# Patient Record
Sex: FEMALE | Race: WHITE | NOT HISPANIC OR LATINO | Employment: OTHER | URBAN - METROPOLITAN AREA
[De-identification: names, ages, dates, MRNs, and addresses within clinical notes are randomized per-mention and may not be internally consistent; named-entity substitution may affect disease eponyms.]

---

## 2017-02-02 ENCOUNTER — PATIENT OUTREACH (OUTPATIENT)
Dept: HEALTH INFORMATION MANAGEMENT | Facility: OTHER | Age: 67
End: 2017-02-02

## 2017-02-03 NOTE — PROGRESS NOTES
2/2/17  -  Outcome:CONFIRMED WEB IZ.    COMPLETED NEW MEMBER      Annual Wellness Visit Scheduling  Scheduling Status: Scheduled    Care Gap Scheduling (Attempt to Schedule EACH Overdue Care Gap!)  Health Maintenance Due   Topic Date Due   • IMM DTaP/Tdap/Td Vaccine (1 - Tdap) Added to her appt   • IMM ZOSTER VACCINE  Added to her    • IMM PNEUMOCOCCAL 65+ (ADULT) LOW/MEDIUM RISK SERIES (1 of 2 - PCV13) Added to her    • IMM INFLUENZA (1) Pt said that she had her Flu vaccine in [facility].                                                                                                           Sent a COREY form to [employer].    [employer] Activation:  Already Active

## 2017-02-24 ENCOUNTER — TELEPHONE (OUTPATIENT)
Dept: MEDICAL GROUP | Facility: PHYSICIAN GROUP | Age: 67
End: 2017-02-24

## 2017-02-24 NOTE — TELEPHONE ENCOUNTER
Future Appointments       Provider Department Center    2/28/2017 3:00 PM JAJA Murdock; [facility]  [facility]          Left message for patient to call back regarding pre-visit planning. Please transfer call to [phone].    WebIZ Immunizations Due:  PCV13 / Shingles / Tdap

## 2017-02-28 ENCOUNTER — OFFICE VISIT (OUTPATIENT)
Dept: MEDICAL GROUP | Facility: PHYSICIAN GROUP | Age: 67
End: 2017-02-28
Payer: MEDICARE

## 2017-02-28 ENCOUNTER — APPOINTMENT (OUTPATIENT)
Dept: RADIOLOGY | Facility: IMAGING CENTER | Age: 67
End: 2017-02-28
Attending: NURSE PRACTITIONER
Payer: MEDICARE

## 2017-02-28 VITALS
HEIGHT: 67 IN | SYSTOLIC BLOOD PRESSURE: 146 MMHG | TEMPERATURE: 98.3 F | OXYGEN SATURATION: 92 % | BODY MASS INDEX: 28.09 KG/M2 | DIASTOLIC BLOOD PRESSURE: 100 MMHG | RESPIRATION RATE: 16 BRPM | WEIGHT: 179 LBS | HEART RATE: 78 BPM

## 2017-02-28 DIAGNOSIS — M54.50 LOW BACK PAIN AT MULTIPLE SITES: ICD-10-CM

## 2017-02-28 DIAGNOSIS — I87.2 CHRONIC VENOUS STASIS DERMATITIS OF BOTH LOWER EXTREMITIES: ICD-10-CM

## 2017-02-28 DIAGNOSIS — F17.210 NICOTINE DEPENDENCE, CIGARETTES, UNCOMPLICATED: ICD-10-CM

## 2017-02-28 DIAGNOSIS — E03.9 ACQUIRED HYPOTHYROIDISM: ICD-10-CM

## 2017-02-28 DIAGNOSIS — N28.9 RENAL INSUFFICIENCY: ICD-10-CM

## 2017-02-28 DIAGNOSIS — Z00.00 MEDICARE ANNUAL WELLNESS VISIT, SUBSEQUENT: ICD-10-CM

## 2017-02-28 DIAGNOSIS — Z78.0 POST-MENOPAUSAL: ICD-10-CM

## 2017-02-28 DIAGNOSIS — Z12.11 SCREEN FOR COLON CANCER: ICD-10-CM

## 2017-02-28 DIAGNOSIS — Z13.6 SCREENING FOR CARDIOVASCULAR CONDITION: ICD-10-CM

## 2017-02-28 DIAGNOSIS — L03.115 CELLULITIS OF RIGHT LOWER EXTREMITY: ICD-10-CM

## 2017-02-28 DIAGNOSIS — Z23 NEED FOR VACCINATION WITH 13-POLYVALENT PNEUMOCOCCAL CONJUGATE VACCINE: ICD-10-CM

## 2017-02-28 PROCEDURE — 72100 X-RAY EXAM L-S SPINE 2/3 VWS: CPT | Mod: TC | Performed by: NURSE PRACTITIONER

## 2017-02-28 PROCEDURE — G0009 ADMIN PNEUMOCOCCAL VACCINE: HCPCS | Performed by: NURSE PRACTITIONER

## 2017-02-28 PROCEDURE — G0439 PPPS, SUBSEQ VISIT: HCPCS | Mod: 25 | Performed by: NURSE PRACTITIONER

## 2017-02-28 PROCEDURE — G8432 DEP SCR NOT DOC, RNG: HCPCS | Performed by: NURSE PRACTITIONER

## 2017-02-28 PROCEDURE — 90670 PCV13 VACCINE IM: CPT | Performed by: NURSE PRACTITIONER

## 2017-02-28 PROCEDURE — 4004F PT TOBACCO SCREEN RCVD TLK: CPT | Performed by: NURSE PRACTITIONER

## 2017-02-28 RX ORDER — CLOBETASOL PROPIONATE 0.46 MG/ML
SOLUTION TOPICAL
Refills: 3 | COMMUNITY
Start: 2017-02-07 | End: 2017-04-15

## 2017-02-28 ASSESSMENT — PAIN SCALES - GENERAL: PAINLEVEL: NO PAIN

## 2017-02-28 ASSESSMENT — PATIENT HEALTH QUESTIONNAIRE - PHQ9: CLINICAL INTERPRETATION OF PHQ2 SCORE: 1

## 2017-02-28 NOTE — PROGRESS NOTES
Chief Complaint   Patient presents with   • Annual Wellness Visit         HPI:  Naomy is a 67 y.o. female here for Medicare Annual Wellness Visit    Taking all medications as directed.    Low back pain present every day, history of falls at work and home, none recently. No fever, chills, loss of bladder or bowel function.      Patient Active Problem List    Diagnosis Date Noted   • Low back pain at multiple sites 06/06/2016   • Nicotine dependence, cigarettes, uncomplicated 06/06/2016   • Chronic venous stasis dermatitis of both lower extremities 07/15/2015   • Acquired hypothyroidism 07/14/2015   • Renal insufficiency 11/02/2013   • Cellulitis of leg 11/01/2013       Current Outpatient Prescriptions   Medication Sig Dispense Refill   • fluocinonide (LIDEX) 0.05 % Cream   3   • mupirocin (BACTROBAN) 2 % Ointment   2   • levothyroxine (SYNTHROID) 75 MCG Tab Take 1 Tab by mouth Every morning on an empty stomach. 90 Tab 4   • clobetasol (TEMOVATE) 0.05 % external solution APPLY NIGHTLY AT BEDTIME TO SCALP AS NEEDED . DO NOT USE ON FACE.  3   • ketoconazole (NIZORAL) 2 % Cream   5   • fluocinonide-emollient (LIDEX-E) 0.05 % cream Apply 0.05 % to affected area(s) BID (RC)(S).       No current facility-administered medications for this visit.        The patient reports adherence to this regimen   Current supplements as per medication list.   Chronic narcotic pain medicines: no    Allergies: Pcn    Current social contact/activities: dinner with friends and family     Is patient current with immunizations?  no   If no, due for PCV13 / Shingles / Tdap but only interested in PCV13.    She  reports that she has been smoking Cigarettes.  She started smoking about 53 years ago. She has a 26.5 pack-year smoking history. She has never used smokeless tobacco. She reports that she drinks about 7.0 oz of alcohol per week. She reports that she does not use illicit drugs.  Ready to quit: Not Answered  Counseling given:  Yes        DPA/Advanced Directive:  Patient does not have an advanced directive.  If not on file, instructed to bring in a copy to scan into her chart. If no advanced directive exists, a packet and workshop information was provided    ROS:    Gait: Uses no assistive device   Ostomy: no   Other tubes: no   Amputations: no   Chronic oxygen use no   Last eye exam 2-3 months ago   : Denies incontinence.       Depression Screening    Little interest or pleasure in doing things?  1 - several days  Feeling down, depressed, or hopeless?  0 - not at all  Patient Health Questionnaire Score: 1    If depressive symptoms identified deferred to follow up visit unless specifically addressed in assessment and plan.    Screening for Cognitive Impairment    Three Minute Recall (banana, sunrise, fence)  2/3    Draws clock face with all 12 numbers and sets the hands to show 10 minutes past 10 o'clock  0 2/5  Cognitive concerns identified deferred for follow up unless specifically addressed in assessment and plan.    Fall Risk Assessment    Has the patient had two or more falls in the last year or any fall with injury in the last year?  No    Safety Assessment    Throw rugs on floor.  Yes  Handrails on all stairs.  Yes  Good lighting in all hallways.  Yes  Difficulty hearing.  Yes  Patient counseled about all safety risks that were identified.    Functional Assessment ADLs    Are there any barriers preventing you from cooking for yourself or meeting nutritional needs?  No.    Are there any barriers preventing you from driving safely or obtaining transportation?  No.    Are there any barriers preventing you from using a telephone or calling for help?  No.    Are there any barriers preventing you from shopping?  No.    Are there any barriers preventing you from taking care of your own finances?  No.    Are there any barriers preventing you from managing your medications?  No.    Are currently engaging any exercise or physical activity?   No.       Health Maintenance Summary                IMM DTaP/Tdap/Td Vaccine Overdue 2/19/1969     IMM ZOSTER VACCINE Overdue 2/19/2010     IMM PNEUMOCOCCAL 65+ (ADULT) LOW/MEDIUM RISK SERIES Overdue 2/19/2015      Done 11/1/2013 Imm Admin: Pneumococcal polysaccharide vaccine (PPSV-23)    MAMMOGRAM Next Due 6/24/2017      Done 6/24/2016 MA-SCREEN MAMMO W/CAD-BILAT     Patient has more history with this topic...    BONE DENSITY Next Due 8/18/2020      Postponed 8/18/2015     COLONOSCOPY Next Due 8/18/2025      Postponed 8/18/2015           Patient Care Team:  JAJA Murdock as PCP - General (Family Medicine)  Edmundo Montiel M.D. (Dermatology)  Haris Talamantes M.D. as Consulting Physician (Ophthalmology)    Social History   Substance Use Topics   • Smoking status: Current Every Day Smoker -- 0.50 packs/day for 53 years     Types: Cigarettes     Start date: 03/29/1963   • Smokeless tobacco: Never Used   • Alcohol Use: 7.0 oz/week     14 Glasses of wine per week     Family History   Problem Relation Age of Onset   • Arthritis Mother      hip fracture   • Diabetes Mother    • Hypertension Mother    • Cancer Mother      skin   • Arthritis Father      lung   • Alcohol/Drug Father      etoh   • Lung Disease Sister      smoker/copd   • Other Brother      hep c   • Arthritis Maternal Grandmother      hip fracture   • Diabetes Maternal Grandfather    • Hyperlipidemia Neg Hx    • Stroke Neg Hx    • No Known Problems Brother      She  has a past medical history of Allergy; Anxiety; Arrhythmia; Blood transfusion without reported diagnosis; Cataract; GERD (gastroesophageal reflux disease); Hypertension; Kidney disease; Thyroid disease; and Urinary tract infection, site not specified. She also has no past medical history of Anemia, Depression, Arthritis, Asthma, Cancer (CMS-[facility], [facility]), CHF (congestive heart failure) (CMS-HCC), Clotting disorder (CMS-HCC), COPD (chronic obstructive pulmonary disease) (CMS-HCC), Diabetes  "(CMS-formerly [facility]), Diabetic neuropathy (CMS-HCC), Glaucoma, Headache(784.0), Heart attack (CMS-HCC), Heart murmur, HIV (human immunodeficiency virus infection) (CMS-HCC), Hyperlipidemia, IBD (inflammatory bowel disease), Meningitis, Muscle disorder, Osteoporosis, Seizure (CMS-HCC), Stroke (CMS-HCC), Substance abuse, Tuberculosis, or Ulcer (CMS-HCC).   Past Surgical History   Procedure Laterality Date   • Pr mammary ductogram, single     • Breast biopsy       hx of benign left breast biopsy   • Abdominal exploration       2 ectopic preg   • Oophorectomy     • Hysterectomy, total abdominal  1977/1978   • Eye surgery       cataract x2           Exam:     Blood pressure 146/100, pulse 78, temperature 36.8 °C (98.3 °F), resp. rate 16, height 1.702 m (5' 7.01\"), weight 81.194 kg (179 lb), SpO2 92 %. Body mass index is 28.03 kg/(m^2).    Hearing good.    Alert, oriented in no acute distress.  Eye contact is good, speech goal directed, affect calm      Assessment and Plan. The following treatment and monitoring plan is recommended:    1. Medicare annual wellness visit, subsequent     2. Acquired hypothyroidism  TSH    FREE THYROXINE    Stable, continue current medications and regular monitoring.   3. Chronic venous stasis dermatitis of both lower extremities      chronic, stable, continue current medications and f/u with dermatology.   4. Cellulitis of right lower extremity      chronic, stable, continue current medications and f/u with dermatology.   5. Nicotine dependence, cigarettes, uncomplicated      counseled regarding smoking cessation.   6. Renal insufficiency  COMP METABOLIC PANEL    Improved, will continue to monitor.    7. Low back pain at multiple sites  DX-LUMBAR SPINE-2 OR 3 VIEWS    Imaging ordered. will be notified of results and referrals prn.   8. Elevated blood pressure      Reviewed lifestyle modifications, monitor at home and bring record to appointment, f/u one month. reviewed s/sx of need for emergent " evaluation.   9. Post-menopausal  DS-BONE DENSITY STUDY (DEXA)   10. Screen for colon cancer  REFERRAL TO GASTROENTEROLOGY   11. Screening for cardiovascular condition  LIPID PROFILE   12. Need for vaccination with 13-polyvalent pneumococcal conjugate vaccine  PNEUMOCOCCAL CONJUGATE VACCINE 13-VALENT     The patient was counseled on the requirements, possible side effects, and future requirements of immunizations for today, including all of the components of combination vaccines.  The vaccinations were approved to be given. Immunizations given by MA after completion of vaccine screening checklist, under the direction of Dr. Lashonda Rosales.      Services needed: No services needed at this time  Health Care Screening recommendations as per orders if indicated.  Referrals offered: PT/OT/Nutrition counseling/Behavioral Health/Smoking cessation as per orders if indicated.    Discussion today about general wellness and lifestyle habits:    · Prevent falls and reduce trip hazards; Cautioned about securing or removing rugs.  · Have a working fire alarm and carbon monoxide detector;   · Engage in regular physical activity and social activities   · Continue with current daily activities (goals)      Follow-up: Return in about 1 month (around 3/28/2017) for elevated blood pressure.

## 2017-02-28 NOTE — MR AVS SNAPSHOT
"        Naomy Vargas   2017 3:00 PM   Office Visit   MRN: [id number]    Department:  [facility]   Dept Phone:  [phone]    Description:  Female : 1950   Provider:  JAJA Murdock; [facility]            Reason for Visit     Annual Wellness Visit           Allergies as of 2017     Allergen Noted Reactions    Pcn [Penicillins] 2015   Swelling    As a child  Unsure of tolerance to other \"cillins\"  HAS TOLERATED KEFLEX PREVIOUSLY WITHOUT DIFFICULTY.      You were diagnosed with     Medicare annual wellness visit, subsequent   [322720]       Acquired hypothyroidism   [8388594]       Chronic venous stasis dermatitis of both lower extremities   [8381216]       Cellulitis of right lower extremity   [623793]       Nicotine dependence, cigarettes, uncomplicated   [1947571]       Renal insufficiency   [109268]       Low back pain at multiple sites   [5322944]       Elevated blood pressure   [308027]       Post-menopausal   [242259]       Screen for colon cancer   [199614]       Need for vaccination with 13-polyvalent pneumococcal conjugate vaccine   [3205307]       Screening for cardiovascular condition   [655631]         Vital Signs     Blood Pressure Pulse Temperature Respirations Height Weight    146/100 mmHg 78 36.8 °C (98.3 °F) 16 1.702 m (5' 7.01\") 81.194 kg (179 lb)    Body Mass Index Oxygen Saturation Smoking Status             28.03 kg/m2 92% Current Every Day Smoker         Basic Information     Date Of Birth Sex Race Ethnicity Preferred Language    1950 Female White Non- English      Your appointments     Mar 29, 2017  2:35 PM   Established Patient with JAJA Murdock   [facility])    [address] [place] [address], [address]  [facility] [address]   [phone]           You will be receiving a confirmation call a few days before your appointment from our automated call confirmation system.              "   Problem List              ICD-10-CM Priority Class Noted - Resolved    Cellulitis of leg L03.119   11/1/2013 - Present    Renal insufficiency N28.9   11/2/2013 - Present    Acquired hypothyroidism E03.9   7/14/2015 - Present    Chronic venous stasis dermatitis of both lower extremities I83.11, I83.12   7/15/2015 - Present    Low back pain at multiple sites M54.5   6/6/2016 - Present    Nicotine dependence, cigarettes, uncomplicated F17.210   6/6/2016 - Present      Health Maintenance        Date Due Completion Dates    IMM DTaP/Tdap/Td Vaccine (1 - Tdap) 2/19/1969 ---    IMM ZOSTER VACCINE 2/19/2010 ---    IMM PNEUMOCOCCAL 65+ (ADULT) LOW/MEDIUM RISK SERIES (1 of 2 - PCV13) 2/19/2015 11/1/2013    MAMMOGRAM 6/24/2017 6/24/2016, 8/18/2015 (Postponed), 1/9/2013, 12/14/2012    Override on 8/18/2015: Postponed    BONE DENSITY 8/18/2020 8/18/2015 (Postponed)    Override on 8/18/2015: Postponed    COLONOSCOPY 8/18/2025 8/18/2015 (Postponed)    Override on 8/18/2015: Postponed            Current Immunizations     13-VALENT PCV PREVNAR 2/28/2017    INFLUENZA VACCINE H1N1 1/11/2010    Influenza TIV (IM) 11/1/2013  8:35 PM    Influenza Vaccine Adult HD 1/21/2017    Pneumococcal polysaccharide vaccine (PPSV-23) 11/1/2013  8:35 PM      Below and/or attached are the medications your provider expects you to take. Review all of your home medications and newly ordered medications with your provider and/or pharmacist. Follow medication instructions as directed by your provider and/or pharmacist. Please keep your medication list with you and share with your provider. Update the information when medications are discontinued, doses are changed, or new medications (including over-the-counter products) are added; and carry medication information at all times in the event of emergency situations     Allergies:  PCN - Swelling               Medications  Valid as of: February 28, 2017 -  4:20 PM    Generic Name Brand Name Tablet Size  Instructions for use    Clobetasol Propionate (Solution) TEMOVATE 0.05 % APPLY NIGHTLY AT BEDTIME TO SCALP AS NEEDED . DO NOT USE ON FACE.        Fluocinonide (Cream) LIDEX 0.05 %         Fluocinonide Emulsified Base (Cream) LIDEX-E 0.05 % Apply 0.05 % to affected area(s) BID (RC)(S).        Ketoconazole (Cream) NIZORAL 2 %         Levothyroxine Sodium (Tab) SYNTHROID 75 MCG Take 1 Tab by mouth Every morning on an empty stomach.        Mupirocin (Ointment) BACTROBAN 2 %         .                 Medicines prescribed today were sent to:     [facility]Y #[id number] - [place], [place] - [address]    [address] [place] [address]    Phone: [phone] Fax: [phone]    Open 24 Hours?: No      Medication refill instructions:       If your prescription bottle indicates you have medication refills left, it is not necessary to call your provider’s office. Please contact your pharmacy and they will refill your medication.    If your prescription bottle indicates you do not have any refills left, you may request refills at any time through one of the following ways: The online [employer] system (except Urgent Care), by calling your provider’s office, or by asking your pharmacy to contact your provider’s office with a refill request. Medication refills are processed only during regular business hours and may not be available until the next business day. Your provider may request additional information or to have a follow-up visit with you prior to refilling your medication.   *Please Note: Medication refills are assigned a new Rx number when refilled electronically. Your pharmacy may indicate that no refills were authorized even though a new prescription for the same medication is available at the pharmacy. Please request the medicine by name with the pharmacy before contacting your provider for a refill.        Your To Do List     Future Labs/Procedures Complete By Expires    COMP METABOLIC PANEL  As directed 3/1/2018    Comments:    8  hour fast, plain water only    DS-BONE DENSITY STUDY (DEXA)  As directed 2/28/2018    DX-LUMBAR SPINE-2 OR 3 VIEWS  As directed 2/28/2018    FREE THYROXINE  As directed 3/1/2018    LIPID PROFILE  As directed 3/1/2018    Comments:    10 hour fast, plain water only    TSH  As directed 3/1/2018      Referral     A referral request has been sent to our patient care coordination department. Please allow 3-5 business days for us to process this request and contact you either by phone or mail. If you do not hear from us by the 5th business day, please call us at [phone].           [employer] Access Code: Activation code not generated  Current [employer] Status: Active

## 2017-03-29 ENCOUNTER — OFFICE VISIT (OUTPATIENT)
Dept: MEDICAL GROUP | Facility: PHYSICIAN GROUP | Age: 67
End: 2017-03-29
Payer: MEDICARE

## 2017-03-29 VITALS
BODY MASS INDEX: 27.62 KG/M2 | TEMPERATURE: 99 F | WEIGHT: 176 LBS | RESPIRATION RATE: 16 BRPM | SYSTOLIC BLOOD PRESSURE: 146 MMHG | OXYGEN SATURATION: 94 % | DIASTOLIC BLOOD PRESSURE: 82 MMHG | HEART RATE: 80 BPM | HEIGHT: 67 IN

## 2017-03-29 DIAGNOSIS — E03.9 ACQUIRED HYPOTHYROIDISM: ICD-10-CM

## 2017-03-29 DIAGNOSIS — F17.210 NICOTINE DEPENDENCE, CIGARETTES, UNCOMPLICATED: ICD-10-CM

## 2017-03-29 DIAGNOSIS — M54.50 LOW BACK PAIN AT MULTIPLE SITES: ICD-10-CM

## 2017-03-29 PROCEDURE — 3014F SCREEN MAMMO DOC REV: CPT | Performed by: NURSE PRACTITIONER

## 2017-03-29 PROCEDURE — 4040F PNEUMOC VAC/ADMIN/RCVD: CPT | Performed by: NURSE PRACTITIONER

## 2017-03-29 PROCEDURE — G8419 CALC BMI OUT NRM PARAM NOF/U: HCPCS | Performed by: NURSE PRACTITIONER

## 2017-03-29 PROCEDURE — G8482 FLU IMMUNIZE ORDER/ADMIN: HCPCS | Performed by: NURSE PRACTITIONER

## 2017-03-29 PROCEDURE — G8432 DEP SCR NOT DOC, RNG: HCPCS | Performed by: NURSE PRACTITIONER

## 2017-03-29 PROCEDURE — 1101F PT FALLS ASSESS-DOCD LE1/YR: CPT | Performed by: NURSE PRACTITIONER

## 2017-03-29 PROCEDURE — 99214 OFFICE O/P EST MOD 30 MIN: CPT | Performed by: NURSE PRACTITIONER

## 2017-03-29 PROCEDURE — 4004F PT TOBACCO SCREEN RCVD TLK: CPT | Performed by: NURSE PRACTITIONER

## 2017-03-29 NOTE — MR AVS SNAPSHOT
"        Naomy Vargas   3/29/2017 2:35 PM   Office Visit   MRN: [id number]    Department:  [facility]   Dept Phone:  [phone]    Description:  Female : 1950   Provider:  JAJA Murdock           Reason for Visit     Follow-Up blood pressure     Hypothyroidism           Allergies as of 3/29/2017     Allergen Noted Reactions    Pcn [Penicillins] 2015   Swelling    As a child  Unsure of tolerance to other \"cillins\"  HAS TOLERATED KEFLEX PREVIOUSLY WITHOUT DIFFICULTY.      Vital Signs     Blood Pressure Pulse Temperature Respirations Height Weight    146/82 mmHg 80 37.2 °C (99 °F) 16 1.702 m (5' 7.01\") 79.833 kg (176 lb)    Body Mass Index Oxygen Saturation Smoking Status             27.56 kg/m2 94% Current Every Day Smoker         Basic Information     Date Of Birth Sex Race Ethnicity Preferred Language    1950 Female White Non- English      Your appointments     May 02, 2017  9:55 AM   Established Patient with JAJA Murdock   [facility])    [address], [address]  [facility] [address] [phone]           You will be receiving a confirmation call a few days before your appointment from our automated call confirmation system.              Problem List              ICD-10-CM Priority Class Noted - Resolved    Cellulitis of leg L03.119   2013 - Present    Renal insufficiency N28.9   2013 - Present    Acquired hypothyroidism E03.9   2015 - Present    Chronic venous stasis dermatitis of both lower extremities I83.11, I83.12   7/15/2015 - Present    Low back pain at multiple sites M54.5   2016 - Present    Nicotine dependence, cigarettes, uncomplicated F17.210   2016 - Present      Health Maintenance        Date Due Completion Dates    COLONOSCOPY 2000 ---    MAMMOGRAM 2017, 2015 (Postponed), 2013, 2012    Override on 2015: Postponed    IMM " PNEUMOCOCCAL 65+ (ADULT) LOW/MEDIUM RISK SERIES (2 of 2 - PPSV23) 11/1/2018 2/28/2017, 11/1/2013    BONE DENSITY 8/18/2020 8/18/2015 (Postponed)    Override on 8/18/2015: Postponed    IMM DTaP/Tdap/Td Vaccine (2 - Td) 3/15/2027 3/15/2017            Current Immunizations     13-VALENT PCV PREVNAR 2/28/2017    INFLUENZA VACCINE H1N1 1/11/2010    Influenza TIV (IM) 11/1/2013  8:35 PM    Influenza Vaccine Adult HD 1/21/2017    Pneumococcal polysaccharide vaccine (PPSV-23) 11/1/2013  8:35 PM    SHINGLES VACCINE 3/15/2017    Tdap Vaccine 3/15/2017      Below and/or attached are the medications your provider expects you to take. Review all of your home medications and newly ordered medications with your provider and/or pharmacist. Follow medication instructions as directed by your provider and/or pharmacist. Please keep your medication list with you and share with your provider. Update the information when medications are discontinued, doses are changed, or new medications (including over-the-counter products) are added; and carry medication information at all times in the event of emergency situations     Allergies:  PCN - Swelling               Medications  Valid as of: March 29, 2017 -  3:22 PM    Generic Name Brand Name Tablet Size Instructions for use    Clobetasol Propionate (Solution) TEMOVATE 0.05 % APPLY NIGHTLY AT BEDTIME TO SCALP AS NEEDED . DO NOT USE ON FACE.        Fluocinonide (Cream) LIDEX 0.05 %         Fluocinonide Emulsified Base (Cream) LIDEX-E 0.05 % Apply 0.05 % to affected area(s) BID (RC)(S).        Ketoconazole (Cream) NIZORAL 2 %         Levothyroxine Sodium (Tab) SYNTHROID 75 MCG Take 1 Tab by mouth Every morning on an empty stomach.        Mupirocin (Ointment) BACTROBAN 2 %         .                 Medicines prescribed today were sent to:     [facility]ACY #[id number] - [place] - [address] [address]    Phone: [phone] Fax: [phone]    Open 24 Hours?: No         Medication refill instructions:       If your prescription bottle indicates you have medication refills left, it is not necessary to call your provider’s office. Please contact your pharmacy and they will refill your medication.    If your prescription bottle indicates you do not have any refills left, you may request refills at any time through one of the following ways: The online [employer] system (except Urgent Care), by calling your provider’s office, or by asking your pharmacy to contact your provider’s office with a refill request. Medication refills are processed only during regular business hours and may not be available until the next business day. Your provider may request additional information or to have a follow-up visit with you prior to refilling your medication.   *Please Note: Medication refills are assigned a new Rx number when refilled electronically. Your pharmacy may indicate that no refills were authorized even though a new prescription for the same medication is available at the pharmacy. Please request the medicine by name with the pharmacy before contacting your provider for a refill.           [employer] Access Code: Activation code not generated  Current [employer] Status: Active          Quit Tobacco Information     Do you want to quit using tobacco?    Quitting tobacco decreases risks of cancer, heart and lung disease, increases life expectancy, improves sense of taste and smell, and increases spending money, among other benefits.    If you are thinking about quitting, we can help.  • [facility] Quit Tobacco Program: 820.352.5645  o Program occurs weekly for four weeks and includes pharmacist consultation on products to support quitting smoking or chewing tobacco. A provider referral is needed for pharmacist consultation.  • Tobacco Users Help Hotline: 5-013-QUIT-NOW (826-1800) or https://[place].quitlogix.org/  o Free, confidential telephone and online coaching for [place] residents. Sessions are  designed on a schedule that is convenient for you. Eligible clients receive free nicotine replacement therapy.  • Nationally: www.smokefree.gov  o Information and professional assistance to support both immediate and long-term needs as you become, and remain, a non-smoker. Smokefree.gov allows you to choose the help that best fits your needs.

## 2017-03-29 NOTE — PROGRESS NOTES
Subjective:     Chief Complaint   Patient presents with   • Follow-Up     blood pressure    • Hypothyroidism        Naomy Vargas is a 67 y.o. female here today for evaluation and management of:    AWV 2.28.17 BP was elevated, She is here today to follow up on elevated blood pressure. She did not have labs done as ordered at the visit but will do so.  Reports home readings range from 169/99 to 136/86 or 137/83. Most -160. She valdez shave an older device, not with her today.    Reviewed imaging and results for low back pain which continues. Denies numbness, loss of bladder bowel function.      Acquired hypothyroidism  Currently taking levothyroxine 75 mcg daily as directed on empty stomach with water and waits at least 30 minutes to consume other food, beverages, or medications.  Denies palpitations, skin changes, temperature intolerance, or changes in bowel habits.  Last TSH 3.44 within range.         ROS   Denies HA, chest pain, shortness of breath, abdominal pain, bladder or bowel changes, lower extremity edema.    Current medicines (including changes today)  Current Outpatient Prescriptions   Medication Sig Dispense Refill   • ketoconazole (NIZORAL) 2 % Cream   5   • levothyroxine (SYNTHROID) 75 MCG Tab Take 1 Tab by mouth Every morning on an empty stomach. 90 Tab 4   • fluocinonide-emollient (LIDEX-E) 0.05 % cream Apply 0.05 % to affected area(s) BID (RC)(S).     • clobetasol (TEMOVATE) 0.05 % external solution APPLY NIGHTLY AT BEDTIME TO SCALP AS NEEDED . DO NOT USE ON FACE.  3   • fluocinonide (LIDEX) 0.05 % Cream   3   • mupirocin (BACTROBAN) 2 % Ointment   2     No current facility-administered medications for this visit.       She  has a past medical history of Allergy; Anxiety; Arrhythmia; Blood transfusion without reported diagnosis; Cataract; GERD (gastroesophageal reflux disease); Hypertension; Kidney disease; Thyroid disease; and Urinary tract infection, site not specified. She also has no  "past medical history of Anemia, Depression, Arthritis, Asthma, Cancer (CMS-[facility]), CHF (congestive heart failure) (CMS-[facility]), Clotting disorder (CMS-[facility]), COPD (chronic obstructive pulmonary disease) (CMS-[facility]), Diabetes (CMS-[facility]), Diabetic neuropathy (CMS-[facility]), Glaucoma, Headache(784.0), Heart attack (CMS-[facility]), Heart murmur, HIV (human immunodeficiency virus infection) (CMS-[facility]), Hyperlipidemia, IBD (inflammatory bowel disease), Meningitis, Muscle disorder, Osteoporosis, Seizure (CMS-[facility]), Stroke (CMS-[facility]), Substance abuse, Tuberculosis, or Ulcer (CMS-HCC).    Allergies Pcn    Current medications, problem list, allergies, past medical history, and tobacco use history reviewed in [facility] today.    Health maintenance reviewed and updated. colonocopy [facility] at [facility]     Objective:   Blood pressure 146/82, pulse 80, temperature 37.2 °C (99 °F), resp. rate 16, height 1.702 m (5' 7.01\"), weight 79.833 kg (176 lb), SpO2 94 %. Body mass index is 27.56 kg/(m^2).     Physical Exam   Constitutional: Alert, no acute distress. Pleasant and cooperative with the examination.  Skin:   Warm, dry, no rashes in visible areas.    Eyes:   Pupils equal, round. Conjunctiva and sclera clear,    Lids normal.  ENT:  Pinna normal.   Neck:   Supple, trachea midline.  Lungs:  Normal effort and respirations. Clear to auscultation bilaterally.  CV:  Regular rate and rhythm.  MS/Ext:  Steady gait, no edema.  Psych:  Eye contact is good, affect calm.    Assessment and Plan:   The following treatment plan was discussed    1. Elevated blood pressure  Reviewed lifestyle modifications. She will continue home monitoring and bring device to next visit. Reviewed s/sx of need for emergent care.    2. Acquired hypothyroidism  She will continue current dose of medication and visit lab.    3. Nicotine dependence, cigarettes, uncomplicated  Advised to quit.    4. Low back pain at multiple sites-degenerative changes of facet joints L3 to S11  Differential diagnosis, " natural history, supportive care, and indications for immediate follow-up discussed at length      Followup: Return in about 1 month (around 4/29/2017).  As scheduled, sooner if symptoms don't resolve or with any new problems.         Reviewed side effects, risks, and benefits of medications prescribed today.  Advised to take all medications as instructed and report any side effects.   The patient voices understanding and agrees.  Report any new or worsening symptoms.  Have labs or other diagnostic studies prior to follow up.  Keep all appointments for any referrals given.      Please note this dictation was created using voice recognition software. Every reasonable attempt has been made to correct obvious errors, however there may be errors of grammar and possibly content that were not discovered before finalizing the note.

## 2017-04-01 NOTE — ASSESSMENT & PLAN NOTE
Currently taking levothyroxine 75 mcg daily as directed on empty stomach with water and waits at least 30 minutes to consume other food, beverages, or medications.  Denies palpitations, skin changes, temperature intolerance, or changes in bowel habits.  Last TSH 3.44 within range.

## 2017-04-05 ENCOUNTER — HOSPITAL ENCOUNTER (OUTPATIENT)
Dept: LAB | Facility: MEDICAL CENTER | Age: 67
End: 2017-04-05
Attending: NURSE PRACTITIONER
Payer: MEDICARE

## 2017-04-05 DIAGNOSIS — N28.9 RENAL INSUFFICIENCY: ICD-10-CM

## 2017-04-05 DIAGNOSIS — Z13.6 SCREENING FOR CARDIOVASCULAR CONDITION: ICD-10-CM

## 2017-04-05 DIAGNOSIS — E03.9 ACQUIRED HYPOTHYROIDISM: ICD-10-CM

## 2017-04-05 LAB
ALBUMIN SERPL BCP-MCNC: 4 G/DL (ref 3.2–4.9)
ALBUMIN/GLOB SERPL: 1.3 G/DL
ALP SERPL-CCNC: 85 U/L (ref 30–99)
ALT SERPL-CCNC: 14 U/L (ref 2–50)
ANION GAP SERPL CALC-SCNC: 9 MMOL/L (ref 0–11.9)
AST SERPL-CCNC: 23 U/L (ref 12–45)
BILIRUB SERPL-MCNC: 0.6 MG/DL (ref 0.1–1.5)
BUN SERPL-MCNC: 20 MG/DL (ref 8–22)
CALCIUM SERPL-MCNC: 9.5 MG/DL (ref 8.5–10.5)
CHLORIDE SERPL-SCNC: 103 MMOL/L (ref 96–112)
CHOLEST SERPL-MCNC: 147 MG/DL (ref 100–199)
CO2 SERPL-SCNC: 23 MMOL/L (ref 20–33)
CREAT SERPL-MCNC: 0.9 MG/DL (ref 0.5–1.4)
GFR SERPL CREATININE-BSD FRML MDRD: >60 ML/MIN/1.73 M 2
GLOBULIN SER CALC-MCNC: 3 G/DL (ref 1.9–3.5)
GLUCOSE SERPL-MCNC: 97 MG/DL (ref 65–99)
HDLC SERPL-MCNC: 48 MG/DL
LDLC SERPL CALC-MCNC: 86 MG/DL
POTASSIUM SERPL-SCNC: 4.3 MMOL/L (ref 3.6–5.5)
PROT SERPL-MCNC: 7 G/DL (ref 6–8.2)
SODIUM SERPL-SCNC: 135 MMOL/L (ref 135–145)
T4 FREE SERPL-MCNC: 1.32 NG/DL (ref 0.53–1.43)
TRIGL SERPL-MCNC: 66 MG/DL (ref 0–149)
TSH SERPL DL<=0.005 MIU/L-ACNC: 3.92 UIU/ML (ref 0.3–3.7)

## 2017-04-05 PROCEDURE — 84439 ASSAY OF FREE THYROXINE: CPT

## 2017-04-05 PROCEDURE — 80053 COMPREHEN METABOLIC PANEL: CPT

## 2017-04-05 PROCEDURE — 80061 LIPID PANEL: CPT

## 2017-04-05 PROCEDURE — 36415 COLL VENOUS BLD VENIPUNCTURE: CPT

## 2017-04-05 PROCEDURE — 84443 ASSAY THYROID STIM HORMONE: CPT

## 2017-04-10 ENCOUNTER — OFFICE VISIT (OUTPATIENT)
Dept: URGENT CARE | Facility: PHYSICIAN GROUP | Age: 67
End: 2017-04-10
Payer: MEDICARE

## 2017-04-10 ENCOUNTER — HOSPITAL ENCOUNTER (OUTPATIENT)
Facility: MEDICAL CENTER | Age: 67
End: 2017-04-10
Attending: NURSE PRACTITIONER
Payer: MEDICARE

## 2017-04-10 VITALS
OXYGEN SATURATION: 97 % | RESPIRATION RATE: 10 BRPM | HEART RATE: 88 BPM | SYSTOLIC BLOOD PRESSURE: 146 MMHG | TEMPERATURE: 98.4 F | DIASTOLIC BLOOD PRESSURE: 100 MMHG

## 2017-04-10 DIAGNOSIS — L03.113 CELLULITIS OF FOREARM, RIGHT: ICD-10-CM

## 2017-04-10 DIAGNOSIS — L28.2 PRURITIC RASH: ICD-10-CM

## 2017-04-10 DIAGNOSIS — T22.211A BURN OF FOREARM, RIGHT, SECOND DEGREE, INITIAL ENCOUNTER: ICD-10-CM

## 2017-04-10 PROCEDURE — G8432 DEP SCR NOT DOC, RNG: HCPCS | Performed by: NURSE PRACTITIONER

## 2017-04-10 PROCEDURE — 87205 SMEAR GRAM STAIN: CPT

## 2017-04-10 PROCEDURE — 99000 SPECIMEN HANDLING OFFICE-LAB: CPT | Performed by: NURSE PRACTITIONER

## 2017-04-10 PROCEDURE — 1101F PT FALLS ASSESS-DOCD LE1/YR: CPT | Performed by: NURSE PRACTITIONER

## 2017-04-10 PROCEDURE — 4004F PT TOBACCO SCREEN RCVD TLK: CPT | Performed by: NURSE PRACTITIONER

## 2017-04-10 PROCEDURE — 3014F SCREEN MAMMO DOC REV: CPT | Performed by: NURSE PRACTITIONER

## 2017-04-10 PROCEDURE — 87070 CULTURE OTHR SPECIMN AEROBIC: CPT

## 2017-04-10 PROCEDURE — G8419 CALC BMI OUT NRM PARAM NOF/U: HCPCS | Performed by: NURSE PRACTITIONER

## 2017-04-10 PROCEDURE — 4040F PNEUMOC VAC/ADMIN/RCVD: CPT | Performed by: NURSE PRACTITIONER

## 2017-04-10 PROCEDURE — 99213 OFFICE O/P EST LOW 20 MIN: CPT | Performed by: NURSE PRACTITIONER

## 2017-04-10 RX ORDER — SULFAMETHOXAZOLE AND TRIMETHOPRIM 800; 160 MG/1; MG/1
1 TABLET ORAL 2 TIMES DAILY
Qty: 20 TAB | Refills: 0 | Status: SHIPPED | OUTPATIENT
Start: 2017-04-10 | End: 2017-04-20

## 2017-04-10 RX ORDER — HYDROXYZINE HYDROCHLORIDE 25 MG/1
25 TABLET, FILM COATED ORAL 3 TIMES DAILY PRN
Qty: 30 TAB | Refills: 0 | Status: SHIPPED | OUTPATIENT
Start: 2017-04-10 | End: 2018-02-23

## 2017-04-10 ASSESSMENT — ENCOUNTER SYMPTOMS
CHILLS: 0
BURN: 1
FEVER: 0
NAUSEA: 0
VOMITING: 0
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
## 2017-04-11 LAB
GRAM STN SPEC: NORMAL
SIGNIFICANT IND 70042: NORMAL
SITE SITE: NORMAL
SOURCE SOURCE: NORMAL

## 2017-04-14 LAB
BACTERIA WND AEROBE CULT: NORMAL
GRAM STN SPEC: NORMAL
SIGNIFICANT IND 70042: NORMAL
SITE SITE: NORMAL
SOURCE SOURCE: NORMAL

## 2017-04-15 ENCOUNTER — HOSPITAL ENCOUNTER (EMERGENCY)
Facility: MEDICAL CENTER | Age: 67
End: 2017-04-15
Attending: EMERGENCY MEDICINE
Payer: MEDICARE

## 2017-04-15 ENCOUNTER — RESOLUTE PROFESSIONAL BILLING HOSPITAL PROF FEE (OUTPATIENT)
Dept: HOSPITALIST | Facility: MEDICAL CENTER | Age: 67
End: 2017-04-15
Payer: MEDICARE

## 2017-04-15 VITALS
SYSTOLIC BLOOD PRESSURE: 170 MMHG | HEIGHT: 67 IN | RESPIRATION RATE: 17 BRPM | HEART RATE: 77 BPM | OXYGEN SATURATION: 92 % | DIASTOLIC BLOOD PRESSURE: 81 MMHG | BODY MASS INDEX: 28.06 KG/M2 | WEIGHT: 178.79 LBS | TEMPERATURE: 98.4 F

## 2017-04-15 LAB
ALBUMIN SERPL BCP-MCNC: 4 G/DL (ref 3.2–4.9)
ALBUMIN/GLOB SERPL: 1.3 G/DL
ALP SERPL-CCNC: 94 U/L (ref 30–99)
ALT SERPL-CCNC: 20 U/L (ref 2–50)
ANION GAP SERPL CALC-SCNC: 9 MMOL/L (ref 0–11.9)
AST SERPL-CCNC: 17 U/L (ref 12–45)
BASOPHILS # BLD AUTO: 0.6 % (ref 0–1.8)
BASOPHILS # BLD: 0.04 K/UL (ref 0–0.12)
BILIRUB SERPL-MCNC: 0.5 MG/DL (ref 0.1–1.5)
BUN SERPL-MCNC: 19 MG/DL (ref 8–22)
CALCIUM SERPL-MCNC: 9.6 MG/DL (ref 8.5–10.5)
CHLORIDE SERPL-SCNC: 107 MMOL/L (ref 96–112)
CO2 SERPL-SCNC: 22 MMOL/L (ref 20–33)
CREAT SERPL-MCNC: 1.2 MG/DL (ref 0.5–1.4)
EOSINOPHIL # BLD AUTO: 0.87 K/UL (ref 0–0.51)
EOSINOPHIL NFR BLD: 12.6 % (ref 0–6.9)
ERYTHROCYTE [DISTWIDTH] IN BLOOD BY AUTOMATED COUNT: 47 FL (ref 35.9–50)
GFR SERPL CREATININE-BSD FRML MDRD: 45 ML/MIN/1.73 M 2
GLOBULIN SER CALC-MCNC: 3.1 G/DL (ref 1.9–3.5)
GLUCOSE SERPL-MCNC: 89 MG/DL (ref 65–99)
HCT VFR BLD AUTO: 47.9 % (ref 37–47)
HGB BLD-MCNC: 16.2 G/DL (ref 12–16)
IMM GRANULOCYTES # BLD AUTO: 0.02 K/UL (ref 0–0.11)
IMM GRANULOCYTES NFR BLD AUTO: 0.3 % (ref 0–0.9)
LACTATE BLD-SCNC: 1.1 MMOL/L (ref 0.5–2)
LYMPHOCYTES # BLD AUTO: 1.04 K/UL (ref 1–4.8)
LYMPHOCYTES NFR BLD: 15.1 % (ref 22–41)
MCH RBC QN AUTO: 36.1 PG (ref 27–33)
MCHC RBC AUTO-ENTMCNC: 33.8 G/DL (ref 33.6–35)
MCV RBC AUTO: 106.7 FL (ref 81.4–97.8)
MONOCYTES # BLD AUTO: 0.83 K/UL (ref 0–0.85)
MONOCYTES NFR BLD AUTO: 12 % (ref 0–13.4)
NEUTROPHILS # BLD AUTO: 4.1 K/UL (ref 2–7.15)
NEUTROPHILS NFR BLD: 59.4 % (ref 44–72)
NRBC # BLD AUTO: 0 K/UL
NRBC BLD AUTO-RTO: 0 /100 WBC
PLATELET # BLD AUTO: 144 K/UL (ref 164–446)
PMV BLD AUTO: 11.2 FL (ref 9–12.9)
POTASSIUM SERPL-SCNC: 4.5 MMOL/L (ref 3.6–5.5)
PROT SERPL-MCNC: 7.1 G/DL (ref 6–8.2)
RBC # BLD AUTO: 4.49 M/UL (ref 4.2–5.4)
SODIUM SERPL-SCNC: 138 MMOL/L (ref 135–145)
WBC # BLD AUTO: 6.9 K/UL (ref 4.8–10.8)

## 2017-04-15 PROCEDURE — 83520 IMMUNOASSAY QUANT NOS NONAB: CPT

## 2017-04-15 PROCEDURE — 85025 COMPLETE CBC W/AUTO DIFF WBC: CPT

## 2017-04-15 PROCEDURE — 99284 EMERGENCY DEPT VISIT MOD MDM: CPT | Performed by: HOSPITALIST

## 2017-04-15 PROCEDURE — 99284 EMERGENCY DEPT VISIT MOD MDM: CPT

## 2017-04-15 PROCEDURE — 80053 COMPREHEN METABOLIC PANEL: CPT

## 2017-04-15 PROCEDURE — 36415 COLL VENOUS BLD VENIPUNCTURE: CPT

## 2017-04-15 PROCEDURE — 700111 HCHG RX REV CODE 636 W/ 250 OVERRIDE (IP): Performed by: EMERGENCY MEDICINE

## 2017-04-15 PROCEDURE — 96375 TX/PRO/DX INJ NEW DRUG ADDON: CPT

## 2017-04-15 PROCEDURE — 83605 ASSAY OF LACTIC ACID: CPT

## 2017-04-15 PROCEDURE — 96374 THER/PROPH/DIAG INJ IV PUSH: CPT

## 2017-04-15 PROCEDURE — 87040 BLOOD CULTURE FOR BACTERIA: CPT

## 2017-04-15 RX ORDER — METHYLPREDNISOLONE 4 MG/1
TABLET ORAL
Qty: 1 KIT | Refills: 0 | Status: SHIPPED | OUTPATIENT
Start: 2017-04-15 | End: 2017-07-05

## 2017-04-15 RX ORDER — METHYLPREDNISOLONE SODIUM SUCCINATE 125 MG/2ML
125 INJECTION, POWDER, LYOPHILIZED, FOR SOLUTION INTRAMUSCULAR; INTRAVENOUS ONCE
Status: COMPLETED | OUTPATIENT
Start: 2017-04-15 | End: 2017-04-15

## 2017-04-15 RX ADMIN — METHYLPREDNISOLONE SODIUM SUCCINATE 125 MG: 125 INJECTION, POWDER, FOR SOLUTION INTRAMUSCULAR; INTRAVENOUS at 16:43

## 2017-04-15 ASSESSMENT — PAIN SCALES - GENERAL: PAINLEVEL_OUTOF10: 7
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
## 2017-04-15 NOTE — ED NOTES
Pt to triage. R eye swollen and rd, rash to R arm, small red raised areas, pt c/o itching  Blood samples collected and sent to lab
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
## 2017-04-16 NOTE — ED NOTES
Discharge home, given instructions and follow up per Dr Ceballos, prescription called in per maisha Santos no questions at this time, to lobby w/o difficulty

## 2017-04-17 DIAGNOSIS — R21 RASH: ICD-10-CM

## 2017-04-18 LAB — TRYPTASE SERPL-MCNC: 10 UG/L

## 2017-04-20 LAB
BACTERIA BLD CULT: NORMAL
SIGNIFICANT IND 70042: NORMAL
SITE SITE: NORMAL
SOURCE SOURCE: NORMAL

## 2017-04-26 ENCOUNTER — HOSPITAL ENCOUNTER (OUTPATIENT)
Dept: RADIOLOGY | Facility: MEDICAL CENTER | Age: 67
End: 2017-04-26
Attending: NURSE PRACTITIONER
Payer: MEDICARE

## 2017-04-26 DIAGNOSIS — Z78.0 POST-MENOPAUSAL: ICD-10-CM

## 2017-04-26 PROCEDURE — 77080 DXA BONE DENSITY AXIAL: CPT

## 2017-04-28 PROBLEM — M85.80 OSTEOPENIA: Status: ACTIVE | Noted: 2017-04-28
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
## 2017-05-01 ENCOUNTER — TELEPHONE (OUTPATIENT)
Dept: MEDICAL GROUP | Facility: PHYSICIAN GROUP | Age: 67
End: 2017-05-01

## 2017-05-01 NOTE — TELEPHONE ENCOUNTER
ESTABLISHED PATIENT PRE-VISIT PLANNING     Note: Patient will not be contacted if there is no indication to call.     1.  Reviewed note from last office visit with PCP and/or other med group provider: Yes    2.  If any orders were placed at last visit, do we have Results/Consult Notes?        •  Labs - Labs were not ordered at last office visit.       •  Imaging - Imaging was not ordered at last office visit.       •  Referrals - No referrals were ordered at last office visit.    3.  Immunizations were updated in [facility] using WebIZ?: Yes       •  Web Iz Recommendations: Patient is up to date on all vaccines    4.  Patient is due for the following Health Maintenance Topics:   Health Maintenance Due   Topic Date Due   • COLONOSCOPY  02/19/2000       - Patient has completed Colonoscopy/FIT and HMR Letter(s) faxed to: verbally requested from [facility]    5.  Patient was not informed to arrive 15 min prior to their scheduled appointment and bring in their medication bottles.

## 2017-05-02 ENCOUNTER — OFFICE VISIT (OUTPATIENT)
Dept: MEDICAL GROUP | Facility: PHYSICIAN GROUP | Age: 67
End: 2017-05-02
Payer: MEDICARE

## 2017-05-02 VITALS
RESPIRATION RATE: 14 BRPM | DIASTOLIC BLOOD PRESSURE: 82 MMHG | OXYGEN SATURATION: 94 % | WEIGHT: 177 LBS | TEMPERATURE: 98.4 F | BODY MASS INDEX: 27.78 KG/M2 | HEIGHT: 67 IN | SYSTOLIC BLOOD PRESSURE: 140 MMHG | HEART RATE: 74 BPM

## 2017-05-02 DIAGNOSIS — M85.80 OSTEOPENIA, UNSPECIFIED LOCATION: ICD-10-CM

## 2017-05-02 DIAGNOSIS — E03.9 ACQUIRED HYPOTHYROIDISM: ICD-10-CM

## 2017-05-02 DIAGNOSIS — R03.0 TRANSIENT ELEVATED BLOOD PRESSURE: ICD-10-CM

## 2017-05-02 DIAGNOSIS — F17.210 NICOTINE DEPENDENCE, CIGARETTES, UNCOMPLICATED: ICD-10-CM

## 2017-05-02 PROCEDURE — 1101F PT FALLS ASSESS-DOCD LE1/YR: CPT | Performed by: NURSE PRACTITIONER

## 2017-05-02 PROCEDURE — 4004F PT TOBACCO SCREEN RCVD TLK: CPT | Performed by: NURSE PRACTITIONER

## 2017-05-02 PROCEDURE — G8432 DEP SCR NOT DOC, RNG: HCPCS | Performed by: NURSE PRACTITIONER

## 2017-05-02 PROCEDURE — 99214 OFFICE O/P EST MOD 30 MIN: CPT | Performed by: NURSE PRACTITIONER

## 2017-05-02 PROCEDURE — G8419 CALC BMI OUT NRM PARAM NOF/U: HCPCS | Performed by: NURSE PRACTITIONER

## 2017-05-02 PROCEDURE — 3014F SCREEN MAMMO DOC REV: CPT | Performed by: NURSE PRACTITIONER

## 2017-05-02 PROCEDURE — 4040F PNEUMOC VAC/ADMIN/RCVD: CPT | Performed by: NURSE PRACTITIONER

## 2017-05-02 NOTE — ASSESSMENT & PLAN NOTE
Currently taking levothyroxine 75 mcg daily as directed on empty stomach with water and waits at least 30 minutes to consume other food, beverages, or medications.  Denies palpitations, temperature intolerance, or changes in bowel habits.  Recent TSH 3.9, previously level was 3.44 and within range.

## 2017-05-02 NOTE — ASSESSMENT & PLAN NOTE
DEXA 4.2017  10-year Probability of Fracture:  Major Osteoporotic     23.6%  Hip     6.7%  continues daily smoking, etoh 2 glasses wine daily. Not supplementing calcium. No regular exercise.
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
## 2017-05-02 NOTE — MR AVS SNAPSHOT
"        Naomy Vargas   2017 9:55 AM   Office Visit   MRN: [id number]    Department:  [facility]   Dept Phone:  [phone]    Description:  Female : 1950   Provider:  JAJA Murdock           Reason for Visit     Results review dexa results     Other elevated bp FV       Allergies as of 2017     Allergen Noted Reactions    Pcn [Penicillins] 2015   Swelling    As a child  Unsure of tolerance to other \"cillins\"  HAS TOLERATED KEFLEX PREVIOUSLY WITHOUT DIFFICULTY.      You were diagnosed with     Acquired hypothyroidism   [1366587]       Nicotine dependence, cigarettes, uncomplicated   [8912229]       Osteopenia, unspecified location   [3364501]         Vital Signs     Blood Pressure Pulse Temperature Respirations Height Weight    134/78 mmHg 74 36.9 °C (98.4 °F) 14 1.702 m (5' 7.01\") 80.287 kg (177 lb)    Body Mass Index Oxygen Saturation Smoking Status             27.72 kg/m2 94% Current Every Day Smoker         Basic Information     Date Of Birth Sex Race Ethnicity Preferred Language    1950 Female White Non- English      Your appointments     2017  9:55 AM   Established Patient with JAJA Murdock   [facility] ([place])    [address] [place] [address], [address]  [facility] [address] [phone]           You will be receiving a confirmation call a few days before your appointment from our automated call confirmation system.              Problem List              ICD-10-CM Priority Class Noted - Resolved    Cellulitis of leg L03.119   2013 - Present    Renal insufficiency N28.9   2013 - Present    Acquired hypothyroidism E03.9   2015 - Present    Chronic venous stasis dermatitis of both lower extremities I83.11, I83.12   7/15/2015 - Present    Low back pain at multiple sites M54.5   2016 - Present    Nicotine dependence, cigarettes, uncomplicated F17.210   2016 - Present    Osteopenia M85.80   " 4/28/2017 - Present      Health Maintenance        Date Due Completion Dates    MAMMOGRAM 6/24/2017 6/24/2016, 8/18/2015 (Postponed), 1/9/2013, 12/14/2012    Override on 8/18/2015: Postponed    IMM PNEUMOCOCCAL 65+ (ADULT) LOW/MEDIUM RISK SERIES (2 of 2 - PPSV23) 11/1/2018 2/28/2017, 11/1/2013    BONE DENSITY 4/26/2022 4/26/2017, 8/18/2015 (Postponed)    Override on 8/18/2015: Postponed    IMM DTaP/Tdap/Td Vaccine (2 - Td) 3/15/2027 3/15/2017    COLONOSCOPY 3/24/2027 3/24/2017            Current Immunizations     13-VALENT PCV PREVNAR 2/28/2017    INFLUENZA VACCINE H1N1 1/11/2010    Influenza TIV (IM) 11/1/2013  8:35 PM    Influenza Vaccine Adult HD 1/21/2017    Pneumococcal polysaccharide vaccine (PPSV-23) 11/1/2013  8:35 PM    SHINGLES VACCINE 3/15/2017    Tdap Vaccine 3/15/2017      Below and/or attached are the medications your provider expects you to take. Review all of your home medications and newly ordered medications with your provider and/or pharmacist. Follow medication instructions as directed by your provider and/or pharmacist. Please keep your medication list with you and share with your provider. Update the information when medications are discontinued, doses are changed, or new medications (including over-the-counter products) are added; and carry medication information at all times in the event of emergency situations     Allergies:  PCN - Swelling               Medications  Valid as of: May 02, 2017 - 10:36 AM    Generic Name Brand Name Tablet Size Instructions for use    Fluocinonide Emulsified Base (Cream) LIDEX-E 0.05 % Apply 0.05 % to affected area(s) BID (RC)(S).        HydrOXYzine HCl (Tab) ATARAX 25 MG Take 1 Tab by mouth 3 times a day as needed for Itching.        Levothyroxine Sodium (Tab) SYNTHROID 75 MCG Take 1 Tab by mouth Every morning on an empty stomach.        MethylPREDNISolone (Tablet Therapy Pack) MEDROL DOSEPAK 4 MG As instructed on the packaging        Mupirocin (Ointment)  BACTROBAN 2 %         .                 Medicines prescribed today were sent to:     [facility]/PHARMACY #[id number] - [place] - [address] [place] [address]    Phone: [phone] Fax: [phone]    Open 24 Hours?: No      Medication refill instructions:       If your prescription bottle indicates you have medication refills left, it is not necessary to call your provider’s office. Please contact your pharmacy and they will refill your medication.    If your prescription bottle indicates you do not have any refills left, you may request refills at any time through one of the following ways: The online [employer] system (except Urgent Care), by calling your provider’s office, or by asking your pharmacy to contact your provider’s office with a refill request. Medication refills are processed only during regular business hours and may not be available until the next business day. Your provider may request additional information or to have a follow-up visit with you prior to refilling your medication.   *Please Note: Medication refills are assigned a new Rx number when refilled electronically. Your pharmacy may indicate that no refills were authorized even though a new prescription for the same medication is available at the pharmacy. Please request the medicine by name with the pharmacy before contacting your provider for a refill.        Your To Do List     Future Labs/Procedures Complete By Expires    FREE THYROXINE  As directed 5/2/2018    TSH  As directed 5/3/2018    VITAMIN D,25 HYDROXY  As directed 5/3/2018      Instructions    Osteoporosis  Osteoporosis is the thinning and loss of density in the bones. Osteoporosis makes the bones more brittle, fragile, and likely to break (fracture). Over time, osteoporosis can cause the bones to become so weak that they fracture after a simple fall. The bones most likely to fracture are the bones in the hip, wrist, and spine.  CAUSES   The exact cause is not known.  RISK  FACTORS  Anyone can develop osteoporosis. You may be at greater risk if you have a family history of the condition or have poor nutrition. You may also have a higher risk if you are:   · Female.    · 50 years old or older.  · A smoker.  · Not physically active.    · White or .  · Slender.  SIGNS AND SYMPTOMS   A fracture might be the first sign of the disease, especially if it results from a fall or injury that would not usually cause a bone to break. Other signs and symptoms include:   · Low back and neck pain.  · Stooped posture.  · Height loss.  DIAGNOSIS   To make a diagnosis, your health care provider may:  · Take a medical history.  · Perform a physical exam.  · Order tests, such as:  ¨ A bone mineral density test.  ¨ A dual-energy X-ray absorptiometry test.  TREATMENT   The goal of osteoporosis treatment is to strengthen your bones to reduce your risk of a fracture. Treatment may involve:  · Making lifestyle changes, such as:  ¨ Eating a diet rich in calcium.  ¨ Doing weight-bearing and muscle-strengthening exercises.  ¨ Stopping tobacco use.  ¨ Limiting alcohol intake.  · Taking medicine to slow the process of bone loss or to increase bone density.  · Monitoring your levels of calcium and vitamin D.  HOME CARE INSTRUCTIONS  · Include calcium and vitamin D in your diet. Calcium is important for bone health, and vitamin D helps the body absorb calcium.  · Perform weight-bearing and muscle-strengthening exercises as directed by your health care provider.  · Do not use any tobacco products, including cigarettes, chewing tobacco, and electronic cigarettes. If you need help quitting, ask your health care provider.  · Limit your alcohol intake.  · Take medicines only as directed by your health care provider.  · Keep all follow-up visits as directed by your health care provider. This is important.  · Take precautions at home to lower your risk of falling, such as:  ¨ Keeping rooms well lit and clutter  free.  ¨ Installing safety rails on stairs.  ¨ Using rubber mats in the bathroom and other areas that are often wet or slippery.  SEEK IMMEDIATE MEDICAL CARE IF:   You fall or injure yourself.      This information is not intended to replace advice given to you by your health care provider. Make sure you discuss any questions you have with your health care provider.     Document Released: 09/27/2006 Document Revised: 01/08/2016 Document Reviewed: 05/28/2015  [employer] Interactive Patient Education ©2016 Elsevier Inc.            [employer] Access Code: Activation code not generated  Current [employer] Medical Status: Active          Quit Tobacco Information     Do you want to quit using tobacco?    Quitting tobacco decreases risks of cancer, heart and lung disease, increases life expectancy, improves sense of taste and smell, and increases spending money, among other benefits.    If you are thinking about quitting, we can help.  • [facility] Quit Tobacco Program: 853.636.6050  o Program occurs weekly for four weeks and includes pharmacist consultation on products to support quitting smoking or chewing tobacco. A provider referral is needed for pharmacist consultation.  • Tobacco Users Help Hotline: 5-816-QUIT-NOW (849-1384) or https://[place].quitlogix.org/  o Free, confidential telephone and online coaching for [place] residents. Sessions are designed on a schedule that is convenient for you. Eligible clients receive free nicotine replacement therapy.  • Nationally: www.smokefree.gov  o Information and professional assistance to support both immediate and long-term needs as you become, and remain, a non-smoker. Smokefree.gov allows you to choose the help that best fits your needs.

## 2017-05-02 NOTE — PATIENT INSTRUCTIONS
Osteoporosis  Osteoporosis is the thinning and loss of density in the bones. Osteoporosis makes the bones more brittle, fragile, and likely to break (fracture). Over time, osteoporosis can cause the bones to become so weak that they fracture after a simple fall. The bones most likely to fracture are the bones in the hip, wrist, and spine.  CAUSES   The exact cause is not known.  RISK FACTORS  Anyone can develop osteoporosis. You may be at greater risk if you have a family history of the condition or have poor nutrition. You may also have a higher risk if you are:   · Female.    · 50 years old or older.  · A smoker.  · Not physically active.    · White or .  · Slender.  SIGNS AND SYMPTOMS   A fracture might be the first sign of the disease, especially if it results from a fall or injury that would not usually cause a bone to break. Other signs and symptoms include:   · Low back and neck pain.  · Stooped posture.  · Height loss.  DIAGNOSIS   To make a diagnosis, your health care provider may:  · Take a medical history.  · Perform a physical exam.  · Order tests, such as:  ¨ A bone mineral density test.  ¨ A dual-energy X-ray absorptiometry test.  TREATMENT   The goal of osteoporosis treatment is to strengthen your bones to reduce your risk of a fracture. Treatment may involve:  · Making lifestyle changes, such as:  ¨ Eating a diet rich in calcium.  ¨ Doing weight-bearing and muscle-strengthening exercises.  ¨ Stopping tobacco use.  ¨ Limiting alcohol intake.  · Taking medicine to slow the process of bone loss or to increase bone density.  · Monitoring your levels of calcium and vitamin D.  HOME CARE INSTRUCTIONS  · Include calcium and vitamin D in your diet. Calcium is important for bone health, and vitamin D helps the body absorb calcium.  · Perform weight-bearing and muscle-strengthening exercises as directed by your health care provider.  · Do not use any tobacco products, including cigarettes, chewing  tobacco, and electronic cigarettes. If you need help quitting, ask your health care provider.  · Limit your alcohol intake.  · Take medicines only as directed by your health care provider.  · Keep all follow-up visits as directed by your health care provider. This is important.  · Take precautions at home to lower your risk of falling, such as:  ¨ Keeping rooms well lit and clutter free.  ¨ Installing safety rails on stairs.  ¨ Using rubber mats in the bathroom and other areas that are often wet or slippery.  SEEK IMMEDIATE MEDICAL CARE IF:   You fall or injure yourself.      This information is not intended to replace advice given to you by your health care provider. Make sure you discuss any questions you have with your health care provider.     Document Released: 09/27/2006 Document Revised: 01/08/2016 Document Reviewed: 05/28/2015  [employer] Interactive Patient Education ©2016 [employer].

## 2017-05-03 ENCOUNTER — HOSPITAL ENCOUNTER (OUTPATIENT)
Dept: LAB | Facility: MEDICAL CENTER | Age: 67
End: 2017-05-03
Attending: NURSE PRACTITIONER
Payer: MEDICARE

## 2017-05-03 DIAGNOSIS — E03.9 ACQUIRED HYPOTHYROIDISM: ICD-10-CM

## 2017-05-03 DIAGNOSIS — M85.80 OSTEOPENIA, UNSPECIFIED LOCATION: ICD-10-CM

## 2017-05-03 LAB
25(OH)D3 SERPL-MCNC: 8 NG/ML (ref 30–100)
T4 FREE SERPL-MCNC: 0.97 NG/DL (ref 0.53–1.43)
TSH SERPL DL<=0.005 MIU/L-ACNC: 3.27 UIU/ML (ref 0.3–3.7)

## 2017-05-03 PROCEDURE — 82306 VITAMIN D 25 HYDROXY: CPT

## 2017-05-03 PROCEDURE — 36415 COLL VENOUS BLD VENIPUNCTURE: CPT

## 2017-05-03 PROCEDURE — 84443 ASSAY THYROID STIM HORMONE: CPT

## 2017-05-03 PROCEDURE — 84439 ASSAY OF FREE THYROXINE: CPT

## 2017-05-10 DIAGNOSIS — E55.9 VITAMIN D DEFICIENCY: ICD-10-CM

## 2017-05-10 RX ORDER — ERGOCALCIFEROL 1.25 MG/1
50000 CAPSULE ORAL
Qty: 12 CAP | Refills: 0 | Status: SHIPPED | OUTPATIENT
Start: 2017-05-10 | End: 2017-08-01 | Stop reason: SDUPTHER

## 2017-05-16 ENCOUNTER — TELEPHONE (OUTPATIENT)
Dept: MEDICAL GROUP | Facility: PHYSICIAN GROUP | Age: 67
End: 2017-05-16

## 2017-05-16 DIAGNOSIS — F17.210 NICOTINE DEPENDENCE, CIGARETTES, UNCOMPLICATED: ICD-10-CM

## 2017-05-16 NOTE — TELEPHONE ENCOUNTER
Referral request submitted to [facility].  VIELKA Mari  [facility].

## 2017-06-09 ENCOUNTER — HOSPITAL ENCOUNTER (OUTPATIENT)
Dept: LAB | Facility: MEDICAL CENTER | Age: 67
End: 2017-06-09
Attending: ALLERGY & IMMUNOLOGY
Payer: MEDICARE

## 2017-06-09 LAB
BASOPHILS # BLD AUTO: 0.6 % (ref 0–1.8)
BASOPHILS # BLD: 0.06 K/UL (ref 0–0.12)
EOSINOPHIL # BLD AUTO: 0.28 K/UL (ref 0–0.51)
EOSINOPHIL NFR BLD: 2.9 % (ref 0–6.9)
ERYTHROCYTE [DISTWIDTH] IN BLOOD BY AUTOMATED COUNT: 62.6 FL (ref 35.9–50)
HCT VFR BLD AUTO: 50.2 % (ref 37–47)
HGB BLD-MCNC: 16.8 G/DL (ref 12–16)
IMM GRANULOCYTES # BLD AUTO: 0.05 K/UL (ref 0–0.11)
IMM GRANULOCYTES NFR BLD AUTO: 0.5 % (ref 0–0.9)
LYMPHOCYTES # BLD AUTO: 1.49 K/UL (ref 1–4.8)
LYMPHOCYTES NFR BLD: 15.6 % (ref 22–41)
MCH RBC QN AUTO: 36 PG (ref 27–33)
MCHC RBC AUTO-ENTMCNC: 33.5 G/DL (ref 33.6–35)
MCV RBC AUTO: 107.5 FL (ref 81.4–97.8)
MONOCYTES # BLD AUTO: 0.93 K/UL (ref 0–0.85)
MONOCYTES NFR BLD AUTO: 9.7 % (ref 0–13.4)
NEUTROPHILS # BLD AUTO: 6.75 K/UL (ref 2–7.15)
NEUTROPHILS NFR BLD: 70.7 % (ref 44–72)
NRBC # BLD AUTO: 0 K/UL
NRBC BLD AUTO-RTO: 0 /100 WBC
PLATELET # BLD AUTO: 117 K/UL (ref 164–446)
PMV BLD AUTO: 12.3 FL (ref 9–12.9)
RBC # BLD AUTO: 4.67 M/UL (ref 4.2–5.4)
WBC # BLD AUTO: 9.6 K/UL (ref 4.8–10.8)

## 2017-06-09 PROCEDURE — 86317 IMMUNOASSAY INFECTIOUS AGENT: CPT | Mod: 91

## 2017-06-09 PROCEDURE — 82784 ASSAY IGA/IGD/IGG/IGM EACH: CPT

## 2017-06-09 PROCEDURE — 82784 ASSAY IGA/IGD/IGG/IGM EACH: CPT | Mod: 91

## 2017-06-09 PROCEDURE — 83520 IMMUNOASSAY QUANT NOS NONAB: CPT

## 2017-06-09 PROCEDURE — 82785 ASSAY OF IGE: CPT

## 2017-06-09 PROCEDURE — 86162 COMPLEMENT TOTAL (CH50): CPT

## 2017-06-09 PROCEDURE — 36415 COLL VENOUS BLD VENIPUNCTURE: CPT

## 2017-06-09 PROCEDURE — 85025 COMPLETE CBC W/AUTO DIFF WBC: CPT

## 2017-06-11 LAB
IGA SERPL-MCNC: 181 MG/DL (ref 68–408)
IGG SERPL-MCNC: 905 MG/DL (ref 768–1632)
IGM SERPL-MCNC: 324 MG/DL (ref 35–263)

## 2017-06-12 LAB
CH50 SERPL-ACNC: 105 CAE UNITS (ref 60–144)
DEPRECATED S PNEUM 1 IGG SER-MCNC: 2.92 UG/ML
DEPRECATED S PNEUM12 IGG SER-MCNC: 0.22 UG/ML
DEPRECATED S PNEUM14 IGG SER-MCNC: 1.66 UG/ML
DEPRECATED S PNEUM19 IGG SER-MCNC: 6.51 UG/ML
DEPRECATED S PNEUM23 IGG SER-MCNC: 0.69 UG/ML
DEPRECATED S PNEUM3 IGG SER-MCNC: 5.15 UG/ML
DEPRECATED S PNEUM4 IGG SER-MCNC: 10.32 UG/ML
DEPRECATED S PNEUM5 IGG SER-MCNC: 3.3 UG/ML
DEPRECATED S PNEUM8 IGG SER-MCNC: 0.63 UG/ML
DEPRECATED S PNEUM9 IGG SER-MCNC: 5.43 UG/ML
S PNEUM DA 18C IGG SER-MCNC: 6.17 UG/ML
S PNEUM DA 6B IGG SER-MCNC: 1.82 UG/ML
S PNEUM DA 7F IGG SER-MCNC: >26 UG/ML
S PNEUM DA 9V IGG SER-MCNC: 15.56 UG/ML
S PNEUM SEROTYPE IGG SER-IMP: NORMAL

## 2017-06-13 LAB — IGE SERPL-ACNC: 4 KU/L

## 2017-06-14 LAB — TRYPTASE SERPL-MCNC: 7.7 UG/L

## 2017-06-15 LAB — C DIPHTHERIAE IGG SER-ACNC: 0.4 IU/ML

## 2017-06-20 ENCOUNTER — OFFICE VISIT (OUTPATIENT)
Dept: MEDICAL GROUP | Facility: PHYSICIAN GROUP | Age: 67
End: 2017-06-20
Payer: MEDICARE

## 2017-06-20 VITALS
HEIGHT: 67 IN | HEART RATE: 82 BPM | DIASTOLIC BLOOD PRESSURE: 84 MMHG | RESPIRATION RATE: 14 BRPM | OXYGEN SATURATION: 96 % | WEIGHT: 175 LBS | BODY MASS INDEX: 27.47 KG/M2 | SYSTOLIC BLOOD PRESSURE: 138 MMHG | TEMPERATURE: 98.2 F

## 2017-06-20 DIAGNOSIS — E03.9 ACQUIRED HYPOTHYROIDISM: ICD-10-CM

## 2017-06-20 DIAGNOSIS — F17.210 NICOTINE DEPENDENCE, CIGARETTES, UNCOMPLICATED: ICD-10-CM

## 2017-06-20 DIAGNOSIS — R03.0 TRANSIENT ELEVATED BLOOD PRESSURE: ICD-10-CM

## 2017-06-20 DIAGNOSIS — R21 RASH: ICD-10-CM

## 2017-06-20 DIAGNOSIS — E55.9 VITAMIN D DEFICIENCY: ICD-10-CM

## 2017-06-20 DIAGNOSIS — I87.2 CHRONIC VENOUS STASIS DERMATITIS OF BOTH LOWER EXTREMITIES: ICD-10-CM

## 2017-06-20 PROCEDURE — 99214 OFFICE O/P EST MOD 30 MIN: CPT | Performed by: NURSE PRACTITIONER

## 2017-06-20 NOTE — MR AVS SNAPSHOT
"        Naomy Vargas   2017 9:55 AM   Office Visit   MRN: [id number]    Department:  [facility]   Dept Phone:  [phone]    Description:  Female : 1950   Provider:  JAJA Murdock           Reason for Visit     Other follow up on elevated blood presure       Allergies as of 2017     Allergen Noted Reactions    Pcn [Penicillins] 2015   Swelling    As a child  Unsure of tolerance to other \"cillins\"  HAS TOLERATED KEFLEX PREVIOUSLY WITHOUT DIFFICULTY.      You were diagnosed with     Acquired hypothyroidism   [3356185]       Chronic venous stasis dermatitis of both lower extremities   [6906941]       Nicotine dependence, cigarettes, uncomplicated   [2171704]       Vitamin D deficiency   [8053289]         Vital Signs     Blood Pressure Pulse Temperature Respirations Height Weight    138/84 mmHg 82 36.8 °C (98.2 °F) 14 1.702 m (5' 7.01\") 79.379 kg (175 lb)    Body Mass Index Oxygen Saturation Smoking Status             27.40 kg/m2 96% Current Every Day Smoker         Basic Information     Date Of Birth Sex Race Ethnicity Preferred Language    1950 Female White Non- English      Your appointments     2017 10:30 AM   Smoking Cessation Consult with [facility] EXAM 4   [facility] [place] [facility]  (--)    [address] [address]   [phone]            2017 11:00 AM   Smoking Cessation Class with PULMONARY REHAB PM CLASS   [place] [facility] [place]    [address] [id number] [phone]           From [address], take the [facility] exit (#[address]) and head west on [address].  Turn right on [address]. Park in the [facility] Parking garage, or you may use our  parking.  The [place] [facility] entrance is left of the  parking by the red and pink heart sculpture.            2017 11:00 AM   Smoking Cessation Class with PULMONARY REHAB PM CLASS   "   PULMONARY LAB OP C (--)    [address]  [place] [address]   [phone]           From [facility] [address] [facility]/ [address], take the [address] exit [address]) and head west on [address].  Turn right on [address]. Park in the [address] Parking garage, or you may use our  parking.  The [place] [facility] entrance is left of the  parking by the red and pink heart sculpture.            Jul 19, 2017 11:00 AM   Smoking Cessation Class with PULMONARY REHAB PM CLASS   [place] [facility] [place])    [address]  [place] [address]   [phone]           From [facility]/ [address], take the [address] exit (#[address]) and head west on [address].  Turn right on [address]. Park in the [address] Parking garage, or you may use our  parking.  The [place] [facility] entrance is left of the  parking by the red and pink heart sculpture.            Jul 26, 2017 11:00 AM   Smoking Cessation Class with PULMONARY REHAB PM CLASS   PULMONARY LAB OP C (--)    [address]  [place] [address]   [phone]           From [facility] [address] [facility]/ [address], take the [address] exit (#[address]) and head west on [address].  Turn right on [address]. Park in the [facility] [address] Parking garage, or you may use our  parking.  The [place] [facility] entrance is left of the  parking by the red and pink heart sculpture.            Aug 24, 2017  9:55 AM   Established Patient with JAJA Murdock   [facility] [place]    [address], [address]  [place] [address]   [phone]           You will be receiving a confirmation call a few days before your appointment from our automated call confirmation system.              Problem List              ICD-10-CM Priority Class Noted - Resolved    Cellulitis of leg L03.119   11/1/2013 - Present    Renal insufficiency N28.9   11/2/2013 - Present    Acquired hypothyroidism E03.9   7/14/2015 - Present    Chronic venous stasis  dermatitis of both lower extremities I87.2   7/15/2015 - Present    Low back pain at multiple sites M54.5   6/6/2016 - Present    Nicotine dependence, cigarettes, uncomplicated F17.210   6/6/2016 - Present    Osteopenia M85.80   4/28/2017 - Present    Vitamin D deficiency E55.9   6/20/2017 - Present      Health Maintenance        Date Due Completion Dates    MAMMOGRAM 6/24/2017 6/24/2016, 8/18/2015 (Postponed), 1/9/2013, 12/14/2012    Override on 8/18/2015: Postponed    IMM PNEUMOCOCCAL 65+ (ADULT) LOW/MEDIUM RISK SERIES (2 of 2 - PPSV23) 11/1/2018 2/28/2017, 11/1/2013    BONE DENSITY 4/26/2022 4/26/2017, 8/18/2015 (Postponed)    Override on 8/18/2015: Postponed    IMM DTaP/Tdap/Td Vaccine (2 - Td) 3/15/2027 3/15/2017    COLONOSCOPY 3/24/2027 3/24/2017            Current Immunizations     13-VALENT PCV PREVNAR 2/28/2017    INFLUENZA VACCINE H1N1 1/11/2010    Influenza TIV (IM) 11/1/2013  8:35 PM    Influenza Vaccine Adult HD 1/21/2017    Pneumococcal polysaccharide vaccine (PPSV-23) 11/1/2013  8:35 PM    SHINGLES VACCINE 3/15/2017    Tdap Vaccine 3/15/2017      Below and/or attached are the medications your provider expects you to take. Review all of your home medications and newly ordered medications with your provider and/or pharmacist. Follow medication instructions as directed by your provider and/or pharmacist. Please keep your medication list with you and share with your provider. Update the information when medications are discontinued, doses are changed, or new medications (including over-the-counter products) are added; and carry medication information at all times in the event of emergency situations     Allergies:  PCN - Swelling               Medications  Valid as of: June 20, 2017 - 10:36 AM    Generic Name Brand Name Tablet Size Instructions for use    Ergocalciferol (Cap) DRISDOL 96568 UNITS Take 1 Cap by mouth every 7 days. Labs every three months, do not refill until after labs.        Fluocinonide  Emulsified Base (Cream) LIDEX-E 0.05 % Apply 0.05 % to affected area(s) BID (RC)(S).        HydrOXYzine HCl (Tab) ATARAX 25 MG Take 1 Tab by mouth 3 times a day as needed for Itching.        Levothyroxine Sodium (Tab) SYNTHROID 75 MCG Take 1 Tab by mouth Every morning on an empty stomach.        MethylPREDNISolone (Tablet Therapy Pack) MEDROL DOSEPAK 4 MG As instructed on the packaging        Mupirocin (Ointment) BACTROBAN 2 %         .                 Medicines prescribed today were sent to:     [facility]/PHARMACY #[id number] - [place] - [address] [place] [address]    Phone: [phone] Fax: [phone]    Open 24 Hours?: No      Medication refill instructions:       If your prescription bottle indicates you have medication refills left, it is not necessary to call your provider’s office. Please contact your pharmacy and they will refill your medication.    If your prescription bottle indicates you do not have any refills left, you may request refills at any time through one of the following ways: The online [employer] system (except Urgent Care), by calling your provider’s office, or by asking your pharmacy to contact your provider’s office with a refill request. Medication refills are processed only during regular business hours and may not be available until the next business day. Your provider may request additional information or to have a follow-up visit with you prior to refilling your medication.   *Please Note: Medication refills are assigned a new Rx number when refilled electronically. Your pharmacy may indicate that no refills were authorized even though a new prescription for the same medication is available at the pharmacy. Please request the medicine by name with the pharmacy before contacting your provider for a refill.        Your To Do List     Future Labs/Procedures Complete By Expires    FREE THYROXINE  As directed 6/20/2018    TSH  As directed 6/21/2018         [employer] Access Code:  Activation code not generated  Current MyChart Status: Active          Quit Tobacco Information     Do you want to quit using tobacco?    Quitting tobacco decreases risks of cancer, heart and lung disease, increases life expectancy, improves sense of taste and smell, and increases spending money, among other benefits.    If you are thinking about quitting, we can help.  • [facility] Quit Tobacco Program: [phone]  o Program occurs weekly for four weeks and includes pharmacist consultation on products to support quitting smoking or chewing tobacco. A provider referral is needed for pharmacist consultation.  • Tobacco Users Help Hotline: 4-804-QUITNOW (374-4557) or https://[place].quitlogix.org/  o Free, confidential telephone and online coaching for [place] residents. Sessions are designed on a schedule that is convenient for you. Eligible clients receive free nicotine replacement therapy.  • Nationally: www.smokefree.gov  o Information and professional assistance to support both immediate and long-term needs as you become, and remain, a non-smoker. Smokefree.gov allows you to choose the help that best fits your needs.

## 2017-06-20 NOTE — PROGRESS NOTES
"  Subjective:     Chief Complaint   Patient presents with   • Other     follow up on elevated blood presure    • Results   • Chronic Care Management        Naomy Vargas is a 67 y.o. female here today for evaluation and management of:    BP readings have improved. She checked blood pressure at home a few times with readings [id number].    She does not currently have rash but was evaluated by allergist per referral and has additional diagnostic testing and follow up appointments scheduled. She continue to follow with dermatology for chronic venous stasis. Hospital tested lower extremity circulation. Was a \"little slow but okay\" TOMY normal in July 2015.    Vitamin D deficiency  May 2017 level 8, started weekly supplement. Will repeat labs in early August, reviewed need for labs every three months while taking high dose supplement.    Acquired hypothyroidism  Currently taking levothyroxine 75 mcg daily as directed on empty stomach with water and waits at least 30 minutes to consume other food, beverages, or medications.  Denies palpitations, temperature intolerance, or changes in bowel habits.  Last TSH 3.2,  previously level was 3.9, prior to that 3.44 and within range.    Nicotine dependence, cigarettes, uncomplicated  1/2 ppd x 53 years. Plans to attend smoking cessation class next month. Has quit twice in the past. Irritable particularly with co workers, now retired. Can walk dogs.       Chronic venous stasis dermatitis of both lower extremities  She does not currently have rash but was evaluated by allergist per referral and has additional diagnostic testing and follow up appointments scheduled. She continue to follow with dermatology for chronic venous stasis. Hospital tested lower extremity circulation. Was a \"little slow but okay\" TOMY normal in July 2015.           ROS   Denies HA, chest pain, shortness of breath, abdominal pain, bladder or bowel changes.    Current medicines (including changes " "today)  Current Outpatient Prescriptions   Medication Sig Dispense Refill   • vitamin D, Ergocalciferol, (DRISDOL) 08300 UNITS Cap capsule Take 1 Cap by mouth every 7 days. Labs every three months, do not refill until after labs. 12 Cap 0   • hydrOXYzine (ATARAX) 25 MG Tab Take 1 Tab by mouth 3 times a day as needed for Itching. 30 Tab 0   • levothyroxine (SYNTHROID) 75 MCG Tab Take 1 Tab by mouth Every morning on an empty stomach. 90 Tab 4   • MethylPREDNISolone (MEDROL DOSEPAK) 4 MG Tablet Therapy Pack As instructed on the packaging 1 Kit 0   • mupirocin (BACTROBAN) 2 % Ointment   2   • fluocinonide-emollient (LIDEX-E) 0.05 % cream Apply 0.05 % to affected area(s) BID (RC)(S).       No current facility-administered medications for this visit.       She  has a past medical history of Allergy; Anxiety; Arrhythmia; Blood transfusion without reported diagnosis; Cataract; GERD (gastroesophageal reflux disease); Hypertension; Kidney disease; Thyroid disease; and Urinary tract infection, site not specified. She also has no past medical history of Anemia, Depression, Arthritis, Asthma, Cancer (CMS-[facility]), CHF (congestive heart failure) (CMS-HCC), Clotting disorder (CMS-HCC), COPD (chronic obstructive pulmonary disease) (CMS-HCC), Diabetes (CMS-HCC), Diabetic neuropathy (CMS-HCC), Glaucoma, Headache, Heart attack (CMS-HCC), Heart murmur, HIV (human immunodeficiency virus infection) (CMS-HCC), Hyperlipidemia, IBD (inflammatory bowel disease), Meningitis, Muscle disorder, Osteoporosis, Seizure (CMS-HCC), Stroke (CMS-HCC), Substance abuse, Tuberculosis, or Ulcer (CMS-HCC).    Allergies Pcn    Current medications, problem list, allergies, past medical history, and tobacco use history reviewed in [facility] today.    Health maintenance reviewed and updated.    Objective:   Blood pressure 138/84, pulse 82, temperature 36.8 °C (98.2 °F), resp. rate 14, height 1.702 m (5' 7.01\"), weight 79.379 kg (175 lb), SpO2 96 %. Body mass index is " 27.4 kg/(m^2).     Physical Exam   Constitutional: Alert, no acute distress. Pleasant and cooperative with the examination.  Skin:   Warm, dry, no rashes in visible areas.    Eyes:   Pupils equal, round. Conjunctiva and sclera clear,    Lids normal.  ENT:  Pinna normal.   Neck:   Supple, trachea midline.  Lungs:  Normal effort and respirations. Clear to auscultation bilaterally.  CV:  Regular rate and rhythm.  MS/Ext:  Steady gait, no edema.  Psych:  Eye contact is good, affect calm.    Assessment and Plan:   The following treatment plan was discussed    1. Acquired hypothyroidism  Stable. Continue current medicines. Monitor labs regularly.      - TSH; Future  - FREE THYROXINE; Future    2. Chronic venous stasis dermatitis of both lower extremities  Reviewed records in [facility], she'd like to complete diagnostics for other conditions prior to additional evaluation of this.     3. Nicotine dependence, cigarettes, uncomplicated  Counseled regarding smoking cessation and advised to follow up for additional. Smoking cessation counseling:  10 minutes including discussion of various products available to assist with this endeavor.  Discussion of triggers to smoke and reasons to quit.      4. Vitamin D deficiency  New, vitamin d weekly and labs every 3 months. Will continue to monitor.    5. Rash  Continue follow up with allergist and dermatology.    6. Transient elevated blood pressure  Currently in  Range - continue home monitoring and follow up with concerns. Reviewed lifestyle modifications. Will continue to monitor.    Followup: Return in about 2 months (around 8/20/2017).  As scheduled, sooner if symptoms don't resolve or with any new problems.       Patient was seen for 35 minutes, more than 50% of time spent in face to face review, consultation, counseling, and arranging future evaluation and follow up of medical conditions and care.     Reviewed side effects, risks, and benefits of medications prescribed  today.  Advised to take all medications as instructed and report any side effects.   The patient voices understanding and agrees.  Report any new or worsening symptoms.  Have labs or other diagnostic studies prior to follow up.  Keep all appointments for any referrals given.      Please note this dictation was created using voice recognition software. Every reasonable attempt has been made to correct obvious errors, however there may be errors of grammar and possibly content that were not discovered before finalizing the note.

## 2017-06-20 NOTE — ASSESSMENT & PLAN NOTE
Currently taking levothyroxine 75 mcg daily as directed on empty stomach with water and waits at least 30 minutes to consume other food, beverages, or medications.  Denies palpitations, temperature intolerance, or changes in bowel habits.  Last TSH 3.2,  previously level was 3.9, prior to that 3.44 and within range.

## 2017-06-20 NOTE — ASSESSMENT & PLAN NOTE
May 2017 level 8, started weekly supplement. Will repeat labs in early August, reviewed need for labs every three months while taking high dose supplement.

## 2017-06-23 NOTE — ASSESSMENT & PLAN NOTE
"She does not currently have rash but was evaluated by allergist per referral and has additional diagnostic testing and follow up appointments scheduled. She continue to follow with dermatology for chronic venous stasis. Hospital tested lower extremity circulation. Was a \"little slow but okay\" TOMY normal in July 2015.    "

## 2017-06-23 NOTE — ASSESSMENT & PLAN NOTE
1/2 ppd x 53 years. Plans to attend smoking cessation class next month. Has quit twice in the past. Irritable particularly with co workers, now retired. Can walk dogs.

## 2017-06-26 RX ORDER — LEVOTHYROXINE SODIUM 0.07 MG/1
TABLET ORAL
Qty: 90 TAB | Refills: 1 | Status: SHIPPED | OUTPATIENT
Start: 2017-06-26 | End: 2017-08-24 | Stop reason: SDUPTHER

## 2017-06-26 NOTE — TELEPHONE ENCOUNTER
Was the patient seen in the last year in this department? Yes     Does patient have an active prescription for medications requested? No     Received Request Via: Pharmacy      Pt met protocol?: Yes    LAST OV 06/20/2017    LAST TSH 05/03/2017

## 2017-07-05 ENCOUNTER — NON-PROVIDER VISIT (OUTPATIENT)
Dept: VASCULAR LAB | Facility: MEDICAL CENTER | Age: 67
End: 2017-07-05
Attending: NURSE PRACTITIONER
Payer: MEDICARE

## 2017-07-05 ENCOUNTER — HOSPITAL ENCOUNTER (OUTPATIENT)
Dept: OTHER | Facility: MEDICAL CENTER | Age: 67
End: 2017-07-05
Attending: NURSE PRACTITIONER
Payer: MEDICARE

## 2017-07-05 DIAGNOSIS — F17.210 NICOTINE DEPENDENCE, CIGARETTES, UNCOMPLICATED: ICD-10-CM

## 2017-07-05 PROCEDURE — S9453 SMOKING CESSATION CLASS: HCPCS

## 2017-07-05 NOTE — RESPIRATORY CARE
"Patient attended week 1 of Quit Tobacco class from 11 am to 12 pm. Patient has not quit smoking. Patient had a pharmacy consult on 07/05/201. Patient has not set a quit date. Patient plans on using chantix .This week we covered the health effects of smoking, having a personal plan to quit, the benefits of quitting, and the 5 day count down to quit. The patient received the first part of their personal Tobacco Quit Plan \"Reasons For Quitting\". We will see her back next week for week 2.  "

## 2017-07-05 NOTE — PROGRESS NOTES
"Outpatient Pharmacotherapy Program    Smoking Cessation Note  Time in: 10:35  Time out: 10:48    Reason for Visit: Patient presents for smoking cessation pharmacotherapy  Initial Visit    HPI:    Age at Which Started Smokin  Average number of cigarettes smoked per day: 10  Additional Smoking Hx: Has tried quitting in the past but has not been successful.   Pt has only tried \"cold-turkey\" in the past without trying any medication therapies.   Pertinent Psych Hx: Anxiety and some seasons of depression per pt.  No current Psych problems per pt.  Recent quit attempts: Last attempt was 15 years ago.    Medications reviewed and reconciled     Vitals:  BP:     164/76        HR:   80    Assessment:    Tobacco use disorder, willing to make quit attempt with a combination of pharmacological and behavioral therapy.    Plan:    Behavioral Modification Recommended: Specifically [facility] Smoking Cessation Classes    Quit Date: or other plans to reduce cigarette usage: She will begin reducing cigarette consumption while finding a way to commit fully to a stop date.      Discussed pharmacologic options, pt and I agree Chantix may be best suited for her.   Educated pt to start Chantix 7 days prior to smoking cessation.    Explained how to initiate chantix also \"starting month francisca\" was ordered from pt's preferred pharmacy.   Discussed having friends and family involved and reducing \"triggers\".  Pt is motivated to stop smoking for a healthier life and to avoid need for future medications.    Chantix            Start Pack    BP is elevated, educated pt to continue monitoring.  Hopefully with smoking cessation BP will improve.        Potential side-effects of all prescribed pharmacotherapy reviewed with patient who verbalizes understanding of the risks and benefits of this therapy.    Follow-up :  with PCP and In pharmacotherapy clinic:  Date: 17    Morales Steinberg PHARMD    cc:  Dr Michael Bloch, Dr Leesa Gómez.              "

## 2017-07-05 NOTE — MR AVS SNAPSHOT
"Naomy Vargas   2017 10:30 AM   Non-Provider Visit   MRN: [id number]    Department:  Vascular Medicine   Dept Phone:  [phone]    Description:  Female : 1950   Provider:  [facility] EXAM 4           Allergies as of 2017     Allergen Noted Reactions    Pcn [Penicillins] 2015   Swelling    As a child  Unsure of tolerance to other \"cillins\"  HAS TOLERATED KEFLEX PREVIOUSLY WITHOUT DIFFICULTY.      Vital Signs     Smoking Status                   Current Every Day Smoker           Basic Information     Date Of Birth Sex Race Ethnicity Preferred Language    1950 Female White Non- English      Your appointments     2017 11:00 AM   Smoking Cessation Class with PULMONARY REHAB PM CLASS   PULMONARY LAB OP [facility] (--)    [address]  [place] [address] [phone]           From [place] [address], take the [address] exit [address]) and head west on [address].  Turn right on [address]. Park in the [employer] garage, or you may use our  parking.  The [facility] entrance is left of the  parking by the red and pink heart sculpture.            2017 11:00 AM   Smoking Cessation Class with PULMONARY REHAB PM CLASS   PULMONARY LAB OP [facility] (--)    [address]  [place] [id number] [phone]           From [facility], take the [employer] exit (#[address]) and head west on [address].  Turn right on [address]. Park in the [employer] garage, or you may use our  parking.  The [facility] entrance is left of the  parking by the red and pink heart sculpture.            2017 10:30 AM   Smoking Cessation Consult with [facility] EXAM 4   [facility]  (--)    [address]  [place] [address]   [phone]            2017 11:00 AM   Smoking Cessation Class with PULMONARY REHAB PM CLASS   PULMONARY LAB OP [facility] (--)    [address] [place]  [place] [address]   [phone]           From [facility]/, take the [address] [facility] exit (#[address]) and head west on [address].  Turn right on [address]. Park in the [facility] Parking garage, or you may use our  parking.  The [facility] [place] [facility] entrance is left of the  parking by the red and pink heart sculpture.            Jul 26, 2017 11:00 AM   Smoking Cessation Class with PULMONARY REHAB PM CLASS   PULMONARY LAB OP [facility] (--)    [address]  [place] [address]   [phone]           From [facility]/ [address], take the [facility] exit (#66) and head west on [address].  Turn right on [address]. Park in the [facility] Parking garage, or you may use our  parking.  The [facility] [place] [facility] entrance is left of the  parking by the red and pink heart sculpture.            Aug 24, 2017  9:55 AM   Established Patient with JAJA Murdock   [facility]    [address], [address]  [place] [id number] [phone]           You will be receiving a confirmation call a few days before your appointment from our automated call confirmation system.              Problem List              ICD-10-CM Priority Class Noted - Resolved    Cellulitis of leg L03.119   11/1/2013 - Present    Renal insufficiency N28.9   11/2/2013 - Present    Acquired hypothyroidism E03.9   7/14/2015 - Present    Chronic venous stasis dermatitis of both lower extremities I87.2   7/15/2015 - Present    Low back pain at multiple sites M54.5   6/6/2016 - Present    Nicotine dependence, cigarettes, uncomplicated F17.210   6/6/2016 - Present    Osteopenia M85.80   4/28/2017 - Present    Vitamin D deficiency E55.9   6/20/2017 - Present      Health Maintenance        Date Due Completion Dates    MAMMOGRAM 6/24/2017 6/24/2016, 8/18/2015 (Postponed), 1/9/2013, 12/14/2012    Override on 8/18/2015: Postponed    IMM INFLUENZA (1) 9/1/2017 1/21/2017,  11/1/2013    IMM PNEUMOCOCCAL 65+ (ADULT) LOW/MEDIUM RISK SERIES (2 of 2 - PPSV23) 11/1/2018 2/28/2017, 11/1/2013    BONE DENSITY 4/26/2022 4/26/2017, 8/18/2015 (Postponed)    Override on 8/18/2015: Postponed    IMM DTaP/Tdap/Td Vaccine (2 - Td) 3/15/2027 3/15/2017    COLONOSCOPY 3/24/2027 3/24/2017            Current Immunizations     13-VALENT PCV PREVNAR 2/28/2017    INFLUENZA VACCINE H1N1 1/11/2010    Influenza TIV (IM) 11/1/2013  8:35 PM    Influenza Vaccine Adult HD 1/21/2017    Pneumococcal polysaccharide vaccine (PPSV-23) 11/1/2013  8:35 PM    SHINGLES VACCINE 3/15/2017    Tdap Vaccine 3/15/2017      Below and/or attached are the medications your provider expects you to take. Review all of your home medications and newly ordered medications with your provider and/or pharmacist. Follow medication instructions as directed by your provider and/or pharmacist. Please keep your medication list with you and share with your provider. Update the information when medications are discontinued, doses are changed, or new medications (including over-the-counter products) are added; and carry medication information at all times in the event of emergency situations     Allergies:  PCN - Swelling               Medications  Valid as of: July 05, 2017 - 10:47 AM    Generic Name Brand Name Tablet Size Instructions for use    Ergocalciferol (Cap) DRISDOL 97434 UNITS Take 1 Cap by mouth every 7 days. Labs every three months, do not refill until after labs.        Fluocinonide Emulsified Base (Cream) LIDEX-E 0.05 % Apply 0.05 % to affected area(s) BID (RC)(S).        HydrOXYzine HCl (Tab) ATARAX 25 MG Take 1 Tab by mouth 3 times a day as needed for Itching.        Levothyroxine Sodium (Tab) SYNTHROID 75 MCG TAKE 1 TAB BY MOUTH EVERY MORNING ON AN EMPTY STOMACH.        Mupirocin (Ointment) BACTROBAN 2 %         .                 Medicines prescribed today were sent to:     [facility][id number] - [place], [place] - [address] [place] [address]    Phone: [phone] Fax: [phone]    Open 24 Hours?: No      Medication refill instructions:       If your prescription bottle indicates you have medication refills left, it is not necessary to call your provider’s office. Please contact your pharmacy and they will refill your medication.    If your prescription bottle indicates you do not have any refills left, you may request refills at any time through one of the following ways: The online [employer] system (except Urgent Care), by calling your provider’s office, or by asking your pharmacy to contact your provider’s office with a refill request. Medication refills are processed only during regular business hours and may not be available until the next business day. Your provider may request additional information or to have a follow-up visit with you prior to refilling your medication.   *Please Note: Medication refills are assigned a new Rx number when refilled electronically. Your pharmacy may indicate that no refills were authorized even though a new prescription for the same medication is available at the pharmacy. Please request the medicine by name with the pharmacy before contacting your provider for a refill.           [employer] Access Code: Activation code not generated  Current [employer] Status: Active          Quit Tobacco Information     Do you want to quit using tobacco?    Quitting tobacco decreases risks of cancer, heart and lung disease, increases life expectancy, improves sense of taste and smell, and increases spending money, among other benefits.    If you are thinking about quitting, we can help.  • [facility] Quit Tobacco Program: [phone]  o Program occurs weekly for four weeks and includes pharmacist consultation on products to support quitting smoking or chewing tobacco. A provider referral is needed for pharmacist consultation.  • Tobacco Users Help Hotline: 9-585-QUIT-NOW (753-9266) or  https://[place].quitlogix.org/  o Free, confidential telephone and online coaching for [place] residents. Sessions are designed on a schedule that is convenient for you. Eligible clients receive free nicotine replacement therapy.  • Nationally: www.smokefree.gov  o Information and professional assistance to support both immediate and long-term needs as you become, and remain, a non-smoker. Smokefree.gov allows you to choose the help that best fits your needs.

## 2017-07-12 ENCOUNTER — HOSPITAL ENCOUNTER (OUTPATIENT)
Dept: OTHER | Facility: MEDICAL CENTER | Age: 67
End: 2017-07-12
Attending: NURSE PRACTITIONER
Payer: MEDICARE

## 2017-07-12 PROCEDURE — S9453 SMOKING CESSATION CLASS: HCPCS

## 2017-07-12 NOTE — RESPIRATORY CARE
"Patient attended week 2 Quit Tobacco class from 11 am to 12 pm.  Patient has not quit smoking. Patient has not set a Quit Date This week we covered \"Why Do I Smoke?\" The Smokers ([place]) Self-Test and review the results. The patient received the second part of their personalized Tobacco Quit Plan and Trigger List to complete.  We will see her back next week for week 3.  "

## 2017-07-19 ENCOUNTER — NON-PROVIDER VISIT (OUTPATIENT)
Dept: VASCULAR LAB | Facility: MEDICAL CENTER | Age: 67
End: 2017-07-19
Attending: NURSE PRACTITIONER
Payer: MEDICARE

## 2017-07-19 ENCOUNTER — HOSPITAL ENCOUNTER (OUTPATIENT)
Dept: OTHER | Facility: MEDICAL CENTER | Age: 67
End: 2017-07-19
Attending: NURSE PRACTITIONER
Payer: MEDICARE

## 2017-07-19 DIAGNOSIS — F17.210 NICOTINE DEPENDENCE, CIGARETTES, UNCOMPLICATED: ICD-10-CM

## 2017-07-19 PROCEDURE — S9453 SMOKING CESSATION CLASS: HCPCS

## 2017-07-19 RX ORDER — VARENICLINE TARTRATE 1 MG/1
1 TABLET, FILM COATED ORAL 2 TIMES DAILY
Qty: 60 TAB | Refills: 3 | Status: SHIPPED | OUTPATIENT
Start: 2017-07-19 | End: 2017-10-12

## 2017-07-19 NOTE — MR AVS SNAPSHOT
"        Naomy Vargas   2017 10:30 AM   Non-Provider Visit   MRN: [id number]    Department:  Vascular Medicine   Dept Phone:  [phone]    Description:  Female : 1950   Provider:  [facility] EXAM 4           Allergies as of 2017     Allergen Noted Reactions    Pcn [Penicillins] 2015   Swelling    As a child  Unsure of tolerance to other \"cillins\"  HAS TOLERATED KEFLEX PREVIOUSLY WITHOUT DIFFICULTY.      You were diagnosed with     Nicotine dependence, cigarettes, uncomplicated   [[id number]]         Vital Signs     Smoking Status                   Current Every Day Smoker           Basic Information     Date Of Birth Sex Race Ethnicity Preferred Language    1950 Female White Non- English      Your appointments     2017 11:00 AM   Smoking Cessation Class with PULMONARY REHAB PM CLASS   PULMONARY LAB OP [facility] (--)    [address] [address]   [phone]           From [address], take the [address] exit [address]) and head west on [address].  Turn right on [address]. Park in the [employer] Parking garage, or you may use our  parking.  The [facility] entrance is left of the  parking by the red and pink heart sculpture.            2017 11:00 AM   Smoking Cessation Class with PULMONARY REHAB PM CLASS   PULMONARY LAB OP [facility] (--)    [address] [address]   [phone]           From [address]/ [address], take the [address] exit (#[address]) and head west on [address].  Turn right on [address]. Park in the [employer] Parking garage, or you may use our  parking.  The [place] [place] [facility] entrance is left of the  parking by the red and pink heart sculpture.            Aug 03, 2017 11:15 AM   Smoking Cessation Consult with [facility] EXAM 4   [facility] [place] [facility]  (--)    [address] [address]   [phone]            Aug 24, 2017  9:55 AM   "   Established Patient with JAJA Murdock   [facility]    [address], [address]  [place] [address] [phone]           You will be receiving a confirmation call a few days before your appointment from our automated call confirmation system.              Problem List              ICD-10-CM Priority Class Noted - Resolved    Cellulitis of leg L03.119   11/1/2013 - Present    Renal insufficiency N28.9   11/2/2013 - Present    Acquired hypothyroidism E03.9   7/14/2015 - Present    Chronic venous stasis dermatitis of both lower extremities I87.2   7/15/2015 - Present    Low back pain at multiple sites M54.5   6/6/2016 - Present    Nicotine dependence, cigarettes, uncomplicated F17.210   6/6/2016 - Present    Osteopenia M85.80   4/28/2017 - Present    Vitamin D deficiency E55.9   6/20/2017 - Present      Health Maintenance        Date Due Completion Dates    MAMMOGRAM 6/24/2017 6/24/2016, 8/18/2015 (Postponed), 1/9/2013, 12/14/2012    Override on 8/18/2015: Postponed    IMM INFLUENZA (1) 9/1/2017 1/21/2017, 11/1/2013    IMM PNEUMOCOCCAL 65+ (ADULT) LOW/MEDIUM RISK SERIES (2 of 2 - PPSV23) 11/1/2018 2/28/2017, 11/1/2013    COLONOSCOPY 3/24/2019 3/24/2017, 3/24/2017    BONE DENSITY 4/26/2022 4/26/2017, 8/18/2015 (Postponed)    Override on 8/18/2015: Postponed    IMM DTaP/Tdap/Td Vaccine (2 - Td) 3/15/2027 3/15/2017            Current Immunizations     13-VALENT PCV PREVNAR 2/28/2017    INFLUENZA VACCINE H1N1 1/11/2010    Influenza TIV (IM) 11/1/2013  8:35 PM    Influenza Vaccine Adult HD 1/21/2017    Pneumococcal polysaccharide vaccine (PPSV-23) 11/1/2013  8:35 PM    SHINGLES VACCINE 3/15/2017    Tdap Vaccine 3/15/2017      Below and/or attached are the medications your provider expects you to take. Review all of your home medications and newly ordered medications with your provider and/or pharmacist. Follow medication instructions as directed by your provider  and/or pharmacist. Please keep your medication list with you and share with your provider. Update the information when medications are discontinued, doses are changed, or new medications (including over-the-counter products) are added; and carry medication information at all times in the event of emergency situations     Allergies:  PCN - Swelling               Medications  Valid as of: July 19, 2017 - 10:44 AM    Generic Name Brand Name Tablet Size Instructions for use    Ergocalciferol (Cap) DRISDOL 69031 UNITS Take 1 Cap by mouth every 7 days. Labs every three months, do not refill until after labs.        Fluocinonide Emulsified Base (Cream) LIDEX-E 0.05 % Apply 0.05 % to affected area(s) BID (RC)(S).        HydrOXYzine HCl (Tab) ATARAX 25 MG Take 1 Tab by mouth 3 times a day as needed for Itching.        Levothyroxine Sodium (Tab) SYNTHROID 75 MCG TAKE 1 TAB BY MOUTH EVERY MORNING ON AN EMPTY STOMACH.        Mupirocin (Ointment) BACTROBAN 2 %         Varenicline Tartrate (Misc) CHANTIX JESSICA 0.5 MG X 11 & 1 MG X 42 Begin starting titration as shown in product information.        .                 Medicines prescribed today were sent to:     [facility]ACY #[id number] - [place] - [address]    [address] [place] [address]    Phone: [phone] Fax: [phone]    Open 24 Hours?: No      Medication refill instructions:       If your prescription bottle indicates you have medication refills left, it is not necessary to call your provider’s office. Please contact your pharmacy and they will refill your medication.    If your prescription bottle indicates you do not have any refills left, you may request refills at any time through one of the following ways: The online [employer] system (except Urgent Care), by calling your provider’s office, or by asking your pharmacy to contact your provider’s office with a refill request. Medication refills are processed only during regular business hours and may not be available until  the next business day. Your provider may request additional information or to have a follow-up visit with you prior to refilling your medication.   *Please Note: Medication refills are assigned a new Rx number when refilled electronically. Your pharmacy may indicate that no refills were authorized even though a new prescription for the same medication is available at the pharmacy. Please request the medicine by name with the pharmacy before contacting your provider for a refill.           MyChart Access Code: Activation code not generated  Current MyChart Status: Active          Quit Tobacco Information     Do you want to quit using tobacco?    Quitting tobacco decreases risks of cancer, heart and lung disease, increases life expectancy, improves sense of taste and smell, and increases spending money, among other benefits.    If you are thinking about quitting, we can help.  • [employer] Quit Tobacco Program: [phone]  o Program occurs weekly for four weeks and includes pharmacist consultation on products to support quitting smoking or chewing tobacco. A provider referral is needed for pharmacist consultation.  • Tobacco Users Help Hotline: 0-258-QUIT-NOW (064-3914) or https://[place].quitlogix.org/  o Free, confidential telephone and online coaching for [place] residents. Sessions are designed on a schedule that is convenient for you. Eligible clients receive free nicotine replacement therapy.  • Nationally: www.smokefree.gov  o Information and professional assistance to support both immediate and long-term needs as you become, and remain, a non-smoker. Smokefree.gov allows you to choose the help that best fits your needs.

## 2017-07-19 NOTE — RESPIRATORY CARE
Patient attended week 3 Quit Tobacco class from 11 am to 12 pm.  Patient has/has not quit smoking. Patient had a pharmacy consult on 07/09/2017. Patient is using Chantix. Patient has quit smoking on 07/17/2017  This week we covered Stress: A Primitive Response, having an emergency kit, breathing techniques, meditation and relaxation. We will see her back next week for week 4.

## 2017-07-19 NOTE — PROGRESS NOTES
"Outpatient Pharmacotherapy Program    Smoking Cessation Note  Time in: 1027y  Time out: 1045y    Reason for Visit: Patient presents for smoking cessation pharmacotherapy  Follow-Up Visit    HPI:    Age at Which Started Smokin  Average number of cigarettes smoked per day: 10  Additional Smoking Hx: Has tried quitting in the past but has not been successful.    Pt has only tried \"cold-turkey\" in the past without trying any medication therapies.    Pertinent Psych Hx: Anxiety and some seasons of depression per pt.  No current Psych problems per pt.  Recent quit attempts: Last attempt was 15 years ago.     Medications reviewed and reconciled     Vitals:  BP:    140/96        HR:   100    Assessment:    Tobacco use disorder, willing to make quit attempt with a combination of pharmacological and behavioral therapy.    Plan:    Behavioral Modification Recommended: Specifically [facility] Smoking Cessation Classes    Quit Date: 17    Chantix            Start Pack, pt tolerating well. Has been on it 2 weeks now and quit smoking 2 days ago. Ordered maintaince dose.     Discussed smoking triggers, and problem solving.     Pt has 2 more classes, finding classes informative.     Potential side-effects of all prescribed pharmacotherapy reviewed with patient who verbalizes understanding of the risks and benefits of this therapy. Pt wishes to continue.     Follow-up :  In pharmacotherapy clinic:  Date: 8/3/17    Akosua Montoya    cc:  Dr Leesa Gómez                  "

## 2017-07-20 ENCOUNTER — HOSPITAL ENCOUNTER (OUTPATIENT)
Dept: LAB | Facility: MEDICAL CENTER | Age: 67
End: 2017-07-20
Attending: ALLERGY & IMMUNOLOGY
Payer: MEDICARE

## 2017-07-20 ENCOUNTER — HOSPITAL ENCOUNTER (OUTPATIENT)
Dept: LAB | Facility: MEDICAL CENTER | Age: 67
End: 2017-07-20
Attending: NURSE PRACTITIONER
Payer: MEDICARE

## 2017-07-20 DIAGNOSIS — E03.9 ACQUIRED HYPOTHYROIDISM: ICD-10-CM

## 2017-07-20 DIAGNOSIS — E55.9 VITAMIN D DEFICIENCY: ICD-10-CM

## 2017-07-20 LAB
25(OH)D3 SERPL-MCNC: 50 NG/ML (ref 30–100)
BASOPHILS # BLD AUTO: 0.5 % (ref 0–1.8)
BASOPHILS # BLD: 0.04 K/UL (ref 0–0.12)
EOSINOPHIL # BLD AUTO: 0.33 K/UL (ref 0–0.51)
EOSINOPHIL NFR BLD: 4.5 % (ref 0–6.9)
ERYTHROCYTE [DISTWIDTH] IN BLOOD BY AUTOMATED COUNT: 60.3 FL (ref 35.9–50)
HCT VFR BLD AUTO: 45.3 % (ref 37–47)
HGB BLD-MCNC: 15.5 G/DL (ref 12–16)
IMM GRANULOCYTES # BLD AUTO: 0.03 K/UL (ref 0–0.11)
IMM GRANULOCYTES NFR BLD AUTO: 0.4 % (ref 0–0.9)
LYMPHOCYTES # BLD AUTO: 1.47 K/UL (ref 1–4.8)
LYMPHOCYTES NFR BLD: 20.1 % (ref 22–41)
MCH RBC QN AUTO: 37.5 PG (ref 27–33)
MCHC RBC AUTO-ENTMCNC: 34.2 G/DL (ref 33.6–35)
MCV RBC AUTO: 109.7 FL (ref 81.4–97.8)
MONOCYTES # BLD AUTO: 0.75 K/UL (ref 0–0.85)
MONOCYTES NFR BLD AUTO: 10.3 % (ref 0–13.4)
NEUTROPHILS # BLD AUTO: 4.68 K/UL (ref 2–7.15)
NEUTROPHILS NFR BLD: 64.2 % (ref 44–72)
NRBC # BLD AUTO: 0 K/UL
NRBC BLD AUTO-RTO: 0 /100 WBC
PLATELET # BLD AUTO: 96 K/UL (ref 164–446)
PMV BLD AUTO: 12.3 FL (ref 9–12.9)
RBC # BLD AUTO: 4.13 M/UL (ref 4.2–5.4)
T4 FREE SERPL-MCNC: 0.95 NG/DL (ref 0.53–1.43)
TSH SERPL DL<=0.005 MIU/L-ACNC: 4.02 UIU/ML (ref 0.3–3.7)
WBC # BLD AUTO: 7.3 K/UL (ref 4.8–10.8)

## 2017-07-20 PROCEDURE — 82306 VITAMIN D 25 HYDROXY: CPT

## 2017-07-20 PROCEDURE — 36415 COLL VENOUS BLD VENIPUNCTURE: CPT

## 2017-07-20 PROCEDURE — 84439 ASSAY OF FREE THYROXINE: CPT

## 2017-07-20 PROCEDURE — 84443 ASSAY THYROID STIM HORMONE: CPT

## 2017-07-21 LAB
IGA SERPL-MCNC: 160 MG/DL (ref 68–408)
IGG SERPL-MCNC: 971 MG/DL (ref 768–1632)
IGM SERPL-MCNC: 272 MG/DL (ref 35–263)

## 2017-07-22 LAB
A ALTERNATA IGE QN: <0.1 KU/L
A FUMIGATUS IGE QN: <0.1 KU/L
ALBUMIN 24H UR QL ELPH: DETECTED
ALBUMIN SERPL-MCNC: 4.21 G/DL (ref 3.75–5.01)
ALPHA1 GLOB 24H UR QL ELPH: DETECTED
ALPHA1 GLOB SERPL ELPH-MCNC: 0.36 G/DL (ref 0.19–0.46)
ALPHA2 GLOB 24H UR QL ELPH: DETECTED
ALPHA2 GLOB SERPL ELPH-MCNC: 0.6 G/DL (ref 0.48–1.05)
B-GLOBULIN SERPL ELPH-MCNC: 0.73 G/DL (ref 0.48–1.1)
B-GLOBULIN UR QL ELPH: DETECTED
BERMUDA GRASS IGE QN: <0.1 KU/L
BOXELDER IGE QN: <0.1 KU/L
C SPHAEROSPERMUM IGE QN: <0.1 KU/L
CAT DANDER IGE QN: <0.1 KU/L
CMN PIGWEED IGE QN: <0.1 KU/L
COLLECT DURATION TIME SPEC: NORMAL H
COMMON RAGWEED IGE QN: <0.1 KU/L
COTTONWOOD IGE QN: <0.1 KU/L
COW MILK IGE QN: <0.1 KU/L
D FARINAE IGE QN: <0.1 KU/L
D PTERONYSS IGE QN: <0.1 KU/L
DEPRECATED MISC ALLERGEN IGE RAST QL: NORMAL
DOG DANDER IGE QN: <0.1 KU/L
EER PROT ELECT SER Q1092: NORMAL
GAMMA GLOB SERPL ELPH-MCNC: 1.11 G/DL (ref 0.62–1.51)
GAMMA GLOB UR QL ELPH: DETECTED
IGE SERPL-ACNC: 2 KU/L
INTERPRETATION SERPL IFE-IMP: NORMAL
INTERPRETATION UR IFE-IMP: NORMAL
KAPPA LC FREE UR-MCNC: 0.35 MG/DL (ref 0.14–2.42)
KAPPA LC FREE/LAMBDA FREE UR: 8.75 RATIO (ref 2.04–10.37)
LAMBDA LC FREE UR-MCNC: 0.04 MG/DL (ref 0.02–0.67)
M RACEMOSUS IGE QN: <0.1 KU/L
MOUSE EPITH IGE QN: <0.1 KU/L
MT JUNIPER IGE QN: <0.1 KU/L
MUGWORT IGE QN: <0.1 KU/L
OLIVE POLN IGE QN: <0.1 KU/L
P NOTATUM IGE QN: <0.1 KU/L
PEANUT IGE QN: <0.1 KU/L
PROT 24H UR-MRATE: NORMAL MG/D (ref 10–140)
PROT SERPL-MCNC: 7 G/DL (ref 6–8.3)
ROACH IGE QN: <0.1 KU/L
SALTWORT IGE QN: <0.1 KU/L
TIMOTHY IGE QN: <0.1 KU/L
TOTAL VOLUME 1105: NORMAL ML
WHITE ELM IGE QN: <0.1 KU/L
WHITE MULBERRY IGE QN: <0.1 KU/L
WHITE OAK IGE QN: <0.1 KU/L

## 2017-07-26 ENCOUNTER — HOSPITAL ENCOUNTER (OUTPATIENT)
Dept: OTHER | Facility: MEDICAL CENTER | Age: 67
End: 2017-07-26
Attending: NURSE PRACTITIONER
Payer: MEDICARE

## 2017-07-26 PROCEDURE — S9453 SMOKING CESSATION CLASS: HCPCS

## 2017-07-26 NOTE — RESPIRATORY CARE
"Patient attended week 4 Quit Tobacco class from 11 am to 12 pm.  Patient did quit smoking . Patient is not using  nicotine replacement. This week we covered Learning to [place] without Cigarettes, eating healthy snacks and reviewed /her \"Tobacco Quit Plan\". She was provided with a certificate of completion for the class.      "

## 2017-08-02 RX ORDER — ERGOCALCIFEROL 1.25 MG/1
CAPSULE ORAL
Qty: 12 CAP | Refills: 0 | Status: SHIPPED | OUTPATIENT
Start: 2017-08-02 | End: 2017-08-24

## 2017-08-03 ENCOUNTER — NON-PROVIDER VISIT (OUTPATIENT)
Dept: VASCULAR LAB | Facility: MEDICAL CENTER | Age: 67
End: 2017-08-03
Attending: NURSE PRACTITIONER
Payer: MEDICARE

## 2017-08-03 PROCEDURE — 99407 BEHAV CHNG SMOKING > 10 MIN: CPT

## 2017-08-03 NOTE — PROGRESS NOTES
"Outpatient Pharmacotherapy Program    Smoking Cessation Note    Reason for Visit: Patient presents for smoking cessation pharmacotherapy  Follow-Up Visit    HPI:    Age at Which Started Smokin  Average number of cigarettes smoked per day: 10  Additional Smoking Hx: Has tried quitting in the past but has not been successful.    Pt has only tried \"cold-turkey\" in the past without trying any medication therapies.    Pertinent Psych Hx: Anxiety and some seasons of depression per pt.  No current Psych problems per pt.  Recent quit attempts: Last attempt was 15 years ago.       Vitals:  BP:      128/72       HR:   70    Assessment:    Tobacco use disorder, willing to make quit attempt with a combination of pharmacological and behavioral therapy.    Plan:    Behavioral Modification Recommended: Specifically Pt has finished smoking cessation class.     Quit Date: 17    Chantix            Currently on maintenance dose.  No complaints, no psychological effects   Pt has been successful on cessation and has only had two slips since stopping on her stop date.  States she can  Afford Chantix and feels the medication is working well.  Continue current regimen.     Potential side-effects of all prescribed pharmacotherapy reviewed with patient who verbalizes understanding of the risks and benefits of this therapy.    Follow-up :  with PCP and In pharmacotherapy clinic:  Date: 17    Morales Steinberg                    "

## 2017-08-03 NOTE — MR AVS SNAPSHOT
"Naomy Vargas   8/3/2017 11:15 AM   Non-Provider Visit   MRN: [id number]    Department:  Vascular Medicine   Dept Phone:  [phone]    Description:  Female : 1950   Provider:  [facility] EXAM 4           Allergies as of 8/3/2017     Allergen Noted Reactions    Pcn [Penicillins] 2015   Swelling    As a child  Unsure of tolerance to other \"cillins\"  HAS TOLERATED KEFLEX PREVIOUSLY WITHOUT DIFFICULTY.      Vital Signs     Smoking Status                   Current Every Day Smoker           Basic Information     Date Of Birth Sex Race Ethnicity Preferred Language    1950 Female White Non- English      Your appointments     Aug 17, 2017  2:00 PM   Smoking Cessation Consult with [facility] EXAM 5   [facility]  (--)    [address]  [place] [address]   [phone]            Aug 24, 2017  9:55 AM   Established Patient with JAJA Murdock   [facility])    [address] [place] [address], [address]  [place] [address] [phone]           You will be receiving a confirmation call a few days before your appointment from our automated call confirmation system.              Problem List              ICD-10-CM Priority Class Noted - Resolved    Cellulitis of leg L03.119   2013 - Present    Renal insufficiency N28.9   2013 - Present    Acquired hypothyroidism E03.9   2015 - Present    Chronic venous stasis dermatitis of both lower extremities I87.2   7/15/2015 - Present    Low back pain at multiple sites M54.5   2016 - Present    Nicotine dependence, cigarettes, uncomplicated F17.210   2016 - Present    Osteopenia M85.80   2017 - Present    Vitamin D deficiency E55.9   2017 - Present      Health Maintenance        Date Due Completion Dates    MAMMOGRAM 2017, 2015 (Postponed), 2013, 2012    Override on 2015: Postponed    IMM INFLUENZA " (1) 9/1/2017 1/21/2017, 11/1/2013    IMM PNEUMOCOCCAL 65+ (ADULT) LOW/MEDIUM RISK SERIES (2 of 2 - PPSV23) 11/1/2018 2/28/2017, 11/1/2013    COLONOSCOPY 3/24/2019 3/24/2017, 3/24/2017    BONE DENSITY 4/26/2022 4/26/2017, 8/18/2015 (Postponed)    Override on 8/18/2015: Postponed    IMM DTaP/Tdap/Td Vaccine (2 - Td) 3/15/2027 3/15/2017            Current Immunizations     13-VALENT PCV PREVNAR 2/28/2017    INFLUENZA VACCINE H1N1 1/11/2010    Influenza TIV (IM) 11/1/2013  8:35 PM    Influenza Vaccine Adult HD 1/21/2017    Pneumococcal polysaccharide vaccine (PPSV-23) 11/1/2013  8:35 PM    SHINGLES VACCINE 3/15/2017    Tdap Vaccine 3/15/2017      Below and/or attached are the medications your provider expects you to take. Review all of your home medications and newly ordered medications with your provider and/or pharmacist. Follow medication instructions as directed by your provider and/or pharmacist. Please keep your medication list with you and share with your provider. Update the information when medications are discontinued, doses are changed, or new medications (including over-the-counter products) are added; and carry medication information at all times in the event of emergency situations     Allergies:  PCN - Swelling               Medications  Valid as of: August 03, 2017 - 11:25 AM    Generic Name Brand Name Tablet Size Instructions for use    Ergocalciferol (Cap) DRISDOL 93775 UNITS TAKE 1 CAP BY MOUTH EVERY 7 DAYS. LABS EVERY THREE MONTHS, DO NOT REFILL UNTIL AFTER LABS.        Fluocinonide Emulsified Base (Cream) LIDEX-E 0.05 % Apply 0.05 % to affected area(s) BID (RC)(S).        HydrOXYzine HCl (Tab) ATARAX 25 MG Take 1 Tab by mouth 3 times a day as needed for Itching.        Levothyroxine Sodium (Tab) SYNTHROID 75 MCG TAKE 1 TAB BY MOUTH EVERY MORNING ON AN EMPTY STOMACH.        Mupirocin (Ointment) BACTROBAN 2 %         Varenicline Tartrate (Tab) CHANTIX 1 MG Take 1 Tab by mouth 2 times a day.        .                  Medicines prescribed today were sent to:     [facility]/PHARMACY #[id number] - [place] - [address] [place] [address]    Phone: [phone] Fax: [phone]    Open 24 Hours?: No      Medication refill instructions:       If your prescription bottle indicates you have medication refills left, it is not necessary to call your provider’s office. Please contact your pharmacy and they will refill your medication.    If your prescription bottle indicates you do not have any refills left, you may request refills at any time through one of the following ways: The online [employer] system (except Urgent Care), by calling your provider’s office, or by asking your pharmacy to contact your provider’s office with a refill request. Medication refills are processed only during regular business hours and may not be available until the next business day. Your provider may request additional information or to have a follow-up visit with you prior to refilling your medication.   *Please Note: Medication refills are assigned a new Rx number when refilled electronically. Your pharmacy may indicate that no refills were authorized even though a new prescription for the same medication is available at the pharmacy. Please request the medicine by name with the pharmacy before contacting your provider for a refill.           [employer] Access Code: Activation code not generated  Current [employer] Status: Active          Quit Tobacco Information     Do you want to quit using tobacco?    Quitting tobacco decreases risks of cancer, heart and lung disease, increases life expectancy, improves sense of taste and smell, and increases spending money, among other benefits.    If you are thinking about quitting, we can help.  • [facility] Quit Tobacco Program: 776.877.1434  o Program occurs weekly for four weeks and includes pharmacist consultation on products to support quitting smoking or chewing tobacco. A provider referral is needed for  pharmacist consultation.  • Tobacco Users Help Hotline: 6-800-QUIT-NOW (536-9521) or https://[place].quitlogix.org/  o Free, confidential telephone and online coaching for [place] residents. Sessions are designed on a schedule that is convenient for you. Eligible clients receive free nicotine replacement therapy.  • Nationally: www.smokefree.gov  o Information and professional assistance to support both immediate and long-term needs as you become, and remain, a non-smoker. Smokefree.gov allows you to choose the help that best fits your needs.

## 2017-08-17 ENCOUNTER — NON-PROVIDER VISIT (OUTPATIENT)
Dept: VASCULAR LAB | Facility: MEDICAL CENTER | Age: 67
End: 2017-08-17
Attending: INTERNAL MEDICINE
Payer: MEDICARE

## 2017-08-17 PROCEDURE — 99406 BEHAV CHNG SMOKING 3-10 MIN: CPT | Performed by: PHARMACIST

## 2017-08-17 NOTE — PROGRESS NOTES
"Outpatient Pharmacotherapy Program    Smoking Cessation Note    Reason for Visit: Patient presents for smoking cessation pharmacotherapy  Follow-Up Visit    HPI:    Age at Which Started Smokin  Average number of cigarettes smoked per day: 10  Additional Smoking Hx: Has tried quitting in the past but has not been successful.    Pt has only tried \"cold-turkey\" in the past without trying any medication therapies.    Pertinent Psych Hx: Anxiety and some seasons of depression per pt.  No current Psych problems per pt.  Recent quit attempts: Last attempt was 15 years ago.       Vitals:  BP:      142/82       HR:   70    Assessment:    Tobacco use disorder, willing to make quit attempt with a combination of pharmacological and behavioral therapy.    Plan:    Behavioral Modification Recommended: Specifically Pt has finished smoking cessation class.     Quit Date: 17    Chantix            Currently on maintenance dose.  No complaints, no psychological effects. One day felt \"off\" but happened just once.     Pt has been successful on cessation and has only had two slips since stopping on her stop date.  However last night she was able to go to a concert with a smoker and was able to abstain. States she can  Afford Chantix and feels the medication is working well.  Continue current regimen.     Potential side-effects of all prescribed pharmacotherapy reviewed with patient who verbalizes understanding of the risks and benefits of this therapy.    Follow-up :  with PCP and In pharmacotherapy clinic: 10/12/17 - she is gone all of September.    Akosua Montoya, PHARMD      "

## 2017-08-17 NOTE — MR AVS SNAPSHOT
"        Naomy Vargas   2017 2:00 PM   Appointment   MRN: [id number]    Department:  Vascular Medicine   Dept Phone:  [phone]    Description:  Female : 1950   Provider:  [facility] EXAM 5           Allergies as of 2017     Allergen Noted Reactions    Pcn [Penicillins] 2015   Swelling    As a child  Unsure of tolerance to other \"cillins\"  HAS TOLERATED KEFLEX PREVIOUSLY WITHOUT DIFFICULTY.      Vital Signs     Smoking Status                   Current Every Day Smoker           Basic Information     Date Of Birth Sex Race Ethnicity Preferred Language    1950 Female White Non- English      Your appointments     Aug 24, 2017  9:55 AM   Established Patient with JAJA Murdock   [facility])    [address] [place] [address], [address]  [facility] [address] [phone]           You will be receiving a confirmation call a few days before your appointment from our automated call confirmation system.            Oct 12, 2017  2:00 PM   Follow up Pharmacotherapy Visit with [facility] EXAM 5   [facility]  (--)    [address] [facility] [address]   [phone]              Problem List              ICD-10-CM Priority Class Noted - Resolved    Cellulitis of leg L03.119   2013 - Present    Renal insufficiency N28.9   2013 - Present    Acquired hypothyroidism E03.9   2015 - Present    Chronic venous stasis dermatitis of both lower extremities I87.2   7/15/2015 - Present    Low back pain at multiple sites M54.5   2016 - Present    Nicotine dependence, cigarettes, uncomplicated F17.210   2016 - Present    Osteopenia M85.80   2017 - Present    Vitamin D deficiency E55.9   2017 - Present      Health Maintenance        Date Due Completion Dates    MAMMOGRAM 2017, 2015 (Postponed), 2013, 2012    Override on 2015: Postponed    IMM INFLUENZA " (1) 9/1/2017 1/21/2017, 11/1/2013    IMM PNEUMOCOCCAL 65+ (ADULT) LOW/MEDIUM RISK SERIES (2 of 2 - PPSV23) 11/1/2018 2/28/2017, 11/1/2013    COLONOSCOPY 3/24/2019 3/24/2017, 3/24/2017    BONE DENSITY 4/26/2022 4/26/2017, 8/18/2015 (Postponed)    Override on 8/18/2015: Postponed    IMM DTaP/Tdap/Td Vaccine (2 - Td) 3/15/2027 3/15/2017            Current Immunizations     13-VALENT PCV PREVNAR 2/28/2017    INFLUENZA VACCINE H1N1 1/11/2010    Influenza TIV (IM) 11/1/2013  8:35 PM    Influenza Vaccine Adult HD 1/21/2017    Pneumococcal polysaccharide vaccine (PPSV-23) 11/1/2013  8:35 PM    SHINGLES VACCINE 3/15/2017    Tdap Vaccine 3/15/2017      Below and/or attached are the medications your provider expects you to take. Review all of your home medications and newly ordered medications with your provider and/or pharmacist. Follow medication instructions as directed by your provider and/or pharmacist. Please keep your medication list with you and share with your provider. Update the information when medications are discontinued, doses are changed, or new medications (including over-the-counter products) are added; and carry medication information at all times in the event of emergency situations     Allergies:  PCN - Swelling               Medications  Valid as of: August 17, 2017 -  2:14 PM    Generic Name Brand Name Tablet Size Instructions for use    Ergocalciferol (Cap) DRISDOL 48397 units TAKE 1 CAP BY MOUTH EVERY 7 DAYS. LABS EVERY THREE MONTHS, DO NOT REFILL UNTIL AFTER LABS.        Fluocinonide Emulsified Base (Cream) LIDEX-E 0.05 % Apply 0.05 % to affected area(s) BID (RC)(S).        HydrOXYzine HCl (Tab) ATARAX 25 MG Take 1 Tab by mouth 3 times a day as needed for Itching.        Levothyroxine Sodium (Tab) SYNTHROID 75 MCG TAKE 1 TAB BY MOUTH EVERY MORNING ON AN EMPTY STOMACH.        Mupirocin (Ointment) BACTROBAN 2 %         Varenicline Tartrate (Tab) CHANTIX 1 MG Take 1 Tab by mouth 2 times a day.        .                  Medicines prescribed today were sent to:     [facility]ARMACY #[id number] - [place] - [address] [place] [address]    Phone: [phone] Fax: [phone]    Open 24 Hours?: No      Medication refill instructions:       If your prescription bottle indicates you have medication refills left, it is not necessary to call your provider’s office. Please contact your pharmacy and they will refill your medication.    If your prescription bottle indicates you do not have any refills left, you may request refills at any time through one of the following ways: The online [employer] system (except Urgent Care), by calling your provider’s office, or by asking your pharmacy to contact your provider’s office with a refill request. Medication refills are processed only during regular business hours and may not be available until the next business day. Your provider may request additional information or to have a follow-up visit with you prior to refilling your medication.   *Please Note: Medication refills are assigned a new Rx number when refilled electronically. Your pharmacy may indicate that no refills were authorized even though a new prescription for the same medication is available at the pharmacy. Please request the medicine by name with the pharmacy before contacting your provider for a refill.           Mobi Rider Access Code: Activation code not generated  Current Mobi Rider Status: Active          Quit Tobacco Information     Do you want to quit using tobacco?    Quitting tobacco decreases risks of cancer, heart and lung disease, increases life expectancy, improves sense of taste and smell, and increases spending money, among other benefits.    If you are thinking about quitting, we can help.  • [facility] Quit Tobacco Program: [phone]  o Program occurs weekly for four weeks and includes pharmacist consultation on products to support quitting smoking or chewing tobacco. A provider referral is needed for  pharmacist consultation.  • Tobacco Users Help Hotline: 2-800-QUIT-NOW (402-4840) or https://[place].quitlogix.org/  o Free, confidential telephone and online coaching for [place] residents. Sessions are designed on a schedule that is convenient for you. Eligible clients receive free nicotine replacement therapy.  • Nationally: www.smokefree.gov  o Information and professional assistance to support both immediate and long-term needs as you become, and remain, a non-smoker. Smokefree.gov allows you to choose the help that best fits your needs.

## 2017-08-24 ENCOUNTER — APPOINTMENT (OUTPATIENT)
Dept: RADIOLOGY | Facility: IMAGING CENTER | Age: 67
End: 2017-08-24
Attending: NURSE PRACTITIONER
Payer: MEDICARE

## 2017-08-24 ENCOUNTER — OFFICE VISIT (OUTPATIENT)
Dept: MEDICAL GROUP | Facility: PHYSICIAN GROUP | Age: 67
End: 2017-08-24
Payer: MEDICARE

## 2017-08-24 VITALS
OXYGEN SATURATION: 97 % | BODY MASS INDEX: 28.56 KG/M2 | SYSTOLIC BLOOD PRESSURE: 134 MMHG | WEIGHT: 182 LBS | HEIGHT: 67 IN | HEART RATE: 70 BPM | DIASTOLIC BLOOD PRESSURE: 84 MMHG | RESPIRATION RATE: 16 BRPM | TEMPERATURE: 98.1 F

## 2017-08-24 DIAGNOSIS — E55.9 VITAMIN D DEFICIENCY: ICD-10-CM

## 2017-08-24 DIAGNOSIS — E03.9 ACQUIRED HYPOTHYROIDISM: ICD-10-CM

## 2017-08-24 DIAGNOSIS — M25.531 RIGHT WRIST PAIN: ICD-10-CM

## 2017-08-24 DIAGNOSIS — F17.211 NICOTINE DEPENDENCE, CIGARETTES, IN REMISSION: ICD-10-CM

## 2017-08-24 PROCEDURE — 99214 OFFICE O/P EST MOD 30 MIN: CPT | Performed by: NURSE PRACTITIONER

## 2017-08-24 PROCEDURE — 73110 X-RAY EXAM OF WRIST: CPT | Mod: TC,RT | Performed by: NURSE PRACTITIONER

## 2017-08-24 RX ORDER — LEVOTHYROXINE SODIUM 88 UG/1
88 TABLET ORAL
Qty: 30 TAB | Refills: 1 | Status: SHIPPED | OUTPATIENT
Start: 2017-08-24 | End: 2017-11-03 | Stop reason: SDUPTHER

## 2017-08-24 NOTE — PROGRESS NOTES
Subjective:     Chief Complaint   Patient presents with   • Results     Review labs      Naomy Vargas is a 67 y.o. female here today for evaluation and management of issues listed below.    Nicotine dependence, cigarettes, in remission  1/2 ppd x 53 years. attended smoking cessation class next month.  quit twice in the past and stopped July 18, 2017. Can walk dogs but hasn't started.   Continues chantix.    Vitamin D deficiency  May 2017 level 8, started weekly supplement. Will repeat labs in early August, reviewed need for labs every three months while taking high dose supplement.  Recent labs with level improved to 50, she also started calcium citrate which has small amount of vitamin D, maybe 500 iu.  She will decrease 50,000 iu to every other week and repeat labs, discussed change to daily 2000 iu after with monitoring for goal in 40's .      Acquired hypothyroidism  Currently taking levothyroxine 75 mcg daily as directed on empty stomach with water and waits at least 30 minutes to consume other food, beverages, or medications.  Denies  temperature intolerance, or changes in bowel habits.  Last TSH was elevated at 4.02,    previously levels were 3.2, 3.9, 3.44            Right wrist pain, 2 months, joint and radial area. Pain with most attempts at grasping sometimes sharp 6-8, with certain motions milder pain rates 4. No trauma or injury. No redness or swelling. Avoiding use helps, nothing else makes it better. Uses wrist splint which helps, aleve not helpful. Denies numbness, weakness.  1. Nicotine dependence, cigarettes, in remission    2. Vitamin D deficiency    3. Acquired hypothyroidism    4. Right wrist pain          Allergies: Pcn  Current medicines (including changes today)  Current Outpatient Prescriptions   Medication Sig Dispense Refill   • levothyroxine (SYNTHROID) 88 MCG Tab Take 1 Tab by mouth every morning before breakfast. 30 Tab 1   • varenicline (CHANTIX) 1 MG tablet Take 1 Tab by mouth  "2 times a day. 60 Tab 3   • hydrOXYzine (ATARAX) 25 MG Tab Take 1 Tab by mouth 3 times a day as needed for Itching. 30 Tab 0   • mupirocin (BACTROBAN) 2 % Ointment   2   • fluocinonide-emollient (LIDEX-E) 0.05 % cream Apply 0.05 % to affected area(s) BID (RC)(S).       No current facility-administered medications for this visit.       She  has a past medical history of Allergy; Anxiety; Arrhythmia; Blood transfusion without reported diagnosis; Cataract; GERD (gastroesophageal reflux disease); Hypertension; Kidney disease; Thyroid disease; and Urinary tract infection, site not specified. She also has no past medical history of Anemia, Depression, Arthritis, Asthma, Cancer (CMS-HCC), CHF (congestive heart failure) (CMS-HCC), Clotting disorder (CMS-HCC), COPD (chronic obstructive pulmonary disease) (CMS-HCC), Diabetes (CMS-HCC), Diabetic neuropathy (CMS-HCC), Glaucoma, Headache, Heart attack (CMS-HCC), Heart murmur, HIV (human immunodeficiency virus infection) (CMS-HCC), Hyperlipidemia, IBD (inflammatory bowel disease), Meningitis, Muscle disorder, Osteoporosis, Seizure (CMS-HCC), Stroke (CMS-HCC), Substance abuse, Tuberculosis, or Ulcer (CMS-HCC).    ROS Denies HA, chest pain, shortness of breath, abdominal pain, bladder or bowel changes, lower extremity edema.    Health Maintenance: Reviewed and updated. Pt prefers every two years on mammography per previous PCP recommendations.     Objective:   Blood pressure 134/84, pulse 70, temperature 36.7 °C (98.1 °F), resp. rate 16, height 1.702 m (5' 7.01\"), weight 82.555 kg (182 lb), SpO2 97 %. Body mass index is 28.5 kg/(m^2).  Physical Exam   Alert, no acute distress. Pleasant and cooperative with the examination.  Eye contact is good, affect calm.  Skin: Warm, dry, no rashes in visible areas.    Eyes: Pupils equal, round. Conjunctiva and sclera clear, lids normal.  ENT: Pinna normal. Neck: Supple, trachea midline.  Lungs: Normal effort and respirations. Clear to " auscultation bilaterally. Abdomen: No CVAT  CV: Regular rate and rhythm.  MS/Ext: Steady gait, no edema.    Results reviewed  TSH, Vitamin D 7.20.17    Assessment and Plan:   Treatment Changes:Increase levothyroxine and repeat labs in 6 weeks. Vitamin D every other week with labs to be done around October 20.    Dermatologist referred her vein [place]. She'll notify us if she needs an additional referral for insurance purposes.  Naomy was seen today for results.    Diagnoses and all orders for this visit:    Nicotine dependence, cigarettes, in remission  Comments:  Counseled regarding ongoing smoking cessation. will continue chantix.    Vitamin D deficiency  Comments:  Decrease to every other week, repeat labs 3 months from last visit. Will be notified of results and any recommended changes. After completion of rx, daily 2000   Orders:  -     VITAMIN D,25 HYDROXY; Future    Acquired hypothyroidism  Comments:  Uncontrolled, increase levothyroxine to 88 mcg daily. repeat labs in 6 weeks.  Orders:  -     TSH WITH REFLEX TO FT4; Future  -     levothyroxine (SYNTHROID) 88 MCG Tab; Take 1 Tab by mouth every morning before breakfast.    Right wrist pain  -     DX-WRIST-COMPLETE 3+ RIGHT; Future    Differential diagnosis, natural history, treatment options, supportive care, and indications for immediate follow-up discussed at length. She'll be notified of results via my chart. She is advised to limit use of the splint toshort periods of time.  Followup: Return in about 3 months (around 11/24/2017). Sooner should new symptoms or problems arise, or as previously scheduled.         Reviewed side effects, risks, and benefits of medications prescribed today.  Advised to take all medications as instructed and report any side effects.   The patient voices understanding and agrees.  Report any new or worsening symptoms.  Have labs or other diagnostic studies prior to follow up.  Keep all appointments for any referrals  given.    Please note this dictation was created using voice recognition software. Every reasonable attempt has been made to correct obvious errors, however there may be errors of grammar and possibly content that were not discovered before finalizing the note.

## 2017-08-24 NOTE — MR AVS SNAPSHOT
"        Naomy Ramosington   2017 9:55 AM   Office Visit   MRN: [id number]    Department:  [facility]   Dept Phone:  [phone]    Description:  Female : 1950   Provider:  JAJA Murdock           Reason for Visit     Results Review labs       Allergies as of 2017     Allergen Noted Reactions    Pcn [Penicillins] 2015   Swelling    As a child  Unsure of tolerance to other \"cillins\"  HAS TOLERATED KEFLEX PREVIOUSLY WITHOUT DIFFICULTY.      You were diagnosed with     Nicotine dependence, cigarettes, in remission   [[id number]]   Counseled regarding ongoing smoking cessation. will continue chantix.    Vitamin D deficiency   [[id number]]   Decrease to every other week, repeat labs 3 months from last visit. Will be notified of results and any recommended changes. After completion of rx, daily 2000     Acquired hypothyroidism   [[id number]]   Uncontrolled, increase levothyroxine to 88 mcg daily. repeat labs in 6 weeks.    Right wrist pain   [[id number]]         Vital Signs     Blood Pressure Pulse Temperature Respirations Height Weight    134/84 mmHg 70 36.7 °C (98.1 °F) 16 1.702 m (5' 7.01\") 82.555 kg (182 lb)    Body Mass Index Oxygen Saturation Smoking Status             28.50 kg/m2 97% Former Smoker         Basic Information     Date Of Birth Sex Race Ethnicity Preferred Language    1950 Female White Non- English      Your appointments     Oct 12, 2017  2:00 PM   Follow up Pharmacotherapy Visit with [facility] EXAM 5   [facility] [place] [place] [facility]  (--)    [address] [facility] [place] [address]   [phone]            2017 12:55 PM   Established Patient with JAJA Murdock   [facility]    [address], [address]  [facility] [address] [phone]           You will be receiving a confirmation call a few days before your appointment from our automated call confirmation system.   "           Problem List              ICD-10-CM Priority Class Noted - Resolved    Cellulitis of leg L03.119   11/1/2013 - Present    Renal insufficiency N28.9   11/2/2013 - Present    Acquired hypothyroidism E03.9   7/14/2015 - Present    Chronic venous stasis dermatitis of both lower extremities I87.2   7/15/2015 - Present    Low back pain at multiple sites M54.5   6/6/2016 - Present    Nicotine dependence, cigarettes, in remission F17.211   6/6/2016 - Present    Osteopenia M85.80   4/28/2017 - Present    Vitamin D deficiency E55.9   6/20/2017 - Present      Health Maintenance        Date Due Completion Dates    IMM INFLUENZA (1) 9/1/2017 1/21/2017, 11/1/2013    MAMMOGRAM 6/24/2018 6/24/2016, 8/18/2015 (Postponed), 1/9/2013, 12/14/2012    Override on 8/18/2015: Postponed    IMM PNEUMOCOCCAL 65+ (ADULT) LOW/MEDIUM RISK SERIES (2 of 2 - PPSV23) 11/1/2018 2/28/2017, 11/1/2013    COLONOSCOPY 3/24/2019 3/24/2017, 3/24/2017    BONE DENSITY 4/26/2022 4/26/2017, 8/18/2015 (Postponed)    Override on 8/18/2015: Postponed    IMM DTaP/Tdap/Td Vaccine (2 - Td) 3/15/2027 3/15/2017            Current Immunizations     13-VALENT PCV PREVNAR 2/28/2017    INFLUENZA VACCINE H1N1 1/11/2010    Influenza TIV (IM) 11/1/2013  8:35 PM    Influenza Vaccine Adult HD 1/21/2017    Pneumococcal polysaccharide vaccine (PPSV-23) 11/1/2013  8:35 PM    SHINGLES VACCINE 3/15/2017    Tdap Vaccine 3/15/2017      Below and/or attached are the medications your provider expects you to take. Review all of your home medications and newly ordered medications with your provider and/or pharmacist. Follow medication instructions as directed by your provider and/or pharmacist. Please keep your medication list with you and share with your provider. Update the information when medications are discontinued, doses are changed, or new medications (including over-the-counter products) are added; and carry medication information at all times in the event of emergency  situations     Allergies:  PCN - Swelling               Medications  Valid as of: August 24, 2017 - 10:32 AM    Generic Name Brand Name Tablet Size Instructions for use    Fluocinonide Emulsified Base (Cream) LIDEX-E 0.05 % Apply 0.05 % to affected area(s) BID (RC)(S).        HydrOXYzine HCl (Tab) ATARAX 25 MG Take 1 Tab by mouth 3 times a day as needed for Itching.        Levothyroxine Sodium (Tab) SYNTHROID 88 MCG Take 1 Tab by mouth every morning before breakfast.        Mupirocin (Ointment) BACTROBAN 2 %         Varenicline Tartrate (Tab) CHANTIX 1 MG Take 1 Tab by mouth 2 times a day.        .                 Medicines prescribed today were sent to:     [facility]ARMACY #[id number] - [place] - [address]    [address] [place] [address]    Phone: [phone] Fax: [phone]    Open 24 Hours?: No      Medication refill instructions:       If your prescription bottle indicates you have medication refills left, it is not necessary to call your provider’s office. Please contact your pharmacy and they will refill your medication.    If your prescription bottle indicates you do not have any refills left, you may request refills at any time through one of the following ways: The online [employer] system (except Urgent Care), by calling your provider’s office, or by asking your pharmacy to contact your provider’s office with a refill request. Medication refills are processed only during regular business hours and may not be available until the next business day. Your provider may request additional information or to have a follow-up visit with you prior to refilling your medication.   *Please Note: Medication refills are assigned a new Rx number when refilled electronically. Your pharmacy may indicate that no refills were authorized even though a new prescription for the same medication is available at the pharmacy. Please request the medicine by name with the pharmacy before contacting your provider for a refill.        Your  To Do List     Future Labs/Procedures Complete By Expires    TSH WITH REFLEX TO FT4  10/2/2017 8/25/2018    DX-WRIST-COMPLETE 3+ RIGHT  As directed 2/24/2018    VITAMIN D,25 HYDROXY  As directed 8/25/2018         MyChart Access Code: Activation code not generated  Current [employer] Status: Active

## 2017-10-12 ENCOUNTER — NON-PROVIDER VISIT (OUTPATIENT)
Dept: VASCULAR LAB | Facility: MEDICAL CENTER | Age: 67
End: 2017-10-12
Attending: INTERNAL MEDICINE
Payer: MEDICARE

## 2017-10-12 VITALS — DIASTOLIC BLOOD PRESSURE: 101 MMHG | SYSTOLIC BLOOD PRESSURE: 160 MMHG

## 2017-10-12 PROCEDURE — 99406 BEHAV CHNG SMOKING 3-10 MIN: CPT

## 2017-10-12 PROCEDURE — 99211 OFF/OP EST MAY X REQ PHY/QHP: CPT

## 2017-10-12 PROCEDURE — 90471 IMMUNIZATION ADMIN: CPT

## 2017-10-12 PROCEDURE — 90662 IIV NO PRSV INCREASED AG IM: CPT

## 2017-10-12 NOTE — PROGRESS NOTES
"Outpatient Pharmacotherapy Program     Smoking Cessation Note     Reason for Visit: Patient presents for smoking cessation pharmacotherapy  Follow-Up Visit     HPI:     Age at Which Started Smokin  Average number of cigarettes smoked per day: 10  Additional Smoking Hx: Has tried quitting in the past but has not been successful.    Pt has only tried \"cold-turkey\" in the past without trying any medication therapies.    Pertinent Psych Hx: Anxiety and some seasons of depression per pt.  No current Psych problems per pt.  Recent quit attempts: Last attempt was 15 years ago.        Vitals:  BP:      160/101       HR:   75     Assessment:     Tobacco use disorder (resolved), successful quit attempt     Plan:     No further tobacco cessation visits warranted. Discontinue Chantix after remaining prescription. Influenza vaccination provided today.     Quit Date: 17     Follow up: As needed with PCP    Fuad Sweet, PharmD              "

## 2017-11-03 DIAGNOSIS — E03.9 ACQUIRED HYPOTHYROIDISM: ICD-10-CM

## 2017-11-06 RX ORDER — LEVOTHYROXINE SODIUM 88 UG/1
88 TABLET ORAL
Qty: 30 TAB | Refills: 2 | Status: SHIPPED | OUTPATIENT
Start: 2017-11-06 | End: 2017-12-06 | Stop reason: SDUPTHER

## 2017-11-06 NOTE — TELEPHONE ENCOUNTER
Was the patient seen in the last year in this department? Yes     Does patient have an active prescription for medications requested? No     Received Request Via: Pharmacy      Pt met protocol?: Yes Last OV 08/2017  TSH   Date Value Ref Range Status   07/20/2017 4.020 (H) 0.300 - 3.700 uIU/mL Final

## 2017-11-06 NOTE — TELEPHONE ENCOUNTER
Patient was last seen by PCP 8/17. Last TSH read 7/17 (4.02). Being monitored. Will refill for 3 months.

## 2017-11-28 DIAGNOSIS — F17.210 NICOTINE DEPENDENCE, CIGARETTES, UNCOMPLICATED: ICD-10-CM

## 2017-11-28 RX ORDER — VARENICLINE TARTRATE 1 MG/1
TABLET, FILM COATED ORAL
Refills: 2 | OUTPATIENT
Start: 2017-11-28

## 2017-11-28 NOTE — TELEPHONE ENCOUNTER
11/28/2017    Received refill request for Chantix. Spoke to patient on phone. She remains tobacco free and has discontinued warfarin. Congratulated patient on her success. Will refuse refill of Chantix.    Fuad Sweet, PharmD

## 2017-12-04 ENCOUNTER — HOSPITAL ENCOUNTER (OUTPATIENT)
Dept: LAB | Facility: MEDICAL CENTER | Age: 67
End: 2017-12-04
Attending: NURSE PRACTITIONER
Payer: MEDICARE

## 2017-12-04 DIAGNOSIS — E03.9 ACQUIRED HYPOTHYROIDISM: ICD-10-CM

## 2017-12-04 DIAGNOSIS — E55.9 VITAMIN D DEFICIENCY: ICD-10-CM

## 2017-12-04 LAB
25(OH)D3 SERPL-MCNC: 50 NG/ML (ref 30–100)
T4 FREE SERPL-MCNC: 0.97 NG/DL (ref 0.53–1.43)
TSH SERPL DL<=0.005 MIU/L-ACNC: 14.46 UIU/ML (ref 0.3–3.7)

## 2017-12-04 PROCEDURE — 82306 VITAMIN D 25 HYDROXY: CPT

## 2017-12-04 PROCEDURE — 84443 ASSAY THYROID STIM HORMONE: CPT

## 2017-12-04 PROCEDURE — 84439 ASSAY OF FREE THYROXINE: CPT

## 2017-12-04 PROCEDURE — 36415 COLL VENOUS BLD VENIPUNCTURE: CPT

## 2017-12-06 ENCOUNTER — OFFICE VISIT (OUTPATIENT)
Dept: MEDICAL GROUP | Facility: PHYSICIAN GROUP | Age: 67
End: 2017-12-06
Payer: MEDICARE

## 2017-12-06 VITALS
HEIGHT: 67 IN | TEMPERATURE: 98 F | OXYGEN SATURATION: 94 % | BODY MASS INDEX: 29.98 KG/M2 | DIASTOLIC BLOOD PRESSURE: 82 MMHG | WEIGHT: 191 LBS | SYSTOLIC BLOOD PRESSURE: 126 MMHG | HEART RATE: 80 BPM | RESPIRATION RATE: 16 BRPM

## 2017-12-06 DIAGNOSIS — E03.9 ACQUIRED HYPOTHYROIDISM: ICD-10-CM

## 2017-12-06 DIAGNOSIS — E55.9 VITAMIN D DEFICIENCY: ICD-10-CM

## 2017-12-06 PROCEDURE — 99214 OFFICE O/P EST MOD 30 MIN: CPT | Performed by: NURSE PRACTITIONER

## 2017-12-06 RX ORDER — LEVOTHYROXINE SODIUM 0.1 MG/1
100 TABLET ORAL
Qty: 30 TAB | Refills: 2 | Status: SHIPPED | OUTPATIENT
Start: 2017-12-06 | End: 2018-03-03 | Stop reason: SDUPTHER

## 2017-12-06 RX ORDER — ERGOCALCIFEROL 1.25 MG/1
CAPSULE ORAL
COMMUNITY
End: 2018-02-23

## 2017-12-08 NOTE — PROGRESS NOTES
Subjective:     Chief Complaint   Patient presents with   • Follow-Up     lab results     Naomy Vargas is a 67 y.o. female here today for evaluation and management of issues listed below.    Acquired hypothyroidism  Currently taking levothyroxine 88 mcg daily as directed on empty stomach with water and waits at least 30 minutes to consume other food, beverages, or medications.  Denies  temperature intolerance, or changes in bowel habits.  Recent TSH increased even with increased dose of levothyroxine.    Vitamin D deficiency  She is taking 50,000 iu to every other week     Last labs:  Lab Results   Component Value Date/Time    25HYDROXY 50 12/04/2017 12:22 PM    25HYDROXY 50 07/20/2017 12:36 PM    tsh  14. 460  1. Acquired hypothyroidism    2. Vitamin D deficiency        ROS   Denies HA, chest pain, shortness of breath, abdominal pain, bladder or bowel changes, lower extremity edema. All other pertinent systems reviewed and are negative except as in HPI.    Allergies: Pcn [penicillins]  Current medicines (including changes today)  Current Outpatient Prescriptions   Medication Sig Dispense Refill   • vitamin D, Ergocalciferol, (DRISDOL) 35598 units Cap capsule Take  by mouth every 7 days.     • levothyroxine (SYNTHROID) 100 MCG Tab Take 1 Tab by mouth every morning before breakfast. 30 Tab 2   • mupirocin (BACTROBAN) 2 % Ointment   2   • fluocinonide-emollient (LIDEX-E) 0.05 % cream Apply 0.05 % to affected area(s) BID (RC)(S).     • hydrOXYzine (ATARAX) 25 MG Tab Take 1 Tab by mouth 3 times a day as needed for Itching. 30 Tab 0     No current facility-administered medications for this visit.        She  has a past medical history of Allergy; Anxiety; Arrhythmia; Blood transfusion without reported diagnosis; Cataract; GERD (gastroesophageal reflux disease); Hypertension; Kidney disease; Thyroid disease; and Urinary tract infection, site not specified. She also has no past medical history of Anemia; Arthritis;  "Asthma; Cancer (CMS-[facility]); CHF (congestive heart failure) (CMS-HCC); Clotting disorder (CMS-HCC); COPD (chronic obstructive pulmonary disease) (CMS-HCC); Depression; Diabetes (CMS-HCC); Diabetic neuropathy (CMS-HCC); Glaucoma; Headache(784.0); Heart attack; Heart murmur; HIV (human immunodeficiency virus infection) (CMS-HCC); Hyperlipidemia; IBD (inflammatory bowel disease); Meningitis; Muscle disorder; Osteoporosis; Seizure (CMS-HCC); Stroke (CMS-HCC); Substance abuse; Tuberculosis; or Ulcer (CMS-HCC).      Health Maintenance: Reviewed and updated.      Objective:   Blood pressure 126/82, pulse 80, temperature 36.7 °C (98 °F), resp. rate 16, height 1.702 m (5' 7.01\"), weight 86.6 kg (191 lb), SpO2 94 %, not currently breastfeeding. Body mass index is 29.91 kg/m².  Physical Exam   Alert, no acute distress. Pleasant and cooperative with the examination.  Eye contact is good, affect calm.  Skin: Warm, dry, no rashes in visible areas.    Eyes: Pupils equal, round. Conjunctiva and sclera clear, lids normal.  ENT: Pinna normal.  Neck: Supple, trachea midline.  Lungs: Normal effort and respirations. Clear to auscultation bilaterally.   Abdomen: No CVAT   CV: Regular rate and rhythm.  MS/Ext: Steady gait, no edema.    Results reviewed  Recent labs    Assessment and Plan:   Treatment:   Naomy was seen today for follow-up.    Diagnoses and all orders for this visit:    Acquired hypothyroidism  Comments:  Uncontrolled, increase levothyroxine to 88 mcg daily. repeat labs in 6 weeks.  Orders:  -     levothyroxine (SYNTHROID) 100 MCG Tab; Take 1 Tab by mouth every morning before breakfast.  -     TSH WITH REFLEX TO FT4; Future    Vitamin D deficiency  Comments:  Stable. Continue high-dose supplement every other week. Discussed various treatment options today. We'll continue to monitor.  Orders:  -     VITAMIN D,25 HYDROXY; Future        Followup: Return in about 6 months (around 6/6/2018). Sooner should new symptoms or " problems arise, or as previously scheduled.           Reviewed side effects, risks, and benefits of medications prescribed today.  Advised to take all medications as instructed and report any side effects.   The patient voices understanding and agrees.  Report any new or worsening symptoms.  Have labs or other diagnostic studies prior to follow up.  Keep all appointments for any referrals given.        Please note this dictation was created using voice recognition software. Every reasonable attempt has been made to correct obvious errors, however there may be errors of grammar and possibly content that were not discovered before finalizing the note.

## 2017-12-08 NOTE — ASSESSMENT & PLAN NOTE
Currently taking levothyroxine 88 mcg daily as directed on empty stomach with water and waits at least 30 minutes to consume other food, beverages, or medications.  Denies  temperature intolerance, or changes in bowel habits.  Recent TSH increased even with increased dose of levothyroxine.

## 2018-01-17 ENCOUNTER — HOSPITAL ENCOUNTER (OUTPATIENT)
Dept: LAB | Facility: MEDICAL CENTER | Age: 68
End: 2018-01-17
Attending: NURSE PRACTITIONER
Payer: MEDICARE

## 2018-01-17 DIAGNOSIS — E03.9 ACQUIRED HYPOTHYROIDISM: ICD-10-CM

## 2018-01-17 LAB — TSH SERPL DL<=0.005 MIU/L-ACNC: 4.22 UIU/ML (ref 0.38–5.33)

## 2018-01-17 PROCEDURE — 84443 ASSAY THYROID STIM HORMONE: CPT

## 2018-01-17 PROCEDURE — 36415 COLL VENOUS BLD VENIPUNCTURE: CPT

## 2018-01-24 ENCOUNTER — HOSPITAL ENCOUNTER (OUTPATIENT)
Dept: RADIOLOGY | Facility: MEDICAL CENTER | Age: 68
End: 2018-01-24
Attending: SURGERY
Payer: MEDICARE

## 2018-01-24 ENCOUNTER — PATIENT OUTREACH (OUTPATIENT)
Dept: HEALTH INFORMATION MANAGEMENT | Facility: OTHER | Age: 68
End: 2018-01-24

## 2018-01-24 DIAGNOSIS — I87.2 VENOUS STASIS DERMATITIS OF BOTH LOWER EXTREMITIES: ICD-10-CM

## 2018-01-24 PROCEDURE — 93970 EXTREMITY STUDY: CPT

## 2018-01-24 PROCEDURE — 93970 EXTREMITY STUDY: CPT | Mod: 26 | Performed by: SURGERY

## 2018-01-24 NOTE — PROGRESS NOTES
1. Attempt #: Final  2. HealthConnect Verified: yes    3. Verify PCP: yes    4. Care Team Updated:       •   DME Company (gait device, O2, CPAP, etc.): NO       •   Other Specialists (eye doctor, derm, GYN, cardiology, endo, etc): NO    5.  Reviewed/Updated the following with patient:       •   Communication Preference Obtained? YES       •   Preferred Pharmacy? YES       •   Preferred Lab? YES       •   Family History (document living status of immediate family members and if + hx of cancer, diabetes, hypertension, hyperlipidemia, heart attack, stroke) YES. Was Abstract Encounter opened and chart updated? YES    6. [employer] Activation: already active    7. [employer] Mehrdad: yes    8. Annual Wellness Visit Scheduling  Scheduling Status:Scheduled      9. Care Gap Scheduling (Attempt to Schedule EACH Overdue Care Gap!)     There are no preventive care reminders to display for this patient.     Scheduled patient for Annual Wellness Visit    10. Patient was advised: “This is a free wellness visit. The provider will screen for medical conditions to help you stay healthy. If you have other concerns to address you may be asked to discuss these at a separate visit or there may be an additional fee.”     11. Patient was informed to arrive 15 min prior to their scheduled appointment and bring in their medication bottles.   Mom stated that the patient was evaluated on 12/4/18 with Dr. Krueger for a cough. Per mom, Dr. Krueger had recommended Benadryl. Mom stated that the cough had gotten better so she never used Benadryl. Mom stated that since that point the patient's cough has gotten worse again and mom went to the store to buy Benadryl. Per mom, the benadryl bottle only says it is for children ages 2 and up and she confirmed it with the pharmacist. Advised her that it is safe to use because it goes by weight. But advised mom that since the cough is continuing to get worse and it has been 2 weeks since we saw her, the patient really should be seen again in our office because she could have something different now. Advised mom that she should be seen before doing the benadryl. 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 She stated that she would only like the covering provider to review the message and provide advise. She stated that she would only like the provider to advise on the benadryl and whether or not it is safe for her child. If it is safe, mom would only like the provider to send a prescription over for her with the accurate dosage. Advised mom that I do not believe that we can prescribe Benadryl, asked mom if the provider could just advise about dosing. Mom stated that was fine.    Dr. Pavon, please advise

## 2018-02-06 ENCOUNTER — TELEPHONE (OUTPATIENT)
Dept: MEDICAL GROUP | Facility: PHYSICIAN GROUP | Age: 68
End: 2018-02-06

## 2018-02-06 NOTE — TELEPHONE ENCOUNTER
Future Appointments       Provider Department Center    2/13/2018 1:35 PM JAJA Murdock; [facility]  [facility]    6/6/2018 1:55 PM JAJA Murdock [facility]        ANNUAL WELLNESS VISIT PRE-VISIT PLANNING WITH OUTREACH    1.  Immunizations were updated in Epic using WebIZ?:Yes       •  WebIZ Recommendations: Patient is up to date on all vaccines       •  Is patient due for Tdap? NO       •  Is patient due for Shingles?NO    2.  MDX printed and highlighted for Provider? YES      3.  Reviewed note from last office visit with PCP: YES 12/06/2017    4.  If any orders were placed at last visit, do we have Results/Consult Notes?        •  Labs - Labs ordered, NOT completed. Patient advised to complete prior to next appointment. vitamin d lab        •  Imaging - Imaging ordered, completed and results are in chart.       •  Referrals - Referral ordered, patient was seen and consult notes are in chart. Care Teams updated  YES.      4.  Patient is due for the following Health Maintenance Topics:   There are no preventive care reminders to display for this patient.    - Patient is up-to-date on all Health Maintenance topics. No records have been requested at this time.      5.  Patient has the following Care Path diagnoses on Problem List:  NONE

## 2018-02-12 ENCOUNTER — HOSPITAL ENCOUNTER (OUTPATIENT)
Dept: LAB | Facility: MEDICAL CENTER | Age: 68
End: 2018-02-12
Attending: NURSE PRACTITIONER
Payer: MEDICARE

## 2018-02-12 DIAGNOSIS — E55.9 VITAMIN D DEFICIENCY: ICD-10-CM

## 2018-02-12 LAB — 25(OH)D3 SERPL-MCNC: 57 NG/ML (ref 30–100)

## 2018-02-12 PROCEDURE — 36415 COLL VENOUS BLD VENIPUNCTURE: CPT

## 2018-02-12 PROCEDURE — 82306 VITAMIN D 25 HYDROXY: CPT

## 2018-02-22 ENCOUNTER — TELEPHONE (OUTPATIENT)
Dept: MEDICAL GROUP | Facility: PHYSICIAN GROUP | Age: 68
End: 2018-02-22

## 2018-02-23 ENCOUNTER — HOSPITAL ENCOUNTER (OUTPATIENT)
Dept: RADIOLOGY | Facility: MEDICAL CENTER | Age: 68
End: 2018-02-23
Attending: ALLERGY & IMMUNOLOGY | Admitting: SURGERY
Payer: MEDICARE

## 2018-02-23 DIAGNOSIS — R05.9 COUGH: ICD-10-CM

## 2018-02-23 DIAGNOSIS — Z01.812 PRE-OPERATIVE LABORATORY EXAMINATION: ICD-10-CM

## 2018-02-23 DIAGNOSIS — Z01.810 PRE-OPERATIVE CARDIOVASCULAR EXAMINATION: ICD-10-CM

## 2018-02-23 LAB
ANION GAP SERPL CALC-SCNC: 4 MMOL/L (ref 0–11.9)
BUN SERPL-MCNC: 22 MG/DL (ref 8–22)
CALCIUM SERPL-MCNC: 9.4 MG/DL (ref 8.5–10.5)
CHLORIDE SERPL-SCNC: 105 MMOL/L (ref 96–112)
CO2 SERPL-SCNC: 28 MMOL/L (ref 20–33)
CREAT SERPL-MCNC: 0.96 MG/DL (ref 0.5–1.4)
EKG IMPRESSION: NORMAL
GLUCOSE SERPL-MCNC: 106 MG/DL (ref 65–99)
POTASSIUM SERPL-SCNC: 4.1 MMOL/L (ref 3.6–5.5)
SODIUM SERPL-SCNC: 137 MMOL/L (ref 135–145)

## 2018-02-23 PROCEDURE — 80048 BASIC METABOLIC PNL TOTAL CA: CPT

## 2018-02-23 PROCEDURE — 71046 X-RAY EXAM CHEST 2 VIEWS: CPT

## 2018-02-23 PROCEDURE — 36415 COLL VENOUS BLD VENIPUNCTURE: CPT

## 2018-02-23 PROCEDURE — 93010 ELECTROCARDIOGRAM REPORT: CPT | Performed by: INTERNAL MEDICINE

## 2018-02-23 PROCEDURE — 93005 ELECTROCARDIOGRAM TRACING: CPT

## 2018-02-23 RX ORDER — OMEGA-3 FATTY ACIDS/FISH OIL 300-1000MG
2 CAPSULE ORAL
Status: ON HOLD | COMMUNITY
End: 2018-02-26

## 2018-02-26 ENCOUNTER — HOSPITAL ENCOUNTER (OUTPATIENT)
Facility: MEDICAL CENTER | Age: 68
End: 2018-02-26
Attending: SURGERY | Admitting: SURGERY
Payer: MEDICARE

## 2018-02-26 VITALS
TEMPERATURE: 97.2 F | BODY MASS INDEX: 28.24 KG/M2 | SYSTOLIC BLOOD PRESSURE: 154 MMHG | RESPIRATION RATE: 18 BRPM | OXYGEN SATURATION: 94 % | WEIGHT: 190.7 LBS | HEIGHT: 69 IN | HEART RATE: 75 BPM | DIASTOLIC BLOOD PRESSURE: 89 MMHG

## 2018-02-26 PROCEDURE — C1894 INTRO/SHEATH, NON-LASER: HCPCS | Performed by: SURGERY

## 2018-02-26 PROCEDURE — 160048 HCHG OR STATISTICAL LEVEL 1-5: Performed by: SURGERY

## 2018-02-26 PROCEDURE — 160002 HCHG RECOVERY MINUTES (STAT): Performed by: SURGERY

## 2018-02-26 PROCEDURE — C1887 CATHETER, GUIDING: HCPCS | Performed by: SURGERY

## 2018-02-26 PROCEDURE — 160025 RECOVERY II MINUTES (STATS): Performed by: SURGERY

## 2018-02-26 PROCEDURE — 160038 HCHG SURGERY MINUTES - EA ADDL 1 MIN LEVEL 2: Performed by: SURGERY

## 2018-02-26 PROCEDURE — 700101 HCHG RX REV CODE 250

## 2018-02-26 PROCEDURE — 500865 HCHG NEEDLE, SPINAL 20G/22G: Performed by: SURGERY

## 2018-02-26 PROCEDURE — C1888 ENDOVAS NON-CARDIAC ABL CATH: HCPCS | Performed by: SURGERY

## 2018-02-26 PROCEDURE — 160009 HCHG ANES TIME/MIN: Performed by: SURGERY

## 2018-02-26 PROCEDURE — 500440 HCHG DRESSING, STERILE ROLL (KERLIX): Performed by: SURGERY

## 2018-02-26 PROCEDURE — 700101 HCHG RX REV CODE 250: Performed by: SURGERY

## 2018-02-26 PROCEDURE — C1769 GUIDE WIRE: HCPCS | Performed by: SURGERY

## 2018-02-26 PROCEDURE — 500700 HCHG HEMOCLIP, SMALL (RED): Performed by: SURGERY

## 2018-02-26 PROCEDURE — 700105 HCHG RX REV CODE 258: Performed by: SURGERY

## 2018-02-26 PROCEDURE — 700111 HCHG RX REV CODE 636 W/ 250 OVERRIDE (IP): Performed by: SURGERY

## 2018-02-26 PROCEDURE — 160046 HCHG PACU - 1ST 60 MINS PHASE II: Performed by: SURGERY

## 2018-02-26 PROCEDURE — 160036 HCHG PACU - EA ADDL 30 MINS PHASE I: Performed by: SURGERY

## 2018-02-26 PROCEDURE — 160027 HCHG SURGERY MINUTES - 1ST 30 MINS LEVEL 2: Performed by: SURGERY

## 2018-02-26 PROCEDURE — 500698 HCHG HEMOCLIP, MEDIUM: Performed by: SURGERY

## 2018-02-26 PROCEDURE — 700111 HCHG RX REV CODE 636 W/ 250 OVERRIDE (IP)

## 2018-02-26 PROCEDURE — 501838 HCHG SUTURE GENERAL: Performed by: SURGERY

## 2018-02-26 PROCEDURE — 502240 HCHG MISC OR SUPPLY RC 0272: Performed by: SURGERY

## 2018-02-26 PROCEDURE — 160035 HCHG PACU - 1ST 60 MINS PHASE I: Performed by: SURGERY

## 2018-02-26 PROCEDURE — 501445 HCHG STAPLER, SKIN DISP: Performed by: SURGERY

## 2018-02-26 RX ORDER — ONDANSETRON 2 MG/ML
4 INJECTION INTRAMUSCULAR; INTRAVENOUS EVERY 4 HOURS PRN
Status: DISCONTINUED | OUTPATIENT
Start: 2018-02-26 | End: 2018-02-26 | Stop reason: HOSPADM

## 2018-02-26 RX ORDER — SCOLOPAMINE TRANSDERMAL SYSTEM 1 MG/1
1 PATCH, EXTENDED RELEASE TRANSDERMAL
Status: DISCONTINUED | OUTPATIENT
Start: 2018-02-26 | End: 2018-02-26 | Stop reason: HOSPADM

## 2018-02-26 RX ORDER — LIDOCAINE HYDROCHLORIDE 10 MG/ML
0.5 INJECTION, SOLUTION INFILTRATION; PERINEURAL
Status: DISCONTINUED | OUTPATIENT
Start: 2018-02-26 | End: 2018-02-26 | Stop reason: HOSPADM

## 2018-02-26 RX ORDER — LIDOCAINE AND PRILOCAINE 25; 25 MG/G; MG/G
1 CREAM TOPICAL
Status: DISCONTINUED | OUTPATIENT
Start: 2018-02-26 | End: 2018-02-26 | Stop reason: HOSPADM

## 2018-02-26 RX ORDER — HEPARIN SODIUM,PORCINE 1000/ML
VIAL (ML) INJECTION
Status: DISCONTINUED | OUTPATIENT
Start: 2018-02-26 | End: 2018-02-26 | Stop reason: HOSPADM

## 2018-02-26 RX ORDER — DEXAMETHASONE SODIUM PHOSPHATE 4 MG/ML
4 INJECTION, SOLUTION INTRA-ARTICULAR; INTRALESIONAL; INTRAMUSCULAR; INTRAVENOUS; SOFT TISSUE
Status: DISCONTINUED | OUTPATIENT
Start: 2018-02-26 | End: 2018-02-26 | Stop reason: HOSPADM

## 2018-02-26 RX ORDER — BUDESONIDE AND FORMOTEROL FUMARATE DIHYDRATE 160; 4.5 UG/1; UG/1
2 AEROSOL RESPIRATORY (INHALATION) 2 TIMES DAILY
COMMUNITY
End: 2019-11-25

## 2018-02-26 RX ORDER — SODIUM CHLORIDE, SODIUM LACTATE, POTASSIUM CHLORIDE, CALCIUM CHLORIDE 600; 310; 30; 20 MG/100ML; MG/100ML; MG/100ML; MG/100ML
INJECTION, SOLUTION INTRAVENOUS CONTINUOUS
Status: DISCONTINUED | OUTPATIENT
Start: 2018-02-26 | End: 2018-02-26 | Stop reason: HOSPADM

## 2018-02-26 RX ORDER — HALOPERIDOL 5 MG/ML
1 INJECTION INTRAMUSCULAR EVERY 6 HOURS PRN
Status: DISCONTINUED | OUTPATIENT
Start: 2018-02-26 | End: 2018-02-26 | Stop reason: HOSPADM

## 2018-02-26 RX ORDER — LIDOCAINE HYDROCHLORIDE 10 MG/ML
INJECTION, SOLUTION INFILTRATION; PERINEURAL
Status: COMPLETED
Start: 2018-02-26 | End: 2018-02-26

## 2018-02-26 RX ADMIN — LIDOCAINE HYDROCHLORIDE 0.5 ML: 10 INJECTION, SOLUTION INFILTRATION; PERINEURAL at 12:50

## 2018-02-26 RX ADMIN — SODIUM CHLORIDE, SODIUM LACTATE, POTASSIUM CHLORIDE, CALCIUM CHLORIDE: 600; 310; 30; 20 INJECTION, SOLUTION INTRAVENOUS at 12:50

## 2018-02-26 ASSESSMENT — PAIN SCALES - GENERAL
PAINLEVEL_OUTOF10: 0

## 2018-02-27 NOTE — OR NURSING
D/c orders received. IV dc'd. Pt changed into clothing with assistance. Discharge instructions given; pt and family verbalized understanding and questions answered. Pt dc'd in w/c with hospital escort in stable condition.

## 2018-02-27 NOTE — DISCHARGE INSTRUCTIONS
ACTIVITY: Rest and take it easy for the first 24 hours.  A responsible adult is recommended to remain with you during that time.  It is normal to feel sleepy.  We encourage you to not do anything that requires balance, judgment or coordination.    MILD FLU-LIKE SYMPTOMS ARE NORMAL. YOU MAY EXPERIENCE GENERALIZED MUSCLE ACHES, THROAT IRRITATION, HEADACHE AND/OR SOME NAUSEA.    FOR 24 HOURS DO NOT:  Drive, operate machinery or run household appliances.  Drink beer or alcoholic beverages.   Make important decisions or sign legal documents.    SPECIAL INSTRUCTIONS:   1) Act: As tolerated except no lifting or pushing next 48 hours.  Walk as much as possible.  Keep right leg elevated ABOVE heart level when not on feet.  2) Leave dressings on for 2 days.  May loosen and rewrap if too tight.  Remove dressings after 2 days and shower.  Pat leg dry when done, place band aids over stitches, then wear HOPE hoses.  3) Resume home meds.  May take Tylenol 325mg every 4 hours as needed for pain. May take Motrin, 400 to 600mg every 8 hours, with food, as needed for pain.  4) Follow up with Dr. Alas in 2 weeks.  Call [phone] tomorrow for follow up ultrasound appointment as well as office appointment and for any problem.    DIET: To avoid nausea, slowly advance diet as tolerated, avoiding spicy or greasy foods for the first day.  Add more substantial food to your diet according to your physician's instructions.  Babies can be fed formula or breast milk as soon as they are hungry.  INCREASE FLUIDS AND FIBER TO AVOID CONSTIPATION.    SURGICAL DRESSING/BATHING: wear knee high hope hose after 2 days post-op    FOLLOW-UP APPOINTMENT:  A follow-up appointment should be arranged with your doctor in 1-2 weeks; call to schedule.    You should CALL YOUR PHYSICIAN if you develop:  Fever greater than 101 degrees F.  Pain not relieved by medication, or persistent nausea or vomiting.  Excessive bleeding (blood soaking through dressing) or  unexpected drainage from the wound.  Extreme redness or swelling around the incision site, drainage of pus or foul smelling drainage.  Inability to urinate or empty your bladder within 8 hours.  Problems with breathing or chest pain.    You should call 911 if you develop problems with breathing or chest pain.  If you are unable to contact your doctor or surgical center, you should go to the nearest emergency room or urgent care center.  Physician's telephone #: Dr. Alas [phone]    If any questions arise, call your doctor.  If your doctor is not available, please feel free to call the Surgical Center at [phone].  The Center is open Monday through Friday from 7AM to 7PM.  You can also call the HEALTH HOTLINE open 24 hours/day, 7 days/week and speak to a nurse at [phone], or toll free at [phone].    A registered nurse may call you a few days after your surgery to see how you are doing after your procedure.    MEDICATIONS: Resume taking daily medication.  Take prescribed pain medication with food.  If no medication is prescribed, you may take non-aspirin pain medication if needed.  PAIN MEDICATION CAN BE VERY CONSTIPATING.  Take a stool softener or laxative such as senokot, pericolace, or milk of magnesia if needed.    May take tylenol or ibuprofen over the counter as needed.     If your physician has prescribed pain medication that includes Acetaminophen (Tylenol), do not take additional Acetaminophen (Tylenol) while taking the prescribed medication.    Depression / Suicide Risk    As you are discharged from this [facility] facility, it is important to learn how to keep safe from harming yourself.    Recognize the warning signs:  · Abrupt changes in personality, positive or negative- including increase in energy   · Giving away possessions  · Change in eating patterns- significant weight changes-  positive or negative  · Change in sleeping patterns- unable to sleep or sleeping all the  time   · Unwillingness or inability to communicate  · Depression  · Unusual sadness, discouragement and loneliness  · Talk of wanting to die  · Neglect of personal appearance   · Rebelliousness- reckless behavior  · Withdrawal from people/activities they love  · Confusion- inability to concentrate     If you or a loved one observes any of these behaviors or has concerns about self-harm, here's what you can do:  · Talk about it- your feelings and reasons for harming yourself  · Remove any means that you might use to hurt yourself (examples: pills, rope, extension cords, firearm)  · Get professional help from the community (Mental Health, Substance Abuse, psychological counseling)  · Do not be alone:Call your Safe Contact- someone whom you trust who will be there for you.  · Call your local CRISIS HOTLINE 555-4367 or 111-706-9104  · Call your local Children's Mobile Crisis Response Team [place] (173) 268-7493 or www.Instapio  · Call the toll free National Suicide Prevention Hotlines   · National Suicide Prevention Lifeline 429-752-CDDB (7041)  · National Hope Line Network 800-SUICIDE (487-2025)

## 2018-02-27 NOTE — OR SURGEON
Immediate Post OP Note    PreOp Diagnosis: Right leg venous stasis disease complicated with stasis dermatitis.    PostOp Diagnosis: Same.    Procedure(s):  RADIO FREQUENCY ABLATION RIGHT LONG SAPENOUS VEIN - Wound Class: Clean    Surgeon: Jim Alas M.D.    Anesthesiologist/Type of Anesthesia:  Anesthesiologist: Franklin Griffiths M.D./General    Surgical Staff:  Circulator: Liliam Linares R.N.  Relief Circulator: Lillie Gregg R.N.  Scrub Person: Melissa Pearson    Specimens: None.    Estimated Blood Loss: 10ml.    IVF: 800ml.    Findings: Successful ablation of R long saphenous vein.    Complications: None.    Dictated, #[id number].        2/26/2018 4:04 PM Jim Alas

## 2018-02-27 NOTE — OP REPORT
DATE OF SERVICE:  02/26/2018    SURGEON:  Dr. Jim Alas.    ANESTHESIOLOGIST:  Dr. Franklin Griffiths.    TYPE OF ANESTHESIA:  General anesthesia.    PREOPERATIVE DIAGNOSIS:  Right leg venous stasis disease, complicated by his   stasis dermatitis, severe.    POSTOPERATIVE DIAGNOSIS:  Right leg venous stasis disease, complicated by his   stasis dermatitis, severe.    PROCEDURE:  Radiofrequency ablation of incompetent right long saphenous vein   in the right lower leg.    INDICATION FOR PROCEDURE:  The patient is a pleasant lady with longstanding   problem of severe right lower extremity stasis dermatitis.  A Noninvasive   vascular study was performed and showed the right long saphenous vein in the   right lower leg to be dilated and incompetent.  Discussion was made with   patient.  She would like to undergo radiofrequency ablation of the right long   saphenous vein at the right lower leg, fully understanding all risks which   include the saphenous nerve injury.    DESCRIPTION OF PROCEDURE:  Informed consent was obtained.  Patient was taken   to the operating room and was placed in the supine position.  She was given   Ancef intravenously.  General anesthesia was induced.  Her right lower   extremity was sterilely prepped and draped in the normal fashion.  A time-out   procedure was done.  Duplex evaluation of the right long saphenous vein was   performed.  It was found to be dilated in the lower leg.  Under ultrasound   guidance, the right long saphenous vein was percutaneously cannulated above   the ankle, using a micropuncture kit.  The microcatheter was replaced with a   7-Gabonese sheath.  The ClosureFAST catheter was advanced through the sheath   into the lesser saphenous vein in position with the tip in the distal right   thigh.  Under ultrasound guidance, tumescent anesthesia was performed from the   catheter entrance site up to the tip of the catheter.  This was done using   standard solution.  Following  this, ablation of the right long saphenous vein   was performed in 7 cm segments.  After this was done, duplex evaluation of the   ablated saphenous vein shows significant vein wall thickening and no   spontaneous flow.  The saphenous vein above the ablation site remained widely   patent.  The sheaths were removed and hemostasis was achieved with manual   compression.  The wounds were closed with 4-0 Prolene suture.  The right leg   was cleaned and dressed with Kerlix and Ace wrap.    ESTIMATED BLOOD LOSS:  10 mL.    INTRAVENOUS FLUID:  800 of crystalloids.    Sponge and instrument count was correct x2.    Patient was then awakened, extubated, taken, but taken to recovery area in   stable condition with intact neurovascular examination of the right lower   extremity.       ____________________________________     MD COLEMAN KING / ANITA    DD:  02/26/2018 16:09:36  DT:  02/26/2018 16:41:39    D#:  [id number]  Job#:  [id number]    cc: MARIA REYES MD, NGOC MENDIOLA MD

## 2018-03-02 ENCOUNTER — HOSPITAL ENCOUNTER (OUTPATIENT)
Dept: RADIOLOGY | Facility: MEDICAL CENTER | Age: 68
End: 2018-03-02
Attending: SURGERY
Payer: MEDICARE

## 2018-03-02 DIAGNOSIS — I87.2 PERIPHERAL VENOUS INSUFFICIENCY: ICD-10-CM

## 2018-03-02 PROCEDURE — 93971 EXTREMITY STUDY: CPT

## 2018-03-03 DIAGNOSIS — E03.9 ACQUIRED HYPOTHYROIDISM: ICD-10-CM

## 2018-03-05 RX ORDER — LEVOTHYROXINE SODIUM 0.1 MG/1
100 TABLET ORAL
Qty: 90 TAB | Refills: 1 | Status: SHIPPED | OUTPATIENT
Start: 2018-03-05 | End: 2018-05-31 | Stop reason: SDUPTHER

## 2018-03-05 NOTE — TELEPHONE ENCOUNTER
Was the patient seen in the last year in this department? Yes     Does patient have an active prescription for medications requested? No     Received Request Via: Pharmacy      Pt met protocol?: Yes pt last ov 12/17   TSH   Date Value Ref Range Status   01/17/2018 4.220 0.380 - 5.330 uIU/mL Final     Comment:

## 2018-03-26 ENCOUNTER — TELEPHONE (OUTPATIENT)
Dept: PULMONOLOGY | Facility: HOSPICE | Age: 68
End: 2018-03-26

## 2018-03-26 DIAGNOSIS — R06.00 DYSPNEA, UNSPECIFIED TYPE: ICD-10-CM

## 2018-03-27 ENCOUNTER — TELEPHONE (OUTPATIENT)
Dept: PULMONOLOGY | Facility: HOSPICE | Age: 68
End: 2018-03-27

## 2018-03-27 DIAGNOSIS — R06.00 DYSPNEA, UNSPECIFIED TYPE: ICD-10-CM

## 2018-04-17 ENCOUNTER — NON-PROVIDER VISIT (OUTPATIENT)
Dept: PULMONOLOGY | Facility: HOSPICE | Age: 68
End: 2018-04-17
Payer: MEDICARE

## 2018-04-17 ENCOUNTER — OFFICE VISIT (OUTPATIENT)
Dept: PULMONOLOGY | Facility: HOSPICE | Age: 68
End: 2018-04-17
Payer: MEDICARE

## 2018-04-17 ENCOUNTER — HOSPITAL ENCOUNTER (OUTPATIENT)
Dept: LAB | Facility: MEDICAL CENTER | Age: 68
End: 2018-04-17
Attending: INTERNAL MEDICINE
Payer: MEDICARE

## 2018-04-17 VITALS
OXYGEN SATURATION: 97 % | TEMPERATURE: 98.1 F | DIASTOLIC BLOOD PRESSURE: 90 MMHG | WEIGHT: 193 LBS | RESPIRATION RATE: 14 BRPM | HEART RATE: 82 BPM | HEIGHT: 67 IN | SYSTOLIC BLOOD PRESSURE: 148 MMHG | BODY MASS INDEX: 30.29 KG/M2

## 2018-04-17 VITALS — HEIGHT: 67 IN | WEIGHT: 193 LBS | BODY MASS INDEX: 30.29 KG/M2

## 2018-04-17 DIAGNOSIS — R91.8 PULMONARY NODULES: ICD-10-CM

## 2018-04-17 DIAGNOSIS — L03.115 CELLULITIS OF RIGHT LOWER EXTREMITY: ICD-10-CM

## 2018-04-17 DIAGNOSIS — R06.00 DYSPNEA, UNSPECIFIED TYPE: ICD-10-CM

## 2018-04-17 DIAGNOSIS — F17.211 NICOTINE DEPENDENCE, CIGARETTES, IN REMISSION: ICD-10-CM

## 2018-04-17 DIAGNOSIS — J44.9 CHRONIC OBSTRUCTIVE PULMONARY DISEASE, UNSPECIFIED COPD TYPE (HCC): ICD-10-CM

## 2018-04-17 PROCEDURE — 94729 DIFFUSING CAPACITY: CPT | Performed by: INTERNAL MEDICINE

## 2018-04-17 PROCEDURE — 36415 COLL VENOUS BLD VENIPUNCTURE: CPT

## 2018-04-17 PROCEDURE — 84520 ASSAY OF UREA NITROGEN: CPT

## 2018-04-17 PROCEDURE — 99204 OFFICE O/P NEW MOD 45 MIN: CPT | Mod: 25 | Performed by: INTERNAL MEDICINE

## 2018-04-17 PROCEDURE — 94060 EVALUATION OF WHEEZING: CPT | Performed by: INTERNAL MEDICINE

## 2018-04-17 PROCEDURE — 94726 PLETHYSMOGRAPHY LUNG VOLUMES: CPT | Performed by: INTERNAL MEDICINE

## 2018-04-17 PROCEDURE — 82565 ASSAY OF CREATININE: CPT

## 2018-04-17 RX ORDER — DIPHENHYDRAMINE HCL 25 MG
25 TABLET ORAL EVERY 6 HOURS PRN
COMMUNITY

## 2018-04-17 ASSESSMENT — PULMONARY FUNCTION TESTS
FVC_PREDICTED: 3.43
FEV1: 1.65
FEV1/FVC_PERCENT_PREDICTED: 85
FEV1_PERCENT_PREDICTED: 64
FEV1/FVC_PERCENT_LLN: 63
FEV1_PREDICTED: 2.62
FEV1/FVC_PERCENT_PREDICTED: 85
FEV1_PERCENT_CHANGE: 2
FEV1_PERCENT_PREDICTED: 63
FEV1/FVC_PREDICTED: 76
FVC: 2.5
FEV1/FVC: 65
FEV1/FVC_PERCENT_PREDICTED: 76
FEV1_PERCENT_CHANGE: 3
FEV1/FVC_PERCENT_CHANGE: -1
FVC_PERCENT_PREDICTED: 75
FVC: 2.6
FVC_PERCENT_PREDICTED: 72
FEV1/FVC: 65.38
FEV1/FVC: 66
FEV1/FVC: 66
FEV1/FVC_PERCENT_PREDICTED: 86
FVC_LLN: 2.86
FEV1_LLN: 2.19
FEV1: 1.7
FEV1/FVC_PERCENT_PREDICTED: 88
FEV1/FVC_PERCENT_CHANGE: 67

## 2018-04-17 NOTE — PROCEDURES
Technician Makeda Gamez, [facility] Comments:Good patient effort & cooperation.  The results of this test meet the ATS/ERS standards for acceptability & reproducibility.  Test was performed on the [employer] Body Plethysmograph-Elite DX system.  Predicted values were [facility]-3 for spirometry, [facility] for DLCO, ITS for Lung Volumes.  The DLCO was uncorrected for Hgb.  A bronchodilator of Ventolin HFA -2puffs via spacer administered.  DLCO performed during dilation period.    Interpretation:    Lung function testing was completed on April 17, 2018. Mid flows are moderately reduced at 42% predicted. FEV1 is mildly reduced at 1.6 L, 63% predicted. Bronchodilator response was not seen. No significant restriction or hyperinflation is noted. Oxygen transfer is normal. Flow volume loop confirms the mild to moderate obstructive pattern with good patient effort noted

## 2018-04-17 NOTE — PATIENT INSTRUCTIONS
This lady comes in on referral from her primary care doctor, a chest x-ray done in February suggested a left lower lobe nodule. She is asymptomatic, no hemoptysis. Once her weight loss. She stopped smoking 9 months ago and this is strongly encouraged.    Health maintenance issues also include vaccinations which are up-to-date, and body mass index mildly elevated at 30.2, gentle exercise and portion control advised.    She has COPD, moderate obstruction on lung function, but has not required inhalers. She stopped Symbicort for fear of its aggravating her right leg cellulitis, did not see any deterioration in status after stopping it. Her lung exam does show clear lungs, no wheezes. She is not required rescue inhalers.    [place] the [place] area [place], uses a rifle.    Recent right leg vein surgery and treatment of cellulitis are noted.    We will arrange for a chest CT scan to be done as soon as possible, and I will see her back next week to decide if we need to pursue this further with a PET scan, possible needle biopsy, but I'm hoping as I do not see it on the lateral view that this is a confluence of bronchial and blood vessels, but that remains to be determined.

## 2018-04-17 NOTE — PROGRESS NOTES
Naomy Vargas is a 68 y.o. female here for left lower lobe nodule and COPD. Patient was referred by her primary care doctor.    History of Present Illness:      This lady comes in on referral from her primary care doctor, a chest x-ray done in February suggested a left lower lobe nodule. She is asymptomatic, no hemoptysis. Once her weight loss. She stopped smoking 9 months ago and this is strongly encouraged.    Health maintenance issues also include vaccinations which are up-to-date, and body mass index mildly elevated at 30.2, gentle exercise and portion control advised.    She has COPD, moderate obstruction on lung function, but has not required inhalers. She stopped Symbicort for fear of its aggravating her right leg cellulitis, did not see any deterioration in status after stopping it. Her lung exam does show clear lungs, no wheezes. She is not required rescue inhalers.    She [place], [place], uses a rifle.    Recent right leg vein surgery and treatment of cellulitis are noted.    We will arrange for a chest CT scan to be done as soon as possible, and I will see her back next week to decide if we need to pursue this further with a PET scan, possible needle biopsy, but I'm hoping as I do not see it on the lateral view that this is a confluence of bronchial and blood vessels, but that remains to be determined.    Constitutional ROS: No unexpected change in weight, No unexplained fevers  Eyes: No change in vision or blurring or double vision  Mouth/Throat ROS: No sore throat, No recent change in voice or hoarseness  Pulmonary ROS: See present history for pertinent positives  Cardiovascular ROS: No chest pain to suggest acute coronary syndrome  Gastrointestinal ROS: No abdominal pain to suggest peptic disease  Musculoskeletal/Extremities ROS: no acute artritis or unusual swelling  Hematologic/Lymphatic ROS: No easy bleeding or unusual lymph node swelling  Neurologic ROS:  No new or unusual weakness  Psychiatric ROS: No hallucinations  Allergic/Immunologic: No  urticaria or allergic rash      Current Outpatient Prescriptions   Medication Sig Dispense Refill   • PROAIR  (90 Base) MCG/ACT Aero Soln inhalation aerosol INHALE 2 (TWO) PUFF EVERY SIX HOURS, AS NEEDED  1   • diphenhydrAMINE (BENADRYL) 25 MG Tab Take 25 mg by mouth every 6 hours as needed for Sleep.     • Acetaminophen 500 MG Cap Take  by mouth.     • Ascorbic Acid (MARYAM-C PO) Take  by mouth.     • Oxymetazoline HCl (NASAL SPRAY NA) Spray  in nose.     • levothyroxine (SYNTHROID) 100 MCG Tab TAKE 1 TAB BY MOUTH EVERY MORNING BEFORE BREAKFAST. 90 Tab 1   • TRIAMCINOLONE ACETONIDE EX Apply 1 Each to affected area(s) 2 times a day as needed (on leg).     • Cholecalciferol (VITAMIN D3) 5000 units Cap Take 1 Cap by mouth every day.     • mupirocin (BACTROBAN) 2 % Ointment Apply  to affected area(s) as needed (on leg).  2   • budesonide-formoterol (SYMBICORT) 160-4.5 MCG/ACT Aerosol Inhale 2 Puffs by mouth 2 Times a Day.       No current facility-administered medications for this visit.        Social History   Substance Use Topics   • Smoking status: Former Smoker     Packs/day: 0.50     Years: 53.00     Types: Cigarettes     Start date: 3/29/1963     Quit date: 7/18/2017   • Smokeless tobacco: Never Used      Comment: class in July 2017   • Alcohol use Yes      Comment: 2 per day        Past Medical History:   Diagnosis Date   • Anxiety    • Arthritis     wrists   • Back pain    • Blood transfusion without reported diagnosis     with ectopic preg x 2    • Breath shortness     with exertion   • Cataract     bilateral removal-Dr. Duke Talamantes   • Cellulitis     right lower leg   • Chickenpox    • COPD (chronic obstructive pulmonary disease) (CMS-[facility])    • Dental disorder     upper denture   • Earache    • Fatigue    • Frequent urination    • Hypertension 02/23/2018    on no meds at this time   • Hypothyroidism    • Influenza   "  • Insomnia    • Kidney disease     reports 1 kidney is smaller than the other   • Mumps    • Pain 02/23/2018    right leg and back, 5/10   • Pneumonia    • Rash    • Ringing in ears    • Shortness of breath    • Swelling of lower extremity    • Thyroid disease    • Tonsillitis    • Toothache    • Venous stasis dermatitis     right leg       Past Surgical History:   Procedure Laterality Date   • VEIN LIGATION RADIO FREQUENCY Right 2/26/2018    Procedure: VEIN LIGATION RADIO FREQUENCY- LONG SAPENOUS;  Surgeon: Jim Alas M.D.;  Location: SURGERY [facility];  Service: General   • ABDOMINAL EXPLORATION      2 ectopic preg   • BREAST BIOPSY      hx of benign left breast biopsy   • EYE SURGERY      cataract x2   • HYSTERECTOMY LAPAROSCOPY     • HYSTERECTOMY, TOTAL ABDOMINAL  1977/1978   • OOPHORECTOMY     • PB MAMMARY DUCTOGRAM, SINGLE     • PB REMV 2ND CATARACT,CORN-SCLER SECTN     • TONSILLECTOMY         Allergies: Latex; Neosporin [neomycin-polymyxin b]; Soap; Tape; and Tea tree oil    Family History   Problem Relation Age of Onset   • Arthritis Mother      hip fracture   • Diabetes Mother    • Cancer Mother      skin   • Arthritis Father      lung   • Alcohol/Drug Father      etoh   • Lung Disease Sister      smoker/copd   • Hypertension Sister    • Other Brother      hep c   • Arthritis Maternal Grandmother      hip fracture   • Diabetes Maternal Grandfather    • No Known Problems Brother    • Hyperlipidemia Neg Hx    • Stroke Neg Hx        Physical Examination    Vitals:    04/17/18 1342   Height: 1.702 m (5' 7\")   Weight: 87.5 kg (193 lb)   Weight % change since last entry.: 0 %   BP: 148/90   Pulse: 82   BMI (Calculated): 30.23   Resp: 14   Temp: 36.7 °C (98.1 °F)       General Appearance: alert, no distress  Skin: Skin color, texture, turgor normal. No rashes or lesions.  Eyes: negative  Oropharynx: Lips, mucosa, and tongue normal. Teeth and gums normal. Oropharynx moist and without lesion  Lungs: " positive findings: Remarkably clear without wheezes or rhonchi.  Heart: negative. RRR without murmur, gallop, or rubs.  No ectopy.  Abdomen: Abdomen soft, non-tender. . No masses,  No organomegaly  Extremities:  Ongoing treatment for right leg cellulitis and erythema  Joints: No acute arthritis  Peripheral Pulses:perfused  Neurologic: intact grossly  Right leg erythema lateral surface lower    II (soft palate, uvula, fauces visible)    Imaging: Described above    PFTS: Described above      Assessment and Plan  1. Pulmonary nodule, LLL nodule, 2/18  - CT-CHEST (THORAX) WITH; Future    2. Chronic obstructive pulmonary disease, unspecified COPD type (CMS-[facility])    3. Nicotine dependence, cigarettes, in remission    4. Cellulitis of right lower extremity    5. BMI 30.0-30.9,adult      This lady comes in on referral from her primary care doctor, a chest x-ray done in February suggested a left lower lobe nodule. She is asymptomatic, no hemoptysis. Once her weight loss. She stopped smoking 9 months ago and this is strongly encouraged.    Health maintenance issues also include vaccinations which are up-to-date, and body mass index mildly elevated at 30.2, gentle exercise and portion control advised.    She has COPD, moderate obstruction on lung function, but has not required inhalers. She stopped Symbicort for fear of its aggravating her right leg cellulitis, did not see any deterioration in status after stopping it. Her lung exam does show clear lungs, no wheezes. She is not required rescue inhalers.    [place], uses a rifle.    Recent right leg vein surgery and treatment of cellulitis are noted.    We will arrange for a chest CT scan to be done as soon as possible, and I will see her back next week to decide if we need to pursue this further with a PET scan, possible needle biopsy, but I'm hoping as I do not see it on the lateral view that this is a confluence of bronchial  and blood vessels, but that remains to be determined.  Followup Return in about 10 days (around 4/27/2018) for follow up visit with Dr. Do Velásquez, add to schedule if needed, after imaging test.

## 2018-04-18 ENCOUNTER — HOSPITAL ENCOUNTER (OUTPATIENT)
Dept: RADIOLOGY | Facility: MEDICAL CENTER | Age: 68
End: 2018-04-18
Attending: INTERNAL MEDICINE
Payer: MEDICARE

## 2018-04-18 DIAGNOSIS — R91.8 PULMONARY NODULES: ICD-10-CM

## 2018-04-18 LAB
BUN SERPL-MCNC: 20 MG/DL (ref 8–22)
CREAT SERPL-MCNC: 1.02 MG/DL (ref 0.5–1.4)

## 2018-04-18 PROCEDURE — 700117 HCHG RX CONTRAST REV CODE 255: Performed by: INTERNAL MEDICINE

## 2018-04-18 PROCEDURE — 71260 CT THORAX DX C+: CPT

## 2018-04-18 RX ADMIN — IOHEXOL 75 ML: 350 INJECTION, SOLUTION INTRAVENOUS at 15:25

## 2018-04-26 ENCOUNTER — OFFICE VISIT (OUTPATIENT)
Dept: PULMONOLOGY | Facility: HOSPICE | Age: 68
End: 2018-04-26
Payer: MEDICARE

## 2018-04-26 VITALS
TEMPERATURE: 97.8 F | HEART RATE: 74 BPM | SYSTOLIC BLOOD PRESSURE: 138 MMHG | WEIGHT: 192 LBS | RESPIRATION RATE: 16 BRPM | DIASTOLIC BLOOD PRESSURE: 82 MMHG | HEIGHT: 67 IN | BODY MASS INDEX: 30.13 KG/M2 | OXYGEN SATURATION: 97 %

## 2018-04-26 DIAGNOSIS — F17.211 NICOTINE DEPENDENCE, CIGARETTES, IN REMISSION: ICD-10-CM

## 2018-04-26 DIAGNOSIS — R91.8 PULMONARY NODULES: ICD-10-CM

## 2018-04-26 DIAGNOSIS — J44.9 CHRONIC OBSTRUCTIVE PULMONARY DISEASE, UNSPECIFIED COPD TYPE (HCC): ICD-10-CM

## 2018-04-26 PROCEDURE — 99214 OFFICE O/P EST MOD 30 MIN: CPT | Performed by: INTERNAL MEDICINE

## 2018-04-26 NOTE — PROGRESS NOTES
Naomy Vargas is a 68 y.o. female here for lung nodule and chest CT scan results with underlying COPD. Patient was referred by her primary care doctor.    History of Present Illness:      This lady comes up to follow up on her chest CT scan, we sought a faint left lower lobe nodule on plain x-ray. That was not verified on CAT scan, but she did have right upper lobe scarring and a 7 mm nodule. My review suggests that this may be a confluence of the scars, but given her smoking history but stopped last year, this bears close watching. It is too small for needle biopsy, and less than 1 cm or PET scan.    We will watch this closely, recheck it in 6 months, if it remains stable then follow-up in one year. If it shows any signs of change, then we may need to proceed with direct sampling. I explained this to her. In the meantime her COPD is controlled, she is looking forward to hunting season, remains very active in her garden. Proair and Symbicort are effective. Vaccines are up-to-date. Body mass index is in range, portion control and gentle exercise. Follow-up in 6 months, add to  my schedule if needed to review the comparison chest CT scan.    Constitutional ROS: No unexpected change in weight, No unexplained fevers  Eyes: No change in vision or blurring or double vision  Mouth/Throat ROS: No sore throat, No recent change in voice or hoarseness  Pulmonary ROS: See present history for pertinent positives  Cardiovascular ROS: No chest pain to suggest acute coronary syndrome  Gastrointestinal ROS: No abdominal pain to suggest peptic disease  Musculoskeletal/Extremities ROS: no acute artritis or unusual swelling  Hematologic/Lymphatic ROS: No easy bleeding or unusual lymph node swelling  Neurologic ROS: No new or unusual weakness  Psychiatric ROS: No hallucinations  Allergic/Immunologic: No  urticaria or allergic rash      Current Outpatient Prescriptions   Medication Sig Dispense Refill   • PROAIR  (90 Base)  MCG/ACT Aero Soln inhalation aerosol INHALE 2 (TWO) PUFF EVERY SIX HOURS, AS NEEDED  1   • diphenhydrAMINE (BENADRYL) 25 MG Tab Take 25 mg by mouth every 6 hours as needed for Sleep.     • Acetaminophen 500 MG Cap Take  by mouth.     • Ascorbic Acid (MARYAM-C PO) Take  by mouth.     • Oxymetazoline HCl (NASAL SPRAY NA) Spray  in nose.     • levothyroxine (SYNTHROID) 100 MCG Tab TAKE 1 TAB BY MOUTH EVERY MORNING BEFORE BREAKFAST. 90 Tab 1   • TRIAMCINOLONE ACETONIDE EX Apply 1 Each to affected area(s) 2 times a day as needed (on leg).     • Cholecalciferol (VITAMIN D3) 5000 units Cap Take 1 Cap by mouth every day.     • mupirocin (BACTROBAN) 2 % Ointment Apply  to affected area(s) as needed (on leg).  2   • budesonide-formoterol (SYMBICORT) 160-4.5 MCG/ACT Aerosol Inhale 2 Puffs by mouth 2 Times a Day.       No current facility-administered medications for this visit.        Social History   Substance Use Topics   • Smoking status: Former Smoker     Packs/day: 0.50     Years: 53.00     Types: Cigarettes     Start date: 3/29/1963     Quit date: 7/18/2017   • Smokeless tobacco: Never Used      Comment: class in July 2017   • Alcohol use Yes      Comment: 2 per day        Past Medical History:   Diagnosis Date   • Anxiety    • Arthritis     wrists   • Back pain    • Blood transfusion without reported diagnosis     with ectopic preg x 2    • Breath shortness     with exertion   • Cataract     bilateral removal-Dr. Duke Talamantes   • Cellulitis     right lower leg   • Chickenpox    • COPD (chronic obstructive pulmonary disease) (CMS-[facility])    • Dental disorder     upper denture   • Earache    • Fatigue    • Frequent urination    • Hypertension 02/23/2018    on no meds at this time   • Hypothyroidism    • Influenza    • Insomnia    • Kidney disease     reports 1 kidney is smaller than the other   • Mumps    • Pain 02/23/2018    right leg and back, 5/10   • Pneumonia    • Rash    • Ringing in ears    • Shortness of breath    •  "Swelling of lower extremity    • Thyroid disease    • Tonsillitis    • Toothache    • Venous stasis dermatitis     right leg       Past Surgical History:   Procedure Laterality Date   • VEIN LIGATION RADIO FREQUENCY Right 2/26/2018    Procedure: VEIN LIGATION RADIO FREQUENCY- LONG SAPENOUS;  Surgeon: Jim Alas M.D.;  Location: SURGERY [facility];  Service: General   • ABDOMINAL EXPLORATION      2 ectopic preg   • BREAST BIOPSY      hx of benign left breast biopsy   • EYE SURGERY      cataract x2   • HYSTERECTOMY LAPAROSCOPY     • HYSTERECTOMY, TOTAL ABDOMINAL  1977/1978   • OOPHORECTOMY     • PB MAMMARY DUCTOGRAM, SINGLE     • PB REMV 2ND CATARACT,CORN-SCLER SECTN     • TONSILLECTOMY         Allergies: Latex; Neosporin [neomycin-polymyxin b]; Soap; Tape; and Tea tree oil    Family History   Problem Relation Age of Onset   • Arthritis Mother      hip fracture   • Diabetes Mother    • Cancer Mother      skin   • Arthritis Father      lung   • Alcohol/Drug Father      etoh   • Lung Disease Sister      smoker/copd   • Hypertension Sister    • Other Brother      hep c   • Arthritis Maternal Grandmother      hip fracture   • Diabetes Maternal Grandfather    • No Known Problems Brother    • Hyperlipidemia Neg Hx    • Stroke Neg Hx        Physical Examination    Vitals:    04/26/18 1123   Height: 1.702 m (5' 7\")   Weight: 87.1 kg (192 lb)   Weight % change since last entry.: 0 %   BP: 138/82   Pulse: 74   BMI (Calculated): 30.07   Resp: 16   Temp: 36.6 °C (97.8 °F)       General Appearance: alert, no distress  Skin: Skin color, texture, turgor normal. No rashes or lesions.  Eyes: negative  Oropharynx: Lips, mucosa, and tongue normal. Teeth and gums normal. Oropharynx moist and without lesion  Lungs: positive findings: Remarkably clear and quiet  Heart: negative. RRR without murmur, gallop, or rubs.  No ectopy.  Abdomen: Abdomen soft, non-tender. . No masses,  No organomegaly  Extremities:  No deformities, edema, " or skin discoloration  Joints: No acute arthritis  Peripheral Pulses:perfused  Neurologic: intact grossly  No cervical or supraclavicular adenopathy    II (soft palate, uvula, fauces visible)    Imaging: Described above    PFTS: None presently      Assessment and Plan  1. Pulmonary nodule, RUL CT , 4/18  - CT-CHEST (THORAX) W/O; Future    2. Nicotine dependence, cigarettes, in remission  - CT-CHEST (THORAX) W/O; Future    3. Chronic obstructive pulmonary disease, unspecified COPD type (CMS-HCC)  - CT-CHEST (THORAX) W/O; Future    This lady comes up to follow up on her chest CT scan, we sought a faint left lower lobe nodule on plain x-ray. That was not verified on CAT scan, but she did have right upper lobe scarring and a 7 mm nodule. My review suggests that this may be a confluence of the scars, but given her smoking history but stopped last year, this bears close watching. It is too small for needle biopsy, and less than 1 cm or PET scan.    We will watch this closely, recheck it in 6 months, if it remains stable then follow-up in one year. If it shows any signs of change, then we may need to proceed with direct sampling. I explained this to her. In the meantime her COPD is controlled, she is looking forward to hunting season, remains very active in her garden. Proair and Symbicort are effective. Vaccines are up-to-date. Body mass index is in range, portion control and gentle exercise. Follow-up in 6 months, add to  my schedule if needed to review the comparison chest CT scan.  Followup Return for follow up visit with Dr. Do Velásquez, add my schedule if needed.

## 2018-04-26 NOTE — PATIENT INSTRUCTIONS
This lady comes up to follow up on her chest CT scan, we sought a faint left lower lobe nodule on plain x-ray. That was not verified on CAT scan, but she did have right upper lobe scarring and a 7 mm nodule. My review suggests that this may be a confluence of the scars, but given her smoking history but stopped last year, this bears close watching. It is too small for needle biopsy, and less than 1 cm or PET scan.    We will watch this closely, recheck it in 6 months, if it remains stable then follow-up in one year. If it shows any signs of change, then we may need to proceed with direct sampling. I explained this to her. In the meantime her COPD is controlled, she is looking forward to hunting season, remains very active in her garden. Proair and Symbicort are effective. Vaccines are up-to-date. Body mass index is in range, portion control and gentle exercise. Follow-up in 6 months, add to  my schedule if needed to review the comparison chest CT scan.

## 2018-05-30 ENCOUNTER — TELEPHONE (OUTPATIENT)
Dept: MEDICAL GROUP | Facility: PHYSICIAN GROUP | Age: 68
End: 2018-05-30

## 2018-05-30 NOTE — TELEPHONE ENCOUNTER
Future Appointments       Provider Department Center    5/31/2018 1:00 PM RESOURCE PROVIDER ROBERT [facility]    8/6/2018 1:45 PM Dina Steinberg P.A.-C. [facility]    11/1/2018 1:30 PM [facility] Pulmonary Medicine         ESTABLISHED PATIENT PRE-VISIT PLANNING     Note: Patient will not be contacted if there is no indication to call.     1.  Reviewed notes from the last few office visits within the medical group: Yes    2.  If any orders were placed at last visit or intended to be done for this visit (i.e. 6 mos follow-up), do we have Results/Consult Notes?        •  Labs - Labs ordered, completed on 04/17/18 and results are in chart.       •  Imaging - Imaging ordered, NOT completed. Patient advised to complete prior to next appointment.       •  Referrals - Referral ordered, patient has NOT been seen. Scheduled     3. Is this appointment scheduled as a Hospital Follow-Up? No    4.  Immunizations were updated in Epic using WebIZ?: Yes       •  Web Iz Recommendations: TD    5.  Patient is due for the following Health Maintenance Topics:   Health Maintenance Due   Topic Date Due   • Annual Wellness Visit  03/01/2018         6.  MDX printed for Provider? NO    7.  Patient was informed to arrive 15 min prior to their scheduled appointment and bring in their medication bottles.

## 2018-05-31 ENCOUNTER — OFFICE VISIT (OUTPATIENT)
Dept: MEDICAL GROUP | Facility: PHYSICIAN GROUP | Age: 68
End: 2018-05-31
Payer: MEDICARE

## 2018-05-31 VITALS
OXYGEN SATURATION: 92 % | HEIGHT: 67 IN | WEIGHT: 192 LBS | BODY MASS INDEX: 30.13 KG/M2 | DIASTOLIC BLOOD PRESSURE: 78 MMHG | TEMPERATURE: 99.1 F | HEART RATE: 84 BPM | SYSTOLIC BLOOD PRESSURE: 146 MMHG

## 2018-05-31 DIAGNOSIS — M54.5 CHRONIC BILATERAL LOW BACK PAIN, WITH SCIATICA PRESENCE UNSPECIFIED: ICD-10-CM

## 2018-05-31 DIAGNOSIS — I87.2 VENOUS STASIS DERMATITIS OF BOTH LOWER EXTREMITIES: ICD-10-CM

## 2018-05-31 DIAGNOSIS — E03.9 ACQUIRED HYPOTHYROIDISM: ICD-10-CM

## 2018-05-31 DIAGNOSIS — G89.29 CHRONIC BILATERAL LOW BACK PAIN, WITH SCIATICA PRESENCE UNSPECIFIED: ICD-10-CM

## 2018-05-31 DIAGNOSIS — E03.1 CONGENITAL HYPOTHYROIDISM WITHOUT GOITER: ICD-10-CM

## 2018-05-31 PROCEDURE — 99214 OFFICE O/P EST MOD 30 MIN: CPT | Performed by: NURSE PRACTITIONER

## 2018-05-31 RX ORDER — LEVOTHYROXINE SODIUM 0.1 MG/1
100 TABLET ORAL
Qty: 90 TAB | Refills: 1 | Status: SHIPPED | OUTPATIENT
Start: 2018-05-31 | End: 2019-02-28 | Stop reason: SDUPTHER

## 2018-05-31 ASSESSMENT — PATIENT HEALTH QUESTIONNAIRE - PHQ9: CLINICAL INTERPRETATION OF PHQ2 SCORE: 0

## 2018-05-31 NOTE — ASSESSMENT & PLAN NOTE
She feels she is doing well on current dose of levothyroxine . She denies feeling hot or cold , palpitations or weight loss.

## 2018-05-31 NOTE — PROGRESS NOTES
Chief Complaint   Patient presents with   • Hypothyroidism     discuss concerns and follow up on health problems        HISTORY OF PRESENT ILLNESS: Patient is a 68 y.o. female established patient who presents today to discuss the following problems.         Venous stasis dermatitis of both lower extremities  She continues to use Triamcinolone and Bactroban on her legs. She didn't return to the Vascular surgeon due to confusion about a follow up appointment. She is not excerccising. She has quit smoking.     Hypothyroidism  She feels she is doing well on current dose of levothyroxine . She denies feeling hot or cold , palpitations or weight loss.     Back pain  She continues to have back pain which limits her movements and exercise.       Patient Active Problem List    Diagnosis Date Noted   • Chronic obstructive pulmonary disease (HCC) 04/17/2018   • Pulmonary nodule, RUL CT , 4/18 04/17/2018   • BMI 30.0-30.9,adult 04/17/2018   • Vitamin D deficiency 06/20/2017   • Osteopenia 04/28/2017   • Back pain 06/06/2016   • Nicotine dependence, cigarettes, in remission 06/06/2016   • Venous stasis dermatitis of both lower extremities 07/15/2015   • Hypothyroidism 07/14/2015   • Renal insufficiency 11/02/2013   • Cellulitis of leg 11/01/2013       Allergies:Latex; Neosporin [neomycin-polymyxin b]; Soap; Tape; Tea tree oil; Colophony [pinus strobus]; and Phenylene    Current Outpatient Prescriptions   Medication Sig Dispense Refill   • levothyroxine (SYNTHROID) 100 MCG Tab Take 1 Tab by mouth every morning before breakfast. 90 Tab 1   • mupirocin (BACTROBAN) 2 % Ointment Apply 1 g to affected area(s) as needed (on leg). 1 Tube 2   • PROAIR  (90 Base) MCG/ACT Aero Soln inhalation aerosol INHALE 2 (TWO) PUFF EVERY SIX HOURS, AS NEEDED  1   • diphenhydrAMINE (BENADRYL) 25 MG Tab Take 25 mg by mouth every 6 hours as needed for Sleep.     • Acetaminophen 500 MG Cap Take  by mouth.     • Ascorbic Acid (MARYAM-C PO) Take  by  mouth.     • Oxymetazoline HCl (NASAL SPRAY NA) Spray  in nose.     • budesonide-formoterol (SYMBICORT) 160-4.5 MCG/ACT Aerosol Inhale 2 Puffs by mouth 2 Times a Day.     • TRIAMCINOLONE ACETONIDE EX Apply 1 Each to affected area(s) 2 times a day as needed (on leg).     • Cholecalciferol (VITAMIN D3) 5000 units Cap Take 1 Cap by mouth every day.       No current facility-administered medications for this visit.        Social History   Substance Use Topics   • Smoking status: Former Smoker     Packs/day: 0.50     Years: 53.00     Types: Cigarettes     Start date: 3/29/1963     Quit date: 2017   • Smokeless tobacco: Never Used      Comment: class in 2017   • Alcohol use Yes      Comment: 2 per day       Family Status   Relation Status   • Mother Alive    hip fractures   • Father  at age 80    TBI   • Sister Alive   • Brother Alive   • Maternal Uncle    • Maternal Grandmother    • Maternal Grandfather    • Paternal Grandmother    • Paternal Grandfather    • Son Alive   • Son Alive   • Brother    • Sister Alive   • Brother    • Neg Hx      Family History   Problem Relation Age of Onset   • Arthritis Mother      hip fracture   • Diabetes Mother    • Cancer Mother      skin   • Arthritis Father      lung   • Alcohol/Drug Father      etoh   • Lung Disease Sister      smoker/copd   • Hypertension Sister    • Other Brother      hep c   • Arthritis Maternal Grandmother      hip fracture   • Diabetes Maternal Grandfather    • No Known Problems Brother    • Hyperlipidemia Neg Hx    • Stroke Neg Hx        ROS:  Review of Systems   Constitutional: Negative for fever, chills, weight loss and malaise/fatigue.   HENT: Negative for ear pain, nosebleeds,      Respiratory: Negative for cough, Cardiovascular: Negative for chest pain, palpitations, orthopnea and leg swelling.   Gastrointestinal: Negative for heartburn,    Musculoskeletal: positive for back pain  "  Skin: positive for itching and rash on lower extremities       Exam:  Blood pressure 146/78, pulse 84, temperature 37.3 °C (99.1 °F), height 1.702 m (5' 7\"), weight 87.1 kg (192 lb), SpO2 92 %.  General:  Well nourished, well developed female in NAD  Head is grossly normal.    Pulmonary: Clear to ausculation   Normal effort. No rales, ronchi, or wheezing.  Cardiovascular: Regular rate and rhythm without murmur.  Extremities: lower legs with patches of erythema discoloration and deep excoriations. Consistent with venous insuffiencey.     Please note that this dictation was created using voice recognition software. I have made every reasonable attempt to correct obvious errors, but I expect that there are errors of grammar and possibly content that I did not discover before finalizing the note.    Assessment/Plan:  1. Venous stasis dermatitis of both lower extremities  mupirocin (BACTROBAN) 2 % Ointment    encouraged excercise. encouraged return to vascular surgeon for followup    2. Congenital hypothyroidism without goiter      refill current medication, synthroid 100 mcg.    3. Acquired hypothyroidism  levothyroxine (SYNTHROID) 100 MCG Tab    Uncontrolled, increase levothyroxine to 88 mcg daily. repeat labs in 6 weeks.   4. Chronic bilateral low back pain, with sciatica presence unspecified      continue OTC analagesics as needed, ice and heat.       keep scheduled follow up with Dina Steinberg     "

## 2018-05-31 NOTE — ASSESSMENT & PLAN NOTE
She continues to use Triamcinolone and Bactroban on her legs. She didn't return to the Vascular surgeon due to confusion about a follow up appointment. She is not excerccising. She has quit smoking.

## 2018-07-03 ENCOUNTER — TELEPHONE (OUTPATIENT)
Dept: MEDICAL GROUP | Facility: PHYSICIAN GROUP | Age: 68
End: 2018-07-03

## 2018-07-03 DIAGNOSIS — E03.1 CONGENITAL HYPOTHYROIDISM WITHOUT GOITER: ICD-10-CM

## 2018-07-03 DIAGNOSIS — E55.9 VITAMIN D DEFICIENCY: ICD-10-CM

## 2018-08-03 ENCOUNTER — HOSPITAL ENCOUNTER (OUTPATIENT)
Dept: LAB | Facility: MEDICAL CENTER | Age: 68
End: 2018-08-03
Attending: PHYSICIAN ASSISTANT
Payer: MEDICARE

## 2018-08-03 ENCOUNTER — TELEPHONE (OUTPATIENT)
Dept: MEDICAL GROUP | Facility: PHYSICIAN GROUP | Age: 68
End: 2018-08-03

## 2018-08-03 DIAGNOSIS — E55.9 VITAMIN D DEFICIENCY: ICD-10-CM

## 2018-08-03 DIAGNOSIS — E03.1 CONGENITAL HYPOTHYROIDISM WITHOUT GOITER: ICD-10-CM

## 2018-08-03 LAB
25(OH)D3 SERPL-MCNC: 67 NG/ML (ref 30–100)
TSH SERPL DL<=0.005 MIU/L-ACNC: 2.1 UIU/ML (ref 0.38–5.33)

## 2018-08-03 PROCEDURE — 36415 COLL VENOUS BLD VENIPUNCTURE: CPT

## 2018-08-03 PROCEDURE — 84443 ASSAY THYROID STIM HORMONE: CPT

## 2018-08-03 PROCEDURE — 82306 VITAMIN D 25 HYDROXY: CPT

## 2018-08-03 NOTE — TELEPHONE ENCOUNTER
Future Appointments       Provider Department Center    8/6/2018 1:45 PM Dina Steinberg P.A.-C. [facility] [place]    11/1/2018 1:30 PM [facility] Pulmonary Medicine         ESTABLISHED PATIENT PRE-VISIT PLANNING     Note: Patient will not be contacted if there is no indication to call.     1.  Reviewed notes from the last few office visits within the medical group: Yes    2.  If any orders were placed at last visit or intended to be done for this visit (i.e. 6 mos follow-up), do we have Results/Consult Notes?        •  Labs - Labs ordered, NOT completed. Patient advised to complete prior to next appointment.       •  Imaging - Imaging ordered, NOT completed. Patient advised to complete prior to next appointment.       •  Referrals - No referrals were ordered at last office visit.    3. Is this appointment scheduled as a Hospital Follow-Up? No    4.  Immunizations were updated in [employer] using WebIZ?: Yes       •  Web Iz Recommendations: FLU, TD and ZOSTAVAX (Shingles)    5.  Patient is due for the following Health Maintenance Topics:   Health Maintenance Due   Topic Date Due   • IMM ZOSTER VACCINES (2 of 3) 05/10/2017   • Annual Wellness Visit  03/01/2018   • MAMMOGRAM  06/24/2018       6.  MDX printed for Provider? YES    7.  Patient was informed to arrive 15 min prior to their scheduled appointment and bring in their medication bottles.

## 2018-08-06 ENCOUNTER — OFFICE VISIT (OUTPATIENT)
Dept: MEDICAL GROUP | Facility: PHYSICIAN GROUP | Age: 68
End: 2018-08-06
Payer: MEDICARE

## 2018-08-06 VITALS
OXYGEN SATURATION: 93 % | WEIGHT: 192 LBS | TEMPERATURE: 98.9 F | HEIGHT: 67 IN | SYSTOLIC BLOOD PRESSURE: 172 MMHG | DIASTOLIC BLOOD PRESSURE: 90 MMHG | HEART RATE: 74 BPM | BODY MASS INDEX: 30.13 KG/M2

## 2018-08-06 DIAGNOSIS — E03.1 CONGENITAL HYPOTHYROIDISM WITHOUT GOITER: ICD-10-CM

## 2018-08-06 DIAGNOSIS — N28.9 RENAL INSUFFICIENCY: ICD-10-CM

## 2018-08-06 DIAGNOSIS — M54.50 CHRONIC RIGHT-SIDED LOW BACK PAIN WITHOUT SCIATICA: ICD-10-CM

## 2018-08-06 DIAGNOSIS — M85.80 OSTEOPENIA, UNSPECIFIED LOCATION: ICD-10-CM

## 2018-08-06 DIAGNOSIS — I87.2 VENOUS STASIS DERMATITIS OF BOTH LOWER EXTREMITIES: ICD-10-CM

## 2018-08-06 DIAGNOSIS — I10 ESSENTIAL HYPERTENSION: ICD-10-CM

## 2018-08-06 DIAGNOSIS — R91.8 PULMONARY NODULES: ICD-10-CM

## 2018-08-06 DIAGNOSIS — Z12.39 ENCOUNTER FOR SCREENING FOR MALIGNANT NEOPLASM OF BREAST: ICD-10-CM

## 2018-08-06 DIAGNOSIS — J44.9 CHRONIC OBSTRUCTIVE PULMONARY DISEASE, UNSPECIFIED COPD TYPE (HCC): ICD-10-CM

## 2018-08-06 DIAGNOSIS — Z23 NEED FOR VACCINATION: ICD-10-CM

## 2018-08-06 DIAGNOSIS — G89.29 CHRONIC RIGHT-SIDED LOW BACK PAIN WITHOUT SCIATICA: ICD-10-CM

## 2018-08-06 DIAGNOSIS — E55.9 VITAMIN D DEFICIENCY: ICD-10-CM

## 2018-08-06 PROCEDURE — 99214 OFFICE O/P EST MOD 30 MIN: CPT | Performed by: PHYSICIAN ASSISTANT

## 2018-08-06 RX ORDER — UREA 10 %
LOTION (ML) TOPICAL
COMMUNITY

## 2018-08-06 RX ORDER — LISINOPRIL 10 MG/1
10 TABLET ORAL DAILY
Qty: 30 TAB | Refills: 5 | Status: SHIPPED | OUTPATIENT
Start: 2018-08-06 | End: 2018-09-06

## 2018-08-06 NOTE — ASSESSMENT & PLAN NOTE
"Treated with levothyroxine 100 mcg. Compliant with medication first thing in the AM before eating/drinking anything. States has been on medication for \"quite awhile.\" Has been stable on current dose. Only symptom she endorses is chronic fatigue. Recently had repeat TSH and would like to discuss results.  "

## 2018-08-06 NOTE — ASSESSMENT & PLAN NOTE
Several previous falls awhile back and has had low back issues since. Worse with standing/walking, back extension, improved with sitting. Denies radiation down legs.  X-rays done in 2/2017. Pain managed reasonably well with OTC acetaminophen. Denies any current concern.

## 2018-08-06 NOTE — ASSESSMENT & PLAN NOTE
Follows with pulmonology. Prescribed Symbicort and ProAir but doesn't use either. Quit smoking over a year ago. Denies any trouble with breathing. Not very active, which she attributes mainly to problems with her legs and back.

## 2018-08-07 NOTE — ASSESSMENT & PLAN NOTE
Has current <1 cm pulmonary nodule which is being followed by pulmonology. Had CT in 4/2018 and is due for next one in 10/2018.

## 2018-08-07 NOTE — ASSESSMENT & PLAN NOTE
Chronic issue managed with PRN steroid cream. Follows with dermatology. Has had vein procedure done previously. Endorses previous cellulitis as well.

## 2018-08-07 NOTE — PROGRESS NOTES
"Subjective:   Naomy Vargas is a 68 y.o. female here today for follow up on COPD and other chronic conditions. Is a new patient to me and is also establishing care today.    Previous PCP: VIELKA Mari    HPI: Patient has the following current medical problems:    Chronic obstructive pulmonary disease (HCC)  Follows with pulmonology. Prescribed Symbicort and ProAir but doesn't use either. Quit smoking over a year ago. Denies any trouble with breathing. Not very active, which she attributes mainly to problems with her legs and back.     Hypothyroidism  Treated with levothyroxine 100 mcg. Compliant with medication first thing in the AM before eating/drinking anything. States has been on medication for \"quite awhile.\" Has been stable on current dose. Only symptom she endorses is chronic fatigue. Recently had repeat TSH and would like to discuss results.    Chronic right-sided low back pain without sciatica  Several previous falls awhile back and has had low back issues since. Worse with standing/walking, back extension, improved with sitting. Denies radiation down legs.  X-rays done in 2/2017. Pain managed reasonably well with OTC acetaminophen. Denies any current concern.    Essential hypertension  Elevated blood pressure today and at previous office visit in May. Patient denies previous hypertension diagnosis or medication. She denies any current symptoms include headache, blurry vision/visual impairment, chest pain, or dizziness.    Osteopenia  Noted on DEXA in 4/2017. On vitamin D supplement but no calcium.     Pulmonary nodule, RUL CT , 4/18  Has current <1 cm pulmonary nodule which is being followed by pulmonology. Had CT in 4/2018 and is due for next one in 10/2018.    Renal insufficiency  Chronic issue, last renal function lab work done in 4/2018.    Venous stasis dermatitis of both lower extremities  Chronic issue managed with PRN steroid cream. Follows with dermatology. Has had vein procedure " done previously. Endorses previous cellulitis as well.    Vitamin D deficiency  Taking vitamin D supplement, 5000 IU daily. Recently had vitamin D level re-checked and would like to discuss results.       Current medicines (including changes today)  Current Outpatient Prescriptions   Medication Sig Dispense Refill   • Melatonin 1 MG Tab Take  by mouth.     • NON SPECIFIED 0.5 mL by Intramuscular route Once for 1 dose. 1 Each 1   • lisinopril (PRINIVIL) 10 MG Tab Take 1 Tab by mouth every day. 30 Tab 5   • levothyroxine (SYNTHROID) 100 MCG Tab Take 1 Tab by mouth every morning before breakfast. 90 Tab 1   • Ascorbic Acid (MARYAM-C PO) Take  by mouth.     • Oxymetazoline HCl (NASAL SPRAY NA) Spray  in nose.     • TRIAMCINOLONE ACETONIDE EX Apply 1 Each to affected area(s) 2 times a day as needed (on leg).     • Cholecalciferol (VITAMIN D3) 5000 units Cap Take 1 Cap by mouth every day.     • mupirocin (BACTROBAN) 2 % Ointment Apply 1 g to affected area(s) as needed (on leg). 1 Tube 2   • PROAIR  (90 Base) MCG/ACT Aero Soln inhalation aerosol INHALE 2 (TWO) PUFF EVERY SIX HOURS, AS NEEDED  1   • diphenhydrAMINE (BENADRYL) 25 MG Tab Take 25 mg by mouth every 6 hours as needed for Sleep.     • Acetaminophen 500 MG Cap Take  by mouth.     • budesonide-formoterol (SYMBICORT) 160-4.5 MCG/ACT Aerosol Inhale 2 Puffs by mouth 2 Times a Day.       No current facility-administered medications for this visit.      She  has a past medical history of Anxiety; Arthritis; Back pain; Blood transfusion without reported diagnosis; Breath shortness; Cataract; Cellulitis; Chickenpox; COPD (chronic obstructive pulmonary disease) (HCC); Dental disorder; Earache; Fatigue; Frequent urination; Hypertension (02/23/2018); Hypothyroidism; Influenza; Insomnia; Kidney disease; Mumps; Pain (02/23/2018); Pneumonia; Rash; Ringing in ears; Shortness of breath; Swelling of lower extremity; Thyroid disease; Tonsillitis; Toothache; and Venous stasis  "dermatitis. She also has no past medical history of Anemia; Clotting disorder (HCC); Depression; Diabetic neuropathy (HCC); Headache(784.0); HIV (human immunodeficiency virus infection) (HCC); Hyperlipidemia; IBD (inflammatory bowel disease); Meningitis; Muscle disorder; Osteoporosis; Substance abuse; or Ulcer.    ROS  As per HPI.       Objective:     Blood pressure (!) 172/90, pulse 74, temperature 37.2 °C (98.9 °F), height 1.702 m (5' 7\"), weight 87.1 kg (192 lb), SpO2 93 %. Body mass index is 30.07 kg/m².     Physical Exam:  Constitutional: Alert, well-appearing, no distress.  Skin: Warm, dry, good turgor, no rashes in visible areas.  Eye: Conjunctiva clear, lids normal.  ENMT: Lips without lesions, moist mucus membranes.  Respiratory: Unlabored respiratory effort, lungs clear to auscultation, no wheezes, no rhonchi.  Cardiovascular: Normal S1, S2, no murmur, no lower extremity edema.      Assessment and Plan:   The following treatment plan was discussed    1. Essential hypertension  New problem. BP today in office 172/90, 180/100 on my re-check. BP at last office visit was 146/78. No current symptoms. Recommend initiation of BP medication. I have prescribed lisinopril. Discussed possible side effects. Discussed importance of checking home BP readings and bringing log to next office visit. F/u in 4 weeks.  - lisinopril (PRINIVIL) 10 MG Tab; Take 1 Tab by mouth every day.  Dispense: 30 Tab; Refill: 5    2. Congenital hypothyroidism without goiter  Chronic issue, well-controlled with daily levothyroxine. Recent TSH results normal. Recommend continuing current dose of levothyroxine.    3. Vitamin D deficiency  Chronic issue, well-controlled with daily OTC vitamin D supplementation. Recent vitamin D level well within normal range. Recommend continuation of current vitamin D dose.    4. Chronic obstructive pulmonary disease, unspecified COPD type ([facility])  Chronic issue, currently well-controlled without medication. Has " quit smoking with improvement in symptoms. Will continue to follow with pulmonology.    5. Pulmonary nodule, RUL CT , 4/18  Established problem, will follow up as scheduled for repeat chest CT this October. Further management TBD depending on change/growth. Will continue to follow with pulmonology.    6. Chronic right-sided low back pain without sciatica  Chronic issue, well-controlled with PRN acetaminophen. Reviewed previous lumbar x-rays which were significant for facet arthropathy. Continue current management.    7. Osteopenia, unspecified location  Chronic issue, previous DEXA results reviewed. Will be due for repeat in 2022. In the meantime, recommend continuation of vitamin D. Recommend addition of calcium supplementation. Weight-bearing exercise encouraged.    8. Venous stasis dermatitis of both lower extremities  Chronic issue, stable with symptomatic improvement with topical steroids. Will continue to follow with dermatology.    9. Renal insufficiency  Chronic issue, stable per review. Will continue to monitor. Discussed avoidance of NSAIDs.    10. Encounter for screening for malignant neoplasm of breast  - MA-SCREEN MAMMO W/CAD-BILAT; Future    11. Need for vaccination  - NON SPECIFIED; 0.5 mL by Intramuscular route Once for 1 dose.  Dispense: 1 Each; Refill: 1    12. BMI 30.0-30.9,adult  - Patient identified as having weight management issue.  Appropriate orders and counseling given.      Followup: Return in about 4 weeks (around 9/3/2018) for BP f/u; Short.    Dina Steinberg P.A.-C.

## 2018-08-07 NOTE — ASSESSMENT & PLAN NOTE
Elevated blood pressure today and at previous office visit in May. Patient denies previous hypertension diagnosis or medication. She denies any current symptoms include headache, blurry vision/visual impairment, chest pain, or dizziness.

## 2018-08-07 NOTE — ASSESSMENT & PLAN NOTE
Taking vitamin D supplement, 5000 IU daily. Recently had vitamin D level re-checked and would like to discuss results.

## 2018-08-29 ENCOUNTER — OFFICE VISIT (OUTPATIENT)
Dept: URGENT CARE | Facility: PHYSICIAN GROUP | Age: 68
End: 2018-08-29
Payer: MEDICARE

## 2018-08-29 ENCOUNTER — HOSPITAL ENCOUNTER (OUTPATIENT)
Dept: RADIOLOGY | Facility: MEDICAL CENTER | Age: 68
End: 2018-08-29
Attending: PHYSICIAN ASSISTANT
Payer: MEDICARE

## 2018-08-29 VITALS
HEART RATE: 78 BPM | BODY MASS INDEX: 27.43 KG/M2 | WEIGHT: 181 LBS | OXYGEN SATURATION: 95 % | RESPIRATION RATE: 18 BRPM | HEIGHT: 68 IN | TEMPERATURE: 96.8 F | DIASTOLIC BLOOD PRESSURE: 82 MMHG | SYSTOLIC BLOOD PRESSURE: 128 MMHG

## 2018-08-29 DIAGNOSIS — N30.01 ACUTE CYSTITIS WITH HEMATURIA: ICD-10-CM

## 2018-08-29 DIAGNOSIS — Z12.39 ENCOUNTER FOR SCREENING FOR MALIGNANT NEOPLASM OF BREAST: ICD-10-CM

## 2018-08-29 LAB
APPEARANCE UR: NORMAL
BILIRUB UR STRIP-MCNC: NORMAL MG/DL
COLOR UR AUTO: YELLOW
GLUCOSE UR STRIP.AUTO-MCNC: NORMAL MG/DL
KETONES UR STRIP.AUTO-MCNC: NORMAL MG/DL
LEUKOCYTE ESTERASE UR QL STRIP.AUTO: NORMAL
NITRITE UR QL STRIP.AUTO: NORMAL
PH UR STRIP.AUTO: 6 [PH] (ref 5–8)
PROT UR QL STRIP: NORMAL MG/DL
RBC UR QL AUTO: NORMAL
SP GR UR STRIP.AUTO: 1.02
UROBILINOGEN UR STRIP-MCNC: 0.2 MG/DL

## 2018-08-29 PROCEDURE — 99204 OFFICE O/P NEW MOD 45 MIN: CPT | Performed by: FAMILY MEDICINE

## 2018-08-29 PROCEDURE — 77067 SCR MAMMO BI INCL CAD: CPT

## 2018-08-29 PROCEDURE — 81002 URINALYSIS NONAUTO W/O SCOPE: CPT | Performed by: FAMILY MEDICINE

## 2018-08-29 RX ORDER — CEFDINIR 300 MG/1
300 CAPSULE ORAL 2 TIMES DAILY
Qty: 10 CAP | Refills: 0 | Status: SHIPPED | OUTPATIENT
Start: 2018-08-29 | End: 2018-09-03

## 2018-08-29 NOTE — PROGRESS NOTES
Subjective:   Naomy Cox a 68 y.o. female who presents for UTI    UTI   This is a new problem. The current episode started yesterday. The problem occurs constantly. The problem has been rapidly worsening. Associated symptoms include urinary symptoms. Pertinent negatives include no chest pain, chills, fever, myalgias, nausea, rash, sore throat or vomiting.     Past Medical History:   Diagnosis Date   • Anxiety    • Arthritis     wrists   • Back pain    • Blood transfusion without reported diagnosis     with ectopic preg x 2    • Breath shortness     with exertion   • Cataract     bilateral removal-Dr. Duke Talamantes   • Cellulitis     right lower leg   • Chickenpox    • COPD (chronic obstructive pulmonary disease) ([facility])    • Dental disorder     upper denture   • Earache    • Fatigue    • Frequent urination    • Hypertension 02/23/2018    on no meds at this time   • Hypothyroidism    • Influenza    • Insomnia    • Kidney disease     reports 1 kidney is smaller than the other   • Mumps    • Pain 02/23/2018    right leg and back, 5/10   • Pneumonia    • Rash    • Ringing in ears    • Shortness of breath    • Swelling of lower extremity    • Thyroid disease    • Tonsillitis    • Toothache    • Venous stasis dermatitis     right leg     Past Surgical History:   Procedure Laterality Date   • VEIN LIGATION RADIO FREQUENCY Right 2/26/2018    Procedure: VEIN LIGATION RADIO FREQUENCY- LONG SAPENOUS;  Surgeon: Jim Alas M.D.;  Location: SURGERY [facility];  Service: General   • ABDOMINAL EXPLORATION      2 ectopic preg   • BREAST BIOPSY      hx of benign left breast biopsy   • EYE SURGERY      cataract x2   • HYSTERECTOMY LAPAROSCOPY     • HYSTERECTOMY, TOTAL ABDOMINAL  1977/1978   • OOPHORECTOMY     • PB MAMMARY DUCTOGRAM, SINGLE     • PB REMV 2ND CATARACT,CORN-SCLER SECTN     • TONSILLECTOMY       Social History     Social History   • Marital status:      Spouse name: N/A   • Number of children: 2  "  • Years of education: N/A     Occupational History   • retired      Social History Main Topics   • Smoking status: Former Smoker     Packs/day: 0.50     Years: 53.00     Types: Cigarettes     Start date: 3/29/1963     Quit date: 7/18/2017   • Smokeless tobacco: Never Used      Comment: class in July 2017   • Alcohol use Yes      Comment: 2 per day   • Drug use: No   • Sexual activity: Not Currently     Partners: Male     Other Topics Concern   • Not on file     Social History Narrative   • No narrative on file      Family History   Problem Relation Age of Onset   • Arthritis Mother         hip fracture   • Diabetes Mother    • Cancer Mother         skin   • Arthritis Father         lung   • Alcohol/Drug Father         etoh   • Lung Disease Sister         smoker/copd   • Hypertension Sister    • Other Brother         hep c   • Arthritis Maternal Grandmother         hip fracture   • Diabetes Maternal Grandfather    • No Known Problems Brother    • Hyperlipidemia Neg Hx    • Stroke Neg Hx       Review of Systems   Constitutional: Negative for chills and fever.   HENT: Negative for sore throat.    Eyes: Negative for pain.   Respiratory: Negative for shortness of breath.    Cardiovascular: Negative for chest pain.   Gastrointestinal: Negative for nausea and vomiting.   Genitourinary: Negative for hematuria.   Musculoskeletal: Negative for myalgias.   Skin: Negative for rash.   Neurological: Negative for dizziness.     Allergies   Allergen Reactions   • Latex Rash     .   • Neosporin [Neomycin-Polymyxin B] Rash     .   • Soap Rash     Certain soaps cause rash   • Tape Itching     PAPER TAPE OK   • Tea Tree Oil Rash     .   • Colophony [Pinus Strobus]    • Phenylene       Objective:   /82   Pulse 78   Temp 36 °C (96.8 °F)   Resp 18   Ht 1.727 m (5' 8\")   Wt 82.1 kg (181 lb)   LMP  (LMP Unknown)   SpO2 95%   BMI 27.52 kg/m²   Physical Exam   Constitutional: She is oriented to person, place, and time. She " appears well-developed and well-nourished. No distress.   HENT:   Head: Normocephalic and atraumatic.   Eyes: Pupils are equal, round, and reactive to light. Conjunctivae and EOM are normal.   Cardiovascular: Normal rate, regular rhythm, normal heart sounds and intact distal pulses.    No murmur heard.  Pulmonary/Chest: Effort normal and breath sounds normal. No respiratory distress.   Abdominal: Soft. Bowel sounds are normal. She exhibits no distension. There is tenderness in the suprapubic area. There is no CVA tenderness.   Musculoskeletal: Normal range of motion.   Neurological: She is alert and oriented to person, place, and time. She has normal reflexes. No sensory deficit.   Skin: Skin is warm and dry.   Psychiatric: She has a normal mood and affect. Her behavior is normal.   Vitals reviewed.    Assessment/Plan:   Assessment    1. Acute cystitis with hematuria  - POCT Urinalysis [[id number]]  - cefdinir (OMNICEF) 300 MG Cap; Take 1 Cap by mouth 2 times a day for 5 days.  Dispense: 10 Cap; Refill: 0    Differential diagnosis, natural history, supportive care, and indications for immediate follow-up discussed.  Return if symptoms worsen or fail to improve.

## 2018-09-02 ASSESSMENT — ENCOUNTER SYMPTOMS
SHORTNESS OF BREATH: 0
VOMITING: 0
FEVER: 0
SORE THROAT: 0
MYALGIAS: 0
EYE PAIN: 0
CHILLS: 0
DIZZINESS: 0
NAUSEA: 0

## 2018-09-05 ENCOUNTER — TELEPHONE (OUTPATIENT)
Dept: MEDICAL GROUP | Facility: PHYSICIAN GROUP | Age: 68
End: 2018-09-05

## 2018-09-05 NOTE — TELEPHONE ENCOUNTER
Future Appointments       Provider Department Center    9/6/2018 12:25 PM Dina Steinberg P.A.-C. [facility] [place]    11/1/2018 1:30 PM [facility] Pulmonary Medicine         .ESTABLISHED PATIENT PRE-VISIT PLANNING     Note: Patient will not be contacted if there is no indication to call.     1.  Reviewed notes from the last few office visits within the medical group: Yes    2.  If any orders were placed at last visit or intended to be done for this visit (i.e. 6 mos follow-up), do we have Results/Consult Notes?        •  Labs - Labs were not ordered at last office visit.       •  Imaging - Imaging ordered, completed and results are in chart.       •  Referrals - No referrals were ordered at last office visit.    3. Is this appointment scheduled as a Hospital Follow-Up? No    4.  Immunizations were updated in [facility] using WebIZ?: Yes       •  Web Iz Recommendations: FLU, TD and ZOSTAVAX (Shingles)    5.  Patient is due for the following Health Maintenance Topics:   Health Maintenance Due   Topic Date Due   • IMM ZOSTER VACCINES (2 of 3) 05/10/2017   • Annual Wellness Visit  03/01/2018   • IMM INFLUENZA (1) 09/01/2018       6.  MDX printed for Provider? NO complete and compliant on 05/31/18    7.  Patient was informed to arrive 15 min prior to their scheduled appointment and bring in their medication bottles.

## 2018-09-06 ENCOUNTER — HOSPITAL ENCOUNTER (OUTPATIENT)
Facility: MEDICAL CENTER | Age: 68
End: 2018-09-06
Attending: PHYSICIAN ASSISTANT
Payer: MEDICARE

## 2018-09-06 ENCOUNTER — OFFICE VISIT (OUTPATIENT)
Dept: MEDICAL GROUP | Facility: PHYSICIAN GROUP | Age: 68
End: 2018-09-06
Payer: MEDICARE

## 2018-09-06 VITALS
TEMPERATURE: 98.3 F | OXYGEN SATURATION: 95 % | HEART RATE: 80 BPM | DIASTOLIC BLOOD PRESSURE: 92 MMHG | HEIGHT: 68 IN | BODY MASS INDEX: 27.28 KG/M2 | SYSTOLIC BLOOD PRESSURE: 172 MMHG | WEIGHT: 180 LBS

## 2018-09-06 DIAGNOSIS — R39.9 UTI SYMPTOMS: ICD-10-CM

## 2018-09-06 DIAGNOSIS — I10 ESSENTIAL HYPERTENSION: ICD-10-CM

## 2018-09-06 LAB
APPEARANCE UR: NORMAL
BILIRUB UR STRIP-MCNC: NORMAL MG/DL
COLOR UR AUTO: NORMAL
GLUCOSE UR STRIP.AUTO-MCNC: NORMAL MG/DL
KETONES UR STRIP.AUTO-MCNC: NORMAL MG/DL
LEUKOCYTE ESTERASE UR QL STRIP.AUTO: NORMAL
NITRITE UR QL STRIP.AUTO: NORMAL
PH UR STRIP.AUTO: 5.5 [PH] (ref 5–8)
PROT UR QL STRIP: NORMAL MG/DL
RBC UR QL AUTO: NORMAL
SP GR UR STRIP.AUTO: 1.01
UROBILINOGEN UR STRIP-MCNC: 0.2 MG/DL

## 2018-09-06 PROCEDURE — 99214 OFFICE O/P EST MOD 30 MIN: CPT | Performed by: PHYSICIAN ASSISTANT

## 2018-09-06 PROCEDURE — 87086 URINE CULTURE/COLONY COUNT: CPT

## 2018-09-06 PROCEDURE — 87077 CULTURE AEROBIC IDENTIFY: CPT

## 2018-09-06 PROCEDURE — 81002 URINALYSIS NONAUTO W/O SCOPE: CPT | Performed by: PHYSICIAN ASSISTANT

## 2018-09-06 PROCEDURE — 87186 SC STD MICRODIL/AGAR DIL: CPT

## 2018-09-06 RX ORDER — LISINOPRIL AND HYDROCHLOROTHIAZIDE 20; 12.5 MG/1; MG/1
1 TABLET ORAL DAILY
Qty: 30 TAB | Refills: 5 | Status: SHIPPED | OUTPATIENT
Start: 2018-09-06 | End: 2018-11-08

## 2018-09-06 RX ORDER — SULFAMETHOXAZOLE AND TRIMETHOPRIM 800; 160 MG/1; MG/1
1 TABLET ORAL 2 TIMES DAILY
Qty: 14 TAB | Refills: 0 | Status: SHIPPED | OUTPATIENT
Start: 2018-09-06 | End: 2018-09-07 | Stop reason: SINTOL

## 2018-09-06 NOTE — PATIENT INSTRUCTIONS
"DASH Eating Plan  DASH stands for \"Dietary Approaches to Stop Hypertension.\" The DASH eating plan is a healthy eating plan that has been shown to reduce high blood pressure (hypertension). Additional health benefits may include reducing the risk of type 2 diabetes mellitus, heart disease, and stroke. The DASH eating plan may also help with weight loss.  What do I need to know about the DASH eating plan?  For the DASH eating plan, you will follow these general guidelines:  · Choose foods with less than 150 milligrams of sodium per serving (as listed on the food label).  · Use salt-free seasonings or herbs instead of table salt or sea salt.  · Check with your health care provider or pharmacist before using salt substitutes.  · Eat lower-sodium products. These are often labeled as \"low-sodium\" or \"no salt added.\"  · Eat fresh foods. Avoid eating a lot of canned foods.  · Eat more vegetables, fruits, and low-fat dairy products.  · Choose whole grains. Look for the word \"whole\" as the first word in the ingredient list.  · Choose fish and skinless chicken or turkey more often than red meat. Limit fish, poultry, and meat to 6 oz (170 g) each day.  · Limit sweets, desserts, sugars, and sugary drinks.  · Choose heart-healthy fats.  · Eat more home-cooked food and less restaurant, buffet, and fast food.  · Limit fried foods.  · Do not griggs foods. Cook foods using methods such as baking, boiling, grilling, and broiling instead.  · When eating at a restaurant, ask that your food be prepared with less salt, or no salt if possible.  What foods can I eat?  Seek help from a dietitian for individual calorie needs.  Grains   Whole grain or whole wheat bread. Brown rice. Whole grain or whole wheat pasta. Quinoa, bulgur, and whole grain cereals. Low-sodium cereals. Corn or whole wheat flour tortillas. Whole grain cornbread. Whole grain crackers. Low-sodium crackers.  Vegetables   Fresh or frozen vegetables (raw, steamed, roasted, or " grilled). Low-sodium or reduced-sodium tomato and vegetable juices. Low-sodium or reduced-sodium tomato sauce and paste. Low-sodium or reduced-sodium canned vegetables.  Fruits   All fresh, canned (in natural juice), or frozen fruits.  Meat and Other Protein Products   Ground beef (85% or leaner), grass-fed beef, or beef trimmed of fat. Skinless chicken or turkey. Ground chicken or turkey. Pork trimmed of fat. All fish and seafood. Eggs. Dried beans, peas, or lentils. Unsalted nuts and seeds. Unsalted canned beans.  Dairy   Low-fat dairy products, such as skim or 1% milk, 2% or reduced-fat cheeses, low-fat ricotta or cottage cheese, or plain low-fat yogurt. Low-sodium or reduced-sodium cheeses.  Fats and Oils   Tub margarines without trans fats. Light or reduced-fat mayonnaise and salad dressings (reduced sodium). Avocado. Safflower, olive, or canola oils. Natural peanut or almond butter.  Other   Unsalted popcorn and pretzels.  The items listed above may not be a complete list of recommended foods or beverages. Contact your dietitian for more options.   What foods are not recommended?  Grains   White bread. White pasta. White rice. Refined cornbread. Bagels and croissants. Crackers that contain trans fat.  Vegetables   Creamed or fried vegetables. Vegetables in a cheese sauce. Regular canned vegetables. Regular canned tomato sauce and paste. Regular tomato and vegetable juices.  Fruits   Canned fruit in light or heavy syrup. Fruit juice.  Meat and Other Protein Products   Fatty cuts of meat. Ribs, chicken wings, russo, sausage, bologna, salami, chitterlings, fatback, hot dogs, bratwurst, and packaged luncheon meats. Salted nuts and seeds. Canned beans with salt.  Dairy   Whole or 2% milk, cream, half-and-half, and cream cheese. Whole-fat or sweetened yogurt. Full-fat cheeses or blue cheese. Nondairy creamers and whipped toppings. Processed cheese, cheese spreads, or cheese curds.  Condiments   Onion and garlic  salt, seasoned salt, table salt, and sea salt. Canned and packaged gravies. Worcestershire sauce. Tartar sauce. Barbecue sauce. Teriyaki sauce. Soy sauce, including reduced sodium. Steak sauce. Fish sauce. Oyster sauce. Cocktail sauce. Horseradish. Ketchup and mustard. Meat flavorings and tenderizers. Bouillon cubes. Hot sauce. Tabasco sauce. Marinades. Taco seasonings. Relishes.  Fats and Oils   Butter, stick margarine, lard, shortening, ghee, and russo fat. Coconut, palm kernel, or palm oils. Regular salad dressings.  Other   Pickles and olives. Salted popcorn and pretzels.  The items listed above may not be a complete list of foods and beverages to avoid. Contact your dietitian for more information.   Where can I find more information?  National [employer], [employer], [employer] [place]: www.nhlbi.nih.gov/health/health-topics/topics/dash/  This information is not intended to replace advice given to you by your health care provider. Make sure you discuss any questions you have with your health care provider.  Document Released: 12/06/2012 Document Revised: 05/25/2017 Document Reviewed: 10/22/2014  Elsevier Interactive Patient Education © 2017 Elsevier Inc.

## 2018-09-07 ENCOUNTER — TELEPHONE (OUTPATIENT)
Dept: MEDICAL GROUP | Facility: PHYSICIAN GROUP | Age: 68
End: 2018-09-07

## 2018-09-07 DIAGNOSIS — N39.0 ACUTE UTI: ICD-10-CM

## 2018-09-07 RX ORDER — CIPROFLOXACIN 250 MG/1
250 TABLET, FILM COATED ORAL 2 TIMES DAILY
Qty: 6 TAB | Refills: 0 | Status: SHIPPED | OUTPATIENT
Start: 2018-09-07 | End: 2018-09-10

## 2018-09-07 NOTE — TELEPHONE ENCOUNTER
I spoke with patient on the telephone. She states that even when she takes the Bactrim with food, it causes significant nausea and vomiting. Will discontinue medication. Want to avoid Macrobid given her CKD. I have prescribed Cipro 250 mg twice a day for 3 days. We'll be in touch with patient regarding culture results when they come back.  Dina Steinberg P.A.-C.

## 2018-09-07 NOTE — TELEPHONE ENCOUNTER
VOICEMAIL  1. Caller Name: Naomy                      Call Back Number: [phone] (home)       2. Message: Pt LVM stating Bactrim is causing nausea/vomiting. Pt is requesting a change. Please advise.     3. Patient approves office to leave a detailed voicemail/MyChart message: N/A

## 2018-09-09 LAB
BACTERIA UR CULT: ABNORMAL
BACTERIA UR CULT: ABNORMAL
SIGNIFICANT IND 70042: ABNORMAL
SITE SITE: ABNORMAL
SOURCE SOURCE: ABNORMAL

## 2018-09-12 ENCOUNTER — TELEPHONE (OUTPATIENT)
Dept: MEDICAL GROUP | Facility: PHYSICIAN GROUP | Age: 68
End: 2018-09-12

## 2018-09-12 DIAGNOSIS — N39.0 UTI (URINARY TRACT INFECTION) DUE TO ENTEROCOCCUS: ICD-10-CM

## 2018-09-12 DIAGNOSIS — B95.2 UTI (URINARY TRACT INFECTION) DUE TO ENTEROCOCCUS: ICD-10-CM

## 2018-09-12 RX ORDER — AMOXICILLIN 500 MG/1
500 CAPSULE ORAL 3 TIMES DAILY
Qty: 15 CAP | Refills: 0 | Status: SHIPPED | OUTPATIENT
Start: 2018-09-12 | End: 2018-09-17

## 2018-09-12 NOTE — TELEPHONE ENCOUNTER
Called patient and discussed recent urine culture results. She has had only mild improvement in her urinary symptoms. Cipro had only intermediate susceptibility so we'll discontinue and switch to amoxicillin. Script sent to pharmacy. Patient advised to take medication with food, stay well-hydrated. Potential for diarrhea discussed. If symptoms do not improve, advised to follow-up with me.  Dina Steinberg P.A.-C.

## 2018-10-05 ENCOUNTER — TELEPHONE (OUTPATIENT)
Dept: MEDICAL GROUP | Facility: PHYSICIAN GROUP | Age: 68
End: 2018-10-05

## 2018-10-05 NOTE — TELEPHONE ENCOUNTER
Future Appointments       Provider Department [place]    10/8/2018 1:05 PM Dina Steinberg P.A.-C. [facility], located within [facility] [place]    10/8/2018 3:30 PM [facility]    11/1/2018 1:30 PM [facility]       ESTABLISHED PATIENT PRE-VISIT PLANNING     Note: Patient will not be contacted if there is no indication to call.     1.  Reviewed notes from the last few office visits within the medical group: Yes    2.  If any orders were placed at last visit or intended to be done for this visit (i.e. 6 mos follow-up), do we have Results/Consult Notes?        •  Labs - Labs were not ordered at last office visit.       •  Imaging - Imaging was not ordered at last office visit.       •  Referrals - No referrals were ordered at last office visit.    3. Is this appointment scheduled as a Hospital Follow-Up? No    4.  Immunizations were updated in Epic using WebIZ?: Yes       •  Web Iz Recommendations: FLU, TD and ZOSTAVAX (Shingles)    5.  Patient is due for the following Health Maintenance Topics:   Health Maintenance Due   Topic Date Due   • IMM ZOSTER VACCINES (2 of 3) 05/10/2017   • Annual Wellness Visit  03/01/2018   • IMM INFLUENZA (1) 09/01/2018       6.  MDX printed for Provider? NO compliant during visit 05/31/18    7.  Patient was informed to arrive 15 min prior to their scheduled appointment and bring in their medication bottles. Confirmed through automated call

## 2018-10-08 ENCOUNTER — OFFICE VISIT (OUTPATIENT)
Dept: MEDICAL GROUP | Facility: PHYSICIAN GROUP | Age: 68
End: 2018-10-08
Payer: MEDICARE

## 2018-10-08 ENCOUNTER — HOSPITAL ENCOUNTER (OUTPATIENT)
Dept: RADIOLOGY | Facility: MEDICAL CENTER | Age: 68
End: 2018-10-08
Attending: INTERNAL MEDICINE
Payer: MEDICARE

## 2018-10-08 VITALS
TEMPERATURE: 97.5 F | HEART RATE: 87 BPM | WEIGHT: 190.3 LBS | OXYGEN SATURATION: 93 % | HEIGHT: 68 IN | DIASTOLIC BLOOD PRESSURE: 60 MMHG | BODY MASS INDEX: 28.84 KG/M2 | SYSTOLIC BLOOD PRESSURE: 124 MMHG

## 2018-10-08 DIAGNOSIS — Z23 NEED FOR VACCINATION: ICD-10-CM

## 2018-10-08 DIAGNOSIS — R30.0 DYSURIA: ICD-10-CM

## 2018-10-08 DIAGNOSIS — F17.211 NICOTINE DEPENDENCE, CIGARETTES, IN REMISSION: ICD-10-CM

## 2018-10-08 DIAGNOSIS — I10 ESSENTIAL HYPERTENSION: ICD-10-CM

## 2018-10-08 DIAGNOSIS — J44.9 CHRONIC OBSTRUCTIVE PULMONARY DISEASE, UNSPECIFIED COPD TYPE (HCC): ICD-10-CM

## 2018-10-08 DIAGNOSIS — R91.8 PULMONARY NODULES: ICD-10-CM

## 2018-10-08 PROCEDURE — 99214 OFFICE O/P EST MOD 30 MIN: CPT | Mod: 25 | Performed by: PHYSICIAN ASSISTANT

## 2018-10-08 PROCEDURE — G0008 ADMIN INFLUENZA VIRUS VAC: HCPCS | Performed by: PHYSICIAN ASSISTANT

## 2018-10-08 PROCEDURE — 71250 CT THORAX DX C-: CPT

## 2018-10-08 PROCEDURE — 90662 IIV NO PRSV INCREASED AG IM: CPT | Performed by: PHYSICIAN ASSISTANT

## 2018-10-08 NOTE — PROGRESS NOTES
Subjective:   Naomy Vargas is a 68 y.o. female here today for hypertension follow-up. Is an established patient of mine.    HPI:    Patient presents to the office today for follow-up on her chronic hypertension. At the last appointment, has significantly elevated blood pressure in the office so I adjusted her medication. She is now taking lisinopril-hydrochlorothiazide 20-12.5 mg daily. She states that things are going fine on the new medication, has not noticed any side effects. She has continued to check blood pressure readings at home and brings her log in for review today. She has had some headaches at the back of her head periodically. Denies any blurry vision, dizziness, or shortness of breath. She endorses one short-lived episode of chest pain last week on one occasion which resolved within 5 minutes. This was upon [facility] . She has not had any further episodes of chest pain since that time. No known cardiac history.    The last time I saw her, I also had started her on treatment for UTI. She required a couple medication changes and ultimately was treated with amoxicillin. She finished this late last month. She is still having some burning discomfort when she urinates although states it is significantly better than it was before.    Current medicines (including changes today)  Current Outpatient Prescriptions   Medication Sig Dispense Refill   • lisinopril-hydrochlorothiazide (PRINZIDE, ZESTORETIC) 20-12.5 MG per tablet Take 1 Tab by mouth every day. 30 Tab 5   • Melatonin 1 MG Tab Take  by mouth.     • levothyroxine (SYNTHROID) 100 MCG Tab Take 1 Tab by mouth every morning before breakfast. 90 Tab 1   • Ascorbic Acid (MARYAM-C PO) Take  by mouth.     • Cholecalciferol (VITAMIN D3) 5000 units Cap Take 1 Cap by mouth every day.     • mupirocin (BACTROBAN) 2 % Ointment Apply 1 g to affected area(s) as needed (on leg). 1 Tube 2   • PROAIR  (90 Base) MCG/ACT Aero Soln  "inhalation aerosol INHALE 2 (TWO) PUFF EVERY SIX HOURS, AS NEEDED  1   • diphenhydrAMINE (BENADRYL) 25 MG Tab Take 25 mg by mouth every 6 hours as needed for Sleep.     • Acetaminophen 500 MG Cap Take  by mouth.     • Oxymetazoline HCl (NASAL SPRAY NA) Spray  in nose.     • budesonide-formoterol (SYMBICORT) 160-4.5 MCG/ACT Aerosol Inhale 2 Puffs by mouth 2 Times a Day.     • TRIAMCINOLONE ACETONIDE EX Apply 1 Each to affected area(s) 2 times a day as needed (on leg).       No current facility-administered medications for this visit.      She  has a past medical history of Anxiety; Arthritis; Back pain; Blood transfusion without reported diagnosis; Breath shortness; Cataract; Cellulitis; Chickenpox; COPD (chronic obstructive pulmonary disease) ([facility]); Dental disorder; Earache; Fatigue; Frequent urination; Hypertension (02/23/2018); Hypothyroidism; Influenza; Insomnia; Kidney disease; Mumps; Pain (02/23/2018); Pneumonia; Rash; Ringing in ears; Shortness of breath; Swelling of lower extremity; Thyroid disease; Tonsillitis; Toothache; and Venous stasis dermatitis. She also has no past medical history of Anemia; Clotting disorder ([facility]); Depression; Diabetic neuropathy ([facility]); Headache(784.0); HIV (human immunodeficiency virus infection) ([facility]); Hyperlipidemia; IBD (inflammatory bowel disease); Meningitis; Muscle disorder; Osteoporosis; Substance abuse ([facility]); or Ulcer.    ROS  As per history of present illness.       Objective:     Blood pressure 124/60, pulse 87, temperature 36.4 °C (97.5 °F), temperature source Temporal, height 1.727 m (5' 8\"), weight 86.3 kg (190 lb 4.8 oz), SpO2 93 %, not currently breastfeeding. Body mass index is 28.94 kg/m².     Physical Exam:  Constitutional: Alert, well-appearing, no distress.  Skin: No rashes in visible areas.  Eye: Pupils are equal and round, conjunctiva clear, lids normal.  ENMT: Lips without lesions, moist mucus membranes.  Respiratory: Unlabored respiratory effort, lungs clear " to auscultation, no wheezes, no rhonchi.  Cardiovascular: Normal S1, S2, no murmur.      Assessment and Plan:   The following treatment plan was discussed    1. Essential hypertension  Chronic issue, blood pressure improved since last office visit. Blood pressure on arrival was 124/60. On my recheck, I got 138/80. Her readings at home are significantly higher than her reading today in the office. They average out to 150s over 90s on my review. I would like patient to bring her blood pressure monitor in to the next appointment to make sure that we are getting similar readings to rule out problem with her machine. We'll keep her medication the same for now.    2. Need for vaccination  - INFLUENZA VACCINE, HIGH DOSE (65+ ONLY)    3. Dysuria  Established problem, improved but still having some slight urinary burning. Will obtain urine culture to rule out persistent UTI area if culture is positive, will need to be treated again with antibiotics.  - URINE CULTURE(NEW); Future      Followup: Return in about 3 months (around 1/8/2019) for f/u HTN; Short.    Dina Steinberg P.A.-C.

## 2018-10-24 ENCOUNTER — HOSPITAL ENCOUNTER (OUTPATIENT)
Dept: LAB | Facility: MEDICAL CENTER | Age: 68
End: 2018-10-24
Attending: PHYSICIAN ASSISTANT
Payer: MEDICARE

## 2018-10-24 DIAGNOSIS — R30.0 DYSURIA: ICD-10-CM

## 2018-10-24 PROCEDURE — 87086 URINE CULTURE/COLONY COUNT: CPT

## 2018-10-26 LAB
BACTERIA UR CULT: NORMAL
SIGNIFICANT IND 70042: NORMAL
SITE SITE: NORMAL
SOURCE SOURCE: NORMAL

## 2018-11-07 ENCOUNTER — TELEPHONE (OUTPATIENT)
Dept: MEDICAL GROUP | Facility: PHYSICIAN GROUP | Age: 68
End: 2018-11-07

## 2018-11-07 ENCOUNTER — OFFICE VISIT (OUTPATIENT)
Dept: PULMONOLOGY | Facility: HOSPICE | Age: 68
End: 2018-11-07
Payer: MEDICARE

## 2018-11-07 VITALS
RESPIRATION RATE: 16 BRPM | WEIGHT: 198 LBS | DIASTOLIC BLOOD PRESSURE: 90 MMHG | TEMPERATURE: 98.1 F | BODY MASS INDEX: 30.01 KG/M2 | HEART RATE: 78 BPM | HEIGHT: 68 IN | OXYGEN SATURATION: 98 % | SYSTOLIC BLOOD PRESSURE: 156 MMHG

## 2018-11-07 DIAGNOSIS — R91.8 PULMONARY NODULES: ICD-10-CM

## 2018-11-07 DIAGNOSIS — J44.9 CHRONIC OBSTRUCTIVE PULMONARY DISEASE, UNSPECIFIED COPD TYPE (HCC): ICD-10-CM

## 2018-11-07 PROCEDURE — 99214 OFFICE O/P EST MOD 30 MIN: CPT | Performed by: INTERNAL MEDICINE

## 2018-11-07 ASSESSMENT — PAIN SCALES - GENERAL: PAINLEVEL: NO PAIN

## 2018-11-07 NOTE — PATIENT INSTRUCTIONS
This lady comes in to follow-up on a lung nodule, repeat CAT scan shows stability of the 7 mm density.  We will check it once more at 1 year and that is scheduled.  Immunizations are current.  Body mass index mildly high at 30.1, portion control advised but she does maintain an activity level and this is encouraged.    The CAT scan also suggested a possible abdominal aortic aneurysm, which will require periodic follow-up, she has seen a vascular surgeon in the past, will discuss this with her primary care doctor tomorrow regarding further workup ultrasound and vascular follow-up.    She has mild COPD but no active bronchospasm has not required inhaler use.  We will check her in 1 year but sooner if problems occur

## 2018-11-07 NOTE — PROGRESS NOTES
Naomy Vargas is a 68 y.o. female here for lung nodule and results of imaging. Patient was referred by her primary care.    History of Present Illness:    This lady comes in to follow-up on a lung nodule, repeat CAT scan shows stability of the 7 mm density.  We will check it once more at 1 year and that is scheduled.  Immunizations are current.  Body mass index mildly high at 30.1, portion control advised but she does maintain an activity level and this is encouraged.    The CAT scan also suggested a possible abdominal aortic aneurysm, which will require periodic follow-up, she has seen a vascular surgeon in the past, will discuss this with her primary care doctor tomorrow regarding further workup ultrasound and vascular follow-up.    She has mild COPD but no active bronchospasm has not required inhaler use.  We will check her in 1 year but sooner if problems occur  Constitutional ROS: No unexpected change in weight, No unexplained fevers  Eyes: No change in vision or blurring or double vision  Mouth/Throat ROS: No sore throat, No recent change in voice or hoarseness  Pulmonary ROS: See present history for pertinent positives  Cardiovascular ROS: No chest pain to suggest acute coronary syndrome  Gastrointestinal ROS: No abdominal pain to suggest peptic disease  Musculoskeletal/Extremities ROS: no acute artritis or unusual swelling  Hematologic/Lymphatic ROS: No easy bleeding or unusual lymph node swelling  Neurologic ROS: No new or unusual weakness  Psychiatric ROS: No hallucinations  Allergic/Immunologic: No  urticaria or allergic rash      Current Outpatient Prescriptions   Medication Sig Dispense Refill   • lisinopril-hydrochlorothiazide (PRINZIDE, ZESTORETIC) 20-12.5 MG per tablet Take 1 Tab by mouth every day. 30 Tab 5   • Melatonin 1 MG Tab Take  by mouth.     • levothyroxine (SYNTHROID) 100 MCG Tab Take 1 Tab by mouth every morning before breakfast. 90 Tab 1   • Ascorbic Acid (MARYAM-C PO) Take  by mouth.      • Cholecalciferol (VITAMIN D3) 5000 units Cap Take 1 Cap by mouth every day.     • mupirocin (BACTROBAN) 2 % Ointment Apply 1 g to affected area(s) as needed (on leg). 1 Tube 2   • PROAIR  (90 Base) MCG/ACT Aero Soln inhalation aerosol INHALE 2 (TWO) PUFF EVERY SIX HOURS, AS NEEDED  1   • diphenhydrAMINE (BENADRYL) 25 MG Tab Take 25 mg by mouth every 6 hours as needed for Sleep.     • Acetaminophen 500 MG Cap Take  by mouth.     • Oxymetazoline HCl (NASAL SPRAY NA) Spray  in nose.     • budesonide-formoterol (SYMBICORT) 160-4.5 MCG/ACT Aerosol Inhale 2 Puffs by mouth 2 Times a Day.     • TRIAMCINOLONE ACETONIDE EX Apply 1 Each to affected area(s) 2 times a day as needed (on leg).       No current facility-administered medications for this visit.        Social History   Substance Use Topics   • Smoking status: Former Smoker     Packs/day: 0.50     Years: 53.00     Types: Cigarettes     Start date: 3/29/1963     Quit date: 7/18/2017   • Smokeless tobacco: Never Used      Comment: class in July 2017   • Alcohol use Yes      Comment: 2 per day        Past Medical History:   Diagnosis Date   • Anxiety    • Arthritis     wrists   • Back pain    • Blood transfusion without reported diagnosis     with ectopic preg x 2    • Breath shortness     with exertion   • Cataract     bilateral removal-Dr. Duke Talamantes   • Cellulitis     right lower leg   • Chickenpox    • COPD (chronic obstructive pulmonary disease) (HCC)    • Dental disorder     upper denture   • Earache    • Fatigue    • Frequent urination    • Hypertension 02/23/2018    on no meds at this time   • Hypothyroidism    • Influenza    • Insomnia    • Kidney disease     reports 1 kidney is smaller than the other   • Mumps    • Pain 02/23/2018    right leg and back, 5/10   • Pneumonia    • Rash    • Ringing in ears    • Shortness of breath    • Swelling of lower extremity    • Thyroid disease    • Tonsillitis    • Toothache    • Venous stasis dermatitis      "right leg       Past Surgical History:   Procedure Laterality Date   • VEIN LIGATION RADIO FREQUENCY Right 2/26/2018    Procedure: VEIN LIGATION RADIO FREQUENCY- LONG SAPENOUS;  Surgeon: Jim Alas M.D.;  Location: SURGERY [facility];  Service: General   • ABDOMINAL EXPLORATION      2 ectopic preg   • BREAST BIOPSY      hx of benign left breast biopsy   • EYE SURGERY      cataract x2   • HYSTERECTOMY LAPAROSCOPY     • HYSTERECTOMY, TOTAL ABDOMINAL  1977/1978   • OOPHORECTOMY     • PB MAMMARY DUCTOGRAM, SINGLE     • PB REMV 2ND CATARACT,CORN-SCLER SECTN     • TONSILLECTOMY         Allergies: Colophony [pinus strobus]; Latex; Neosporin [neomycin-polymyxin b]; Phenylene; Soap; Tape; and Tea tree oil    Family History   Problem Relation Age of Onset   • Arthritis Mother         hip fracture   • Diabetes Mother    • Cancer Mother         skin   • Arthritis Father         lung   • Alcohol/Drug Father         etoh   • Lung Disease Sister         smoker/copd   • Hypertension Sister    • Other Brother         hep c   • Arthritis Maternal Grandmother         hip fracture   • Diabetes Maternal Grandfather    • No Known Problems Brother    • Hyperlipidemia Neg Hx    • Stroke Neg Hx        Physical Examination    Vitals:    11/07/18 1348   Height: 1.727 m (5' 8\")   Weight: 89.8 kg (198 lb)   Weight % change since last entry.: 0 %   BP: 156/90   Pulse: 78   BMI (Calculated): 30.11   Resp: 16   Temp: 36.7 °C (98.1 °F)   TempSrc: Oral       General Appearance: alert, no distress  Skin: Skin color, texture, turgor normal. No rashes or lesions.  Eyes: negative  Oropharynx: Lips, mucosa, and tongue normal. Teeth and gums normal. Oropharynx moist and without lesion  Lungs: positive findings: No wheezes or rhonchi  Heart: negative. RRR without murmur, gallop, or rubs.  No ectopy.  Abdomen: Abdomen soft, non-tender. . No masses,  No organomegaly  Extremities:  No deformities, edema, or skin discoloration  Joints: No acute " arthritis  Peripheral Pulses:perfused  Neurologic: intact grossly      II (soft palate, uvula, fauces visible)    Imaging: Described above, stable nodule, possible aneurysm, see vascular referral recommendations    PFTS: None presently      Assessment and Plan  1. Chronic obstructive pulmonary disease, unspecified COPD type (HCC)    2. Pulmonary nodule, RUL CT , 4/18  - CT-CHEST (THORAX) W/O; Future    3. BMI 30.0-30.9,adult  Patient's body mass index is 30.11 kg/m². Exercise and nutrition counseling were performed at this visit.    This lady comes in to follow-up on a lung nodule, repeat CAT scan shows stability of the 7 mm density.  We will check it once more at 1 year and that is scheduled.  Immunizations are current.  Body mass index mildly high at 30.1, portion control advised but she does maintain an activity level and this is encouraged.    The CAT scan also suggested a possible abdominal aortic aneurysm, which will require periodic follow-up, she has seen a vascular surgeon in the past, will discuss this with her primary care doctor tomorrow regarding further workup ultrasound and vascular follow-up.    She has mild COPD but no active bronchospasm has not required inhaler use.  We will check her in 1 year but sooner if problems occur  Followup Return in about 1 year (around 11/7/2019) for follow up visit with Dr. oD Velásquez, after imaging test.

## 2018-11-07 NOTE — TELEPHONE ENCOUNTER
Future Appointments       Provider Department Center    11/8/2018 1:05 PM Dina Steinberg P.A.-C. [facility]        ESTABLISHED PATIENT PRE-VISIT PLANNING     Patient was NOT contacted to complete PVP.     Note: Patient will not be contacted if there is no indication to call.     1.  Reviewed notes from the last few office visits within the medical group: Yes    2.  If any orders were placed at last visit or intended to be done for this visit (i.e. 6 mos follow-up), do we have Results/Consult Notes?        •  Labs - Labs were not ordered at last office visit.       •  Imaging - Imaging ordered, NOT completed. Patient advised to complete prior to next appointment.       •  Referrals - No referrals were ordered at last office visit.    3. Is this appointment scheduled as a Hospital Follow-Up? No    4.  Immunizations were updated in [facility] using WebIZ?: Yes       •  Web Iz Recommendations: PNEUMOVAX (PPSV23), TD and ZOSTAVAX (Shingles)    5.  Patient is due for the following Health Maintenance Topics:   Health Maintenance Due   Topic Date Due   • IMM ZOSTER VACCINES (2 of 3) 05/10/2017   • Annual Wellness Visit  03/01/2018   • IMM PNEUMOCOCCAL(2 of 2 - PPSV23) 11/01/2018       6.  MDX printed for Provider? NO complaint during visit 05/31/18    7.  Patient was informed to arrive 15 min prior to their scheduled appointment and bring in their medication bottles. Confirmed through automated call

## 2018-11-08 ENCOUNTER — OFFICE VISIT (OUTPATIENT)
Dept: MEDICAL GROUP | Facility: PHYSICIAN GROUP | Age: 68
End: 2018-11-08
Payer: MEDICARE

## 2018-11-08 VITALS
SYSTOLIC BLOOD PRESSURE: 132 MMHG | WEIGHT: 189 LBS | TEMPERATURE: 97.7 F | OXYGEN SATURATION: 95 % | BODY MASS INDEX: 28.64 KG/M2 | DIASTOLIC BLOOD PRESSURE: 80 MMHG | HEIGHT: 68 IN | HEART RATE: 85 BPM

## 2018-11-08 DIAGNOSIS — I10 ESSENTIAL HYPERTENSION: ICD-10-CM

## 2018-11-08 DIAGNOSIS — I71.40 ABDOMINAL AORTIC ANEURYSM (AAA) WITHOUT RUPTURE (HCC): ICD-10-CM

## 2018-11-08 PROCEDURE — 99214 OFFICE O/P EST MOD 30 MIN: CPT | Performed by: PHYSICIAN ASSISTANT

## 2018-11-08 RX ORDER — AMLODIPINE BESYLATE 5 MG/1
5 TABLET ORAL DAILY
Qty: 30 TAB | Refills: 3 | Status: SHIPPED | OUTPATIENT
Start: 2018-11-08 | End: 2019-02-27 | Stop reason: SDUPTHER

## 2018-11-08 RX ORDER — LISINOPRIL AND HYDROCHLOROTHIAZIDE 25; 20 MG/1; MG/1
1 TABLET ORAL DAILY
Qty: 30 TAB | Refills: 3 | Status: SHIPPED | OUTPATIENT
Start: 2018-11-08 | End: 2019-03-09 | Stop reason: SDUPTHER

## 2018-11-08 NOTE — PROGRESS NOTES
Subjective:   Naomy Vargas is a 68 y.o. female here today for follow-up on hypertension. Is an established patient of mine.    HPI:    Patient presents to the office today for follow-up on hypertension.  She is currently on lisinopril-hydrochlorothiazide 20-12.5 milligrams daily.  She has been consistently checking blood pressures at home and brings log in for review.  Unfortunately, her blood pressure remains pretty elevated.  Averages about 160s over 90s consistently, sometimes even higher than this.  She remains asymptomatic.  She is denying any chest pain, shortness of breath, palpitations, dizziness, or headaches.  She has been a bit stressed out lately given a finding of an aneurysm on recent chest CT that she had done.  She is asking what needs to be done about that.  She otherwise offers up no new concerns.    Current medicines (including changes today)  Current Outpatient Prescriptions   Medication Sig Dispense Refill   • lisinopril-hydrochlorothiazide (PRINZIDE, ZESTORETIC) 20-25 MG per tablet Take 1 Tab by mouth every day. 30 Tab 3   • amLODIPine (NORVASC) 5 MG Tab Take 1 Tab by mouth every day. 30 Tab 3   • Melatonin 1 MG Tab Take  by mouth.     • levothyroxine (SYNTHROID) 100 MCG Tab Take 1 Tab by mouth every morning before breakfast. 90 Tab 1   • mupirocin (BACTROBAN) 2 % Ointment Apply 1 g to affected area(s) as needed (on leg). 1 Tube 2   • PROAIR  (90 Base) MCG/ACT Aero Soln inhalation aerosol INHALE 2 (TWO) PUFF EVERY SIX HOURS, AS NEEDED  1   • diphenhydrAMINE (BENADRYL) 25 MG Tab Take 25 mg by mouth every 6 hours as needed for Sleep.     • Acetaminophen 500 MG Cap Take  by mouth.     • Ascorbic Acid (MARYAM-C PO) Take  by mouth.     • Oxymetazoline HCl (NASAL SPRAY NA) Spray  in nose.     • budesonide-formoterol (SYMBICORT) 160-4.5 MCG/ACT Aerosol Inhale 2 Puffs by mouth 2 Times a Day.     • TRIAMCINOLONE ACETONIDE EX Apply 1 Each to affected area(s) 2 times a day as needed (on leg).   "   • Cholecalciferol (VITAMIN D3) 5000 units Cap Take 1 Cap by mouth every day.       No current facility-administered medications for this visit.      She  has a past medical history of Anxiety; Arthritis; Back pain; Blood transfusion without reported diagnosis; Breath shortness; Cataract; Cellulitis; Chickenpox; COPD (chronic obstructive pulmonary disease) ([facility]); Dental disorder; Earache; Fatigue; Frequent urination; Hypertension (02/23/2018); Hypothyroidism; Influenza; Insomnia; Kidney disease; Mumps; Pain (02/23/2018); Pneumonia; Rash; Ringing in ears; Shortness of breath; Swelling of lower extremity; Thyroid disease; Tonsillitis; Toothache; and Venous stasis dermatitis. She also has no past medical history of Anemia; Clotting disorder ([facility]); Depression; Diabetic neuropathy ([facility]); Headache(784.0); HIV (human immunodeficiency virus infection) ([facility]); Hyperlipidemia; IBD (inflammatory bowel disease); Meningitis; Muscle disorder; Osteoporosis; Substance abuse ([facility]); or Ulcer.    ROS  As per HPI.     Objective:     Blood pressure 132/80, pulse 85, temperature 36.5 °C (97.7 °F), temperature source Temporal, height 1.727 m (5' 8\"), weight 85.7 kg (189 lb), SpO2 95 %, not currently breastfeeding. Body mass index is 28.74 kg/m².     Physical Exam:  Constitutional: Alert, well-appearing, no distress.  Skin: Warm, dry, good turgor, no rashes in visible areas.  Eye: Pupils are equal and round, conjunctiva clear, lids normal.  ENMT: Lips without lesions, moist mucus membranes.  Respiratory: Unlabored respiratory effort, lungs clear to auscultation, no wheezes, no rhonchi.  Cardiovascular: Normal S1, S2, no murmur.      Assessment and Plan:   The following treatment plan was discussed    1. Essential hypertension  Chronic issue, still uncontrolled.  I checked her blood pressure and got 170/98.  I checked this against her automatic wrist cuff which she brought in with her and it reads 162/98.  I am going to increase her " medication.  Specifically, we will increase the hydrochlorothiazide to 25 mg and also add on amlodipine 5 mg daily.  Patient advised to message me in 2 weeks with home readings.  Follow-up in the office in 3 months.  - lisinopril-hydrochlorothiazide (PRINZIDE, ZESTORETIC) 20-25 MG per tablet; Take 1 Tab by mouth every day.  Dispense: 30 Tab; Refill: 3  - amLODIPine (NORVASC) 5 MG Tab; Take 1 Tab by mouth every day.  Dispense: 30 Tab; Refill: 3    2. Abdominal aortic aneurysm (AAA) without rupture (HCC)  New problem, uncontrolled.  Reviewed recent chest CT which shows likely abdominal aortic aneurysm measuring up to 3.6 cm but incompletely evaluated. Will get ultrasound to further evaluate.  Patient may need to see vascular surgery.  Will wait on results.  - US-AORTA/ILIACS DUPLEX COMPLETE; Future      Followup: Return in about 3 months (around 2/8/2019).    Dina Steinberg P.A.-C.

## 2018-11-08 NOTE — PATIENT INSTRUCTIONS
Amlodipine tablets  What is this medicine?  AMLODIPINE (am ELVI casas) is a calcium-channel blocker. It affects the amount of calcium found in your heart and muscle cells. This relaxes your blood vessels, which can reduce the amount of work the heart has to do. This medicine is used to lower high blood pressure. It is also used to prevent chest pain.  This medicine may be used for other purposes; ask your health care provider or pharmacist if you have questions.  COMMON BRAND NAME(S): Norvasc  What should I tell my health care provider before I take this medicine?  They need to know if you have any of these conditions:  -heart problems like heart failure or aortic stenosis  -liver disease  -an unusual or allergic reaction to amlodipine, other medicines, foods, dyes, or preservatives  -pregnant or trying to get pregnant  -breast-feeding  How should I use this medicine?  Take this medicine by mouth with a glass of water. Follow the directions on the prescription label. Take your medicine at regular intervals. Do not take more medicine than directed.  Talk to your pediatrician regarding the use of this medicine in children. Special care may be needed. This medicine has been used in children as young as 6.  Persons over 65 years old may have a stronger reaction to this medicine and need smaller doses.  Overdosage: If you think you have taken too much of this medicine contact a poison control center or emergency room at once.  NOTE: This medicine is only for you. Do not share this medicine with others.  What if I miss a dose?  If you miss a dose, take it as soon as you can. If it is almost time for your next dose, take only that dose. Do not take double or extra doses.  What may interact with this medicine?  -herbal or dietary supplements  -local or general anesthetics  -medicines for high blood pressure  -medicines for prostate problems  -rifampin  This list may not describe all possible interactions. Give your health  care provider a list of all the medicines, herbs, non-prescription drugs, or dietary supplements you use. Also tell them if you smoke, drink alcohol, or use illegal drugs. Some items may interact with your medicine.  What should I watch for while using this medicine?  Visit your doctor or health care professional for regular check ups. Check your blood pressure and pulse rate regularly. Ask your health care professional what your blood pressure and pulse rate should be, and when you should contact him or her.  This medicine may make you feel confused, dizzy or lightheaded. Do not drive, use machinery, or do anything that needs mental alertness until you know how this medicine affects you. To reduce the risk of dizzy or fainting spells, do not sit or stand up quickly, especially if you are an older patient. Avoid alcoholic drinks; they can make you more dizzy.  Do not suddenly stop taking amlodipine. Ask your doctor or health care professional how you can gradually reduce the dose.  What side effects may I notice from receiving this medicine?  Side effects that you should report to your doctor or health care professional as soon as possible:  -allergic reactions like skin rash, itching or hives, swelling of the face, lips, or tongue  -breathing problems  -changes in vision or hearing  -chest pain  -fast, irregular heartbeat  -swelling of legs or ankles  Side effects that usually do not require medical attention (report to your doctor or health care professional if they continue or are bothersome):  -dry mouth  -facial flushing  -nausea, vomiting  -stomach gas, pain  -tired, weak  -trouble sleeping  This list may not describe all possible side effects. Call your doctor for medical advice about side effects. You may report side effects to FDA at 1-124-FDA-4572.  Where should I keep my medicine?  Keep out of the reach of children.  Store at room temperature between 59 and 86 degrees F (15 and 30 degrees C). Protect from  light. Keep container tightly closed. Throw away any unused medicine after the expiration date.  NOTE: This sheet is a summary. It may not cover all possible information. If you have questions about this medicine, talk to your doctor, pharmacist, or health care provider.  © 2018 [employer]/Gold Standard (2013-11-15 11:40:58)

## 2018-11-16 ENCOUNTER — HOSPITAL ENCOUNTER (OUTPATIENT)
Dept: RADIOLOGY | Facility: MEDICAL CENTER | Age: 68
End: 2018-11-16
Attending: PHYSICIAN ASSISTANT
Payer: MEDICARE

## 2018-11-16 DIAGNOSIS — I71.40 ABDOMINAL AORTIC ANEURYSM (AAA) 3.0 CM TO 5.0 CM IN DIAMETER IN FEMALE (HCC): ICD-10-CM

## 2018-11-16 DIAGNOSIS — I77.1 BILATERAL ILIAC ARTERY STENOSIS (HCC): ICD-10-CM

## 2018-11-16 DIAGNOSIS — I71.40 ABDOMINAL AORTIC ANEURYSM (AAA) WITHOUT RUPTURE (HCC): ICD-10-CM

## 2018-11-16 PROCEDURE — 93978 VASCULAR STUDY: CPT | Mod: 26 | Performed by: SURGERY

## 2018-11-16 PROCEDURE — 93978 VASCULAR STUDY: CPT

## 2018-11-30 ENCOUNTER — TELEPHONE (OUTPATIENT)
Dept: URGENT CARE | Facility: PHYSICIAN GROUP | Age: 68
End: 2018-11-30

## 2018-11-30 NOTE — TELEPHONE ENCOUNTER
Patient could not scan blood pressure log into chart so she came in to get in there for you.     Scanned under media

## 2019-02-07 ENCOUNTER — TELEPHONE (OUTPATIENT)
Dept: MEDICAL GROUP | Facility: PHYSICIAN GROUP | Age: 69
End: 2019-02-07

## 2019-02-07 NOTE — TELEPHONE ENCOUNTER
Future Appointments       Provider Department Center    2/8/2019 1:25 PM (Arrive by 1:05 PM) Dina Steinberg P.A.-C. [facility] [place]    11/25/2019 2:30 PM Do Velásquez M.D. [facility] Pulmonary Medicine         ESTABLISHED PATIENT PRE-VISIT PLANNING     Patient was NOT contacted to complete PVP.     Note: Patient will not be contacted if there is no indication to call.     1.  Reviewed notes from the last few office visits within the medical group: Yes    2.  If any orders were placed at last visit or intended to be done for this visit (i.e. 6 mos follow-up), do we have Results/Consult Notes?        •  Labs - Labs were not ordered at last office visit.       •  Imaging - Imaging ordered, completed and results are in chart.       •  Referrals - Referral ordered, patient has NOT been seen.    3. Is this appointment scheduled as a Hospital Follow-Up? No    4.  Immunizations were updated in [facility] using WebIZ?: Yes       •  Web Iz Recommendations: PNEUMOVAX (PPSV23), TD and SHINGRIX (Shingles)    5.  Patient is due for the following Health Maintenance Topics:   Health Maintenance Due   Topic Date Due   • IMM ZOSTER VACCINES (2 of 3) 05/10/2017   • Annual Wellness Visit  03/01/2018   • IMM PNEUMOCOCCAL(2 of 2 - PPSV23) 11/01/2018       6. Orders for overdue Health Maintenance topics pended in Pre-Charting? YES    7.  AHA (MDX) form printed for Provider? YES    8.  Patient was informed to arrive 15 min prior to their scheduled appointment and bring in their medication bottles.

## 2019-02-08 ENCOUNTER — OFFICE VISIT (OUTPATIENT)
Dept: MEDICAL GROUP | Facility: PHYSICIAN GROUP | Age: 69
End: 2019-02-08
Payer: MEDICARE

## 2019-02-08 ENCOUNTER — HOSPITAL ENCOUNTER (OUTPATIENT)
Dept: LAB | Facility: MEDICAL CENTER | Age: 69
End: 2019-02-08
Attending: PHYSICIAN ASSISTANT
Payer: MEDICARE

## 2019-02-08 ENCOUNTER — TELEPHONE (OUTPATIENT)
Dept: MEDICAL GROUP | Facility: PHYSICIAN GROUP | Age: 69
End: 2019-02-08

## 2019-02-08 VITALS
HEART RATE: 80 BPM | HEIGHT: 68 IN | SYSTOLIC BLOOD PRESSURE: 118 MMHG | DIASTOLIC BLOOD PRESSURE: 78 MMHG | OXYGEN SATURATION: 97 % | BODY MASS INDEX: 28.64 KG/M2 | TEMPERATURE: 99.2 F | RESPIRATION RATE: 18 BRPM | WEIGHT: 189 LBS

## 2019-02-08 DIAGNOSIS — R30.0 DYSURIA: ICD-10-CM

## 2019-02-08 DIAGNOSIS — I10 ESSENTIAL HYPERTENSION: ICD-10-CM

## 2019-02-08 DIAGNOSIS — I71.40 ABDOMINAL AORTIC ANEURYSM (AAA) 3.0 CM TO 5.0 CM IN DIAMETER IN FEMALE (HCC): ICD-10-CM

## 2019-02-08 DIAGNOSIS — I77.1 BILATERAL ILIAC ARTERY STENOSIS (HCC): ICD-10-CM

## 2019-02-08 DIAGNOSIS — Z23 NEED FOR VACCINATION: ICD-10-CM

## 2019-02-08 DIAGNOSIS — J44.9 CHRONIC OBSTRUCTIVE PULMONARY DISEASE, UNSPECIFIED COPD TYPE (HCC): ICD-10-CM

## 2019-02-08 LAB
ALBUMIN SERPL BCP-MCNC: 4.3 G/DL (ref 3.2–4.9)
ALBUMIN/GLOB SERPL: 1.6 G/DL
ALP SERPL-CCNC: 69 U/L (ref 30–99)
ALT SERPL-CCNC: 16 U/L (ref 2–50)
ANION GAP SERPL CALC-SCNC: 9 MMOL/L (ref 0–11.9)
APPEARANCE UR: CLEAR
AST SERPL-CCNC: 18 U/L (ref 12–45)
BACTERIA #/AREA URNS HPF: NEGATIVE /HPF
BILIRUB SERPL-MCNC: 0.8 MG/DL (ref 0.1–1.5)
BILIRUB UR QL STRIP.AUTO: NEGATIVE
BUN SERPL-MCNC: 39 MG/DL (ref 8–22)
CALCIUM SERPL-MCNC: 10.2 MG/DL (ref 8.5–10.5)
CHLORIDE SERPL-SCNC: 102 MMOL/L (ref 96–112)
CHOLEST SERPL-MCNC: 144 MG/DL (ref 100–199)
CO2 SERPL-SCNC: 28 MMOL/L (ref 20–33)
COLOR UR: YELLOW
CREAT SERPL-MCNC: 1.33 MG/DL (ref 0.5–1.4)
EPI CELLS #/AREA URNS HPF: NEGATIVE /HPF
FASTING STATUS PATIENT QL REPORTED: NORMAL
GLOBULIN SER CALC-MCNC: 2.7 G/DL (ref 1.9–3.5)
GLUCOSE SERPL-MCNC: 98 MG/DL (ref 65–99)
GLUCOSE UR STRIP.AUTO-MCNC: NEGATIVE MG/DL
HDLC SERPL-MCNC: 57 MG/DL
HYALINE CASTS #/AREA URNS LPF: ABNORMAL /LPF
KETONES UR STRIP.AUTO-MCNC: NEGATIVE MG/DL
LDLC SERPL CALC-MCNC: 65 MG/DL
LEUKOCYTE ESTERASE UR QL STRIP.AUTO: ABNORMAL
MICRO URNS: ABNORMAL
NITRITE UR QL STRIP.AUTO: NEGATIVE
PH UR STRIP.AUTO: 6 [PH]
POTASSIUM SERPL-SCNC: 3.9 MMOL/L (ref 3.6–5.5)
PROT SERPL-MCNC: 7 G/DL (ref 6–8.2)
PROT UR QL STRIP: NEGATIVE MG/DL
RBC # URNS HPF: ABNORMAL /HPF
RBC UR QL AUTO: NEGATIVE
SODIUM SERPL-SCNC: 139 MMOL/L (ref 135–145)
SP GR UR STRIP.AUTO: 1.01
TRIGL SERPL-MCNC: 110 MG/DL (ref 0–149)
UROBILINOGEN UR STRIP.AUTO-MCNC: 0.2 MG/DL
WBC #/AREA URNS HPF: ABNORMAL /HPF

## 2019-02-08 PROCEDURE — 99214 OFFICE O/P EST MOD 30 MIN: CPT | Mod: 25 | Performed by: PHYSICIAN ASSISTANT

## 2019-02-08 PROCEDURE — 90750 HZV VACC RECOMBINANT IM: CPT | Performed by: PHYSICIAN ASSISTANT

## 2019-02-08 PROCEDURE — 90732 PPSV23 VACC 2 YRS+ SUBQ/IM: CPT | Performed by: PHYSICIAN ASSISTANT

## 2019-02-08 PROCEDURE — 90472 IMMUNIZATION ADMIN EACH ADD: CPT | Performed by: PHYSICIAN ASSISTANT

## 2019-02-08 PROCEDURE — 80053 COMPREHEN METABOLIC PANEL: CPT

## 2019-02-08 PROCEDURE — 8041 PR SCP AHA: Performed by: PHYSICIAN ASSISTANT

## 2019-02-08 PROCEDURE — G0009 ADMIN PNEUMOCOCCAL VACCINE: HCPCS | Performed by: PHYSICIAN ASSISTANT

## 2019-02-08 PROCEDURE — 87086 URINE CULTURE/COLONY COUNT: CPT

## 2019-02-08 PROCEDURE — 81001 URINALYSIS AUTO W/SCOPE: CPT

## 2019-02-08 PROCEDURE — 36415 COLL VENOUS BLD VENIPUNCTURE: CPT

## 2019-02-08 PROCEDURE — 87077 CULTURE AEROBIC IDENTIFY: CPT

## 2019-02-08 PROCEDURE — 87186 SC STD MICRODIL/AGAR DIL: CPT

## 2019-02-08 PROCEDURE — 80061 LIPID PANEL: CPT

## 2019-02-08 ASSESSMENT — PATIENT HEALTH QUESTIONNAIRE - PHQ9: CLINICAL INTERPRETATION OF PHQ2 SCORE: 0

## 2019-02-08 NOTE — TELEPHONE ENCOUNTER
Future Appointments       Provider Department Center    2/11/2019 11:25 AM Dina Steinberg P.A.-C. [facility] [place]    11/25/2019 2:30 PM Do Velásquez M.D. [facility] Pulmonary Medicine         ESTABLISHED PATIENT PRE-VISIT PLANNING     Patient was NOT contacted to complete PVP.     Note: Patient will not be contacted if there is no indication to call.     1.  Reviewed notes from the last few office visits within the medical group: Yes    2.  If any orders were placed at last visit or intended to be done for this visit (i.e. 6 mos follow-up), do we have Results/Consult Notes?        •  Labs - Labs ordered, NOT completed. Patient advised to complete prior to next appointment.   Note: If patient appointment is for lab review and patient did not complete labs, check with provider if OK to reschedule patient until labs completed.       •  Imaging - Imaging was not ordered at last office visit.       •  Referrals - No referrals were ordered at last office visit.    3. Is this appointment scheduled as a Hospital Follow-Up? No    4.  Immunizations were updated in Epic using WebIZ?: Yes       •  Web Iz Recommendations: TD    5.  Patient is due for the following Health Maintenance Topics:   Health Maintenance Due   Topic Date Due   • Annual Wellness Visit  03/01/2018       6. Orders for overdue Health Maintenance topics pended in Pre-Charting? NO    7.  AHA (MDX) form printed for Provider? No, already completed    8.  Patient was informed to arrive 15 min prior to their scheduled appointment and bring in their medication bottles.

## 2019-02-08 NOTE — PROGRESS NOTES
Subjective:     Naomy Vargas is a 68 y.o. female here today for follow-up on hypertension and Annual Health Assessment. Is an established patient of mine.    HPI:    Patient presents to the office today for follow-up on hypertension. I last saw her in November. At that time, BP was still uncontrolled with readings in the 160s-170s/90s. I increased her lisinopril-HCTZ dose to 20-25 mg daily and added on amlodipine 5 mg daily. She is tolerating the new medication well. She denies any side effects. BP has been running much better. She brings updated log in with her today. Most recent readings average in the 130s/80s. BP today in the office is 118/78.    Since the last time I saw her, she underwent US evaluation for finding of AAA on previous chest CT. This was consistent with 3.4 x 3 cm AAA with bilateral iliac artery stenosis. I placed referral to vascular surgery but she did not schedule appointment because she was unaware I ordered this referral. She did not read the [employer] message I sent.    She has new complaint today of burning with urination. Has been ongoing since fall of last year. States she can feel something coming down and occasionally has pushed back up. Notices this mainly when walking around, having BM. Does endorse some urinary leakage at times.      Health Maintenance Summary                IMM ZOSTER VACCINES Overdue 5/10/2017      Done 3/15/2017 Imm Admin: Zoster Vaccine Live (ZVL) (Zostavax)    Annual Wellness Visit Overdue 3/1/2018      Done 2/28/2017 Visit Dx: Medicare annual wellness visit, subsequent     Patient has more history with this topic...    IMM PNEUMOCOCCAL 65+ (ADULT) LOW/MEDIUM RISK SERIES Overdue 11/1/2018      Done 2/28/2017 Imm Admin: Pneumococcal Conjugate Vaccine (Prevnar/PCV-13)     Patient has more history with this topic...    COLONOSCOPY Next Due 3/24/2019      Done 3/24/2017 REFERRAL TO GI FOR COLONOSCOPY     Patient has more history with this topic...    PFT  SCREENING-FEV1 AND FEV/FVC RATIO / SPIROMETRY SHOULD BE PERFORMED ANNUALLY Next Due 4/17/2019      Done 4/17/2018 AMB PULMONARY FUNCTION TEST/LAB    MAMMOGRAM Next Due 8/29/2020      Done 8/29/2018 MA-MAMMO SCREENING BILAT W/TOMOSYNTHESIS W/CAD     Patient has more history with this topic...    BONE DENSITY Next Due 4/26/2022      Done 4/26/2017 DS-BONE DENSITY STUDY (DEXA)     Patient has more history with this topic...    IMM DTaP/Tdap/Td Vaccine Next Due 3/15/2027      Done 3/15/2017 Imm Admin: Tdap Vaccine           Annual Health Assessment Questions:     1.  Are you currently engaging in any exercise or physical activity? No    2.  How would you describe your mood or emotional well-being today? good    3.  Have you had any falls in the last year? No    4.  Have you noticed any problems with your balance or had difficulty walking? No    5.  In the last six months have you experienced any leakage of urine? Yes    6. DPA/Advanced Directive: Patient does not have an Advanced Directive.  A packet and workshop information was given on Advanced Directives.    Current medicines (including changes today)  Current Outpatient Prescriptions   Medication Sig Dispense Refill   • lisinopril-hydrochlorothiazide (PRINZIDE, ZESTORETIC) 20-25 MG per tablet Take 1 Tab by mouth every day. 30 Tab 3   • amLODIPine (NORVASC) 5 MG Tab Take 1 Tab by mouth every day. 30 Tab 3   • Melatonin 1 MG Tab Take  by mouth.     • levothyroxine (SYNTHROID) 100 MCG Tab Take 1 Tab by mouth every morning before breakfast. 90 Tab 1   • budesonide-formoterol (SYMBICORT) 160-4.5 MCG/ACT Aerosol Inhale 2 Puffs by mouth 2 Times a Day.     • Cholecalciferol (VITAMIN D3) 5000 units Cap Take 1 Cap by mouth every day.     • mupirocin (BACTROBAN) 2 % Ointment Apply 1 g to affected area(s) as needed (on leg). (Patient not taking: Reported on 2/8/2019) 1 Tube 2   • PROAIR  (90 Base) MCG/ACT Aero Soln inhalation aerosol INHALE 2 (TWO) PUFF EVERY SIX HOURS,  AS NEEDED  1   • diphenhydrAMINE (BENADRYL) 25 MG Tab Take 25 mg by mouth every 6 hours as needed for Sleep.     • Acetaminophen 500 MG Cap Take  by mouth.     • Ascorbic Acid (MARYAM-C PO) Take  by mouth.     • Oxymetazoline HCl (NASAL SPRAY NA) Spray  in nose.     • TRIAMCINOLONE ACETONIDE EX Apply 1 Each to affected area(s) 2 times a day as needed (on leg).       No current facility-administered medications for this visit.        She  has a past medical history of Anxiety; Arthritis; Back pain; Blood transfusion without reported diagnosis; Breath shortness; Cataract; Cellulitis; Cellulitis of leg (11/1/2013); Chickenpox; COPD (chronic obstructive pulmonary disease) (formerly [facility]); Dental disorder; Earache; Fatigue; Frequent urination; Hypertension (02/23/2018); Hypothyroidism; Influenza; Insomnia; Kidney disease; Mumps; Pain (02/23/2018); Pneumonia; Rash; Ringing in ears; Shortness of breath; Swelling of lower extremity; Thyroid disease; Tonsillitis; Toothache; and Venous stasis dermatitis. She also has no past medical history of Anemia; Clotting disorder (formerly [facility]); Depression; Diabetic neuropathy (formerly [facility]); Headache(784.0); HIV (human immunodeficiency virus infection) (formerly [facility]); Hyperlipidemia; IBD (inflammatory bowel disease); Meningitis; Muscle disorder; Osteoporosis; Substance abuse (formerly [facility]); or Ulcer.    Colophony [pinus strobus]; Latex; Neosporin [neomycin-polymyxin b]; Phenylene; Soap; Tape; and Tea tree oil    She  reports that she quit smoking about 18 months ago. Her smoking use included Cigarettes. She started smoking about 55 years ago. She has a 26.50 pack-year smoking history. She has never used smokeless tobacco. She reports that she drinks alcohol. She reports that she does not use drugs.  Counseling given: Not Answered      ROS   No chest pain, no shortness of breath       Objective:     Physical Exam:  Blood pressure 118/78, pulse 80, temperature 37.3 °C (99.2 °F), temperature source Temporal, resp. rate 18, height 1.727  "m (5' 8\"), weight 85.7 kg (189 lb), SpO2 97 %, not currently breastfeeding. Body mass index is 28.74 kg/m².     Constitutional: Alert, well-appearing, no distress.  Skin: Warm, dry, good turgor. Reddish-brown rash on bilateral lower legs c/w venous stasis changes.  Eye: Equal, round and reactive, conjunctiva clear, lids normal.  ENMT: Lips without lesions, good dentition, oropharynx clear.  Neck: Trachea midline, no masses, no thyromegaly. No submandibular or anterior cervical lymphadenopathy. No carotid bruits bilaterally.  Respiratory: Unlabored respiratory effort, lungs clear to auscultation, no wheezes, no rhonchi.  Cardiovascular: Normal S1, S2, no murmur, no edema.  Psych: Alert and oriented x3, normal affect and mood.      Assessment and Plan:     1. Essential hypertension  Chronic issue, now well controlled since medication changes at last appointment.  Continue current management with lisinopril-hydrochlorothiazide 20-25 milligrams daily and amlodipine 5 mg daily.  She is due for screening lab work which I have ordered.  - Comp Metabolic Panel; Future    2. Abdominal aortic aneurysm (AAA) 3.0 cm to 5.0 cm in diameter in female (HCC)  Established problem, stable but needing monitoring.  Explained to patient that at this size her aneurysm currently is, surgical intervention will likely not be recommended, but she will need surveillance imaging.  I previously referred to vascular surgery.  She was given the contact information to schedule this appointment.  - Lipid Profile; Future    3. Bilateral iliac artery stenosis (HCC)  New problem, stable.  Recent ultrasound evaluation revealed moderate stenosis of the bilateral iliac arteries.  Patient will be following up with vascular surgery regarding this.  Daily aspirin recommended.  - Lipid Profile; Future    4. Chronic obstructive pulmonary disease, unspecified COPD type (HCC)  Chronic issue, stable. She is prescribed Symbicort inhaler but states is not currently " using. She denies any significant dyspnea/ARAUJO and does not think she needs it. She will continue to follow with her pulmonologist.    5. Dysuria  Established problem, uncontrolled.Symptoms ongoing since about last September.  Features are consistent with likely bladder prolapse.  Advised patient to schedule a separate appointment for full pelvic examination.  In the meantime, we will check her urine to rule out infection.  - URINALYSIS,CULTURE IF INDICATED; Future    6. Need for vaccination  Patient is due for both Zoster and Pneumococcal vaccinations. Would like to have both today.  - Shingles Vaccine (SHINGRIX)  - Pneumococal Polysaccharide Vaccine 23-Valent =>3YO SQ/IM      Discussion today about general wellness and lifestyle habits:    · Engage in regular physical activity and social activities.  · Prevent falls and reduce trip hazards; using ambulatory aides, hearing and vision testing if appropriate.  · Steps to improve urinary incontinence.  · Advanced care planning.    Follow-Up: Return for f/u dysuria, Short.     Dina Steinberg P.A.-C.           PLEASE NOTE: This dictation was created using voice recognition software. I have made every reasonable attempt to correct obvious errors, but I expect that there are errors of grammar and possibly content that I did not discover before finalizing the note.

## 2019-02-11 ENCOUNTER — OFFICE VISIT (OUTPATIENT)
Dept: MEDICAL GROUP | Facility: PHYSICIAN GROUP | Age: 69
End: 2019-02-11
Payer: MEDICARE

## 2019-02-11 VITALS
BODY MASS INDEX: 28.64 KG/M2 | HEIGHT: 68 IN | TEMPERATURE: 98.6 F | DIASTOLIC BLOOD PRESSURE: 68 MMHG | SYSTOLIC BLOOD PRESSURE: 108 MMHG | WEIGHT: 189 LBS | OXYGEN SATURATION: 94 % | HEART RATE: 82 BPM

## 2019-02-11 DIAGNOSIS — N32.81 OAB (OVERACTIVE BLADDER): ICD-10-CM

## 2019-02-11 DIAGNOSIS — N39.0 UTI (URINARY TRACT INFECTION) DUE TO ENTEROCOCCUS: ICD-10-CM

## 2019-02-11 DIAGNOSIS — B95.2 UTI (URINARY TRACT INFECTION) DUE TO ENTEROCOCCUS: ICD-10-CM

## 2019-02-11 DIAGNOSIS — N81.10 VAGINAL PROLAPSE: ICD-10-CM

## 2019-02-11 DIAGNOSIS — N17.9 ACUTE KIDNEY INJURY (HCC): ICD-10-CM

## 2019-02-11 PROCEDURE — 99214 OFFICE O/P EST MOD 30 MIN: CPT | Performed by: PHYSICIAN ASSISTANT

## 2019-02-11 RX ORDER — AMOXICILLIN 875 MG/1
875 TABLET, COATED ORAL 2 TIMES DAILY
Qty: 10 TAB | Refills: 0 | Status: SHIPPED | OUTPATIENT
Start: 2019-02-11 | End: 2019-02-16

## 2019-02-11 NOTE — PATIENT INSTRUCTIONS
Prolapse   Prolapse means the falling down, bulging, dropping, or drooping of a body part. Organs that commonly prolapse include the rectum, small intestine, bladder, urethra, vagina (birth canal), uterus (womb), and cervix. Prolapse occurs when the ligaments and muscle tissue around the rectum, bladder, and uterus are damaged or weakened.   CAUSES   This happens especially with:  · Childbirth. Some women feel pelvic pressure or have trouble holding their urine right after childbirth, because of stretching and tearing of pelvic tissues. This generally gets better with time and the feeling usually goes away, but it may return with aging.   · Chronic heavy lifting.   · Aging.   · Menopause, with loss of estrogen production weakening the pelvic ligaments and muscles.   · Past pelvic surgery.   · Obesity.   · Chronic constipation.   · Chronic cough.   Prolapse may affect a single organ, or several organs may prolapse at the same time. The front wall of the vagina holds up the bladder. The back wall holds up part of the lower intestine, or rectum. The uterus fills a spot in the middle. All these organs can be involved when the ligaments and muscles around the vagina relax too much. This often gets worse when women stop producing estrogen (menopause).  SYMPTOMS  · Uncontrolled loss of urine (incontinence) with cough, sneeze, straining, and exercise.   · More force may be required to have a bowel movement, due to trapping of the stool.   · When part of an organ bulges through the opening of the vagina, there is sometimes a feeling of heaviness or pressure. It may feel as though something is falling out. This sensation increases with coughing or bearing down.   · If the organs protrude through the opening of the vagina and rub against the clothing, there may be soreness, ulcers, infection, pain, and bleeding.   · Lower back pain.   · Pushing in the upper or lower part of the vagina, to pass urine or have a bowel movement.    · Problems having sexual intercourse.   · Being unable to insert a tampon or applicator.   DIAGNOSIS   Usually, a physical exam is all that is needed to identify the problem. During the examination, you may be asked to cough and strain while lying down, sitting up, and standing up. Your caregiver will determine if more testing is required, such as bladder function tests. Some diagnoses are:  · Cystocele: Bulging and falling of the bladder into the top of the vagina.   · Rectocele: Part of the rectum bulging into the vagina.   · Prolapse of the uterus: The uterus falls or drops into the vagina.   · Enterocele: Bulging of the top of the vagina, after a hysterectomy (uterus removal), with the small intestine bulging into the vagina. A hernia in the top of the vagina.   · Urethrocele: The urethra (urine carrying tube) bulging into the vagina.   TREATMENT   In most cases, prolapse needs to be treated only if it produces symptoms. If the symptoms are interfering with your usual daily or sexual activities, treatment may be necessary. The following are some measures that may be used to treat prolapse.  · Estrogen may help elderly women with mild prolapse.   · Kegel exercises may help mild cases of prolapse, by strengthening and tightening the muscles of the pelvic floor.   · Pessaries are used in women who choose not to, or are unable to, have surgery. A pessary is a doughnut-shaped piece of plastic or rubber that is put into the vagina to keep the organs in place. This device must be fitted by your caregiver. Your caregiver will also explain how to care for yourself with the pessary. If it works well for you, this may be the only treatment required.   · Surgery is often the only form of treatment for more severe prolapses. There are different types of surgery available. You should discuss what the best procedure is for you. If the uterus is prolapsed, it may be removed (hysterectomy) as part of the surgical treatment.  Your caregiver will discuss the risks and benefits with you.   · Uterine-vaginal suspension (surgery to hold up the organs) may be used, especially if you want to maintain your fertility.   No form of treatment is guaranteed to correct the prolapse or relieve the symptoms.  HOME CARE INSTRUCTIONS   · Wear a sanitary pad or absorbent product if you have incontinence of urine.   · Avoid heavy lifting and straining with exercise and work.   · Take over-the-counter pain medicine for minor discomfort.   · Try taking estrogen or using estrogen vaginal cream.   · Try Kegel exercises or use a pessary, before deciding to have surgery.   · Do Kegel exercises after having a baby.   SEEK MEDICAL CARE IF:   · Your symptoms interfere with your daily activities.   · You need medicine to help with the discomfort.   · You need to be fitted with a pessary.   · You notice bleeding from the vagina.   · You think you have ulcers or you notice ulcers on the cervix.   · You have an oral temperature above 102° F (38.9° C).   · You develop pain or blood with urination.   · You have bleeding with a bowel movement.   · The symptoms are interfering with your sex life.   · You have urinary incontinence that interferes with your daily activities.   · You lose urine with sexual intercourse.   · You have a chronic cough.   · You have chronic constipation.   Document Released: 06/23/2004 Document Revised: 03/11/2013 Document Reviewed: 01/02/2011  [employer]® Patient Information ©2013 [employer].

## 2019-02-11 NOTE — PROGRESS NOTES
Subjective:   Naomy Vargas is a 68 y.o. female here today for follow-up on dysuria, vaginal bulge. Is an established patient of mine.    HPI:    Patient presents to the office today to follow-up on urinary issues discussed at recent appointment on 2/8. At that time, patient had endorsed ongoing burning with urination since fall of last year, worse recently. She continues to experience this. She does feel it is getting worse. She denies fever, abdominal pain. I ordered U/A and culture at last appointment, results of which are back and indicated enterococcus UTI. She has not yet started antibiotics. She also would like to go through results of recent blood work. She is concerned by the elevated BUN and would like to discuss this further.    In addition to dysuria, patient also has been experiencing urinary urgency, frequency, and bladder leakage for years. She has never previously had this evaluated but is interested in further treatment if it would help. She does continue to feel sensation of bulge in her vagina. This happens mostly when she is walking around or having bowel movement/straining in any way. She denies having to splint to urinate or have bowel movement.       Current medicines (including changes today)  Current Outpatient Prescriptions   Medication Sig Dispense Refill   • amoxicillin (AMOXIL) 875 MG tablet Take 1 Tab by mouth 2 times a day for 5 days. 10 Tab 0   • lisinopril-hydrochlorothiazide (PRINZIDE, ZESTORETIC) 20-25 MG per tablet Take 1 Tab by mouth every day. 30 Tab 3   • amLODIPine (NORVASC) 5 MG Tab Take 1 Tab by mouth every day. 30 Tab 3   • Melatonin 1 MG Tab Take  by mouth.     • levothyroxine (SYNTHROID) 100 MCG Tab Take 1 Tab by mouth every morning before breakfast. 90 Tab 1   • mupirocin (BACTROBAN) 2 % Ointment Apply 1 g to affected area(s) as needed (on leg). 1 Tube 2   • Ascorbic Acid (MARYAM-C PO) Take  by mouth.     • Oxymetazoline HCl (NASAL SPRAY NA) [place]  in nose.     •  "TRIAMCINOLONE ACETONIDE EX Apply 1 Each to affected area(s) 2 times a day as needed (on leg).     • Cholecalciferol (VITAMIN D3) 5000 units Cap Take 1 Cap by mouth every day.     • PROAIR  (90 Base) MCG/ACT Aero Soln inhalation aerosol INHALE 2 (TWO) PUFF EVERY SIX HOURS, AS NEEDED  1   • diphenhydrAMINE (BENADRYL) 25 MG Tab Take 25 mg by mouth every 6 hours as needed for Sleep.     • Acetaminophen 500 MG Cap Take  by mouth.     • budesonide-formoterol (SYMBICORT) 160-4.5 MCG/ACT Aerosol Inhale 2 Puffs by mouth 2 Times a Day.       No current facility-administered medications for this visit.      She  has a past medical history of Anxiety; Arthritis; Back pain; Blood transfusion without reported diagnosis; Breath shortness; Cataract; Cellulitis; Cellulitis of leg (11/1/2013); Chickenpox; COPD (chronic obstructive pulmonary disease) (Formerly [facility]); Dental disorder; Earache; Fatigue; Frequent urination; Hypertension (02/23/2018); Hypothyroidism; Influenza; Insomnia; Kidney disease; Mumps; OAB (overactive bladder) (2/11/2019); Pain (02/23/2018); Pneumonia; Rash; Ringing in ears; Shortness of breath; Swelling of lower extremity; Thyroid disease; Tonsillitis; Toothache; and Venous stasis dermatitis. She also has no past medical history of Anemia; Clotting disorder (Formerly [facility]); Depression; Diabetic neuropathy ([facility]); Headache(784.0); HIV (human immunodeficiency virus infection) (Formerly [facility]); Hyperlipidemia; IBD (inflammatory bowel disease); Meningitis; Osteoporosis; Substance abuse ([facility]); or Ulcer.    ROS  As per HPI.       Objective:     Blood pressure 108/68, pulse 82, temperature 37 °C (98.6 °F), height 1.727 m (5' 8\"), weight 85.7 kg (189 lb), SpO2 94 %, not currently breastfeeding. Body mass index is 28.74 kg/m².     Physical Exam:  Constitutional: Alert, well-appearing, no distress.  Skin: Warm, dry, good turgor, no rashes in visible areas.  Eye: Pupils are equal and round, conjunctiva clear, lids normal.  ENMT: Lips without " lesions, moist mucus membranes.  Pelvic: External genitalia with some atrophy, o/w normal-appearing. No genital lesions noted. On speculum insertion, there is visible prolapse of the anterior vaginal wall. No abnormal-appearing discharge is noted. Cervix is surgically absent. On bimanual and digital rectal exam, there are no masses palpated.    A chaperone was offered to the patient during today's exam. Patient declined chaperone.      Assessment and Plan:   The following treatment plan was discussed    1. UTI (urinary tract infection) due to Enterococcus  New problem, uncontrolled. Reviewed recent urinary studies with patient which indicate infection. Culture indicates sensitivity to penicillins, so will treat with amoxicillin BID x 5 days. Patient to inform me if she does not notice symptomatic improvement within 72 hours or feels she is worsening.  - amoxicillin (AMOXIL) 875 MG tablet; Take 1 Tab by mouth 2 times a day for 5 days.  Dispense: 10 Tab; Refill: 0    2. Acute kidney injury (HCC)  New problem, uncontrolled. Recent lab work shows decreased GFR from baseline of 40 and elevated BUN of 39. Likely secondary to uncontrolled UTI which was explained to patient. Will need to closely monitor after treatment to ensure improvement.    3. Vaginal prolapse  New problem, uncontrolled. Patient's symptoms and exam are consistent with vaginal prolapse. Will refer to urology for further evaluation/treatment. In the meantime, advised patient to start pelvic floor exercises and timed voiding. She was provided with handouts explaining both. Will re-assess symptoms at next appointment in 4 months.  - REFERRAL TO UROLOGY    4. OAB (overactive bladder)  Established problem, still uncontrolled. Plan as per above.  - REFERRAL TO UROLOGY      Followup: Return in about 4 months (around 6/11/2019).    Dina Steinberg P.A.-C.

## 2019-02-24 DIAGNOSIS — E03.9 ACQUIRED HYPOTHYROIDISM: ICD-10-CM

## 2019-02-26 RX ORDER — LEVOTHYROXINE SODIUM 0.1 MG/1
TABLET ORAL
Refills: 1 | OUTPATIENT
Start: 2019-02-26

## 2019-02-27 DIAGNOSIS — I10 ESSENTIAL HYPERTENSION: ICD-10-CM

## 2019-02-28 RX ORDER — LEVOTHYROXINE SODIUM 0.1 MG/1
100 TABLET ORAL
Qty: 90 TAB | Refills: 1 | Status: SHIPPED | OUTPATIENT
Start: 2019-02-28 | End: 2019-08-22 | Stop reason: SDUPTHER

## 2019-02-28 RX ORDER — AMLODIPINE BESYLATE 5 MG/1
TABLET ORAL
Qty: 90 TAB | Refills: 1 | Status: SHIPPED | OUTPATIENT
Start: 2019-02-28 | End: 2019-04-18 | Stop reason: SDUPTHER

## 2019-02-28 NOTE — TELEPHONE ENCOUNTER
Was the patient seen in the last year in this department? Yes    Does patient have an active prescription for medications requested? No     Received Request Via: Pharmacy      Pt met protocol?: Yes, last ov 2/11/19  BP Readings from Last 1 Encounters:   02/11/19 108/68

## 2019-02-28 NOTE — TELEPHONE ENCOUNTER
Refill X 6 months, sent to pharmacy.Pt. Seen in the last 6 months per protocol.   Lab Results   Component Value Date/Time    SODIUM 139 02/08/2019 02:47 PM    POTASSIUM 3.9 02/08/2019 02:47 PM    CHLORIDE 102 02/08/2019 02:47 PM    CO2 28 02/08/2019 02:47 PM    GLUCOSE 98 02/08/2019 02:47 PM    BUN 39 (H) 02/08/2019 02:47 PM    CREATININE 1.33 02/08/2019 02:47 PM

## 2019-03-05 ENCOUNTER — HOSPITAL ENCOUNTER (OUTPATIENT)
Dept: LAB | Facility: MEDICAL CENTER | Age: 69
End: 2019-03-05
Attending: NURSE PRACTITIONER
Payer: MEDICARE

## 2019-03-05 PROCEDURE — 87186 SC STD MICRODIL/AGAR DIL: CPT

## 2019-03-05 PROCEDURE — 87086 URINE CULTURE/COLONY COUNT: CPT

## 2019-03-05 PROCEDURE — 87077 CULTURE AEROBIC IDENTIFY: CPT

## 2019-03-06 LAB
AMBIGUOUS DTTM AMBI4: NORMAL
SIGNIFICANT IND 70042: NORMAL
SITE SITE: NORMAL
SOURCE SOURCE: NORMAL

## 2019-03-09 DIAGNOSIS — I10 ESSENTIAL HYPERTENSION: ICD-10-CM

## 2019-03-11 RX ORDER — LISINOPRIL AND HYDROCHLOROTHIAZIDE 25; 20 MG/1; MG/1
TABLET ORAL
Qty: 90 TAB | Refills: 1 | Status: SHIPPED | OUTPATIENT
Start: 2019-03-11 | End: 2019-04-18 | Stop reason: SDUPTHER

## 2019-03-11 NOTE — TELEPHONE ENCOUNTER
Was the patient seen in the last year in this department? Yes    Does patient have an active prescription for medications requested? No     Received Request Via: Pharmacy      Pt met protocol?: Yes    Pt last ov 2/19   BP Readings from Last 1 Encounters:   02/11/19 108/68

## 2019-04-18 DIAGNOSIS — I10 ESSENTIAL HYPERTENSION: ICD-10-CM

## 2019-04-18 RX ORDER — AMLODIPINE BESYLATE 5 MG/1
5 TABLET ORAL
Qty: 100 TAB | Refills: 1 | Status: SHIPPED | OUTPATIENT
Start: 2019-04-18 | End: 2020-04-10

## 2019-04-18 RX ORDER — LISINOPRIL AND HYDROCHLOROTHIAZIDE 25; 20 MG/1; MG/1
1 TABLET ORAL
Qty: 100 TAB | Refills: 1 | Status: SHIPPED | OUTPATIENT
Start: 2019-04-18 | End: 2019-12-11 | Stop reason: SDUPTHER

## 2019-08-22 DIAGNOSIS — E03.9 ACQUIRED HYPOTHYROIDISM: ICD-10-CM

## 2019-08-22 NOTE — TELEPHONE ENCOUNTER
Was the patient seen in the last year in this department? Yes    Does patient have an active prescription for medications requested? No     Received Request Via: Pharmacy    Pt met protocol?: Yes     Last OV 02/11/19    TSH   Date Value Ref Range Status   08/03/2018 2.100 0.380 - 5.330 uIU/mL Final     Comment:     Please note new reference ranges effective 12/14/2017 10:00 AM  Pregnant Females, 1st Trimester  0.050-3.700  Pregnant Females, 2nd Trimester  0.310-4.350  Pregnant Females, 3rd Trimester  0.410-5.180

## 2019-08-25 RX ORDER — LEVOTHYROXINE SODIUM 0.1 MG/1
TABLET ORAL
Qty: 90 TAB | Refills: 0 | Status: SHIPPED | OUTPATIENT
Start: 2019-08-25 | End: 2019-11-20 | Stop reason: SDUPTHER

## 2019-09-04 ENCOUNTER — TELEPHONE (OUTPATIENT)
Dept: MEDICAL GROUP | Facility: PHYSICIAN GROUP | Age: 69
End: 2019-09-04

## 2019-09-04 NOTE — TELEPHONE ENCOUNTER
Phone Number Called: [phone] (home)     Call outcome: left message for patient to call back regarding message below

## 2019-09-04 NOTE — TELEPHONE ENCOUNTER
----- Message from Naomy Vargas sent at 9/2/2019  5:00 AM PDT -----  Regarding: levothyroxine (SYNTHROID) 100 MCG Tab  Mirian Moralez,    Your medication has been filled. Please schedule a follow up for any future refills.    Have a wonderful day!

## 2019-10-10 ENCOUNTER — HOSPITAL ENCOUNTER (OUTPATIENT)
Dept: RADIOLOGY | Facility: MEDICAL CENTER | Age: 69
End: 2019-10-10
Attending: INTERNAL MEDICINE
Payer: MEDICARE

## 2019-10-10 DIAGNOSIS — R91.8 PULMONARY NODULES: ICD-10-CM

## 2019-10-10 PROCEDURE — 71250 CT THORAX DX C-: CPT

## 2019-11-21 RX ORDER — LEVOTHYROXINE SODIUM 0.1 MG/1
TABLET ORAL
Qty: 90 TAB | Refills: 0 | Status: SHIPPED | OUTPATIENT
Start: 2019-11-21 | End: 2020-02-20

## 2019-11-21 NOTE — TELEPHONE ENCOUNTER
Was the patient seen in the last year in this department? Yes    Does patient have an active prescription for medications requested? No     Received Request Via: Pharmacy      Pt met protocol?: Yes, labs 8/18 ov 2/19   Ref Range & Units 1yr ago    TSH 0.380 - 5.330 uIU/mL 2.100    Comment: Please note new reference ranges effective 12/14/2017 10:00 AM   Pregnant Females, 1st Trimester  0.050-3.700   Pregnant Females, 2nd Trimester  0.310-4.350   Pregnant Females, 3rd Trimester  0.410-5.180    Resulting Agency

## 2019-11-25 ENCOUNTER — OFFICE VISIT (OUTPATIENT)
Dept: PULMONOLOGY | Facility: HOSPICE | Age: 69
End: 2019-11-25
Payer: MEDICARE

## 2019-11-25 VITALS
HEIGHT: 68 IN | SYSTOLIC BLOOD PRESSURE: 136 MMHG | BODY MASS INDEX: 26.07 KG/M2 | OXYGEN SATURATION: 96 % | HEART RATE: 78 BPM | WEIGHT: 172 LBS | DIASTOLIC BLOOD PRESSURE: 92 MMHG

## 2019-11-25 DIAGNOSIS — I87.2 VENOUS STASIS DERMATITIS OF BOTH LOWER EXTREMITIES: ICD-10-CM

## 2019-11-25 DIAGNOSIS — R91.8 PULMONARY NODULES: ICD-10-CM

## 2019-11-25 DIAGNOSIS — J44.9 CHRONIC OBSTRUCTIVE PULMONARY DISEASE, UNSPECIFIED COPD TYPE (HCC): ICD-10-CM

## 2019-11-25 PROCEDURE — 99214 OFFICE O/P EST MOD 30 MIN: CPT | Performed by: INTERNAL MEDICINE

## 2019-11-25 RX ORDER — AZITHROMYCIN 250 MG/1
TABLET, FILM COATED ORAL
Qty: 6 TAB | Refills: 0 | Status: SHIPPED | OUTPATIENT
Start: 2019-11-25 | End: 2020-06-24

## 2019-11-25 RX ORDER — ASPIRIN 325 MG
325 TABLET ORAL DAILY
COMMUNITY
End: 2021-08-12

## 2019-11-25 NOTE — PATIENT INSTRUCTIONS
Naomy comes in to follow-up on her lung nodule, previously 7 mm and the most recent CAT scan shows stability.  We need to image this once more at 6 months to complete our 2 years surveillance.    Body mass index is down from 30-26, she still has good proportions but I cautioned her that if she has substantial weight loss unexplained she will let me know.    Mild COPD has not been problematic, she no longer requires Symbicort and she will keep the Proventil rescue inhaler on her list of medications in case things flare.  She is current on flu vaccine, also Pneumovax and Prevnar.    The last issue is she does deal with chronic lower extremity venous insufficiency and intermittent secondary cellulitis, left greater than right.  This is been limiting her activity, she is passionate about getting outside [place] the [place], likes birds large game and her  is presently hunting with their dog for [employer] game.    We will see her back in 6 months we look at the CAT scan sooner if she has problems.  A minor lower and upper respiratory infection recently does not require antibiotics at this time

## 2019-12-02 NOTE — PROGRESS NOTES
Naomy Vargas is a 69 y.o. female here for lung nodule and mild COPD with recent respiratory infection. Patient was referred by primary care.    History of Present Illness:      Naomy comes in to follow-up on her lung nodule, previously 7 mm and the most recent CAT scan shows stability.  We need to image this once more at 6 months to complete our 2 years surveillance.    Body mass index is down from 30-26, she still has good proportions but I cautioned her that if she has substantial weight loss unexplained she will let me know.    Mild COPD has not been problematic, she no longer requires Symbicort and she will keep the Proventil rescue inhaler on her list of medications in case things flare.  She is current on flu vaccine, also Pneumovax and Prevnar.    The last issue is she does deal with chronic lower extremity venous insufficiency and intermittent secondary cellulitis, left greater than right.  This is been limiting her activity, she is passionate about getting outside hunting [place] the mountains, likes birds large game and her  is presently hunting with their dog for [employer] game.    We will see her back in 6 months we look at the CAT scan sooner if she has problems.  A minor lower and upper respiratory infection recently does not require antibiotics at this time  Constitutional ROS: No unexpected change in weight, No unexplained fevers  Eyes: No change in vision or blurring or double vision  Mouth/Throat ROS: No sore throat, No recent change in voice or hoarseness  Pulmonary ROS: See present history for pertinent positives  Cardiovascular ROS: No chest pain to suggest acute coronary syndrome  Gastrointestinal ROS: No abdominal pain to suggest peptic disease  Musculoskeletal/Extremities ROS: no acute artritis or unusual swelling  Hematologic/Lymphatic ROS: No easy bleeding or unusual lymph node swelling  Neurologic ROS: No new or unusual weakness  Psychiatric ROS: No  hallucinations  Allergic/Immunologic: No  urticaria or allergic rash      Current Outpatient Medications   Medication Sig Dispense Refill   • aspirin (ASA) 325 MG Tab Take 325 mg by mouth every day.     • Mirabegron ER (MYRBETRIQ) 50 MG TABLET SR 24 HR every day.     • azithromycin (ZITHROMAX) 250 MG Tab Take 2 tablets on day 1, then take 1 tablet a day for 4 days. 6 Tab 0   • levothyroxine (SYNTHROID) 100 MCG Tab TAKE 1 TABLET BY MOUTH EVERY DAY BEFORE BREAKFAST 90 Tab 0   • lisinopril-hydrochlorothiazide (PRINZIDE, ZESTORETIC) 20-25 MG per tablet Take 1 Tab by mouth every day. 100 Tab 1   • amLODIPine (NORVASC) 5 MG Tab Take 1 Tab by mouth every day. 100 Tab 1   • Melatonin 1 MG Tab Take  by mouth.     • diphenhydrAMINE (BENADRYL) 25 MG Tab Take 25 mg by mouth every 6 hours as needed for Sleep.     • Acetaminophen 500 MG Cap Take  by mouth.     • Ascorbic Acid (MARYAM-C PO) Take  by mouth.     • Oxymetazoline HCl (NASAL SPRAY NA) Spray  in nose.     • Cholecalciferol (VITAMIN D3) 5000 units Cap Take 1 Cap by mouth every day.     • mupirocin (BACTROBAN) 2 % Ointment Apply 1 g to affected area(s) as needed (on leg). (Patient not taking: Reported on 2019) 1 Tube 2   • PROAIR  (90 Base) MCG/ACT Aero Soln inhalation aerosol INHALE 2 (TWO) PUFF EVERY SIX HOURS, AS NEEDED  1   • TRIAMCINOLONE ACETONIDE EX Apply 1 Each to affected area(s) 2 times a day as needed (on leg).       No current facility-administered medications for this visit.        Social History     Tobacco Use   • Smoking status: Former Smoker     Packs/day: 0.50     Years: 53.00     Pack years: 26.50     Types: Cigarettes     Start date: 3/29/1963     Last attempt to quit: 2017     Years since quittin.3   • Smokeless tobacco: Never Used   • Tobacco comment: class in 2017   Substance Use Topics   • Alcohol use: Yes     Comment: 2 per day   • Drug use: No        Past Medical History:   Diagnosis Date   • Anxiety    • Arthritis      wrists   • Back pain    • Blood transfusion without reported diagnosis     with ectopic preg x 2    • Breath shortness     with exertion   • Cataract     bilateral removal-Dr. Duke Talamantes   • Cellulitis     right lower leg   • Cellulitis of leg 11/1/2013    Followed by dermatology. Managed with fluocinonide and Bactroban on right leg Left leg she uses triamcinolone and Sarna pramocaine (anesthetic, antipruretic) on both.     • Chickenpox    • COPD (chronic obstructive pulmonary disease) ([facility])    • Dental disorder     upper denture   • Earache    • Fatigue    • Frequent urination    • Hypertension 02/23/2018    on no meds at this time   • Hypothyroidism    • Influenza    • Insomnia    • Kidney disease     reports 1 kidney is smaller than the other   • Mumps    • OAB (overactive bladder) 2/11/2019   • Pain 02/23/2018    right leg and back, 5/10   • Pneumonia    • Rash    • Ringing in ears    • Shortness of breath    • Swelling of lower extremity    • Thyroid disease    • Tonsillitis    • Toothache    • Venous stasis dermatitis     right leg       Past Surgical History:   Procedure Laterality Date   • VEIN LIGATION RADIO FREQUENCY Right 2/26/2018    Procedure: VEIN LIGATION RADIO FREQUENCY- LONG SAPENOUS;  Surgeon: Jim Alas M.D.;  Location: SURGERY [facility];  Service: General   • ABDOMINAL EXPLORATION      2 ectopic preg   • BREAST BIOPSY      hx of benign left breast biopsy   • EYE SURGERY      cataract x2   • HYSTERECTOMY LAPAROSCOPY     • HYSTERECTOMY, TOTAL ABDOMINAL  1977/1978   • OOPHORECTOMY     • PB MAMMARY DUCTOGRAM, SINGLE     • PB REMV 2ND CATARACT,CORN-SCLER SECTN     • TONSILLECTOMY         Allergies: Colophony [pinus strobus]; Latex; Neosporin [neomycin-polymyxin b]; Phenylene; Soap; Tape; and Tea tree oil    Family History   Problem Relation Age of Onset   • Arthritis Mother         hip fracture   • Diabetes Mother    • Cancer Mother         skin   • Arthritis Father         lung   •  "Alcohol/Drug Father         etoh   • Lung Disease Sister         smoker/copd   • Hypertension Sister    • Other Brother         hep c   • Arthritis Maternal Grandmother         hip fracture   • Diabetes Maternal Grandfather    • No Known Problems Brother    • Hyperlipidemia Neg Hx    • Stroke Neg Hx        Physical Examination    Vitals:    11/25/19 1352   Height: 1.727 m (5' 8\")   Weight: 78 kg (172 lb)   Weight % change since last entry.: 0 %   BP: 136/92   Pulse: 78   BMI (Calculated): 26.15       General Appearance: alert, no distress  Skin: Skin color, texture, turgor normal. No rashes or lesions.  Eyes: negative  Oropharynx: Lips, mucosa, and tongue normal. Teeth and gums normal. Oropharynx moist and without lesion  Lungs: positive findings: Quiet and clear with minimal rhonchi on forced expiratory exam  Heart: negative. RRR without murmur, gallop, or rubs.  No ectopy.  Abdomen: Abdomen soft, non-tender. . No masses,  No organomegaly  Extremities:  No deformities, edema, or skin discoloration  Joints: No acute arthritis  Peripheral Pulses:perfused  Neurologic: intact grossly  No cervical adenopathy    II (soft palate, uvula, fauces visible)    Imaging: Nodule as described above    PFTS: None today      Assessment and Plan  1. Pulmonary nodule, RUL CT , 4/18  - CT-CHEST (THORAX) W/O; Future    2. Chronic obstructive pulmonary disease, unspecified COPD type (HCC)  Continue maintenance and rescue inhaler as needed    3. Venous stasis dermatitis of both lower extremities  Chronic without change clinically      Followup Return in about 6 months (around 5/25/2020) for follow up visit with Dr. Do Velásquez.    "

## 2019-12-31 ENCOUNTER — HOSPITAL ENCOUNTER (OUTPATIENT)
Dept: RADIOLOGY | Facility: MEDICAL CENTER | Age: 69
End: 2019-12-31
Attending: FAMILY MEDICINE
Payer: MEDICARE

## 2019-12-31 ENCOUNTER — OFFICE VISIT (OUTPATIENT)
Dept: URGENT CARE | Facility: PHYSICIAN GROUP | Age: 69
End: 2019-12-31
Payer: MEDICARE

## 2019-12-31 VITALS
RESPIRATION RATE: 16 BRPM | TEMPERATURE: 97 F | HEART RATE: 80 BPM | WEIGHT: 173 LBS | SYSTOLIC BLOOD PRESSURE: 130 MMHG | OXYGEN SATURATION: 95 % | DIASTOLIC BLOOD PRESSURE: 86 MMHG | HEIGHT: 68 IN | BODY MASS INDEX: 26.22 KG/M2

## 2019-12-31 DIAGNOSIS — S89.91XA INJURY OF RIGHT KNEE, INITIAL ENCOUNTER: ICD-10-CM

## 2019-12-31 DIAGNOSIS — S99.921A INJURY OF RIGHT FOOT, INITIAL ENCOUNTER: ICD-10-CM
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
## 2019-12-31 DIAGNOSIS — S80.01XA CONTUSION OF RIGHT KNEE, INITIAL ENCOUNTER: ICD-10-CM

## 2019-12-31 DIAGNOSIS — S93.601A SPRAIN OF RIGHT FOOT, INITIAL ENCOUNTER: ICD-10-CM

## 2019-12-31 PROCEDURE — 73630 X-RAY EXAM OF FOOT: CPT | Mod: RT

## 2019-12-31 PROCEDURE — 99213 OFFICE O/P EST LOW 20 MIN: CPT | Performed by: FAMILY MEDICINE

## 2019-12-31 PROCEDURE — 73564 X-RAY EXAM KNEE 4 OR MORE: CPT | Mod: RT

## 2019-12-31 ASSESSMENT — ENCOUNTER SYMPTOMS
FEVER: 0
TINGLING: 0
SHORTNESS OF BREATH: 0
FALLS: 1
SENSORY CHANGE: 0
VOMITING: 0

## 2019-12-31 NOTE — PROGRESS NOTES
Subjective:     Naomy Vargas is a 69 y.o. female who presents for Fall (R knee/foot injury )    HPI  Pt presents for evaluation of a new problem   Pt with a ground level fall yesterday   Tripped over the dog and fell onto right leg   Now having pain in the right lateral knee/upper shin and of the right foot  Pain is constant, worse with ambulation, and stable since onset  Areas are very tender to touch  No open wounds, bleeding, or exudate present  No associated numbness or tingling    Review of Systems   Constitutional: Negative for fever.   Respiratory: Negative for shortness of breath.    Cardiovascular: Negative for chest pain.   Gastrointestinal: Negative for vomiting.   Musculoskeletal: Positive for falls.   Skin: Positive for rash (chronic).   Neurological: Negative for tingling and sensory change.       PMH:  has a past medical history of Anxiety, Arthritis, Back pain, Blood transfusion without reported diagnosis, Breath shortness, Cataract, Cellulitis, Cellulitis of leg (11/1/2013), Chickenpox, COPD (chronic obstructive pulmonary disease) ([facility]), Dental disorder, Earache, Fatigue, Frequent urination, Hypertension (02/23/2018), Hypothyroidism, Influenza, Insomnia, Kidney disease, Mumps, OAB (overactive bladder) (2/11/2019), Pain (02/23/2018), Pneumonia, Rash, Ringing in ears, Shortness of breath, Swelling of lower extremity, Thyroid disease, Tonsillitis, Toothache, and Venous stasis dermatitis. She also has no past medical history of Anemia, Clotting disorder ([facility]), Depression, Diabetic neuropathy ([facility]), Headache(784.0), HIV (human immunodeficiency virus infection) ([facility]), Hyperlipidemia, IBD (inflammatory bowel disease), Meningitis, Osteoporosis, Substance abuse ([facility]), or Ulcer.  MEDS:   Current Outpatient Medications:   •  lisinopril-hydrochlorothiazide (PRINZIDE) 20-25 MG per tablet, TAKE 1 TABLET BY MOUTH EVERY DAY, Disp: 100 Tab, Rfl: 0  •  aspirin (ASA) 325 MG Tab, Take 325 mg by mouth every day.,  Disp: , Rfl:   •  Mirabegron ER (MYRBETRIQ) 50 MG TABLET SR 24 HR, every day., Disp: , Rfl:   •  levothyroxine (SYNTHROID) 100 MCG Tab, TAKE 1 TABLET BY MOUTH EVERY DAY BEFORE BREAKFAST, Disp: 90 Tab, Rfl: 0  •  amLODIPine (NORVASC) 5 MG Tab, Take 1 Tab by mouth every day., Disp: 100 Tab, Rfl: 1  •  Melatonin 1 MG Tab, Take  by mouth., Disp: , Rfl:   •  diphenhydrAMINE (BENADRYL) 25 MG Tab, Take 25 mg by mouth every 6 hours as needed for Sleep., Disp: , Rfl:   •  Acetaminophen 500 MG Cap, Take  by mouth., Disp: , Rfl:   •  Ascorbic Acid (MARYAM-C PO), Take  by mouth., Disp: , Rfl:   •  Oxymetazoline HCl (NASAL SPRAY NA), Spray  in nose., Disp: , Rfl:   •  Cholecalciferol (VITAMIN D3) 5000 units Cap, Take 1 Cap by mouth every day., Disp: , Rfl:   •  azithromycin (ZITHROMAX) 250 MG Tab, Take 2 tablets on day 1, then take 1 tablet a day for 4 days. (Patient not taking: Reported on 12/31/2019), Disp: 6 Tab, Rfl: 0  •  mupirocin (BACTROBAN) 2 % Ointment, Apply 1 g to affected area(s) as needed (on leg). (Patient not taking: Reported on 11/25/2019), Disp: 1 Tube, Rfl: 2  •  PROAIR  (90 Base) MCG/ACT Aero Soln inhalation aerosol, INHALE 2 (TWO) PUFF EVERY SIX HOURS, AS NEEDED, Disp: , Rfl: 1  •  TRIAMCINOLONE ACETONIDE EX, Apply 1 Each to affected area(s) 2 times a day as needed (on leg)., Disp: , Rfl:   ALLERGIES:   Allergies   Allergen Reactions   • Colophony [Pinus Strobus] Itching   • Latex Rash     .   • Neosporin [Neomycin-Polymyxin B] Rash     .   • Phenylene Itching   • Soap Rash     Certain soaps cause rash   • Tape Itching     PAPER TAPE OK   • Tea Tree Oil Rash     .     SURGHX:   Past Surgical History:   Procedure Laterality Date   • VEIN LIGATION RADIO FREQUENCY Right 2/26/2018    Procedure: VEIN LIGATION RADIO FREQUENCY- LONG SAPENOUS;  Surgeon: Jim Alas M.D.;  Location: SURGERY [facility];  Service: General   • ABDOMINAL EXPLORATION      2 ectopic preg   • BREAST BIOPSY      hx of benign  "left breast biopsy   • EYE SURGERY      cataract x2   • HYSTERECTOMY LAPAROSCOPY     • HYSTERECTOMY, TOTAL ABDOMINAL  1977/1978   • OOPHORECTOMY     • PB MAMMARY DUCTOGRAM, SINGLE     • PB REMV 2ND CATARACT,CORN-SCLER SECTN     • TONSILLECTOMY       SOCHX:  reports that she quit smoking about 2 years ago. Her smoking use included cigarettes. She started smoking about 56 years ago. She has a 26.50 pack-year smoking history. She has never used smokeless tobacco. She reports current alcohol use. She reports that she does not use drugs.  FH: Family history was reviewed, not contributing to acute injury      Objective:   /86   Pulse 80   Temp 36.1 °C (97 °F) (Temporal)   Resp 16   Ht 1.727 m (5' 8\")   Wt 78.5 kg (173 lb)   LMP  (LMP Unknown)   SpO2 95%   BMI 26.30 kg/m²     Physical Exam  Constitutional:       General: She is not in acute distress.     Appearance: She is well-developed. She is not diaphoretic.   HENT:      Head: Normocephalic and atraumatic.   Musculoskeletal:      Comments: Right ankle/foot:  Appearance - No bruising, erythema, or deformity appreciated  Palpation - +TTP along the 2nd-5th MTP joints and along metatarsals    ROM - FROM throughout  Strength - 5/5 throughout  Special testing - Neg squeeze test, neg drawer test  Neurovascular - 2+ dorsalis pedis and posterior tibial.  Sensation intact and equal bilaterally    Right knee  Appearance - No bruising, erythema, or deformity appreciated  Palpation - +TTP along the proximal fibula and lateral knee  ROM - FROM   Strength - 5/5 throughout  Neuro - Sensation equal and intact bilaterally   Skin:     General: Skin is warm and dry.      Findings: No erythema.   Neurological:      Mental Status: She is alert and oriented to person, place, and time.   Psychiatric:         Behavior: Behavior normal.         Thought Content: Thought content normal.         Judgment: Judgment normal.       Assessment/Plan:   Assessment    1. Fall, initial " encounter  - DX-KNEE COMPLETE 4+ RIGHT; Future  - DX-FOOT-COMPLETE 3+ RIGHT; Future    2. Sprain of right foot, initial encounter  - DX-FOOT-COMPLETE 3+ RIGHT; Future    3. Contusion of right knee, initial encounter  - DX-KNEE COMPLETE 4+ RIGHT; Future    Patient is a 69-year-old female with ground-level fall after tripping over her dog.  Has sustained sprain of foot and contusion of right knee.  X-rays do not show acute fracture or dislocation of foot or knee.  Reviewed supportive care measures including heat, NSAIDs, and will plan to follow-up on an as-needed basis.

## 2020-02-19 ENCOUNTER — PATIENT OUTREACH (OUTPATIENT)
Dept: HEALTH INFORMATION MANAGEMENT | Facility: OTHER | Age: 70
End: 2020-02-19

## 2020-02-19 NOTE — PROGRESS NOTES
Called member to introduce myself as their SCP  and schedule AHA/AWV. Member had no questions at this time, direct phone number given for future contact.

## 2020-03-18 ENCOUNTER — OFFICE VISIT (OUTPATIENT)
Dept: URGENT CARE | Facility: PHYSICIAN GROUP | Age: 70
End: 2020-03-18
Payer: MEDICARE

## 2020-03-18 VITALS
DIASTOLIC BLOOD PRESSURE: 62 MMHG | TEMPERATURE: 98.2 F | RESPIRATION RATE: 16 BRPM | HEART RATE: 82 BPM | BODY MASS INDEX: 26.22 KG/M2 | SYSTOLIC BLOOD PRESSURE: 130 MMHG | OXYGEN SATURATION: 94 % | WEIGHT: 173 LBS | HEIGHT: 68 IN

## 2020-03-18 DIAGNOSIS — L03.116 BILATERAL LOWER LEG CELLULITIS: ICD-10-CM

## 2020-03-18 DIAGNOSIS — L03.115 BILATERAL LOWER LEG CELLULITIS: ICD-10-CM

## 2020-03-18 PROCEDURE — 99214 OFFICE O/P EST MOD 30 MIN: CPT | Performed by: PHYSICIAN ASSISTANT

## 2020-03-18 RX ORDER — SULFAMETHOXAZOLE AND TRIMETHOPRIM 800; 160 MG/1; MG/1
1 TABLET ORAL 2 TIMES DAILY
Qty: 20 TAB | Refills: 0 | Status: SHIPPED | OUTPATIENT
Start: 2020-03-18 | End: 2020-03-28

## 2020-03-18 RX ORDER — LISINOPRIL AND HYDROCHLOROTHIAZIDE 20; 12.5 MG/1; MG/1
TABLET ORAL
COMMUNITY
End: 2020-06-24

## 2020-03-18 RX ORDER — LISINOPRIL AND HYDROCHLOROTHIAZIDE 25; 20 MG/1; MG/1
TABLET ORAL
COMMUNITY
End: 2020-06-24

## 2020-03-18 RX ORDER — LISINOPRIL 10 MG/1
TABLET ORAL
COMMUNITY
End: 2020-06-24

## 2020-03-18 RX ORDER — METHYLPREDNISOLONE 4 MG/1
TABLET ORAL
Qty: 21 TAB | Refills: 0 | Status: SHIPPED | OUTPATIENT
Start: 2020-03-18 | End: 2020-06-24

## 2020-03-18 RX ORDER — AMLODIPINE BESYLATE 5 MG/1
TABLET ORAL
COMMUNITY
End: 2020-04-10

## 2020-03-18 NOTE — PROGRESS NOTES
Subjective:   Naomy Vargas is a 70 y.o. female who presents today with   Chief Complaint   Patient presents with   • Leg Problem     R leg rash, rash gradually spreading all over body, painful, swelling, x7 yrs        Rash   This is a new problem. The current episode started in the past 7 days. The problem has been gradually worsening since onset. The affected locations include the left lower leg and right lower leg. She was exposed to nothing. Past treatments include antibiotic cream. The treatment provided no relief.     Patient states she has had cellulitis for years now that she has battling that flares up occasionally.  She states she has seen several dermatology specialist and vascular specialist.  PMH:  has a past medical history of Anxiety, Arthritis, Back pain, Blood transfusion without reported diagnosis, Breath shortness, Cataract, Cellulitis, Cellulitis of leg (11/1/2013), Chickenpox, COPD (chronic obstructive pulmonary disease) ([facility]), Dental disorder, Earache, Fatigue, Frequent urination, Hypertension (02/23/2018), Hypothyroidism, Influenza, Insomnia, Kidney disease, Mumps, OAB (overactive bladder) (2/11/2019), Pain (02/23/2018), Pneumonia, Rash, Ringing in ears, Shortness of breath, Swelling of lower extremity, Thyroid disease, Tonsillitis, Toothache, and Venous stasis dermatitis. She also has no past medical history of Anemia, Clotting disorder ([facility]), Depression, Diabetic neuropathy ([facility]), Headache(784.0), HIV (human immunodeficiency virus infection) ([facility]), Hyperlipidemia, IBD (inflammatory bowel disease), Meningitis, Osteoporosis, Substance abuse ([facility]), or Ulcer.  MEDS:   Current Outpatient Medications:   •  lisinopril (PRINIVIL) 10 MG Tab, lisinopril 10 mg tablet, Disp: , Rfl:   •  sulfamethoxazole-trimethoprim (BACTRIM DS) 800-160 MG tablet, Take 1 Tab by mouth 2 times a day for 10 days., Disp: 20 Tab, Rfl: 0  •  methylPREDNISolone (MEDROL DOSEPAK) 4 MG Tablet Therapy Pack, Follow schedule  on package instructions., Disp: 21 Tab, Rfl: 0  •  levothyroxine (SYNTHROID) 100 MCG Tab, TAKE 1 TABLET BY MOUTH EVERY DAY BEFORE BREAKFAST, Disp: 90 Tab, Rfl: 0  •  lisinopril-hydrochlorothiazide (PRINZIDE) 20-25 MG per tablet, TAKE 1 TABLET BY MOUTH EVERY DAY, Disp: 100 Tab, Rfl: 0  •  aspirin (ASA) 325 MG Tab, Take 325 mg by mouth every day., Disp: , Rfl:   •  Mirabegron ER (MYRBETRIQ) 50 MG TABLET SR 24 HR, every day., Disp: , Rfl:   •  azithromycin (ZITHROMAX) 250 MG Tab, Take 2 tablets on day 1, then take 1 tablet a day for 4 days., Disp: 6 Tab, Rfl: 0  •  amLODIPine (NORVASC) 5 MG Tab, Take 1 Tab by mouth every day., Disp: 100 Tab, Rfl: 1  •  Melatonin 1 MG Tab, Take  by mouth., Disp: , Rfl:   •  mupirocin (BACTROBAN) 2 % Ointment, Apply 1 g to affected area(s) as needed (on leg)., Disp: 1 Tube, Rfl: 2  •  PROAIR  (90 Base) MCG/ACT Aero Soln inhalation aerosol, INHALE 2 (TWO) PUFF EVERY SIX HOURS, AS NEEDED, Disp: , Rfl: 1  •  diphenhydrAMINE (BENADRYL) 25 MG Tab, Take 25 mg by mouth every 6 hours as needed for Sleep., Disp: , Rfl:   •  Acetaminophen 500 MG Cap, Take  by mouth., Disp: , Rfl:   •  Ascorbic Acid (MARYAM-C PO), Take  by mouth., Disp: , Rfl:   •  Oxymetazoline HCl (NASAL SPRAY NA), Spray  in nose., Disp: , Rfl:   •  TRIAMCINOLONE ACETONIDE EX, Apply 1 Each to affected area(s) 2 times a day as needed (on leg)., Disp: , Rfl:   •  Cholecalciferol (VITAMIN D3) 5000 units Cap, Take 1 Cap by mouth every day., Disp: , Rfl:   •  lisinopril-hydrochlorothiazide (PRINZIDE) 20-25 MG per tablet, lisinopril 20 mg-hydrochlorothiazide 25 mg tablet, Disp: , Rfl:   •  lisinopril-hydrochlorothiazide (PRINZIDE) 20-12.5 MG per tablet, lisinopril 20 mg-hydrochlorothiazide 12.5 mg tablet, Disp: , Rfl:   •  amLODIPine (NORVASC) 5 MG Tab, amlodipine 5 mg tablet, Disp: , Rfl:   ALLERGIES:   Allergies   Allergen Reactions   • Colophony [Pinus Strobus] Itching   • Latex Rash     .   • Neosporin [Neomycin-Polymyxin B]  "Rash     .   • Phenylene Itching   • Soap Rash     Certain soaps cause rash   • Tape Itching     PAPER TAPE OK   • Tea Tree Oil Rash     .     SURGHX:   Past Surgical History:   Procedure Laterality Date   • VEIN LIGATION RADIO FREQUENCY Right 2/26/2018    Procedure: VEIN LIGATION RADIO FREQUENCY- LONG SAPENOUS;  Surgeon: Jim Alas M.D.;  Location: SURGERY [facility];  Service: General   • ABDOMINAL EXPLORATION      2 ectopic preg   • BREAST BIOPSY      hx of benign left breast biopsy   • EYE SURGERY      cataract x2   • HYSTERECTOMY LAPAROSCOPY     • HYSTERECTOMY, TOTAL ABDOMINAL  1977/1978   • OOPHORECTOMY     • PB MAMMARY DUCTOGRAM, SINGLE     • PB REMV 2ND CATARACT,CORN-SCLER SECTN     • TONSILLECTOMY       SOCHX:  reports that she quit smoking about 2 years ago. Her smoking use included cigarettes. She started smoking about 57 years ago. She has a 26.50 pack-year smoking history. She has never used smokeless tobacco. She reports current alcohol use. She reports that she does not use drugs.  FH: Reviewed with patient, not pertinent to this visit.       Review of Systems   Skin: Positive for itching and rash.   All other systems reviewed and are negative.       Objective:   /62 (BP Location: Right arm, Patient Position: Sitting, BP Cuff Size: Adult)   Pulse 82   Temp 36.8 °C (98.2 °F) (Temporal)   Resp 16   Ht 1.727 m (5' 8\")   Wt 78.5 kg (173 lb)   LMP  (LMP Unknown)   SpO2 94%   BMI 26.30 kg/m²   Physical Exam  Vitals signs and nursing note reviewed.   Constitutional:       General: She is not in acute distress.     Appearance: She is well-developed.   HENT:      Head: Normocephalic and atraumatic.      Right Ear: Hearing normal.      Left Ear: Hearing normal.   Eyes:      Pupils: Pupils are equal, round, and reactive to light.   Cardiovascular:      Rate and Rhythm: Normal rate and regular rhythm.      Heart sounds: Normal heart sounds.   Pulmonary:      Effort: Pulmonary effort is " normal.      Breath sounds: Normal breath sounds.   Musculoskeletal:      Comments: Normal movement in all 4 extremities   Skin:     General: Skin is warm and dry.             Comments: Erythema and eczematous rash on bilateral lower extremities.  No edema or swelling noted today.  Some mild weeping noted in certain portions of the rash.   Neurological:      Mental Status: She is alert.      Coordination: Coordination normal.   Psychiatric:         Mood and Affect: Mood normal.     No acute deformity noted in the leg.  Distal pulses intact.  Negative Homans sign.    Assessment/Plan:   Assessment    1. Bilateral lower leg cellulitis  - sulfamethoxazole-trimethoprim (BACTRIM DS) 800-160 MG tablet; Take 1 Tab by mouth 2 times a day for 10 days.  Dispense: 20 Tab; Refill: 0  - methylPREDNISolone (MEDROL DOSEPAK) 4 MG Tablet Therapy Pack; Follow schedule on package instructions.  Dispense: 21 Tab; Refill: 0  - REFERRAL TO VASCULAR SURGERY    Other orders  - lisinopril-hydrochlorothiazide (PRINZIDE) 20-25 MG per tablet; lisinopril 20 mg-hydrochlorothiazide 25 mg tablet  - lisinopril-hydrochlorothiazide (PRINZIDE) 20-12.5 MG per tablet; lisinopril 20 mg-hydrochlorothiazide 12.5 mg tablet  - amLODIPine (NORVASC) 5 MG Tab; amlodipine 5 mg tablet  - lisinopril (PRINIVIL) 10 MG Tab; lisinopril 10 mg tablet  Likely secondary to vascular issue.Differential diagnosis, natural history, supportive care, and indications for immediate follow-up discussed.   Discussed side effects of steroids and medication with patient regarding her kidney function and she is understanding and agreeable with this plan and the side effects.  Patient given instructions and understanding of medications and treatment.    If not improving in 3-5 days, F/U with PCP or return to UC if symptoms worsen.  Strict ER precautions given with worsening of symptoms despite treatment and patient is understanding of this and will have a low threshold to go to the  \[facility] [place] [facility]\"".  Patient agreeable to plan.      Please note that this dictation was created using voice recognition software. I have made every reasonable attempt to correct obvious errors, but I expect that there are errors of grammar and possibly content that I did not discover before finalizing the note.    Arsen Mosquera PA-C

## 2020-04-10 DIAGNOSIS — I10 ESSENTIAL HYPERTENSION: ICD-10-CM

## 2020-04-10 RX ORDER — AMLODIPINE BESYLATE 5 MG/1
TABLET ORAL
Qty: 30 TAB | Refills: 0 | Status: SHIPPED | OUTPATIENT
Start: 2020-04-10 | End: 2020-06-24 | Stop reason: SDUPTHER

## 2020-05-19 ENCOUNTER — TELEPHONE (OUTPATIENT)
Dept: PULMONOLOGY | Facility: HOSPICE | Age: 70
End: 2020-05-19

## 2020-05-19 RX ORDER — LEVOTHYROXINE SODIUM 0.1 MG/1
TABLET ORAL
Qty: 90 TAB | Refills: 0 | Status: SHIPPED | OUTPATIENT
Start: 2020-05-19 | End: 2020-08-18

## 2020-05-19 NOTE — TELEPHONE ENCOUNTER
Last seen by PCP 02/11/2019. Will send 3 month(s) to the pharmacy.  Thyroid labs not done recently. Pt to make appt and get labs prior to more refills.

## 2020-05-19 NOTE — TELEPHONE ENCOUNTER
I called patient to discuss her getting her CT done prior to Tuesday's appt 5/26/20 with Dr Velásquez. Patient to call radiology to try to get Ct completed before follow up.    Patient in agreement.

## 2020-05-21 ENCOUNTER — HOSPITAL ENCOUNTER (OUTPATIENT)
Dept: RADIOLOGY | Facility: MEDICAL CENTER | Age: 70
End: 2020-05-21
Attending: INTERNAL MEDICINE
Payer: MEDICARE

## 2020-05-21 DIAGNOSIS — R91.8 PULMONARY NODULES: ICD-10-CM

## 2020-05-21 PROCEDURE — 71250 CT THORAX DX C-: CPT

## 2020-05-26 ENCOUNTER — OFFICE VISIT (OUTPATIENT)
Dept: PULMONOLOGY | Facility: HOSPICE | Age: 70
End: 2020-05-26
Payer: MEDICARE

## 2020-05-26 VITALS
SYSTOLIC BLOOD PRESSURE: 124 MMHG | DIASTOLIC BLOOD PRESSURE: 76 MMHG | WEIGHT: 170 LBS | HEART RATE: 74 BPM | TEMPERATURE: 98.8 F | BODY MASS INDEX: 25.76 KG/M2 | RESPIRATION RATE: 16 BRPM | HEIGHT: 68 IN | OXYGEN SATURATION: 98 %

## 2020-05-26 DIAGNOSIS — I71.40 ABDOMINAL AORTIC ANEURYSM (AAA) 3.0 CM TO 5.0 CM IN DIAMETER IN FEMALE (HCC): ICD-10-CM

## 2020-05-26 DIAGNOSIS — I87.2 VENOUS STASIS DERMATITIS OF BOTH LOWER EXTREMITIES: ICD-10-CM

## 2020-05-26 DIAGNOSIS — R91.8 PULMONARY NODULES: ICD-10-CM

## 2020-05-26 DIAGNOSIS — J44.9 CHRONIC OBSTRUCTIVE PULMONARY DISEASE, UNSPECIFIED COPD TYPE (HCC): ICD-10-CM

## 2020-05-26 PROCEDURE — 99214 OFFICE O/P EST MOD 30 MIN: CPT | Performed by: INTERNAL MEDICINE

## 2020-05-26 ASSESSMENT — PAIN SCALES - GENERAL: PAINLEVEL: NO PAIN

## 2020-05-26 NOTE — PATIENT INSTRUCTIONS
Katie comes in today to follow-up on her lung nodule, 26-pack-year smoking history, the most recent imaging I reviewed personally and it does show 7 mm nodular density in the region of the scar, without apparent enlargement.  The scar makes it difficult to sort out, and continued close surveillance is planned, reassess periodically no interval new cough hemoptysis or purulence.    She does have arthritis, and the chronic pretibial rash, somewhat of a neurodermatitis pattern, she sometimes inadvertently will scratch it, but does keep it moist and controlled best possible.    Very importantly, her  Willard got an [employer] tag in the [place], and I have the same tag, [place].  [place]  His wife accompanies him, [place].    Pulmonary history is otherwise unchanged, weight is stable, vaccinations are current, rare use of albuterol rescue inhaler.  Reassess in 1 year with imaging as ordered, addendum to dictation, there are no signs of aneurysm on the CAT scan

## 2020-05-26 NOTE — PROGRESS NOTES
Naomy Vargas is a 70 y.o. female here for lung nodule and results of CT scan comparison. Patient was referred by primary care.    History of Present Illness:      Naomy comes in today to follow-up on her lung nodule, 26-pack-year smoking history, the most recent imaging I reviewed personally and it does show 7 mm nodular density in the region of the scar, without apparent enlargement.  The scar makes it difficult to sort out, and continued close surveillance is planned, reassess periodically no interval new cough hemoptysis or purulence.    She does have arthritis, and the chronic pretibial rash, somewhat of a neurodermatitis pattern, she sometimes inadvertently will scratch it, but does keep it moist and controlled best possible.    Very importantly, her  Willard got an [employer] tag in the area [place], and I have the same tag, will look forward to [place].  [place].  His wife accompanies him, perhaps [place].    Pulmonary history is otherwise unchanged, weight is stable, vaccinations are current, rare use of albuterol rescue inhaler.  Reassess in 1 year with imaging as ordered, addendum to dictation, there are no signs of aneurysm on the CAT scan    Constitutional ROS: No unexpected change in weight, No unexplained fevers  Eyes: No change in vision or blurring or double vision  Mouth/Throat ROS: No sore throat, No recent change in voice or hoarseness  Pulmonary ROS: See present history for pertinent positives  Cardiovascular ROS: No chest pain to suggest acute coronary syndrome  Gastrointestinal ROS: No abdominal pain to suggest peptic disease  Musculoskeletal/Extremities ROS: no acute artritis or unusual swelling  Hematologic/Lymphatic ROS: No easy bleeding or unusual lymph node swelling  Neurologic ROS: No new or unusual weakness  Psychiatric ROS: No  hallucinations  Allergic/Immunologic: No  urticaria or allergic rash      Current Outpatient Medications   Medication Sig Dispense Refill   • levothyroxine (SYNTHROID) 100 MCG Tab TAKE 1 TABLET BY MOUTH EVERY DAY BEFORE BREAKFAST 90 Tab 0   • amLODIPine (NORVASC) 5 MG Tab TAKE 1 TABLET BY MOUTH EVERY DAY 30 Tab 0   • lisinopril-hydrochlorothiazide (PRINZIDE) 20-25 MG per tablet TAKE 1 TABLET BY MOUTH EVERY  Tab 0   • aspirin (ASA) 325 MG Tab Take 325 mg by mouth every day.     • Mirabegron ER (MYRBETRIQ) 50 MG TABLET SR 24 HR every day.     • Melatonin 1 MG Tab Take  by mouth.     • PROAIR  (90 Base) MCG/ACT Aero Soln inhalation aerosol INHALE 2 (TWO) PUFF EVERY SIX HOURS, AS NEEDED  1   • diphenhydrAMINE (BENADRYL) 25 MG Tab Take 25 mg by mouth every 6 hours as needed for Sleep.     • Acetaminophen 500 MG Cap Take  by mouth.     • Ascorbic Acid (MARYAM-C PO) Take  by mouth.     • Oxymetazoline HCl (NASAL SPRAY NA) Spray  in nose.     • TRIAMCINOLONE ACETONIDE EX Apply 1 Each to affected area(s) 2 times a day as needed (on leg).     • Cholecalciferol (VITAMIN D3) 5000 units Cap Take 1 Cap by mouth every day.     • lisinopril-hydrochlorothiazide (PRINZIDE) 20-25 MG per tablet lisinopril 20 mg-hydrochlorothiazide 25 mg tablet     • lisinopril-hydrochlorothiazide (PRINZIDE) 20-12.5 MG per tablet lisinopril 20 mg-hydrochlorothiazide 12.5 mg tablet     • lisinopril (PRINIVIL) 10 MG Tab lisinopril 10 mg tablet     • methylPREDNISolone (MEDROL DOSEPAK) 4 MG Tablet Therapy Pack Follow schedule on package instructions. 21 Tab 0   • azithromycin (ZITHROMAX) 250 MG Tab Take 2 tablets on day 1, then take 1 tablet a day for 4 days. (Patient not taking: Reported on 5/26/2020) 6 Tab 0   • mupirocin (BACTROBAN) 2 % Ointment Apply 1 g to affected area(s) as needed (on leg). (Patient not taking: Reported on 5/26/2020) 1 Tube 2     No current facility-administered medications for this visit.        Social History      Tobacco Use   • Smoking status: Former Smoker     Packs/day: 0.50     Years: 53.00     Pack years: 26.50     Types: Cigarettes     Start date: 3/29/1963     Last attempt to quit: 2017     Years since quittin.8   • Smokeless tobacco: Never Used   • Tobacco comment: class in 2017   Substance Use Topics   • Alcohol use: Yes     Comment: 2 per day   • Drug use: No        Past Medical History:   Diagnosis Date   • Anxiety    • Arthritis     wrists   • Back pain    • Blood transfusion without reported diagnosis     with ectopic preg x 2    • Breath shortness     with exertion   • Cataract     bilateral removal-Dr. Duke Talamantes   • Cellulitis     right lower leg   • Cellulitis of leg 2013    Followed by dermatology. Managed with fluocinonide and Bactroban on right leg Left leg she uses triamcinolone and Sarna pramocaine (anesthetic, antipruretic) on both.     • Chickenpox    • COPD (chronic obstructive pulmonary disease) ([facility])    • Dental disorder     upper denture   • Earache    • Fatigue    • Frequent urination    • Hypertension 2018    on no meds at this time   • Hypothyroidism    • Influenza    • Insomnia    • Kidney disease     reports 1 kidney is smaller than the other   • Mumps    • OAB (overactive bladder) 2019   • Pain 2018    right leg and back, 5/10   • Pneumonia    • Rash    • Ringing in ears    • Shortness of breath    • Swelling of lower extremity    • Thyroid disease    • Tonsillitis    • Toothache    • Venous stasis dermatitis     right leg       Past Surgical History:   Procedure Laterality Date   • VEIN LIGATION RADIO FREQUENCY Right 2018    Procedure: VEIN LIGATION RADIO FREQUENCY- LONG SAPENOUS;  Surgeon: Jim Alas M.D.;  Location: SURGERY [facility];  Service: General   • ABDOMINAL EXPLORATION      2 ectopic preg   • BREAST BIOPSY      hx of benign left breast biopsy   • EYE SURGERY      cataract x2   • HYSTERECTOMY LAPAROSCOPY     • HYSTERECTOMY,  "TOTAL ABDOMINAL  1977/1978   • OOPHORECTOMY     • PB MAMMARY DUCTOGRAM, SINGLE     • PB REMV 2ND CATARACT,CORN-SCLER SECTN     • TONSILLECTOMY         Allergies: Colophony [pinus strobus]; Latex; Neosporin [neomycin-polymyxin b]; Phenylene; Soap; Tape; and Tea tree oil    Family History   Problem Relation Age of Onset   • Arthritis Mother         hip fracture   • Diabetes Mother    • Cancer Mother         skin   • Arthritis Father         lung   • Alcohol/Drug Father         etoh   • Lung Disease Sister         smoker/copd   • Hypertension Sister    • Other Brother         hep c   • Arthritis Maternal Grandmother         hip fracture   • Diabetes Maternal Grandfather    • No Known Problems Brother    • Hyperlipidemia Neg Hx    • Stroke Neg Hx        Physical Examination    Vitals:    05/26/20 1414   Height: 1.727 m (5' 8\")   Weight: 77.1 kg (170 lb)   Weight % change since last entry.: 0 %   BP: 124/76   Pulse: 74   BMI (Calculated): 25.85   Resp: 16   Temp: 37.1 °C (98.8 °F)   TempSrc: Oral       General Appearance: alert, no distress  Skin: Skin color, texture, turgor normal. No rashes or lesions.  Eyes: negative  Oropharynx: Lips, mucosa, and tongue normal. Teeth and gums normal. Oropharynx moist and without lesion  Lungs: positive findings: Quiet and clear  Heart: negative. RRR without murmur, gallop, or rubs.  No ectopy.  Abdomen: Abdomen soft, non-tender. . No masses,  No organomegaly  Extremities: No clubbing  Joints: No acute arthritis  Peripheral Pulses:perfused  Neurologic: intact grossly  Pretibial rash as noted    II (soft palate, uvula, fauces visible)    Imaging: See description above CAT scan reviewed    PFTS: None today      Assessment and Plan  1. Chronic obstructive pulmonary disease, unspecified COPD type (HCC)  Stable does not require inhalers    2. Pulmonary nodule, RUL CT , 4/18  Reimage in April 2021    3. Abdominal aortic aneurysm (AAA) 3.0 cm to 5.0 cm in diameter in female (HCC)  Contact " primary care regarding any additional need for follow-up, no aneurysm visualized on chest CT scan    4. Venous stasis dermatitis of both lower extremities  Noted        Followup in 1 year with repeat imaging

## 2020-06-07 DIAGNOSIS — I10 ESSENTIAL HYPERTENSION: ICD-10-CM

## 2020-06-08 RX ORDER — AMLODIPINE BESYLATE 5 MG/1
TABLET ORAL
Qty: 30 TAB | Refills: 0 | OUTPATIENT
Start: 2020-06-08

## 2020-06-11 ENCOUNTER — PATIENT OUTREACH (OUTPATIENT)
Dept: HEALTH INFORMATION MANAGEMENT | Facility: OTHER | Age: 70
End: 2020-06-11

## 2020-06-11 ENCOUNTER — TELEPHONE (OUTPATIENT)
Dept: MEDICAL GROUP | Facility: PHYSICIAN GROUP | Age: 70
End: 2020-06-11

## 2020-06-11 NOTE — TELEPHONE ENCOUNTER
ANNUAL WELLNESS VISIT PRE-VISIT PLANNING WITHOUT OUTREACH    1.  Reviewed note from last office visit with PCP: YES    2.  If any orders were placed at last visit, do we have Results/Consult Notes?        •  Labs - Labs ordered, NOT completed. Patient advised to complete prior to next appointment.  Note: If patient appointment is for lab review and patient did not complete labs, check with provider if OK to reschedule patient until labs completed.       •  Imaging - Imaging was not ordered at last office visit.       •  Referrals - No referrals were ordered at last office visit.    3.  Immunizations were updated in [facility] using WebIZ?: Yes       •  WebIZ Recommendations: TD, SHINGRIX (Shingles) and cpox        •  Is patient due for Tdap? NO       •  Is patient due for Shingrix? YES. Patient was not notified of copay/out of pocket cost.     4.  Patient is due for the following Health Maintenance Topics:   Health Maintenance Due   Topic Date Due   • HEPATITIS C SCREENING  1950   • Annual Wellness Visit  03/01/2018   • COLONOSCOPY  03/24/2019   • IMM ZOSTER VACCINES (3 of 3) 04/05/2019   • Annual Pulmonary Function Test / Spirometry  04/17/2019       5.  Reviewed/Updated the following with patient:       •   Preferred Pharmacy? YES       •   Preferred Lab? YES       •   Preferred Communication? YES       •   Allergies? YES       •   Medications? YES. Was Abstract Encounter opened and chart updated? YES       •   Social History? YES. Was Abstract Encounter opened and chart updated? YES       •   Family History (document living status of immediate family members and if + hx of  cancer, diabetes, hypertension, hyperlipidemia, heart attack, stroke) YES. Was Abstract Encounter opened and chart updated? YES    6.  Care Team Updated:       •   DME Company (gait device, O2, CPAP, etc.): YES       •   Other Specialists (eye doctor, derm, GYN, cardiology, endo, etc): YES    7. Orders for overdue Health Maintenance topics  pended in Pre-Charting? YES    8.  Patient has the following Care Path diagnoses on Problem List:  NONE    9.  Patient was advised: “This is a free wellness visit. The provider will screen for medical conditions to help you stay healthy. If you have other concerns to address you may be asked to discuss these at a separate visit or there may be an additional fee.”     10.  Patient was informed to arrive 15 min prior to their scheduled appointment and bring in their medication bottles.

## 2020-06-11 NOTE — PROGRESS NOTES
Called member to schedule AHA/AWV appointment and introduce SCPPA program. No answer LM with call back number [phone]

## 2020-06-16 ENCOUNTER — HOSPITAL ENCOUNTER (OUTPATIENT)
Dept: LAB | Facility: MEDICAL CENTER | Age: 70
End: 2020-06-16
Attending: PHYSICIAN ASSISTANT
Payer: MEDICARE

## 2020-06-16 DIAGNOSIS — E03.9 ACQUIRED HYPOTHYROIDISM: ICD-10-CM

## 2020-06-16 LAB — TSH SERPL DL<=0.005 MIU/L-ACNC: 1.61 UIU/ML (ref 0.38–5.33)

## 2020-06-16 PROCEDURE — 36415 COLL VENOUS BLD VENIPUNCTURE: CPT

## 2020-06-16 PROCEDURE — 84443 ASSAY THYROID STIM HORMONE: CPT

## 2020-06-24 ENCOUNTER — OFFICE VISIT (OUTPATIENT)
Dept: MEDICAL GROUP | Facility: PHYSICIAN GROUP | Age: 70
End: 2020-06-24
Payer: MEDICARE

## 2020-06-24 VITALS
DIASTOLIC BLOOD PRESSURE: 62 MMHG | BODY MASS INDEX: 25.76 KG/M2 | WEIGHT: 170 LBS | HEIGHT: 68 IN | OXYGEN SATURATION: 96 % | TEMPERATURE: 98.2 F | HEART RATE: 96 BPM | SYSTOLIC BLOOD PRESSURE: 112 MMHG

## 2020-06-24 DIAGNOSIS — R91.8 PULMONARY NODULES: ICD-10-CM

## 2020-06-24 DIAGNOSIS — Z12.12 SCREENING FOR COLORECTAL CANCER: ICD-10-CM

## 2020-06-24 DIAGNOSIS — I71.40 ABDOMINAL AORTIC ANEURYSM (AAA) 3.0 CM TO 5.0 CM IN DIAMETER IN FEMALE (HCC): ICD-10-CM

## 2020-06-24 DIAGNOSIS — Z13.6 ENCOUNTER FOR LIPID SCREENING FOR CARDIOVASCULAR DISEASE: ICD-10-CM

## 2020-06-24 DIAGNOSIS — I10 ESSENTIAL HYPERTENSION: ICD-10-CM

## 2020-06-24 DIAGNOSIS — D69.6 THROMBOCYTOPENIA, UNSPECIFIED (HCC): ICD-10-CM

## 2020-06-24 DIAGNOSIS — I77.1 BILATERAL ILIAC ARTERY STENOSIS (HCC): ICD-10-CM

## 2020-06-24 DIAGNOSIS — Z12.11 SCREENING FOR COLORECTAL CANCER: ICD-10-CM

## 2020-06-24 DIAGNOSIS — M54.50 CHRONIC RIGHT-SIDED LOW BACK PAIN WITHOUT SCIATICA: ICD-10-CM

## 2020-06-24 DIAGNOSIS — E03.1 CONGENITAL HYPOTHYROIDISM WITHOUT GOITER: ICD-10-CM

## 2020-06-24 DIAGNOSIS — I87.2 VENOUS STASIS DERMATITIS OF BOTH LOWER EXTREMITIES: ICD-10-CM

## 2020-06-24 DIAGNOSIS — Z11.59 NEED FOR HEPATITIS C SCREENING TEST: ICD-10-CM

## 2020-06-24 DIAGNOSIS — J44.9 CHRONIC OBSTRUCTIVE PULMONARY DISEASE, UNSPECIFIED COPD TYPE (HCC): ICD-10-CM

## 2020-06-24 DIAGNOSIS — Z13.220 ENCOUNTER FOR LIPID SCREENING FOR CARDIOVASCULAR DISEASE: ICD-10-CM

## 2020-06-24 DIAGNOSIS — M85.80 OSTEOPENIA, UNSPECIFIED LOCATION: ICD-10-CM

## 2020-06-24 DIAGNOSIS — E55.9 VITAMIN D DEFICIENCY: ICD-10-CM

## 2020-06-24 DIAGNOSIS — Z23 NEED FOR VACCINATION: ICD-10-CM

## 2020-06-24 DIAGNOSIS — Z00.00 MEDICARE ANNUAL WELLNESS VISIT, SUBSEQUENT: ICD-10-CM

## 2020-06-24 DIAGNOSIS — G89.29 CHRONIC RIGHT-SIDED LOW BACK PAIN WITHOUT SCIATICA: ICD-10-CM

## 2020-06-24 DIAGNOSIS — N18.30 CKD (CHRONIC KIDNEY DISEASE) STAGE 3, GFR 30-59 ML/MIN: ICD-10-CM

## 2020-06-24 DIAGNOSIS — N32.81 OAB (OVERACTIVE BLADDER): ICD-10-CM

## 2020-06-24 PROBLEM — N81.10 VAGINAL PROLAPSE: Status: RESOLVED | Noted: 2019-02-11 | Resolved: 2020-06-24

## 2020-06-24 PROCEDURE — G0439 PPPS, SUBSEQ VISIT: HCPCS | Performed by: PHYSICIAN ASSISTANT

## 2020-06-24 PROCEDURE — 90471 IMMUNIZATION ADMIN: CPT | Performed by: PHYSICIAN ASSISTANT

## 2020-06-24 PROCEDURE — 90750 HZV VACC RECOMBINANT IM: CPT | Performed by: PHYSICIAN ASSISTANT

## 2020-06-24 RX ORDER — LISINOPRIL AND HYDROCHLOROTHIAZIDE 25; 20 MG/1; MG/1
1 TABLET ORAL
Qty: 100 TAB | Refills: 1 | Status: SHIPPED | OUTPATIENT
Start: 2020-06-24 | End: 2020-08-25

## 2020-06-24 RX ORDER — TRIAMCINOLONE ACETONIDE 1 MG/G
1 CREAM TOPICAL 2 TIMES DAILY PRN
Qty: 80 G | Refills: 2 | Status: SHIPPED | OUTPATIENT
Start: 2020-06-24 | End: 2022-04-05 | Stop reason: SDUPTHER

## 2020-06-24 RX ORDER — AMLODIPINE BESYLATE 5 MG/1
5 TABLET ORAL
Qty: 100 TAB | Refills: 1 | Status: SHIPPED | OUTPATIENT
Start: 2020-06-24 | End: 2020-11-30

## 2020-06-24 ASSESSMENT — PATIENT HEALTH QUESTIONNAIRE - PHQ9: CLINICAL INTERPRETATION OF PHQ2 SCORE: 0

## 2020-06-24 ASSESSMENT — ENCOUNTER SYMPTOMS: GENERAL WELL-BEING: FAIR

## 2020-06-24 ASSESSMENT — ACTIVITIES OF DAILY LIVING (ADL): BATHING_REQUIRES_ASSISTANCE: 0

## 2020-06-24 NOTE — PATIENT INSTRUCTIONS
The 3 providers available to establish care with at this clinic are:  1. Dr. Josué Moses MD  2. Dr. Calista Andino DO  3. VIELKA Bearden

## 2020-06-24 NOTE — ASSESSMENT & PLAN NOTE
Established problem, well-controlled with amlodipine 5 mg daily and lisinopril-HCTZ 20-25 mg daily. BP today in the office is 112/62. Continue current management.

## 2020-06-24 NOTE — ASSESSMENT & PLAN NOTE
Established problem, well-controlled with levothyroxine 100 mcg daily. Last TSH earlier this month was normal. Continue current management.

## 2020-06-24 NOTE — ASSESSMENT & PLAN NOTE
Established problem, likely stable. She endorses high-calcium diet and is compliant with supplemental vitamin D. Due for repeat DEXA in 2022.

## 2020-06-24 NOTE — ASSESSMENT & PLAN NOTE
Established problem, stable, felt to be due to possible scarring. Has CT chest annually. Follows with pulmonology.

## 2020-06-24 NOTE — ASSESSMENT & PLAN NOTE
Established problem, suspect well-controlled given that she has been compliant with vitamin D supplement. Takes 5000 IU daily. Needs vit D level re-checked to ensure no toxicity. In the meantime, continue current management.

## 2020-06-24 NOTE — ASSESSMENT & PLAN NOTE
Established problem, uncontrolled. Currently using Epsom salt soaks and has tried multiple OTC lotions without relief. Experiences a lot of itching and skin sloughing. H/o recurrent cellulitis. Has seen dermatology in the past. Requesting refill of triamcinolone cream which was previously helping. Advised to use only as long as necessary to calm down inflammation, then transition to moisturizing lotion.

## 2020-06-24 NOTE — PROGRESS NOTES
Chief Complaint   Patient presents with   • Annual Wellness Visit         HPI:  Naomy is a 70 y.o. here for Medicare Annual Wellness Visit. Is an established patient of mine.    HC AWV    Patient Active Problem List    Diagnosis Date Noted   • Thrombocytopenia, unspecified (Former[facility]) 06/24/2020   • OAB (overactive bladder) 02/11/2019   • Abdominal aortic aneurysm (AAA) 3.0 cm to 5.0 cm in diameter in female (Formerly [facility]) 11/16/2018   • Bilateral iliac artery stenosis (Formerly [facility]) 11/16/2018   • Essential hypertension 08/06/2018   • Chronic obstructive pulmonary disease (Former[facility]) 04/17/2018   • Pulmonary nodule, RUL CT , 4/18 04/17/2018   • Vitamin D deficiency 06/20/2017   • Osteopenia 04/28/2017   • Chronic right-sided low back pain without sciatica 06/06/2016   • Venous stasis dermatitis of both lower extremities 07/15/2015   • Hypothyroidism 07/14/2015   • CKD (chronic kidney disease) stage 3, GFR 30-59 ml/min (Former[facility]) 11/02/2013       Current Outpatient Medications   Medication Sig Dispense Refill   • lisinopril-hydrochlorothiazide (PRINZIDE) 20-25 MG per tablet Take 1 Tab by mouth every day. 100 Tab 1   • amLODIPine (NORVASC) 5 MG Tab Take 1 Tab by mouth every day. 100 Tab 1   • triamcinolone acetonide (KENALOG) 0.1 % Cream Apply 1 Application to affected area(s) 2 times a day as needed. 80 g 2   • levothyroxine (SYNTHROID) 100 MCG Tab TAKE 1 TABLET BY MOUTH EVERY DAY BEFORE BREAKFAST 90 Tab 0   • aspirin (ASA) 325 MG Tab Take 325 mg by mouth every day.     • Mirabegron ER (MYRBETRIQ) 50 MG TABLET SR 24 HR every day.     • Melatonin 1 MG Tab Take  by mouth.     • PROAIR  (90 Base) MCG/ACT Aero Soln inhalation aerosol INHALE 2 (TWO) PUFF EVERY SIX HOURS, AS NEEDED  1   • diphenhydrAMINE (BENADRYL) 25 MG Tab Take 25 mg by mouth every 6 hours as needed for Sleep.     • Acetaminophen 500 MG Cap Take  by mouth.     • Ascorbic Acid (MARYAM-C PO) Take  by mouth.     • Oxymetazoline HCl (NASAL SPRAY NA) [place]  in nose.     •  Cholecalciferol (VITAMIN D3) 5000 units Cap Take 1 Cap by mouth every day.       No current facility-administered medications for this visit.         Patient is taking medications as noted in medication list.  Current supplements as per medication list.     Allergies: Colophony [pinus strobus]; Latex; Neosporin [neomycin-polymyxin b]; Phenylene; Soap; Tape; and Tea tree oil    Current social contact/activities: visits with friends and family frequently      Is patient current with immunizations? No, due for SHINGRIX (Shingles). Patient is interested in receiving SHINGRIX (Shingles) today.    She  reports that she quit smoking about 2 years ago. Her smoking use included cigarettes. She started smoking about 57 years ago. She has a 26.50 pack-year smoking history. She has never used smokeless tobacco. She reports current alcohol use. She reports that she does not use drugs.  Counseling given: Not Answered  Comment: class in July 2017        DPA/Advanced directive: Patient has Advanced Directive on file.     ROS:    Gait: Uses no assistive device   Ostomy: No   Other tubes: No   Amputations: No   Chronic oxygen use No   Last eye exam unknown and due will have done with her eye doctor  Wears hearing aids: No   : Denies any urinary leakage during the last 6 months      Screening:    Annual Health Assessment Questions:    1.  Are you currently engaging in any exercise or physical activity? No    2.  How would you describe your mood or emotional well-being today? good    3.  Have you had any falls in the last year? No    4.  Have you noticed any problems with your balance or had difficulty walking? No    5.  In the last six months have you experienced any leakage of urine? No    6. DPA/Advanced Directive: Patient has Advanced Directive on file.     Depression Screening    Little interest or pleasure in doing things?  0 - not at all  Feeling down, depressed, or hopeless? 0 - not at all  Patient Health Questionnaire Score:  0    If depressive symptoms identified deferred to follow up visit unless specifically addressed in assessment and plan.    Interpretation of PHQ-9 Total Score   Score Severity   1-4 No Depression   5-9 Mild Depression   10-14 Moderate Depression   15-19 Moderately Severe Depression   20-27 Severe Depression    Screening for Cognitive Impairment    Three Minute Recall (village, kitchen, baby)  3/3    [place]organ clock face with all 12 numbers and set the hands to show 10 past 10.  Yes    If cognitive concerns identified, deferred for follow up unless specifically addressed in assessment and plan.    Fall Risk Assessment    Has the patient had two or more falls in the last year or any fall with injury in the last year?  No  If fall risk identified, deferred for follow up unless specifically addressed in assessment and plan.    Safety Assessment    Throw rugs on floor.  No  Handrails on all stairs.  Yes  Good lighting in all hallways.  Yes  Difficulty hearing.  Yes  Patient counseled about all safety risks that were identified.    Functional Assessment ADLs    Are there any barriers preventing you from cooking for yourself or meeting nutritional needs?  No.    Are there any barriers preventing you from driving safely or obtaining transportation?  No.    Are there any barriers preventing you from using a telephone or calling for help?  No.    Are there any barriers preventing you from shopping?  No.    Are there any barriers preventing you from taking care of your own finances?  No.    Are there any barriers preventing you from managing your medications?  No.    Are there any barriers preventing you from showering, bathing or dressing yourself?  No.    Are you currently engaging in any exercise or physical activity?  No.     What is your perception of your health?  Fair.    Health Maintenance Summary                HEPATITIS C SCREENING Overdue 1950     Annual Wellness Visit Overdue 3/1/2018      Done 2/28/2017 Visit Dx:  Medicare annual wellness visit, subsequent     Patient has more history with this topic...    COLONOSCOPY Overdue 3/24/2019      Done 3/24/2017 REFERRAL TO GI FOR COLONOSCOPY     Patient has more history with this topic...    IMM ZOSTER VACCINES Overdue 2019      Done 2019 Imm Admin: Recombinant Zoster Vaccine (RZV) (Shingrix)     Patient has more history with this topic...    Annual Pulmonary Function Test / Spirometry Overdue 2019      Done 2018 AMB PULMONARY FUNCTION TEST/LAB    MAMMOGRAM Next Due 2020      Done 2018 MA-MAMMO SCREENING BILAT W/TOMOSYNTHESIS W/CAD     Patient has more history with this topic...    BONE DENSITY Next Due 2022      Done 2017 DS-BONE DENSITY STUDY (DEXA)     Patient has more history with this topic...    IMM DTaP/Tdap/Td Vaccine Next Due 3/15/2027      Done 3/15/2017 Imm Admin: Tdap Vaccine          Patient Care Team:  Dina Steinberg P.A.-C. as PCP - General (Physician Assistant)  Edmundo Montiel M.D. (Dermatology)  Haris Talamantes M.D. as Consulting Physician (Ophthalmology)  Maria R Reyes, M.D. as Consulting Physician (Allergy)    Social History     Tobacco Use   • Smoking status: Former Smoker     Packs/day: 0.50     Years: 53.00     Pack years: 26.50     Types: Cigarettes     Start date: 3/29/1963     Last attempt to quit: 2017     Years since quittin.9   • Smokeless tobacco: Never Used   • Tobacco comment: class in 2017   Substance Use Topics   • Alcohol use: Yes     Comment: 2 per day   • Drug use: No     Family History   Problem Relation Age of Onset   • Arthritis Mother         hip fracture   • Diabetes Mother    • Cancer Mother         skin   • Arthritis Father         lung   • Alcohol/Drug Father         etoh   • Lung Disease Sister         smoker/copd   • Hypertension Sister    • Other Brother         hep c   • Arthritis Maternal Grandmother         hip fracture   • Diabetes Maternal Grandfather    • No Known  "Problems Brother    • No Known Problems Sister    • No Known Problems Brother    • Hyperlipidemia Neg Hx    • Stroke Neg Hx      She  has a past medical history of Anxiety, Arthritis, Back pain, Blood transfusion without reported diagnosis, Breath shortness, Cataract, Cellulitis, Cellulitis of leg (11/1/2013), Chickenpox, COPD (chronic obstructive pulmonary disease) ([facility]), Dental disorder, Earache, Fatigue, Frequent urination, Hypertension (02/23/2018), Hypothyroidism, Influenza, Insomnia, Kidney disease, Mumps, OAB (overactive bladder) (2/11/2019), Pain (02/23/2018), Pneumonia, Rash, Ringing in ears, Shortness of breath, Swelling of lower extremity, Thyroid disease, Tonsillitis, Toothache, and Venous stasis dermatitis. She also has no past medical history of Anemia, Clotting disorder ([facility]), Depression, Diabetic neuropathy ([facility]), Headache(784.0), HIV (human immunodeficiency virus infection) (Former[facility]), Hyperlipidemia, IBD (inflammatory bowel disease), Meningitis, Osteoporosis, Substance abuse ([facility]), or Ulcer.   Past Surgical History:   Procedure Laterality Date   • VEIN LIGATION RADIO FREQUENCY Right 2/26/2018    Procedure: VEIN LIGATION RADIO FREQUENCY- LONG SAPENOUS;  Surgeon: Jim Alas M.D.;  Location: SURGERY [facility];  Service: General   • ABDOMINAL EXPLORATION      2 ectopic preg   • BREAST BIOPSY      hx of benign left breast biopsy   • EYE SURGERY      cataract x2   • HYSTERECTOMY LAPAROSCOPY     • HYSTERECTOMY, TOTAL ABDOMINAL  1977/1978   • OOPHORECTOMY     • PB MAMMARY DUCTOGRAM, SINGLE     • PB REMV 2ND CATARACT,CORN-SCLER SECTN     • TONSILLECTOMY             Exam:     /62 (BP Location: Right arm, Patient Position: Sitting, BP Cuff Size: Adult)   Pulse 96   Temp 36.8 °C (98.2 °F)   Ht 1.727 m (5' 8\")   Wt 77.1 kg (170 lb)   SpO2 96%  Body mass index is 25.85 kg/m².    Hearing good.    Alert, oriented in no acute distress.  Eye contact is good, speech goal directed, affect " calm      Assessment and Plan. The following treatment and monitoring plan is recommended:      Venous stasis dermatitis of both lower extremities  Established problem, uncontrolled. Currently using Epsom salt soaks and has tried multiple OTC lotions without relief. Experiences a lot of itching and skin sloughing. H/o recurrent cellulitis. Has seen dermatology in the past. Requesting refill of triamcinolone cream which was previously helping. Advised to use only as long as necessary to calm down inflammation, then transition to moisturizing lotion.    Vitamin D deficiency  Established problem, suspect well-controlled given that she has been compliant with vitamin D supplement. Takes 5000 IU daily. Needs vit D level re-checked to ensure no toxicity. In the meantime, continue current management.    CKD (chronic kidney disease) stage 3, GFR 30-59 ml/min ([facility])  Established problem, relatively stable per review but hasn't had labs since 2/2019. GFR was 40 at that time. Needs updated lab work which I have ordered.    Pulmonary nodule, RUL CT , 4/18  Established problem, stable, felt to be due to possible scarring. Has CT chest annually. Follows with pulmonology.    Osteopenia  Established problem, likely stable. She endorses high-calcium diet and is compliant with supplemental vitamin D. Due for repeat DEXA in 2022.    Hypothyroidism  Established problem, well-controlled with levothyroxine 100 mcg daily. Last TSH earlier this month was normal. Continue current management.    Chronic right-sided low back pain without sciatica  Established problem, stable. Manages pain with PRN Tylenol. Continue current management.    Essential hypertension  Established problem, well-controlled with amlodipine 5 mg daily and lisinopril-HCTZ 20-25 mg daily. BP today in the office is 112/62. Continue current management.    OAB (overactive bladder)  Established problem, well-controlled with Myrbetriq. Continue current management.    Chronic  obstructive pulmonary disease (HCC)  Established problem, stable. Patient not currently using inhalers although has both Symbicort and ProAir at home. Endorses improvement in breathing once she quit smoking.    Bilateral iliac artery stenosis (HCC)  Established problem, unclear level of control. Found to have bilateral iliac stenosis on US study done in 2018. I recommended she see vascular surgery at the time. Unclear if she was seen for this issue or just her venous insufficiency. Also found to have aortic aneurysm so I am recommending repeat study. Further recommendations pending results.    Abdominal aortic aneurysm (AAA) 3.0 cm to 5.0 cm in diameter in female (HCC)  Established problem, unclear level of control. Recommend repeat US to monitor for growth. Further recommendations pending result.    Thrombocytopenia, unspecified (HCC)  Established problem per review, unclear level of control as has not had CBC since 2017. Will re-check.         Services suggested: No services needed at this time  Health Care Screening recommendations as per orders if indicated.  Referrals offered: PT/OT/Nutrition counseling/Behavioral Health/Smoking cessation as per orders if indicated.    Discussion today about general wellness and lifestyle habits:    · Prevent falls and reduce trip hazards; Cautioned about securing or removing rugs.  · Have a working fire alarm and carbon monoxide detector;   · Engage in regular physical activity and social activities       Follow-up: Return for establish care with new PCP.     Dina Steinberg P.A.-C.

## 2020-06-24 NOTE — ASSESSMENT & PLAN NOTE
Established problem, relatively stable per review but hasn't had labs since 2/2019. GFR was 40 at that time. Needs updated lab work which I have ordered.

## 2020-06-25 NOTE — ASSESSMENT & PLAN NOTE
Established problem, unclear level of control. Found to have bilateral iliac stenosis on US study done in 2018. I recommended she see vascular surgery at the time. Unclear if she was seen for this issue or just her venous insufficiency. Also found to have aortic aneurysm so I am recommending repeat study. Further recommendations pending results.

## 2020-06-25 NOTE — ASSESSMENT & PLAN NOTE
Established problem, unclear level of control. Recommend repeat US to monitor for growth. Further recommendations pending result.

## 2020-06-25 NOTE — ASSESSMENT & PLAN NOTE
Established problem, stable. Patient not currently using inhalers although has both Symbicort and ProAir at home. Endorses improvement in breathing once she quit smoking.

## 2020-06-25 NOTE — ASSESSMENT & PLAN NOTE
Established problem per review, unclear level of control as has not had CBC since 2017. Will re-check.

## 2020-07-09 ENCOUNTER — HOSPITAL ENCOUNTER (OUTPATIENT)
Dept: LAB | Facility: MEDICAL CENTER | Age: 70
End: 2020-07-09
Attending: PHYSICIAN ASSISTANT
Payer: MEDICARE

## 2020-07-09 DIAGNOSIS — Z11.59 NEED FOR HEPATITIS C SCREENING TEST: ICD-10-CM

## 2020-07-09 DIAGNOSIS — Z13.220 ENCOUNTER FOR LIPID SCREENING FOR CARDIOVASCULAR DISEASE: ICD-10-CM

## 2020-07-09 DIAGNOSIS — Z13.6 ENCOUNTER FOR LIPID SCREENING FOR CARDIOVASCULAR DISEASE: ICD-10-CM

## 2020-07-09 DIAGNOSIS — I10 ESSENTIAL HYPERTENSION: ICD-10-CM

## 2020-07-09 DIAGNOSIS — E55.9 VITAMIN D DEFICIENCY: ICD-10-CM

## 2020-07-09 DIAGNOSIS — D69.6 THROMBOCYTOPENIA, UNSPECIFIED (HCC): ICD-10-CM

## 2020-07-09 LAB
25(OH)D3 SERPL-MCNC: 77 NG/ML (ref 30–100)
ALBUMIN SERPL BCP-MCNC: 4.3 G/DL (ref 3.2–4.9)
ALBUMIN/GLOB SERPL: 1.7 G/DL
ALP SERPL-CCNC: 70 U/L (ref 30–99)
ALT SERPL-CCNC: 14 U/L (ref 2–50)
ANION GAP SERPL CALC-SCNC: 14 MMOL/L (ref 7–16)
ANISOCYTOSIS BLD QL SMEAR: ABNORMAL
AST SERPL-CCNC: 19 U/L (ref 12–45)
BASOPHILS # BLD AUTO: 0.4 % (ref 0–1.8)
BASOPHILS # BLD: 0.02 K/UL (ref 0–0.12)
BILIRUB SERPL-MCNC: 0.9 MG/DL (ref 0.1–1.5)
BUN SERPL-MCNC: 28 MG/DL (ref 8–22)
CALCIUM SERPL-MCNC: 9.5 MG/DL (ref 8.5–10.5)
CHLORIDE SERPL-SCNC: 103 MMOL/L (ref 96–112)
CHOLEST SERPL-MCNC: 146 MG/DL (ref 100–199)
CO2 SERPL-SCNC: 24 MMOL/L (ref 20–33)
COMMENT 1642: NORMAL
CREAT SERPL-MCNC: 1.62 MG/DL (ref 0.5–1.4)
EOSINOPHIL # BLD AUTO: 0.23 K/UL (ref 0–0.51)
EOSINOPHIL NFR BLD: 4.6 % (ref 0–6.9)
ERYTHROCYTE [DISTWIDTH] IN BLOOD BY AUTOMATED COUNT: 67 FL (ref 35.9–50)
FASTING STATUS PATIENT QL REPORTED: NORMAL
GLOBULIN SER CALC-MCNC: 2.6 G/DL (ref 1.9–3.5)
GLUCOSE SERPL-MCNC: 101 MG/DL (ref 65–99)
HCT VFR BLD AUTO: 34.7 % (ref 37–47)
HCV AB SER QL: NORMAL
HDLC SERPL-MCNC: 61 MG/DL
HGB BLD-MCNC: 12 G/DL (ref 12–16)
IMM GRANULOCYTES # BLD AUTO: 0.03 K/UL (ref 0–0.11)
IMM GRANULOCYTES NFR BLD AUTO: 0.6 % (ref 0–0.9)
LDLC SERPL CALC-MCNC: 74 MG/DL
LYMPHOCYTES # BLD AUTO: 1.1 K/UL (ref 1–4.8)
LYMPHOCYTES NFR BLD: 22 % (ref 22–41)
MACROCYTES BLD QL SMEAR: ABNORMAL
MCH RBC QN AUTO: 43.2 PG (ref 27–33)
MCHC RBC AUTO-ENTMCNC: 34.6 G/DL (ref 33.6–35)
MCV RBC AUTO: 124.8 FL (ref 81.4–97.8)
MONOCYTES # BLD AUTO: 0.41 K/UL (ref 0–0.85)
MONOCYTES NFR BLD AUTO: 8.2 % (ref 0–13.4)
MORPHOLOGY BLD-IMP: NORMAL
NEUTROPHILS # BLD AUTO: 3.21 K/UL (ref 2–7.15)
NEUTROPHILS NFR BLD: 64.2 % (ref 44–72)
NRBC # BLD AUTO: 0 K/UL
NRBC BLD-RTO: 0 /100 WBC
PLATELET # BLD AUTO: 125 K/UL (ref 164–446)
PLATELET BLD QL SMEAR: NORMAL
PMV BLD AUTO: 11 FL (ref 9–12.9)
POTASSIUM SERPL-SCNC: 4.3 MMOL/L (ref 3.6–5.5)
PROT SERPL-MCNC: 6.9 G/DL (ref 6–8.2)
RBC # BLD AUTO: 2.78 M/UL (ref 4.2–5.4)
RBC BLD AUTO: PRESENT
SODIUM SERPL-SCNC: 141 MMOL/L (ref 135–145)
TRIGL SERPL-MCNC: 55 MG/DL (ref 0–149)
WBC # BLD AUTO: 5 K/UL (ref 4.8–10.8)

## 2020-07-09 PROCEDURE — 82306 VITAMIN D 25 HYDROXY: CPT

## 2020-07-09 PROCEDURE — 85025 COMPLETE CBC W/AUTO DIFF WBC: CPT

## 2020-07-09 PROCEDURE — 80061 LIPID PANEL: CPT

## 2020-07-09 PROCEDURE — 80053 COMPREHEN METABOLIC PANEL: CPT

## 2020-07-09 PROCEDURE — 36415 COLL VENOUS BLD VENIPUNCTURE: CPT

## 2020-07-09 PROCEDURE — G0472 HEP C SCREEN HIGH RISK/OTHER: HCPCS

## 2020-07-10 ENCOUNTER — TELEPHONE (OUTPATIENT)
Dept: MEDICAL GROUP | Facility: PHYSICIAN GROUP | Age: 70
End: 2020-07-10

## 2020-07-10 NOTE — TELEPHONE ENCOUNTER
----- Message from Dina Steinberg P.A.-C. sent at 7/9/2020  7:09 PM PDT -----  There are multiple abnormalities on patient's labs that need to be discussed. Please call her and set her up an appointment with me.  Dina Steinberg P.A.-C.

## 2020-07-15 ENCOUNTER — OFFICE VISIT (OUTPATIENT)
Dept: MEDICAL GROUP | Facility: PHYSICIAN GROUP | Age: 70
End: 2020-07-15
Payer: MEDICARE

## 2020-07-15 ENCOUNTER — HOSPITAL ENCOUNTER (OUTPATIENT)
Dept: LAB | Facility: MEDICAL CENTER | Age: 70
End: 2020-07-15
Attending: PHYSICIAN ASSISTANT
Payer: MEDICARE

## 2020-07-15 VITALS
WEIGHT: 169.9 LBS | SYSTOLIC BLOOD PRESSURE: 118 MMHG | DIASTOLIC BLOOD PRESSURE: 70 MMHG | OXYGEN SATURATION: 95 % | HEART RATE: 74 BPM | TEMPERATURE: 99.2 F | HEIGHT: 68 IN | BODY MASS INDEX: 25.75 KG/M2

## 2020-07-15 DIAGNOSIS — D69.6 THROMBOCYTOPENIA, UNSPECIFIED (HCC): ICD-10-CM

## 2020-07-15 DIAGNOSIS — E03.9 ACQUIRED HYPOTHYROIDISM: ICD-10-CM

## 2020-07-15 DIAGNOSIS — D75.89 MACROCYTOSIS: ICD-10-CM

## 2020-07-15 DIAGNOSIS — N18.30 CKD (CHRONIC KIDNEY DISEASE) STAGE 3, GFR 30-59 ML/MIN: ICD-10-CM

## 2020-07-15 LAB
FOLATE SERPL-MCNC: 19.7 NG/ML
VIT B12 SERPL-MCNC: <150 PG/ML (ref 211–911)

## 2020-07-15 PROCEDURE — 84207 ASSAY OF VITAMIN B-6: CPT

## 2020-07-15 PROCEDURE — 82746 ASSAY OF FOLIC ACID SERUM: CPT

## 2020-07-15 PROCEDURE — 36415 COLL VENOUS BLD VENIPUNCTURE: CPT

## 2020-07-15 PROCEDURE — 99214 OFFICE O/P EST MOD 30 MIN: CPT | Performed by: PHYSICIAN ASSISTANT

## 2020-07-15 PROCEDURE — 82607 VITAMIN B-12: CPT

## 2020-07-15 ASSESSMENT — FIBROSIS 4 INDEX: FIB4 SCORE: 2.84

## 2020-07-15 NOTE — PROGRESS NOTES
Subjective:   Naomy Vargas is a 70 y.o. female here today for follow-up on CKD, other lab results. Is an established patient of mine.    HPI:    Patient presents to the office today for discussion regarding above.  She was recently in for an annual wellness visit and she recently had recommended screening lab work performed on 7/9.  Multiple abnormalities came up on her labs, so I asked her to schedule an appointment.    Patient has chronic kidney disease, historically stage III.  Was noted to have worsening of her kidney function on recent lab work including jump in creatinine to 1.62 and decreased GFR of 31.  She is not currently following with a nephrologist.  Does endorse atrophied right kidney which was picked up incidentally on prior imaging. She denies NSAID use. Has not noticed hematuria.    She has historically had a macrocytosis.  There was jump in MCV on this most recent lab work to 124.8.  Her hemoglobin has decreased to the lower limit of normal at 12.0.  Platelets remain low at 125.    Vitamin D level came back at 77.  Patient does take a supplement, dose of 5000 IU daily.      Current medicines (including changes today)  Current Outpatient Medications   Medication Sig Dispense Refill   • lisinopril-hydrochlorothiazide (PRINZIDE) 20-25 MG per tablet Take 1 Tab by mouth every day. 100 Tab 1   • amLODIPine (NORVASC) 5 MG Tab Take 1 Tab by mouth every day. 100 Tab 1   • triamcinolone acetonide (KENALOG) 0.1 % Cream Apply 1 Application to affected area(s) 2 times a day as needed. 80 g 2   • levothyroxine (SYNTHROID) 100 MCG Tab TAKE 1 TABLET BY MOUTH EVERY DAY BEFORE BREAKFAST 90 Tab 0   • aspirin (ASA) 325 MG Tab Take 325 mg by mouth every day.     • Mirabegron ER (MYRBETRIQ) 50 MG TABLET SR 24 HR every day.     • Melatonin 1 MG Tab Take  by mouth.     • PROAIR  (90 Base) MCG/ACT Aero Soln inhalation aerosol INHALE 2 (TWO) PUFF EVERY SIX HOURS, AS NEEDED  1   • diphenhydrAMINE (BENADRYL) 25  "MG Tab Take 25 mg by mouth every 6 hours as needed for Sleep.     • Acetaminophen 500 MG Cap Take  by mouth.     • Ascorbic Acid (MARYAM-C PO) Take  by mouth.     • Oxymetazoline HCl (NASAL SPRAY NA) Spray  in nose.     • Cholecalciferol (VITAMIN D3) 5000 units Cap Take 1 Cap by mouth every day.       No current facility-administered medications for this visit.      She  has a past medical history of Anxiety, Arthritis, Back pain, Blood transfusion without reported diagnosis, Breath shortness, Cataract, Cellulitis, Cellulitis of leg (11/1/2013), Chickenpox, COPD (chronic obstructive pulmonary disease) ([facility]), Dental disorder, Earache, Fatigue, Frequent urination, Hypertension (02/23/2018), Hypothyroidism, Influenza, Insomnia, Kidney disease, Mumps, OAB (overactive bladder) (2/11/2019), Pain (02/23/2018), Pneumonia, Rash, Ringing in ears, Shortness of breath, Swelling of lower extremity, Thyroid disease, Tonsillitis, Toothache, and Venous stasis dermatitis. She also has no past medical history of Anemia, Clotting disorder ([facility]), Depression, Diabetic neuropathy ([facility]), Headache(784.0), HIV (human immunodeficiency virus infection) ([facility]), Hyperlipidemia, IBD (inflammatory bowel disease), Meningitis, Osteoporosis, Substance abuse ([facility]), or Ulcer.    ROS  As per HPI.     Objective:     /70 (BP Location: Right arm, Patient Position: Sitting, BP Cuff Size: Adult)   Pulse 74   Temp 37.3 °C (99.2 °F) (Temporal)   Ht 1.727 m (5' 8\")   Wt 77.1 kg (169 lb 14.4 oz)   SpO2 95%  Body mass index is 25.83 kg/m².     Physical Exam:  Constitutional: Alert, well-appearing, pleasant, no distress.  Skin: No rashes in visible areas.  Eye: Pupils are equal and round, conjunctiva clear, lids normal.  ENMT: Lips without lesions, moist mucus membranes.  Neck: Normal-appearing.  Respiratory: Unlabored respiratory effort.      Assessment and Plan:   The following treatment plan was discussed    1. CKD (chronic kidney disease) stage 3, GFR " 30-59 ml/min (HCC)  Established problem, uncontrolled and worsening.  I am recommending that she follow-up with nephrology.  I was able to find results of the 2008 abdominal CT which shows scarring in the right kidney with multiple areas of full-thickness loss of cortex.  Advised to continue to abstain from NSAIDs.  - REFERRAL TO NEPHROLOGY    2. Macrocytosis  Established problem, uncontrolled and worsening.  She is borderline anemic.  I do not see that she is ever had further testing to determine the cause, so I am recommending check of vitamins B12, B6, and folic acid.  Further recommendations pending results.  May need to see hematology.  - VITAMIN B12; Future  - VITAMIN B6; Future  - FOLATE; Future    3. Thrombocytopenia, unspecified (HCC)  Established problem, uncontrolled but stable per review.  Hepatitis C screening was negative.  Would lean towards having her see hematology but will wait on the results of her B vitamins.      Followup: Return for f/u pending lab results.    Dina Steinberg P.A.-C.

## 2020-07-19 LAB — VIT B6 SERPL-MCNC: 24.5 NMOL/L (ref 20–125)

## 2020-07-21 ENCOUNTER — TELEPHONE (OUTPATIENT)
Dept: MEDICAL GROUP | Facility: PHYSICIAN GROUP | Age: 70
End: 2020-07-21

## 2020-07-21 DIAGNOSIS — E53.8 B12 DEFICIENCY: ICD-10-CM

## 2020-07-21 NOTE — TELEPHONE ENCOUNTER
----- Message from Dina Steinberg P.A.-C. sent at 7/21/2020  2:34 PM PDT -----  Please call patient as she did not read my previous [employer] message. Her B12 level is low and she needs to start supplementation. Folate and B6 levels were normal. Can either come to clinic for injections or start oral supplement. Please ask what she prefers and let me know.  Dina Steinberg P.A.-C.

## 2020-07-22 ENCOUNTER — OFFICE VISIT (OUTPATIENT)
Dept: NEPHROLOGY | Facility: MEDICAL CENTER | Age: 70
End: 2020-07-22
Payer: MEDICARE

## 2020-07-22 VITALS
HEIGHT: 68 IN | DIASTOLIC BLOOD PRESSURE: 76 MMHG | RESPIRATION RATE: 16 BRPM | BODY MASS INDEX: 25.31 KG/M2 | HEART RATE: 64 BPM | OXYGEN SATURATION: 98 % | TEMPERATURE: 98.4 F | SYSTOLIC BLOOD PRESSURE: 140 MMHG | WEIGHT: 167 LBS

## 2020-07-22 DIAGNOSIS — R80.9 MICROALBUMINURIA: ICD-10-CM

## 2020-07-22 DIAGNOSIS — E55.9 VITAMIN D DEFICIENCY: ICD-10-CM

## 2020-07-22 DIAGNOSIS — I10 ESSENTIAL HYPERTENSION: ICD-10-CM

## 2020-07-22 DIAGNOSIS — D64.9 ANEMIA, UNSPECIFIED TYPE: ICD-10-CM

## 2020-07-22 DIAGNOSIS — N18.30 CKD (CHRONIC KIDNEY DISEASE) STAGE 3, GFR 30-59 ML/MIN: ICD-10-CM

## 2020-07-22 PROBLEM — E53.8 B12 DEFICIENCY: Status: ACTIVE | Noted: 2020-07-22

## 2020-07-22 PROCEDURE — 99204 OFFICE O/P NEW MOD 45 MIN: CPT | Performed by: INTERNAL MEDICINE

## 2020-07-22 ASSESSMENT — ENCOUNTER SYMPTOMS
EYES NEGATIVE: 1
COUGH: 0
ABDOMINAL PAIN: 0
CHILLS: 0
NAUSEA: 0
VOMITING: 0
BACK PAIN: 0
ORTHOPNEA: 0
WEIGHT LOSS: 0
SINUS PAIN: 0
FEVER: 0
NECK PAIN: 0
HEMOPTYSIS: 0
WHEEZING: 0
PALPITATIONS: 0
MYALGIAS: 0
SHORTNESS OF BREATH: 0
DIARRHEA: 0

## 2020-07-22 ASSESSMENT — FIBROSIS 4 INDEX: FIB4 SCORE: 2.84

## 2020-07-22 NOTE — PROGRESS NOTES
Subjective:      Naomy Vargas is a 70 y.o. female who presents with New Patient and Chronic Kidney Disease            HPI  Naomy is coming today for initial evaluation of CKD III with worsening creat level  Doing well, no complaints  No dysuria/hematuria/flank pain  No NSAID's  CKD III-creat in 2018 at 1.02 -worse to 1.33 in Feb 2019 -now at 1.6!  HTN: BP not monitored at home  CT abd from 2008 revealed scarred right kidney    Review of Systems   Constitutional: Negative for chills, fever, malaise/fatigue and weight loss.   HENT: Negative for congestion, hearing loss and sinus pain.    Eyes: Negative.    Respiratory: Negative for cough, hemoptysis, shortness of breath and wheezing.    Cardiovascular: Positive for leg swelling. Negative for chest pain, palpitations and orthopnea.   Gastrointestinal: Negative for abdominal pain, diarrhea, nausea and vomiting.   Genitourinary: Negative for dysuria, frequency, hematuria and urgency.   Musculoskeletal: Negative for back pain, joint pain, myalgias and neck pain.   Skin: Negative.    All other systems reviewed and are negative.    Past Medical History:   Diagnosis Date   • Anxiety    • Arthritis     wrists   • Back pain    • Blood transfusion without reported diagnosis     with ectopic preg x 2    • Breath shortness     with exertion   • Cataract     bilateral removal-Dr. Duke Talamantes   • Cellulitis     right lower leg   • Cellulitis of leg 11/1/2013    Followed by dermatology. Managed with fluocinonide and Bactroban on right leg Left leg she uses triamcinolone and Sarna pramocaine (anesthetic, antipruretic) on both.     • Chickenpox    • COPD (chronic obstructive pulmonary disease) (HCC)    • Dental disorder     upper denture   • Earache    • Fatigue    • Frequent urination    • Hypertension 02/23/2018    on no meds at this time   • Hypothyroidism    • Influenza    • Insomnia    • Kidney disease     reports 1 kidney is smaller than the other   • Mumps    • OAB  (overactive bladder) 2/11/2019   • Pain 02/23/2018    right leg and back, 5/10   • Pneumonia    • Rash    • Ringing in ears    • Shortness of breath    • Swelling of lower extremity    • Thyroid disease    • Tonsillitis    • Toothache    • Venous stasis dermatitis     right leg       Family History   Problem Relation Age of Onset   • Arthritis Mother         hip fracture   • Diabetes Mother    • Cancer Mother         skin   • Arthritis Father         lung   • Alcohol/Drug Father         etoh   • Lung Disease Sister         smoker/copd   • Hypertension Sister    • Other Brother         hep c   • Arthritis Maternal Grandmother         hip fracture   • Diabetes Maternal Grandfather    • No Known Problems Brother    • No Known Problems Sister    • No Known Problems Brother    • Hyperlipidemia Neg Hx    • Stroke Neg Hx        Social History     Socioeconomic History   • Marital status:      Spouse name: Not on file   • Number of children: 2   • Years of education: Not on file   • Highest education level: Not on file   Occupational History   • Occupation: retired   Social Needs   • Financial resource strain: Not on file   • Food insecurity     Worry: Not on file     Inability: Not on file   • Transportation needs     Medical: Not on file     Non-medical: Not on file   Tobacco Use   • Smoking status: Former Smoker     Packs/day: 0.50     Years: 53.00     Pack years: 26.50     Types: Cigarettes     Start date: 3/29/1963     Last attempt to quit: 7/18/2017     Years since quitting: 3.0   • Smokeless tobacco: Never Used   • Tobacco comment: class in July 2017   Substance and Sexual Activity   • Alcohol use: Yes     Comment: 2 per day   • Drug use: No   • Sexual activity: Not Currently     Partners: Male   Lifestyle   • Physical activity     Days per week: Not on file     Minutes per session: Not on file   • Stress: Not on file   Relationships   • Social connections     Talks on phone: Not on file     Gets together: Not  "on file     Attends Buddhist service: Not on file     Active member of club or organization: Not on file     Attends meetings of clubs or organizations: Not on file     Relationship status: Not on file   • Intimate partner violence     Fear of current or ex partner: Not on file     Emotionally abused: Not on file     Physically abused: Not on file     Forced sexual activity: Not on file   Other Topics Concern   • Not on file   Social History Narrative   • Not on file          Objective:     /76 (BP Location: Right arm, Patient Position: Sitting)   Pulse 64   Temp 36.9 °C (98.4 °F)   Resp 16   Ht 1.727 m (5' 8\")   Wt 75.8 kg (167 lb)   LMP  (LMP Unknown)   SpO2 98%   BMI 25.39 kg/m²      Physical Exam  Vitals signs reviewed.   Constitutional:       General: She is not in acute distress.     Appearance: Normal appearance. She is well-developed. She is not diaphoretic.   HENT:      Head: Normocephalic and atraumatic.      Nose: Nose normal.      Mouth/Throat:      Mouth: Mucous membranes are moist.      Pharynx: Oropharynx is clear.   Eyes:      General: No scleral icterus.     Extraocular Movements: Extraocular movements intact.      Conjunctiva/sclera: Conjunctivae normal.      Pupils: Pupils are equal, round, and reactive to light.   Neck:      Musculoskeletal: Normal range of motion and neck supple.   Cardiovascular:      Rate and Rhythm: Normal rate and regular rhythm.      Pulses: Normal pulses.      Heart sounds: Normal heart sounds. No friction rub. No gallop.    Pulmonary:      Effort: Pulmonary effort is normal. No respiratory distress.      Breath sounds: Normal breath sounds. No wheezing, rhonchi or rales.   Abdominal:      General: Bowel sounds are normal. There is no distension.      Palpations: Abdomen is soft. There is no mass.      Tenderness: There is no abdominal tenderness. There is no right CVA tenderness, left CVA tenderness or guarding.   Musculoskeletal:      Right lower leg: " Edema present.      Left lower leg: Edema present.   Skin:     General: Skin is warm.      Coloration: Skin is not jaundiced.      Findings: No erythema or rash.   Neurological:      General: No focal deficit present.      Mental Status: She is alert and oriented to person, place, and time.      Cranial Nerves: No cranial nerve deficit.      Coordination: Coordination normal.   Psychiatric:         Mood and Affect: Mood normal.         Behavior: Behavior normal.         Thought Content: Thought content normal.         Judgment: Judgment normal.            Laboratory and renal imaging results reviewed : d/w pt  Lab Results   Component Value Date/Time    CREATININE 1.62 (H) 07/09/2020 09:37 AM    POTASSIUM 4.3 07/09/2020 09:37 AM          Assessment/Plan:       1. CKD (chronic kidney disease) stage 3, GFR 30-59 ml/min (HCC)      With worsening creat level to monitor closely, increase fluid intake  - URINALYSIS; Future  - Basic Metabolic Panel; Future  - PTH INTACT (PTH ONLY); Future  - US-RENAL; Future    2. Vitamin D deficiency      Well controlled  - PTH INTACT (PTH ONLY); Future    3. Essential hypertension      To monitor BP and call clinic if BP> 135/85    4. Microalbuminuria      On ACEi  - MICROALB/CREAT RATIO, RANDOM UR    5. Anemia, unspecified type      To monitor  - CBC WITHOUT DIFFERENTIAL; Future    Recs:  Continue current treatment  Keep well hydrated  Low salt diet  Monitor BP  Avoid NSAID's  F/u in 4-5 weeks    Thank you for the consult

## 2020-07-22 NOTE — TELEPHONE ENCOUNTER
Called pt and informed pt abt results, she states she would like to do oral. Please advise on which OTC strength and dosage she should take.

## 2020-07-22 NOTE — TELEPHONE ENCOUNTER
Should start 1,000 mcg daily. I've sent script to her pharmacy. If it ends up being cheaper, can also buy OTC. Recommend re-check in 1 month which I have ordered.  Dina Steinberg P.A.-C.

## 2020-07-23 RX ORDER — CYANOCOBALAMIN 1000 UG/ML
1000 INJECTION, SOLUTION INTRAMUSCULAR; SUBCUTANEOUS ONCE
Status: COMPLETED | OUTPATIENT
Start: 2020-07-23 | End: 2020-07-24

## 2020-07-23 NOTE — TELEPHONE ENCOUNTER
B12 injection ordered. When patient comes in tomorrow, please schedule her for another injection in 1 week. Will need weekly injections until her level normalizes.  Dina Steinberg P.A.-C.

## 2020-07-23 NOTE — TELEPHONE ENCOUNTER
Called pt, she states she would like to do injections instead. Pt is scheduled to get her shot tomorrow 7/23/2020.

## 2020-07-24 ENCOUNTER — NON-PROVIDER VISIT (OUTPATIENT)
Dept: MEDICAL GROUP | Facility: PHYSICIAN GROUP | Age: 70
End: 2020-07-24
Payer: MEDICARE

## 2020-07-24 PROCEDURE — 96372 THER/PROPH/DIAG INJ SC/IM: CPT | Performed by: PHYSICIAN ASSISTANT

## 2020-07-24 RX ADMIN — CYANOCOBALAMIN 1000 MCG: 1000 INJECTION, SOLUTION INTRAMUSCULAR; SUBCUTANEOUS at 13:33

## 2020-07-24 NOTE — PROGRESS NOTES
Naomy Vargas is a 70 y.o. female here for a non-provider visit for Vitamin B-12 injection.    Reason for injection: Vitamin B-12 Defeciency  Order in MAR?: Yes  Patient supplied?:No  Minimum interval has been met for this injection (per MAR order): Yes    Order and dose verified by: EF  Patient tolerated injection and no adverse effects were observed or reported: Yes    # of Administrations remaining in MAR: 0

## 2020-07-29 ENCOUNTER — HOSPITAL ENCOUNTER (OUTPATIENT)
Dept: RADIOLOGY | Facility: MEDICAL CENTER | Age: 70
End: 2020-07-29
Attending: PHYSICIAN ASSISTANT
Payer: MEDICARE

## 2020-07-29 DIAGNOSIS — I71.40 ABDOMINAL AORTIC ANEURYSM (AAA) 3.0 CM TO 5.0 CM IN DIAMETER IN FEMALE (HCC): ICD-10-CM

## 2020-07-29 DIAGNOSIS — I77.1 BILATERAL ILIAC ARTERY STENOSIS (HCC): ICD-10-CM

## 2020-07-29 PROCEDURE — 93978 VASCULAR STUDY: CPT

## 2020-07-29 PROCEDURE — 93978 VASCULAR STUDY: CPT | Mod: 26 | Performed by: INTERNAL MEDICINE

## 2020-07-31 ENCOUNTER — NON-PROVIDER VISIT (OUTPATIENT)
Dept: MEDICAL GROUP | Facility: PHYSICIAN GROUP | Age: 70
End: 2020-07-31
Payer: MEDICARE

## 2020-07-31 DIAGNOSIS — E53.8 B12 DEFICIENCY: ICD-10-CM

## 2020-07-31 PROCEDURE — 96372 THER/PROPH/DIAG INJ SC/IM: CPT | Performed by: NURSE PRACTITIONER

## 2020-07-31 RX ORDER — CYANOCOBALAMIN 1000 UG/ML
1000 INJECTION, SOLUTION INTRAMUSCULAR; SUBCUTANEOUS ONCE
Status: COMPLETED | OUTPATIENT
Start: 2020-07-31 | End: 2020-07-31

## 2020-07-31 RX ADMIN — CYANOCOBALAMIN 1000 MCG: 1000 INJECTION, SOLUTION INTRAMUSCULAR; SUBCUTANEOUS at 11:59

## 2020-07-31 NOTE — PROGRESS NOTES
Naomy Vargas is a 70 y.o. female here for a non-provider visit for B12 injection.    Reason for injection: B12 deficiency   Order in MAR?: Yes  Patient supplied?:No  Minimum interval has been met for this injection (per MAR order): Yes    Order and dose verified by: Guido  Patient tolerated injection and no adverse effects were observed or reported: Yes

## 2020-08-07 ENCOUNTER — NON-PROVIDER VISIT (OUTPATIENT)
Dept: MEDICAL GROUP | Facility: PHYSICIAN GROUP | Age: 70
End: 2020-08-07
Payer: MEDICARE

## 2020-08-07 DIAGNOSIS — E53.8 B12 DEFICIENCY: ICD-10-CM

## 2020-08-07 PROCEDURE — 96372 THER/PROPH/DIAG INJ SC/IM: CPT | Performed by: NURSE PRACTITIONER

## 2020-08-07 RX ORDER — CYANOCOBALAMIN 1000 UG/ML
1000 INJECTION, SOLUTION INTRAMUSCULAR; SUBCUTANEOUS ONCE
Status: COMPLETED | OUTPATIENT
Start: 2020-08-07 | End: 2020-08-07

## 2020-08-07 RX ADMIN — CYANOCOBALAMIN 1000 MCG: 1000 INJECTION, SOLUTION INTRAMUSCULAR; SUBCUTANEOUS at 11:45

## 2020-08-07 NOTE — PROGRESS NOTES
Naomy Vargas is a 70 y.o. female here for a non-provider visit for b12 injection.    Reason for injection: B12 defiveiency  Order in MAR?: Yes  Patient supplied?:No  Minimum interval has been met for this injection (per MAR order): Yes    Order and dose verified by: Guido  Patient tolerated injection and no adverse effects were observed or reported: Yes

## 2020-08-18 ENCOUNTER — HOSPITAL ENCOUNTER (OUTPATIENT)
Dept: LAB | Facility: MEDICAL CENTER | Age: 70
End: 2020-08-18
Attending: INTERNAL MEDICINE
Payer: MEDICARE

## 2020-08-18 ENCOUNTER — HOSPITAL ENCOUNTER (OUTPATIENT)
Dept: RADIOLOGY | Facility: MEDICAL CENTER | Age: 70
End: 2020-08-18
Attending: INTERNAL MEDICINE
Payer: MEDICARE

## 2020-08-18 DIAGNOSIS — D64.9 ANEMIA, UNSPECIFIED TYPE: ICD-10-CM

## 2020-08-18 DIAGNOSIS — N18.30 CKD (CHRONIC KIDNEY DISEASE) STAGE 3, GFR 30-59 ML/MIN: ICD-10-CM

## 2020-08-18 DIAGNOSIS — E55.9 VITAMIN D DEFICIENCY: ICD-10-CM

## 2020-08-18 LAB
ANION GAP SERPL CALC-SCNC: 17 MMOL/L (ref 7–16)
APPEARANCE UR: CLEAR
BACTERIA #/AREA URNS HPF: NEGATIVE /HPF
BILIRUB UR QL STRIP.AUTO: NEGATIVE
BUN SERPL-MCNC: 36 MG/DL (ref 8–22)
CALCIUM SERPL-MCNC: 9.6 MG/DL (ref 8.5–10.5)
CHLORIDE SERPL-SCNC: 96 MMOL/L (ref 96–112)
CO2 SERPL-SCNC: 24 MMOL/L (ref 20–33)
COLOR UR: YELLOW
CREAT SERPL-MCNC: 1.96 MG/DL (ref 0.5–1.4)
CREAT UR-MCNC: 57.71 MG/DL
EPI CELLS #/AREA URNS HPF: NEGATIVE /HPF
ERYTHROCYTE [DISTWIDTH] IN BLOOD BY AUTOMATED COUNT: 54 FL (ref 35.9–50)
GLUCOSE SERPL-MCNC: 90 MG/DL (ref 65–99)
GLUCOSE UR STRIP.AUTO-MCNC: NEGATIVE MG/DL
HCT VFR BLD AUTO: 39.9 % (ref 37–47)
HGB BLD-MCNC: 13.3 G/DL (ref 12–16)
HYALINE CASTS #/AREA URNS LPF: ABNORMAL /LPF
KETONES UR STRIP.AUTO-MCNC: NEGATIVE MG/DL
LEUKOCYTE ESTERASE UR QL STRIP.AUTO: ABNORMAL
MCH RBC QN AUTO: 37.6 PG (ref 27–33)
MCHC RBC AUTO-ENTMCNC: 33.3 G/DL (ref 33.6–35)
MCV RBC AUTO: 112.7 FL (ref 81.4–97.8)
MICRO URNS: ABNORMAL
MICROALBUMIN UR-MCNC: <1.2 MG/DL
MICROALBUMIN/CREAT UR: NORMAL MG/G (ref 0–30)
NITRITE UR QL STRIP.AUTO: NEGATIVE
PH UR STRIP.AUTO: 6 [PH] (ref 5–8)
PLATELET # BLD AUTO: 128 K/UL (ref 164–446)
PMV BLD AUTO: 12.8 FL (ref 9–12.9)
POTASSIUM SERPL-SCNC: 4.4 MMOL/L (ref 3.6–5.5)
PROT UR QL STRIP: NEGATIVE MG/DL
PTH-INTACT SERPL-MCNC: 33.8 PG/ML (ref 14–72)
RBC # BLD AUTO: 3.54 M/UL (ref 4.2–5.4)
RBC # URNS HPF: ABNORMAL /HPF
RBC UR QL AUTO: NEGATIVE
SODIUM SERPL-SCNC: 137 MMOL/L (ref 135–145)
SP GR UR STRIP.AUTO: 1.01
UROBILINOGEN UR STRIP.AUTO-MCNC: 0.2 MG/DL
WBC # BLD AUTO: 5.9 K/UL (ref 4.8–10.8)
WBC #/AREA URNS HPF: ABNORMAL /HPF

## 2020-08-18 PROCEDURE — 81001 URINALYSIS AUTO W/SCOPE: CPT

## 2020-08-18 PROCEDURE — 36415 COLL VENOUS BLD VENIPUNCTURE: CPT

## 2020-08-18 PROCEDURE — 76775 US EXAM ABDO BACK WALL LIM: CPT

## 2020-08-18 PROCEDURE — 83970 ASSAY OF PARATHORMONE: CPT

## 2020-08-18 PROCEDURE — 80048 BASIC METABOLIC PNL TOTAL CA: CPT

## 2020-08-18 PROCEDURE — 82043 UR ALBUMIN QUANTITATIVE: CPT

## 2020-08-18 PROCEDURE — 82570 ASSAY OF URINE CREATININE: CPT

## 2020-08-18 PROCEDURE — 85027 COMPLETE CBC AUTOMATED: CPT

## 2020-08-25 ENCOUNTER — OFFICE VISIT (OUTPATIENT)
Dept: NEPHROLOGY | Facility: MEDICAL CENTER | Age: 70
End: 2020-08-25
Payer: MEDICARE

## 2020-08-25 VITALS
WEIGHT: 165 LBS | DIASTOLIC BLOOD PRESSURE: 72 MMHG | TEMPERATURE: 97.4 F | SYSTOLIC BLOOD PRESSURE: 128 MMHG | RESPIRATION RATE: 14 BRPM | HEIGHT: 68 IN | OXYGEN SATURATION: 99 % | BODY MASS INDEX: 25.01 KG/M2 | HEART RATE: 71 BPM

## 2020-08-25 DIAGNOSIS — E55.9 VITAMIN D DEFICIENCY: ICD-10-CM

## 2020-08-25 DIAGNOSIS — N18.30 CKD (CHRONIC KIDNEY DISEASE) STAGE 3, GFR 30-59 ML/MIN: ICD-10-CM

## 2020-08-25 DIAGNOSIS — R80.9 MICROALBUMINURIA: ICD-10-CM

## 2020-08-25 DIAGNOSIS — D64.9 ANEMIA, UNSPECIFIED TYPE: ICD-10-CM

## 2020-08-25 DIAGNOSIS — I10 ESSENTIAL HYPERTENSION: ICD-10-CM

## 2020-08-25 DIAGNOSIS — N17.9 AKI (ACUTE KIDNEY INJURY) (HCC): ICD-10-CM

## 2020-08-25 DIAGNOSIS — N13.30 HYDRONEPHROSIS, UNSPECIFIED HYDRONEPHROSIS TYPE: ICD-10-CM

## 2020-08-25 PROCEDURE — 99214 OFFICE O/P EST MOD 30 MIN: CPT | Performed by: INTERNAL MEDICINE

## 2020-08-25 RX ORDER — LISINOPRIL 20 MG/1
20 TABLET ORAL DAILY
Qty: 30 TAB | Refills: 3 | Status: SHIPPED | OUTPATIENT
Start: 2020-08-25 | End: 2020-12-21

## 2020-08-25 ASSESSMENT — FIBROSIS 4 INDEX: FIB4 SCORE: 2.78

## 2020-08-25 ASSESSMENT — ENCOUNTER SYMPTOMS
ORTHOPNEA: 0
CHILLS: 0
WEIGHT LOSS: 0
COUGH: 0
NAUSEA: 0
ABDOMINAL PAIN: 0
SHORTNESS OF BREATH: 0
SINUS PAIN: 0
DIARRHEA: 0
FLANK PAIN: 0
WHEEZING: 0
VOMITING: 0
EYES NEGATIVE: 1
FEVER: 0
PALPITATIONS: 0
HEMOPTYSIS: 0

## 2020-08-25 NOTE — PROGRESS NOTES
Subjective:      Naomy Vargas is a 70 y.o. female who presents with Follow-Up and Chronic Kidney Disease            Chronic Kidney Disease  Pertinent negatives include no abdominal pain, chest pain, chills, congestion, coughing, fever, nausea or vomiting.     Naomy is coming today for initial evaluation of CKD III with worsening creat level  Doing well, no complaints  No dysuria/hematuria/flank pain  Pedal edema improved  No NSAID's  CKD III-creat in 2018 at 1.02 -worse to 1.33 in Feb 2019 - worsen to1.6! -now at 1.9!  HTN: BP well controlled  CT abd from 2008 revealed scarred right kidney  Recent US small right kidney with hydronephrosis    Review of Systems   Constitutional: Negative for chills, fever, malaise/fatigue and weight loss.   HENT: Negative for congestion, hearing loss and sinus pain.    Eyes: Negative.    Respiratory: Negative for cough, hemoptysis, shortness of breath and wheezing.    Cardiovascular: Negative for chest pain, palpitations, orthopnea and leg swelling.   Gastrointestinal: Negative for abdominal pain, diarrhea, nausea and vomiting.   Genitourinary: Negative for dysuria, flank pain, frequency, hematuria and urgency.   Skin: Negative.    All other systems reviewed and are negative.    Past Medical History:   Diagnosis Date   • Anxiety    • Arthritis     wrists   • Back pain    • Blood transfusion without reported diagnosis     with ectopic preg x 2    • Breath shortness     with exertion   • Cataract     bilateral removal-Dr. Duke Talamantes   • Cellulitis     right lower leg   • Cellulitis of leg 11/1/2013    Followed by dermatology. Managed with fluocinonide and Bactroban on right leg Left leg she uses triamcinolone and Sarna pramocaine (anesthetic, antipruretic) on both.     • Chickenpox    • COPD (chronic obstructive pulmonary disease) ([facility])    • Dental disorder     upper denture   • Earache    • Fatigue    • Frequent urination    • Hypertension 02/23/2018    on no meds at this  time   • Hypothyroidism    • Influenza    • Insomnia    • Kidney disease     reports 1 kidney is smaller than the other   • Mumps    • OAB (overactive bladder) 2/11/2019   • Pain 02/23/2018    right leg and back, 5/10   • Pneumonia    • Rash    • Ringing in ears    • Shortness of breath    • Swelling of lower extremity    • Thyroid disease    • Tonsillitis    • Toothache    • Venous stasis dermatitis     right leg       Family History   Problem Relation Age of Onset   • Arthritis Mother         hip fracture   • Diabetes Mother    • Cancer Mother         skin   • Arthritis Father         lung   • Alcohol/Drug Father         etoh   • Lung Disease Sister         smoker/copd   • Hypertension Sister    • Other Brother         hep c   • Arthritis Maternal Grandmother         hip fracture   • Diabetes Maternal Grandfather    • No Known Problems Brother    • No Known Problems Sister    • No Known Problems Brother    • Hyperlipidemia Neg Hx    • Stroke Neg Hx        Social History     Socioeconomic History   • Marital status:      Spouse name: Not on file   • Number of children: 2   • Years of education: Not on file   • Highest education level: Not on file   Occupational History   • Occupation: retired   Social Needs   • Financial resource strain: Not on file   • Food insecurity     Worry: Not on file     Inability: Not on file   • Transportation needs     Medical: Not on file     Non-medical: Not on file   Tobacco Use   • Smoking status: Former Smoker     Packs/day: 0.50     Years: 53.00     Pack years: 26.50     Types: Cigarettes     Start date: 3/29/1963     Quit date: 7/18/2017     Years since quitting: 3.1   • Smokeless tobacco: Never Used   • Tobacco comment: class in July 2017   Substance and Sexual Activity   • Alcohol use: Yes     Comment: 2 per day   • Drug use: No   • Sexual activity: Not Currently     Partners: Male   Lifestyle   • Physical activity     Days per week: Not on file     Minutes per session:  "Not on file   • Stress: Not on file   Relationships   • Social connections     Talks on phone: Not on file     Gets together: Not on file     Attends Yarsani service: Not on file     Active member of club or organization: Not on file     Attends meetings of clubs or organizations: Not on file     Relationship status: Not on file   • Intimate partner violence     Fear of current or ex partner: Not on file     Emotionally abused: Not on file     Physically abused: Not on file     Forced sexual activity: Not on file   Other Topics Concern   • Not on file   Social History Narrative   • Not on file          Objective:     /72 (BP Location: Right arm, Patient Position: Sitting)   Pulse 71   Temp 36.3 °C (97.4 °F)   Resp 14   Ht 1.727 m (5' 8\")   Wt 74.8 kg (165 lb)   LMP  (LMP Unknown)   SpO2 99%   BMI 25.09 kg/m²      Physical Exam  Vitals signs and nursing note reviewed.   Constitutional:       General: She is not in acute distress.     Appearance: Normal appearance. She is well-developed and normal weight. She is not diaphoretic.   HENT:      Head: Normocephalic and atraumatic.      Nose: Nose normal. No congestion.      Mouth/Throat:      Mouth: Mucous membranes are moist.      Pharynx: Oropharynx is clear.   Eyes:      General: No scleral icterus.     Extraocular Movements: Extraocular movements intact.      Conjunctiva/sclera: Conjunctivae normal.      Pupils: Pupils are equal, round, and reactive to light.   Neck:      Musculoskeletal: Normal range of motion and neck supple.   Cardiovascular:      Rate and Rhythm: Normal rate and regular rhythm.      Pulses: Normal pulses.      Heart sounds: Normal heart sounds. No friction rub. No gallop.    Pulmonary:      Effort: Pulmonary effort is normal. No respiratory distress.      Breath sounds: Normal breath sounds. No wheezing, rhonchi or rales.   Abdominal:      General: Bowel sounds are normal. There is no distension.      Palpations: There is no mass. "      Tenderness: There is no abdominal tenderness. There is no right CVA tenderness, left CVA tenderness or guarding.   Musculoskeletal:      Right lower leg: No edema.      Left lower leg: No edema.   Skin:     General: Skin is warm.      Coloration: Skin is not jaundiced.      Findings: No erythema or rash.   Neurological:      General: No focal deficit present.      Mental Status: She is alert and oriented to person, place, and time.      Cranial Nerves: No cranial nerve deficit.      Coordination: Coordination normal.   Psychiatric:         Mood and Affect: Mood normal.         Behavior: Behavior normal.         Thought Content: Thought content normal.         Judgment: Judgment normal.            Laboratory and renal imaging results reviewed : d/w pt  Lab Results   Component Value Date/Time    CREATININE 1.96 (H) 08/18/2020 01:44 PM    POTASSIUM 4.4 08/18/2020 01:44 PM          Assessment/Plan:       1. DARRIN/CKD (chronic kidney disease) stage 3, GFR 30-59 ml/min (HCC)      With worsening creat level to monitor closely, increase fluid intake      Hold HCTZ      Urology evaluation for hydronephrosis    2. Vitamin D deficiency      Well controlled      PTH WNL    3. Essential hypertension      BP well controlled To monitor BP at home nd call clinic if BP> 135/85    4. Microalbuminuria      Very mild On ACEi      5. Anemia, unspecified type      Hb stable WNL    Recs:  Hold HCTZ  Urology evaluation  Keep well hydrated  Low salt diet  Monitor BP  Avoid NSAID's  F/u in 2 weeks

## 2020-08-25 NOTE — PATIENT INSTRUCTIONS
Stop lisinopril/HCTZ  Replace with lisinopril 20 mg QD  Low salt diet  Keep well hydrated  Urology evaluation

## 2020-09-08 ENCOUNTER — HOSPITAL ENCOUNTER (OUTPATIENT)
Dept: LAB | Facility: MEDICAL CENTER | Age: 70
End: 2020-09-08
Attending: INTERNAL MEDICINE
Payer: MEDICARE

## 2020-09-08 DIAGNOSIS — N18.30 CKD (CHRONIC KIDNEY DISEASE) STAGE 3, GFR 30-59 ML/MIN: ICD-10-CM

## 2020-09-08 DIAGNOSIS — N17.9 AKI (ACUTE KIDNEY INJURY) (HCC): ICD-10-CM

## 2020-09-08 DIAGNOSIS — N13.30 HYDRONEPHROSIS, UNSPECIFIED HYDRONEPHROSIS TYPE: ICD-10-CM

## 2020-09-08 LAB
ANION GAP SERPL CALC-SCNC: 12 MMOL/L (ref 7–16)
BUN SERPL-MCNC: 28 MG/DL (ref 8–22)
CALCIUM SERPL-MCNC: 9.4 MG/DL (ref 8.5–10.5)
CHLORIDE SERPL-SCNC: 101 MMOL/L (ref 96–112)
CO2 SERPL-SCNC: 24 MMOL/L (ref 20–33)
CREAT SERPL-MCNC: 1.36 MG/DL (ref 0.5–1.4)
GLUCOSE SERPL-MCNC: 84 MG/DL (ref 65–99)
POTASSIUM SERPL-SCNC: 4.9 MMOL/L (ref 3.6–5.5)
SODIUM SERPL-SCNC: 137 MMOL/L (ref 135–145)

## 2020-09-08 PROCEDURE — 36415 COLL VENOUS BLD VENIPUNCTURE: CPT

## 2020-09-08 PROCEDURE — 80048 BASIC METABOLIC PNL TOTAL CA: CPT

## 2020-09-10 ENCOUNTER — OFFICE VISIT (OUTPATIENT)
Dept: NEPHROLOGY | Facility: MEDICAL CENTER | Age: 70
End: 2020-09-10
Payer: MEDICARE

## 2020-09-10 ENCOUNTER — HOSPITAL ENCOUNTER (OUTPATIENT)
Dept: LAB | Facility: MEDICAL CENTER | Age: 70
End: 2020-09-10
Attending: INTERNAL MEDICINE
Payer: MEDICARE

## 2020-09-10 VITALS
RESPIRATION RATE: 14 BRPM | HEIGHT: 68 IN | SYSTOLIC BLOOD PRESSURE: 140 MMHG | TEMPERATURE: 97.8 F | HEART RATE: 76 BPM | OXYGEN SATURATION: 96 % | DIASTOLIC BLOOD PRESSURE: 76 MMHG | BODY MASS INDEX: 25.46 KG/M2 | WEIGHT: 168 LBS

## 2020-09-10 DIAGNOSIS — E55.9 VITAMIN D DEFICIENCY: ICD-10-CM

## 2020-09-10 DIAGNOSIS — I10 ESSENTIAL HYPERTENSION: ICD-10-CM

## 2020-09-10 DIAGNOSIS — N39.0 URINARY TRACT INFECTION WITHOUT HEMATURIA, SITE UNSPECIFIED: ICD-10-CM

## 2020-09-10 DIAGNOSIS — D64.9 ANEMIA, UNSPECIFIED TYPE: ICD-10-CM

## 2020-09-10 DIAGNOSIS — N18.30 CKD (CHRONIC KIDNEY DISEASE) STAGE 3, GFR 30-59 ML/MIN: ICD-10-CM

## 2020-09-10 DIAGNOSIS — N13.30 HYDRONEPHROSIS, UNSPECIFIED HYDRONEPHROSIS TYPE: ICD-10-CM

## 2020-09-10 DIAGNOSIS — R80.9 MICROALBUMINURIA: ICD-10-CM

## 2020-09-10 LAB
APPEARANCE UR: CLEAR
BACTERIA #/AREA URNS HPF: NEGATIVE /HPF
BILIRUB UR QL STRIP.AUTO: NEGATIVE
COLOR UR: YELLOW
EPI CELLS #/AREA URNS HPF: NEGATIVE /HPF
GLUCOSE UR STRIP.AUTO-MCNC: NEGATIVE MG/DL
HYALINE CASTS #/AREA URNS LPF: ABNORMAL /LPF
KETONES UR STRIP.AUTO-MCNC: NEGATIVE MG/DL
LEUKOCYTE ESTERASE UR QL STRIP.AUTO: ABNORMAL
MICRO URNS: ABNORMAL
NITRITE UR QL STRIP.AUTO: NEGATIVE
PH UR STRIP.AUTO: 7 [PH] (ref 5–8)
PROT UR QL STRIP: NEGATIVE MG/DL
RBC # URNS HPF: ABNORMAL /HPF
RBC UR QL AUTO: NEGATIVE
SP GR UR STRIP.AUTO: 1.01
UROBILINOGEN UR STRIP.AUTO-MCNC: 1 MG/DL
WBC #/AREA URNS HPF: ABNORMAL /HPF

## 2020-09-10 PROCEDURE — 81001 URINALYSIS AUTO W/SCOPE: CPT

## 2020-09-10 PROCEDURE — 99214 OFFICE O/P EST MOD 30 MIN: CPT | Performed by: INTERNAL MEDICINE

## 2020-09-10 PROCEDURE — 87086 URINE CULTURE/COLONY COUNT: CPT

## 2020-09-10 ASSESSMENT — ENCOUNTER SYMPTOMS
VOMITING: 0
CHILLS: 0
EYES NEGATIVE: 1
WEIGHT LOSS: 0
WHEEZING: 0
HEMOPTYSIS: 0
FLANK PAIN: 0
NECK PAIN: 0
MYALGIAS: 0
ABDOMINAL PAIN: 1
SHORTNESS OF BREATH: 0
SINUS PAIN: 0
ORTHOPNEA: 0
PALPITATIONS: 0
FEVER: 0
COUGH: 0
BACK PAIN: 0
NAUSEA: 0

## 2020-09-10 ASSESSMENT — FIBROSIS 4 INDEX: FIB4 SCORE: 2.78

## 2020-09-10 NOTE — PROGRESS NOTES
Subjective:      Naomy Vargas is a 70 y.o. female who presents with Follow-Up and Chronic Kidney Disease            Chronic Kidney Disease  Associated symptoms include abdominal pain. Pertinent negatives include no chest pain, chills, congestion, coughing, fever, myalgias, nausea, neck pain or vomiting.     Naomy is coming today for f/u of CKD III with worsening creat level  Doing well, no complaints except recent UTI with some bladder discomfort  No dysuria/hematuria/flank pain, no fever/chills  Pedal edema improved  No NSAID's  CKD III-creat in 2018 at 1.02 -worse to 1.33 in Feb 2019 - worsen to1.6! - 1.9!-improved to 1,3  HTN: BP well controlled  CT abd from 2008 revealed scarred right kidney  Recent US small right kidney with hydronephrosis scheduled with Urology    Review of Systems   Constitutional: Negative for chills, fever, malaise/fatigue and weight loss.   HENT: Negative for congestion, hearing loss and sinus pain.    Eyes: Negative.    Respiratory: Negative for cough, hemoptysis, shortness of breath and wheezing.    Cardiovascular: Negative for chest pain, palpitations, orthopnea and leg swelling.   Gastrointestinal: Positive for abdominal pain. Negative for nausea and vomiting.   Genitourinary: Negative for dysuria, flank pain, frequency, hematuria and urgency.   Musculoskeletal: Negative for back pain, joint pain, myalgias and neck pain.   Skin: Negative.    All other systems reviewed and are negative.    Past Medical History:   Diagnosis Date   • Anxiety    • Arthritis     wrists   • Back pain    • Blood transfusion without reported diagnosis     with ectopic preg x 2    • Breath shortness     with exertion   • Cataract     bilateral removal-Dr. Duke Talamantes   • Cellulitis     right lower leg   • Cellulitis of leg 11/1/2013    Followed by dermatology. Managed with fluocinonide and Bactroban on right leg Left leg she uses triamcinolone and Sarna pramocaine (anesthetic, antipruretic) on both.      • Chickenpox    • COPD (chronic obstructive pulmonary disease) ([facility])    • Dental disorder     upper denture   • Earache    • Fatigue    • Frequent urination    • Hypertension 02/23/2018    on no meds at this time   • Hypothyroidism    • Influenza    • Insomnia    • Kidney disease     reports 1 kidney is smaller than the other   • Mumps    • OAB (overactive bladder) 2/11/2019   • Pain 02/23/2018    right leg and back, 5/10   • Pneumonia    • Rash    • Ringing in ears    • Shortness of breath    • Swelling of lower extremity    • Thyroid disease    • Tonsillitis    • Toothache    • Venous stasis dermatitis     right leg       Family History   Problem Relation Age of Onset   • Arthritis Mother         hip fracture   • Diabetes Mother    • Cancer Mother         skin   • Arthritis Father         lung   • Alcohol/Drug Father         etoh   • Lung Disease Sister         smoker/copd   • Hypertension Sister    • Other Brother         hep c   • Arthritis Maternal Grandmother         hip fracture   • Diabetes Maternal Grandfather    • No Known Problems Brother    • No Known Problems Sister    • No Known Problems Brother    • Hyperlipidemia Neg Hx    • Stroke Neg Hx        Social History     Socioeconomic History   • Marital status:      Spouse name: Not on file   • Number of children: 2   • Years of education: Not on file   • Highest education level: Not on file   Occupational History   • Occupation: retired   Social Needs   • Financial resource strain: Not on file   • Food insecurity     Worry: Not on file     Inability: Not on file   • Transportation needs     Medical: Not on file     Non-medical: Not on file   Tobacco Use   • Smoking status: Former Smoker     Packs/day: 0.50     Years: 53.00     Pack years: 26.50     Types: Cigarettes     Start date: 3/29/1963     Quit date: 7/18/2017     Years since quitting: 3.1   • Smokeless tobacco: Never Used   • Tobacco comment: class in July 2017   Substance and Sexual  "Activity   • Alcohol use: Yes     Comment: 2 per day   • Drug use: No   • Sexual activity: Not Currently     Partners: Male   Lifestyle   • Physical activity     Days per week: Not on file     Minutes per session: Not on file   • Stress: Not on file   Relationships   • Social connections     Talks on phone: Not on file     Gets together: Not on file     Attends [employer] service: Not on file     Active member of club or organization: Not on file     Attends meetings of clubs or organizations: Not on file     Relationship status: Not on file   • Intimate partner violence     Fear of current or ex partner: Not on file     Emotionally abused: Not on file     Physically abused: Not on file     Forced sexual activity: Not on file   Other Topics Concern   • Not on file   Social History Narrative   • Not on file          Objective:     /76 (BP Location: Right arm, Patient Position: Sitting)   Pulse 76   Temp 36.6 °C (97.8 °F)   Resp 14   Ht 1.727 m (5' 8\")   Wt 76.2 kg (168 lb)   LMP  (LMP Unknown)   SpO2 96%   BMI 25.54 kg/m²      Physical Exam  Vitals signs reviewed.   Constitutional:       General: She is not in acute distress.     Appearance: Normal appearance. She is well-developed. She is not diaphoretic.   HENT:      Head: Normocephalic and atraumatic.      Nose: Nose normal.      Mouth/Throat:      Mouth: Mucous membranes are moist.      Pharynx: Oropharynx is clear.   Eyes:      General: No scleral icterus.     Extraocular Movements: Extraocular movements intact.      Conjunctiva/sclera: Conjunctivae normal.      Pupils: Pupils are equal, round, and reactive to light.   Neck:      Musculoskeletal: Normal range of motion and neck supple.   Cardiovascular:      Rate and Rhythm: Normal rate and regular rhythm.      Pulses: Normal pulses.      Heart sounds: Normal heart sounds. No friction rub. No gallop.    Pulmonary:      Effort: Pulmonary effort is normal. No respiratory distress.      Breath sounds: " Normal breath sounds. No wheezing, rhonchi or rales.   Abdominal:      General: Bowel sounds are normal. There is no distension.      Palpations: Abdomen is soft. There is no mass.      Tenderness: There is no abdominal tenderness. There is no right CVA tenderness, left CVA tenderness or guarding.   Musculoskeletal:         General: No swelling.      Right lower leg: No edema.      Left lower leg: No edema.   Skin:     General: Skin is warm.      Coloration: Skin is not jaundiced or pale.      Findings: No erythema or rash.   Neurological:      General: No focal deficit present.      Mental Status: She is alert and oriented to person, place, and time.      Cranial Nerves: No cranial nerve deficit.      Coordination: Coordination normal.   Psychiatric:         Mood and Affect: Mood normal.         Behavior: Behavior normal.         Thought Content: Thought content normal.         Judgment: Judgment normal.            Laboratory and renal imaging results reviewed : d/w pt  Lab Results   Component Value Date/Time    CREATININE 1.36 09/08/2020 11:58 AM    POTASSIUM 4.9 09/08/2020 11:58 AM          Assessment/Plan:       1. DARRIN/CKD (chronic kidney disease) stage 3, GFR 30-59 ml/min ([facility])      Creat level improved to 1.36      Hold HCTZ      Urology f/u      Keep well hydrated    2. Vitamin D deficiency      Well controlled      PTH WNL    3. Essential hypertension      BP well controlled To monitor BP at home nd call clinic if BP> 135/85    4. Microalbuminuria      Very mild On ACEi      5. Anemia, unspecified type      Hb stable WNL  6. Recent UTI -to complete UA and culture  Recs:  Hold HCTZ  Urology f/u  Keep well hydrated  Low salt diet  Monitor BP  Avoid NSAID's  F/u in 3 months

## 2020-09-13 LAB
BACTERIA UR CULT: NORMAL
SIGNIFICANT IND 70042: NORMAL
SITE SITE: NORMAL
SOURCE SOURCE: NORMAL

## 2020-09-20 ENCOUNTER — APPOINTMENT (OUTPATIENT)
Dept: RADIOLOGY | Facility: IMAGING CENTER | Age: 70
End: 2020-09-20
Attending: PHYSICIAN ASSISTANT
Payer: MEDICARE

## 2020-09-20 ENCOUNTER — OFFICE VISIT (OUTPATIENT)
Dept: URGENT CARE | Facility: PHYSICIAN GROUP | Age: 70
End: 2020-09-20
Payer: MEDICARE

## 2020-09-20 VITALS
HEIGHT: 68 IN | SYSTOLIC BLOOD PRESSURE: 140 MMHG | TEMPERATURE: 97.7 F | WEIGHT: 168 LBS | RESPIRATION RATE: 16 BRPM | OXYGEN SATURATION: 99 % | BODY MASS INDEX: 25.46 KG/M2 | HEART RATE: 72 BPM | DIASTOLIC BLOOD PRESSURE: 80 MMHG

## 2020-09-20 DIAGNOSIS — S52.501A CLOSED FRACTURE OF DISTAL END OF RIGHT RADIUS, UNSPECIFIED FRACTURE MORPHOLOGY, INITIAL ENCOUNTER: Primary | ICD-10-CM

## 2020-09-20 DIAGNOSIS — S69.91XA INJURY OF RIGHT WRIST, INITIAL ENCOUNTER: ICD-10-CM

## 2020-09-20 DIAGNOSIS — S62.124A CLOSED NONDISPLACED FRACTURE OF LUNATE OF RIGHT WRIST, INITIAL ENCOUNTER: ICD-10-CM

## 2020-09-20 PROCEDURE — 99214 OFFICE O/P EST MOD 30 MIN: CPT | Performed by: PHYSICIAN ASSISTANT

## 2020-09-20 PROCEDURE — 73110 X-RAY EXAM OF WRIST: CPT | Mod: TC,RT | Performed by: PHYSICIAN ASSISTANT

## 2020-09-20 ASSESSMENT — ENCOUNTER SYMPTOMS
FEVER: 0
FALLS: 1
DIARRHEA: 0
SHORTNESS OF BREATH: 0
VOMITING: 0
ABDOMINAL PAIN: 0
CHILLS: 0
LOSS OF CONSCIOUSNESS: 0
COUGH: 0
MUSCLE WEAKNESS: 0
NAUSEA: 0
TINGLING: 0
CONSTIPATION: 0
NUMBNESS: 0

## 2020-09-20 ASSESSMENT — FIBROSIS 4 INDEX: FIB4 SCORE: 2.78

## 2020-09-20 ASSESSMENT — PAIN SCALES - GENERAL: PAINLEVEL: 5=MODERATE PAIN

## 2020-09-20 NOTE — PROGRESS NOTES
Subjective:   Naomy Vargas is a 70 y.o. female who presents for Fall (off bed, tried to catch self and hand got caught in handle and heard a crack and now swollen and bruised, x last night )        Wrist Injury   The incident occurred 3 to 6 hours ago. The incident occurred at home. The injury mechanism was a fall (FOOSH injury ). Pain location: R wrist  The pain does not radiate. The pain is at a severity of 5/10. Pertinent negatives include no muscle weakness, numbness or tingling. The symptoms are aggravated by movement and palpation. She has tried ice for the symptoms.     No previous of injury or surgery. Hx of osteopenia.     R hand dominant     Review of Systems   Constitutional: Negative for chills, fever and malaise/fatigue.   Respiratory: Negative for cough and shortness of breath.    Gastrointestinal: Negative for abdominal pain, constipation, diarrhea, nausea and vomiting.   Musculoskeletal: Positive for falls and joint pain (R wrist ).   Neurological: Negative for tingling, loss of consciousness and numbness.   All other systems reviewed and are negative.      PMH:  has a past medical history of Anxiety, Arthritis, Back pain, Blood transfusion without reported diagnosis, Breath shortness, Cataract, Cellulitis, Cellulitis of leg (11/1/2013), Chickenpox, COPD (chronic obstructive pulmonary disease) ([facility]), Dental disorder, Earache, Fatigue, Frequent urination, Hypertension (02/23/2018), Hypothyroidism, Influenza, Insomnia, Kidney disease, Mumps, OAB (overactive bladder) (2/11/2019), Pain (02/23/2018), Pneumonia, Rash, Ringing in ears, Shortness of breath, Swelling of lower extremity, Thyroid disease, Tonsillitis, Toothache, and Venous stasis dermatitis. She also has no past medical history of Anemia, Clotting disorder ([facility]), Depression, Diabetic neuropathy ([facility]), Headache(784.0), HIV (human immunodeficiency virus infection) ([facility]), Hyperlipidemia, IBD (inflammatory bowel disease), Meningitis,  Osteoporosis, Substance abuse (H[facility]), or Ulcer.  MEDS:   Current Outpatient Medications:   •  lisinopril (PRINIVIL) 20 MG Tab, Take 1 Tab by mouth every day., Disp: 30 Tab, Rfl: 3  •  levothyroxine (SYNTHROID) 100 MCG Tab, TAKE 1 TABLET BY MOUTH EVERY DAY BEFORE BREAKFAST, Disp: 100 Tab, Rfl: 1  •  amLODIPine (NORVASC) 5 MG Tab, Take 1 Tab by mouth every day., Disp: 100 Tab, Rfl: 1  •  triamcinolone acetonide (KENALOG) 0.1 % Cream, Apply 1 Application to affected area(s) 2 times a day as needed., Disp: 80 g, Rfl: 2  •  aspirin (ASA) 325 MG Tab, Take 325 mg by mouth every day., Disp: , Rfl:   •  Mirabegron ER (MYRBETRIQ) 50 MG TABLET SR 24 HR, every day., Disp: , Rfl:   •  Melatonin 1 MG Tab, Take  by mouth., Disp: , Rfl:   •  PROAIR  (90 Base) MCG/ACT Aero Soln inhalation aerosol, INHALE 2 (TWO) PUFF EVERY SIX HOURS, AS NEEDED, Disp: , Rfl: 1  •  diphenhydrAMINE (BENADRYL) 25 MG Tab, Take 25 mg by mouth every 6 hours as needed for Sleep., Disp: , Rfl:   •  Acetaminophen 500 MG Cap, Take  by mouth., Disp: , Rfl:   •  Ascorbic Acid (MARYAM-C PO), Take  by mouth., Disp: , Rfl:   •  Oxymetazoline HCl (NASAL SPRAY NA), Spray  in nose., Disp: , Rfl:   •  Cholecalciferol (VITAMIN D3) 5000 units Cap, Take 1 Cap by mouth every day., Disp: , Rfl:   ALLERGIES:   Allergies   Allergen Reactions   • Colophony [Pinus Strobus] Itching   • Latex Rash     .   • Neosporin [Neomycin-Polymyxin B] Rash     .   • Phenylene Itching   • Soap Rash     Certain soaps cause rash   • Tape Itching     PAPER TAPE OK   • Tea Tree Oil Rash     .     SURGHX:   Past Surgical History:   Procedure Laterality Date   • VEIN LIGATION RADIO FREQUENCY Right 2/26/2018    Procedure: VEIN LIGATION RADIO FREQUENCY- LONG SAPENOUS;  Surgeon: Jim Alas M.D.;  Location: SURGERY [facility];  Service: General   • ABDOMINAL EXPLORATION      2 ectopic preg   • BREAST BIOPSY      hx of benign left breast biopsy   • EYE SURGERY      cataract x2   •  "HYSTERECTOMY LAPAROSCOPY     • HYSTERECTOMY, TOTAL ABDOMINAL  1977/1978   • OOPHORECTOMY     • PB MAMMARY DUCTOGRAM, SINGLE     • PB REMV 2ND CATARACT,CORN-SCLER SECTN     • TONSILLECTOMY       SOCHX:  reports that she quit smoking about 3 years ago. Her smoking use included cigarettes. She started smoking about 57 years ago. She has a 26.50 pack-year smoking history. She has never used smokeless tobacco. She reports current alcohol use. She reports that she does not use drugs.  Family History   Problem Relation Age of Onset   • Arthritis Mother         hip fracture   • Diabetes Mother    • Cancer Mother         skin   • Arthritis Father         lung   • Alcohol/Drug Father         etoh   • Lung Disease Sister         smoker/copd   • Hypertension Sister    • Other Brother         hep c   • Arthritis Maternal Grandmother         hip fracture   • Diabetes Maternal Grandfather    • No Known Problems Brother    • No Known Problems Sister    • No Known Problems Brother    • Hyperlipidemia Neg Hx    • Stroke Neg Hx         Objective:   /80 (BP Location: Right arm, Patient Position: Sitting, BP Cuff Size: Adult)   Pulse 72   Temp 36.5 °C (97.7 °F) (Tympanic)   Resp 16   Ht 1.727 m (5' 8\")   Wt 76.2 kg (168 lb)   LMP  (LMP Unknown)   SpO2 99%   Breastfeeding No   BMI 25.54 kg/m²     Physical Exam  Vitals signs reviewed.   Constitutional:       General: She is not in acute distress.     Appearance: She is well-developed.   HENT:      Head: Normocephalic and atraumatic.      Right Ear: External ear normal.      Left Ear: External ear normal.      Nose: Nose normal.      Mouth/Throat:      Mouth: Mucous membranes are moist.   Eyes:      Conjunctiva/sclera: Conjunctivae normal.      Pupils: Pupils are equal, round, and reactive to light.   Neck:      Musculoskeletal: Normal range of motion and neck supple.      Trachea: No tracheal deviation.   Cardiovascular:      Rate and Rhythm: Normal rate and regular rhythm. "   Pulmonary:      Effort: Pulmonary effort is normal.      Breath sounds: Normal breath sounds.   Musculoskeletal:      Left wrist: She exhibits decreased range of motion, tenderness and bony tenderness. She exhibits no swelling and no effusion.   Skin:     General: Skin is warm and dry.      Capillary Refill: Capillary refill takes less than 2 seconds.   Neurological:      General: No focal deficit present.      Mental Status: She is alert and oriented to person, place, and time.   Psychiatric:         Mood and Affect: Mood normal.         Behavior: Behavior normal.        Wrist IMPRESSION:     1.  Nondisplaced distal radial metaphysis fracture.  2.  Suggestion of irregularity of the lunate which is new from prior. This is age indeterminate. Follow-up is suggested.      Assessment/Plan:     1. Closed fracture of distal end of right radius, unspecified fracture morphology, initial encounter  REFERRAL TO ORTHOPEDICS   2. Closed nondisplaced fracture of lunate of right wrist, initial encounter     3. Injury of right wrist, initial encounter  DX-WRIST-COMPLETE 3+ RIGHT     Supportive care reviewed.  Rest, ice, elevate.  The patient was placed in a sugar tong splint.  Radius fracture handout provided.  A referral was placed to follow-up with orthopedics, patient will call to schedule on Tuesday.  We discussed possibility of repeat x-ray to evaluate possible lunate fracture.    If symptoms worsen or persist patient can return to clinic for reevaluation.  Red flags and emergency room precautions discussed. Patient confirmed understanding of information.    Please note that this dictation was created using voice recognition software. I have made every reasonable attempt to correct obvious errors, but I expect that there are errors of grammar and possibly content that I did not discover before finalizing the note.

## 2020-09-23 ENCOUNTER — TELEPHONE (OUTPATIENT)
Dept: URGENT CARE | Facility: PHYSICIAN GROUP | Age: 70
End: 2020-09-23

## 2020-09-23 ENCOUNTER — HOSPITAL ENCOUNTER (OUTPATIENT)
Dept: RADIOLOGY | Facility: MEDICAL CENTER | Age: 70
End: 2020-09-23
Attending: INTERNAL MEDICINE
Payer: MEDICARE

## 2020-09-23 DIAGNOSIS — R91.8 PULMONARY NODULES: ICD-10-CM

## 2020-09-23 PROCEDURE — 71250 CT THORAX DX C-: CPT

## 2020-09-23 NOTE — TELEPHONE ENCOUNTER
Pt said she saw you over the weekend regarding broken arm and you told her if she doesn't hear from the orthopedic by Tuesday to reach out to you.

## 2020-09-29 ENCOUNTER — HOSPITAL ENCOUNTER (OUTPATIENT)
Dept: RADIOLOGY | Facility: MEDICAL CENTER | Age: 70
End: 2020-09-29
Attending: ORTHOPAEDIC SURGERY
Payer: MEDICARE

## 2020-09-29 DIAGNOSIS — S62.124S: ICD-10-CM

## 2020-09-29 DIAGNOSIS — S52.591S: ICD-10-CM

## 2020-09-29 PROCEDURE — 73200 CT UPPER EXTREMITY W/O DYE: CPT | Mod: RT

## 2020-10-02 ENCOUNTER — HOSPITAL ENCOUNTER (OUTPATIENT)
Dept: RADIOLOGY | Facility: MEDICAL CENTER | Age: 70
End: 2020-10-02
Attending: UROLOGY
Payer: MEDICARE

## 2020-10-02 DIAGNOSIS — N32.81 OVERACTIVE BLADDER: ICD-10-CM

## 2020-10-02 PROCEDURE — 74176 CT ABD & PELVIS W/O CONTRAST: CPT

## 2020-10-06 ENCOUNTER — OFFICE VISIT (OUTPATIENT)
Dept: MEDICAL GROUP | Facility: PHYSICIAN GROUP | Age: 70
End: 2020-10-06
Payer: MEDICARE

## 2020-10-06 VITALS
HEIGHT: 68 IN | WEIGHT: 168 LBS | BODY MASS INDEX: 25.46 KG/M2 | SYSTOLIC BLOOD PRESSURE: 132 MMHG | OXYGEN SATURATION: 95 % | DIASTOLIC BLOOD PRESSURE: 78 MMHG | HEART RATE: 69 BPM | TEMPERATURE: 97.5 F

## 2020-10-06 DIAGNOSIS — E53.8 B12 DEFICIENCY: ICD-10-CM

## 2020-10-06 DIAGNOSIS — N18.32 STAGE 3B CHRONIC KIDNEY DISEASE: ICD-10-CM

## 2020-10-06 DIAGNOSIS — D75.89 MACROCYTOSIS: ICD-10-CM

## 2020-10-06 DIAGNOSIS — E55.9 VITAMIN D DEFICIENCY: ICD-10-CM

## 2020-10-06 DIAGNOSIS — E03.1 CONGENITAL HYPOTHYROIDISM WITHOUT GOITER: ICD-10-CM

## 2020-10-06 DIAGNOSIS — Z13.6 SCREENING FOR CARDIOVASCULAR CONDITION: ICD-10-CM

## 2020-10-06 DIAGNOSIS — Z12.31 ENCOUNTER FOR SCREENING MAMMOGRAM FOR MALIGNANT NEOPLASM OF BREAST: ICD-10-CM

## 2020-10-06 PROCEDURE — 99214 OFFICE O/P EST MOD 30 MIN: CPT | Performed by: NURSE PRACTITIONER

## 2020-10-06 ASSESSMENT — FIBROSIS 4 INDEX: FIB4 SCORE: 2.78

## 2020-10-06 NOTE — PATIENT INSTRUCTIONS
Call and make appointment     [facility]  [address] [place] [id number]  Phone: [phone]

## 2020-10-06 NOTE — PROGRESS NOTES
Chief Complaint   Patient presents with   • [facility] Care         Subjective:     Naomy Vargas is a 70 y.o. female presenting to establish care.    F/u for radial fracture next week.    Lower extremity rash/venous stasis: Chronic, uncontrolled.  Patient reports hypersensitivity to small injuries, describing multiple episodes where she had abrasions or cuts to her lower extremities that led to widespread cellulitis, edema, pruritic rash.  She has been hospitalized twice for this.  She is been worked up extensively by both allergy as well as vein specialist.  Working diagnosis has been venous stasis.  Underwent venous ablation of the right lower extremity several years ago but states rash and irritation have persisted.  Reports that she saw Dr. Montiel at dermatology and has not seen them for several years but he to did not understand the etiology of her physical reaction.  Currently she manages with topical corticosteroid using as needed.    Hypothyroidism: Chronic, controlled.  TSH checked in June 2020, within normal limits.  Patient 100 mcg levothyroxine and tolerating well without signs or symptoms of hypo-or hyperthyroidism.    Chronic kidney disease: Chronic, controlled.  Patient established with nephrology, seeing them routinely and avoiding nephrotoxins.      Review of systems:      Denies chest pain, shortness of breath, sore throat, difficulty swallowing, new cough, dizziness, severe headache, altered cognition, changes in bowel or bladder habits, decreased sensation, decreased strength, numbness or tingling, intolerable depression or anxiety, rash or skin concerns, changes in vision, fatigue, myalgias, painful or swollen lymph nodes.       Current Outpatient Medications:   •  lisinopril (PRINIVIL) 20 MG Tab, Take 1 Tab by mouth every day., Disp: 30 Tab, Rfl: 3  •  levothyroxine (SYNTHROID) 100 MCG Tab, TAKE 1 TABLET BY MOUTH EVERY DAY BEFORE BREAKFAST, Disp: 100 Tab, Rfl: 1  •  amLODIPine  "(NORVASC) 5 MG Tab, Take 1 Tab by mouth every day., Disp: 100 Tab, Rfl: 1  •  triamcinolone acetonide (KENALOG) 0.1 % Cream, Apply 1 Application to affected area(s) 2 times a day as needed., Disp: 80 g, Rfl: 2  •  aspirin (ASA) 325 MG Tab, Take 325 mg by mouth every day., Disp: , Rfl:   •  Mirabegron ER (MYRBETRIQ) 50 MG TABLET SR 24 HR, every day., Disp: , Rfl:   •  Melatonin 1 MG Tab, Take  by mouth., Disp: , Rfl:   •  PROAIR  (90 Base) MCG/ACT Aero Soln inhalation aerosol, INHALE 2 (TWO) PUFF EVERY SIX HOURS, AS NEEDED, Disp: , Rfl: 1  •  diphenhydrAMINE (BENADRYL) 25 MG Tab, Take 25 mg by mouth every 6 hours as needed for Sleep., Disp: , Rfl:   •  Acetaminophen 500 MG Cap, Take  by mouth., Disp: , Rfl:   •  Ascorbic Acid (MARYAM-C PO), Take  by mouth., Disp: , Rfl:   •  Oxymetazoline HCl (NASAL SPRAY NA), Spray  in nose., Disp: , Rfl:   •  Cholecalciferol (VITAMIN D3) 5000 units Cap, Take 1 Cap by mouth every day., Disp: , Rfl:     Allergies, past medical history, past surgical history, family history, social history reviewed and updated    Objective:     Vitals: /78 (BP Location: Left arm, Patient Position: Sitting, BP Cuff Size: Adult)   Pulse 69   Temp 36.4 °C (97.5 °F) (Temporal)   Ht 1.727 m (5' 8\")   Wt 76.2 kg (168 lb)   LMP  (LMP Unknown)   SpO2 95%   BMI 25.54 kg/m²   General: Alert, cooperative, dressed appropriately for weather / situation  Eyes:Normocephalic.  EOMI, no icterus or pallor.  Conjunctivae clear without erythema / irritation.  ENT:  External ears developed; Bilat TMs visualized; appear pearly without bulging, effusion, or erythema; crisp light reflex.    Lymph: Neck supple, absent of cervical or supraclavicular lymphadenopathy.  Thyroid palpated, free of masses or goiter.   Heart: Regular rate and rhythm.  S1 and S2 normal.  No murmurs auscultated; no murmurs / bruits heard over bilateral carotids.  Bilateral radial pulses strong and equal.  Bilateral posterior tibial " pulses strong and equal.  Respiratory: Normal respiratory effort.  Clear to auscultation bilaterally.  AP ratio 1:2   Abdomen: Non-distended;   Skin: Notable hemosiderin staining bilateral lower extremities below the knee, right leg worse than left.  Multiple scratch abrasions visualized on right lower extremity along with intermixed erythema.  Ecchymosis.  Scattered thickened plaques visualized.  Musculoskeletal: Gait is normal.  Bilateral  strength strong equal.  Moves extremities freely and equally bilaterally  Neuro:  AAOx3 Visual tracking intact, no nystagmus;   Psych:  Affect/mood is normal, judgement is good, memory is intact, grooming is appropriate.    Assessment/Plan:     Naomy was seen today for establish care.    Diagnoses and all orders for this visit:    Patient due for updated fasting lab work as well as mammogram.  Requested that she do so.    Regarding the venous stasis and chronic pruritic rash of the lower extremities, I would like to obtain the records from dermatology seeing what specific testing was done.  It would likely benefit patient to follow-up with either dermatology or vascular medicine as that this is a persistent worsening problem.    Encounter for screening mammogram for malignant neoplasm of breast  -     MA-SCREENING MAMMO BILAT W/TOMOSYNTHESIS W/CAD; Future    Stage 3b chronic kidney disease  -     Comp Metabolic Panel; Future    Congenital hypothyroidism without goiter  -     TSH WITH REFLEX TO FT4; Future    Vitamin D deficiency  -     VITAMIN D,25 HYDROXY; Future    Macrocytosis  -     VITAMIN B12; Future  -     CBC WITH DIFFERENTIAL; Future    B12 deficiency  -     VITAMIN B12; Future    Screening for cardiovascular condition  -     Lipid Profile; Future          Return in about 4 months (around 2/6/2021).    Patient verbalized understanding and agreed to plan of care.  Encouraged to contact me with needs via [employer] or by phone if needed.      I have placed the above  orders and discussed them with an approved delegating provider.  The MA is performing the below orders under the direction of Dr Andino.    Please note that this dictation was created using voice recognition software. I have made every reasonable attempt to correct obvious errors, but I expect that there are errors of grammar and possibly content that I did not discover before finalizing the note.

## 2020-11-20 DIAGNOSIS — Z78.0 POSTMENOPAUSAL: ICD-10-CM

## 2020-11-26 DIAGNOSIS — I10 ESSENTIAL HYPERTENSION: ICD-10-CM

## 2020-11-30 RX ORDER — AMLODIPINE BESYLATE 5 MG/1
TABLET ORAL
Qty: 90 TAB | Refills: 2 | Status: SHIPPED | OUTPATIENT
Start: 2020-11-30 | End: 2021-12-16

## 2020-12-07 ENCOUNTER — HOSPITAL ENCOUNTER (OUTPATIENT)
Dept: LAB | Facility: MEDICAL CENTER | Age: 70
End: 2020-12-07
Attending: INTERNAL MEDICINE
Payer: MEDICARE

## 2020-12-07 DIAGNOSIS — D64.9 ANEMIA, UNSPECIFIED TYPE: ICD-10-CM

## 2020-12-07 DIAGNOSIS — I10 ESSENTIAL HYPERTENSION: ICD-10-CM

## 2020-12-07 DIAGNOSIS — N39.0 URINARY TRACT INFECTION WITHOUT HEMATURIA, SITE UNSPECIFIED: ICD-10-CM

## 2020-12-07 DIAGNOSIS — N18.30 CKD (CHRONIC KIDNEY DISEASE) STAGE 3, GFR 30-59 ML/MIN: ICD-10-CM

## 2020-12-07 DIAGNOSIS — E55.9 VITAMIN D DEFICIENCY: ICD-10-CM

## 2020-12-07 LAB
25(OH)D3 SERPL-MCNC: 59 NG/ML (ref 30–100)
ANION GAP SERPL CALC-SCNC: 12 MMOL/L (ref 7–16)
APPEARANCE UR: CLEAR
BACTERIA #/AREA URNS HPF: NEGATIVE /HPF
BILIRUB UR QL STRIP.AUTO: NEGATIVE
BUN SERPL-MCNC: 26 MG/DL (ref 8–22)
CALCIUM SERPL-MCNC: 10 MG/DL (ref 8.5–10.5)
CHLORIDE SERPL-SCNC: 103 MMOL/L (ref 96–112)
CO2 SERPL-SCNC: 26 MMOL/L (ref 20–33)
COLOR UR: YELLOW
CREAT SERPL-MCNC: 1.23 MG/DL (ref 0.5–1.4)
CREAT UR-MCNC: 71.13 MG/DL
EPI CELLS #/AREA URNS HPF: NORMAL /HPF
ERYTHROCYTE [DISTWIDTH] IN BLOOD BY AUTOMATED COUNT: 47.6 FL (ref 35.9–50)
GLUCOSE SERPL-MCNC: 97 MG/DL (ref 65–99)
GLUCOSE UR STRIP.AUTO-MCNC: NEGATIVE MG/DL
HCT VFR BLD AUTO: 44.5 % (ref 37–47)
HGB BLD-MCNC: 13.9 G/DL (ref 12–16)
KETONES UR STRIP.AUTO-MCNC: NEGATIVE MG/DL
LEUKOCYTE ESTERASE UR QL STRIP.AUTO: ABNORMAL
MCH RBC QN AUTO: 31.2 PG (ref 27–33)
MCHC RBC AUTO-ENTMCNC: 31.2 G/DL (ref 33.6–35)
MCV RBC AUTO: 100 FL (ref 81.4–97.8)
MICRO URNS: ABNORMAL
MICROALBUMIN UR-MCNC: 2.4 MG/DL
MICROALBUMIN/CREAT UR: 34 MG/G (ref 0–30)
NITRITE UR QL STRIP.AUTO: NEGATIVE
PH UR STRIP.AUTO: 6.5 [PH] (ref 5–8)
PLATELET # BLD AUTO: 133 K/UL (ref 164–446)
PMV BLD AUTO: 12.1 FL (ref 9–12.9)
POTASSIUM SERPL-SCNC: 4.3 MMOL/L (ref 3.6–5.5)
PROT UR QL STRIP: NEGATIVE MG/DL
PTH-INTACT SERPL-MCNC: 33.9 PG/ML (ref 14–72)
RBC # BLD AUTO: 4.45 M/UL (ref 4.2–5.4)
RBC # URNS HPF: NORMAL /HPF
RBC UR QL AUTO: NEGATIVE
SODIUM SERPL-SCNC: 141 MMOL/L (ref 135–145)
SP GR UR STRIP.AUTO: 1.02
UROBILINOGEN UR STRIP.AUTO-MCNC: 0.2 MG/DL
WBC # BLD AUTO: 6.9 K/UL (ref 4.8–10.8)
WBC #/AREA URNS HPF: NORMAL /HPF

## 2020-12-07 PROCEDURE — 82570 ASSAY OF URINE CREATININE: CPT

## 2020-12-07 PROCEDURE — 36415 COLL VENOUS BLD VENIPUNCTURE: CPT

## 2020-12-07 PROCEDURE — 82043 UR ALBUMIN QUANTITATIVE: CPT

## 2020-12-07 PROCEDURE — 85027 COMPLETE CBC AUTOMATED: CPT

## 2020-12-07 PROCEDURE — 83970 ASSAY OF PARATHORMONE: CPT

## 2020-12-07 PROCEDURE — 80048 BASIC METABOLIC PNL TOTAL CA: CPT

## 2020-12-07 PROCEDURE — 82306 VITAMIN D 25 HYDROXY: CPT

## 2020-12-07 PROCEDURE — 81001 URINALYSIS AUTO W/SCOPE: CPT

## 2020-12-09 ENCOUNTER — OFFICE VISIT (OUTPATIENT)
Dept: NEPHROLOGY | Facility: MEDICAL CENTER | Age: 70
End: 2020-12-09
Payer: MEDICARE

## 2020-12-09 VITALS
OXYGEN SATURATION: 95 % | SYSTOLIC BLOOD PRESSURE: 132 MMHG | DIASTOLIC BLOOD PRESSURE: 74 MMHG | WEIGHT: 164.4 LBS | HEART RATE: 70 BPM | BODY MASS INDEX: 24.92 KG/M2 | TEMPERATURE: 98.7 F | HEIGHT: 68 IN

## 2020-12-09 DIAGNOSIS — N18.31 STAGE 3A CHRONIC KIDNEY DISEASE: ICD-10-CM

## 2020-12-09 DIAGNOSIS — E55.9 VITAMIN D DEFICIENCY: ICD-10-CM

## 2020-12-09 DIAGNOSIS — R80.9 MICROALBUMINURIA: ICD-10-CM

## 2020-12-09 DIAGNOSIS — D64.9 ANEMIA, UNSPECIFIED TYPE: ICD-10-CM

## 2020-12-09 DIAGNOSIS — I10 ESSENTIAL HYPERTENSION: ICD-10-CM

## 2020-12-09 PROCEDURE — 99214 OFFICE O/P EST MOD 30 MIN: CPT | Performed by: INTERNAL MEDICINE

## 2020-12-09 ASSESSMENT — ENCOUNTER SYMPTOMS
SHORTNESS OF BREATH: 0
ABDOMINAL PAIN: 0
NAUSEA: 0
PALPITATIONS: 0
CHILLS: 0
COUGH: 0
WEIGHT LOSS: 0
SINUS PAIN: 0
ORTHOPNEA: 0
HEMOPTYSIS: 0
WHEEZING: 0
FEVER: 0
DIARRHEA: 0
VOMITING: 0
FLANK PAIN: 0
EYES NEGATIVE: 1

## 2020-12-09 ASSESSMENT — FIBROSIS 4 INDEX: FIB4 SCORE: 2.672612419124243847

## 2020-12-09 NOTE — PROGRESS NOTES
Subjective:      Naomy Vargas is a 70 y.o. female who presents with Follow-Up            Chronic Kidney Disease  Pertinent negatives include no abdominal pain, chest pain, chills, congestion, coughing, fever, nausea or vomiting.     Naomy is coming today for f/u of CKD III   Doing well, no complaints.  No dysuria/hematuria/flank pain, no fever/chills  Pedal edema improved  No NSAID's  CKD III-creat in 2018 at 1.02 -worse to 1.33 in Feb 2019 - worsen to1.6! - 1.9!-improved to 1.3 -now at 1.23  HTN: BP well controlled  CT abd from 2008 revealed scarred right kidney  Renal US small right kidney with hydronephrosis f/u with Urology    Review of Systems   Constitutional: Negative for chills, fever, malaise/fatigue and weight loss.   HENT: Negative for congestion, hearing loss and sinus pain.    Eyes: Negative.    Respiratory: Negative for cough, hemoptysis, shortness of breath and wheezing.    Cardiovascular: Negative for chest pain, palpitations, orthopnea and leg swelling.   Gastrointestinal: Negative for abdominal pain, diarrhea, nausea and vomiting.   Genitourinary: Negative for dysuria, flank pain, frequency, hematuria and urgency.   Skin: Negative.    All other systems reviewed and are negative.    Past Medical History:   Diagnosis Date   • Anxiety    • Arthritis     wrists   • Back pain    • Blood transfusion without reported diagnosis     with ectopic preg x 2    • Breath shortness     with exertion   • Cataract     bilateral removal-Dr. Duke Talamantes   • Cellulitis     right lower leg   • Cellulitis of leg 11/1/2013    Followed by dermatology. Managed with fluocinonide and Bactroban on right leg Left leg she uses triamcinolone and Sarna pramocaine (anesthetic, antipruretic) on both.     • Chickenpox    • COPD (chronic obstructive pulmonary disease) (HCC)    • Dental disorder     upper denture   • Earache    • Fatigue    • Frequent urination    • Hypertension 02/23/2018    on no meds at this time   •  Hypothyroidism    • Influenza    • Insomnia    • Kidney disease     reports 1 kidney is smaller than the other   • Mumps    • OAB (overactive bladder) 2/11/2019   • Pain 02/23/2018    right leg and back, 5/10   • Pneumonia    • Rash    • Ringing in ears    • Shortness of breath    • Swelling of lower extremity    • Thyroid disease    • Tonsillitis    • Toothache    • Venous stasis dermatitis     right leg       Family History   Problem Relation Age of Onset   • Arthritis Mother         hip fracture   • Diabetes Mother    • Cancer Mother         skin   • Arthritis Father         lung   • Alcohol/Drug Father         etoh   • Lung Disease Sister         smoker/copd   • Hypertension Sister    • Other Brother         hep c   • Arthritis Maternal Grandmother         hip fracture   • Diabetes Maternal Grandfather    • No Known Problems Son    • No Known Problems Son    • No Known Problems Brother    • No Known Problems Sister    • No Known Problems Brother    • Hyperlipidemia Neg Hx    • Stroke Neg Hx        Social History     Socioeconomic History   • Marital status:      Spouse name: Not on file   • Number of children: 2   • Years of education: Not on file   • Highest education level: Not on file   Occupational History   • Occupation: retired   Social Needs   • Financial resource strain: Not on file   • Food insecurity     Worry: Not on file     Inability: Not on file   • Transportation needs     Medical: Not on file     Non-medical: Not on file   Tobacco Use   • Smoking status: Former Smoker     Packs/day: 0.50     Years: 53.00     Pack years: 26.50     Types: Cigarettes     Start date: 3/29/1963     Quit date: 7/18/2017     Years since quitting: 3.3   • Smokeless tobacco: Never Used   • Tobacco comment: class in July 2017   Substance and Sexual Activity   • Alcohol use: Yes     Comment: 2 per day   • Drug use: No   • Sexual activity: Not Currently     Partners: Male   Lifestyle   • Physical activity     Days per  "week: Not on file     Minutes per session: Not on file   • Stress: Not on file   Relationships   • Social connections     Talks on phone: Not on file     Gets together: Not on file     Attends Buddhist service: Not on file     Active member of club or organization: Not on file     Attends meetings of clubs or organizations: Not on file     Relationship status: Not on file   • Intimate partner violence     Fear of current or ex partner: Not on file     Emotionally abused: Not on file     Physically abused: Not on file     Forced sexual activity: Not on file   Other Topics Concern   • Not on file   Social History Narrative   • Not on file          Objective:     /74 (BP Location: Left arm, Patient Position: Sitting, BP Cuff Size: Adult)   Pulse 70   Temp 37.1 °C (98.7 °F) (Temporal)   Ht 1.727 m (5' 8\")   Wt 74.6 kg (164 lb 6.4 oz)   LMP  (LMP Unknown)   SpO2 95%   BMI 25.00 kg/m²      Physical Exam  Vitals signs reviewed.   Constitutional:       General: She is not in acute distress.     Appearance: Normal appearance. She is well-developed. She is not diaphoretic.   HENT:      Head: Normocephalic and atraumatic.      Nose: Nose normal.      Mouth/Throat:      Mouth: Mucous membranes are moist.      Pharynx: Oropharynx is clear.   Eyes:      Extraocular Movements: Extraocular movements intact.      Conjunctiva/sclera: Conjunctivae normal.      Pupils: Pupils are equal, round, and reactive to light.   Neck:      Musculoskeletal: Normal range of motion and neck supple.   Cardiovascular:      Rate and Rhythm: Normal rate and regular rhythm.      Pulses: Normal pulses.      Heart sounds: Normal heart sounds.   Pulmonary:      Effort: Pulmonary effort is normal. No respiratory distress.      Breath sounds: Normal breath sounds. No wheezing, rhonchi or rales.   Abdominal:      General: Bowel sounds are normal. There is no distension.      Palpations: Abdomen is soft. There is no mass.      Tenderness: There is " no abdominal tenderness. There is no right CVA tenderness, left CVA tenderness or guarding.   Musculoskeletal:      Right lower leg: No edema.      Left lower leg: No edema.   Skin:     General: Skin is warm.      Findings: No erythema or rash.   Neurological:      General: No focal deficit present.      Mental Status: She is alert and oriented to person, place, and time.      Cranial Nerves: No cranial nerve deficit.      Coordination: Coordination normal.   Psychiatric:         Mood and Affect: Mood normal.         Behavior: Behavior normal.         Thought Content: Thought content normal.         Judgment: Judgment normal.            Laboratory and renal imaging results reviewed : d/w pt  Lab Results   Component Value Date/Time    CREATININE 1.23 12/07/2020 01:23 PM    POTASSIUM 4.3 12/07/2020 01:23 PM          Assessment/Plan:       1. DARRIN/CKD (chronic kidney disease) stage 3, GFR 30-59 ml/min ([facility])      Creat level improved to 1.36 -1.23    2. Vitamin D deficiency      Well controlled      PTH WNL    3. Essential hypertension      BP well controlled       To monitor BP at home nd call clinic if BP> 135/85    4. Microalbuminuria      Very mild On ACEi      5. Anemia, unspecified type      Hb stable WNL    Recs:  Continue current treatment  Urology f/u  Keep well hydrated  Low salt diet  Monitor BP  Avoid NSAID's  F/u in 6 months

## 2020-12-21 RX ORDER — LISINOPRIL 20 MG/1
TABLET ORAL
Qty: 30 TAB | Refills: 3 | Status: SHIPPED | OUTPATIENT
Start: 2020-12-21 | End: 2021-05-26

## 2020-12-22 ENCOUNTER — HOSPITAL ENCOUNTER (OUTPATIENT)
Dept: RADIOLOGY | Facility: MEDICAL CENTER | Age: 70
End: 2020-12-22
Attending: NURSE PRACTITIONER
Payer: MEDICARE

## 2020-12-22 ENCOUNTER — HOSPITAL ENCOUNTER (OUTPATIENT)
Dept: RADIOLOGY | Facility: MEDICAL CENTER | Age: 70
End: 2020-12-22
Attending: FAMILY MEDICINE
Payer: MEDICARE

## 2020-12-22 DIAGNOSIS — Z78.0 POSTMENOPAUSAL: ICD-10-CM

## 2020-12-22 DIAGNOSIS — Z12.31 ENCOUNTER FOR SCREENING MAMMOGRAM FOR MALIGNANT NEOPLASM OF BREAST: ICD-10-CM

## 2020-12-22 PROCEDURE — 77080 DXA BONE DENSITY AXIAL: CPT

## 2020-12-22 PROCEDURE — 77067 SCR MAMMO BI INCL CAD: CPT

## 2021-01-15 DIAGNOSIS — Z23 NEED FOR VACCINATION: ICD-10-CM

## 2021-02-01 ENCOUNTER — TELEPHONE (OUTPATIENT)
Dept: MEDICAL GROUP | Facility: PHYSICIAN GROUP | Age: 71
End: 2021-02-01

## 2021-02-01 NOTE — TELEPHONE ENCOUNTER
ESTABLISHED PATIENT PRE-VISIT PLANNING     Patient was contacted to complete PVP.     Note: Patient will not be contacted if there is no indication to call.     1.  Reviewed notes from the last few office visits within the medical group: Yes    2.  If any orders were placed at last visit or intended to be done for this visit (i.e. 6 mos follow-up), do we have Results/Consult Notes?         •  Labs - Labs ordered, NOT completed. Patient advised to complete prior to next appointment.  Note: If patient appointment is for lab review and patient did not complete labs, check with provider if OK to reschedule patient until labs completed.       •  Imaging - Imaging ordered, completed and results are in chart.       •  Referrals - No referrals were ordered at last office visit.    3. Is this appointment scheduled as a Hospital Follow-Up? No    4.  Immunizations were updated in Epic using Reconcile Outside Information activity? No    5.  Patient is due for the following Health Maintenance Topics:   Health Maintenance Due   Topic Date Due   • COVID-19 Vaccine (1 of 2) 02/19/1966   • COLONOSCOPY  03/24/2019   • Annual Pulmonary Function Test / Spirometry  04/17/2019       6.  AHA (Pulse8) form printed for Provider? Yes

## 2021-02-11 ENCOUNTER — HOSPITAL ENCOUNTER (OUTPATIENT)
Dept: LAB | Facility: MEDICAL CENTER | Age: 71
End: 2021-02-11
Attending: NURSE PRACTITIONER
Payer: MEDICARE

## 2021-02-11 DIAGNOSIS — E53.8 B12 DEFICIENCY: ICD-10-CM

## 2021-02-11 DIAGNOSIS — E55.9 VITAMIN D DEFICIENCY: ICD-10-CM

## 2021-02-11 DIAGNOSIS — E03.1 CONGENITAL HYPOTHYROIDISM WITHOUT GOITER: ICD-10-CM

## 2021-02-11 DIAGNOSIS — Z13.6 SCREENING FOR CARDIOVASCULAR CONDITION: ICD-10-CM

## 2021-02-11 DIAGNOSIS — D75.89 MACROCYTOSIS: ICD-10-CM

## 2021-02-11 DIAGNOSIS — N18.32 STAGE 3B CHRONIC KIDNEY DISEASE: ICD-10-CM

## 2021-02-11 LAB
25(OH)D3 SERPL-MCNC: 72 NG/ML (ref 30–100)
ALBUMIN SERPL BCP-MCNC: 4.3 G/DL (ref 3.2–4.9)
ALBUMIN/GLOB SERPL: 1.2 G/DL
ALP SERPL-CCNC: 85 U/L (ref 30–99)
ALT SERPL-CCNC: 16 U/L (ref 2–50)
ANION GAP SERPL CALC-SCNC: 13 MMOL/L (ref 7–16)
AST SERPL-CCNC: 21 U/L (ref 12–45)
BASOPHILS # BLD AUTO: 0.9 % (ref 0–1.8)
BASOPHILS # BLD: 0.06 K/UL (ref 0–0.12)
BILIRUB SERPL-MCNC: 0.8 MG/DL (ref 0.1–1.5)
BUN SERPL-MCNC: 22 MG/DL (ref 8–22)
CALCIUM SERPL-MCNC: 9.8 MG/DL (ref 8.5–10.5)
CHLORIDE SERPL-SCNC: 100 MMOL/L (ref 96–112)
CHOLEST SERPL-MCNC: 180 MG/DL (ref 100–199)
CO2 SERPL-SCNC: 25 MMOL/L (ref 20–33)
CREAT SERPL-MCNC: 1.3 MG/DL (ref 0.5–1.4)
EOSINOPHIL # BLD AUTO: 0.62 K/UL (ref 0–0.51)
EOSINOPHIL NFR BLD: 8.9 % (ref 0–6.9)
ERYTHROCYTE [DISTWIDTH] IN BLOOD BY AUTOMATED COUNT: 45.1 FL (ref 35.9–50)
GLOBULIN SER CALC-MCNC: 3.5 G/DL (ref 1.9–3.5)
GLUCOSE SERPL-MCNC: 101 MG/DL (ref 65–99)
HCT VFR BLD AUTO: 46.4 % (ref 37–47)
HDLC SERPL-MCNC: 82 MG/DL
HGB BLD-MCNC: 15 G/DL (ref 12–16)
IMM GRANULOCYTES # BLD AUTO: 0.03 K/UL (ref 0–0.11)
IMM GRANULOCYTES NFR BLD AUTO: 0.4 % (ref 0–0.9)
LDLC SERPL CALC-MCNC: 82 MG/DL
LYMPHOCYTES # BLD AUTO: 0.89 K/UL (ref 1–4.8)
LYMPHOCYTES NFR BLD: 12.8 % (ref 22–41)
MCH RBC QN AUTO: 31.9 PG (ref 27–33)
MCHC RBC AUTO-ENTMCNC: 32.3 G/DL (ref 33.6–35)
MCV RBC AUTO: 98.7 FL (ref 81.4–97.8)
MONOCYTES # BLD AUTO: 0.79 K/UL (ref 0–0.85)
MONOCYTES NFR BLD AUTO: 11.4 % (ref 0–13.4)
NEUTROPHILS # BLD AUTO: 4.57 K/UL (ref 2–7.15)
NEUTROPHILS NFR BLD: 65.6 % (ref 44–72)
NRBC # BLD AUTO: 0 K/UL
NRBC BLD-RTO: 0 /100 WBC
PLATELET # BLD AUTO: 131 K/UL (ref 164–446)
PMV BLD AUTO: 12.7 FL (ref 9–12.9)
POTASSIUM SERPL-SCNC: 4.3 MMOL/L (ref 3.6–5.5)
PROT SERPL-MCNC: 7.8 G/DL (ref 6–8.2)
RBC # BLD AUTO: 4.7 M/UL (ref 4.2–5.4)
SODIUM SERPL-SCNC: 138 MMOL/L (ref 135–145)
TRIGL SERPL-MCNC: 78 MG/DL (ref 0–149)
TSH SERPL DL<=0.005 MIU/L-ACNC: 2.79 UIU/ML (ref 0.38–5.33)
VIT B12 SERPL-MCNC: 871 PG/ML (ref 211–911)
WBC # BLD AUTO: 7 K/UL (ref 4.8–10.8)

## 2021-02-11 PROCEDURE — 36415 COLL VENOUS BLD VENIPUNCTURE: CPT

## 2021-02-11 PROCEDURE — 80053 COMPREHEN METABOLIC PANEL: CPT

## 2021-02-11 PROCEDURE — 82607 VITAMIN B-12: CPT

## 2021-02-11 PROCEDURE — 82306 VITAMIN D 25 HYDROXY: CPT

## 2021-02-11 PROCEDURE — 84443 ASSAY THYROID STIM HORMONE: CPT

## 2021-02-11 PROCEDURE — 85025 COMPLETE CBC W/AUTO DIFF WBC: CPT

## 2021-02-11 PROCEDURE — 80061 LIPID PANEL: CPT

## 2021-02-16 ENCOUNTER — OFFICE VISIT (OUTPATIENT)
Dept: MEDICAL GROUP | Facility: PHYSICIAN GROUP | Age: 71
End: 2021-02-16
Payer: MEDICARE

## 2021-02-16 VITALS
HEIGHT: 68 IN | TEMPERATURE: 98.7 F | DIASTOLIC BLOOD PRESSURE: 76 MMHG | OXYGEN SATURATION: 96 % | SYSTOLIC BLOOD PRESSURE: 132 MMHG | WEIGHT: 174 LBS | HEART RATE: 86 BPM | BODY MASS INDEX: 26.37 KG/M2

## 2021-02-16 DIAGNOSIS — R19.09 ABDOMINAL MASS OF OTHER SITE: ICD-10-CM

## 2021-02-16 DIAGNOSIS — I87.2 VENOUS STASIS DERMATITIS OF BOTH LOWER EXTREMITIES: ICD-10-CM

## 2021-02-16 DIAGNOSIS — R59.0 ABDOMINAL LYMPHADENOPATHY: ICD-10-CM

## 2021-02-16 DIAGNOSIS — E03.1 CONGENITAL HYPOTHYROIDISM WITHOUT GOITER: ICD-10-CM

## 2021-02-16 DIAGNOSIS — J44.9 CHRONIC OBSTRUCTIVE PULMONARY DISEASE, UNSPECIFIED COPD TYPE (HCC): ICD-10-CM

## 2021-02-16 DIAGNOSIS — I71.40 ABDOMINAL AORTIC ANEURYSM (AAA) 3.0 CM TO 5.0 CM IN DIAMETER IN FEMALE (HCC): ICD-10-CM

## 2021-02-16 DIAGNOSIS — N18.32 STAGE 3B CHRONIC KIDNEY DISEASE: ICD-10-CM

## 2021-02-16 DIAGNOSIS — M85.80 OSTEOPENIA, UNSPECIFIED LOCATION: ICD-10-CM

## 2021-02-16 DIAGNOSIS — D69.6 THROMBOCYTOPENIA, UNSPECIFIED (HCC): ICD-10-CM

## 2021-02-16 DIAGNOSIS — I71.40 ABDOMINAL AORTIC ANEURYSM WITHOUT RUPTURE (HCC): ICD-10-CM

## 2021-02-16 DIAGNOSIS — I77.1 BILATERAL ILIAC ARTERY STENOSIS (HCC): ICD-10-CM

## 2021-02-16 PROBLEM — E55.9 VITAMIN D DEFICIENCY: Status: RESOLVED | Noted: 2017-06-20 | Resolved: 2021-02-16

## 2021-02-16 PROCEDURE — 99214 OFFICE O/P EST MOD 30 MIN: CPT | Performed by: NURSE PRACTITIONER

## 2021-02-16 PROCEDURE — 8041 PR SCP AHA: Performed by: NURSE PRACTITIONER

## 2021-02-16 RX ORDER — LEVOTHYROXINE SODIUM 0.1 MG/1
100 TABLET ORAL
Qty: 100 TABLET | Refills: 3 | Status: SHIPPED | OUTPATIENT
Start: 2021-02-16 | End: 2022-03-25

## 2021-02-16 RX ORDER — NITROFURANTOIN MACROCRYSTALS 50 MG/1
CAPSULE ORAL
Status: ON HOLD | COMMUNITY
Start: 2021-01-13 | End: 2023-03-30

## 2021-02-16 ASSESSMENT — PATIENT HEALTH QUESTIONNAIRE - PHQ9: CLINICAL INTERPRETATION OF PHQ2 SCORE: 0

## 2021-02-16 ASSESSMENT — FIBROSIS 4 INDEX: FIB4 SCORE: 2.81

## 2021-02-16 NOTE — PATIENT INSTRUCTIONS
[facility] Vascular Medicine  [address] [place] [id number]  Phone: [phone]       Today, your Healthcare Provider may have discussed the following recommendations:    1. Exercise and Physical Activity  According to the American Heart Association, it is recommended to engage in physical activity regularly and to aim for 150 minutes of moderate-intensity aerobic activity per week.  Your Healthcare Provider may have recommended taking the stairs instead of the elevator, starting or maintaining a walking program or strength-training program.    2. Emotional Well-being  Mental and emotional well-being is essential to overall health.  Your Healthcare Provider may have encouraged you to build strong, positive relationships with family and friends, become more involved in your community (by volunteering or joining a spiritual community), or focus on self-care.    3. Fall and Injury Prevention  To prevent falls and injuries and also improve your balance, your Healthcare Provider may have suggested that you use a cane or walker, start an exercise of physical therapy program, or have your vision and/or hearing tested.    4. Urinary Leakage (Urinary Incontinence)  To control or manage the leakage of urine, your Healthcare Provider may have recommended you start bladder training exercises (such as Kegel exercises), a trial of a medication or a referral to see a specialist to discuss surgical options.

## 2021-02-16 NOTE — PROGRESS NOTES
Chief Complaint   Patient presents with   • Follow-Up         Subjective:     Naomy Vargas is a 70 y.o. female presenting for follow-up.    Overactive bladder: Chronic and controlled.  Patient established with urology [place], Dr. Abraham Randall.  Recently started on low-dose nitrofurantoin is tolerated well with significant improvement in symptoms.  They did discuss bladder stimulation as well as neurotoxin administration into the bladder but she is going to wait and see if she can get sufficient relief with antibiotic prior to these other options    Venous stasis: ref'd to vascular; contact info provided.  Patient has had persistent bilateral lower extremity hemosiderin staining, chronic venous stasis dermatitis.  Previously referred to vascular medicine but has not followed up.  Contact info provided to encourage patient to do so.    CKD: Chronic and controlled.  Last GFR did go down from 43-40.  Have discussed with patient the importance of avoidance of nephrotoxins and increased hydration.  Follow-up with nephrology in 6 months.    Venous stasis / dermatitis: Utilizes topical corticosteroid, at most 2 days on / 2 days off when needed; roughly 2 / month.  Previously seen Dr. Montiel for dermatology.  Previously referred to vascular medicine but has not followed up.  Referral information provided.    Osteopenia: CKD; doing calcium / Vit D; wt bearing exercise; due to CKD, will avoid bisphosphonates at this time.    Review of systems:      Denies chest pain, shortness of breath, sore throat, difficulty swallowing, new cough, dizziness, severe headache, altered cognition, changes in bowel habits, decreased sensation, decreased strength, numbness or tingling, intolerable depression or anxiety, changes in vision, fatigue, myalgias, painful or swollen lymph nodes.       Current Outpatient Medications:   •  nitrofurantoin (MACRODANTIN) 50 MG Cap, TAKE 1 CAPSULE BY MOUTH EVERYDAY AT BEDTIME, Disp: , Rfl:   •   "levothyroxine (SYNTHROID) 100 MCG Tab, Take 1 tablet by mouth every morning before breakfast., Disp: 100 tablet, Rfl: 3  •  lisinopril (PRINIVIL) 20 MG Tab, TAKE 1 TABLET BY MOUTH EVERY DAY, Disp: 30 Tab, Rfl: 3  •  amLODIPine (NORVASC) 5 MG Tab, TAKE 1 TABLET BY MOUTH EVERY DAY, Disp: 90 Tab, Rfl: 2  •  triamcinolone acetonide (KENALOG) 0.1 % Cream, Apply 1 Application to affected area(s) 2 times a day as needed., Disp: 80 g, Rfl: 2  •  aspirin (ASA) 325 MG Tab, Take 325 mg by mouth every day., Disp: , Rfl:   •  Mirabegron ER (MYRBETRIQ) 50 MG TABLET SR 24 HR, every day., Disp: , Rfl:   •  Melatonin 1 MG Tab, Take  by mouth., Disp: , Rfl:   •  PROAIR  (90 Base) MCG/ACT Aero Soln inhalation aerosol, INHALE 2 (TWO) PUFF EVERY SIX HOURS, AS NEEDED, Disp: , Rfl: 1  •  diphenhydrAMINE (BENADRYL) 25 MG Tab, Take 25 mg by mouth every 6 hours as needed for Sleep., Disp: , Rfl:   •  Acetaminophen 500 MG Cap, Take  by mouth., Disp: , Rfl:   •  Ascorbic Acid (MARYAM-C PO), Take  by mouth., Disp: , Rfl:   •  Oxymetazoline HCl (NASAL SPRAY NA), Spray  in nose., Disp: , Rfl:   •  Cholecalciferol (VITAMIN D3) 5000 units Cap, Take 1 Cap by mouth every day., Disp: , Rfl:     Allergies, past medical history, past surgical history, family history, social history reviewed and updated    Objective:     Vitals: /76 (BP Location: Right arm, Patient Position: Sitting, BP Cuff Size: Large adult)   Pulse 86   Temp 37.1 °C (98.7 °F) (Temporal)   Ht 1.727 m (5' 8\")   Wt 78.9 kg (174 lb)   LMP  (LMP Unknown)   SpO2 96%   BMI 26.46 kg/m²   Constitutional: Alert, no distress, well-groomed.  Skin: Warm, dry, good turgor, no rashes in visible areas.  Eye: Equal, round and reactive, conjunctiva clear, lids normal.  ENMT: Lips without lesions, good dentition, moist mucous membranes.  Neck: Trachea midline, no masses, no thyromegaly.  Respiratory: Unlabored respiratory effort, no cough.  MSK: Normal gait, moves all " extremities.  Neuro: Grossly non-focal.   Psych: Alert and oriented x3, normal affect and mood.    Assessment/Plan:     Naomy was seen today for follow-up.    Diagnoses and all orders for this visit:    Abdominal lymphadenopathy: Seen in October CT.  No follow-up was done.  Severe diverticulosis was seen at the time, so possibly causing the lymphadenopathy.  Follow-up imaging indicated.    Congenital hypothyroidism without goiter: Chronic, controlled.  Refills of levothyroxine 100 mcg provided.  Continue current dose  -     levothyroxine (SYNTHROID) 100 MCG Tab; Take 1 tablet by mouth every morning before breakfast.    Stage 3b chronic kidney disease: Established with nephrology.  Clarification requested for patient to see if she takes daily aspirin.  Did let her know this is quite nephrotoxic if she takes a high dose daily.  Would benefit from discussing this with nephrology and vascular medicine.    Venous stasis dermatitis of both lower extremities: Chronic, utilizes topical hydrocortisone as needed.  Once again discussed the importance of using this sparingly due to risk for dependence.  Advised patient follow-up with vascular medicine and vascular surgery for further recommendations    Osteopenia, unspecified location: Bisphosphonate therapy not currently indicated due to chronic kidney disease.  Continue calcium and vitamin D supplementation as well as weightbearing exercise on a routine basis    Chronic obstructive pulmonary disease, unspecified COPD type (HCC): Chronic and controlled.  Established with pulmonology, will follow up in May.    Bilateral iliac artery stenosis (HCC): Would benefit from discussion of treatment with vascular surgery due to significant stenosis with bilateral venous stasis    Thrombocytopenia, unspecified (HCC) chronic and controlled, monitoring routinely.  No spontaneous bleeding    Abdominal aortic aneurysm (AAA) 3.0 cm to 5.0 cm in diameter in female (HCC): Referral to  vascular surgery placed.          Return in about 6 months (around 8/16/2021).    Patient verbalized understanding and agreed to plan of care.  Encouraged to contact me with needs via MyChart or by phone if needed.      I have placed the above orders and discussed them with an approved delegating provider.  The MA is performing the below orders under the direction of Dr Andino.    Please note that this dictation was created using voice recognition software. I have made every reasonable attempt to correct obvious errors, but I expect that there are errors of grammar and possibly content that I did not discover before finalizing the note.

## 2021-02-16 NOTE — PROGRESS NOTES
Annual Health Assessment Questions:    1.  Are you currently engaging in any exercise or physical activity? No    2.  How would you describe your mood or emotional well-being today? good    3.  Have you had any falls in the last year? Yes    4.  Have you noticed any problems with your balance or had difficulty walking? No    5.  In the last six months have you experienced any leakage of urine? No    6. DPA/Advanced Directive: Patient does not have an Advanced Directive.  A packet and workshop information was given on Advanced Directives.

## 2021-02-17 ENCOUNTER — IMMUNIZATION (OUTPATIENT)
Dept: FAMILY PLANNING/WOMEN'S HEALTH CLINIC | Facility: IMMUNIZATION CENTER | Age: 71
End: 2021-02-17
Attending: INTERNAL MEDICINE
Payer: MEDICARE

## 2021-02-17 DIAGNOSIS — Z23 NEED FOR VACCINATION: ICD-10-CM

## 2021-02-17 DIAGNOSIS — Z23 ENCOUNTER FOR VACCINATION: Primary | ICD-10-CM

## 2021-02-17 PROCEDURE — 0001A PFIZER SARS-COV-2 VACCINE: CPT

## 2021-02-17 PROCEDURE — 91300 PFIZER SARS-COV-2 VACCINE: CPT

## 2021-02-25 ENCOUNTER — HOSPITAL ENCOUNTER (OUTPATIENT)
Dept: RADIOLOGY | Facility: MEDICAL CENTER | Age: 71
End: 2021-02-25
Attending: NURSE PRACTITIONER
Payer: MEDICARE

## 2021-02-25 DIAGNOSIS — I71.40 ABDOMINAL AORTIC ANEURYSM WITHOUT RUPTURE (HCC): ICD-10-CM

## 2021-02-25 DIAGNOSIS — R19.09 ABDOMINAL MASS OF OTHER SITE: ICD-10-CM

## 2021-02-25 DIAGNOSIS — R59.0 ABDOMINAL LYMPHADENOPATHY: ICD-10-CM

## 2021-02-25 PROCEDURE — 700117 HCHG RX CONTRAST REV CODE 255: Performed by: NURSE PRACTITIONER

## 2021-02-25 PROCEDURE — 74174 CTA ABD&PLVS W/CONTRAST: CPT | Mod: MG

## 2021-02-25 RX ADMIN — IOHEXOL 100 ML: 350 INJECTION, SOLUTION INTRAVENOUS at 18:05

## 2021-03-10 ENCOUNTER — IMMUNIZATION (OUTPATIENT)
Dept: FAMILY PLANNING/WOMEN'S HEALTH CLINIC | Facility: IMMUNIZATION CENTER | Age: 71
End: 2021-03-10
Attending: INTERNAL MEDICINE
Payer: MEDICARE

## 2021-03-10 DIAGNOSIS — Z23 ENCOUNTER FOR VACCINATION: Primary | ICD-10-CM

## 2021-03-10 PROCEDURE — 91300 PFIZER SARS-COV-2 VACCINE: CPT

## 2021-03-10 PROCEDURE — 0002A PFIZER SARS-COV-2 VACCINE: CPT

## 2021-04-26 NOTE — PROGRESS NOTES
Neck , no lymphadenopathy Subjective:   Naomy Vargas is a 68 y.o. female here today for follow-up hypertension and UTI. Is an established patient of mine.    HPI:    Patient presents to the office today in follow-up for hypertension and UTI. At the last visit about a month ago, I started her on blood pressure medication, lisinopril 10 mg daily. She endorses compliance with taking the medication every day. She has been monitoring her blood pressures at home daily and brings a log in to show me today. She states that despite the medication, her blood pressure is still running consistently high. She continues to deny any symptoms including blurry vision/visual impairment, headaches, palpitations, dizziness, or chest pressure or pain.    She is also here to follow-up on recent UTI diagnosis on 8/29/18 at the urgent care. She was prescribed Ceftin ear which she finished yesterday. She states her symptoms have not improved at all with the antibiotic. She continues to complain of dysuria and urgency. No fever, visible hematuria, flank pain, abdominal pain, or vomiting.    Current medicines (including changes today)  Current Outpatient Prescriptions   Medication Sig Dispense Refill   • lisinopril-hydrochlorothiazide (PRINZIDE, ZESTORETIC) 20-12.5 MG per tablet Take 1 Tab by mouth every day. 30 Tab 5   • Melatonin 1 MG Tab Take  by mouth.     • levothyroxine (SYNTHROID) 100 MCG Tab Take 1 Tab by mouth every morning before breakfast. 90 Tab 1   • mupirocin (BACTROBAN) 2 % Ointment Apply 1 g to affected area(s) as needed (on leg). 1 Tube 2   • Ascorbic Acid (MARYAM-C PO) Take  by mouth.     • Oxymetazoline HCl (NASAL SPRAY NA) Spray  in nose.     • TRIAMCINOLONE ACETONIDE EX Apply 1 Each to affected area(s) 2 times a day as needed (on leg).     • Cholecalciferol (VITAMIN D3) 5000 units Cap Take 1 Cap by mouth every day.     • PROAIR  (90 Base) MCG/ACT Aero Soln inhalation aerosol INHALE 2 (TWO) PUFF EVERY SIX HOURS, AS NEEDED  1   •  "diphenhydrAMINE (BENADRYL) 25 MG Tab Take 25 mg by mouth every 6 hours as needed for Sleep.     • Acetaminophen 500 MG Cap Take  by mouth.     • budesonide-formoterol (SYMBICORT) 160-4.5 MCG/ACT Aerosol Inhale 2 Puffs by mouth 2 Times a Day.       No current facility-administered medications for this visit.      She  has a past medical history of Anxiety; Arthritis; Back pain; Blood transfusion without reported diagnosis; Breath shortness; Cataract; Cellulitis; Chickenpox; COPD (chronic obstructive pulmonary disease) ([facility]); Dental disorder; Earache; Fatigue; Frequent urination; Hypertension (02/23/2018); Hypothyroidism; Influenza; Insomnia; Kidney disease; Mumps; Pain (02/23/2018); Pneumonia; Rash; Ringing in ears; Shortness of breath; Swelling of lower extremity; Thyroid disease; Tonsillitis; Toothache; and Venous stasis dermatitis. She also has no past medical history of Anemia; Clotting disorder ([facility]); Depression; Diabetic neuropathy ([facility]); Headache(784.0); HIV (human immunodeficiency virus infection) ([facility]); Hyperlipidemia; IBD (inflammatory bowel disease); Meningitis; Muscle disorder; Osteoporosis; Substance abuse; or Ulcer.    ROS  As per HPI.       Objective:     Blood pressure (!) 172/92, pulse 80, temperature 36.8 °C (98.3 °F), height 1.727 m (5' 8\"), weight 81.6 kg (180 lb), SpO2 95 %. Body mass index is 27.37 kg/m².     Physical Exam:  Constitutional: Alert, well-appearing, no distress.  Skin: No rashes in visible areas.  Eye: Pupils are equal and round, conjunctiva clear, lids normal.  ENMT: Lips without lesions, moist mucus membranes.  Respiratory: Unlabored respiratory effort, lungs clear to auscultation, no wheezes, no rhonchi.  Cardiovascular: Normal S1, S2, no murmur.  Abdomen: Normoactive bowel sounds. Abdomen is soft, nondistended, nontender to palpation throughout. No CVA tenderness to percussion bilaterally.      Assessment and Plan:   The following treatment plan was discussed    1. Essential " hypertension  Established problem, uncontrolled. I am going to increase her blood pressure medication given that her home readings are consistently elevated per review of her log. Blood pressure today in the office is 172/92. I have changed her to combination lisinopril-hydrochlorothiazide 20-12.5 mg daily. Patient advised to continue monitoring blood pressure readings at home and bring log to the next office visit in one month. Counseled that if she ever becomes symptomatic with elevated blood pressure or if she has a home reading greater than or equal to 180/115, should go to ER for evaluation.  - lisinopril-hydrochlorothiazide (PRINZIDE, ZESTORETIC) 20-12.5 MG per tablet; Take 1 Tab by mouth every day.  Dispense: 30 Tab; Refill: 5    2. UTI symptoms  Established problem, uncontrolled. Reviewed urgent care note and urinalysis which was suggestive of acute UTI including positive leukocytes and blood. Urine culture was not obtained. Urinalysis was repeated today in the office which shows moderate leukocytes, trace blood, and trace protein. She remains symptomatic. I am going to retreat with antibiotics. I have prescribed Bactrim. Culture obtained and will notify patient of results including need for any change in antibiotics.  - POCT Urinalysis  - URINE CULTURE(NEW); Future  - sulfamethoxazole-trimethoprim (BACTRIM DS) 800-160 MG tablet; Take 1 Tab by mouth 2 times a day for 7 days.  Dispense: 14 Tab; Refill: 0        Followup: Return in about 1 month (around 10/6/2018) for f/u HTN; Short.    Dina Steinberg P.A.-C.

## 2021-05-20 ENCOUNTER — OFFICE VISIT (OUTPATIENT)
Dept: SLEEP MEDICINE | Facility: MEDICAL CENTER | Age: 71
End: 2021-05-20
Payer: MEDICARE

## 2021-05-20 VITALS
OXYGEN SATURATION: 93 % | DIASTOLIC BLOOD PRESSURE: 84 MMHG | WEIGHT: 169 LBS | HEART RATE: 80 BPM | SYSTOLIC BLOOD PRESSURE: 130 MMHG | RESPIRATION RATE: 16 BRPM | HEIGHT: 68 IN | BODY MASS INDEX: 25.61 KG/M2

## 2021-05-20 DIAGNOSIS — Z87.891 FORMER SMOKER: ICD-10-CM

## 2021-05-20 DIAGNOSIS — J44.9 CHRONIC OBSTRUCTIVE PULMONARY DISEASE, UNSPECIFIED COPD TYPE (HCC): ICD-10-CM

## 2021-05-20 DIAGNOSIS — R91.8 PULMONARY NODULES: ICD-10-CM

## 2021-05-20 PROCEDURE — 99213 OFFICE O/P EST LOW 20 MIN: CPT | Performed by: PHYSICIAN ASSISTANT

## 2021-05-20 ASSESSMENT — ENCOUNTER SYMPTOMS
DIZZINESS: 0
ORTHOPNEA: 0
WHEEZING: 0
PALPITATIONS: 0
INSOMNIA: 1
CHILLS: 0
SPUTUM PRODUCTION: 0
TREMORS: 0
FEVER: 0
HEARTBURN: 0
SHORTNESS OF BREATH: 0
SINUS PAIN: 0
COUGH: 0
WEIGHT LOSS: 0
SORE THROAT: 0
HEADACHES: 0

## 2021-05-20 ASSESSMENT — FIBROSIS 4 INDEX: FIB4 SCORE: 2.85

## 2021-05-20 NOTE — PATIENT INSTRUCTIONS
1-had covid vaccine  2-continue covid precautions per CDC guidelines  3-reviewed meds  4-reviewed latest CT  5-follow up CT in 6 months   6-follow up in 6 months with PFT

## 2021-05-20 NOTE — PROGRESS NOTES
CC: Mild nasal congestion this time year.    HPI:  Naomy Vargas is a 71 y.o. year old female here today for follow-up on COPD.  She was last seen in clinic 5/26/2020 by Dr. Do Velásquez.  She has a reported history of smoking with quit date 7/18/2017 and 26.5 pack years.    Pertinent past medical history includes chronic kidney disease stage III, venous stasis dermatitis both lower extremities, pulmonary nodule right upper lobe.  She reports having gone deer hunting this last fall with success.  She does have to take breaks when hiking especially uphill.    Reviewed in clinic vitals including /84, HR of 80, O2 sat of 93% and BMI of 25.7 kg/m².    Reviewed home medication regimen including lisinopril-patient denies dry or persistent cough, ProAir, Benadryl, oxymetazoline nasal spray.  Does not appear she has been on maintenance therapy for her COPD.  She reports rarely requiring her ProAir.    Reviewed most recent imaging including CT scan obtained 9/23/2020 demonstrating emphysematous and bullous changes of the lungs, stable 7 mm nodular scarring within right lung apex, stable biapical pulmonary scarring, no routine follow-up required as this has been stable for greater than 2 years consistent with benign process.  Stable mildly enlarged mediastinal lymph nodes.  Hiatal hernia.  Atrophic right kidney.  Splenomegaly.    Pulmonary function testing last obtained 4/17/2018 demonstrating FEV1 of 1.6 L or 63% predicted, FVC of 2.5 L or 72% predicted, FEV1/FVC ratio of 66, residual volume 117% predicted, % predicted, DLCO 97% predicted. Per pulmonologist interpretation moderate reduction of mid flows, no bronchodilator response, no significant restriction or hyperinflation noted, normal oxygen transfer, flow volume loop confirms mild to moderate obstructive pattern.    Review of Systems   Constitutional: Negative for chills, fever, malaise/fatigue and weight loss.   HENT: Positive for congestion and  tinnitus. Negative for hearing loss, nosebleeds, sinus pain and sore throat.    Respiratory: Negative for cough, sputum production, shortness of breath and wheezing.    Cardiovascular: Positive for leg swelling (wears compression hose if on her feet ). Negative for chest pain, palpitations and orthopnea.   Gastrointestinal: Negative for heartburn.        Upper dentition, missing teeth on bottom, no difficulty swallowing    Neurological: Negative for dizziness, tremors and headaches.   Psychiatric/Behavioral: The patient has insomnia (staying asleep ).        Past Medical History:   Diagnosis Date   • Anxiety    • Arthritis     wrists   • Back pain    • Blood transfusion without reported diagnosis     with ectopic preg x 2    • Breath shortness     with exertion   • Cataract     bilateral removal-Dr. Duke Talamantes   • Cellulitis     right lower leg   • Cellulitis of leg 11/1/2013    Followed by dermatology. Managed with fluocinonide and Bactroban on right leg Left leg she uses triamcinolone and Sarna pramocaine (anesthetic, antipruretic) on both.     • Chickenpox    • COPD (chronic obstructive pulmonary disease) ([facility])    • Dental disorder     upper denture   • Earache    • Fatigue    • Frequent urination    • Hypertension 02/23/2018    on no meds at this time   • Hypothyroidism    • Influenza    • Insomnia    • Kidney disease     reports 1 kidney is smaller than the other   • Mumps    • OAB (overactive bladder) 2/11/2019   • Pain 02/23/2018    right leg and back, 5/10   • Pneumonia    • Rash    • Ringing in ears    • Shortness of breath    • Swelling of lower extremity    • Thyroid disease    • Tonsillitis    • Toothache    • Venous stasis dermatitis     right leg       Past Surgical History:   Procedure Laterality Date   • VEIN LIGATION RADIO FREQUENCY Right 2/26/2018    Procedure: VEIN LIGATION RADIO FREQUENCY- LONG SAPENOUS;  Surgeon: Jim Alas M.D.;  Location: SURGERY [facility];  Service: General   •  ABDOMINAL EXPLORATION      2 ectopic preg   • BREAST BIOPSY      hx of benign left breast biopsy   • EYE SURGERY      cataract x2   • HYSTERECTOMY LAPAROSCOPY     • HYSTERECTOMY, TOTAL ABDOMINAL  1977/1978   • OOPHORECTOMY     • PB MAMMARY DUCTOGRAM, SINGLE     • PB REMV 2ND CATARACT,CORN-SCLER SECTN     • TONSILLECTOMY         Family History   Problem Relation Age of Onset   • Arthritis Mother         hip fracture   • Diabetes Mother    • Cancer Mother         skin   • Arthritis Father         lung   • Alcohol/Drug Father         etoh   • Lung Disease Sister         smoker/copd   • Hypertension Sister    • Other Brother         hep c   • Arthritis Maternal Grandmother         hip fracture   • Diabetes Maternal Grandfather    • No Known Problems Son    • No Known Problems Son    • No Known Problems Brother    • No Known Problems Sister    • No Known Problems Brother    • Hyperlipidemia Neg Hx    • Stroke Neg Hx        Social History     Socioeconomic History   • Marital status:      Spouse name: Not on file   • Number of children: 2   • Years of education: Not on file   • Highest education level: Not on file   Occupational History   • Occupation: retired   Tobacco Use   • Smoking status: Former Smoker     Packs/day: 0.50     Years: 53.00     Pack years: 26.50     Types: Cigarettes     Start date: 3/29/1963     Quit date: 7/18/2017     Years since quitting: 3.8   • Smokeless tobacco: Never Used   • Tobacco comment: class in July 2017   Vaping Use   • Vaping Use: Never used   Substance and Sexual Activity   • Alcohol use: Yes     Comment: 2 per day   • Drug use: No   • Sexual activity: Not Currently     Partners: Male   Other Topics Concern   • Not on file   Social History Narrative   • Not on file     Social Determinants of Health     Financial Resource Strain:    • Difficulty of Paying Living Expenses:    Food Insecurity:    • Worried About Running Out of Food in the Last Year:    • Ran Out of Food in the  "Last Year:    Transportation Needs:    • Lack of Transportation (Medical):    • Lack of Transportation (Non-Medical):    Physical Activity:    • Days of Exercise per Week:    • Minutes of Exercise per Session:    Stress:    • Feeling of Stress :    Social Connections:    • Frequency of Communication with Friends and Family:    • Frequency of Social Gatherings with Friends and Family:    • Attends Confucianist Services:    • Active Member of Clubs or Organizations:    • Attends Club or Organization Meetings:    • Marital Status:    Intimate Partner Violence:    • Fear of Current or Ex-Partner:    • Emotionally Abused:    • Physically Abused:    • Sexually Abused:        Allergies as of 05/20/2021 - Reviewed 05/20/2021   Allergen Reaction Noted   • Colophony [pinus strobus] Itching 05/31/2018   • Latex Rash 02/23/2018   • Neosporin [neomycin-polymyxin b] Rash 02/23/2018   • Phenylene Itching 05/31/2018   • Soap Rash 02/23/2018   • Tape Itching 02/23/2018   • Tea tree oil Rash 02/23/2018        @Vital signs for this encounter:  Vitals:    05/20/21 1443   Height: 1.727 m (5' 8\")   Weight: 76.7 kg (169 lb)   Weight % change since last entry.: 0 %   BP: 130/84   Pulse: 80   BMI (Calculated): 25.7   Resp: 16       Current medications as of today   Current Outpatient Medications   Medication Sig Dispense Refill   • nitrofurantoin (MACRODANTIN) 50 MG Cap TAKE 1 CAPSULE BY MOUTH EVERYDAY AT BEDTIME     • levothyroxine (SYNTHROID) 100 MCG Tab Take 1 tablet by mouth every morning before breakfast. 100 tablet 3   • lisinopril (PRINIVIL) 20 MG Tab TAKE 1 TABLET BY MOUTH EVERY DAY 30 Tab 3   • amLODIPine (NORVASC) 5 MG Tab TAKE 1 TABLET BY MOUTH EVERY DAY 90 Tab 2   • triamcinolone acetonide (KENALOG) 0.1 % Cream Apply 1 Application to affected area(s) 2 times a day as needed. 80 g 2   • aspirin (ASA) 325 MG Tab Take 325 mg by mouth every day.     • Mirabegron ER (MYRBETRIQ) 50 MG TABLET SR 24 HR every day.     • Melatonin 1 MG Tab " Take  by mouth.     • PROAIR  (90 Base) MCG/ACT Aero Soln inhalation aerosol INHALE 2 (TWO) PUFF EVERY SIX HOURS, AS NEEDED  1   • diphenhydrAMINE (BENADRYL) 25 MG Tab Take 25 mg by mouth every 6 hours as needed for Sleep.     • Acetaminophen 500 MG Cap Take  by mouth.     • Ascorbic Acid (MARYAM-C PO) Take  by mouth.     • Oxymetazoline HCl (NASAL SPRAY NA) Spray  in nose.     • Cholecalciferol (VITAMIN D3) 5000 units Cap Take 1 Cap by mouth every day.       No current facility-administered medications for this visit.         Physical Exam:   Gen:           Alert and oriented, No apparent distress. Mood and affect appropriate, normal interaction with provider.  Eyes:          sclere white, conjunctive moist.  Hearing:     Grossly intact.  Dentition:    Upper dentures, poor lower dentition.  Oropharynx:   Tongue normal, posterior pharynx without erythema or exudate.  Neck:        Supple, trachea midline, no masses.  Respiratory Effort: No intercostal retractions or use of accessory muscles.   Lung Auscultation:      Slightly diminished; no rales, rhonchi or wheezing.  CV:            Regular rate and rhythm.  Mild lower extremity edema. No murmurs, rubs or gallops.  Digits, Nails, Ext: No clubbing, cyanosis, petechiae, or nodes.   Skin:        No rashes, lesions or ulcers noted on exposed skin surfaces.                     Assessment:  1. Pulmonary nodules  CT-CHEST (THORAX) W/O   2. Chronic obstructive pulmonary disease, unspecified COPD type (HCC)  PULMONARY FUNCTION TESTS -Test requested: Complete Pulmonary Function Test   3. Former smoker         Immunizations:    Flu: 8/27/2020  Pneumovax 23: 2/8/2019  Prevnar 13: 2/28/2017    Plan:    71 y.o. year old female here today for follow-up on COPD.  She was last seen in clinic 5/26/2020 by Dr. Do Velásquez.  She has a reported history of smoking with quit date 7/18/2017 and 26.5 pack years.    Former smoker: Continued abstinence advised.  Continue to monitor with  annual CT scans for lung cancer screening.     Pertinent past medical history includes chronic kidney disease stage III, venous stasis dermatitis both lower extremities, pulmonary nodule right upper lobe.  She reports having gone deer hunting this last fall with success.  She does have to take breaks when hiking especially uphill.    Reviewed in clinic vitals including /84, HR of 80, O2 sat of 93% and BMI of 25.7 kg/m².    Reviewed home medication regimen including lisinopril-patient denies dry or persistent cough, ProAir, Benadryl, oxymetazoline nasal spray.  Does not appear she has been on maintenance therapy for her COPD.  She reports rarely requiring her ProAir.    COPD: Not currently requiring a maintenance inhaler, rare rescue inhaler use.  Update pulmonary function testing.    History of pulmonary nodule: Per last imaging stable, likely benign.  Will continue to monitor annually given her smoking history.  Follow-up CT in 6 months.    Had Covid vaccine, continue Covid precautions per CDC guidelines.    Follow-up appointment in 6 months with PFT.    This dictation was created using voice recognition software. The accuracy of the dictation is limited to the abilities of the software. I expect there may be some errors of grammar and possibly content.

## 2021-05-26 ENCOUNTER — HOSPITAL ENCOUNTER (OUTPATIENT)
Dept: LAB | Facility: MEDICAL CENTER | Age: 71
End: 2021-05-26
Attending: INTERNAL MEDICINE
Payer: MEDICARE

## 2021-05-26 DIAGNOSIS — N18.31 STAGE 3A CHRONIC KIDNEY DISEASE: ICD-10-CM

## 2021-05-26 DIAGNOSIS — E53.8 B12 DEFICIENCY: ICD-10-CM

## 2021-05-26 DIAGNOSIS — I10 ESSENTIAL HYPERTENSION: ICD-10-CM

## 2021-05-26 LAB
ANION GAP SERPL CALC-SCNC: 11 MMOL/L (ref 7–16)
APPEARANCE UR: CLEAR
BACTERIA #/AREA URNS HPF: NEGATIVE /HPF
BILIRUB UR QL STRIP.AUTO: NEGATIVE
BUN SERPL-MCNC: 23 MG/DL (ref 8–22)
CALCIUM SERPL-MCNC: 9.5 MG/DL (ref 8.5–10.5)
CHLORIDE SERPL-SCNC: 98 MMOL/L (ref 96–112)
CO2 SERPL-SCNC: 27 MMOL/L (ref 20–33)
COLOR UR: YELLOW
CREAT SERPL-MCNC: 1.23 MG/DL (ref 0.5–1.4)
CREAT UR-MCNC: 60.42 MG/DL
EPI CELLS #/AREA URNS HPF: NEGATIVE /HPF
GLUCOSE SERPL-MCNC: 100 MG/DL (ref 65–99)
GLUCOSE UR STRIP.AUTO-MCNC: NEGATIVE MG/DL
HYALINE CASTS #/AREA URNS LPF: NORMAL /LPF
KETONES UR STRIP.AUTO-MCNC: NEGATIVE MG/DL
LEUKOCYTE ESTERASE UR QL STRIP.AUTO: ABNORMAL
MICRO URNS: ABNORMAL
MICROALBUMIN UR-MCNC: 4.6 MG/DL
MICROALBUMIN/CREAT UR: 76 MG/G (ref 0–30)
NITRITE UR QL STRIP.AUTO: NEGATIVE
PH UR STRIP.AUTO: 6.5 [PH] (ref 5–8)
POTASSIUM SERPL-SCNC: 4.2 MMOL/L (ref 3.6–5.5)
PROT UR QL STRIP: NEGATIVE MG/DL
RBC # URNS HPF: NORMAL /HPF
RBC UR QL AUTO: NEGATIVE
SODIUM SERPL-SCNC: 136 MMOL/L (ref 135–145)
SP GR UR STRIP.AUTO: 1.01
UROBILINOGEN UR STRIP.AUTO-MCNC: 0.2 MG/DL
WBC #/AREA URNS HPF: NORMAL /HPF

## 2021-05-26 PROCEDURE — 80048 BASIC METABOLIC PNL TOTAL CA: CPT

## 2021-05-26 PROCEDURE — 82306 VITAMIN D 25 HYDROXY: CPT

## 2021-05-26 PROCEDURE — 36415 COLL VENOUS BLD VENIPUNCTURE: CPT

## 2021-05-26 PROCEDURE — 81001 URINALYSIS AUTO W/SCOPE: CPT

## 2021-05-26 PROCEDURE — 82570 ASSAY OF URINE CREATININE: CPT

## 2021-05-26 PROCEDURE — 82043 UR ALBUMIN QUANTITATIVE: CPT

## 2021-05-26 RX ORDER — LISINOPRIL 20 MG/1
TABLET ORAL
Qty: 30 TABLET | Refills: 3 | Status: SHIPPED | OUTPATIENT
Start: 2021-05-26 | End: 2022-09-07

## 2021-05-28 LAB — 25(OH)D3 SERPL-MCNC: 49 NG/ML (ref 30–80)

## 2021-06-02 ENCOUNTER — OFFICE VISIT (OUTPATIENT)
Dept: NEPHROLOGY | Facility: MEDICAL CENTER | Age: 71
End: 2021-06-02
Payer: MEDICARE

## 2021-06-02 VITALS
WEIGHT: 168 LBS | OXYGEN SATURATION: 93 % | HEART RATE: 86 BPM | BODY MASS INDEX: 25.46 KG/M2 | RESPIRATION RATE: 18 BRPM | HEIGHT: 68 IN | TEMPERATURE: 98.2 F | SYSTOLIC BLOOD PRESSURE: 130 MMHG | DIASTOLIC BLOOD PRESSURE: 70 MMHG

## 2021-06-02 DIAGNOSIS — D64.9 ANEMIA, UNSPECIFIED TYPE: ICD-10-CM

## 2021-06-02 DIAGNOSIS — I10 ESSENTIAL HYPERTENSION: ICD-10-CM

## 2021-06-02 DIAGNOSIS — N18.31 STAGE 3A CHRONIC KIDNEY DISEASE: ICD-10-CM

## 2021-06-02 DIAGNOSIS — R80.9 MICROALBUMINURIA: ICD-10-CM

## 2021-06-02 DIAGNOSIS — N13.30 HYDRONEPHROSIS, UNSPECIFIED HYDRONEPHROSIS TYPE: ICD-10-CM

## 2021-06-02 DIAGNOSIS — E55.9 VITAMIN D DEFICIENCY: ICD-10-CM

## 2021-06-02 PROCEDURE — 99214 OFFICE O/P EST MOD 30 MIN: CPT | Performed by: INTERNAL MEDICINE

## 2021-06-02 ASSESSMENT — ENCOUNTER SYMPTOMS
EYES NEGATIVE: 1
NECK PAIN: 0
NAUSEA: 0
WEIGHT LOSS: 0
HEMOPTYSIS: 0
FEVER: 0
SINUS PAIN: 0
ORTHOPNEA: 0
PALPITATIONS: 0
VOMITING: 0
WHEEZING: 0
ABDOMINAL PAIN: 0
MYALGIAS: 0
SHORTNESS OF BREATH: 0
BACK PAIN: 0
FLANK PAIN: 0
CHILLS: 0
COUGH: 0
DIARRHEA: 0

## 2021-06-02 ASSESSMENT — FIBROSIS 4 INDEX: FIB4 SCORE: 2.85

## 2021-06-02 NOTE — PROGRESS NOTES
Subjective:      Naomy Vargas is a 71 y.o. female who presents with No chief complaint on file.            Chronic Kidney Disease  Pertinent negatives include no abdominal pain, chest pain, chills, congestion, coughing, fever, myalgias, nausea, neck pain or vomiting.     Naomy is coming today for f/u of CKD III   Doing well, no complaints.  No dysuria/hematuria/flank pain, no fever/chills  Pedal edema improved  No NSAID's  CKD III-creat in 2018 at 1.02 -worse to 1.33 in Feb 2019 - worsen to1.6! - 1.9!-improved to 1.3 -now stable at 1.2-1.3  HTN: BP well controlled  CT abd from 2008 revealed scarred right kidney  Renal US small right kidney with hydronephrosis f/u with Urology    Review of Systems   Constitutional: Negative for chills, fever, malaise/fatigue and weight loss.   HENT: Negative for congestion, hearing loss and sinus pain.    Eyes: Negative.    Respiratory: Negative for cough, hemoptysis, shortness of breath and wheezing.    Cardiovascular: Negative for chest pain, palpitations, orthopnea and leg swelling.   Gastrointestinal: Negative for abdominal pain, diarrhea, nausea and vomiting.   Genitourinary: Negative for dysuria, flank pain, frequency, hematuria and urgency.   Musculoskeletal: Negative for back pain, joint pain, myalgias and neck pain.   Skin: Negative.    All other systems reviewed and are negative.    Past Medical History:   Diagnosis Date   • Anxiety    • Arthritis     wrists   • Back pain    • Blood transfusion without reported diagnosis     with ectopic preg x 2    • Breath shortness     with exertion   • Cataract     bilateral removal-Dr. Duke Talamantes   • Cellulitis     right lower leg   • Cellulitis of leg 11/1/2013    Followed by dermatology. Managed with fluocinonide and Bactroban on right leg Left leg she uses triamcinolone and Sarna pramocaine (anesthetic, antipruretic) on both.     • Chickenpox    • COPD (chronic obstructive pulmonary disease) (HCC)    • Dental disorder      upper denture   • Earache    • Fatigue    • Frequent urination    • Hypertension 02/23/2018    on no meds at this time   • Hypothyroidism    • Influenza    • Insomnia    • Kidney disease     reports 1 kidney is smaller than the other   • Mumps    • OAB (overactive bladder) 2/11/2019   • Pain 02/23/2018    right leg and back, 5/10   • Pneumonia    • Rash    • Ringing in ears    • Shortness of breath    • Swelling of lower extremity    • Thyroid disease    • Tonsillitis    • Toothache    • Venous stasis dermatitis     right leg       Family History   Problem Relation Age of Onset   • Arthritis Mother         hip fracture   • Diabetes Mother    • Cancer Mother         skin   • Arthritis Father         lung   • Alcohol/Drug Father         etoh   • Lung Disease Sister         smoker/copd   • Hypertension Sister    • Other Brother         hep c   • Arthritis Maternal Grandmother         hip fracture   • Diabetes Maternal Grandfather    • No Known Problems Son    • No Known Problems Son    • No Known Problems Brother    • No Known Problems Sister    • No Known Problems Brother    • Hyperlipidemia Neg Hx    • Stroke Neg Hx        Social History     Socioeconomic History   • Marital status:      Spouse name: Not on file   • Number of children: 2   • Years of education: Not on file   • Highest education level: Not on file   Occupational History   • Occupation: retired   Tobacco Use   • Smoking status: Former Smoker     Packs/day: 0.50     Years: 53.00     Pack years: 26.50     Types: Cigarettes     Start date: 3/29/1963     Quit date: 7/18/2017     Years since quitting: 3.8   • Smokeless tobacco: Never Used   • Tobacco comment: class in July 2017   Vaping Use   • Vaping Use: Never used   Substance and Sexual Activity   • Alcohol use: Yes     Comment: 2 per day   • Drug use: No   • Sexual activity: Not Currently     Partners: Male   Other Topics Concern   • Not on file   Social History Narrative   • Not on file  "    Social Determinants of Health     Financial Resource Strain:    • Difficulty of Paying Living Expenses:    Food Insecurity:    • Worried About Running Out of Food in the Last Year:    • Ran Out of Food in the Last Year:    Transportation Needs:    • Lack of Transportation (Medical):    • Lack of Transportation (Non-Medical):    Physical Activity:    • Days of Exercise per Week:    • Minutes of Exercise per Session:    Stress:    • Feeling of Stress :    Social Connections:    • Frequency of Communication with Friends and Family:    • Frequency of Social Gatherings with Friends and Family:    • Attends [employer] Services:    • Active Member of Clubs or Organizations:    • Attends Club or Organization Meetings:    • Marital Status:    Intimate Partner Violence:    • Fear of Current or Ex-Partner:    • Emotionally Abused:    • Physically Abused:    • Sexually Abused:           Objective:     /70 (BP Location: Right arm, Patient Position: Sitting)   Pulse 86   Temp 36.8 °C (98.2 °F) (Temporal)   Resp 18   Ht 1.727 m (5' 8\")   Wt 76.2 kg (168 lb)   LMP  (LMP Unknown)   SpO2 93%   BMI 25.54 kg/m²      Physical Exam  Vitals reviewed.   Constitutional:       General: She is not in acute distress.     Appearance: Normal appearance. She is well-developed. She is not diaphoretic.   HENT:      Head: Normocephalic and atraumatic.      Nose: Nose normal.      Mouth/Throat:      Mouth: Mucous membranes are moist.      Pharynx: Oropharynx is clear.   Eyes:      General: No scleral icterus.     Extraocular Movements: Extraocular movements intact.      Conjunctiva/sclera: Conjunctivae normal.      Pupils: Pupils are equal, round, and reactive to light.   Cardiovascular:      Rate and Rhythm: Normal rate and regular rhythm.      Pulses: Normal pulses.      Heart sounds: Normal heart sounds. No friction rub. No gallop.    Pulmonary:      Effort: Pulmonary effort is normal. No respiratory distress.      Breath sounds: " Normal breath sounds. No wheezing or rales.   Abdominal:      General: Bowel sounds are normal. There is no distension.      Palpations: Abdomen is soft. There is no mass.      Tenderness: There is no abdominal tenderness. There is no right CVA tenderness, left CVA tenderness or guarding.   Musculoskeletal:      Cervical back: Normal range of motion and neck supple.      Right lower leg: No edema.      Left lower leg: No edema.   Skin:     General: Skin is warm.      Findings: No erythema or rash.   Neurological:      General: No focal deficit present.      Mental Status: She is alert and oriented to person, place, and time.      Cranial Nerves: No cranial nerve deficit.      Coordination: Coordination normal.   Psychiatric:         Mood and Affect: Mood normal.         Behavior: Behavior normal.         Thought Content: Thought content normal.         Judgment: Judgment normal.            Laboratory and renal imaging results reviewed : d/w pt  Lab Results   Component Value Date/Time    CREATININE 1.23 05/26/2021 02:47 PM    POTASSIUM 4.2 05/26/2021 02:47 PM          Assessment/Plan:       1. CKD (chronic kidney disease) stage 3, GFR 30-59 ml/min ([facility])      Creat level stable at baseline    2. Vitamin D deficiency      Well controlled      PTH WNL    3. Essential hypertension      BP well controlled       To monitor BP at home nd call clinic if BP> 135/85    4. Microalbuminuria      Very mild On ACEi      5. Anemia, unspecified type      Hb stable WNL    Recs:  Continue current treatment  Urology f/u  Keep well hydrated  Low salt diet  Monitor BP  Avoid NSAID's  F/u in 6 months

## 2021-06-29 ENCOUNTER — PATIENT MESSAGE (OUTPATIENT)
Dept: HEALTH INFORMATION MANAGEMENT | Facility: OTHER | Age: 71
End: 2021-06-29

## 2021-08-06 ENCOUNTER — TELEPHONE (OUTPATIENT)
Dept: MEDICAL GROUP | Facility: PHYSICIAN GROUP | Age: 71
End: 2021-08-06

## 2021-08-06 NOTE — TELEPHONE ENCOUNTER
ESTABLISHED PATIENT PRE-VISIT PLANNING     Patient was contacted to complete PVP.     Note: Patient will not be contacted if there is no indication to call.     1.  Reviewed notes from the last few office visits within the medical group: Yes    2.  If any orders were placed at last visit or intended to be done for this visit (i.e. 6 mos follow-up), do we have Results/Consult Notes?         •  Labs - Labs were not ordered at last office visit.  Note: If patient appointment is for lab review and patient did not complete labs, check with provider if OK to reschedule patient until labs completed.       •  Imaging - Imaging was not ordered at last office visit.       •  Referrals - Referral ordered, patient was seen and consult notes were requested from [facility] DR. MERARI SON. Care Teams updated  YES.    3. Is this appointment scheduled as a Hospital Follow-Up? No    4.  Immunizations were updated in Epic using Reconcile Outside Information activity? Yes    5.  Patient is due for the following Health Maintenance Topics:   Health Maintenance Due   Topic Date Due   • COLONOSCOPY  03/24/2019   • Annual Pulmonary Function Test / Spirometry  04/17/2019   • Annual Wellness Visit  06/25/2021       6.  AHA (Pulse[employer]) form printed for Provider? Yes

## 2021-08-12 ENCOUNTER — OFFICE VISIT (OUTPATIENT)
Dept: MEDICAL GROUP | Facility: PHYSICIAN GROUP | Age: 71
End: 2021-08-12
Payer: MEDICARE

## 2021-08-12 VITALS
HEART RATE: 69 BPM | BODY MASS INDEX: 25.31 KG/M2 | TEMPERATURE: 97.7 F | DIASTOLIC BLOOD PRESSURE: 70 MMHG | OXYGEN SATURATION: 96 % | HEIGHT: 68 IN | SYSTOLIC BLOOD PRESSURE: 128 MMHG | RESPIRATION RATE: 12 BRPM | WEIGHT: 167 LBS

## 2021-08-12 DIAGNOSIS — D69.6 THROMBOCYTOPENIA, UNSPECIFIED (HCC): ICD-10-CM

## 2021-08-12 DIAGNOSIS — N32.81 OAB (OVERACTIVE BLADDER): ICD-10-CM

## 2021-08-12 DIAGNOSIS — I71.40 ABDOMINAL AORTIC ANEURYSM (AAA) 3.0 CM TO 5.0 CM IN DIAMETER IN FEMALE (HCC): ICD-10-CM

## 2021-08-12 DIAGNOSIS — G89.29 CHRONIC RIGHT-SIDED LOW BACK PAIN WITHOUT SCIATICA: ICD-10-CM

## 2021-08-12 DIAGNOSIS — E03.1 CONGENITAL HYPOTHYROIDISM WITHOUT GOITER: ICD-10-CM

## 2021-08-12 DIAGNOSIS — I87.2 VENOUS STASIS DERMATITIS OF BOTH LOWER EXTREMITIES: ICD-10-CM

## 2021-08-12 DIAGNOSIS — M54.50 CHRONIC RIGHT-SIDED LOW BACK PAIN WITHOUT SCIATICA: ICD-10-CM

## 2021-08-12 DIAGNOSIS — R91.8 PULMONARY NODULES: ICD-10-CM

## 2021-08-12 DIAGNOSIS — Z91.89 CANDIDATE FOR STATIN THERAPY DUE TO RISK OF FUTURE CARDIOVASCULAR EVENT: ICD-10-CM

## 2021-08-12 DIAGNOSIS — I77.1 BILATERAL ILIAC ARTERY STENOSIS (HCC): ICD-10-CM

## 2021-08-12 DIAGNOSIS — I10 ESSENTIAL HYPERTENSION: ICD-10-CM

## 2021-08-12 DIAGNOSIS — J44.9 CHRONIC OBSTRUCTIVE PULMONARY DISEASE, UNSPECIFIED COPD TYPE (HCC): ICD-10-CM

## 2021-08-12 DIAGNOSIS — M85.80 OSTEOPENIA, UNSPECIFIED LOCATION: ICD-10-CM

## 2021-08-12 DIAGNOSIS — N18.32 STAGE 3B CHRONIC KIDNEY DISEASE: ICD-10-CM

## 2021-08-12 DIAGNOSIS — Z12.11 COLON CANCER SCREENING: ICD-10-CM

## 2021-08-12 PROCEDURE — 99214 OFFICE O/P EST MOD 30 MIN: CPT | Performed by: NURSE PRACTITIONER

## 2021-08-12 RX ORDER — UBIDECARENONE 75 MG
100 CAPSULE ORAL DAILY
COMMUNITY

## 2021-08-12 RX ORDER — VITAMIN B COMPLEX
1000 TABLET ORAL DAILY
COMMUNITY

## 2021-08-12 RX ORDER — ROSUVASTATIN CALCIUM 10 MG/1
10 TABLET, COATED ORAL EVERY EVENING
Qty: 90 TABLET | Refills: 1 | Status: SHIPPED | OUTPATIENT
Start: 2021-08-12 | End: 2021-11-15

## 2021-08-12 ASSESSMENT — FIBROSIS 4 INDEX: FIB4 SCORE: 2.85

## 2021-08-12 NOTE — PROGRESS NOTES
Subjective  Chief Complaint  Establish care to manage her chronic conditions    History of Present Illness  Naomy Vargas is a 71 y.o. female. This patient is here today to establish care.  Her prior PCP was VIELKA Bearden.    CKD (chronic kidney disease) stage 3, GFR 30-59 ml/min ([facility])  This is a chronic condition.  She follows closely with [facility] (Dr. Lawrence) every 6 months.  Last labs from 5/26/2021 appear stable.  She reports adequate hydration status and continues avoid nephrotoxic agents.    Hypothyroidism  This is a chronic condition.   Last TSH:  2/2021 - WNL  Current medication:  Levothyroxine 100 mcg daily  She is taking medication as directed, and denies any side effects.  Patient is taking the medication on an empty stomach in the morning and waiting at least 30 minutes before eating.  She reports good energy level on the medication.  She denies fatigue, cold intolerance, weight gain, constipation, dry skin, insomnia, tremor, or change of appetite.      Venous stasis dermatitis of both lower extremities  This is a chronic condition.  She reports she has seen dermatology in the past.  She does continue to use kenalog cream as needed with any inflammation.  She does continue to keep area moisturized.  She does endorse some itching.  She does report history of cellulitis to both legs.     Chronic right-sided low back pain without sciatica  This is a chronic condition that is well managed with use of Tylenol PRN.  She has no further concerns at this time.     Osteopenia  Last DEXA 12/22/2020  Reports she is currently supplementing vitamin D and calcium.  She reports she  is engaging in routine weightbearing exercise.  Denies recent falls or fractures.    Chronic obstructive pulmonary disease (HCC)  This is a chronic condition.  She denies any respiratory concerns at this time.  She does have a history of tobacco use, and quit in 2017.  She does have ProAir available at home, and  she endorses she has not had to use it for quite some time.     Pulmonary nodule, RUL CT , 4/18  This is a chronic condition.  She follows closely with [facility].  She denies any respiratory concerns at this time.  She does undergo CT scans of her chest annually for monitoring.  Next CT scan scheduled for 11/2021.     Essential hypertension  This is a chronic condition.  Current meds:  Lisinopril 20 mg daily, amlodipine 5 mg daily  She is currently taking all meds as directed.   She denies light-headed, tunnel-vision, unusual fatigue., pounding headache, visual changes, palpitations, flushed face.  She  denies medicine side effects.    Abdominal aortic aneurysm (AAA) 3.0 cm to 5.0 cm in diameter in female (HCC)  This is a chronic condition.  She follows closely with [facility].  She reports that she continues to have ultrasound to monitor.    Bilateral iliac artery stenosis (HCC)  This is a chronic condition.  She continues to follow closely with vascular surgery for this condition.  She has no concerns today.  She does have annual imaging completed for visualization and monitoring of the area.     OAB (overactive bladder)  This is a chronic condition.  She follows closely with [facility].  She is currently taking Mirabegron 50 mg daily without any side effects.  She denies any urinary concerns.  She does have nitrofurantoin 50 mg that she takes daily as prevention due to history of recurrent UTIs.    Thrombocytopenia, unspecified (HCC)  This is a chronic condition.  CBCs done annually and appear stable.  She denies any abnormal bruising or bleeding.     Past Medical History    Allergies: Colophony [pinus strobus], Latex, Neosporin [neomycin-polymyxin b], Phenylene, Soap, Tape, and Tea tree oil  Past Medical History:   Diagnosis Date   • Anxiety    • Arthritis     wrists   • Back pain    • Blood transfusion without reported diagnosis     with ectopic preg x 2    • Breath shortness     with  exertion   • Cataract     bilateral removal-Dr. Duke Talamantes   • Cellulitis     right lower leg   • Cellulitis of leg 11/1/2013    Followed by dermatology. Managed with fluocinonide and Bactroban on right leg Left leg she uses triamcinolone and Sarna pramocaine (anesthetic, antipruretic) on both.     • Chickenpox    • COPD (chronic obstructive pulmonary disease) ([facility])    • Dental disorder     upper denture   • Earache    • Fatigue    • Frequent urination    • Hypertension 02/23/2018    on no meds at this time   • Hypothyroidism    • Influenza    • Insomnia    • Kidney disease     reports 1 kidney is smaller than the other   • Mumps    • OAB (overactive bladder) 2/11/2019   • Pain 02/23/2018    right leg and back, 5/10   • Pneumonia    • Rash    • Ringing in ears    • Shortness of breath    • Swelling of lower extremity    • Thyroid disease    • Tonsillitis    • Toothache    • Venous stasis dermatitis     right leg     Past Surgical History:   Procedure Laterality Date   • VEIN LIGATION RADIO FREQUENCY Right 2/26/2018    Procedure: VEIN LIGATION RADIO FREQUENCY- LONG SAPENOUS;  Surgeon: Jim Alas M.D.;  Location: SURGERY [facility];  Service: General   • ABDOMINAL EXPLORATION      2 ectopic preg   • BREAST BIOPSY      hx of benign left breast biopsy   • EYE SURGERY      cataract x2   • HYSTERECTOMY LAPAROSCOPY     • HYSTERECTOMY, TOTAL ABDOMINAL  1977/1978   • OOPHORECTOMY     • PB MAMMARY DUCTOGRAM, SINGLE     • PB REMV 2ND CATARACT,CORN-SCLER SECTN     • TONSILLECTOMY       Current Outpatient Medications Ordered in Epic   Medication Sig Dispense Refill   • cyanocobalamin (VITAMIN B-12) 100 MCG Tab Take 100 mcg by mouth every day.     • vitamin D (CHOLECALCIFEROL) 1000 Unit (25 mcg) Tab Take 1,000 Units by mouth every day.     • rosuvastatin (CRESTOR) 10 MG Tab Take 1 Tablet by mouth every evening. 90 Tablet 1   • lisinopril (PRINIVIL) 20 MG Tab TAKE 1 TABLET BY MOUTH EVERY DAY 30 tablet 3   •  nitrofurantoin (MACRODANTIN) 50 MG Cap TAKE 1 CAPSULE BY MOUTH EVERYDAY AT BEDTIME     • levothyroxine (SYNTHROID) 100 MCG Tab Take 1 tablet by mouth every morning before breakfast. 100 tablet 3   • amLODIPine (NORVASC) 5 MG Tab TAKE 1 TABLET BY MOUTH EVERY DAY 90 Tab 2   • triamcinolone acetonide (KENALOG) 0.1 % Cream Apply 1 Application to affected area(s) 2 times a day as needed. 80 g 2   • Mirabegron ER (MYRBETRIQ) 50 MG TABLET SR 24 HR every day.     • Melatonin 1 MG Tab Take  by mouth.     • PROAIR  (90 Base) MCG/ACT Aero Soln inhalation aerosol INHALE 2 (TWO) PUFF EVERY SIX HOURS, AS NEEDED  1   • diphenhydrAMINE (BENADRYL) 25 MG Tab Take 25 mg by mouth every 6 hours as needed for Sleep.     • Acetaminophen 500 MG Cap Take  by mouth.     • Oxymetazoline HCl (NASAL SPRAY NA) Spray  in nose.       No current [facility]-ordered facility-administered medications on file.     Family History:    Family History   Problem Relation Age of Onset   • Arthritis Mother         hip fracture   • Diabetes Mother    • Cancer Mother         skin   • Arthritis Father         lung   • Alcohol/Drug Father         etoh   • Lung Disease Sister         smoker/copd   • Hypertension Sister    • Other Brother         hep c   • Arthritis Maternal Grandmother         hip fracture   • Diabetes Maternal Grandfather    • No Known Problems Son    • No Known Problems Son    • No Known Problems Brother    • No Known Problems Sister    • No Known Problems Brother    • Hyperlipidemia Neg Hx    • Stroke Neg Hx       Personal/Social History:    Social History     Tobacco Use   • Smoking status: Former Smoker     Packs/day: 0.50     Years: 53.00     Pack years: 26.50     Types: Cigarettes     Start date: 3/29/1963     Quit date: 2017     Years since quittin.0   • Smokeless tobacco: Never Used   • Tobacco comment: class in 2017   Vaping Use   • Vaping Use: Never used   Substance Use Topics   • Alcohol use: Yes     Comment: 2 per day  "  • Drug use: No     Social History     Social History Narrative   • Not on file      Review of Systems:     General: Negative for fever/chills.    Eyes:  Negative for vision changes.   ENT:  Negative for congestion.    Respiratory:  Negative for cough and dyspnea.     Cardiovascular:  Negative for chest pain and palpitations.   Gastrointestinal:  Negative for abdominal pain.    Genitourinary:  Negative for dysuria.    Musculoskeletal:  See HPI.    Skin:  See HPI.    Neurological:  Negative for numbness/tingling.    Heme/Lymph:  Does not bruise/bleed easily.     Objective  Physical Exam:   /70 (BP Location: Left arm, Patient Position: Sitting, BP Cuff Size: Adult)   Pulse 69   Temp 36.5 °C (97.7 °F) (Temporal)   Resp 12   Ht 1.727 m (5' 8\")   Wt 75.8 kg (167 lb)   SpO2 96%  Body mass index is 25.39 kg/m².  General:  Alert and oriented.  Well appearing.  NAD.  Head:  Normocephalic.   Eyes:  Eyes conjunctiva clear lids without ptosis.    ENT: Ears normal shape and contour.   Neck: Supple without JVD.   Pulmonary:  Normal effort.  Clear to ausculation without rales, ronchi, or wheezing.  Cardiovascular:  Regular rate and rhythm without murmur, rubs or gallop.  Radial pulses are intact and equal bilaterally.  Gastrointestinal: Abdomen soft, nontender, nondistended. Normal bowel sounds. Liver and spleen are not palpable.  Musculoskeletal:  No extremity cyanosis, clubbing, or edema.  Skin:  Warm and dry.  No obvious lesions.  Neurologic: Grossly intact. Sensation intact.   Psych: Normal mood and affect. Alert and oriented x3. Judgment and insight is normal.    Assessment/Plan   1. Essential hypertension  This is a chronic condition, stable.   Continue amlodipine 5 mg daily.   Continue lisinopril 20 mg daily.     2. Congenital hypothyroidism without goiter  This is a chronic condition, stable.   Continue levothyroxine 100 mcg daily.   Recheck TSH annually.     3. OAB (overactive bladder)  This is a chronic " condition, stable.   Advised to continue treatment plan per and close follow up with urology.    4. Chronic obstructive pulmonary disease, unspecified COPD type (HCC)  This is a chronic condition, stable.   Advised to continue treatment plan per and close follow up with pulmonology.    5. Stage 3b chronic kidney disease (HCC)  This is a chronic condition, stable.  Advised to continue treatment plan per and close follow up with nephrology.   Advised to continue good hydration status and avoid nephrotoxic agents.     6. Venous stasis dermatitis of both lower extremities  This is a chronic condition, stable.   Continue kenalog cream application as needed.     7. Chronic right-sided low back pain without sciatica  This is a chronic condition, stable.   Continue tylenol as needed.     8. Osteopenia, unspecified location  This is a chronic condition, stable.   Continue weight bearing exercises.   Continue calcium and vitamin D supplementation.     9. Pulmonary nodule, RUL CT , 4/18  This is a chronic condition, stable.   Advised to continue treatment plan per and close follow up with pulmonology.    10. Abdominal aortic aneurysm (AAA) 3.0 cm to 5.0 cm in diameter in female (HCC)  This is a chronic condition, stable.   Advised to continue treatment plan per and close follow up with vascular surgery.     11. Bilateral iliac artery stenosis (HCC)  This is a chronic condition, stable.   Advised to continue treatment plan per and close follow up with vascular surgery.   - rosuvastatin (CRESTOR) 10 MG Tab; Take 1 Tablet by mouth every evening.  Dispense: 90 Tablet; Refill: 1    12. Thrombocytopenia, unspecified (HCC)  This is a chronic condition, stable.   CBC annually and monitor for any abnormal bleeding/bruising.     13. Candidate for statin therapy due to risk of future cardiovascular event  - rosuvastatin (CRESTOR) 10 MG Tab; Take 1 Tablet by mouth every evening.  Dispense: 90 Tablet; Refill: 1    14. Colon cancer  screening  - REFERRAL TO GI FOR COLONOSCOPY    Health Maintenance: Completed    Return in about 6 months (around 2/12/2022), or if symptoms worsen or fail to improve.    Patient verbalized understanding and agreed to plan of care.  We have discussed to contact me with needs via MyChart or by phone if needed.      I have placed the above orders and discussed them with an approved delegating provider.  The MA is performing the below orders under the direction of Dr. Moses.    Please note that this dictation was created using voice recognition software. I have made every reasonable attempt to correct obvious errors, but I expect that there are errors of grammar and possibly content that I did not discover before finalizing the note.    VIELKA Ennis  [facility]

## 2021-08-12 NOTE — ASSESSMENT & PLAN NOTE
This is a chronic condition.  Current meds:  Lisinopril 20 mg daily, amlodipine 5 mg daily  She is currently taking all meds as directed.   She denies light-headed, tunnel-vision, unusual fatigue., pounding headache, visual changes, palpitations, flushed face.  She  denies medicine side effects.

## 2021-08-12 NOTE — ASSESSMENT & PLAN NOTE
This is a chronic condition.  She follows closely with [facility] (Dr. Lawrence) every 6 months.  Last labs from 5/26/2021 appear stable.  She reports adequate hydration status and continues avoid nephrotoxic agents.

## 2021-08-12 NOTE — ASSESSMENT & PLAN NOTE
This is a chronic condition.  [facility].  She is currently taking Mirabegron 50 mg daily without any side effects.  She denies any urinary concerns.  She does have nitrofurantoin 50 mg that she takes daily as prevention due to history of recurrent UTIs.

## 2021-08-12 NOTE — ASSESSMENT & PLAN NOTE
This is a chronic condition that is well managed with use of Tylenol PRN.  She has no further concerns at this time.

## 2021-08-12 NOTE — ASSESSMENT & PLAN NOTE
This is a chronic condition.  CBCs done annually and appear stable.  She denies any abnormal bruising or bleeding.

## 2021-08-12 NOTE — ASSESSMENT & PLAN NOTE
This is a chronic condition.  She continues to follow closely with vascular surgery for this condition.  She has no concerns today.  She does have annual imaging completed for visualization and monitoring of the area.

## 2021-08-12 NOTE — ASSESSMENT & PLAN NOTE
This is a chronic condition.  She reports she has seen dermatology in the past.  She does continue to use kenalog cream as needed with any inflammation.  She does continue to keep area moisturized.  She does endorse some itching.  She does report history of cellulitis to both legs.

## 2021-08-12 NOTE — ASSESSMENT & PLAN NOTE
Last DEXA 12/22/2020  Reports she is currently supplementing vitamin D and calcium.  She reports she  is engaging in routine weightbearing exercise.  Denies recent falls or fractures.

## 2021-08-12 NOTE — ASSESSMENT & PLAN NOTE
This is a chronic condition.  She denies any respiratory concerns at this time.  She does have a history of tobacco use, and quit in 2017.  She does have ProAir available at home, and she endorses she has not had to use it for quite some time.

## 2021-08-12 NOTE — ASSESSMENT & PLAN NOTE
This is a chronic condition.  She follows closely with [facility].  She reports that she continues to have ultrasound to monitor.

## 2021-08-12 NOTE — ASSESSMENT & PLAN NOTE
This is a chronic condition.  She follows closely with [facility].  She denies any respiratory concerns at this time.  She does undergo CT scans of her chest annually for monitoring.  Next CT scan scheduled for 11/2021.

## 2021-08-12 NOTE — ASSESSMENT & PLAN NOTE
This is a chronic condition.   Last TSH:  2/2021 - WNL  Current medication:  Levothyroxine 100 mcg daily  She is taking medication as directed, and denies any side effects.  Patient is taking the medication on an empty stomach in the morning and waiting at least 30 minutes before eating.  She reports good energy level on the medication.  She denies fatigue, cold intolerance, weight gain, constipation, dry skin, insomnia, tremor, or change of appetite.

## 2021-10-05 ENCOUNTER — TELEPHONE (OUTPATIENT)
Dept: HEALTH INFORMATION MANAGEMENT | Facility: OTHER | Age: 71
End: 2021-10-05

## 2021-10-08 ENCOUNTER — OFFICE VISIT (OUTPATIENT)
Dept: MEDICAL GROUP | Facility: PHYSICIAN GROUP | Age: 71
End: 2021-10-08
Payer: MEDICARE

## 2021-10-08 VITALS
TEMPERATURE: 98.7 F | HEART RATE: 75 BPM | SYSTOLIC BLOOD PRESSURE: 126 MMHG | BODY MASS INDEX: 25.16 KG/M2 | DIASTOLIC BLOOD PRESSURE: 70 MMHG | RESPIRATION RATE: 12 BRPM | WEIGHT: 166 LBS | HEIGHT: 68 IN | OXYGEN SATURATION: 95 %

## 2021-10-08 DIAGNOSIS — R19.7 DIARRHEA, UNSPECIFIED TYPE: ICD-10-CM

## 2021-10-08 PROCEDURE — 99213 OFFICE O/P EST LOW 20 MIN: CPT | Performed by: NURSE PRACTITIONER

## 2021-10-08 RX ORDER — LOPERAMIDE HYDROCHLORIDE 2 MG/1
2 CAPSULE ORAL 4 TIMES DAILY PRN
Qty: 30 CAPSULE | Refills: 1 | Status: SHIPPED | OUTPATIENT
Start: 2021-10-08

## 2021-10-08 ASSESSMENT — FIBROSIS 4 INDEX: FIB4 SCORE: 2.85

## 2021-10-08 NOTE — PATIENT INSTRUCTIONS
[facility] - [address] [address]  Phone: [phone]   Fax: [phone]      Low-FODMAP Eating Plan    FODMAPs (fermentable oligosaccharides, disaccharides, monosaccharides, and polyols) are sugars that are hard for some people to digest. A low-FODMAP eating plan may help some people who have bowel (intestinal) diseases to manage their symptoms.  This meal plan can be complicated to follow. Work with a diet and nutrition specialist (dietitian) to make a low-FODMAP eating plan that is right for you. A dietitian can make sure that you get enough nutrition from this diet.  What are tips for following this plan?  Reading food labels  · Check labels for hidden FODMAPs such as:  ? High-fructose syrup.  ? Honey.  ? Agave.  ? Natural fruit flavors.  ? Onion or garlic powder.  · Choose low-FODMAP foods that contain 3-4 grams of fiber per serving.  · Check food labels for serving sizes. Eat only one serving at a time to make sure FODMAP levels stay low.  Meal planning  · Follow a low-FODMAP eating plan for up to 6 weeks, or as told by your health care provider or dietitian.  · To follow the eating plan:  1. Eliminate high-FODMAP foods from your diet completely.  2. Gradually reintroduce high-FODMAP foods into your diet one at a time. Most people should wait a few days after introducing one high-FODMAP food before they introduce the next high-FODMAP food. Your dietitian can recommend how quickly you may reintroduce foods.  3. Keep a daily record of what you eat and drink, and make note of any symptoms that you have after eating.  4. Review your daily record with a dietitian regularly. Your dietitian can help you identify which foods you can eat and which foods you should avoid.  General tips  · Drink enough fluid each day to keep your urine pale yellow.  · Avoid processed foods. These often have added sugar and may be high in FODMAPs.  · Avoid most dairy products, whole grains,  "and sweeteners.  · Work with a dietitian to make sure you get enough fiber in your diet.  Recommended foods  Grains  · Gluten-free grains, such as rice, oats, buckwheat, quinoa, corn, polenta, and millet. Gluten-free pasta, bread, or cereal. Rice noodles. Corn tortillas.  Vegetables  · Eggplant, zucchini, cucumber, peppers, green beans, [place] sprouts, bean sprouts, lettuce, arugula, kale, Swiss chard, spinach, eduardo greens, bok day, summer squash, potato, and tomato. Limited amounts of corn, carrot, and sweet potato. Green parts of scallions.  Fruits  · Bananas, oranges, caroline, limes, blueberries, raspberries, strawberries, grapes, cantaloupe, honeydew melon, kiwi, papaya, passion fruit, and pineapple. Limited amounts of dried cranberries, banana chips, and shredded coconut.  Dairy  · Lactose-free milk, yogurt, and kefir. Lactose-free cottage cheese and ice cream. Non-dairy milks, such as almond, coconut, hemp, and rice milk. Yogurts made of non-dairy milks. Limited amounts of goat cheese, brie, mozzarella, parmesan, swiss, and other hard cheeses.  Meats and other protein foods  · Unseasoned beef, pork, poultry, or fish. Eggs. [place]. Tofu (firm) and tempeh. Limited amounts of nuts and seeds, such as almonds, walnuts, brazil nuts, pecans, peanuts, pumpkin seeds, gabriela seeds, and sunflower seeds.  Fats and oils  · Butter-free spreads. Vegetable oils, such as olive, canola, and sunflower oil.  Seasoning and other foods  · Artificial sweeteners with names that do not end in \"ol\" such as aspartame, saccharine, and stevia. Maple syrup, white table sugar, raw sugar, brown sugar, and molasses. Fresh basil, coriander, parsley, rosemary, and thyme.  Beverages  · Water and mineral water. Sugar-sweetened soft drinks. Small amounts of orange juice or cranberry juice. Black and green tea. Most dry analisa. Coffee.  This may not be a complete list of low-FODMAP foods. Talk with your dietitian for more information.  Foods to " avoid  Grains  · Wheat, including kamut, durum, and semolina. Barley and bulgur. Couscous. Wheat-based cereals. Wheat noodles, bread, crackers, and pastries.  Vegetables  · Chicory root, artichoke, asparagus, cabbage, snow peas, sugar snap peas, mushrooms, and cauliflower. Onions, garlic, leeks, and the white part of scallions.  Fruits  · Fresh, dried, and juiced forms of apple, pear, watermelon, peach, plum, cherries, apricots, blackberries, boysenberries, figs, nectarines, and pamela. Avocado.  Dairy  · Milk, yogurt, ice cream, and soft cheese. Cream and sour cream. Milk-based sauces. Custard.  Meats and other protein foods  · Fried or fatty meat. Sausage. Cashews and pistachios. Soybeans, baked beans, black beans, chickpeas, kidney beans, amadou beans, navy beans, lentils, and split peas.  Seasoning and other foods  · Any sugar-free gum or candy. Foods that contain artificial sweeteners such as sorbitol, mannitol, isomalt, or xylitol. Foods that contain honey, high-fructose corn syrup, or agave. Bouillon, vegetable stock, beef stock, and chicken stock. Garlic and onion powder. Condiments made with onion, such as hummus, chutney, pickles, relish, salad dressing, and salsa. Tomato paste.  Beverages  · Chicory-based drinks. Coffee substitutes. Chamomile tea. Fennel tea. Sweet or fortified analisa such as port or lila. Diet soft drinks made with isomalt, mannitol, maltitol, sorbitol, or xylitol. Apple, pear, and pamela juice. Juices with high-fructose corn syrup.  This may not be a complete list of high-FODMAP foods. Talk with your dietitian to discuss what dietary choices are best for you.   Summary  · A low-FODMAP eating plan is a short-term diet that eliminates FODMAPs from your diet to help ease symptoms of certain bowel diseases.  · The eating plan usually lasts up to 6 weeks. After that, high-FODMAP foods are restarted gradually, one at a time, so you can find out which may be causing symptoms.  · A low-FODMAP  eating plan can be complicated. It is best to work with a dietitian who has experience with this type of plan.  This information is not intended to replace advice given to you by your health care provider. Make sure you discuss any questions you have with your health care provider.  Document Released: 08/14/2018 Document Revised: 11/30/2018 Document Reviewed: 08/14/2018  Elsevier Patient Education © 2020 [employer]

## 2021-10-08 NOTE — PROGRESS NOTES
Subjective  Chief Complaint  Chief Complaint   Patient presents with   • Diarrhea     3x week      History of Present Illness  Naomy presents today with the following.  Problem   Diarrhea     Past Medical History    Allergies: Colophony [pinus strobus], Latex, Neosporin [neomycin-polymyxin b], Phenylene, Soap, Tape, and Tea tree oil  Past Medical History:   Diagnosis Date   • Anxiety    • Arthritis     wrists   • Back pain    • Blood transfusion without reported diagnosis     with ectopic preg x 2    • Breath shortness     with exertion   • Cataract     bilateral removal-Dr. Duke Talamantes   • Cellulitis     right lower leg   • Cellulitis of leg 11/1/2013    Followed by dermatology. Managed with fluocinonide and Bactroban on right leg Left leg she uses triamcinolone and Sarna pramocaine (anesthetic, antipruretic) on both.     • Chickenpox    • COPD (chronic obstructive pulmonary disease) ([facility])    • Dental disorder     upper denture   • Earache    • Fatigue    • Frequent urination    • Hypertension 02/23/2018    on no meds at this time   • Hypothyroidism    • Influenza    • Insomnia    • Kidney disease     reports 1 kidney is smaller than the other   • Mumps    • OAB (overactive bladder) 2/11/2019   • Pain 02/23/2018    right leg and back, 5/10   • Pneumonia    • Rash    • Ringing in ears    • Shortness of breath    • Swelling of lower extremity    • Thyroid disease    • Tonsillitis    • Toothache    • Venous stasis dermatitis     right leg     Current Outpatient Medications Ordered in Epic   Medication Sig Dispense Refill   • loperamide (IMODIUM) 2 MG Cap Take 1 Capsule by mouth 4 times a day as needed for Diarrhea. 30 Capsule 1   • cyanocobalamin (VITAMIN B-12) 100 MCG Tab Take 100 mcg by mouth every day.     • vitamin D (CHOLECALCIFEROL) 1000 Unit (25 mcg) Tab Take 1,000 Units by mouth every day.     • rosuvastatin (CRESTOR) 10 MG Tab Take 1 Tablet by mouth every evening. 90 Tablet 1   • lisinopril (PRINIVIL) 20  "MG Tab TAKE 1 TABLET BY MOUTH EVERY DAY 30 tablet 3   • nitrofurantoin (MACRODANTIN) 50 MG Cap TAKE 1 CAPSULE BY MOUTH EVERYDAY AT BEDTIME     • levothyroxine (SYNTHROID) 100 MCG Tab Take 1 tablet by mouth every morning before breakfast. 100 tablet 3   • amLODIPine (NORVASC) 5 MG Tab TAKE 1 TABLET BY MOUTH EVERY DAY 90 Tab 2   • triamcinolone acetonide (KENALOG) 0.1 % Cream Apply 1 Application to affected area(s) 2 times a day as needed. 80 g 2   • Mirabegron ER (MYRBETRIQ) 50 MG TABLET SR 24 HR every day.     • Melatonin 1 MG Tab Take  by mouth.     • PROAIR  (90 Base) MCG/ACT Aero Soln inhalation aerosol INHALE 2 (TWO) PUFF EVERY SIX HOURS, AS NEEDED  1   • diphenhydrAMINE (BENADRYL) 25 MG Tab Take 25 mg by mouth every 6 hours as needed for Sleep.     • Acetaminophen 500 MG Cap Take  by mouth.     • Oxymetazoline HCl (NASAL SPRAY NA) Spray  in nose.       No current [facility]-ordered facility-administered medications on file.     Review of Systems  See below.     Objective  Physical Exam  /70 (BP Location: Right arm, Patient Position: Sitting, BP Cuff Size: Adult)   Pulse 75   Temp 37.1 °C (98.7 °F) (Temporal)   Resp 12   Ht 1.727 m (5' 8\")   Wt 75.3 kg (166 lb)   SpO2 95%  Body mass index is 25.24 kg/m².  General:  Alert and oriented.  Well appearing.  NAD  Neck: Supple without JVD. No lymphadenopathy.  Gastrointestinal: Abdomen soft, nontender, nondistended. Normal bowel sounds. Liver and spleen are not palpable.  Skin:  Warm and dry.  No obvious lesions.  Musculoskeletal:  No extremity cyanosis, clubbing, or edema.    Assessment/Plan  71 y.o. female with the following issues.    1. Diarrhea, unspecified type  CALPROTECTIN,FECAL    C Diff by PCR rflx Toxin    loperamide (IMODIUM) 2 MG Cap    CULTURE STOOL    FECAL LACTOFERRIN QUALITATIVE    OCCULT BLOOD STOOL    CANCELED: OCCULT BLOOD FECES IMMUNOASSAY    CANCELED: FECAL LACTOFERRIN QUALITATIVE    CANCELED: CULTURE STOOL      Diarrhea  This is a " new condition.  She reports experiencing diarrheal episodes 2-3x a day; and reports last three days of diarrhea have been black.  She does endorse less of an appetite but still able to eat square meals a day and stay hydrated.  She denies any changes to her diet.  She denies any abdominal pain, fever/chills, nausea/vomiting, no ill contacts including Covid-19 positive individuals.  She reports taking imodium, peptobismol. She denies recent antibiotic use; taken off nitrofurantoin 3-4 weeks ago (per urology).  She feels as though diarrhea worsen; reports stool is watery. She reports taking imodium on a regular basis. No new medications.  > Stop peptobismol - may be cause of dark stools.  Continue imodium QID PRN.  Will obtain stool studies for further evaluation as no new changes to medication, diet; no abdominal pain and PE benign.  She does have appointment with GI for colonoscopy at end of year; advised to call to see if able to move up appointment due to chronic diarrhea.  Discussed low FODMAP diet.  Discussed importance of hydration and nutrition status.  ER precautions advised.      Return if symptoms worsen or fail to improve.    Health Maintenance: Completed    I have placed the below orders and discussed them with an approved delegating provider.  The MA is performing the below orders under the direction of Dr. Andino.    Please note that this dictation was created using voice recognition software. I have worked with consultants from the vendor as well as technical experts from [facility] to optimize the interface. I have made every reasonable attempt to correct obvious errors, but I expect that there are errors of grammar and possibly content that I did not discover before finalizing the note.    VIELKA Ennis  [facility]

## 2021-10-08 NOTE — ASSESSMENT & PLAN NOTE
This is a new condition.  She reports experiencing diarrheal episodes 2-3x a day; and reports last three days of diarrhea have been black.  She does endorse less of an appetite but still able to eat square meals a day and stay hydrated.  She denies any changes to her diet.  She denies any abdominal pain, fever/chills, nausea/vomiting, no ill contacts including Covid-19 positive individuals.  She reports taking imodium, peptobismol. She denies recent antibiotic use; taken off nitrofurantoin 3-4 weeks ago (per urology).  She feels as though diarrhea worsen; reports stool is watery. She reports taking imodium on a regular basis. No new medications.  > Stop peptobismol - may be cause of dark stools.  Continue imodium QID PRN.  Will obtain stool studies for further evaluation as no new changes to medication, diet; no abdominal pain and PE benign.  She does have appointment with GI for colonoscopy at end of year; advised to call to see if able to move up appointment due to chronic diarrhea.  Discussed low FODMAP diet.  Discussed importance of hydration and nutrition status.  ER precautions advised.

## 2021-10-11 ENCOUNTER — HOSPITAL ENCOUNTER (OUTPATIENT)
Dept: LAB | Facility: MEDICAL CENTER | Age: 71
End: 2021-10-11
Attending: NURSE PRACTITIONER
Payer: MEDICARE

## 2021-10-13 PROBLEM — I70.0 AORTIC ATHEROSCLEROSIS (HCC): Status: ACTIVE | Noted: 2021-10-13

## 2021-10-13 PROBLEM — E66.3 OVERWEIGHT WITH BODY MASS INDEX (BMI) OF 25 TO 25.9 IN ADULT: Status: ACTIVE | Noted: 2021-10-13

## 2021-10-13 PROBLEM — I73.9 PERIPHERAL VASCULAR DISEASE, UNSPECIFIED (HCC): Status: ACTIVE | Noted: 2021-10-13

## 2021-11-05 ENCOUNTER — HOSPITAL ENCOUNTER (OUTPATIENT)
Dept: RADIOLOGY | Facility: MEDICAL CENTER | Age: 71
End: 2021-11-05
Attending: PHYSICIAN ASSISTANT
Payer: MEDICARE

## 2021-11-05 DIAGNOSIS — R91.8 PULMONARY NODULES: ICD-10-CM

## 2021-11-05 PROCEDURE — 71250 CT THORAX DX C-: CPT | Mod: ME

## 2021-11-08 ENCOUNTER — OFFICE VISIT (OUTPATIENT)
Dept: SLEEP MEDICINE | Facility: MEDICAL CENTER | Age: 71
End: 2021-11-08
Payer: MEDICARE

## 2021-11-08 ENCOUNTER — NON-PROVIDER VISIT (OUTPATIENT)
Dept: SLEEP MEDICINE | Facility: MEDICAL CENTER | Age: 71
End: 2021-11-08
Attending: PHYSICIAN ASSISTANT
Payer: MEDICARE

## 2021-11-08 VITALS
WEIGHT: 157 LBS | HEIGHT: 67 IN | DIASTOLIC BLOOD PRESSURE: 84 MMHG | BODY MASS INDEX: 24.64 KG/M2 | RESPIRATION RATE: 16 BRPM | HEART RATE: 73 BPM | OXYGEN SATURATION: 96 % | SYSTOLIC BLOOD PRESSURE: 130 MMHG

## 2021-11-08 VITALS — WEIGHT: 157 LBS | BODY MASS INDEX: 24.59 KG/M2

## 2021-11-08 DIAGNOSIS — Z87.891 FORMER SMOKER: ICD-10-CM

## 2021-11-08 DIAGNOSIS — J44.9 CHRONIC OBSTRUCTIVE PULMONARY DISEASE, UNSPECIFIED COPD TYPE (HCC): ICD-10-CM

## 2021-11-08 DIAGNOSIS — R91.8 PULMONARY NODULES: ICD-10-CM

## 2021-11-08 PROCEDURE — 99214 OFFICE O/P EST MOD 30 MIN: CPT | Performed by: PHYSICIAN ASSISTANT

## 2021-11-08 PROCEDURE — 94060 EVALUATION OF WHEEZING: CPT | Performed by: INTERNAL MEDICINE

## 2021-11-08 PROCEDURE — 94729 DIFFUSING CAPACITY: CPT | Performed by: INTERNAL MEDICINE

## 2021-11-08 PROCEDURE — 94726 PLETHYSMOGRAPHY LUNG VOLUMES: CPT | Performed by: INTERNAL MEDICINE

## 2021-11-08 ASSESSMENT — ENCOUNTER SYMPTOMS
COUGH: 1
SHORTNESS OF BREATH: 0
HEARTBURN: 0
HEADACHES: 0
FEVER: 0
CHILLS: 0
SPUTUM PRODUCTION: 1
WHEEZING: 0
INSOMNIA: 0
SORE THROAT: 0
DIZZINESS: 0
PALPITATIONS: 0
WEIGHT LOSS: 0
ORTHOPNEA: 0
TREMORS: 0

## 2021-11-08 ASSESSMENT — PULMONARY FUNCTION TESTS
FEV1/FVC: 69
FVC: 3.01
FEV1/FVC_PERCENT_PREDICTED: 85
FEV1/FVC_PREDICTED: 78
FEV1/FVC_PERCENT_PREDICTED: 86
FEV1/FVC_PERCENT_PREDICTED: 77
FEV1_PERCENT_CHANGE: 10
FEV1: 2.2
FVC_PERCENT_PREDICTED: 106
FVC_LLN: 2.61
FEV1/FVC: 66
FVC_PERCENT_PREDICTED: 96
FEV1: 2.08
FEV1_PERCENT_CHANGE: 5
FEV1/FVC_PERCENT_CHANGE: 50
FEV1_LLN: 2.01
FVC_PREDICTED: 3.13
FEV1_PERCENT_PREDICTED: 91
FEV1/FVC: 66.27
FEV1/FVC_PERCENT_PREDICTED: 90
FVC: 3.32
FEV1/FVC_PERCENT_CHANGE: -4
FEV1_PREDICTED: 2.4
FEV1_PERCENT_PREDICTED: 86
FEV1/FVC_PERCENT_PREDICTED: 88
FEV1/FVC_PERCENT_LLN: 65
FEV1/FVC: 69

## 2021-11-08 ASSESSMENT — FIBROSIS 4 INDEX
FIB4 SCORE: 2.85
FIB4 SCORE: 2.85

## 2021-11-08 NOTE — PROCEDURES
Technician: JUMANA Dietz    Technician Comment:  Good patient effort & cooperation.  The results of this test meet the ATS/ERS standards for acceptability & reproducibility.  Test was performed on the Med[employer]lexus Body Plethysmograph-El[facility] DX system.  Predicted values were GLI-2012 for spirometry, GLI-2017 for DLCO, ITS for Lung Volumes.  The DLCO was uncorrected for Hgb.  A bronchodilator of Ventolin HFA -2puffs via spacer administered.  DLCO performed during dilation period.    Interpretation:  Baseline spirometry shows airflow obstruction with FEV1/FVC ratio of 69 and FEV1 of 2.08 L or 86% predicted.  There is significant bronchodilator response by absolute increase in FVC.  Total lung capacity is within normal limits however there is mild air trapping.  Diffusion capacity is within normal limits at 92% predicted.  Pulmonary function testing shows mild airflow obstruction with preserved FEV1, mild air trapping and normal DLCO.  There is bronchodilator responsiveness.  Findings could be consistent with asthma versus early COPD.  Correlate clinically.

## 2021-11-08 NOTE — PROGRESS NOTES
CC: follow up     HPI:  Naomy Vargas is a 71 y.o. year old female here today for follow-up on COPD. Last seen in clinic 5/20/2021.  Former smoker with reported quit date 7/18/2017 and 26.5-pack-year history.    Pertinent past medical history includes chronic kidney disease, stage 3, hypertension, abdominal aortic aneurysm, PVD, venous stasis dermatitis, thrombocytopenia.     Reviewed in clinic vitals including BP one 130/84, HR 73, O2 sat 96%and BMI of 24.59 kg/m².    Reviewed home medication regimen including lisinopril-denies dry persistent cough, oxymetazoline nasal spray, albuterol inhaler which she denies requiring recently.    Reviewed most recent imaging including CT scan obtained 11/5/21 demonstrating stable nodule posterior right lung apex, likely scarring.  Groundglass opacity left lower lobe unchanged from 9/23/2020 measuring 14 mm, mild to moderate emphysema.  Stable mild mediastinal adenopathy.    Pulmonary function testing obtained 11/8/2021 demonstrated an FEV1 of 2.78 L or 86% predicted, FVC of 3.01 L 96% predicted, FEV1/FVC ratio 69, normal flows are improved from prior testing in 2018, residual volume 142% predicted, % predicted, DLCO past medical history includes stage III chronic kidney disease, hypothyroidism, venous stasis dermatitis both lower extremities peripheral vascular disease.  % predicted.  Per pulmonologist interpretation significant bronchodilator response by absolute increase in FVC, airflow obstruction, mild air trapping, normal diffusion capacity.  Findings consistent with asthma versus early COPD.    Review of Systems   Constitutional: Positive for malaise/fatigue (chronic mild). Negative for chills, fever and weight loss.   HENT: Positive for congestion (allergies ) and tinnitus (chronic ). Negative for hearing loss, nosebleeds and sore throat.    Respiratory: Positive for cough and sputum production (clear occasional ). Negative for shortness of breath and  wheezing.    Cardiovascular: Positive for leg swelling (intermittent ). Negative for chest pain, palpitations and orthopnea.   Gastrointestinal: Negative for heartburn.        Upper dentures, missing a few teeth on bottom, no difficulty swallowing    Neurological: Negative for dizziness, tremors and headaches.   Psychiatric/Behavioral: The patient does not have insomnia.        Past Medical History:   Diagnosis Date   • Anxiety    • Arthritis     wrists   • Back pain    • Blood transfusion without reported diagnosis     with ectopic preg x 2    • Breath shortness     with exertion   • Cataract     bilateral removal-Dr. Duke Talamantes   • Cellulitis     right lower leg   • Cellulitis of leg 11/1/2013    Followed by dermatology. Managed with fluocinonide and Bactroban on right leg Left leg she uses triamcinolone and Sarna pramocaine (anesthetic, antipruretic) on both.     • Chickenpox    • COPD (chronic obstructive pulmonary disease) ([facility])    • Dental disorder     upper denture   • Earache    • Fatigue    • Frequent urination    • Hypertension 02/23/2018    on no meds at this time   • Hypothyroidism    • Influenza    • Insomnia    • Kidney disease     reports 1 kidney is smaller than the other   • Mumps    • OAB (overactive bladder) 2/11/2019   • Pain 02/23/2018    right leg and back, 5/10   • Peripheral vascular disease, unspecified ([facility]) 10/13/2021   • Pneumonia    • Rash    • Ringing in ears    • Shortness of breath    • Swelling of lower extremity    • Thyroid disease    • Tonsillitis    • Toothache    • Venous stasis dermatitis     right leg       Past Surgical History:   Procedure Laterality Date   • VEIN LIGATION RADIO FREQUENCY Right 2/26/2018    Procedure: VEIN LIGATION RADIO FREQUENCY- LONG SAPENOUS;  Surgeon: Jim Alas M.D.;  Location: SURGERY [facility];  Service: General   • ABDOMINAL EXPLORATION      2 ectopic preg   • BREAST BIOPSY      hx of benign left breast biopsy   • EYE SURGERY       cataract x2   • HYSTERECTOMY LAPAROSCOPY     • HYSTERECTOMY, TOTAL ABDOMINAL     • OOPHORECTOMY     • PB MAMMARY DUCTOGRAM, SINGLE     • PB REMV 2ND CATARACT,CORN-SCLER SECTN     • TONSILLECTOMY         Family History   Problem Relation Age of Onset   • Arthritis Mother         hip fracture   • Diabetes Mother    • Cancer Mother         skin   • Arthritis Father         lung   • Alcohol/Drug Father         etoh   • Lung Disease Sister         smoker/copd   • Hypertension Sister    • Other Brother         hep c   • Arthritis Maternal Grandmother         hip fracture   • Diabetes Maternal Grandfather    • No Known Problems Son    • No Known Problems Son    • No Known Problems Brother    • No Known Problems Sister    • No Known Problems Brother    • Hyperlipidemia Neg Hx    • Stroke Neg Hx        Social History     Socioeconomic History   • Marital status:      Spouse name: Not on file   • Number of children: 2   • Years of education: Not on file   • Highest education level: Not on file   Occupational History   • Occupation: retired   Tobacco Use   • Smoking status: Former Smoker     Packs/day: 0.50     Years: 53.00     Pack years: 26.50     Types: Cigarettes     Start date: 3/29/1963     Quit date: 2017     Years since quittin.3   • Smokeless tobacco: Never Used   Vaping Use   • Vaping Use: Never used   Substance and Sexual Activity   • Alcohol use: Yes     Comment: 2 per day   • Drug use: No   • Sexual activity: Not Currently     Partners: Male   Other Topics Concern   • Not on file   Social History Narrative   • Not on file     Social Determinants of Health     Financial Resource Strain:    • Difficulty of Paying Living Expenses: Not on file   Food Insecurity:    • Worried About Running Out of Food in the Last Year: Not on file   • Ran Out of Food in the Last Year: Not on file   Transportation Needs:    • Lack of Transportation (Medical): Not on file   • Lack of Transportation (Non-Medical):  "Not on file   Physical Activity:    • Days of Exercise per Week: Not on file   • Minutes of Exercise per Session: Not on file   Stress:    • Feeling of Stress : Not on file   Social Connections:    • Frequency of Communication with Friends and Family: Not on file   • Frequency of Social Gatherings with Friends and Family: Not on file   • Attends Jain Services: Not on file   • Active Member of Clubs or Organizations: Not on file   • Attends Club or Organization Meetings: Not on file   • Marital Status: Not on file   Intimate Partner Violence:    • Fear of Current or Ex-Partner: Not on file   • Emotionally Abused: Not on file   • Physically Abused: Not on file   • Sexually Abused: Not on file   Housing Stability:    • Unable to Pay for Housing in the Last Year: Not on file   • Number of Places Lived in the Last Year: Not on file   • Unstable Housing in the Last Year: Not on file       Allergies as of 11/08/2021 - Reviewed 11/08/2021   Allergen Reaction Noted   • Colophony [pinus strobus] Itching 05/31/2018   • Latex Rash 02/23/2018   • Neosporin [neomycin-polymyxin b] Rash 02/23/2018   • Phenylene Itching 05/31/2018   • Soap Rash 02/23/2018   • Tape Itching 02/23/2018   • Tea tree oil Rash 02/23/2018        @Vital signs for this encounter:  /84   Pulse 73   Resp 16   Ht 1.702 m (5' 7\")   Wt 71.2 kg (157 lb)   SpO2 96%     Current medications as of today   Current Outpatient Medications   Medication Sig Dispense Refill   • loperamide (IMODIUM) 2 MG Cap Take 1 Capsule by mouth 4 times a day as needed for Diarrhea. 30 Capsule 1   • cyanocobalamin (VITAMIN B-12) 100 MCG Tab Take 100 mcg by mouth every day.     • vitamin D (CHOLECALCIFEROL) 1000 Unit (25 mcg) Tab Take 1,000 Units by mouth every day.     • rosuvastatin (CRESTOR) 10 MG Tab Take 1 Tablet by mouth every evening. 90 Tablet 1   • lisinopril (PRINIVIL) 20 MG Tab TAKE 1 TABLET BY MOUTH EVERY DAY 30 tablet 3   • nitrofurantoin (MACRODANTIN) 50 MG " Cap TAKE 1 CAPSULE BY MOUTH EVERYDAY AT BEDTIME     • levothyroxine (SYNTHROID) 100 MCG Tab Take 1 tablet by mouth every morning before breakfast. 100 tablet 3   • amLODIPine (NORVASC) 5 MG Tab TAKE 1 TABLET BY MOUTH EVERY DAY 90 Tab 2   • triamcinolone acetonide (KENALOG) 0.1 % Cream Apply 1 Application to affected area(s) 2 times a day as needed. 80 g 2   • Mirabegron ER (MYRBETRIQ) 50 MG TABLET SR 24 HR every day.     • Melatonin 1 MG Tab Take  by mouth.     • PROAIR  (90 Base) MCG/ACT Aero Soln inhalation aerosol INHALE 2 (TWO) PUFF EVERY SIX HOURS, AS NEEDED  1   • diphenhydrAMINE (BENADRYL) 25 MG Tab Take 25 mg by mouth every 6 hours as needed for Sleep.     • Acetaminophen 500 MG Cap Take  by mouth.     • Oxymetazoline HCl (NASAL SPRAY NA) Spray  in nose.       No current facility-administered medications for this visit.         Physical Exam:   Gen:           Alert and oriented, No apparent distress. Mood and affect appropriate, normal interaction with provider.  Eyes:          sclere white, conjunctive moist.  Hearing:     Grossly intact.  Dentition:    Upper dentures, missing some bottom teeth  Oropharynx:   Tongue normal, posterior pharynx without erythema or exudate.  Neck:        Supple, trachea midline, no masses.  Respiratory Effort: No intercostal retractions or use of accessory muscles.   Lung Auscultation:      Decreased throughout; no rales, rhonchi or wheezing.  CV:            Regular rate and rhythm.  Trace edema. No murmurs, rubs or gallops.  Digits, Nails, Ext: No clubbing, cyanosis, petechiae, or nodes.   Skin:        No rashes, lesions or ulcers noted on exposed skin surfaces.                     Assessment:  1. Chronic obstructive pulmonary disease, unspecified COPD type (HCC)     2. Pulmonary nodules  CT-CHEST (THORAX) W/O   3. Former smoker         Immunizations:    Flu: 9/22/2021  Pneumovax 23: 2/8/2019, 11/1/2013   Prevnar 13: 2/28/2017  Pfizer SARS-CoV-2 vaccine: 3/10/2021,  2/17/2021    Plan:  71 y.o. year old female here today for follow-up on COPD. Last seen in clinic 5/20/2021.  Former smoker with reported quit date 7/18/2017 and 26.5-pack-year history.    Former smoker: Patient remains abstinent of tobacco.    Pertinent past medical history includes chronic kidney disease, stage 3, hypertension, abdominal aortic aneurysm, PVD, venous stasis dermatitis, thrombocytopenia.     Reviewed in clinic vitals including BP one 130/84, HR 73, O2 sat 96%and BMI of 24.59 kg/m².    Reviewed home medication regimen including lisinopril-denies dry persistent cough, oxymetazoline nasal spray, albuterol inhaler which she has not been requiring.    COPD: Reviewed pulmonary function testing.  Does not require maintenance inhaler, infrequent requirement for rescue inhaler, reviewed indications for use.    Pulmonary nodules: Reviewed imaging with patient, reviewed with pulmonologist recommended follow-up CT scan in March 2020.    Due for covid booster.  Follow up in 5 months with Ct results.     This dictation was created using voice recognition software. The accuracy of the dictation is limited to the abilities of the software. I expect there may be some errors of grammar and possibly content.

## 2021-11-08 NOTE — PATIENT INSTRUCTIONS
"1-indications for \"rescue\" inhaler use   -increased cough   -increased shortness of breath or work of breathing   -wheezing  2-reviewed CT results  3-reviewed with pulmonologist for recommendations  4-obtain follow up chest CT in 3-6 months  4-reviewed PFT results, improved overall  5-follow up in 5 months     "

## 2021-11-13 DIAGNOSIS — I77.1 BILATERAL ILIAC ARTERY STENOSIS (HCC): ICD-10-CM

## 2021-11-13 DIAGNOSIS — Z91.89 CANDIDATE FOR STATIN THERAPY DUE TO RISK OF FUTURE CARDIOVASCULAR EVENT: ICD-10-CM

## 2021-11-16 RX ORDER — ROSUVASTATIN CALCIUM 10 MG/1
TABLET, COATED ORAL
Qty: 100 TABLET | Refills: 1 | Status: SHIPPED | OUTPATIENT
Start: 2021-11-16 | End: 2022-06-02 | Stop reason: SDUPTHER

## 2021-11-24 NOTE — TELEPHONE ENCOUNTER
Please advise patient that there has been some improvement, but blood pressure still high. Would like her to bring log in again in 2 weeks and if still high, likely increase amlodipine dose.  Dina Steinberg P.A.-C.     Previously Declined (within the last year)

## 2021-12-02 ENCOUNTER — HOSPITAL ENCOUNTER (OUTPATIENT)
Dept: LAB | Facility: MEDICAL CENTER | Age: 71
End: 2021-12-02
Attending: INTERNAL MEDICINE
Payer: MEDICARE

## 2021-12-02 DIAGNOSIS — N18.31 STAGE 3A CHRONIC KIDNEY DISEASE: ICD-10-CM

## 2021-12-02 DIAGNOSIS — I10 ESSENTIAL HYPERTENSION: ICD-10-CM

## 2021-12-02 LAB
ANION GAP SERPL CALC-SCNC: 9 MMOL/L (ref 7–16)
BUN SERPL-MCNC: 26 MG/DL (ref 8–22)
CALCIUM SERPL-MCNC: 9.3 MG/DL (ref 8.5–10.5)
CHLORIDE SERPL-SCNC: 102 MMOL/L (ref 96–112)
CO2 SERPL-SCNC: 26 MMOL/L (ref 20–33)
CREAT SERPL-MCNC: 1.21 MG/DL (ref 0.5–1.4)
FASTING STATUS PATIENT QL REPORTED: NORMAL
GLUCOSE SERPL-MCNC: 113 MG/DL (ref 65–99)
POTASSIUM SERPL-SCNC: 4.7 MMOL/L (ref 3.6–5.5)
SODIUM SERPL-SCNC: 137 MMOL/L (ref 135–145)

## 2021-12-02 PROCEDURE — 80048 BASIC METABOLIC PNL TOTAL CA: CPT

## 2021-12-02 PROCEDURE — 36415 COLL VENOUS BLD VENIPUNCTURE: CPT

## 2021-12-09 ENCOUNTER — OFFICE VISIT (OUTPATIENT)
Dept: NEPHROLOGY | Facility: MEDICAL CENTER | Age: 71
End: 2021-12-09
Payer: MEDICARE

## 2021-12-09 VITALS
OXYGEN SATURATION: 97 % | BODY MASS INDEX: 24.1 KG/M2 | SYSTOLIC BLOOD PRESSURE: 118 MMHG | WEIGHT: 159 LBS | HEIGHT: 68 IN | TEMPERATURE: 97.2 F | DIASTOLIC BLOOD PRESSURE: 78 MMHG | HEART RATE: 79 BPM

## 2021-12-09 DIAGNOSIS — R80.9 MICROALBUMINURIA: ICD-10-CM

## 2021-12-09 DIAGNOSIS — E55.9 VITAMIN D DEFICIENCY: ICD-10-CM

## 2021-12-09 DIAGNOSIS — I10 ESSENTIAL HYPERTENSION: ICD-10-CM

## 2021-12-09 DIAGNOSIS — D64.9 ANEMIA, UNSPECIFIED TYPE: ICD-10-CM

## 2021-12-09 DIAGNOSIS — N18.31 STAGE 3A CHRONIC KIDNEY DISEASE: ICD-10-CM

## 2021-12-09 PROCEDURE — 99214 OFFICE O/P EST MOD 30 MIN: CPT | Performed by: INTERNAL MEDICINE

## 2021-12-09 ASSESSMENT — ENCOUNTER SYMPTOMS
EYES NEGATIVE: 1
ABDOMINAL PAIN: 0
COUGH: 0
FLANK PAIN: 0
BACK PAIN: 0
HEMOPTYSIS: 0
CHILLS: 0
PALPITATIONS: 0
MYALGIAS: 0
DIARRHEA: 0
NECK PAIN: 0
ORTHOPNEA: 0
SHORTNESS OF BREATH: 0
FEVER: 0
SINUS PAIN: 0
WEIGHT LOSS: 0
VOMITING: 0
NAUSEA: 0
WHEEZING: 0

## 2021-12-09 ASSESSMENT — FIBROSIS 4 INDEX: FIB4 SCORE: 2.85

## 2021-12-09 NOTE — PROGRESS NOTES
Subjective:      Naomy Vargas is a 71 y.o. female who presents with Follow-Up and Chronic Kidney Disease            Chronic Kidney Disease  Pertinent negatives include no abdominal pain, chest pain, chills, congestion, coughing, fever, myalgias, nausea, neck pain or vomiting.     Naomy is coming today for f/u of CKD IIIa  Doing well, no complaints.  No dysuria/hematuria/flank pain, no fever/chills  Pedal edema improved  No NSAID's  CKD III-creat in 2018 at 1.02 -worse to 1.33 in Feb 2019 - worsen to1.6! - 1.9!-improved to 1.3 -now stable at 1.2  HTN: BP well controlled  CT abd from 2008 revealed scarred right kidney  Renal US small right kidney with hydronephrosis f/u with Urology    Review of Systems   Constitutional: Negative for chills, fever, malaise/fatigue and weight loss.   HENT: Negative for congestion, hearing loss and sinus pain.    Eyes: Negative.    Respiratory: Negative for cough, hemoptysis, shortness of breath and wheezing.    Cardiovascular: Negative for chest pain, palpitations, orthopnea and leg swelling.   Gastrointestinal: Negative for abdominal pain, diarrhea, nausea and vomiting.   Genitourinary: Negative for dysuria, flank pain, frequency, hematuria and urgency.   Musculoskeletal: Negative for back pain, myalgias and neck pain.   Skin: Negative.    All other systems reviewed and are negative.    Past Medical History:   Diagnosis Date   • Anxiety    • Arthritis     wrists   • Back pain    • Blood transfusion without reported diagnosis     with ectopic preg x 2    • Breath shortness     with exertion   • Cataract     bilateral removal-Dr. Duke Talamantes   • Cellulitis     right lower leg   • Cellulitis of leg 11/1/2013    Followed by dermatology. Managed with fluocinonide and Bactroban on right leg Left leg she uses triamcinolone and Sarna pramocaine (anesthetic, antipruretic) on both.     • Chickenpox    • COPD (chronic obstructive pulmonary disease) (HCC)    • Dental disorder     upper  denture   • Earache    • Fatigue    • Frequent urination    • Hypertension 2018    on no meds at this time   • Hypothyroidism    • Influenza    • Insomnia    • Kidney disease     reports 1 kidney is smaller than the other   • Mumps    • OAB (overactive bladder) 2019   • Pain 2018    right leg and back, 5/10   • Peripheral vascular disease, unspecified (HCC) 10/13/2021   • Pneumonia    • Rash    • Ringing in ears    • Shortness of breath    • Swelling of lower extremity    • Thyroid disease    • Tonsillitis    • Toothache    • Venous stasis dermatitis     right leg       Family History   Problem Relation Age of Onset   • Arthritis Mother         hip fracture   • Diabetes Mother    • Cancer Mother         skin   • Arthritis Father         lung   • Alcohol/Drug Father         etoh   • Lung Disease Sister         smoker/copd   • Hypertension Sister    • Other Brother         hep c   • Arthritis Maternal Grandmother         hip fracture   • Diabetes Maternal Grandfather    • No Known Problems Son    • No Known Problems Son    • No Known Problems Brother    • No Known Problems Sister    • No Known Problems Brother    • Hyperlipidemia Neg Hx    • Stroke Neg Hx        Social History     Socioeconomic History   • Marital status:      Spouse name: Not on file   • Number of children: 2   • Years of education: Not on file   • Highest education level: Not on file   Occupational History   • Occupation: retired   Tobacco Use   • Smoking status: Former Smoker     Packs/day: 0.50     Years: 53.00     Pack years: 26.50     Types: Cigarettes     Start date: 3/29/1963     Quit date: 2017     Years since quittin.3   • Smokeless tobacco: Never Used   Vaping Use   • Vaping Use: Never used   Substance and Sexual Activity   • Alcohol use: Yes     Comment: 2 per day   • Drug use: No   • Sexual activity: Not Currently     Partners: Male   Other Topics Concern   • Not on file   Social History Narrative   • Not  "on file     Social Determinants of Health     Financial Resource Strain:    • Difficulty of Paying Living Expenses: Not on file   Food Insecurity:    • Worried About Running Out of Food in the Last Year: Not on file   • Ran Out of Food in the Last Year: Not on file   Transportation Needs:    • Lack of Transportation (Medical): Not on file   • Lack of Transportation (Non-Medical): Not on file   Physical Activity:    • Days of Exercise per Week: Not on file   • Minutes of Exercise per Session: Not on file   Stress:    • Feeling of Stress : Not on file   Social Connections:    • Frequency of Communication with Friends and Family: Not on file   • Frequency of Social Gatherings with Friends and Family: Not on file   • Attends Yazidism Services: Not on file   • Active Member of Clubs or Organizations: Not on file   • Attends Club or Organization Meetings: Not on file   • Marital Status: Not on file   Intimate Partner Violence:    • Fear of Current or Ex-Partner: Not on file   • Emotionally Abused: Not on file   • Physically Abused: Not on file   • Sexually Abused: Not on file   Housing Stability:    • Unable to Pay for Housing in the Last Year: Not on file   • Number of Places Lived in the Last Year: Not on file   • Unstable Housing in the Last Year: Not on file          Objective:     /78 (BP Location: Right arm, Patient Position: Sitting)   Pulse 79   Temp 36.2 °C (97.2 °F) (Temporal)   Ht 1.727 m (5' 8\")   Wt 72.1 kg (159 lb)   LMP  (LMP Unknown)   SpO2 97%   BMI 24.18 kg/m²      Physical Exam  Vitals reviewed.   Constitutional:       General: She is not in acute distress.     Appearance: Normal appearance. She is well-developed. She is not diaphoretic.   HENT:      Head: Normocephalic and atraumatic.      Nose: Nose normal.      Mouth/Throat:      Mouth: Mucous membranes are moist.      Pharynx: Oropharynx is clear.   Eyes:      Extraocular Movements: Extraocular movements intact.      " Conjunctiva/sclera: Conjunctivae normal.      Pupils: Pupils are equal, round, and reactive to light.   Cardiovascular:      Rate and Rhythm: Normal rate and regular rhythm.      Pulses: Normal pulses.      Heart sounds: Normal heart sounds. No friction rub. No gallop.    Pulmonary:      Effort: Pulmonary effort is normal. No respiratory distress.      Breath sounds: Normal breath sounds. No wheezing, rhonchi or rales.   Abdominal:      General: Bowel sounds are normal. There is no distension.      Palpations: Abdomen is soft. There is no mass.      Tenderness: There is no abdominal tenderness. There is no right CVA tenderness or left CVA tenderness.   Musculoskeletal:      Cervical back: Normal range of motion and neck supple.      Right lower leg: No edema.      Left lower leg: No edema.   Skin:     General: Skin is warm.      Coloration: Skin is not pale.      Findings: No erythema or rash.   Neurological:      General: No focal deficit present.      Mental Status: She is alert and oriented to person, place, and time.      Cranial Nerves: No cranial nerve deficit.      Coordination: Coordination normal.   Psychiatric:         Mood and Affect: Mood normal.         Behavior: Behavior normal.         Thought Content: Thought content normal.         Judgment: Judgment normal.            Laboratory and renal imaging results reviewed : d/w pt  Lab Results   Component Value Date/Time    CREATININE 1.21 12/02/2021 09:33 AM    POTASSIUM 4.7 12/02/2021 09:33 AM          Assessment/Plan:       1. CKD (chronic kidney disease) stage 3a, GFR 30-59 ml/min ([facility])      Creat level stable at baseline    2. Vitamin D deficiency      Well controlled      PTH WNL    3. Essential hypertension      BP well controlled       To monitor BP at home nd call clinic if BP> 135/85    4. Microalbuminuria      Very mild -on ACEi      5. Anemia, unspecified type      Hb stable WNL    Recs:  Continue current treatment  Urology f/u  Keep well  hydrated  Low salt diet  Monitor BP  Avoid NSAID's  F/u in 6 months

## 2021-12-16 DIAGNOSIS — I10 ESSENTIAL HYPERTENSION: ICD-10-CM

## 2021-12-16 RX ORDER — AMLODIPINE BESYLATE 5 MG/1
TABLET ORAL
Qty: 90 TABLET | Refills: 1 | Status: SHIPPED | OUTPATIENT
Start: 2021-12-16 | End: 2022-08-02 | Stop reason: SDUPTHER

## 2022-03-29 ENCOUNTER — OFFICE VISIT (OUTPATIENT)
Dept: SLEEP MEDICINE | Facility: MEDICAL CENTER | Age: 72
End: 2022-03-29
Payer: MEDICARE

## 2022-03-29 VITALS
HEART RATE: 80 BPM | DIASTOLIC BLOOD PRESSURE: 78 MMHG | BODY MASS INDEX: 23.95 KG/M2 | RESPIRATION RATE: 16 BRPM | HEIGHT: 68 IN | SYSTOLIC BLOOD PRESSURE: 122 MMHG | WEIGHT: 158 LBS | OXYGEN SATURATION: 94 %

## 2022-03-29 DIAGNOSIS — Z87.891 FORMER SMOKER: ICD-10-CM

## 2022-03-29 DIAGNOSIS — G47.00 INSOMNIA, UNSPECIFIED TYPE: ICD-10-CM

## 2022-03-29 DIAGNOSIS — R93.89 ABNORMAL CHEST CT: ICD-10-CM

## 2022-03-29 DIAGNOSIS — J44.9 CHRONIC OBSTRUCTIVE PULMONARY DISEASE, UNSPECIFIED COPD TYPE (HCC): ICD-10-CM

## 2022-03-29 PROCEDURE — 99213 OFFICE O/P EST LOW 20 MIN: CPT | Performed by: PHYSICIAN ASSISTANT

## 2022-03-29 ASSESSMENT — ENCOUNTER SYMPTOMS
WEIGHT LOSS: 0
SPUTUM PRODUCTION: 0
WHEEZING: 0
SINUS PAIN: 0
ORTHOPNEA: 0
SORE THROAT: 0
COUGH: 0
DIZZINESS: 0
PALPITATIONS: 0
SHORTNESS OF BREATH: 0
INSOMNIA: 1
FEVER: 0
HEARTBURN: 0
HEADACHES: 0
TREMORS: 0
CHILLS: 0

## 2022-03-29 ASSESSMENT — FIBROSIS 4 INDEX: FIB4 SCORE: 2.89

## 2022-03-29 NOTE — PROGRESS NOTES
CC: Follow-up COPD, imaging results    HPI:  Naomy Vargas is a 72 y.o. year old female here today for follow-up on COPD and abnormal CT scan chest. Last seen in clinic 11/8/2021.  Patient is a former smoker with reported 26.5-pack-year history and quit date 7/18/2017.    Pertinent past medical history includes CKD stage III, hypertension, AAA, peripheral vascular disease, venous stasis dermatitis and thrombocytopenia.    Reviewed in clinic vitals including /78, HR 80, O2 sat of 94% on room air and BMI of 24.02 kg/m².    Reviewed home medication regimen including lisinopril (denies dry or persistent cough), ProAir.      Reviewed most recent imaging including CT scan obtained  11/5/21 demonstrating stable nodule posterior right lung apex, likely scarring.  Groundglass opacity left lower lobe unchanged from 9/23/2020 measuring 14 mm, mild to moderate emphysema.  Stable mild mediastinal adenopathy.     Pulmonary function testing obtained 11/8/2021 demonstrated an FEV1 of 2.78 L or 86% predicted, FVC of 3.01 L 96% predicted, FEV1/FVC ratio 69, normal flows are improved from prior testing in 2018, residual volume 142% predicted, % predicted, DLCO past medical history includes stage III chronic kidney disease, hypothyroidism, venous stasis dermatitis both lower extremities peripheral vascular disease.  % predicted.  Per pulmonologist interpretation significant bronchodilator response by absolute increase in FVC, airflow obstruction, mild air trapping, normal diffusion capacity.  Findings consistent with asthma versus early COPD.    Review of Systems   Constitutional: Positive for malaise/fatigue (mild). Negative for chills, fever and weight loss.   HENT: Positive for congestion (allergies) and tinnitus (constant). Negative for hearing loss, nosebleeds, sinus pain and sore throat.    Respiratory: Negative for cough, sputum production, shortness of breath and wheezing.    Cardiovascular: Positive for leg  swelling. Negative for chest pain, palpitations and orthopnea.   Gastrointestinal: Negative for heartburn.        Upper dentures, missing lower teeth, no swallowing difficulty      Neurological: Negative for dizziness, tremors and headaches.   Psychiatric/Behavioral: The patient has insomnia (staying asleep ).        Past Medical History:   Diagnosis Date   • Anxiety    • Arthritis     wrists   • Back pain    • Blood transfusion without reported diagnosis     with ectopic preg x 2    • Breath shortness     with exertion   • Cataract     bilateral removal-Dr. Duke Talamantes   • Cellulitis     right lower leg   • Cellulitis of leg 11/1/2013    Followed by dermatology. Managed with fluocinonide and Bactroban on right leg Left leg she uses triamcinolone and Sarna pramocaine (anesthetic, antipruretic) on both.     • Chickenpox    • COPD (chronic obstructive pulmonary disease) ([facility])    • Dental disorder     upper denture   • Earache    • Fatigue    • Frequent urination    • Hypertension 02/23/2018    on no meds at this time   • Hypothyroidism    • Influenza    • Insomnia    • Kidney disease     reports 1 kidney is smaller than the other   • Mumps    • OAB (overactive bladder) 2/11/2019   • Pain 02/23/2018    right leg and back, 5/10   • Peripheral vascular disease, unspecified ([facility]) 10/13/2021   • Pneumonia    • Rash    • Ringing in ears    • Shortness of breath    • Swelling of lower extremity    • Thyroid disease    • Tonsillitis    • Toothache    • Venous stasis dermatitis     right leg       Past Surgical History:   Procedure Laterality Date   • VEIN LIGATION RADIO FREQUENCY Right 2/26/2018    Procedure: VEIN LIGATION RADIO FREQUENCY- LONG SAPENOUS;  Surgeon: Jim Alas M.D.;  Location: SURGERY [facility];  Service: General   • ABDOMINAL EXPLORATION      2 ectopic preg   • BREAST BIOPSY      hx of benign left breast biopsy   • EYE SURGERY      cataract x2   • HYSTERECTOMY LAPAROSCOPY     • HYSTERECTOMY, TOTAL  ABDOMINAL     • OOPHORECTOMY     • PB MAMMARY DUCTOGRAM, SINGLE     • UT REMV 2ND CATARACT,CORN-SCLER SECTN     • TONSILLECTOMY         Family History   Problem Relation Age of Onset   • Arthritis Mother         hip fracture   • Diabetes Mother    • Cancer Mother         skin   • Arthritis Father         lung   • Alcohol/Drug Father         etoh   • Lung Disease Sister         smoker/copd   • Hypertension Sister    • Other Brother         hep c   • Arthritis Maternal Grandmother         hip fracture   • Diabetes Maternal Grandfather    • No Known Problems Son    • No Known Problems Son    • No Known Problems Brother    • No Known Problems Sister    • No Known Problems Brother    • Hyperlipidemia Neg Hx    • Stroke Neg Hx        Social History     Socioeconomic History   • Marital status:      Spouse name: Not on file   • Number of children: 2   • Years of education: Not on file   • Highest education level: Not on file   Occupational History   • Occupation: retired   Tobacco Use   • Smoking status: Former Smoker     Packs/day: 0.50     Years: 53.00     Pack years: 26.50     Types: Cigarettes     Start date: 3/29/1963     Quit date: 2017     Years since quittin.6   • Smokeless tobacco: Never Used   Vaping Use   • Vaping Use: Never used   Substance and Sexual Activity   • Alcohol use: Yes     Comment: 2 per day   • Drug use: No   • Sexual activity: Not Currently     Partners: Male   Other Topics Concern   • Not on file   Social History Narrative   • Not on file     Social Determinants of Health     Financial Resource Strain: Not on file   Food Insecurity: Not on file   Transportation Needs: Not on file   Physical Activity: Not on file   Stress: Not on file   Social Connections: Not on file   Intimate Partner Violence: Not on file   Housing Stability: Not on file       Allergies as of 2022 - Reviewed 2022   Allergen Reaction Noted   • Colophony [pinus strobus] Itching 2018   •  "Latex Rash 02/23/2018   • Neosporin [neomycin-polymyxin b] Rash 02/23/2018   • Phenylene Itching 05/31/2018   • Soap Rash 02/23/2018   • Tape Itching 02/23/2018   • Tea tree oil Rash 02/23/2018        @Vital signs for this encounter:  /78   Pulse 80   Resp 16   Ht 1.727 m (5' 8\")   Wt 71.7 kg (158 lb)   SpO2 94%     Current medications as of today   Current Outpatient Medications   Medication Sig Dispense Refill   • levothyroxine (SYNTHROID) 100 MCG Tab TAKE 1 TABLET BY MOUTH EVERY DAY BEFORE BREAKFAST 100 Tablet 0   • amLODIPine (NORVASC) 5 MG Tab TAKE 1 TABLET BY MOUTH EVERY DAY 90 Tablet 1   • rosuvastatin (CRESTOR) 10 MG Tab TAKE 1 TABLET BY MOUTH EVERY DAY IN THE EVENING 100 Tablet 1   • loperamide (IMODIUM) 2 MG Cap Take 1 Capsule by mouth 4 times a day as needed for Diarrhea. 30 Capsule 1   • cyanocobalamin (VITAMIN B-12) 100 MCG Tab Take 100 mcg by mouth every day.     • vitamin D (CHOLECALCIFEROL) 1000 Unit (25 mcg) Tab Take 1,000 Units by mouth every day.     • lisinopril (PRINIVIL) 20 MG Tab TAKE 1 TABLET BY MOUTH EVERY DAY 30 tablet 3   • nitrofurantoin (MACRODANTIN) 50 MG Cap TAKE 1 CAPSULE BY MOUTH EVERYDAY AT BEDTIME     • triamcinolone acetonide (KENALOG) 0.1 % Cream Apply 1 Application to affected area(s) 2 times a day as needed. 80 g 2   • Mirabegron ER 50 MG TABLET SR 24 HR every day.     • Melatonin 1 MG Tab Take  by mouth.     • PROAIR  (90 Base) MCG/ACT Aero Soln inhalation aerosol INHALE 2 (TWO) PUFF EVERY SIX HOURS, AS NEEDED  1   • diphenhydrAMINE (BENADRYL) 25 MG Tab Take 25 mg by mouth every 6 hours as needed for Sleep.     • Acetaminophen 500 MG Cap Take  by mouth.     • Oxymetazoline HCl (NASAL SPRAY NA) Spray  in nose.       No current facility-administered medications for this visit.         Physical Exam:   Gen:           Alert and oriented, No apparent distress. Mood and affect appropriate, normal interaction with provider.  Eyes:          sclere white, conjunctive " moist.  Hearing:     Grossly intact.  Dentition:    Edentulous, wears upper dentures  Oropharynx:   Tongue normal, posterior pharynx without erythema or exudate.  Neck:        Supple, trachea midline, no masses.  Respiratory Effort: No intercostal retractions or use of accessory muscles.   Lung Auscultation:      decreased throughout; no rales, rhonchi or wheezing.  CV:            Regular rate and rhythm.  Trace lower extremity edema. No murmurs, rubs or gallops.  Digits, Nails, Ext: No clubbing, cyanosis, petechiae, or nodes.   Skin:        No rashes, lesions or ulcers noted on exposed skin surfaces.                     Assessment:  1. Chronic obstructive pulmonary disease, unspecified COPD type (HCC)     2. Former smoker     3. Insomnia, unspecified type     4. Abnormal chest CT         Immunizations:    Flu: 9/22/2021  Pneumovax 23: 2/8/2019, 11/1/2013  Prevnar 13: 2/28/2017    Plan:  72 y.o. year old female here today for follow-up on COPD and abnormal CT scan chest.    Former smoker: Quit in 2017, remains abstinent of tobacco.    COPD: Patient does not currently feel like she requires maintenance inhaler.  Infrequent need for short acting bronchodilator.  Update pulmonary function testing 1 year. Mucinex to help clear secretions related to allergies, using nasal spray.    Insomnia: Trial extended release melatonin, assess for benefit.    Abnormal chest CT: Follow-up imaging ordered at previous appointment and is due in May.     Follow up in 6 months.     This dictation was created using voice recognition software. The accuracy of the dictation is limited to the abilities of the software. I expect there may be some errors of grammar and possibly content.

## 2022-04-05 ENCOUNTER — OFFICE VISIT (OUTPATIENT)
Dept: MEDICAL GROUP | Facility: PHYSICIAN GROUP | Age: 72
End: 2022-04-05
Payer: MEDICARE

## 2022-04-05 VITALS
BODY MASS INDEX: 23.64 KG/M2 | DIASTOLIC BLOOD PRESSURE: 70 MMHG | OXYGEN SATURATION: 93 % | TEMPERATURE: 97.8 F | HEIGHT: 68 IN | WEIGHT: 156 LBS | SYSTOLIC BLOOD PRESSURE: 122 MMHG | RESPIRATION RATE: 18 BRPM | HEART RATE: 79 BPM

## 2022-04-05 DIAGNOSIS — E53.8 B12 DEFICIENCY: ICD-10-CM

## 2022-04-05 DIAGNOSIS — Z12.11 COLON CANCER SCREENING: ICD-10-CM

## 2022-04-05 DIAGNOSIS — E03.9 HYPOTHYROIDISM, UNSPECIFIED TYPE: ICD-10-CM

## 2022-04-05 DIAGNOSIS — I71.40 ABDOMINAL AORTIC ANEURYSM (AAA) 3.0 CM TO 5.0 CM IN DIAMETER IN FEMALE (HCC): ICD-10-CM

## 2022-04-05 DIAGNOSIS — I77.1 BILATERAL ILIAC ARTERY STENOSIS (HCC): ICD-10-CM

## 2022-04-05 DIAGNOSIS — I87.2 VENOUS STASIS DERMATITIS OF BOTH LOWER EXTREMITIES: ICD-10-CM

## 2022-04-05 DIAGNOSIS — I70.0 AORTIC ATHEROSCLEROSIS (HCC): ICD-10-CM

## 2022-04-05 PROBLEM — E66.3 OVERWEIGHT WITH BODY MASS INDEX (BMI) OF 25 TO 25.9 IN ADULT: Status: RESOLVED | Noted: 2021-10-13 | Resolved: 2022-04-05

## 2022-04-05 PROCEDURE — 99214 OFFICE O/P EST MOD 30 MIN: CPT | Performed by: PHYSICIAN ASSISTANT

## 2022-04-05 RX ORDER — TRIAMCINOLONE ACETONIDE 1 MG/G
1 CREAM TOPICAL 2 TIMES DAILY PRN
Qty: 80 G | Refills: 2 | Status: SHIPPED | OUTPATIENT
Start: 2022-04-05 | End: 2023-10-16

## 2022-04-05 RX ORDER — MIRABEGRON 50 MG/1
TABLET, FILM COATED, EXTENDED RELEASE ORAL
COMMUNITY

## 2022-04-05 ASSESSMENT — PATIENT HEALTH QUESTIONNAIRE - PHQ9: CLINICAL INTERPRETATION OF PHQ2 SCORE: 0

## 2022-04-05 ASSESSMENT — FIBROSIS 4 INDEX: FIB4 SCORE: 2.89

## 2022-04-05 NOTE — ASSESSMENT & PLAN NOTE
Patient is currently on levothyroxine 100 mcg.  Her last TSH last year was within range.  She is taking it as prescribed on an empty stomach in the morning. When asked, patient denies any new issues or temperature intolerance, skin changes, hair changes. She also denies any abdominal issues such as diarrhea or constipation.

## 2022-04-05 NOTE — PROGRESS NOTES
Annual Health Assessment Questions:    1.  Are you currently engaging in any exercise or physical activity? No    2.  How would you describe your mood or emotional well-being today? good    3.  Have you had any falls in the last year? No    4.  Have you noticed any problems with your balance or had difficulty walking? No    5.  In the last six months have you experienced any leakage of urine? Yes, only a little bit     6. DPA/Advanced Directive: Patient does not have an Advanced Directive.  A packet and workshop information was given on Advanced Directives.

## 2022-04-05 NOTE — ASSESSMENT & PLAN NOTE
Patient's last AAA ultrasound was 2 years ago.  Discussed was managing this, and she states that she is not sure.

## 2022-04-05 NOTE — PROGRESS NOTES
Subjective:     CC: Establish care, AAA    HPI:   Naomy presents today with     Hypothyroidism  Patient is currently on levothyroxine 100 mcg.  Her last TSH last year was within range.  She is taking it as prescribed on an empty stomach in the morning. When asked, patient denies any new issues or temperature intolerance, skin changes, hair changes. She also denies any abdominal issues such as diarrhea or constipation.       Abdominal aortic aneurysm (AAA) 3.0 cm to 5.0 cm in diameter in female ([facility])  Patient's last AAA ultrasound was 2 years ago.  Discussed was managing this, and she states that she is not sure.       Past Medical History:   Diagnosis Date   • Anxiety    • Arthritis     wrists   • Back pain    • Blood transfusion without reported diagnosis     with ectopic preg x 2    • Breath shortness     with exertion   • Cataract     bilateral removal-Dr. Duke Talamantes   • Cellulitis     right lower leg   • Cellulitis of leg 11/1/2013    Followed by dermatology. Managed with fluocinonide and Bactroban on right leg Left leg she uses triamcinolone and Sarna pramocaine (anesthetic, antipruretic) on both.     • Chickenpox    • COPD (chronic obstructive pulmonary disease) ([facility])    • Dental disorder     upper denture   • Earache    • Fatigue    • Frequent urination    • Hypertension 02/23/2018    on no meds at this time   • Hypothyroidism    • Influenza    • Insomnia    • Kidney disease     reports 1 kidney is smaller than the other   • Mumps    • OAB (overactive bladder) 2/11/2019   • Pain 02/23/2018    right leg and back, 5/10   • Peripheral vascular disease, unspecified ([facility]) 10/13/2021   • Pneumonia    • Rash    • Ringing in ears    • Shortness of breath    • Swelling of lower extremity    • Thyroid disease    • Tonsillitis    • Toothache    • Venous stasis dermatitis     right leg       Social History     Tobacco Use   • Smoking status: Former Smoker     Packs/day: 0.50     Years: 53.00     Pack years: 26.50      Types: Cigarettes     Start date: 3/29/1963     Quit date: 2017     Years since quittin.7   • Smokeless tobacco: Never Used   Vaping Use   • Vaping Use: Never used   Substance Use Topics   • Alcohol use: Yes     Comment: 2 per day   • Drug use: No       Current Outpatient Medications Ordered in Epic   Medication Sig Dispense Refill   • Mirabegron ER (MYRBETRIQ) 50 MG TABLET SR 24 HR Myrbetriq 50 mg tablet,extended release   Take 1 tablet every day by oral route for 90 days.     • Ascorbic Acid (VITAMIN C PO) Take  by mouth.     • Doxylamine Succinate, Sleep, (SLEEP AID PO) Take  by mouth.     • Dextromethorphan-guaiFENesin (MUCINEX DM PO) Take  by mouth.     • triamcinolone acetonide (KENALOG) 0.1 % Cream Apply 1 Application topically 2 times a day as needed. 80 g 2   • levothyroxine (SYNTHROID) 100 MCG Tab TAKE 1 TABLET BY MOUTH EVERY DAY BEFORE BREAKFAST 100 Tablet 0   • amLODIPine (NORVASC) 5 MG Tab TAKE 1 TABLET BY MOUTH EVERY DAY 90 Tablet 1   • rosuvastatin (CRESTOR) 10 MG Tab TAKE 1 TABLET BY MOUTH EVERY DAY IN THE EVENING 100 Tablet 1   • loperamide (IMODIUM) 2 MG Cap Take 1 Capsule by mouth 4 times a day as needed for Diarrhea. 30 Capsule 1   • cyanocobalamin (VITAMIN B-12) 100 MCG Tab Take 100 mcg by mouth every day.     • vitamin D (CHOLECALCIFEROL) 1000 Unit (25 mcg) Tab Take 1,000 Units by mouth every day.     • lisinopril (PRINIVIL) 20 MG Tab TAKE 1 TABLET BY MOUTH EVERY DAY 30 tablet 3   • nitrofurantoin (MACRODANTIN) 50 MG Cap TAKE 1 CAPSULE BY MOUTH EVERYDAY AT BEDTIME     • Melatonin 1 MG Tab Take  by mouth.     • PROAIR  (90 Base) MCG/ACT Aero Soln inhalation aerosol INHALE 2 (TWO) PUFF EVERY SIX HOURS, AS NEEDED  1   • diphenhydrAMINE (BENADRYL) 25 MG Tab Take 25 mg by mouth every 6 hours as needed for Sleep.     • Acetaminophen 500 MG Cap Take  by mouth.     • Oxymetazoline HCl (NASAL SPRAY NA) Spray  in nose.       No current [facility]-ordered facility-administered medications on  "file.       Allergies:  Colophony [pinus strobus], Latex, Neosporin [neomycin-polymyxin b], Phenylene, Soap, Tape, and Tea tree oil    Health Maintenance: Completed    ROS:  Gen: no fevers/chills  Eyes: no changes in vision  ENT: no sore throat  Pulm: no sob, no cough  CV: no chest pain  GI: no nausea/vomiting  : no dysuria  MSk: no myalgias  Neuro: no headaches    Objective:       Exam:  /70   Pulse 79   Temp 36.6 °C (97.8 °F) (Temporal)   Resp 18   Ht 1.727 m (5' 8\")   Wt 70.8 kg (156 lb)   LMP  (LMP Unknown)   SpO2 93%   BMI 23.72 kg/m²  Body mass index is 23.72 kg/m².    Gen: Alert and oriented, No apparent distress.  Skin: Warm, dry, good turgor, no rashes in visible areas.  HEENT: Normocephalic. Eyes conjunctiva clear lids without ptosis, pupils equal and reactive to light accommodation, ears normal shape and contour  Neck: Trachea midline, no masses, no thyromegaly  Lungs: Normal effort, CTA bilaterally, no wheezes, rhonchi, or rales  CV: Regular rate and rhythm. No murmurs, rubs, or gallops.  MSK: Normal gait, moves all extremities.  Neuro: Grossly non-focal.  Ext: No clubbing, cyanosis, edema.  Psych: Alert and oriented x3, normal affect and mood.     Labs: Labs from 2/11/2021 were reviewed and discussed with the patient. All questions were answered.     Assessment & Plan:     72 y.o. female with the following -     1. Abdominal aortic aneurysm (AAA) 3.0 cm to 5.0 cm in diameter in female (HCC)  Chronic.  Patient is last aorta ultrasound was July 2020 and AAA measuring 3.2 x 2.9 cm.  Patient reports that she does not have a cardiologist or vascular specialist managing this.  Ultrasound ordered for surveillance.  Discussed that the patient should be established with vascular so we will place the referral once I have the results of her ultrasound  - US-AORTA/ILIACS DUPLEX COMPLETE; Future    2. Bilateral iliac artery stenosis (HCC)  See #1 above.  Ultrasound in July 2020 showed 50 to 75% " stenosis in the bilateral iliacs.   - US-AORTA/ILIACS DUPLEX COMPLETE; Future    3. Hypothyroidism, unspecified type  Chronic, labs ordered.  Patient does not need refill of her medication today.  - TSH WITH REFLEX TO FT4; Future    4. Venous stasis dermatitis of both lower extremities  Patient is requesting a refill of her Kenalog cream for a rash she has on her lower extremity for venous stasis.  She only uses this as needed.  Was previously seen by dermatology for this.  - triamcinolone acetonide (KENALOG) 0.1 % Cream; Apply 1 Application topically 2 times a day as needed.  Dispense: 80 g; Refill: 2    5. Colon cancer screening  - Referral to GI for Colonoscopy    6. Aortic atherosclerosis (HCC)  Chronic.  Patient is already on rosuvastatin 10 mg.  Labs ordered to evaluate as patient should have tight cholesterol control.  - Lipid Profile; Future    7. B12 deficiency  Patient has a history of macrocytosis.  CBC already ordered from her nephrologist.  B12 level added onto blood work for evaluation.  - VITAMIN B12; Future    Annual Health Assessment Questions:    1.  Are you currently engaging in any exercise or physical activity? No    2.  How would you describe your mood or emotional well-being today? good    3.  Have you had any falls in the last year? No    4.  Have you noticed any problems with your balance or had difficulty walking? No    5.  In the last six months have you experienced any leakage of urine? Yes, only a little bit -no interventions needed today as patient is already on Myrbetriq through urology [place].    6. DPA/Advanced Directive: Patient does not have an Advanced Directive.  A packet and workshop information was given on Advanced Directives.    I spent a total of 35 minutes with record review (including external notes and labs), exam, communication with the patient, communication with other providers, and documentation of this encounter.     Return for Annual Wellness ERNIE next  week.    Please note that this dictation was created using voice recognition software. I have made every reasonable attempt to correct obvious errors, but I expect that there are errors of grammar and possibly content that I did not discover before finalizing the note.    Electronically signed by Prema Patel PA-C on April 5, 2022

## 2022-04-12 PROBLEM — I77.9 DISORDER OF ARTERIES AND ARTERIOLES (HCC): Status: ACTIVE | Noted: 2022-04-12

## 2022-04-12 PROBLEM — I73.9 PERIPHERAL VASCULAR DISEASE, UNSPECIFIED (HCC): Status: RESOLVED | Noted: 2021-10-13 | Resolved: 2022-04-12

## 2022-06-02 DIAGNOSIS — Z91.89 CANDIDATE FOR STATIN THERAPY DUE TO RISK OF FUTURE CARDIOVASCULAR EVENT: ICD-10-CM

## 2022-06-02 DIAGNOSIS — I77.1 BILATERAL ILIAC ARTERY STENOSIS (HCC): ICD-10-CM

## 2022-06-04 RX ORDER — ROSUVASTATIN CALCIUM 10 MG/1
10 TABLET, COATED ORAL EVERY EVENING
Qty: 100 TABLET | Refills: 1 | Status: SHIPPED | OUTPATIENT
Start: 2022-06-04 | End: 2022-11-14

## 2022-06-09 ENCOUNTER — HOSPITAL ENCOUNTER (OUTPATIENT)
Dept: LAB | Facility: MEDICAL CENTER | Age: 72
End: 2022-06-09
Attending: INTERNAL MEDICINE
Payer: MEDICARE

## 2022-06-09 ENCOUNTER — HOSPITAL ENCOUNTER (OUTPATIENT)
Dept: LAB | Facility: MEDICAL CENTER | Age: 72
End: 2022-06-09
Attending: PHYSICIAN ASSISTANT
Payer: MEDICARE

## 2022-06-09 DIAGNOSIS — E03.9 HYPOTHYROIDISM, UNSPECIFIED TYPE: ICD-10-CM

## 2022-06-09 DIAGNOSIS — E53.8 B12 DEFICIENCY: ICD-10-CM

## 2022-06-09 DIAGNOSIS — R80.9 MICROALBUMINURIA: ICD-10-CM

## 2022-06-09 DIAGNOSIS — E55.9 VITAMIN D DEFICIENCY: ICD-10-CM

## 2022-06-09 DIAGNOSIS — N18.31 STAGE 3A CHRONIC KIDNEY DISEASE: ICD-10-CM

## 2022-06-09 DIAGNOSIS — I70.0 AORTIC ATHEROSCLEROSIS (HCC): ICD-10-CM

## 2022-06-09 LAB
ANION GAP SERPL CALC-SCNC: 12 MMOL/L (ref 7–16)
BUN SERPL-MCNC: 21 MG/DL (ref 8–22)
CALCIUM SERPL-MCNC: 9 MG/DL (ref 8.5–10.5)
CHLORIDE SERPL-SCNC: 103 MMOL/L (ref 96–112)
CHOLEST SERPL-MCNC: 112 MG/DL (ref 100–199)
CO2 SERPL-SCNC: 23 MMOL/L (ref 20–33)
CREAT SERPL-MCNC: 1.08 MG/DL (ref 0.5–1.4)
CREAT UR-MCNC: 82 MG/DL
GFR SERPLBLD CREATININE-BSD FMLA CKD-EPI: 54 ML/MIN/1.73 M 2
GLUCOSE SERPL-MCNC: 112 MG/DL (ref 65–99)
HDLC SERPL-MCNC: 57 MG/DL
LDLC SERPL CALC-MCNC: 42 MG/DL
MICROALBUMIN UR-MCNC: <1.2 MG/DL
MICROALBUMIN/CREAT UR: NORMAL MG/G (ref 0–30)
POTASSIUM SERPL-SCNC: 4.3 MMOL/L (ref 3.6–5.5)
PTH-INTACT SERPL-MCNC: 42.9 PG/ML (ref 14–72)
SODIUM SERPL-SCNC: 138 MMOL/L (ref 135–145)
TRIGL SERPL-MCNC: 64 MG/DL (ref 0–149)
TSH SERPL DL<=0.005 MIU/L-ACNC: 2.77 UIU/ML (ref 0.38–5.33)
VIT B12 SERPL-MCNC: 894 PG/ML (ref 211–911)

## 2022-06-09 PROCEDURE — 82607 VITAMIN B-12: CPT

## 2022-06-09 PROCEDURE — 80048 BASIC METABOLIC PNL TOTAL CA: CPT

## 2022-06-09 PROCEDURE — 82570 ASSAY OF URINE CREATININE: CPT

## 2022-06-09 PROCEDURE — 36415 COLL VENOUS BLD VENIPUNCTURE: CPT

## 2022-06-09 PROCEDURE — 80061 LIPID PANEL: CPT

## 2022-06-09 PROCEDURE — 82043 UR ALBUMIN QUANTITATIVE: CPT

## 2022-06-09 PROCEDURE — 84443 ASSAY THYROID STIM HORMONE: CPT

## 2022-06-09 PROCEDURE — 83970 ASSAY OF PARATHORMONE: CPT

## 2022-06-16 ENCOUNTER — OFFICE VISIT (OUTPATIENT)
Dept: NEPHROLOGY | Facility: MEDICAL CENTER | Age: 72
End: 2022-06-16
Payer: MEDICARE

## 2022-06-16 VITALS
WEIGHT: 153 LBS | HEIGHT: 68 IN | DIASTOLIC BLOOD PRESSURE: 68 MMHG | HEART RATE: 78 BPM | TEMPERATURE: 99 F | OXYGEN SATURATION: 92 % | SYSTOLIC BLOOD PRESSURE: 120 MMHG | BODY MASS INDEX: 23.19 KG/M2

## 2022-06-16 DIAGNOSIS — R80.9 MICROALBUMINURIA: ICD-10-CM

## 2022-06-16 DIAGNOSIS — N18.31 STAGE 3A CHRONIC KIDNEY DISEASE: ICD-10-CM

## 2022-06-16 DIAGNOSIS — D64.9 ANEMIA, UNSPECIFIED TYPE: ICD-10-CM

## 2022-06-16 DIAGNOSIS — E55.9 VITAMIN D DEFICIENCY: ICD-10-CM

## 2022-06-16 DIAGNOSIS — I10 ESSENTIAL HYPERTENSION: ICD-10-CM

## 2022-06-16 PROCEDURE — 99214 OFFICE O/P EST MOD 30 MIN: CPT | Performed by: INTERNAL MEDICINE

## 2022-06-16 ASSESSMENT — FIBROSIS 4 INDEX: FIB4 SCORE: 2.89

## 2022-06-16 ASSESSMENT — ENCOUNTER SYMPTOMS
SHORTNESS OF BREATH: 0
HEMOPTYSIS: 0
PALPITATIONS: 0
FLANK PAIN: 0
EYES NEGATIVE: 1
VOMITING: 0
NECK PAIN: 0
ORTHOPNEA: 0
WHEEZING: 0
MYALGIAS: 0
DIARRHEA: 0
ABDOMINAL PAIN: 0
WEIGHT LOSS: 0
NAUSEA: 0
CHILLS: 0
SINUS PAIN: 0
FEVER: 0
COUGH: 0
BACK PAIN: 0

## 2022-06-16 NOTE — PROGRESS NOTES
Subjective:      Naomy Vargas is a 72 y.o. female who presents with CKD          Chronic Kidney Disease  Pertinent negatives include no abdominal pain, chest pain, chills, congestion, coughing, fever, myalgias, nausea, neck pain or vomiting.     Naomy is coming today for f/u of CKD IIIa  Doing well, no complaints.  No dysuria/hematuria/flank pain, no fever/chills  Pedal edema improved -well controlled with low salt diet  No NSAID's  CKD III-creat in 2018 at 1.02 -worse to 1.33 in Feb 2019 - worsen to1.6! - 1.9!-improved to 1.3 - 1.2 -now at 1.08  HTN: BP well controlled  CT abd from 2008 revealed scarred right kidney  Renal US small right kidney with hydronephrosis f/u with Urology    Review of Systems   Constitutional: Negative for chills, fever, malaise/fatigue and weight loss.   HENT: Negative for congestion, hearing loss and sinus pain.    Eyes: Negative.    Respiratory: Negative for cough, hemoptysis, shortness of breath and wheezing.    Cardiovascular: Negative for chest pain, palpitations, orthopnea and leg swelling.   Gastrointestinal: Negative for abdominal pain, diarrhea, nausea and vomiting.   Genitourinary: Negative for dysuria, flank pain, frequency, hematuria and urgency.   Musculoskeletal: Negative for back pain, joint pain, myalgias and neck pain.   Skin: Negative.    All other systems reviewed and are negative.    Past Medical History:   Diagnosis Date   • Anxiety    • Arthritis     wrists   • Back pain    • Blood transfusion without reported diagnosis     with ectopic preg x 2    • Breath shortness     with exertion   • Cataract     bilateral removal-Dr. Duke Talamantes   • Cellulitis     right lower leg   • Cellulitis of leg 11/1/2013    Followed by dermatology. Managed with fluocinonide and Bactroban on right leg Left leg she uses triamcinolone and Sarna pramocaine (anesthetic, antipruretic) on both.     • Chickenpox    • COPD (chronic obstructive pulmonary disease) (HCC)    • Dental  "disorder     upper denture   • Earache    • Fatigue    • Frequent urination    • Hypertension 2018    on no meds at this time   • Hypothyroidism    • Influenza    • Insomnia    • Kidney disease     reports 1 kidney is smaller than the other   • Mumps    • OAB (overactive bladder) 2019   • Pain 2018    right leg and back, 5/10   • Peripheral vascular disease, unspecified (HCC) 10/13/2021   • Pneumonia    • Rash    • Ringing in ears    • Shortness of breath    • Swelling of lower extremity    • Thyroid disease    • Tonsillitis    • Toothache    • Venous stasis dermatitis     right leg       Family History   Problem Relation Age of Onset   • Arthritis Mother         hip fracture   • Diabetes Mother    • Cancer Mother         skin   • Arthritis Father         lung   • Alcohol/Drug Father         etoh   • Lung Disease Sister         smoker/copd   • Hypertension Sister    • Other Brother         hep c   • Arthritis Maternal Grandmother         hip fracture   • Diabetes Maternal Grandfather    • No Known Problems Son    • No Known Problems Son    • No Known Problems Brother    • No Known Problems Sister    • No Known Problems Brother    • Hyperlipidemia Neg Hx    • Stroke Neg Hx        Social History     Socioeconomic History   • Marital status:    • Number of children: 2   Occupational History   • Occupation: retired   Tobacco Use   • Smoking status: Former Smoker     Packs/day: 0.50     Years: 53.00     Pack years: 26.50     Types: Cigarettes     Quit date: 2017     Years since quittin.9   • Smokeless tobacco: Never Used   Vaping Use   • Vaping Use: Never used   Substance and Sexual Activity   • Alcohol use: Yes     Comment: 2 per day   • Drug use: No   • Sexual activity: Not Currently     Partners: Male          Objective:     /68 (BP Location: Right arm, Patient Position: Sitting)   Pulse 78   Temp 37.2 °C (99 °F) (Temporal)   Ht 1.727 m (5' 8\")   Wt 69.4 kg (153 lb)   LMP  " (LMP Unknown)   SpO2 92%   BMI 23.26 kg/m²      Physical Exam  Vitals reviewed.   Constitutional:       General: She is not in acute distress.     Appearance: Normal appearance. She is well-developed. She is not diaphoretic.   HENT:      Head: Normocephalic and atraumatic.      Nose: Nose normal.      Mouth/Throat:      Mouth: Mucous membranes are moist.      Pharynx: Oropharynx is clear.   Eyes:      Extraocular Movements: Extraocular movements intact.      Conjunctiva/sclera: Conjunctivae normal.      Pupils: Pupils are equal, round, and reactive to light.   Cardiovascular:      Rate and Rhythm: Normal rate and regular rhythm.      Pulses: Normal pulses.      Heart sounds: Normal heart sounds.     No friction rub. No gallop.   Pulmonary:      Effort: Pulmonary effort is normal. No respiratory distress.      Breath sounds: Normal breath sounds. No wheezing, rhonchi or rales.   Abdominal:      General: Bowel sounds are normal. There is no distension.      Palpations: Abdomen is soft. There is no mass.      Tenderness: There is no abdominal tenderness. There is no right CVA tenderness, left CVA tenderness or guarding.   Musculoskeletal:         General: Normal range of motion.      Cervical back: Normal range of motion and neck supple.      Right lower leg: No edema.      Left lower leg: No edema.   Skin:     General: Skin is warm.      Coloration: Skin is not pale.      Findings: No erythema or rash.   Neurological:      General: No focal deficit present.      Mental Status: She is alert and oriented to person, place, and time.      Cranial Nerves: No cranial nerve deficit.      Coordination: Coordination normal.   Psychiatric:         Mood and Affect: Mood normal.         Thought Content: Thought content normal.         Judgment: Judgment normal.            Laboratory and renal imaging results reviewed : d/w pt  Lab Results   Component Value Date/Time    CREATININE 1.08 06/09/2022 09:41 AM    POTASSIUM 4.3  06/09/2022 09:41 AM          Assessment/Plan:       1. CKD (chronic kidney disease) stage 3a, GFR 30-59 ml/min ([facility])      Creat level improved -to monitor    2. Vitamin D deficiency      Well controlled      PTH WNL    3. Essential hypertension      BP well controlled       To monitor BP at home nd call clinic if BP> 135/85    4. Microalbuminuria      Very mild -on ACEi      5. Anemia, unspecified type      Hb stable WNL    Recs:  Continue current treatment  Urology f/u  Keep well hydrated  Low salt diet  Monitor BP  Avoid NSAID's  F/u in 6 months

## 2022-08-02 DIAGNOSIS — I10 ESSENTIAL HYPERTENSION: ICD-10-CM

## 2022-08-03 RX ORDER — AMLODIPINE BESYLATE 5 MG/1
5 TABLET ORAL
Qty: 90 TABLET | Refills: 1 | Status: SHIPPED | OUTPATIENT
Start: 2022-08-03 | End: 2023-01-27

## 2022-08-04 ENCOUNTER — TELEPHONE (OUTPATIENT)
Dept: MEDICAL GROUP | Facility: PHYSICIAN GROUP | Age: 72
End: 2022-08-04
Payer: MEDICARE

## 2022-08-04 NOTE — TELEPHONE ENCOUNTER
Pt called and LVM needing a refill request for Amlodipine 5 MG. Prema Patel PA-C has already sent in a prescription on 8/3/22. Called pharmacy to confirm and they has the Rx and will have it ready for pt to . Pt notified after.

## 2022-09-07 RX ORDER — LISINOPRIL 20 MG/1
TABLET ORAL
Qty: 100 TABLET | Refills: 3 | Status: SHIPPED | OUTPATIENT
Start: 2022-09-07 | End: 2023-10-02

## 2022-09-13 DIAGNOSIS — E03.1 CONGENITAL HYPOTHYROIDISM WITHOUT GOITER: ICD-10-CM

## 2022-09-13 RX ORDER — LEVOTHYROXINE SODIUM 0.1 MG/1
TABLET ORAL
Qty: 100 TABLET | Refills: 0 | Status: SHIPPED | OUTPATIENT
Start: 2022-09-13 | End: 2022-12-20

## 2022-11-09 DIAGNOSIS — Z91.89 CANDIDATE FOR STATIN THERAPY DUE TO RISK OF FUTURE CARDIOVASCULAR EVENT: ICD-10-CM

## 2022-11-09 DIAGNOSIS — I77.1 BILATERAL ILIAC ARTERY STENOSIS (HCC): ICD-10-CM

## 2022-11-10 NOTE — TELEPHONE ENCOUNTER
Received request via: Pharmacy    Was the patient seen in the last year in this department? Yes    Does the patient have an active prescription (recently filled or refills available) for medication(s) requested? No    Does the patient have custodial Plus and need 100 day supply (blood pressure, diabetes and cholesterol meds only)? Yes, quantity updated to 100 days

## 2022-11-14 RX ORDER — ROSUVASTATIN CALCIUM 10 MG/1
TABLET, COATED ORAL
Qty: 100 TABLET | Refills: 1 | Status: SHIPPED | OUTPATIENT
Start: 2022-11-14 | End: 2023-06-26

## 2022-12-09 ENCOUNTER — HOSPITAL ENCOUNTER (OUTPATIENT)
Dept: LAB | Facility: MEDICAL CENTER | Age: 72
End: 2022-12-09
Attending: INTERNAL MEDICINE
Payer: MEDICARE

## 2022-12-09 DIAGNOSIS — N18.31 STAGE 3A CHRONIC KIDNEY DISEASE: ICD-10-CM

## 2022-12-09 DIAGNOSIS — I10 ESSENTIAL HYPERTENSION: ICD-10-CM

## 2022-12-09 DIAGNOSIS — D64.9 ANEMIA, UNSPECIFIED TYPE: ICD-10-CM

## 2022-12-09 LAB
ANION GAP SERPL CALC-SCNC: 9 MMOL/L (ref 7–16)
APPEARANCE UR: CLEAR
BILIRUB UR QL STRIP.AUTO: NEGATIVE
BUN SERPL-MCNC: 20 MG/DL (ref 8–22)
CALCIUM SERPL-MCNC: 9.3 MG/DL (ref 8.5–10.5)
CHLORIDE SERPL-SCNC: 102 MMOL/L (ref 96–112)
CO2 SERPL-SCNC: 26 MMOL/L (ref 20–33)
COLOR UR: YELLOW
CREAT SERPL-MCNC: 0.96 MG/DL (ref 0.5–1.4)
ERYTHROCYTE [DISTWIDTH] IN BLOOD BY AUTOMATED COUNT: 47.4 FL (ref 35.9–50)
GFR SERPLBLD CREATININE-BSD FMLA CKD-EPI: 63 ML/MIN/1.73 M 2
GLUCOSE SERPL-MCNC: 92 MG/DL (ref 65–99)
GLUCOSE UR STRIP.AUTO-MCNC: NEGATIVE MG/DL
HCT VFR BLD AUTO: 44.8 % (ref 37–47)
HGB BLD-MCNC: 14.4 G/DL (ref 12–16)
KETONES UR STRIP.AUTO-MCNC: NEGATIVE MG/DL
LEUKOCYTE ESTERASE UR QL STRIP.AUTO: NEGATIVE
MCH RBC QN AUTO: 29.8 PG (ref 27–33)
MCHC RBC AUTO-ENTMCNC: 32.1 G/DL (ref 33.6–35)
MCV RBC AUTO: 92.8 FL (ref 81.4–97.8)
MICRO URNS: NORMAL
NITRITE UR QL STRIP.AUTO: NEGATIVE
PH UR STRIP.AUTO: 7 [PH] (ref 5–8)
PLATELET # BLD AUTO: 151 K/UL (ref 164–446)
PMV BLD AUTO: 11.4 FL (ref 9–12.9)
POTASSIUM SERPL-SCNC: 4.6 MMOL/L (ref 3.6–5.5)
PROT UR QL STRIP: NEGATIVE MG/DL
RBC # BLD AUTO: 4.83 M/UL (ref 4.2–5.4)
RBC UR QL AUTO: NEGATIVE
SODIUM SERPL-SCNC: 137 MMOL/L (ref 135–145)
SP GR UR STRIP.AUTO: 1.01
UROBILINOGEN UR STRIP.AUTO-MCNC: 1 MG/DL
WBC # BLD AUTO: 7.4 K/UL (ref 4.8–10.8)

## 2022-12-09 PROCEDURE — 80048 BASIC METABOLIC PNL TOTAL CA: CPT

## 2022-12-09 PROCEDURE — 85027 COMPLETE CBC AUTOMATED: CPT

## 2022-12-09 PROCEDURE — 36415 COLL VENOUS BLD VENIPUNCTURE: CPT

## 2022-12-09 PROCEDURE — 81003 URINALYSIS AUTO W/O SCOPE: CPT

## 2022-12-14 ENCOUNTER — OFFICE VISIT (OUTPATIENT)
Dept: NEPHROLOGY | Facility: MEDICAL CENTER | Age: 72
End: 2022-12-14
Payer: MEDICARE

## 2022-12-14 VITALS
HEART RATE: 83 BPM | OXYGEN SATURATION: 94 % | BODY MASS INDEX: 25.01 KG/M2 | SYSTOLIC BLOOD PRESSURE: 110 MMHG | DIASTOLIC BLOOD PRESSURE: 60 MMHG | TEMPERATURE: 97.6 F | HEIGHT: 67 IN | WEIGHT: 159.38 LBS

## 2022-12-14 DIAGNOSIS — D64.9 ANEMIA, UNSPECIFIED TYPE: ICD-10-CM

## 2022-12-14 DIAGNOSIS — R80.9 MICROALBUMINURIA: ICD-10-CM

## 2022-12-14 DIAGNOSIS — E55.9 VITAMIN D DEFICIENCY: ICD-10-CM

## 2022-12-14 DIAGNOSIS — N18.2 CKD (CHRONIC KIDNEY DISEASE), STAGE II: ICD-10-CM

## 2022-12-14 DIAGNOSIS — I10 ESSENTIAL HYPERTENSION: ICD-10-CM

## 2022-12-14 DIAGNOSIS — N13.30 HYDRONEPHROSIS, UNSPECIFIED HYDRONEPHROSIS TYPE: ICD-10-CM

## 2022-12-14 PROCEDURE — 99214 OFFICE O/P EST MOD 30 MIN: CPT | Performed by: INTERNAL MEDICINE

## 2022-12-14 ASSESSMENT — ENCOUNTER SYMPTOMS
WHEEZING: 0
CHILLS: 0
ORTHOPNEA: 0
SINUS PAIN: 0
NAUSEA: 0
DIARRHEA: 0
COUGH: 0
FEVER: 0
SHORTNESS OF BREATH: 0
BACK PAIN: 1
WEIGHT LOSS: 0
EYES NEGATIVE: 1
HEMOPTYSIS: 0
VOMITING: 0
PALPITATIONS: 0
FLANK PAIN: 0
ABDOMINAL PAIN: 0

## 2022-12-14 ASSESSMENT — FIBROSIS 4 INDEX: FIB4 SCORE: 2.5

## 2022-12-14 NOTE — PROGRESS NOTES
Subjective:      Naomy Vargas is a 72 y.o. female who presents with CKD          Chronic Kidney Disease  Pertinent negatives include no abdominal pain, chest pain, chills, congestion, coughing, fever, nausea or vomiting.   Naomy is coming today for f/u of CKD IIIa  Doing well, no complaints.  No dysuria/hematuria/flank pain  Pedal edema improved -well controlled with low salt diet  No NSAID's  CKD III-creat in 2018 at 1.02 -worse to 1.33 in Feb 2019 - worsen to1.6! - 1.9!-improved to 1.3 - 1.2 -1.08 -improved to 0.96 -CKD II  HTN: BP well controlled  CT abd from 2008 revealed scarred right kidney  Renal US small right kidney with hydronephrosis f/u with Urology    Review of Systems   Constitutional:  Negative for chills, fever, malaise/fatigue and weight loss.   HENT:  Negative for congestion, hearing loss and sinus pain.    Eyes: Negative.    Respiratory:  Negative for cough, hemoptysis, shortness of breath and wheezing.    Cardiovascular:  Negative for chest pain, palpitations, orthopnea and leg swelling.   Gastrointestinal:  Negative for abdominal pain, diarrhea, nausea and vomiting.   Genitourinary:  Negative for dysuria, flank pain, frequency, hematuria and urgency.   Musculoskeletal:  Positive for back pain.   Skin: Negative.    All other systems reviewed and are negative.  Past Medical History:   Diagnosis Date    Anxiety     Arthritis     wrists    Back pain     Blood transfusion without reported diagnosis     with ectopic preg x 2     Breath shortness     with exertion    Cataract     bilateral removal-Dr. Duke Talamantes    Cellulitis     right lower leg    Cellulitis of leg 11/1/2013    Followed by dermatology. Managed with fluocinonide and Bactroban on right leg Left leg she uses triamcinolone and Sarna pramocaine (anesthetic, antipruretic) on both.      Chickenpox     COPD (chronic obstructive pulmonary disease) (HCC)     Dental disorder     upper denture    Earache     Fatigue     Frequent  "urination     Hypertension 2018    on no meds at this time    Hypothyroidism     Influenza     Insomnia     Kidney disease     reports 1 kidney is smaller than the other    Mumps     OAB (overactive bladder) 2019    Pain 2018    right leg and back, 5/10    Peripheral vascular disease, unspecified (HCC) 10/13/2021    Pneumonia     Rash     Ringing in ears     Shortness of breath     Swelling of lower extremity     Thyroid disease     Tonsillitis     Toothache     Venous stasis dermatitis     right leg       Family History   Problem Relation Age of Onset    Arthritis Mother         hip fracture    Diabetes Mother     Cancer Mother         skin    Arthritis Father         lung    Alcohol/Drug Father         etoh    Lung Disease Sister         smoker/copd    Hypertension Sister     Other Brother         hep c    Arthritis Maternal Grandmother         hip fracture    Diabetes Maternal Grandfather     No Known Problems Son     No Known Problems Son     No Known Problems Brother     No Known Problems Sister     No Known Problems Brother     Hyperlipidemia Neg Hx     Stroke Neg Hx        Social History     Socioeconomic History    Marital status:     Number of children: 2   Occupational History    Occupation: retired   Tobacco Use    Smoking status: Former     Packs/day: 0.50     Years: 53.00     Pack years: 26.50     Types: Cigarettes     Quit date: 2017     Years since quittin.4    Smokeless tobacco: Never   Vaping Use    Vaping Use: Never used   Substance and Sexual Activity    Alcohol use: Yes     Comment: 2 per day    Drug use: No    Sexual activity: Not Currently     Partners: Male          Objective:     /60 (BP Location: Right arm, Patient Position: Sitting, BP Cuff Size: Adult)   Pulse 83   Temp 36.4 °C (97.6 °F) (Temporal)   Ht 1.689 m (5' 6.5\")   Wt 72.3 kg (159 lb 6 oz)   LMP  (LMP Unknown)   SpO2 94%   BMI 25.34 kg/m²      Physical Exam  Vitals reviewed. "   Constitutional:       General: She is not in acute distress.     Appearance: Normal appearance. She is well-developed. She is not diaphoretic.   HENT:      Head: Normocephalic and atraumatic.      Nose: Nose normal.      Mouth/Throat:      Mouth: Mucous membranes are moist.      Pharynx: Oropharynx is clear.   Eyes:      Extraocular Movements: Extraocular movements intact.      Conjunctiva/sclera: Conjunctivae normal.      Pupils: Pupils are equal, round, and reactive to light.   Cardiovascular:      Rate and Rhythm: Normal rate and regular rhythm.      Pulses: Normal pulses.      Heart sounds: Normal heart sounds.   Pulmonary:      Effort: Pulmonary effort is normal. No respiratory distress.      Breath sounds: Normal breath sounds. No wheezing or rales.   Abdominal:      General: Bowel sounds are normal. There is no distension.      Palpations: Abdomen is soft. There is no mass.      Tenderness: There is no abdominal tenderness. There is no right CVA tenderness or left CVA tenderness.   Musculoskeletal:      Cervical back: Normal range of motion and neck supple.      Right lower leg: No edema.      Left lower leg: No edema.   Skin:     General: Skin is warm.      Coloration: Skin is not pale.      Findings: No erythema or rash.   Neurological:      General: No focal deficit present.      Mental Status: She is alert and oriented to person, place, and time.      Cranial Nerves: No cranial nerve deficit.      Coordination: Coordination normal.   Psychiatric:         Mood and Affect: Mood normal.         Behavior: Behavior normal.         Thought Content: Thought content normal.         Judgment: Judgment normal.          Laboratory and renal imaging results reviewed : d/w pt  Lab Results   Component Value Date/Time    CREATININE 0.96 12/09/2022 10:45 AM    POTASSIUM 4.6 12/09/2022 10:45 AM          Assessment/Plan:       1. CKD (chronic kidney disease) stage 3a, GFR 30-59 ml/min ([facility]) -improved to CKD II      Creat  level improved -to monitor    2. Vitamin D deficiency      Well controlled      PTH WNL    3. Essential hypertension      BP well controlled       To monitor BP at home nd call clinic if BP> 135/85    4. Microalbuminuria      Very mild -on ACEi      5. Anemia, unspecified type      Hb stable WNL  6. Right hydronephrosis -f/u with Urology    Recs:  Continue current treatment  Urology f/u  Keep well hydrated  Low salt diet  Monitor BP  Avoid NSAID's  F/u in 6 months

## 2022-12-20 DIAGNOSIS — E03.1 CONGENITAL HYPOTHYROIDISM WITHOUT GOITER: ICD-10-CM

## 2022-12-20 RX ORDER — LEVOTHYROXINE SODIUM 0.1 MG/1
TABLET ORAL
Qty: 100 TABLET | Refills: 0 | Status: SHIPPED | OUTPATIENT
Start: 2022-12-20 | End: 2023-06-19

## 2022-12-20 NOTE — TELEPHONE ENCOUNTER
Received request via: Pharmacy    Was the patient seen in the last year in this department? Yes    Does the patient have an active prescription (recently filled or refills available) for medication(s) requested? No    Does the patient have senior living Plus and need 100 day supply (blood pressure, diabetes and cholesterol meds only)? Medication is not for cholesterol, blood pressure or diabetes

## 2022-12-29 ENCOUNTER — DOCUMENTATION (OUTPATIENT)
Dept: HEALTH INFORMATION MANAGEMENT | Facility: OTHER | Age: 72
End: 2022-12-29
Payer: MEDICARE

## 2023-01-26 DIAGNOSIS — I10 ESSENTIAL HYPERTENSION: ICD-10-CM

## 2023-01-27 RX ORDER — AMLODIPINE BESYLATE 5 MG/1
5 TABLET ORAL
Qty: 90 TABLET | Refills: 1 | Status: SHIPPED | OUTPATIENT
Start: 2023-01-27 | End: 2023-07-17 | Stop reason: SDUPTHER

## 2023-02-21 NOTE — TELEPHONE ENCOUNTER
Received request via: Patient    Was the patient seen in the last year in this department? Yes    Does the patient have an active prescription (recently filled or refills available) for medication(s) requested? No    Does the patient have custodial Plus and need 100 day supply (blood pressure, diabetes and cholesterol meds only)? Medication is not for cholesterol, blood pressure or diabetes

## 2023-02-27 ENCOUNTER — OFFICE VISIT (OUTPATIENT)
Dept: MEDICAL GROUP | Facility: PHYSICIAN GROUP | Age: 73
End: 2023-02-27
Payer: MEDICARE

## 2023-02-27 VITALS
BODY MASS INDEX: 24.33 KG/M2 | HEIGHT: 67 IN | OXYGEN SATURATION: 92 % | DIASTOLIC BLOOD PRESSURE: 66 MMHG | HEART RATE: 79 BPM | WEIGHT: 155 LBS | RESPIRATION RATE: 16 BRPM | TEMPERATURE: 98.5 F | SYSTOLIC BLOOD PRESSURE: 124 MMHG

## 2023-02-27 DIAGNOSIS — N18.32 STAGE 3B CHRONIC KIDNEY DISEASE: ICD-10-CM

## 2023-02-27 DIAGNOSIS — I77.9 DISORDER OF ARTERIES AND ARTERIOLES (HCC): ICD-10-CM

## 2023-02-27 DIAGNOSIS — Z12.31 ENCOUNTER FOR SCREENING MAMMOGRAM FOR BREAST CANCER: ICD-10-CM

## 2023-02-27 DIAGNOSIS — D69.6 THROMBOCYTOPENIA, UNSPECIFIED (HCC): ICD-10-CM

## 2023-02-27 DIAGNOSIS — J43.9 PULMONARY EMPHYSEMA, UNSPECIFIED EMPHYSEMA TYPE (HCC): ICD-10-CM

## 2023-02-27 DIAGNOSIS — R91.1 LUNG NODULE: ICD-10-CM

## 2023-02-27 DIAGNOSIS — I70.0 AORTIC ATHEROSCLEROSIS (HCC): ICD-10-CM

## 2023-02-27 DIAGNOSIS — Z12.11 COLON CANCER SCREENING: ICD-10-CM

## 2023-02-27 DIAGNOSIS — I77.1 BILATERAL ILIAC ARTERY STENOSIS (HCC): ICD-10-CM

## 2023-02-27 PROCEDURE — 99214 OFFICE O/P EST MOD 30 MIN: CPT | Performed by: PHYSICIAN ASSISTANT

## 2023-02-27 RX ORDER — BUDESONIDE AND FORMOTEROL FUMARATE DIHYDRATE 80; 4.5 UG/1; UG/1
2 AEROSOL RESPIRATORY (INHALATION) 2 TIMES DAILY
Qty: 3 EACH | Refills: 0 | Status: SHIPPED | OUTPATIENT
Start: 2023-02-27 | End: 2023-05-30

## 2023-02-27 ASSESSMENT — PATIENT HEALTH QUESTIONNAIRE - PHQ9: CLINICAL INTERPRETATION OF PHQ2 SCORE: 0

## 2023-02-27 NOTE — PROGRESS NOTES
Subjective:     CC: Shortness of breath    HPI:   Naomy presents today with concerns for shortness of breath over the last 2 weeks.  States she has been using her albuterol, or does not seem to help. No fever or recent illness. Does not feel like she is wheezing.  States that she is getting shortness of breath both with exertion and without.    Annual Health Assessment Questions:    1.  Are you currently engaging in any exercise or physical activity? No    2.  How would you describe your mood or emotional well-being today? good    3.  Have you had any falls in the last year? No    4.  Have you noticed any problems with your balance or had difficulty walking? Yes    5.  In the last six months have you experienced any leakage of urine? Yes    6. DPA/Advanced Directive: Patient has Advance Directive on file.      Past Medical History:   Diagnosis Date    Anxiety     Arthritis     wrists    Back pain     Blood transfusion without reported diagnosis     with ectopic preg x 2     Breath shortness     with exertion    Cataract     bilateral removal-Dr. Duke Talamantes    Cellulitis     right lower leg    Cellulitis of leg 11/1/2013    Followed by dermatology. Managed with fluocinonide and Bactroban on right leg Left leg she uses triamcinolone and Sarna pramocaine (anesthetic, antipruretic) on both.      Chickenpox     COPD (chronic obstructive pulmonary disease) ([facility])     Dental disorder     upper denture    Earache     Fatigue     Frequent urination     Hypertension 02/23/2018    on no meds at this time    Hypothyroidism     Influenza     Insomnia     Kidney disease     reports 1 kidney is smaller than the other    Mumps     OAB (overactive bladder) 2/11/2019    Pain 02/23/2018    right leg and back, 5/10    Peripheral vascular disease, unspecified ([facility]) 10/13/2021    Pneumonia     Rash     Ringing in ears     Shortness of breath     Swelling of lower extremity     Thyroid disease     Tonsillitis     Toothache     Venous  stasis dermatitis     right leg       Social History     Tobacco Use    Smoking status: Former     Packs/day: 0.50     Years: 53.00     Pack years: 26.50     Types: Cigarettes     Quit date: 2017     Years since quittin.6    Smokeless tobacco: Never   Vaping Use    Vaping Use: Never used   Substance Use Topics    Alcohol use: Yes     Comment: 2 per day    Drug use: No       Current Outpatient Medications Ordered in Epic   Medication Sig Dispense Refill    budesonide-formoterol (SYMBICORT) 80-4.5 MCG/ACT Aerosol Inhale 2 Puffs 2 times a day. 3 Each 0    PROAIR  (90 Base) MCG/ACT Aero Soln inhalation aerosol Inhale 1 Puff every 6 hours as needed for Shortness of Breath. 8.5 g 1    amLODIPine (NORVASC) 5 MG Tab TAKE 1 TABLET BY MOUTH EVERY DAY 90 Tablet 1    levothyroxine (SYNTHROID) 100 MCG Tab TAKE 1 TABLET BY MOUTH EVERY DAY BEFORE BREAKFAST 100 Tablet 0    rosuvastatin (CRESTOR) 10 MG Tab TAKE 1 TABLET BY MOUTH EVERY DAY IN THE EVENING 100 Tablet 1    lisinopril (PRINIVIL) 20 MG Tab TAKE 1 TABLET BY MOUTH EVERY  Tablet 3    Mirabegron ER (MYRBETRIQ) 50 MG TABLET SR 24 HR Myrbetriq 50 mg tablet,extended release   Take 1 tablet every day by oral route for 90 days.      Ascorbic Acid (VITAMIN C PO) Take  by mouth.      Doxylamine Succinate, Sleep, (SLEEP AID PO) Take  by mouth.      Dextromethorphan-guaiFENesin (MUCINEX DM PO) Take  by mouth.      triamcinolone acetonide (KENALOG) 0.1 % Cream Apply 1 Application topically 2 times a day as needed. 80 g 2    loperamide (IMODIUM) 2 MG Cap Take 1 Capsule by mouth 4 times a day as needed for Diarrhea. 30 Capsule 1    cyanocobalamin (VITAMIN B-12) 100 MCG Tab Take 100 mcg by mouth every day.      vitamin D (CHOLECALCIFEROL) 1000 Unit (25 mcg) Tab Take 1,000 Units by mouth every day.      nitrofurantoin (MACRODANTIN) 50 MG Cap TAKE 1 CAPSULE BY MOUTH EVERYDAY AT BEDTIME      Melatonin 1 MG Tab Take  by mouth.      diphenhydrAMINE (BENADRYL) 25 MG Tab  "Take 25 mg by mouth every 6 hours as needed for Sleep.      Acetaminophen 500 MG Cap Take  by mouth.      Oxymetazoline HCl (NASAL SPRAY NA) Spray  in nose.       No current [facility]-ordered facility-administered medications on file.       Allergies:  Colophony [pinus strobus], Latex, Neosporin [neomycin-polymyxin b], Phenylene, Soap, Tape, and Tea tree oil    Health Maintenance: Completed    ROS:  Gen: no fevers/chills  Eyes: no changes in vision  ENT: no sore throat  Pulm: Positive for shortness of breath  CV: no chest pain  GI: no nausea/vomiting  : no dysuria  MSk: no myalgias  Skin: no rash  Neuro: no headaches    Objective:     Exam:  /66   Pulse 79   Temp 36.9 °C (98.5 °F) (Temporal)   Resp 16   Ht 1.689 m (5' 6.5\")   Wt 70.3 kg (155 lb)   LMP  (LMP Unknown)   SpO2 92%   BMI 24.64 kg/m²  Body mass index is 24.64 kg/m².    Gen: Alert and oriented, No apparent distress.  Skin: Warm, dry, good turgor, no rashes in visible areas.  HEENT: Normocephalic. Eyes conjunctiva clear lids without ptosis, pupils equal and reactive to light accommodation  Neck: Trachea midline, no masses, no thyromegaly  Lungs: Normal effort,  Very mild wheeze throughout  CV: Regular rate and rhythm. No murmurs, rubs, or gallops.  MSK: Normal gait, moves all extremities.  Neuro: Grossly non-focal.  Ext: No clubbing, cyanosis, edema.  Psych: Alert and oriented x3, normal affect and mood.     Labs: Labs from 12/9/2022, 6/9/2022 were reviewed and discussed with the patient.     Assessment & Plan:     73 y.o. female with the following -     1. Pulmonary emphysema, unspecified emphysema type (HCC)  Chronic, worsening.  Patient has been having ongoing shortness of breath over the last few weeks.  Review of previous pulmonary note shows that at that time she did not require maintenance inhaler because her symptoms were well controlled, however given that they are worsening, did discuss starting a maintenance inhaler at this time.  " Prescription for Symbicort sent in.  A lengthy discussion about the difference between a maintenance inhaler and a controller inhaler.  Patient is also due for updated PFTs but will defer this to pulmonology.  Did encourage her to schedule an appointment in the next months for her annual with them.  - Referral to Pulmonary and Sleep Medicine    2. Lung nodule  Chronic, stable.  Patient was due for an updated CT chest, however they order .  New order placed today and she will follow-up with pulmonology regarding the results.  - CT-CHEST (THORAX) W/O; Future    3. Aortic atherosclerosis (HCC)  Chronic, stable.  Patient is already on Crestor and lipids are well controlled.  Blood pressure is also well controlled.  We will continue to monitor.    4. Bilateral iliac artery stenosis (HCC)  Chronic, status unknown.  Ultrasound was previously ordered but patient forgot.  Provided with the number to call and schedule for further evaluation of this.    5. Stage 3b chronic kidney disease (HCC)  Chronic, stable.  Managed by nephrology.  plan to follow-up with Dr. Lawrence as scheduled at least annually.    6. Disorder of arteries and arterioles (HCC)  Chronic, stable.  Diagnosed last year based off of an abnormal Quanta flow result.  We will continue to monitor.    7. Thrombocytopenia, unspecified (HCC)  Chronic, improving.  Patient has an isolated mild thrombocytopenia on recent blood work that is improving.  We will plan to monitor.  Recommended refraining from using aspirin.    8. Colon cancer screening  Patient declined being referred to GI for a colonoscopy at this time. Instead they would like to proceed with Cologuard. I informed the patient that if the Cologuard comes back abnormal they will need to be referred to GI and will need a colonoscopy for further evaluation. They express understanding.   - COLOGUARD (FIT DNA)    9. Encounter for screening mammogram for breast cancer  - MA-SCREENING MAMMO BILAT W/CAD;  Future    I spent a total of 34 minutes with record review (including external notes and labs), exam, communication with the patient, communication with other providers, and documentation of this encounter.     Return in about 3 months (around 5/27/2023), or if symptoms worsen or fail to improve.    Please note that this dictation was created using voice recognition software. I have made every reasonable attempt to correct obvious errors, but I expect that there are errors of grammar and possibly content that I did not discover before finalizing the note.    Electronically signed by Prema Patel PA-C on February 27, 2023

## 2023-02-27 NOTE — PROGRESS NOTES
Annual Health Assessment Questions:    1.  Are you currently engaging in any exercise or physical activity? No    2.  How would you describe your mood or emotional well-being today? good    3.  Have you had any falls in the last year? No    4.  Have you noticed any problems with your balance or had difficulty walking? Yes    5.  In the last six months have you experienced any leakage of urine? Yes    6. DPA/Advanced Directive: Patient has Advance Directive on file.

## 2023-03-06 ENCOUNTER — TELEPHONE (OUTPATIENT)
Dept: HEALTH INFORMATION MANAGEMENT | Facility: OTHER | Age: 73
End: 2023-03-06
Payer: MEDICARE

## 2023-03-07 ENCOUNTER — HOSPITAL ENCOUNTER (OUTPATIENT)
Dept: RADIOLOGY | Facility: MEDICAL CENTER | Age: 73
End: 2023-03-07
Attending: PHYSICIAN ASSISTANT
Payer: MEDICARE

## 2023-03-07 DIAGNOSIS — Z12.31 ENCOUNTER FOR SCREENING MAMMOGRAM FOR BREAST CANCER: ICD-10-CM

## 2023-03-07 DIAGNOSIS — R91.1 LUNG NODULE: ICD-10-CM

## 2023-03-07 PROCEDURE — 77063 BREAST TOMOSYNTHESIS BI: CPT

## 2023-03-07 PROCEDURE — 71250 CT THORAX DX C-: CPT

## 2023-03-23 ENCOUNTER — APPOINTMENT (OUTPATIENT)
Dept: RADIOLOGY | Facility: MEDICAL CENTER | Age: 73
DRG: 871 | End: 2023-03-23
Attending: EMERGENCY MEDICINE
Payer: MEDICARE

## 2023-03-23 ENCOUNTER — HOSPITAL ENCOUNTER (INPATIENT)
Facility: MEDICAL CENTER | Age: 73
LOS: 7 days | DRG: 871 | End: 2023-03-30
Attending: EMERGENCY MEDICINE | Admitting: INTERNAL MEDICINE
Payer: MEDICARE

## 2023-03-23 DIAGNOSIS — J84.9 INTERSTITIAL LUNG DISEASE (HCC): ICD-10-CM

## 2023-03-23 DIAGNOSIS — R09.02 HYPOXIA: ICD-10-CM

## 2023-03-23 DIAGNOSIS — R06.03 ACUTE RESPIRATORY DISTRESS: ICD-10-CM

## 2023-03-23 DIAGNOSIS — R79.89 ELEVATED TROPONIN: ICD-10-CM

## 2023-03-23 DIAGNOSIS — A41.9 SEPSIS WITHOUT ACUTE ORGAN DYSFUNCTION, DUE TO UNSPECIFIED ORGANISM (HCC): ICD-10-CM

## 2023-03-23 PROBLEM — E87.29 HIGH ANION GAP METABOLIC ACIDOSIS: Status: ACTIVE | Noted: 2023-03-23

## 2023-03-23 PROBLEM — J90 PLEURAL EFFUSION: Status: ACTIVE | Noted: 2023-03-23

## 2023-03-23 PROBLEM — J96.01 ACUTE HYPOXEMIC RESPIRATORY FAILURE (HCC): Status: ACTIVE | Noted: 2023-03-23

## 2023-03-23 LAB
ALBUMIN SERPL BCP-MCNC: 3.5 G/DL (ref 3.2–4.9)
ALBUMIN/GLOB SERPL: 0.7 G/DL
ALP SERPL-CCNC: 142 U/L (ref 30–99)
ALT SERPL-CCNC: 43 U/L (ref 2–50)
ANION GAP SERPL CALC-SCNC: 20 MMOL/L (ref 7–16)
APPEARANCE UR: CLEAR
AST SERPL-CCNC: 55 U/L (ref 12–45)
BACTERIA #/AREA URNS HPF: NEGATIVE /HPF
BASOPHILS # BLD AUTO: 0.7 % (ref 0–1.8)
BASOPHILS # BLD: 0.09 K/UL (ref 0–0.12)
BILIRUB SERPL-MCNC: 1.2 MG/DL (ref 0.1–1.5)
BILIRUB UR QL STRIP.AUTO: NEGATIVE
BUN SERPL-MCNC: 23 MG/DL (ref 8–22)
CALCIUM ALBUM COR SERPL-MCNC: 9.5 MG/DL (ref 8.5–10.5)
CALCIUM SERPL-MCNC: 9.1 MG/DL (ref 8.5–10.5)
CHLORIDE SERPL-SCNC: 99 MMOL/L (ref 96–112)
CO2 SERPL-SCNC: 15 MMOL/L (ref 20–33)
COLOR UR: YELLOW
CREAT SERPL-MCNC: 1.34 MG/DL (ref 0.5–1.4)
EKG IMPRESSION: NORMAL
EOSINOPHIL # BLD AUTO: 0.03 K/UL (ref 0–0.51)
EOSINOPHIL NFR BLD: 0.2 % (ref 0–6.9)
EPI CELLS #/AREA URNS HPF: ABNORMAL /HPF
ERYTHROCYTE [DISTWIDTH] IN BLOOD BY AUTOMATED COUNT: 50.2 FL (ref 35.9–50)
FLUAV RNA SPEC QL NAA+PROBE: NEGATIVE
FLUBV RNA SPEC QL NAA+PROBE: NEGATIVE
GFR SERPLBLD CREATININE-BSD FMLA CKD-EPI: 42 ML/MIN/1.73 M 2
GLOBULIN SER CALC-MCNC: 4.8 G/DL (ref 1.9–3.5)
GLUCOSE SERPL-MCNC: 152 MG/DL (ref 65–99)
GLUCOSE UR STRIP.AUTO-MCNC: NEGATIVE MG/DL
GRAN CASTS #/AREA URNS LPF: ABNORMAL /LPF
HCT VFR BLD AUTO: 51.6 % (ref 37–47)
HGB BLD-MCNC: 16.6 G/DL (ref 12–16)
HYALINE CASTS #/AREA URNS LPF: ABNORMAL /LPF
IMM GRANULOCYTES # BLD AUTO: 0.17 K/UL (ref 0–0.11)
IMM GRANULOCYTES NFR BLD AUTO: 1.3 % (ref 0–0.9)
KETONES UR STRIP.AUTO-MCNC: ABNORMAL MG/DL
LACTATE SERPL-SCNC: 1.8 MMOL/L (ref 0.5–2)
LACTATE SERPL-SCNC: 2.7 MMOL/L (ref 0.5–2)
LACTATE SERPL-SCNC: 5.3 MMOL/L (ref 0.5–2)
LEUKOCYTE ESTERASE UR QL STRIP.AUTO: NEGATIVE
LYMPHOCYTES # BLD AUTO: 1.07 K/UL (ref 1–4.8)
LYMPHOCYTES NFR BLD: 8.1 % (ref 22–41)
MCH RBC QN AUTO: 31.2 PG (ref 27–33)
MCHC RBC AUTO-ENTMCNC: 32.2 G/DL (ref 33.6–35)
MCV RBC AUTO: 97 FL (ref 81.4–97.8)
MICRO URNS: ABNORMAL
MONOCYTES # BLD AUTO: 1.26 K/UL (ref 0–0.85)
MONOCYTES NFR BLD AUTO: 9.6 % (ref 0–13.4)
MUCOUS THREADS #/AREA URNS HPF: ABNORMAL /HPF
NEUTROPHILS # BLD AUTO: 10.55 K/UL (ref 2–7.15)
NEUTROPHILS NFR BLD: 80.1 % (ref 44–72)
NITRITE UR QL STRIP.AUTO: NEGATIVE
NRBC # BLD AUTO: 0 K/UL
NRBC BLD-RTO: 0 /100 WBC
NT-PROBNP SERPL IA-MCNC: 5116 PG/ML (ref 0–125)
PH UR STRIP.AUTO: 5.5 [PH] (ref 5–8)
PLATELET # BLD AUTO: 180 K/UL (ref 164–446)
PMV BLD AUTO: 10.4 FL (ref 9–12.9)
POTASSIUM SERPL-SCNC: 5.2 MMOL/L (ref 3.6–5.5)
PROT SERPL-MCNC: 8.3 G/DL (ref 6–8.2)
PROT UR QL STRIP: 30 MG/DL
RBC # BLD AUTO: 5.32 M/UL (ref 4.2–5.4)
RBC # URNS HPF: ABNORMAL /HPF
RBC UR QL AUTO: NEGATIVE
RHEUMATOID FACT SER IA-ACNC: 11 IU/ML (ref 0–14)
RSV RNA SPEC QL NAA+PROBE: NEGATIVE
SARS-COV-2 RNA RESP QL NAA+PROBE: NOTDETECTED
SODIUM SERPL-SCNC: 134 MMOL/L (ref 135–145)
SP GR UR STRIP.AUTO: 1.01
SPECIMEN SOURCE: NORMAL
TROPONIN T SERPL-MCNC: 75 NG/L (ref 6–19)
UROBILINOGEN UR STRIP.AUTO-MCNC: 1 MG/DL
WBC # BLD AUTO: 13.2 K/UL (ref 4.8–10.8)
WBC #/AREA URNS HPF: ABNORMAL /HPF

## 2023-03-23 PROCEDURE — 80053 COMPREHEN METABOLIC PANEL: CPT

## 2023-03-23 PROCEDURE — 700102 HCHG RX REV CODE 250 W/ 637 OVERRIDE(OP): Performed by: STUDENT IN AN ORGANIZED HEALTH CARE EDUCATION/TRAINING PROGRAM

## 2023-03-23 PROCEDURE — 99291 CRITICAL CARE FIRST HOUR: CPT | Mod: GC | Performed by: INTERNAL MEDICINE

## 2023-03-23 PROCEDURE — 700101 HCHG RX REV CODE 250: Performed by: STUDENT IN AN ORGANIZED HEALTH CARE EDUCATION/TRAINING PROGRAM

## 2023-03-23 PROCEDURE — 700102 HCHG RX REV CODE 250 W/ 637 OVERRIDE(OP): Performed by: EMERGENCY MEDICINE

## 2023-03-23 PROCEDURE — 86431 RHEUMATOID FACTOR QUANT: CPT

## 2023-03-23 PROCEDURE — 94640 AIRWAY INHALATION TREATMENT: CPT

## 2023-03-23 PROCEDURE — 700111 HCHG RX REV CODE 636 W/ 250 OVERRIDE (IP): Performed by: STUDENT IN AN ORGANIZED HEALTH CARE EDUCATION/TRAINING PROGRAM

## 2023-03-23 PROCEDURE — 86039 ANTINUCLEAR ANTIBODIES (ANA): CPT

## 2023-03-23 PROCEDURE — 87486 CHLMYD PNEUM DNA AMP PROBE: CPT

## 2023-03-23 PROCEDURE — 84484 ASSAY OF TROPONIN QUANT: CPT

## 2023-03-23 PROCEDURE — 96375 TX/PRO/DX INJ NEW DRUG ADDON: CPT

## 2023-03-23 PROCEDURE — 700101 HCHG RX REV CODE 250: Performed by: EMERGENCY MEDICINE

## 2023-03-23 PROCEDURE — 87798 DETECT AGENT NOS DNA AMP: CPT

## 2023-03-23 PROCEDURE — 770022 HCHG ROOM/CARE - ICU (200)

## 2023-03-23 PROCEDURE — 700105 HCHG RX REV CODE 258: Performed by: INTERNAL MEDICINE

## 2023-03-23 PROCEDURE — 83605 ASSAY OF LACTIC ACID: CPT | Mod: 91

## 2023-03-23 PROCEDURE — 87086 URINE CULTURE/COLONY COUNT: CPT

## 2023-03-23 PROCEDURE — 71045 X-RAY EXAM CHEST 1 VIEW: CPT

## 2023-03-23 PROCEDURE — 93005 ELECTROCARDIOGRAM TRACING: CPT | Performed by: EMERGENCY MEDICINE

## 2023-03-23 PROCEDURE — 83516 IMMUNOASSAY NONANTIBODY: CPT

## 2023-03-23 PROCEDURE — 83880 ASSAY OF NATRIURETIC PEPTIDE: CPT

## 2023-03-23 PROCEDURE — 36415 COLL VENOUS BLD VENIPUNCTURE: CPT

## 2023-03-23 PROCEDURE — 700111 HCHG RX REV CODE 636 W/ 250 OVERRIDE (IP): Performed by: INTERNAL MEDICINE

## 2023-03-23 PROCEDURE — 86225 DNA ANTIBODY NATIVE: CPT

## 2023-03-23 PROCEDURE — 96365 THER/PROPH/DIAG IV INF INIT: CPT

## 2023-03-23 PROCEDURE — 700111 HCHG RX REV CODE 636 W/ 250 OVERRIDE (IP): Performed by: EMERGENCY MEDICINE

## 2023-03-23 PROCEDURE — 87040 BLOOD CULTURE FOR BACTERIA: CPT

## 2023-03-23 PROCEDURE — 0241U HCHG SARS-COV-2 COVID-19 NFCT DS RESP RNA 4 TRGT MIC: CPT

## 2023-03-23 PROCEDURE — C9803 HOPD COVID-19 SPEC COLLECT: HCPCS | Performed by: EMERGENCY MEDICINE

## 2023-03-23 PROCEDURE — 85025 COMPLETE CBC W/AUTO DIFF WBC: CPT

## 2023-03-23 PROCEDURE — A9270 NON-COVERED ITEM OR SERVICE: HCPCS | Performed by: EMERGENCY MEDICINE

## 2023-03-23 PROCEDURE — 99291 CRITICAL CARE FIRST HOUR: CPT

## 2023-03-23 PROCEDURE — 86235 NUCLEAR ANTIGEN ANTIBODY: CPT

## 2023-03-23 PROCEDURE — 87389 HIV-1 AG W/HIV-1&-2 AB AG IA: CPT

## 2023-03-23 PROCEDURE — 700105 HCHG RX REV CODE 258: Performed by: EMERGENCY MEDICINE

## 2023-03-23 PROCEDURE — 93005 ELECTROCARDIOGRAM TRACING: CPT

## 2023-03-23 PROCEDURE — A9270 NON-COVERED ITEM OR SERVICE: HCPCS | Performed by: STUDENT IN AN ORGANIZED HEALTH CARE EDUCATION/TRAINING PROGRAM

## 2023-03-23 PROCEDURE — 86331 IMMUNODIFFUSION OUCHTERLONY: CPT | Mod: 91

## 2023-03-23 PROCEDURE — 86038 ANTINUCLEAR ANTIBODIES: CPT

## 2023-03-23 PROCEDURE — 86256 FLUORESCENT ANTIBODY TITER: CPT

## 2023-03-23 PROCEDURE — 81001 URINALYSIS AUTO W/SCOPE: CPT

## 2023-03-23 PROCEDURE — 86606 ASPERGILLUS ANTIBODY: CPT

## 2023-03-23 PROCEDURE — 87581 M.PNEUMON DNA AMP PROBE: CPT

## 2023-03-23 PROCEDURE — 87633 RESP VIRUS 12-25 TARGETS: CPT

## 2023-03-23 RX ORDER — LEVOTHYROXINE SODIUM 0.1 MG/1
100 TABLET ORAL
Status: DISCONTINUED | OUTPATIENT
Start: 2023-03-24 | End: 2023-03-30 | Stop reason: HOSPADM

## 2023-03-23 RX ORDER — LABETALOL HYDROCHLORIDE 5 MG/ML
10 INJECTION, SOLUTION INTRAVENOUS EVERY 4 HOURS PRN
Status: DISCONTINUED | OUTPATIENT
Start: 2023-03-23 | End: 2023-03-30 | Stop reason: HOSPADM

## 2023-03-23 RX ORDER — SODIUM CHLORIDE 9 MG/ML
30 INJECTION, SOLUTION INTRAVENOUS ONCE
Status: COMPLETED | OUTPATIENT
Start: 2023-03-23 | End: 2023-03-23

## 2023-03-23 RX ORDER — METHYLPREDNISOLONE SODIUM SUCCINATE 40 MG/ML
80 INJECTION, POWDER, LYOPHILIZED, FOR SOLUTION INTRAMUSCULAR; INTRAVENOUS EVERY 6 HOURS
Status: DISCONTINUED | OUTPATIENT
Start: 2023-03-24 | End: 2023-03-24

## 2023-03-23 RX ORDER — AZITHROMYCIN 250 MG/1
500 TABLET, FILM COATED ORAL DAILY
Status: COMPLETED | OUTPATIENT
Start: 2023-03-24 | End: 2023-03-25

## 2023-03-23 RX ORDER — IPRATROPIUM BROMIDE AND ALBUTEROL SULFATE 2.5; .5 MG/3ML; MG/3ML
SOLUTION RESPIRATORY (INHALATION)
Status: DISPENSED
Start: 2023-03-23 | End: 2023-03-24

## 2023-03-23 RX ORDER — AMOXICILLIN 250 MG
2 CAPSULE ORAL 2 TIMES DAILY
Status: DISCONTINUED | OUTPATIENT
Start: 2023-03-24 | End: 2023-03-30 | Stop reason: HOSPADM

## 2023-03-23 RX ORDER — FUROSEMIDE 10 MG/ML
20 INJECTION INTRAMUSCULAR; INTRAVENOUS ONCE
Status: COMPLETED | OUTPATIENT
Start: 2023-03-23 | End: 2023-03-23

## 2023-03-23 RX ORDER — METHYLPREDNISOLONE SODIUM SUCCINATE 125 MG/2ML
80 INJECTION, POWDER, LYOPHILIZED, FOR SOLUTION INTRAMUSCULAR; INTRAVENOUS EVERY 6 HOURS
Status: DISCONTINUED | OUTPATIENT
Start: 2023-03-24 | End: 2023-03-23

## 2023-03-23 RX ORDER — POLYETHYLENE GLYCOL 3350 17 G/17G
1 POWDER, FOR SOLUTION ORAL
Status: DISCONTINUED | OUTPATIENT
Start: 2023-03-23 | End: 2023-03-30 | Stop reason: HOSPADM

## 2023-03-23 RX ORDER — IPRATROPIUM BROMIDE AND ALBUTEROL SULFATE 2.5; .5 MG/3ML; MG/3ML
3 SOLUTION RESPIRATORY (INHALATION)
Status: DISCONTINUED | OUTPATIENT
Start: 2023-03-23 | End: 2023-03-30 | Stop reason: HOSPADM

## 2023-03-23 RX ORDER — METHYLPREDNISOLONE SODIUM SUCCINATE 125 MG/2ML
40 INJECTION, POWDER, LYOPHILIZED, FOR SOLUTION INTRAMUSCULAR; INTRAVENOUS ONCE
Status: COMPLETED | OUTPATIENT
Start: 2023-03-23 | End: 2023-03-23

## 2023-03-23 RX ORDER — BUDESONIDE AND FORMOTEROL FUMARATE DIHYDRATE 80; 4.5 UG/1; UG/1
2 AEROSOL RESPIRATORY (INHALATION) 2 TIMES DAILY
Status: DISCONTINUED | OUTPATIENT
Start: 2023-03-23 | End: 2023-03-30 | Stop reason: HOSPADM

## 2023-03-23 RX ORDER — ENOXAPARIN SODIUM 100 MG/ML
40 INJECTION SUBCUTANEOUS DAILY
Status: DISCONTINUED | OUTPATIENT
Start: 2023-03-23 | End: 2023-03-30 | Stop reason: HOSPADM

## 2023-03-23 RX ORDER — METHYLPREDNISOLONE SODIUM SUCCINATE 125 MG/2ML
125 INJECTION, POWDER, LYOPHILIZED, FOR SOLUTION INTRAMUSCULAR; INTRAVENOUS ONCE
Status: DISCONTINUED | OUTPATIENT
Start: 2023-03-23 | End: 2023-03-23

## 2023-03-23 RX ORDER — AZITHROMYCIN 250 MG/1
500 TABLET, FILM COATED ORAL ONCE
Status: COMPLETED | OUTPATIENT
Start: 2023-03-23 | End: 2023-03-23

## 2023-03-23 RX ORDER — ROSUVASTATIN CALCIUM 10 MG/1
10 TABLET, COATED ORAL EVERY EVENING
Status: DISCONTINUED | OUTPATIENT
Start: 2023-03-24 | End: 2023-03-30 | Stop reason: HOSPADM

## 2023-03-23 RX ORDER — LISINOPRIL 20 MG/1
20 TABLET ORAL DAILY
Status: DISCONTINUED | OUTPATIENT
Start: 2023-03-24 | End: 2023-03-30 | Stop reason: HOSPADM

## 2023-03-23 RX ORDER — BISACODYL 10 MG
10 SUPPOSITORY, RECTAL RECTAL
Status: DISCONTINUED | OUTPATIENT
Start: 2023-03-23 | End: 2023-03-30 | Stop reason: HOSPADM

## 2023-03-23 RX ADMIN — ENOXAPARIN SODIUM 40 MG: 100 INJECTION SUBCUTANEOUS at 21:50

## 2023-03-23 RX ADMIN — ALBUTEROL SULFATE 2.5 MG: 2.5 SOLUTION RESPIRATORY (INHALATION) at 16:26

## 2023-03-23 RX ADMIN — IPRATROPIUM BROMIDE AND ALBUTEROL SULFATE 3 ML: 2.5; .5 SOLUTION RESPIRATORY (INHALATION) at 21:13

## 2023-03-23 RX ADMIN — FUROSEMIDE 20 MG: 10 INJECTION, SOLUTION INTRAMUSCULAR; INTRAVENOUS at 19:42

## 2023-03-23 RX ADMIN — SODIUM CHLORIDE 250 MG: 9 INJECTION, SOLUTION INTRAVENOUS at 22:35

## 2023-03-23 RX ADMIN — BUDESONIDE AND FORMOTEROL FUMARATE DIHYDRATE 2 PUFF: 80; 4.5 AEROSOL RESPIRATORY (INHALATION) at 22:28

## 2023-03-23 RX ADMIN — SODIUM CHLORIDE 2109 ML: 9 INJECTION, SOLUTION INTRAVENOUS at 16:21

## 2023-03-23 RX ADMIN — METHYLPREDNISOLONE SODIUM SUCCINATE 40 MG: 125 INJECTION, POWDER, FOR SOLUTION INTRAMUSCULAR; INTRAVENOUS at 16:17

## 2023-03-23 RX ADMIN — AMPICILLIN AND SULBACTAM 3 G: 1; 2 INJECTION, POWDER, FOR SOLUTION INTRAMUSCULAR; INTRAVENOUS at 16:23

## 2023-03-23 RX ADMIN — AZITHROMYCIN MONOHYDRATE 500 MG: 250 TABLET ORAL at 16:28

## 2023-03-23 ASSESSMENT — PAIN DESCRIPTION - PAIN TYPE: TYPE: CHRONIC PAIN

## 2023-03-23 ASSESSMENT — ENCOUNTER SYMPTOMS
CHILLS: 0
SHORTNESS OF BREATH: 1
SPUTUM PRODUCTION: 0
FEVER: 1
PALPITATIONS: 0
HEMOPTYSIS: 0
HEARTBURN: 0
DIZZINESS: 0
HEADACHES: 0
BLURRED VISION: 0
NAUSEA: 0
COUGH: 0
DOUBLE VISION: 0
VOMITING: 0

## 2023-03-23 ASSESSMENT — FIBROSIS 4 INDEX: FIB4 SCORE: 3.4

## 2023-03-23 NOTE — ED TRIAGE NOTES
"Chief Complaint   Patient presents with    Shortness of Breath     X 1 month progressively worse, unable to obtain pulse ox initially with increased WOB, placed on NRB 15L with a sat of 71% in triage, pt roomed with an eventual saturation of 92% on 15L NRB, pt denies respiratory hx or home oxygen requirement, denies cardiac hx or chest pain. Pt remains alert/oriented throughout     Pt ambulatory to triage and roomed immediately in red pod for above complaints, VSS on 15L NRB, GCS 15, speaking in full sentences.     /58   Pulse (!) 101   Temp 35.9 °C (96.7 °F) (Temporal)   Resp (!) 30   Ht 1.702 m (5' 7\")   Wt 70.3 kg (155 lb)   LMP  (LMP Unknown)   SpO2 91%   BMI 24.28 kg/m²     "

## 2023-03-23 NOTE — ED PROVIDER NOTES
ER Provider Note    Scribed for Sudha Álvarez M.d. by Lala Mondragon. 3/23/2023  3:40 PM    Primary Care Provider: Prema Patel P.A.-C.    CHIEF COMPLAINT  Chief Complaint   Patient presents with    Shortness of Breath     X 1 month progressively worse, unable to obtain pulse ox initially with increased WOB, placed on NRB 15L with a sat of 71% in triage, pt roomed with an eventual saturation of 92% on 15L NRB, pt denies respiratory hx or home oxygen requirement, denies cardiac hx or chest pain. Pt remains alert/oriented throughout     LIMITATION TO HISTORY   Select: : None    HPI/ROS  OUTSIDE HISTORIAN(S):  Family Son at bedside providing some history    EXTERNAL RECORDS REVIEWED  Inpatient Notes CT-Chest from 3/7/23.   1. Interval appearance of diffuse ill-defined pulmonary infiltrates, groundglass opacities and interstitial septal thickening present throughout the lungs bilaterally with a peripheral predominance. There is also extensive bronchiectasis. These changes   are new from comparison and obscure the previously seen right upper lobe pulmonary nodule. Differential diagnosis includes sequelae of Covid pneumonia, interstitial lung disease, postinflammatory process as well as cryptogenic organizing pneumonia and   atypical infection. 2.  Multiple new enlarged mediastinal and hilar lymph nodes. 3.  Extensive atherosclerotic change of aorta and coronary arteries. 4.  Splenomegaly. 5.  Atrophic right kidney.  Patient's shortness of breath for over two weeks. She states that she has been using albuterol but it is not helping. Patient is a smoker for 53 years. Her emphysema is worsening. The CT is for lung nodule. Patient has bad kidneys.       Naomy Vargas is a 73 y.o. female who presents to the ED for evaluation of gradually worsening shortness of breath onset one month. Patient reports of worsening shortness of breath for about a month with no use of home oxygen. Naomy had a history of a small  nodule on the lungs, but reports it has now grown in size for the first time. Patient is not on chemotherapy or treatment. She admits to associated symptoms of vomiting and weakness, but denies cough, diarrhea, new leg swelling, rhinorrhea, or sore throat. Patient does not feel as if she has COVID. No alleviating factors were reported. No history of asthma, or heart problems.  Chart shows she does have a history of COPD and uses inhalers.  They have not been helpful recently for her shortness of breath.        PAST MEDICAL HISTORY  Past Medical History:   Diagnosis Date    Anxiety     Arthritis     wrists    Back pain     Blood transfusion without reported diagnosis     with ectopic preg x 2     Breath shortness     with exertion    Cataract     bilateral removal-Dr. Duke Talamantes    Cellulitis     right lower leg    Cellulitis of leg 11/1/2013    Followed by dermatology. Managed with fluocinonide and Bactroban on right leg Left leg she uses triamcinolone and Sarna pramocaine (anesthetic, antipruretic) on both.      Chickenpox     COPD (chronic obstructive pulmonary disease) ([facility])     Dental disorder     upper denture    Earache     Fatigue     Frequent urination     Hypertension 02/23/2018    on no meds at this time    Hypothyroidism     Influenza     Insomnia     Kidney disease     reports 1 kidney is smaller than the other    Mumps     OAB (overactive bladder) 2/11/2019    Pain 02/23/2018    right leg and back, 5/10    Peripheral vascular disease, unspecified ([facility]) 10/13/2021    Pneumonia     Rash     Ringing in ears     Shortness of breath     Swelling of lower extremity     Thyroid disease     Tonsillitis     Toothache     Venous stasis dermatitis     right leg       SURGICAL HISTORY  Past Surgical History:   Procedure Laterality Date    VEIN LIGATION RADIO FREQUENCY Right 2/26/2018    Procedure: VEIN LIGATION RADIO FREQUENCY- LONG SAPENOUS;  Surgeon: Jim Alas M.D.;  Location: SURGERY [facility];   Service: General    ABDOMINAL EXPLORATION      2 ectopic preg    BREAST BIOPSY      hx of benign left breast biopsy    EYE SURGERY      cataract x2    HYSTERECTOMY LAPAROSCOPY      HYSTERECTOMY, TOTAL ABDOMINAL  1977/1978    OOPHORECTOMY      PB MAMMARY DUCTOGRAM, SINGLE      CO REMV 2ND CATARACT,CORN-SCLER SECTN      TONSILLECTOMY         FAMILY HISTORY  Family History   Problem Relation Age of Onset    Arthritis Mother         hip fracture    Diabetes Mother     Cancer Mother         skin    Arthritis Father         lung    Alcohol/Drug Father         etoh    Lung Disease Sister         smoker/copd    Hypertension Sister     Other Brother         hep c    Arthritis Maternal Grandmother         hip fracture    Diabetes Maternal Grandfather     No Known Problems Son     No Known Problems Son     No Known Problems Brother     No Known Problems Sister     No Known Problems Brother     Hyperlipidemia Neg Hx     Stroke Neg Hx        SOCIAL HISTORY   reports that she quit smoking about 5 years ago. Her smoking use included cigarettes. She has a 26.50 pack-year smoking history. She has never used smokeless tobacco. She reports current alcohol use. She reports that she does not use drugs.    CURRENT MEDICATIONS  Previous Medications    ACETAMINOPHEN 500 MG CAP    Take  by mouth.    AMLODIPINE (NORVASC) 5 MG TAB    TAKE 1 TABLET BY MOUTH EVERY DAY    ASCORBIC ACID (VITAMIN C PO)    Take  by mouth.    BUDESONIDE-FORMOTEROL (SYMBICORT) 80-4.5 MCG/ACT AEROSOL    Inhale 2 Puffs 2 times a day.    CYANOCOBALAMIN (VITAMIN B-12) 100 MCG TAB    Take 100 mcg by mouth every day.    DEXTROMETHORPHAN-GUAIFENESIN (MUCINEX DM PO)    Take  by mouth.    DIPHENHYDRAMINE (BENADRYL) 25 MG TAB    Take 25 mg by mouth every 6 hours as needed for Sleep.    DOXYLAMINE SUCCINATE, SLEEP, (SLEEP AID PO)    Take  by mouth.    LEVOTHYROXINE (SYNTHROID) 100 MCG TAB    TAKE 1 TABLET BY MOUTH EVERY DAY BEFORE BREAKFAST    LISINOPRIL (PRINIVIL) 20 MG TAB     "TAKE 1 TABLET BY MOUTH EVERY DAY    LOPERAMIDE (IMODIUM) 2 MG CAP    Take 1 Capsule by mouth 4 times a day as needed for Diarrhea.    MELATONIN 1 MG TAB    Take  by mouth.    MIRABEGRON ER (MYRBETRIQ) 50 MG TABLET SR 24 HR    Myrbetriq 50 mg tablet,extended release   Take 1 tablet every day by oral route for 90 days.    NITROFURANTOIN (MACRODANTIN) 50 MG CAP    TAKE 1 CAPSULE BY MOUTH EVERYDAY AT BEDTIME    OXYMETAZOLINE HCL (NASAL SPRAY NA)    Spray  in nose.    PROAIR  (90 BASE) MCG/ACT AERO SOLN INHALATION AEROSOL    Inhale 1 Puff every 6 hours as needed for Shortness of Breath.    ROSUVASTATIN (CRESTOR) 10 MG TAB    TAKE 1 TABLET BY MOUTH EVERY DAY IN THE EVENING    TRIAMCINOLONE ACETONIDE (KENALOG) 0.1 % CREAM    Apply 1 Application topically 2 times a day as needed.    VITAMIN D (CHOLECALCIFEROL) 1000 UNIT (25 MCG) TAB    Take 1,000 Units by mouth every day.       ALLERGIES  Colophony [pinus strobus], Latex, Neosporin [neomycin-polymyxin b], Phenylene, Soap, Tape, and Tea tree oil    PHYSICAL EXAM  /58   Pulse (!) 101   Temp 35.9 °C (96.7 °F) (Temporal)   Resp (!) 30   Ht 1.702 m (5' 7\")   Wt 70.3 kg (155 lb)   LMP  (LMP Unknown)   SpO2 91%   BMI 24.28 kg/m²     Constitutional:  Well developed, moderate acute distress, Non-toxic appearance.   HENT: Normocephalic, Atraumatic, Bilateral external ears normal, Nose normal.   Eyes: PERRL, EOMI, Conjunctiva normal  Neck: Normal range of motion, No tenderness, Supple, No stridor.   Cardiovascular: Tachycardic heart rate, Normal rhythm  Thorax & Lungs: tachypneic, diminished breath sounds bilaterally, crackles in the base bilaterally, poor air movement, mild retractions  Abdomen: Benign abdominal exam, no guarding no rebound, no tenderness, no distention  Skin: Warm, Dry, No erythema, No rash. Chronic discoloration of bilateral lower extremities.   Back: No tenderness, No CVA tenderness.   Extremities: Intact distal pulses, +1 edema bilaterally, " No tenderness   Neurologic: Alert & oriented x 3, Normal motor function, Normal sensory function, No focal deficits noted.  Psychiatric: appropriate     DIAGNOSTIC STUDIES & PROCEDURES    Labs:   Results for orders placed or performed during the hospital encounter of 03/23/23   Lactic acid (lactate)   Result Value Ref Range    Lactic Acid 5.3 (HH) 0.5 - 2.0 mmol/L   Lactic acid (lactate): Repeat if initial lactic acid result is greater than 2   Result Value Ref Range    Lactic Acid 2.7 (H) 0.5 - 2.0 mmol/L   CBC With Differential   Result Value Ref Range    WBC 13.2 (H) 4.8 - 10.8 K/uL    RBC 5.32 4.20 - 5.40 M/uL    Hemoglobin 16.6 (H) 12.0 - 16.0 g/dL    Hematocrit 51.6 (H) 37.0 - 47.0 %    MCV 97.0 81.4 - 97.8 fL    MCH 31.2 27.0 - 33.0 pg    MCHC 32.2 (L) 33.6 - 35.0 g/dL    RDW 50.2 (H) 35.9 - 50.0 fL    Platelet Count 180 164 - 446 K/uL    MPV 10.4 9.0 - 12.9 fL    Neutrophils-Polys 80.10 (H) 44.00 - 72.00 %    Lymphocytes 8.10 (L) 22.00 - 41.00 %    Monocytes 9.60 0.00 - 13.40 %    Eosinophils 0.20 0.00 - 6.90 %    Basophils 0.70 0.00 - 1.80 %    Immature Granulocytes 1.30 (H) 0.00 - 0.90 %    Nucleated RBC 0.00 /100 WBC    Neutrophils (Absolute) 10.55 (H) 2.00 - 7.15 K/uL    Lymphs (Absolute) 1.07 1.00 - 4.80 K/uL    Monos (Absolute) 1.26 (H) 0.00 - 0.85 K/uL    Eos (Absolute) 0.03 0.00 - 0.51 K/uL    Baso (Absolute) 0.09 0.00 - 0.12 K/uL    Immature Granulocytes (abs) 0.17 (H) 0.00 - 0.11 K/uL    NRBC (Absolute) 0.00 K/uL   Comp Metabolic Panel   Result Value Ref Range    Sodium 134 (L) 135 - 145 mmol/L    Potassium 5.2 3.6 - 5.5 mmol/L    Chloride 99 96 - 112 mmol/L    Co2 15 (L) 20 - 33 mmol/L    Anion Gap 20.0 (H) 7.0 - 16.0    Glucose 152 (H) 65 - 99 mg/dL    Bun 23 (H) 8 - 22 mg/dL    Creatinine 1.34 0.50 - 1.40 mg/dL    Calcium 9.1 8.5 - 10.5 mg/dL    AST(SGOT) 55 (H) 12 - 45 U/L    ALT(SGPT) 43 2 - 50 U/L    Alkaline Phosphatase 142 (H) 30 - 99 U/L    Total Bilirubin 1.2 0.1 - 1.5 mg/dL    Albumin  3.5 3.2 - 4.9 g/dL    Total Protein 8.3 (H) 6.0 - 8.2 g/dL    Globulin 4.8 (H) 1.9 - 3.5 g/dL    A-G Ratio 0.7 g/dL   proBrain Natriuretic Peptide, NT   Result Value Ref Range    NT-proBNP 5116 (H) 0 - 125 pg/mL   Troponin   Result Value Ref Range    Troponin T 75 (H) 6 - 19 ng/L   CoV-2, FLU A/B, and RSV by PCR (2-4 Hours CEPHEID) : Collect NP swab in VTM    Specimen: Mid-Turbinate; Respirate   Result Value Ref Range    Influenza virus A RNA Negative Negative    Influenza virus B, PCR Negative Negative    RSV, PCR Negative Negative    SARS-CoV-2 by PCR NotDetected     SARS-CoV-2 Source NP Swab    ESTIMATED GFR   Result Value Ref Range    GFR (CKD-EPI) 42 (A) >60 mL/min/1.73 m 2   CORRECTED CALCIUM   Result Value Ref Range    Correct Calcium 9.5 8.5 - 10.5 mg/dL   EKG   Result Value Ref Range    Report       [facility] Emergency Dept.    Test Date:  2023  Pt Name:    SYLVIA PAVON          Department: ER  MRN:        [id number]                      Room:       [facility]  Gender:     Female                       Technician: [id number]  :        [id number]                   Requested By:ER TRIAGE PROTOCOL  Order #:    [id number]                    Reading MD: Sudha Scott MD    Measurements  Intervals                                Axis  Rate:       101                          P:          60  AZ:         123                          QRS:        74  QRSD:       93                           T:          -3  QT:         352  QTc:        457    Interpretive Statements  Sinus tachycardia  Atrial premature complex  Borderline T abnormalities, anterior leads  Compared to ECG 2018 11:03:54  Atrial premature complex(es) now present  T-wave abnormality now present  Sinus rhythm no longer present  Electronically Signed On 3- 20:59:47 PDT by Sudha Scott MD         All labs reviewed by me.    EKG:   I have independently interpreted this EKG     Radiology:   The attending Emergency  Physician has independently interpreted the diagnostic imaging associated with this visit and is awaiting the final reading from the radiologist, which will be displayed below.    Preliminary interpretation is a follows: infiltrates bilaterally  Radiologist interpretation:     DX-CHEST-PORTABLE (1 VIEW)   Final Result      1.  Diffuse bilateral pulmonary infiltrates. Differential diagnosis includes atypical pneumonia, interstitial edema and chronic interstitial lung disease.      2.  Cardiomegaly.      3.  Small bilateral pleural effusions.           COURSE & MEDICAL DECISION MAKING    ED Observation Status? Yes; I am placing the patient in to an observation status due to a diagnostic uncertainty as well as therapeutic intensity. Patient placed in observation status at 4:01 PM, 3/23/2023.     Observation plan is as follows: Sepsis evaluation including serial lactic acids, monitoring of oxygen saturation and response to therapy.    Upon Reevaluation, the patient's condition has: not improved; and will be escalated to hospitalization.    Patient discharged from ED Observation status at 7:15 PM (Time) 3/23/2023. (Date).     INITIAL ASSESSMENT AND PLAN  Care Narrative: Patient presented to the emergency department with acute on chronic shortness of breath.  Today patient became extremely weak and had difficulty breathing.  O2 sat in triage was 71%.  Upon arrival to her room she was placed on a nonrebreather with O2 sats in the high 80s.  She was tachypneic.  Overall she looked well and was able to reply in full sentences.  She did not appear to have be in any impending respiratory failure.  Exam did not suggest CHF.  However will obtain BnP.  No previous history of heart problems.  Denies fevers or cough that would suggest an infectious etiology.  Patient had a CT scan a few weeks ago and it does not appear that there was any follow-up concerning this scan.  It however shows diffuse bilateral pulmonary infiltrates.   There further was concerned about a possible atypical pneumonia versus chronic interstitial disease.  I will try albuterol inhaler.  We will try steroids.  Will cover for sepsis with Antibiotics for pneumonia.  Patient has not had any episodes of hypotension here.  Without any history of heart problems we will give full sepsis bolus lactic acid has returned and is elevated.      3:40 PM - Patient seen and evaluated at bedside. Naomy Vargas is a 73 y.o. female who presents with shortness of breath. Patient will be treated with NS infusion 2109 mL, Unasyn 3 g, and Zithromax 500 mg for her symptoms. Ordered DX-chest, lactic acid, COVID swab, Troponin, CBC with diff, CMP, Urinalysis, Urine culture, blood culture, and EKG to evaluate. She understands and agrees to the plan of care. Differential diagnoses include but are not limited to: COVID vs Interstitial pneumonia vs Atypical pneumonia vs Emphysema vs Cancer.     4:06 PM - Spoke with pharmacy.     4:07 PM - Patient will be treated with Proventil 2.5 mg, Solumedrol injection 40 mg, and Lasix injection 20 mg.     4:32 PM - Working on titrating oxygen.     5:10 PM - Paged ICU.     7:16 PM - Patient was reevaluated at bedside. Discussed lab and radiology results with the patient. Informed her on plans for admission. Patient verbalizes understanding and agreement to this plan of care.      7:36 PM I discussed the patient's case and the above findings with Dr. Larson (ICU) who kindly agreed to evaluate the patient     HYDRATION: Based on the patient's presentation of Dehydration the patient was given IV fluids. IV Hydration was used because oral hydration was not as rapid as required. Upon recheck following hydration, the patient was mildly improved.    ADDITIONAL PROBLEM LIST AND DISPOSITION  Anion gap acidosis  Elevated bnp                 DISPOSITION AND DISCUSSIONS    Patient initially received to the fluid bolus per sepsis protocol.  Patient's lactic acid was  5.3.  Plan was to repeat lactic acid at 2-hour interval to see appropriate disposition of the patient in the ICU versus the floor.  Patient continued to sat in the high 80s.  We initially did try to transition her to nasal cannula but she was unable to keep her oxygen saturations up and therefore she was placed back on the OxyMask.    Patients lactic acid did improve.  However patient continues to be hypoxic and short of breath.  She was placed on an OxiMax but continues to be in the low 80s with this.  Therefore she was placed on a nonrebreather at 15 L and still remains mildly hypoxic.  Therefore we will transition her to high flow oxygen.  I also gave the patient a dose of Lasix as there is evidence of some mild cardiomegaly on imaging and her BNP is elevated.  Perhaps the extra fluid from the sepsis bolus did cause her to have some mild increased shortness of breath and hypoxia.  COVID testing was negative.  Discussed the case with Dr. Butler who will evaluate the patient for ICU admission.    I have discussed management of the patient with the following physicians and TERESA's: Dr. Larson (ICU)     Discussion of management with other [facility] or appropriate source(s): None       The patient remains critically ill.  Critical care time = 120 minutes in directly providing and coordinating critical care and extensive data review.  No time overlap and excludes procedures.    DISPOSITION:  Patient will be hospitalized by Dr. Larson in critical condition.    FINAL IMPRESSION   1. Sepsis without acute organ dysfunction, due to unspecified organism (HCC)    2. Hypoxia    3. Interstitial lung disease (HCC)    4. Elevated troponin    5. Acute respiratory distress         Lala ALMONTE (Jose), am scribing for, and in the presence of, Sudha Álvarez M.D..    Electronically signed by: Lala Mondragon (Jose), 3/23/2023    Sudha ALMONTE M.D. personally performed the services described in this documentation, as scribed by Lala  Giancarlo in my presence, and it is both accurate and complete.    The note accurately reflects work and decisions made by me.  Sudha Álvarez M.D.  3/23/2023  9:11 PM

## 2023-03-24 ENCOUNTER — APPOINTMENT (OUTPATIENT)
Dept: RADIOLOGY | Facility: MEDICAL CENTER | Age: 73
DRG: 871 | End: 2023-03-24
Attending: INTERNAL MEDICINE
Payer: MEDICARE

## 2023-03-24 ENCOUNTER — APPOINTMENT (OUTPATIENT)
Dept: CARDIOLOGY | Facility: MEDICAL CENTER | Age: 73
DRG: 871 | End: 2023-03-24
Attending: INTERNAL MEDICINE
Payer: MEDICARE

## 2023-03-24 PROBLEM — R93.89 ABNORMAL CT OF THE CHEST: Status: ACTIVE | Noted: 2023-03-24

## 2023-03-24 PROBLEM — I10 HYPERTENSION: Status: ACTIVE | Noted: 2023-03-24

## 2023-03-24 PROBLEM — D72.829 LEUKOCYTOSIS: Status: ACTIVE | Noted: 2023-03-24

## 2023-03-24 PROBLEM — D75.89 MACROCYTOSIS: Status: RESOLVED | Noted: 2020-07-15 | Resolved: 2023-03-24

## 2023-03-24 PROBLEM — R19.7 DIARRHEA: Status: RESOLVED | Noted: 2021-10-08 | Resolved: 2023-03-24

## 2023-03-24 LAB
ALBUMIN SERPL BCP-MCNC: 3.1 G/DL (ref 3.2–4.9)
ALBUMIN/GLOB SERPL: 0.8 G/DL
ALP SERPL-CCNC: 155 U/L (ref 30–99)
ALT SERPL-CCNC: 34 U/L (ref 2–50)
ANION GAP SERPL CALC-SCNC: 15 MMOL/L (ref 7–16)
AST SERPL-CCNC: 43 U/L (ref 12–45)
B PARAP IS1001 DNA NPH QL NAA+NON-PROBE: NOT DETECTED
B PERT.PT PRMT NPH QL NAA+NON-PROBE: NOT DETECTED
BASOPHILS # BLD AUTO: 0.1 % (ref 0–1.8)
BASOPHILS # BLD: 0.01 K/UL (ref 0–0.12)
BILIRUB SERPL-MCNC: 0.8 MG/DL (ref 0.1–1.5)
BUN SERPL-MCNC: 27 MG/DL (ref 8–22)
C PNEUM DNA NPH QL NAA+NON-PROBE: NOT DETECTED
CALCIUM ALBUM COR SERPL-MCNC: 8.5 MG/DL (ref 8.5–10.5)
CALCIUM SERPL-MCNC: 7.8 MG/DL (ref 8.5–10.5)
CHLORIDE SERPL-SCNC: 103 MMOL/L (ref 96–112)
CO2 SERPL-SCNC: 16 MMOL/L (ref 20–33)
CREAT SERPL-MCNC: 1.08 MG/DL (ref 0.5–1.4)
CRP SERPL HS-MCNC: 8.22 MG/DL (ref 0–0.75)
EOSINOPHIL # BLD AUTO: 0 K/UL (ref 0–0.51)
EOSINOPHIL NFR BLD: 0 % (ref 0–6.9)
ERYTHROCYTE [DISTWIDTH] IN BLOOD BY AUTOMATED COUNT: 47.3 FL (ref 35.9–50)
ERYTHROCYTE [SEDIMENTATION RATE] IN BLOOD BY WESTERGREN METHOD: 28 MM/HOUR (ref 0–25)
FLUAV RNA NPH QL NAA+NON-PROBE: NOT DETECTED
FLUBV RNA NPH QL NAA+NON-PROBE: NOT DETECTED
GFR SERPLBLD CREATININE-BSD FMLA CKD-EPI: 54 ML/MIN/1.73 M 2
GLOBULIN SER CALC-MCNC: 4.1 G/DL (ref 1.9–3.5)
GLUCOSE SERPL-MCNC: 155 MG/DL (ref 65–99)
HADV DNA NPH QL NAA+NON-PROBE: NOT DETECTED
HCOV 229E RNA NPH QL NAA+NON-PROBE: NOT DETECTED
HCOV HKU1 RNA NPH QL NAA+NON-PROBE: NOT DETECTED
HCOV NL63 RNA NPH QL NAA+NON-PROBE: NOT DETECTED
HCOV OC43 RNA NPH QL NAA+NON-PROBE: NOT DETECTED
HCT VFR BLD AUTO: 42.5 % (ref 37–47)
HGB BLD-MCNC: 13.8 G/DL (ref 12–16)
HIV 1+2 AB+HIV1 P24 AG SERPL QL IA: NORMAL
HMPV RNA NPH QL NAA+NON-PROBE: NOT DETECTED
HPIV1 RNA NPH QL NAA+NON-PROBE: NOT DETECTED
HPIV2 RNA NPH QL NAA+NON-PROBE: NOT DETECTED
HPIV3 RNA NPH QL NAA+NON-PROBE: NOT DETECTED
HPIV4 RNA NPH QL NAA+NON-PROBE: NOT DETECTED
IMM GRANULOCYTES # BLD AUTO: 0.08 K/UL (ref 0–0.11)
IMM GRANULOCYTES NFR BLD AUTO: 0.9 % (ref 0–0.9)
LV EJECT FRACT  99904: 55
LV EJECT FRACT MOD 2C 99903: 61.97
LV EJECT FRACT MOD 4C 99902: 44.2
LV EJECT FRACT MOD BP 99901: 53.68
LYMPHOCYTES # BLD AUTO: 1.15 K/UL (ref 1–4.8)
LYMPHOCYTES NFR BLD: 13.4 % (ref 22–41)
M PNEUMO DNA NPH QL NAA+NON-PROBE: NOT DETECTED
MAGNESIUM SERPL-MCNC: 1.8 MG/DL (ref 1.5–2.5)
MCH RBC QN AUTO: 30.8 PG (ref 27–33)
MCHC RBC AUTO-ENTMCNC: 32.5 G/DL (ref 33.6–35)
MCV RBC AUTO: 94.9 FL (ref 81.4–97.8)
MONOCYTES # BLD AUTO: 0.28 K/UL (ref 0–0.85)
MONOCYTES NFR BLD AUTO: 3.3 % (ref 0–13.4)
NEUTROPHILS # BLD AUTO: 7.08 K/UL (ref 2–7.15)
NEUTROPHILS NFR BLD: 82.3 % (ref 44–72)
NRBC # BLD AUTO: 0 K/UL
NRBC BLD-RTO: 0 /100 WBC
PHOSPHATE SERPL-MCNC: 4.4 MG/DL (ref 2.5–4.5)
PLATELET # BLD AUTO: 144 K/UL (ref 164–446)
PMV BLD AUTO: 10.1 FL (ref 9–12.9)
POTASSIUM SERPL-SCNC: 4.4 MMOL/L (ref 3.6–5.5)
PROCALCITONIN SERPL-MCNC: 1.75 NG/ML
PROT SERPL-MCNC: 7.2 G/DL (ref 6–8.2)
RBC # BLD AUTO: 4.48 M/UL (ref 4.2–5.4)
RSV RNA NPH QL NAA+NON-PROBE: NOT DETECTED
RV+EV RNA NPH QL NAA+NON-PROBE: NOT DETECTED
SARS-COV-2 RNA NPH QL NAA+NON-PROBE: NOTDETECTED
SCCMEC + MECA PNL NOSE NAA+PROBE: NEGATIVE
SODIUM SERPL-SCNC: 134 MMOL/L (ref 135–145)
TROPONIN T SERPL-MCNC: 68 NG/L (ref 6–19)
WBC # BLD AUTO: 8.6 K/UL (ref 4.8–10.8)

## 2023-03-24 PROCEDURE — 770022 HCHG ROOM/CARE - ICU (200)

## 2023-03-24 PROCEDURE — A9270 NON-COVERED ITEM OR SERVICE: HCPCS | Performed by: GENERAL PRACTICE

## 2023-03-24 PROCEDURE — 93306 TTE W/DOPPLER COMPLETE: CPT

## 2023-03-24 PROCEDURE — A9270 NON-COVERED ITEM OR SERVICE: HCPCS | Performed by: STUDENT IN AN ORGANIZED HEALTH CARE EDUCATION/TRAINING PROGRAM

## 2023-03-24 PROCEDURE — 93306 TTE W/DOPPLER COMPLETE: CPT | Mod: 26 | Performed by: INTERNAL MEDICINE

## 2023-03-24 PROCEDURE — 80053 COMPREHEN METABOLIC PANEL: CPT

## 2023-03-24 PROCEDURE — 700102 HCHG RX REV CODE 250 W/ 637 OVERRIDE(OP): Performed by: GENERAL PRACTICE

## 2023-03-24 PROCEDURE — 85652 RBC SED RATE AUTOMATED: CPT

## 2023-03-24 PROCEDURE — 87641 MR-STAPH DNA AMP PROBE: CPT

## 2023-03-24 PROCEDURE — 99291 CRITICAL CARE FIRST HOUR: CPT | Performed by: INTERNAL MEDICINE

## 2023-03-24 PROCEDURE — 700111 HCHG RX REV CODE 636 W/ 250 OVERRIDE (IP): Performed by: INTERNAL MEDICINE

## 2023-03-24 PROCEDURE — 85025 COMPLETE CBC W/AUTO DIFF WBC: CPT

## 2023-03-24 PROCEDURE — 700102 HCHG RX REV CODE 250 W/ 637 OVERRIDE(OP): Performed by: STUDENT IN AN ORGANIZED HEALTH CARE EDUCATION/TRAINING PROGRAM

## 2023-03-24 PROCEDURE — 700111 HCHG RX REV CODE 636 W/ 250 OVERRIDE (IP): Performed by: STUDENT IN AN ORGANIZED HEALTH CARE EDUCATION/TRAINING PROGRAM

## 2023-03-24 PROCEDURE — 84100 ASSAY OF PHOSPHORUS: CPT

## 2023-03-24 PROCEDURE — 94640 AIRWAY INHALATION TREATMENT: CPT

## 2023-03-24 PROCEDURE — 84145 PROCALCITONIN (PCT): CPT

## 2023-03-24 PROCEDURE — 700101 HCHG RX REV CODE 250: Performed by: STUDENT IN AN ORGANIZED HEALTH CARE EDUCATION/TRAINING PROGRAM

## 2023-03-24 PROCEDURE — 86140 C-REACTIVE PROTEIN: CPT

## 2023-03-24 PROCEDURE — 84484 ASSAY OF TROPONIN QUANT: CPT

## 2023-03-24 PROCEDURE — 83735 ASSAY OF MAGNESIUM: CPT

## 2023-03-24 PROCEDURE — 71045 X-RAY EXAM CHEST 1 VIEW: CPT

## 2023-03-24 RX ORDER — OMEPRAZOLE 20 MG/1
20 CAPSULE, DELAYED RELEASE ORAL DAILY
Status: DISCONTINUED | OUTPATIENT
Start: 2023-03-24 | End: 2023-03-30 | Stop reason: HOSPADM

## 2023-03-24 RX ORDER — MAGNESIUM SULFATE HEPTAHYDRATE 40 MG/ML
2 INJECTION, SOLUTION INTRAVENOUS ONCE
Status: COMPLETED | OUTPATIENT
Start: 2023-03-24 | End: 2023-03-24

## 2023-03-24 RX ORDER — METHYLPREDNISOLONE SODIUM SUCCINATE 40 MG/ML
125 INJECTION, POWDER, LYOPHILIZED, FOR SOLUTION INTRAMUSCULAR; INTRAVENOUS EVERY 6 HOURS
Status: DISCONTINUED | OUTPATIENT
Start: 2023-03-24 | End: 2023-03-26

## 2023-03-24 RX ADMIN — DOCUSATE SODIUM 50 MG AND SENNOSIDES 8.6 MG 2 TABLET: 8.6; 5 TABLET, FILM COATED ORAL at 05:20

## 2023-03-24 RX ADMIN — BUDESONIDE AND FORMOTEROL FUMARATE DIHYDRATE 2 PUFF: 80; 4.5 AEROSOL RESPIRATORY (INHALATION) at 17:16

## 2023-03-24 RX ADMIN — IPRATROPIUM BROMIDE AND ALBUTEROL SULFATE 3 ML: 2.5; .5 SOLUTION RESPIRATORY (INHALATION) at 18:33

## 2023-03-24 RX ADMIN — CEFTRIAXONE SODIUM 1000 MG: 10 INJECTION, POWDER, FOR SOLUTION INTRAVENOUS at 05:20

## 2023-03-24 RX ADMIN — LEVOTHYROXINE SODIUM 100 MCG: 0.1 TABLET ORAL at 07:05

## 2023-03-24 RX ADMIN — MAGNESIUM SULFATE HEPTAHYDRATE 2 G: 40 INJECTION, SOLUTION INTRAVENOUS at 10:13

## 2023-03-24 RX ADMIN — METHYLPREDNISOLONE SODIUM SUCCINATE 125.2 MG: 40 INJECTION, POWDER, FOR SOLUTION INTRAMUSCULAR; INTRAVENOUS at 17:17

## 2023-03-24 RX ADMIN — ENOXAPARIN SODIUM 40 MG: 100 INJECTION SUBCUTANEOUS at 17:15

## 2023-03-24 RX ADMIN — LISINOPRIL 20 MG: 20 TABLET ORAL at 05:20

## 2023-03-24 RX ADMIN — IPRATROPIUM BROMIDE AND ALBUTEROL SULFATE 3 ML: 2.5; .5 SOLUTION RESPIRATORY (INHALATION) at 21:43

## 2023-03-24 RX ADMIN — AZITHROMYCIN MONOHYDRATE 500 MG: 250 TABLET ORAL at 05:20

## 2023-03-24 RX ADMIN — OMEPRAZOLE 20 MG: 20 CAPSULE, DELAYED RELEASE ORAL at 07:37

## 2023-03-24 RX ADMIN — BUDESONIDE AND FORMOTEROL FUMARATE DIHYDRATE 2 PUFF: 80; 4.5 AEROSOL RESPIRATORY (INHALATION) at 05:20

## 2023-03-24 RX ADMIN — ROSUVASTATIN CALCIUM 10 MG: 20 TABLET, FILM COATED ORAL at 17:15

## 2023-03-24 RX ADMIN — METHYLPREDNISOLONE SODIUM SUCCINATE 80 MG: 40 INJECTION, POWDER, FOR SOLUTION INTRAMUSCULAR; INTRAVENOUS at 05:19

## 2023-03-24 RX ADMIN — METHYLPREDNISOLONE SODIUM SUCCINATE 125.2 MG: 40 INJECTION, POWDER, FOR SOLUTION INTRAMUSCULAR; INTRAVENOUS at 11:52

## 2023-03-24 ASSESSMENT — ENCOUNTER SYMPTOMS
COUGH: 0
SHORTNESS OF BREATH: 1
MYALGIAS: 0
WEIGHT LOSS: 0
NAUSEA: 0
VOMITING: 0
FALLS: 0
DOUBLE VISION: 0
NERVOUS/ANXIOUS: 0
WEAKNESS: 0
ABDOMINAL PAIN: 0
FEVER: 0
WHEEZING: 0
PALPITATIONS: 0
SORE THROAT: 0
DIZZINESS: 0
HEARTBURN: 0
DEPRESSION: 0
CHILLS: 0
CONSTIPATION: 0
DIARRHEA: 0
HEADACHES: 0
BLURRED VISION: 0

## 2023-03-24 ASSESSMENT — LIFESTYLE VARIABLES: SUBSTANCE_ABUSE: 0

## 2023-03-24 ASSESSMENT — PAIN DESCRIPTION - PAIN TYPE
TYPE: CHRONIC PAIN
TYPE: ACUTE PAIN;CHRONIC PAIN

## 2023-03-24 NOTE — ASSESSMENT & PLAN NOTE
Currently requiring HiFLO. Resp panel negative. TTE this admission with LVEF 55% and RVSP 35 mmHg indicating mild pulm HTN possible 2/2 pulm source  Suspicious for progressive ILD, UIP, AIP, acute eosinophilic pneumonia, HSP, , PLCH, NSIP,  other   No history of vasculitis/autoimmune disease  S/p azithro x 3 days    -Ceftriaxone (through 3/28/23)  -Solumedrol 62.4mg IV Q12H- weaning  -Continue production, HFNC, noninvasive BiPAP if needed  -Follow closely in ICU, high risk of respiratory decompensation  -Start serologic work-up for ILD  -Pulm consult for further evaluation of ILD  -When clinically improves, consider bronchoscopy and transbronchial biopsy versus open lung biopsy if no clear diagnosis  -Wean O2 as able, appreciate RT assistance  -Aspiration precautions in place  -Will likely need to be sent home on steroid taper-- will possibly require PJP ppx with Bactrim at discharge  -Will require follow up with pulmonology as outpt   -PT/OT  -Will transfer to [facility]

## 2023-03-24 NOTE — PROGRESS NOTES
[facility] Progress Note      Admit Date: 3/23/2023    Resident(s): Ted Foss Jr., M.D.  Attending: MICAH JOSEPH/ Dr. Beltran    Date & Time:   3/24/2023   12:40 PM       Patient ID:    Name:             Naomy Vargas     YOB: 1950  Age:                 73 y.o.  female   MRN:               [id number]    HPI:  73-year-old female, history of non-oxygen dependent COPD 40.5 pack-year history quit smoking 6 years ago, HTN, DLD who was admitted 3/23 after presenting with one month of increasing dyspnea on exertion that worsened the past several days associated with subjective fever and nonproductive cough. Seen by PCP with CT chest 3/7/2023 which demonstrating interval development of diffuse interstitial septal thickening and some groundglass infiltrates with a peripheral predominance suggestive of a interstitial lung disease which has not yet been worked up or followed up on.Admitted to the ICU for sepsis secondary to presumed underlying pulmonary infection complicated by acute hypoxemic respiratory failure requiiring high dose steroids and antibiotics.      Hospital Course:  3/24:increase methylprednisolone from 80->125mg Q6H  Continue ceftriaxone and azithromycin.  High flow nasal cannula.    Consultants:  none       Procedures:  none      Interval Events:  NO acute events since admission.  Patient denies pain and breathing improved. Patient reports she has an appointment with [facility] scheduled 3/29 with Nika Khan.Has 2 daog, no cats or birds.  Neighbors have horses, goats, pigs, chickens, roosters - does not spend any significant time around their pens. No reported personal or family history of autoimmune disease.  Afebrile  HR  (85)  BP /60-63 (115/62)  I/0: net +1709  HFNC 60L/100%  WBC 13->8  ->144  Na 134 (corrected 135)  Cr wnl  Glucose 150s  Mg 1.8  K 4.4,phos 4.4  Trop 75->68  Procal 1.75  Resp panel negative  RF wnl  HIV negative  CXR:  "Pulmonary edema and/or infiltrates are identified, which are stable since the prior exam.Trace bilateral pleural effusions        Review of Systems   Constitutional:  Negative for chills, fever, malaise/fatigue and weight loss.   HENT:  Negative for congestion and sore throat.    Eyes:  Negative for blurred vision and double vision.   Respiratory:  Positive for shortness of breath. Negative for cough and wheezing.    Cardiovascular:  Negative for chest pain and palpitations.   Gastrointestinal:  Negative for abdominal pain, constipation, diarrhea, heartburn, nausea and vomiting.   Genitourinary:  Negative for dysuria, frequency and urgency.   Musculoskeletal:  Negative for falls, joint pain and myalgias.   Skin:  Negative for rash.   Neurological:  Negative for dizziness, weakness and headaches.   Psychiatric/Behavioral:  Negative for depression, substance abuse and suicidal ideas. The patient is not nervous/anxious.    All other systems reviewed and are negative.    PHYSICAL EXAM  Vitals:    03/24/23 0950 03/24/23 1000 03/24/23 1100 03/24/23 1200   BP:  129/62 127/65 106/58   Pulse: 87 87 92 85   Resp: (!) 24 (!) 26 (!) 35 (!) 36   Temp:       TempSrc:       SpO2: 91% 90% 88% 88%   Weight:       Height:         Body mass index is 24.98 kg/m².  /58   Pulse 85   Temp 36.6 °C (97.8 °F) (Temporal)   Resp (!) 36   Ht 1.676 m (5' 6\")   Wt 70.2 kg (154 lb 12.2 oz)   LMP  (LMP Unknown)   SpO2 88%   BMI 24.98 kg/m²   O2 therapy: Pulse Oximetry: 88 %, O2 (LPM): 60, O2 Delivery Device: Heated High Flow Nasal Cannula    Physical Exam  Vitals and nursing note reviewed.   Constitutional:       Appearance: Normal appearance. She is ill-appearing.   HENT:      Head: Normocephalic and atraumatic.      Mouth/Throat:      Mouth: Mucous membranes are dry.      Pharynx: No oropharyngeal exudate or posterior oropharyngeal erythema.   Eyes:      Conjunctiva/sclera: Conjunctivae normal.   Cardiovascular:      Rate and " Rhythm: Normal rate and regular rhythm.      Pulses: Normal pulses.      Heart sounds: Normal heart sounds. No murmur heard.    No friction rub. No gallop.   Pulmonary:      Effort: Pulmonary effort is normal. No respiratory distress.      Breath sounds: Normal breath sounds. No stridor. No wheezing, rhonchi or rales.      Comments: On HFNC  Abdominal:      General: Abdomen is flat. Bowel sounds are normal. There is no distension.      Palpations: Abdomen is soft.      Tenderness: There is no abdominal tenderness.   Musculoskeletal:         General: No swelling or tenderness. Normal range of motion.      Cervical back: Neck supple.      Right lower leg: No edema.      Left lower leg: No edema.   Skin:     General: Skin is warm and dry.      Capillary Refill: Capillary refill takes less than 2 seconds.      Findings: No bruising or rash.   Neurological:      Mental Status: She is alert and oriented to person, place, and time. Mental status is at baseline.      Cranial Nerves: No cranial nerve deficit.      Sensory: No sensory deficit.      Coordination: Coordination normal.   Psychiatric:         Mood and Affect: Mood normal.         Behavior: Behavior normal.       Respiratory:     Respiration: (!) 36, Pulse Oximetry: 88 %    Chest Tube Drains:          HemoDynamics:  Pulse: 85 Blood Pressure : 106/58      Neuro:      Fluids:  Date 03/24/23 0700 - 03/25/23 0659   Shift 5506-4332 2723-1857 3134-3303 24 Hour Total   INTAKE   P.O. 360   360     P.O. 360   360   I.V. 36.9   36.9     Magnesium Sulfate Volume 36.9   36.9     Volume (mL) (NS (BOLUS) infusion 2,109 mL) 0   0   IV Piggyback 10.5   10.5     Volume (mL) (ampicillin/sulbactam (UNASYN) 3 g in  mL IVPB) 0   0     Volume (mL) (methylPREDNISolone sod succ (Solu-Medrol) 250 mg in  mL IVPB) 0.5   0.5     Volume (mL) (cefTRIAXone (Rocephin) syringe 1,000 mg) 10   10   Shift Total 407.4   407.4   OUTPUT   Shift Total       .4   407.4         Intake/Output Summary (Last 24 hours) at 3/24/2023 0726  Last data filed at 3/24/2023 0600  Gross per 24 hour   Intake 2309 ml   Output 600 ml   Net 1709 ml       Weight: 70.2 kg (154 lb 12.2 oz)  Body mass index is 24.98 kg/m².    Recent Labs     23  1521 23  0534   SODIUM 134* 134*   POTASSIUM 5.2 4.4   CHLORIDE 99 103   CO2 15* 16*   BUN 23* 27*   CREATININE 1.34 1.08   MAGNESIUM  --  1.8   PHOSPHORUS  --  4.4   CALCIUM 9.1 7.8*       GI/Nutrition:  Recent Labs     23  1521 23  0534   ALTSGPT 43 34   ASTSGOT 55* 43   ALKPHOSPHAT 142* 155*   TBILIRUBIN 1.2 0.8   GLUCOSE 152* 155*       Heme:  Recent Labs     23  1521 23  0534   RBC 5.32 4.48   HEMOGLOBIN 16.6* 13.8   HEMATOCRIT 51.6* 42.5   PLATELETCT 180 144*       Infectious Disease:  Temp  Av.6 °C (97.9 °F)  Min: 35.9 °C (96.7 °F)  Max: 37 °C (98.6 °F)  Recent Labs     23  1521 23  0534   WBC 13.2* 8.6   NEUTSPOLYS 80.10* 82.30*   LYMPHOCYTES 8.10* 13.40*   MONOCYTES 9.60 3.30   EOSINOPHILS 0.20 0.00   BASOPHILS 0.70 0.10   ASTSGOT 55* 43   ALTSGPT 43 34   ALKPHOSPHAT 142* 155*   TBILIRUBIN 1.2 0.8       Meds:   omeprazole  20 mg      methylPREDNISolone  125.2 mg      senna-docusate  2 Tablet      And    polyethylene glycol/lytes  1 Packet      And    magnesium hydroxide  30 mL      And    bisacodyl  10 mg      Respiratory Therapy Consult        enoxaparin (LOVENOX) injection  40 mg      labetalol  10 mg      ipratropium-albuterol  3 mL      cefTRIAXone (ROCEPHIN) IV  1,000 mg      azithromycin  500 mg      budesonide-formoterol  2 Puff      levothyroxine  100 mcg      lisinopril  20 mg      rosuvastatin  10 mg              Imaging:  EC-ECHOCARDIOGRAM COMPLETE W/O CONT   Final Result      DX-CHEST-PORTABLE (1 VIEW)   Final Result         1.  Pulmonary edema and/or infiltrates are identified, which are stable since the prior exam.   2.  Trace bilateral pleural effusions   3.  Cardiomegaly   4.   Atherosclerosis      DX-CHEST-PORTABLE (1 VIEW)   Final Result      1.  Diffuse bilateral pulmonary infiltrates. Differential diagnosis includes atypical pneumonia, interstitial edema and chronic interstitial lung disease.      2.  Cardiomegaly.      3.  Small bilateral pleural effusions.          Assessment and Plan:  73-year-old female, history of non-oxygen dependent COPD with 40.5 pack years quit smoking six years ago, HTN, DLD who was admitted 3/23 after presenting with one month of increasing dyspnea on exertion that worsened the past several days associated with subjective fever and nonproductive cough. Seen by PCP with CT chest 3/7/2023 which demonstrating interval development of diffuse interstitial septal thickening and some groundglass infiltrates with a peripheral predominance suggestive of a interstitial lung disease which has not yet been worked up or followed up on.Admitted to the ICU for sepsis secondary to presumed underlying pulmonary infection complicated by acute hypoxemic respiratory failure requiiring high dose steroids and antibiotics.    * Acute hypoxemic respiratory failure (HCC)- (present on admission)  Assessment & Plan  Currently requiring HiFLO.   Suspicious for progressive ILD, UIP, AIP, acute eosinophilic pneumonia, HSP, , PLCH, NSIP,  other   -No history of vasculitis/autoimmune disease  -Low suspicion for acute bacterial pneumonia; short course abx for ? CAP  -resp panel negative  -increase Solumedrol 80->125mg IV Q6H  -Continue production, HFNC, noninvasive BiPAP if needed  -Follow closely in ICU, certainly at risk for requiring emergent intubation  -Follow-up echocardiogram; suspect significant pulm hypertension with a large PA on CT, likely related to chronic lung disease/hypoxia  -Start serologic work-up for ILD  -When clinically improves, consider bronchoscopy and transbronchial biopsy versus open lung biopsy if no clear diagnosis  - Cont ceftriaxone, azithromycin  - Wean O2  as able, appreciate RT assistance  - Aspiration precautions in place    Sepsis (HCC)  Assessment & Plan  This is Sepsis Present on admission  SIRS criteria identified on my evaluation include: Tachycardia, with heart rate greater than 90 BPM, Tachypnea, with respirations greater than 20 per minute and Leukocytosis, with WBC greater than 12,000  Source is Likely pulmonary  Sepsis protocol initiated  Fluid resuscitation ordered per protocol  Crystalloid Fluid Administration: Fluid resuscitation ordered per standard protocol - 30 mL/kg per current or ideal body weight  IV antibiotics as appropriate for source of sepsis  Reassessment: I have reassessed the patient's hemodynamic status      Will cont to treat empirically with Ceftriaxone, azithromycin        ILD (interstitial lung disease) ([facility])  Assessment & Plan  Concern for possible ILD. 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  - Will initiate steroid therapy as above  - W/u at this time includes: EBEN, RF, hypersensitivity panels, PJP. Will evaluate further based on results     Leukocytosis  Assessment & Plan  resolved    Pleural effusion  Assessment & Plan  Noted on CXR in addition to elevated BNP  - Will order echo to assess for possible underlying CHF    Elevated troponin  Assessment & Plan  downtrending likely from demand ischemia. No need to repeat.      Thrombocytopenia, unspecified (HCC)- (present on admission)  Assessment & Plan   Suspect secondary to sepsis. No bleeding evident. Continue to monitor.    Chronic obstructive pulmonary disease (HCC)- (present on admission)  Assessment & Plan  Not on home O2  - Cont home inhalers, duo nebs prn  Former smoker 3/4 PPD for 54 years, quit six years ago. On home Symbicort and Albuterol    Hypothyroidism- (present on admission)  Assessment & Plan  Cont home synthroid     Hypertension  Assessment & Plan  Controlled  -continue home Lisinopril        DISPO: stay in the ICU    CODE STATUS:  Full      Quality Measures:  Feeding: Oral  Analgesia: none  Sedation: none  Thromboprophylaxis: Lovenox  Head of bed: aspiration precautions  Ulcer prophylaxis: Omeprazole  Glycemic control: none  Bowel care:yes  Indwelling lines: PIV x 2  Deescalation of antibiotics: Ceftriaxone, Azithromycin

## 2023-03-24 NOTE — ASSESSMENT & PLAN NOTE
>>ASSESSMENT AND PLAN FOR HYPERTENSION WRITTEN ON 3/27/2023  2:09 PM BY GARETH LUDWIG M.D.    Controlled  -continue home Lisinopril  - Labetalol PRN if SBP >180

## 2023-03-24 NOTE — ASSESSMENT & PLAN NOTE
Improved; Source is Likely pulmonary  -Stopped Azithromycin, continue Ceftriaxone 3/24-3/28  -Off Vasopressors

## 2023-03-24 NOTE — PROGRESS NOTES
4 Eyes Skin Assessment Completed by Aaron WORTHINGTON RN and Arin HERNANDEZ RN.    Head WDL  Ears WDL  Nose WDL  Mouth WDL  Neck WDL  Breast/Chest WDL  Shoulder Blades WDL  Spine WDL  (R) Arm/Elbow/Hand WDL  (L) Arm/Elbow/Hand WDL  Abdomen WDL  Groin WDL  Scrotum/Coccyx/Buttocks Redness and Blanching  (R) Leg WDL  (L) Leg WDL  (R) Heel/Foot/Toe WDL  (L) Heel/Foot/Toe WDL          Devices In Places ECG, Blood Pressure Cuff, Pulse Ox, and HFNC      Interventions In Place Heel Mepilex, Sacral Mepilex, Pillows, Q2 Turns, and Low Air Loss Mattress    Possible Skin Injury No    Pictures Uploaded Into Epic N/A  Wound Consult Placed N/A  RN Wound Prevention Protocol Ordered Yes

## 2023-03-24 NOTE — CARE PLAN
The patient is Watcher - Medium risk of patient condition declining or worsening    Shift Goals  Clinical Goals: Monitor O2, pain control  Patient Goals: Rest  Family Goals: CASIMIRO    Progress made toward(s) clinical / shift goals:   to monitor O2 levels, Collaborating with RT regarding O2 demand. Pt updated on POC. No complaints of acute pain.   Problem: Knowledge Deficit - Standard  Goal: Patient and family/care givers will demonstrate understanding of plan of care, disease process/condition, diagnostic tests and medications  Outcome: Progressing     Problem: Pain - Standard  Goal: Alleviation of pain or a reduction in pain to the patient’s comfort goal  Outcome: Progressing       Patient is not progressing towards the following goals:

## 2023-03-24 NOTE — CARE PLAN
Problem: Humidified High Flow Nasal Cannula  Goal: Maintain adequate oxygenation dependent on patient condition  Description: Target End Date:  resolve prior to discharge or when underlying condition is resolved/stabilized    1.  Implement humidified high flow oxygen therapy  2.  Titrate high flow oxygen to maintain appropriate SpO2  Outcome: Progressing  60L / 100%

## 2023-03-24 NOTE — ASSESSMENT & PLAN NOTE
Concern for possible ILD. 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  - Will initiate steroid therapy as above  - Workup at this time includes: EBEN (+ve), RF, hypersensitivity panels, PJP. Will evaluate further based on results   -Pulm consult

## 2023-03-24 NOTE — H&P
Critical Care/Pulmonary H&P    Date of Service: 3/23/2023    Date of Admission:  3/23/2023  3:08 PM    Attending Physician: Rey Larson M.d.    Resident Physician: Bhavesh Muhammad M.D.        Chief Complaint:  Shortness of Breath (X 1 month progressively worse, unable to obtain pulse ox initially with increased WOB, placed on NRB 15L with a sat of 71% in triage, pt roomed with an eventual saturation of 92% on 15L NRB, pt denies respiratory hx or home oxygen requirement, denies cardiac hx or chest pain. Pt remains alert/oriented throughout)      History of Present Illness:     Naomy Vargas is a 73 y.o. female with a past medical history of HTN, HLD, COPD (not on home O2), lung nodules who presented to our facility for evaluation and management of progressively worsening SOB. Patient states that over the past month she's noted progression of her SOB to the point where she was so SOB while getting into her shower this am that she had no choice but to call her  and come to the ED for evaluation. She reports subjective fevers as well over the past few days. She denies any CP, n/v, cough, sputum production at this time.    She states she was evaluated by her PCP regarding these symptoms and underwent CT scan with findings suggestive of possible ILD vs atypical PNA vs COVID PNA. She was told by her PCP to f/u with pulmonology for further evaluation, however due to the acute progression of her symptoms she was unable to wait for her upcoming appointment.    In the ED patient was noted to be satting at 71% on RA and patient was promptly titrated up to a NRB. WBC 13.2, BNP 5116, trop 75, LA 5.3, and CXR revealing diffuse b/l pulmonary infiltrates, cardiomegaly and b/l pleural effusions.     Review of Systems   Constitutional:  Positive for fever. Negative for chills.   Eyes:  Negative for blurred vision and double vision.   Respiratory:  Positive for shortness of breath. Negative for cough, hemoptysis and  sputum production.    Cardiovascular:  Negative for chest pain and palpitations.   Gastrointestinal:  Negative for heartburn, nausea and vomiting.   Genitourinary:  Negative for dysuria and urgency.   Skin:  Negative for itching and rash.   Neurological:  Negative for dizziness and headaches.     Home Medications    **Home medications have not yet been reviewed for this encounter**         Social History     Tobacco Use    Smoking status: Former     Packs/day: 0.50     Years: 53.00     Pack years: 26.50     Types: Cigarettes     Quit date: 2017     Years since quittin.6    Smokeless tobacco: Never   Vaping Use    Vaping Use: Never used   Substance Use Topics    Alcohol use: Yes     Comment: 2 per day    Drug use: No        Past Medical History:   Diagnosis Date    Anxiety     Arthritis     wrists    Back pain     Blood transfusion without reported diagnosis     with ectopic preg x 2     Breath shortness     with exertion    Cataract     bilateral removal-Dr. Duke Talamantes    Cellulitis     right lower leg    Cellulitis of leg 2013    Followed by dermatology. Managed with fluocinonide and Bactroban on right leg Left leg she uses triamcinolone and Sarna pramocaine (anesthetic, antipruretic) on both.      Chickenpox     COPD (chronic obstructive pulmonary disease) ([facility])     Dental disorder     upper denture    Earache     Fatigue     Frequent urination     Hypertension 2018    on no meds at this time    Hypothyroidism     Influenza     Insomnia     Kidney disease     reports 1 kidney is smaller than the other    Mumps     OAB (overactive bladder) 2019    Pain 2018    right leg and back, 5/10    Peripheral vascular disease, unspecified ([facility]) 10/13/2021    Pneumonia     Rash     Ringing in ears     Shortness of breath     Swelling of lower extremity     Thyroid disease     Tonsillitis     Toothache     Venous stasis dermatitis     right leg       Past Surgical History:   Procedure Laterality  "Date    VEIN LIGATION RADIO FREQUENCY Right 2/26/2018    Procedure: VEIN LIGATION RADIO FREQUENCY- LONG SAPENOUS;  Surgeon: Jim Alas M.D.;  Location: SURGERY [facility];  Service: General    ABDOMINAL EXPLORATION      2 ectopic preg    BREAST BIOPSY      hx of benign left breast biopsy    EYE SURGERY      cataract x2    HYSTERECTOMY LAPAROSCOPY      HYSTERECTOMY, TOTAL ABDOMINAL  1977/1978    OOPHORECTOMY      PB MAMMARY DUCTOGRAM, SINGLE      VA REMV 2ND CATARACT,CORN-SCLER SECTN      TONSILLECTOMY         Allergies: Colophony [pinus strobus], Latex, Neosporin [neomycin-polymyxin b], Phenylene, Soap, Tape, and Tea tree oil    Family History   Problem Relation Age of Onset    Arthritis Mother         hip fracture    Diabetes Mother     Cancer Mother         skin    Arthritis Father         lung    Alcohol/Drug Father         etoh    Lung Disease Sister         smoker/copd    Hypertension Sister     Other Brother         hep c    Arthritis Maternal Grandmother         hip fracture    Diabetes Maternal Grandfather     No Known Problems Son     No Known Problems Son     No Known Problems Brother     No Known Problems Sister     No Known Problems Brother     Hyperlipidemia Neg Hx     Stroke Neg Hx        Vitals:    03/23/23 1515 03/23/23 1519 03/23/23 1521 03/23/23 1531   Height:   1.702 m (5' 7\")    Weight:   70.3 kg (155 lb)    Weight % change since last entry.:   0 %    BP: 117/64 118/58  103/58   Pulse: (!) 113 (!) 101  (!) 101   BMI (Calculated):   24.28    Resp: 12 (!) 30     Temp: 35.9 °C (96.7 °F)      TempSrc: Temporal       03/23/23 1601 03/23/23 1626 03/23/23 1631 03/23/23 1701   Height:       Weight:       Weight % change since last entry.:       BP: 101/58  112/61 123/75   Pulse: 96 94 95 91   BMI (Calculated):       Resp: (!) 34 20 (!) 26    Temp:       TempSrc:        03/23/23 1731 03/23/23 1801 03/23/23 1945 03/23/23 1951   Height:       Weight:       Weight % change since last entry.:     "   BP: 124/70 122/72     Pulse: 91 90 91 86   BMI (Calculated):       Resp:   (!) 28 (!) 28   Temp:       TempSrc:        23   Height:    Weight:    Weight % change since last entry.:    BP: 131/71   Pulse: 90   BMI (Calculated):    Resp:    Temp:    TempSrc:          Social History     Socioeconomic History    Marital status:      Spouse name: Not on file    Number of children: 2    Years of education: Not on file    Highest education level: Not on file   Occupational History    Occupation: retired   Tobacco Use    Smoking status: Former     Packs/day: 0.50     Years: 53.00     Pack years: 26.50     Types: Cigarettes     Quit date: 2017     Years since quittin.6    Smokeless tobacco: Never   Vaping Use    Vaping Use: Never used   Substance and Sexual Activity    Alcohol use: Yes     Comment: 2 per day    Drug use: No    Sexual activity: Not Currently     Partners: Male   Other Topics Concern    Not on file   Social History Narrative    Not on file     Social Determinants of Health     Financial Resource Strain: Not on file   Food Insecurity: Not on file   Transportation Needs: Not on file   Physical Activity: Not on file   Stress: Not on file   Social Connections: Not on file   Intimate Partner Violence: Not on file   Housing Stability: Not on file           Physical Examination    Physical Exam  HENT:      Mouth/Throat:      Mouth: Mucous membranes are moist.      Pharynx: Oropharynx is clear.   Eyes:      Pupils: Pupils are equal, round, and reactive to light.   Cardiovascular:      Rate and Rhythm: Normal rate and regular rhythm.   Pulmonary:      Effort: Pulmonary effort is normal.      Comments: Satting well on HiFLO  Abdominal:      General: Abdomen is flat.      Palpations: Abdomen is soft.   Skin:     General: Skin is warm and dry.      Capillary Refill: Capillary refill takes less than 2 seconds.   Neurological:      General: No focal deficit present.      Mental Status: She is  alert and oriented to person, place, and time.   Psychiatric:         Mood and Affect: Mood normal.         Behavior: Behavior normal.         Intake/Output Summary (Last 24 hours) at 3/23/2023 2022  Last data filed at 3/23/2023 1806  Gross per 24 hour   Intake 2209 ml   Output --   Net 2209 ml       Recent Labs     03/23/23  1521   WBC 13.2*   NEUTSPOLYS 80.10*   LYMPHOCYTES 8.10*   MONOCYTES 9.60   EOSINOPHILS 0.20   BASOPHILS 0.70   ASTSGOT 55*   ALTSGPT 43   ALKPHOSPHAT 142*   TBILIRUBIN 1.2     Recent Labs     03/23/23  1521   SODIUM 134*   POTASSIUM 5.2   CHLORIDE 99   CO2 15*   BUN 23*   CREATININE 1.34   CALCIUM 9.1     Recent Labs     03/23/23  1521   ALTSGPT 43   ASTSGOT 55*   ALKPHOSPHAT 142*   TBILIRUBIN 1.2   GLUCOSE 152*         DX-CHEST-PORTABLE (1 VIEW)   Final Result      1.  Diffuse bilateral pulmonary infiltrates. Differential diagnosis includes atypical pneumonia, interstitial edema and chronic interstitial lung disease.      2.  Cardiomegaly.      3.  Small bilateral pleural effusions.          Patient Active Problem List   Diagnosis    CKD (chronic kidney disease) stage 3, GFR 30-59 ml/min (HCC)    Hypothyroidism    Venous stasis dermatitis of both lower extremities    Chronic right-sided low back pain without sciatica    Osteopenia    Chronic obstructive pulmonary disease (HCC)    Pulmonary nodule, RUL CT , 4/18    Essential hypertension    Abdominal aortic aneurysm (AAA) 3.0 cm to 5.0 cm in diameter in female    Bilateral iliac artery stenosis (HCC)    OAB (overactive bladder)    Thrombocytopenia, unspecified (HCC)    Macrocytosis    B12 deficiency    Diarrhea    Overweight with body mass index (BMI) of 25 to 25.9 in adult    Aortic atherosclerosis (HCC)    Disorder of arteries and arterioles (HCC)       Assessment and Plan:    * Acute hypoxemic respiratory failure (HCC)  Assessment & Plan  Currently requiring HiFLO. DDx at this time includes possible ILD vs PNA   - Admit to CICU for  respiratory monitoring and possible BiPAP requirements  - Cont high dose steroids (will give additional 250 mg IV solumedrol dose followed by 80 mg Q6, titrate as appropriate based on response)  - Cont ceftriaxone, azithromycin  - Wean O2 as able, appreciate RT assistance  - Aspiration precautions in place    Sepsis (HCC)  Assessment & Plan  This is Sepsis Present on admission  SIRS criteria identified on my evaluation include: Tachycardia, with heart rate greater than 90 BPM, Tachypnea, with respirations greater than 20 per minute and Leukocytosis, with WBC greater than 12,000  Source is Likely pulmonary  Sepsis protocol initiated  Fluid resuscitation ordered per protocol  Crystalloid Fluid Administration: Fluid resuscitation ordered per standard protocol - 30 mL/kg per current or ideal body weight  IV antibiotics as appropriate for source of sepsis  Reassessment: I have reassessed the patient's hemodynamic status      Will cont to treat empirically with Ceftriaxone, azithromycin        Elevated troponin  Assessment & Plan  Likely demand ischemia in setting of AHRF  - Continue to trend    ILD (interstitial lung disease) ([facility])  Assessment & Plan  Concern for possible ILD. 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  - Will initiate steroid therapy as above  - W/u at this time includes: EBEN, RF, hypersensitivity panels, PJP. Will evaluate further based on results     Pleural effusion  Assessment & Plan  Noted on CXR in addition to elevated BNP  - Will order echo to assess for possible underlying CHF    High anion gap metabolic acidosis  Assessment & Plan  Likely in the setting of lactic acidosis 2/2 AHRF  - CTM, will improve w/ correction of above     Chronic obstructive pulmonary disease (HCC)  Assessment & Plan  Not on home O2  - Cont home inhalers, duo nebs prn    Hypothyroidism  Assessment & Plan  Cont home synthroid       ICU check list:  CODE: Full  DVT PPx: LVX  Abx: Ceftriaxone,  azithromycin  Diet: Regular

## 2023-03-24 NOTE — ASSESSMENT & PLAN NOTE
Not on home O2; Former smoker 3/4 PPD for 54 years, quit six years ago. On home Symbicort and Albuterol  - Cont home inhalers, duo nebs prn  - Solumedrol  - Currently on HFNC

## 2023-03-24 NOTE — PROGRESS NOTES
CRITICAL CARE MEDICINE ATTENDING PROGRESS NOTE    Date of admission  3/23/2023    Chief Complaint  73 y.o. female admitted 3/23/2023 with acute respiratory failure.  She has a history of COPD, HTN, dyslipidemia    Hospital Course      3/24 -    increase methylprednisolone.  Continue ceftriaxone and azithromycin.  High flow nasal cannula.  3/25 -    continue current dose of methylprednisolone.  Continue antibiotics.  High flow nasal cannula.  Keep ICU.      Interval Problem Update  Reviewed last 24 hour events:      SR  HFNC  98.0  -143 mL in the last 24      Review of Systems  Review of Systems   Unable to perform ROS: Acuity of condition     Vital Signs for the last 24 hours  Temp:  [36.5 °C (97.7 °F)-36.7 °C (98 °F)] 36.5 °C (97.7 °F)  Pulse:  [74-92] 85  Resp:  [19-46] 24  BP: ()/(55-85) 108/55  SpO2:  [86 %-95 %] 89 %    Hemodynamic parameters for the last 24 hours       Vent Settings for the last 24 hours       Physical Exam  Physical Exam  Constitutional:       Appearance: She is not diaphoretic.   HENT:      Head: Normocephalic.      Mouth/Throat:      Pharynx: Oropharynx is clear.   Eyes:      Pupils: Pupils are equal, round, and reactive to light.   Cardiovascular:      Comments: Sinus rhythm  Pulmonary:      Breath sounds: Rales (Coarse and fine crackles are about the same) present. No wheezing.   Abdominal:      General: There is no distension.      Tenderness: There is no abdominal tenderness.   Musculoskeletal:      Cervical back: Normal range of motion.      Right lower leg: No edema.      Left lower leg: No edema.   Skin:     General: Skin is warm.      Capillary Refill: Capillary refill takes less than 2 seconds.   Neurological:      General: No focal deficit present.      Mental Status: She is oriented to person, place, and time.      Cranial Nerves: No cranial nerve deficit.       Medications  Current Facility-Administered Medications   Medication Dose Route Frequency Provider Last Rate Last  Admin    melatonin tablet 5 mg  5 mg Oral Nightly Bhavesh Muhammad M.D.   5 mg at 03/25/23 0106    albuterol (PROVENTIL) 2.5mg/0.5ml nebulizer solution 2.5 mg  2.5 mg Nebulization 4X/DAY (RT) Jos Beltran M.D.   2.5 mg at 03/25/23 0658    albuterol (PROVENTIL) 2.5mg/0.5ml nebulizer solution 2.5 mg  2.5 mg Nebulization Q2HRS PRN (RT) Jos Beltran M.D.        omeprazole (PRILOSEC) capsule 20 mg  20 mg Oral DAILY Ted Foss Jr., M.D.   20 mg at 03/25/23 0548    methylPREDNISolone (SOLU-MEDROL) 40 MG injection 125.2 mg  125.2 mg Intravenous Q6HRS Jos Beltran M.D.   125.2 mg at 03/25/23 0547    senna-docusate (PERICOLACE or SENOKOT S) 8.6-50 MG per tablet 2 Tablet  2 Tablet Oral BID Bhavesh Muhammad M.D.   2 Tablet at 03/24/23 0520    And    polyethylene glycol/lytes (MIRALAX) PACKET 1 Packet  1 Packet Oral QDAY PRN Bhavesh Muhammad M.D.        And    magnesium hydroxide (MILK OF MAGNESIA) suspension 30 mL  30 mL Oral QDAY PRN Bhavesh Muhammad M.D.        And    bisacodyl (DULCOLAX) suppository 10 mg  10 mg Rectal QDAY PRN Bhavesh Muhammad M.D.        Respiratory Therapy Consult   Nebulization Continuous RT Bhavesh Muhammad M.D.        enoxaparin (Lovenox) inj 40 mg  40 mg Subcutaneous DAILY AT 1800 Bhavesh Muhammad M.D.   40 mg at 03/24/23 1715    labetalol (NORMODYNE/TRANDATE) injection 10 mg  10 mg Intravenous Q4HRS PRN Bhavesh Muhammad M.D.        ipratropium-albuterol (DUONEB) nebulizer solution  3 mL Nebulization Q4H PRN (RT) Bhavesh Muhammad M.D.   3 mL at 03/25/23 0224    cefTRIAXone (Rocephin) syringe 1,000 mg  1,000 mg Intravenous Q24HRS Bhavesh Muhammad M.D.   1,000 mg at 03/25/23 0548    budesonide-formoterol (SYMBICORT) 80-4.5 MCG/ACT inhaler 2 Puff  2 Puff Inhalation BID Bhavesh Muhammad M.D.   2 Puff at 03/25/23 0548    levothyroxine (SYNTHROID) tablet 100 mcg  100 mcg Oral QAM AC Bhavesh Muhammad M.D.   100 mcg at 03/25/23 0548    lisinopril (PRINIVIL)  tablet 20 mg  20 mg Oral DAILY Bhavesh Muhammad M.D.   20 mg at 03/25/23 0548    rosuvastatin (CRESTOR) tablet 10 mg  10 mg Oral Q EVENING Bhavesh Muhammad M.D.   10 mg at 03/24/23 1715       Fluids    Intake/Output Summary (Last 24 hours) at 3/25/2023 0749  Last data filed at 3/25/2023 0600  Gross per 24 hour   Intake 1257.39 ml   Output 1400 ml   Net -142.61 ml       Laboratory      Recent Labs     03/23/23  1521 03/24/23  0534 03/25/23  0101   SODIUM 134* 134* 133*   POTASSIUM 5.2 4.4 4.1   CHLORIDE 99 103 105   CO2 15* 16* 17*   BUN 23* 27* 30*   CREATININE 1.34 1.08 1.00   MAGNESIUM  --  1.8 2.4   PHOSPHORUS  --  4.4 3.3   CALCIUM 9.1 7.8* 8.0*     Recent Labs     03/23/23  1521 03/24/23  0534 03/25/23  0101   ALTSGPT 43 34 27   ASTSGOT 55* 43 29   ALKPHOSPHAT 142* 155* 135*   TBILIRUBIN 1.2 0.8 0.7   GLUCOSE 152* 155* 185*     Recent Labs     03/23/23  1521 03/24/23  0534 03/25/23  0101   WBC 13.2* 8.6 17.4*   NEUTSPOLYS 80.10* 82.30* 87.60*   LYMPHOCYTES 8.10* 13.40* 5.70*   MONOCYTES 9.60 3.30 5.60   EOSINOPHILS 0.20 0.00 0.00   BASOPHILS 0.70 0.10 0.20   ASTSGOT 55* 43 29   ALTSGPT 43 34 27   ALKPHOSPHAT 142* 155* 135*   TBILIRUBIN 1.2 0.8 0.7     Recent Labs     03/23/23  1521 03/24/23  0534 03/25/23  0101   RBC 5.32 4.48 4.18*   HEMOGLOBIN 16.6* 13.8 13.2   HEMATOCRIT 51.6* 42.5 40.2   PLATELETCT 180 144* 167       Imaging  X-Ray:  I have personally reviewed the images and compared with prior images. and My impression is: Bilateral opacities are about the same    Assessment/Plan      Acute hypoxemic respiratory failure   She requires very high supplemental oxygen on a high flow nasal cannula   High risk of deterioration    Abnormal CT of the thorax   Evidence of interstitial lung disease - UIP, AIP, hypersensitivity pneumonitis, organizing pneumonia, NSIP   She has 2 dogs which are not new pets   No pussy cats or birdies - she has an outside bird feeder for hummingbirds   Neighbors have horses, goats,  pigs, chickens, roosters - does not spend any significant time around their pens   No evidence of collagen vascular or rheumatologic disease on exam   Urinalysis without an active sediment   Negative screening for influenza, RSV and SARS-CoV-2   Clinically, cannot exclude an acute infectious process with pneumonia - continue empiric ceftriaxone and azithromycin   Continue methylprednisolone, 125 mg IV every 6 hours    COPD   No acute exacerbation   Bronchodilators    Primary hypertension   Goal SBP less than 160    Dyslipidemia   Continue statin      VTE:  Lovenox  Ulcer: Not Indicated  Lines: None    I have performed a physical exam and reviewed and updated ROS and Plan today (3/25/2023). In review of yesterday's note (3/24/2023), there are no changes except as documented above.     She remains on very high supplemental oxygen requiring a high flow nasal cannula.  I have assessed and reassessed her respiratory status.  She is at increased risk for worsening respiratory system dysfunction.    Discussed patient condition and risk of morbidity and/or mortality with RN, RT, Pharmacy, Charge nurse / hot rounds, and QA team    The patient remains critically ill.  Critical care time = 40 minutes in directly providing and coordinating critical care and extensive data review.  No time overlap and excludes procedures.    A Critical Care Medicine progress note may have been authored by a resident physician or advanced practitioner of nursing under my direct supervision on this date of service.  As the supervising and attending physician, I have either attested to or cosigned that document.  IN THE EVENT THAT DISCREPANCIES EXIST BETWEEN THIS DOCUMENT AND ANY DOCUMENT THAT I HAVE ATTESTED TO OR COSIGNED ON THIS DATE OF SERVICE, THEN THIS DOCUMENT REMAINS THE FINAL AUTHORITY AS TO MY ASSESSMENT AND PLAN REGARDING THE CARE OF THIS PATIENT.    Jos Beltran MD  Pulmonary and Critical Care Medicine

## 2023-03-25 ENCOUNTER — APPOINTMENT (OUTPATIENT)
Dept: RADIOLOGY | Facility: MEDICAL CENTER | Age: 73
DRG: 871 | End: 2023-03-25
Attending: INTERNAL MEDICINE
Payer: MEDICARE

## 2023-03-25 PROBLEM — I27.20 PULMONARY HYPERTENSION (HCC): Status: ACTIVE | Noted: 2023-03-25

## 2023-03-25 PROBLEM — I36.1 NONRHEUMATIC TRICUSPID VALVE REGURGITATION: Status: ACTIVE | Noted: 2023-03-25

## 2023-03-25 LAB
ALBUMIN SERPL BCP-MCNC: 3 G/DL (ref 3.2–4.9)
ALBUMIN/GLOB SERPL: 0.8 G/DL
ALP SERPL-CCNC: 135 U/L (ref 30–99)
ALT SERPL-CCNC: 27 U/L (ref 2–50)
ANION GAP SERPL CALC-SCNC: 11 MMOL/L (ref 7–16)
AST SERPL-CCNC: 29 U/L (ref 12–45)
BASOPHILS # BLD AUTO: 0.2 % (ref 0–1.8)
BASOPHILS # BLD: 0.03 K/UL (ref 0–0.12)
BILIRUB SERPL-MCNC: 0.7 MG/DL (ref 0.1–1.5)
BUN SERPL-MCNC: 30 MG/DL (ref 8–22)
CALCIUM ALBUM COR SERPL-MCNC: 8.8 MG/DL (ref 8.5–10.5)
CALCIUM SERPL-MCNC: 8 MG/DL (ref 8.5–10.5)
CHLORIDE SERPL-SCNC: 105 MMOL/L (ref 96–112)
CO2 SERPL-SCNC: 17 MMOL/L (ref 20–33)
CREAT SERPL-MCNC: 1 MG/DL (ref 0.5–1.4)
EOSINOPHIL # BLD AUTO: 0 K/UL (ref 0–0.51)
EOSINOPHIL NFR BLD: 0 % (ref 0–6.9)
ERYTHROCYTE [DISTWIDTH] IN BLOOD BY AUTOMATED COUNT: 47.5 FL (ref 35.9–50)
EST. AVERAGE GLUCOSE BLD GHB EST-MCNC: 97 MG/DL
GFR SERPLBLD CREATININE-BSD FMLA CKD-EPI: 59 ML/MIN/1.73 M 2
GLOBULIN SER CALC-MCNC: 3.6 G/DL (ref 1.9–3.5)
GLUCOSE SERPL-MCNC: 185 MG/DL (ref 65–99)
HBA1C MFR BLD: 5 % (ref 4–5.6)
HCT VFR BLD AUTO: 40.2 % (ref 37–47)
HGB BLD-MCNC: 13.2 G/DL (ref 12–16)
IMM GRANULOCYTES # BLD AUTO: 0.15 K/UL (ref 0–0.11)
IMM GRANULOCYTES NFR BLD AUTO: 0.9 % (ref 0–0.9)
LYMPHOCYTES # BLD AUTO: 0.99 K/UL (ref 1–4.8)
LYMPHOCYTES NFR BLD: 5.7 % (ref 22–41)
MAGNESIUM SERPL-MCNC: 2.4 MG/DL (ref 1.5–2.5)
MCH RBC QN AUTO: 31.6 PG (ref 27–33)
MCHC RBC AUTO-ENTMCNC: 32.8 G/DL (ref 33.6–35)
MCV RBC AUTO: 96.2 FL (ref 81.4–97.8)
MONOCYTES # BLD AUTO: 0.98 K/UL (ref 0–0.85)
MONOCYTES NFR BLD AUTO: 5.6 % (ref 0–13.4)
MYELOPEROXIDASE AB SER-ACNC: 1 AU/ML (ref 0–19)
NEUTROPHILS # BLD AUTO: 15.27 K/UL (ref 2–7.15)
NEUTROPHILS NFR BLD: 87.6 % (ref 44–72)
NRBC # BLD AUTO: 0 K/UL
NRBC BLD-RTO: 0 /100 WBC
PHOSPHATE SERPL-MCNC: 3.3 MG/DL (ref 2.5–4.5)
PLATELET # BLD AUTO: 167 K/UL (ref 164–446)
PMV BLD AUTO: 10.2 FL (ref 9–12.9)
POTASSIUM SERPL-SCNC: 4.1 MMOL/L (ref 3.6–5.5)
PROT SERPL-MCNC: 6.6 G/DL (ref 6–8.2)
PROTEINASE3 AB SER-ACNC: 0 AU/ML (ref 0–19)
RBC # BLD AUTO: 4.18 M/UL (ref 4.2–5.4)
SODIUM SERPL-SCNC: 133 MMOL/L (ref 135–145)
WBC # BLD AUTO: 17.4 K/UL (ref 4.8–10.8)

## 2023-03-25 PROCEDURE — 700111 HCHG RX REV CODE 636 W/ 250 OVERRIDE (IP): Performed by: STUDENT IN AN ORGANIZED HEALTH CARE EDUCATION/TRAINING PROGRAM

## 2023-03-25 PROCEDURE — 84100 ASSAY OF PHOSPHORUS: CPT

## 2023-03-25 PROCEDURE — 770022 HCHG ROOM/CARE - ICU (200)

## 2023-03-25 PROCEDURE — 700101 HCHG RX REV CODE 250: Performed by: INTERNAL MEDICINE

## 2023-03-25 PROCEDURE — A9270 NON-COVERED ITEM OR SERVICE: HCPCS | Performed by: STUDENT IN AN ORGANIZED HEALTH CARE EDUCATION/TRAINING PROGRAM

## 2023-03-25 PROCEDURE — A9270 NON-COVERED ITEM OR SERVICE: HCPCS | Performed by: GENERAL PRACTICE

## 2023-03-25 PROCEDURE — 80053 COMPREHEN METABOLIC PANEL: CPT

## 2023-03-25 PROCEDURE — 99291 CRITICAL CARE FIRST HOUR: CPT | Performed by: INTERNAL MEDICINE

## 2023-03-25 PROCEDURE — 94640 AIRWAY INHALATION TREATMENT: CPT

## 2023-03-25 PROCEDURE — 700101 HCHG RX REV CODE 250: Performed by: STUDENT IN AN ORGANIZED HEALTH CARE EDUCATION/TRAINING PROGRAM

## 2023-03-25 PROCEDURE — 700102 HCHG RX REV CODE 250 W/ 637 OVERRIDE(OP): Performed by: INTERNAL MEDICINE

## 2023-03-25 PROCEDURE — 82962 GLUCOSE BLOOD TEST: CPT

## 2023-03-25 PROCEDURE — 83036 HEMOGLOBIN GLYCOSYLATED A1C: CPT

## 2023-03-25 PROCEDURE — 85025 COMPLETE CBC W/AUTO DIFF WBC: CPT

## 2023-03-25 PROCEDURE — 700102 HCHG RX REV CODE 250 W/ 637 OVERRIDE(OP): Performed by: GENERAL PRACTICE

## 2023-03-25 PROCEDURE — 700111 HCHG RX REV CODE 636 W/ 250 OVERRIDE (IP): Performed by: INTERNAL MEDICINE

## 2023-03-25 PROCEDURE — 700102 HCHG RX REV CODE 250 W/ 637 OVERRIDE(OP): Performed by: STUDENT IN AN ORGANIZED HEALTH CARE EDUCATION/TRAINING PROGRAM

## 2023-03-25 PROCEDURE — 83735 ASSAY OF MAGNESIUM: CPT

## 2023-03-25 PROCEDURE — 94669 MECHANICAL CHEST WALL OSCILL: CPT

## 2023-03-25 PROCEDURE — 71045 X-RAY EXAM CHEST 1 VIEW: CPT

## 2023-03-25 RX ORDER — CHOLECALCIFEROL (VITAMIN D3) 125 MCG
5 CAPSULE ORAL NIGHTLY
Status: DISCONTINUED | OUTPATIENT
Start: 2023-03-25 | End: 2023-03-30 | Stop reason: HOSPADM

## 2023-03-25 RX ADMIN — BUDESONIDE AND FORMOTEROL FUMARATE DIHYDRATE 2 PUFF: 80; 4.5 AEROSOL RESPIRATORY (INHALATION) at 17:21

## 2023-03-25 RX ADMIN — BUDESONIDE AND FORMOTEROL FUMARATE DIHYDRATE 2 PUFF: 80; 4.5 AEROSOL RESPIRATORY (INHALATION) at 05:48

## 2023-03-25 RX ADMIN — LISINOPRIL 20 MG: 20 TABLET ORAL at 05:48

## 2023-03-25 RX ADMIN — ALBUTEROL SULFATE 2.5 MG: 2.5 SOLUTION RESPIRATORY (INHALATION) at 09:56

## 2023-03-25 RX ADMIN — AZITHROMYCIN MONOHYDRATE 500 MG: 250 TABLET ORAL at 05:48

## 2023-03-25 RX ADMIN — METHYLPREDNISOLONE SODIUM SUCCINATE 125.2 MG: 40 INJECTION, POWDER, FOR SOLUTION INTRAMUSCULAR; INTRAVENOUS at 11:41

## 2023-03-25 RX ADMIN — INSULIN HUMAN 2 UNITS: 100 INJECTION, SOLUTION PARENTERAL at 19:50

## 2023-03-25 RX ADMIN — LEVOTHYROXINE SODIUM 100 MCG: 0.1 TABLET ORAL at 05:48

## 2023-03-25 RX ADMIN — Medication 5 MG: at 01:06

## 2023-03-25 RX ADMIN — ALBUTEROL SULFATE 2.5 MG: 2.5 SOLUTION RESPIRATORY (INHALATION) at 06:58

## 2023-03-25 RX ADMIN — METHYLPREDNISOLONE SODIUM SUCCINATE 125.2 MG: 40 INJECTION, POWDER, FOR SOLUTION INTRAMUSCULAR; INTRAVENOUS at 05:47

## 2023-03-25 RX ADMIN — ENOXAPARIN SODIUM 40 MG: 100 INJECTION SUBCUTANEOUS at 17:21

## 2023-03-25 RX ADMIN — METHYLPREDNISOLONE SODIUM SUCCINATE 125.2 MG: 40 INJECTION, POWDER, FOR SOLUTION INTRAMUSCULAR; INTRAVENOUS at 00:52

## 2023-03-25 RX ADMIN — Medication 5 MG: at 20:15

## 2023-03-25 RX ADMIN — ALBUTEROL SULFATE 2.5 MG: 2.5 SOLUTION RESPIRATORY (INHALATION) at 18:57

## 2023-03-25 RX ADMIN — ROSUVASTATIN CALCIUM 10 MG: 20 TABLET, FILM COATED ORAL at 17:21

## 2023-03-25 RX ADMIN — ALBUTEROL SULFATE 2.5 MG: 2.5 SOLUTION RESPIRATORY (INHALATION) at 13:56

## 2023-03-25 RX ADMIN — CEFTRIAXONE SODIUM 1000 MG: 10 INJECTION, POWDER, FOR SOLUTION INTRAVENOUS at 05:48

## 2023-03-25 RX ADMIN — METHYLPREDNISOLONE SODIUM SUCCINATE 125.2 MG: 40 INJECTION, POWDER, FOR SOLUTION INTRAMUSCULAR; INTRAVENOUS at 17:22

## 2023-03-25 RX ADMIN — IPRATROPIUM BROMIDE AND ALBUTEROL SULFATE 3 ML: 2.5; .5 SOLUTION RESPIRATORY (INHALATION) at 02:24

## 2023-03-25 RX ADMIN — OMEPRAZOLE 20 MG: 20 CAPSULE, DELAYED RELEASE ORAL at 05:48

## 2023-03-25 ASSESSMENT — ENCOUNTER SYMPTOMS
CONSTIPATION: 0
MYALGIAS: 0
HEADACHES: 0
SHORTNESS OF BREATH: 1
BLURRED VISION: 0
CHILLS: 0
PALPITATIONS: 0
VOMITING: 0
ABDOMINAL PAIN: 0
DOUBLE VISION: 0
NERVOUS/ANXIOUS: 0
DEPRESSION: 0
DIZZINESS: 0
HEARTBURN: 0
WHEEZING: 0
DIARRHEA: 0
SORE THROAT: 0
COUGH: 0
WEAKNESS: 0
FEVER: 0
FALLS: 0
WEIGHT LOSS: 0
NAUSEA: 0

## 2023-03-25 ASSESSMENT — PAIN DESCRIPTION - PAIN TYPE
TYPE: ACUTE PAIN;CHRONIC PAIN
TYPE: ACUTE PAIN;CHRONIC PAIN
TYPE: ACUTE PAIN
TYPE: ACUTE PAIN;CHRONIC PAIN

## 2023-03-25 ASSESSMENT — PATIENT HEALTH QUESTIONNAIRE - PHQ9
SUM OF ALL RESPONSES TO PHQ9 QUESTIONS 1 AND 2: 0
1. LITTLE INTEREST OR PLEASURE IN DOING THINGS: NOT AT ALL
2. FEELING DOWN, DEPRESSED, IRRITABLE, OR HOPELESS: NOT AT ALL

## 2023-03-25 ASSESSMENT — LIFESTYLE VARIABLES: SUBSTANCE_ABUSE: 0

## 2023-03-25 NOTE — CARE PLAN
The patient is Watcher - Medium risk of patient condition declining or worsening    Shift Goals  Clinical Goals: Maintain airway, monitor for worsening dyspnea  Patient Goals: Rest  Family Goals: CASIMIRO    Progress made toward(s) clinical / shift goals:    Problem: Knowledge Deficit - Standard  Goal: Patient and family/care givers will demonstrate understanding of plan of care, disease process/condition, diagnostic tests and medications  Outcome: Progressing  Note: Pt is able to demonstrate understanding of plan of care, condition, diagnostic tests, and medications.     Problem: Pain - Standard  Goal: Alleviation of pain or a reduction in pain to the patient’s comfort goal  3/25/2023 0131 by Ksenia Hill RReenaNReena  Outcome: Progressing  Flowsheets (Taken 3/25/2023 0000)  Pain Rating Scale (NPRS): 0  Note: Pt remains pain free. No interventions needed.  3/25/2023 0116 by Ksenia Hill R.NReena  Outcome: Progressing  Flowsheets (Taken 3/25/2023 0000)  Pain Rating Scale (NPRS): 0  Note: Pt remains pain free. No interventions necessary.      Problem: Skin Integrity  Goal: Skin integrity is maintained or improved  3/25/2023 0131 by Ksenia Hill R.N.  Outcome: Progressing  Note: Skin remains intact.  3/25/2023 0116 by Ksenia Hill R.N.  Outcome: Progressing  Note: Skin integrity is maintained.        Patient is not progressing towards the following goals:

## 2023-03-25 NOTE — ASSESSMENT & PLAN NOTE
Pt gluc consistently >150  Likely 2/2 high dose steroids as above v acute illness  -SSI  -hypoglycemia protocol   -A1c

## 2023-03-25 NOTE — PROGRESS NOTES
Monitor summary:  Sinus rhythm with rates from the 70s to 80s  Frequent PACs and PVCs  .12/.08/.38

## 2023-03-25 NOTE — ASSESSMENT & PLAN NOTE
Mild, TTE this admission with RVSP 35 mmHg   Likely 2/2 primary pulmonary etiology (type 3 pulm HTN)

## 2023-03-25 NOTE — CARE PLAN
Problem: Humidified High Flow Nasal Cannula  Goal: Maintain adequate oxygenation dependent on patient condition  Description: Target End Date:  resolve prior to discharge or when underlying condition is resolved/stabilized    1.  Implement humidified high flow oxygen therapy  2.  Titrate high flow oxygen to maintain appropriate SpO2  Outcome: Progressing     45L 100%

## 2023-03-25 NOTE — CARE PLAN
The patient is Stable - Low risk of patient condition declining or worsening    Shift Goals  Clinical Goals: Maintain airway, monitor for worsening dyspnea  Patient Goals: Rest  Family Goals: CASIMIRO    Progress made toward(s) clinical / shift goals:    Problem: Knowledge Deficit - Standard  Goal: Patient and family/care givers will demonstrate understanding of plan of care, disease process/condition, diagnostic tests and medications  Outcome: Progressing     Problem: Pain - Standard  Goal: Alleviation of pain or a reduction in pain to the patient’s comfort goal  Outcome: Progressing     Problem: Skin Integrity  Goal: Skin integrity is maintained or improved  Outcome: Progressing     Problem: Psychosocial  Goal: Patient's level of anxiety will decrease  Outcome: Progressing  Goal: Patient's ability to verbalize feelings about condition will improve  Outcome: Progressing  Goal: Patient's ability to re-evaluate and adapt role responsibilities will improve  Outcome: Progressing  Goal: Patient and family will demonstrate ability to cope with life altering diagnosis and/or procedure  Outcome: Progressing  Goal: Spiritual and cultural needs incorporated into hospitalization  Outcome: Progressing     Problem: Communication  Goal: The ability to communicate needs accurately and effectively will improve  Outcome: Progressing     Problem: Discharge Barriers/Planning  Goal: Patient's continuum of care needs are met  Outcome: Progressing     Problem: Hemodynamics  Goal: Patient's hemodynamics, fluid balance and neurologic status will be stable or improve  Outcome: Progressing     Problem: Respiratory  Goal: Patient will achieve/maintain optimum respiratory ventilation and gas exchange  Outcome: Progressing     Problem: Chest Tube Management  Goal: Complications related to chest tube will be avoided or minimized  Outcome: Progressing     Problem: Fluid Volume  Goal: Fluid volume balance will be maintained  Outcome: Progressing      Problem: Mechanical Ventilation  Goal: Safe management of artificial airway and ventilation  Outcome: Progressing  Goal: Successful weaning off mechanical ventilator, spontaneously maintains adequate gas exchange  Outcome: Progressing  Goal: Patient will be able to express needs and understand communication  Outcome: Progressing     Problem: Dysphagia  Goal: Dysphagia will improve  Outcome: Progressing     Problem: Risk for Aspiration  Goal: Patient's risk for aspiration will be absent or decrease  Outcome: Progressing     Problem: Nutrition  Goal: Patient's nutritional and fluid intake will be adequate or improve  Outcome: Progressing  Goal: Enteral nutrition will be maintained or improve  Outcome: Progressing  Goal: Enteral nutrition will be maintained or improve  Outcome: Progressing     Problem: Urinary Elimination  Goal: Establish and maintain regular urinary output  Outcome: Progressing     Problem: Bowel Elimination  Goal: Establish and maintain regular bowel function  Outcome: Progressing     Problem: Gastrointestinal Irritability  Goal: Nausea and vomiting will be absent or improve  Outcome: Progressing  Goal: Diarrhea will be absent or improved  Outcome: Progressing     Problem: Rectal Tube  Goal: Fecal output will be contained and skin will remain free from irritation  Outcome: Progressing     Problem: Mobility  Goal: Patient's capacity to carry out activities will improve  Outcome: Progressing     Problem: Self Care  Goal: Patient will have the ability to perform ADLs independently or with assistance (bathe, groom, dress, toilet and feed)  Outcome: Progressing     Problem: Infection - Standard  Goal: Patient will remain free from infection  Outcome: Progressing     Problem: Wound/ / Incision Healing  Goal: Patient's wound/surgical incision will decrease in size and heals properly  Outcome: Progressing       Patient is not progressing towards the following goals:

## 2023-03-25 NOTE — CARE PLAN
Problem: Humidified High Flow Nasal Cannula  Goal: Maintain adequate oxygenation dependent on patient condition  Description: Target End Date:  resolve prior to discharge or when underlying condition is resolved/stabilized    1.  Implement humidified high flow oxygen therapy  2.  Titrate high flow oxygen to maintain appropriate SpO2  Outcome: Progressing  Flowsheets  Taken 3/25/2023 1357 by Jean Davis, RRT  O2 (LPM): 45  FiO2%: 80 %  Taken 3/24/2023 2100 by Sheila Gleason, MARY ALICE  Indication: COPD diagnosis: SpO2 less than 89% despite conventional supplemental oxygen devices  Outcome: Normoxia

## 2023-03-25 NOTE — PROGRESS NOTES
CRITICAL CARE MEDICINE ATTENDING PROGRESS NOTE    Date of admission  3/23/2023    Chief Complaint  73 y.o. female admitted 3/23/2023 with acute respiratory failure.  She has a history of COPD, HTN, dyslipidemia    Hospital Course      3/24 -    increase methylprednisolone.  Continue ceftriaxone and azithromycin.  High flow nasal cannula.  3/25 -    continue current dose of methylprednisolone.  Continue antibiotics.  High flow nasal cannula.  Keep ICU.  3/26 -    Taper methylprednisolone.  Continue antibiotics.  Keep ICU.  High flow nasal cannula.      Interval Problem Update  Reviewed last 24 hour events:      SR  HFNC  98.1  +800 mL in the last 24  +2366 mL since admission      Review of Systems  Review of Systems   Unable to perform ROS: Acuity of condition     Vital Signs for the last 24 hours  Temp:  [36.3 °C (97.3 °F)-36.7 °C (98.1 °F)] 36.6 °C (97.9 °F)  Pulse:  [] 81  Resp:  [13-55] 22  BP: ()/(52-66) 132/64  SpO2:  [89 %-95 %] 92 %    Hemodynamic parameters for the last 24 hours       Vent Settings for the last 24 hours       Physical Exam  Physical Exam  Constitutional:       Appearance: She is not diaphoretic.   HENT:      Head: Normocephalic.      Mouth/Throat:      Pharynx: Oropharynx is clear.   Eyes:      Pupils: Pupils are equal, round, and reactive to light.   Cardiovascular:      Comments: Sinus rhythm  Pulmonary:      Breath sounds: Rales (Significant improvement in coarse bibasilar crackles.  Fine, Velcro-like crackles persist.) present. No wheezing.   Abdominal:      General: There is no distension.      Tenderness: There is no abdominal tenderness.   Musculoskeletal:      Cervical back: Normal range of motion.      Right lower leg: No edema.      Left lower leg: No edema.   Skin:     General: Skin is warm.      Capillary Refill: Capillary refill takes less than 2 seconds.   Neurological:      General: No focal deficit present.      Mental Status: She is oriented to person, place, and  time.      Cranial Nerves: No cranial nerve deficit.       Medications  Current Facility-Administered Medications   Medication Dose Route Frequency Provider Last Rate Last Admin    melatonin tablet 5 mg  5 mg Oral Nightly Bhavesh Muhammad M.D.   5 mg at 03/25/23 2015    albuterol (PROVENTIL) 2.5mg/0.5ml nebulizer solution 2.5 mg  2.5 mg Nebulization 4X/DAY (RT) Jos Beltran M.D.   2.5 mg at 03/26/23 0712    albuterol (PROVENTIL) 2.5mg/0.5ml nebulizer solution 2.5 mg  2.5 mg Nebulization Q2HRS PRN (RT) Jos Beltran M.D.        insulin regular (HumuLIN R,NovoLIN R) injection  2-9 Units Subcutaneous 4X/DAY ACHS Jos Beltran M.D.   2 Units at 03/1950    And    dextrose 10 % BOLUS 25 g  25 g Intravenous Q15 MIN PRN Jos Beltran M.D.        omeprazole (PRILOSEC) capsule 20 mg  20 mg Oral DAILY Ted Foss Jr., M.D.   20 mg at 03/26/23 0604    methylPREDNISolone (SOLU-MEDROL) 40 MG injection 125.2 mg  125.2 mg Intravenous Q6HRS Jos Beltran M.D.   125.2 mg at 03/26/23 0604    senna-docusate (PERICOLACE or SENOKOT S) 8.6-50 MG per tablet 2 Tablet  2 Tablet Oral BID Bhavesh Muhammad M.D.   2 Tablet at 03/24/23 0520    And    polyethylene glycol/lytes (MIRALAX) PACKET 1 Packet  1 Packet Oral QDAY PRN Bhavesh Muhammad M.D.        And    magnesium hydroxide (MILK OF MAGNESIA) suspension 30 mL  30 mL Oral QDAY PRN Bhavesh Muhammad M.D.        And    bisacodyl (DULCOLAX) suppository 10 mg  10 mg Rectal QDAY PRN Bhavesh Muhammad M.D.        Respiratory Therapy Consult   Nebulization Continuous RT Bhavesh Muhammad M.D.        enoxaparin (Lovenox) inj 40 mg  40 mg Subcutaneous DAILY AT 1800 Bhavesh Muhammad M.D.   40 mg at 03/25/23 1721    labetalol (NORMODYNE/TRANDATE) injection 10 mg  10 mg Intravenous Q4HRS PRN Bhavesh Muhammad M.D.        ipratropium-albuterol (DUONEB) nebulizer solution  3 mL Nebulization Q4H PRN (RT) Bhavesh Muhammad M.D.   3 mL at  03/25/23 0224    cefTRIAXone (Rocephin) syringe 1,000 mg  1,000 mg Intravenous Q24HRS Bhavesh Muhammad M.D.   1,000 mg at 03/26/23 0604    budesonide-formoterol (SYMBICORT) 80-4.5 MCG/ACT inhaler 2 Puff  2 Puff Inhalation BID Bhavesh Muhammad M.D.   2 Puff at 03/26/23 0604    levothyroxine (SYNTHROID) tablet 100 mcg  100 mcg Oral QAM AC Bhavesh Muhammad M.D.   100 mcg at 03/26/23 0604    lisinopril (PRINIVIL) tablet 20 mg  20 mg Oral DAILY Bhavesh Muhammad M.D.   20 mg at 03/26/23 0604    rosuvastatin (CRESTOR) tablet 10 mg  10 mg Oral Q EVENING Bhavesh Muhammad M.D.   10 mg at 03/25/23 1721       Fluids    Intake/Output Summary (Last 24 hours) at 3/26/2023 0939  Last data filed at 3/26/2023 0600  Gross per 24 hour   Intake 1450 ml   Output 650 ml   Net 800 ml       Laboratory      Recent Labs     03/24/23  0534 03/25/23  0101 03/26/23  0115   SODIUM 134* 133* 134*   POTASSIUM 4.4 4.1 3.9   CHLORIDE 103 105 106   CO2 16* 17* 18*   BUN 27* 30* 32*   CREATININE 1.08 1.00 0.92   MAGNESIUM 1.8 2.4 2.3   PHOSPHORUS 4.4 3.3 3.4   CALCIUM 7.8* 8.0* 8.0*     Recent Labs     03/24/23  0534 03/25/23  0101 03/26/23  0115   ALTSGPT 34 27 27   ASTSGOT 43 29 23   ALKPHOSPHAT 155* 135* 123*   TBILIRUBIN 0.8 0.7 0.6   GLUCOSE 155* 185* 166*     Recent Labs     03/24/23  0534 03/25/23  0101 03/26/23  0115   WBC 8.6 17.4* 15.6*   NEUTSPOLYS 82.30* 87.60* 90.90*   LYMPHOCYTES 13.40* 5.70* 4.10*   MONOCYTES 3.30 5.60 4.40   EOSINOPHILS 0.00 0.00 0.00   BASOPHILS 0.10 0.20 0.20   ASTSGOT 43 29 23   ALTSGPT 34 27 27   ALKPHOSPHAT 155* 135* 123*   TBILIRUBIN 0.8 0.7 0.6     Recent Labs     03/24/23  0534 03/25/23  0101 03/26/23  0115   RBC 4.48 4.18* 4.02*   HEMOGLOBIN 13.8 13.2 12.5   HEMATOCRIT 42.5 40.2 37.9   PLATELETCT 144* 167 168       Imaging  X-Ray:  I have personally reviewed the images and compared with prior images. and My impression is: A wee bit of improvement in the left upper lung    Assessment/Plan      Acute  hypoxemic respiratory failure   She requires very high supplemental oxygen on a high flow nasal cannula   High risk of deterioration    Abnormal CT of the thorax   Evidence of interstitial lung disease - extensive differential diagnosis   She has 2 dogs which are not new pets   No pussy cats or birdies - she has an outside bird feeder for hummingbirds   Neighbors have horses, goats, pigs, chickens, roosters - does not spend any significant time around their pens   No evidence of collagen vascular or rheumatologic disease on exam   Urinalysis without an active sediment   Rheumatoid factor normal, MPO normal, serine protease 3 negative   WSR and CRP slightly elevated   Negative screening for influenza, RSV and SARS-CoV-2   Negative respiratory viral panel   Blood cultures negative so far from 3/23   Clinically, cannot exclude an acute infectious process with pneumonia - procalcitonin elevated (for what it's worth ) - continue empiric ceftriaxone and azithromycin   Taper methylprednisolone, 62.5 mg IV every 6 hours    COPD   No acute exacerbation   Bronchodilators    Primary hypertension   Goal SBP less than 160    Dyslipidemia   Continue statin      VTE:  Lovenox  Ulcer: Not Indicated  Lines: None    I have performed a physical exam and reviewed and updated ROS and Plan today (3/26/2023). In review of yesterday's note (3/25/2023), there are no changes except as documented above.     I have assessed and reassessed her respiratory status.  She is on very high supplemental oxygen requiring high flow nasal cannula.  She is at increased risk for worsening respiratory system dysfunction.    Discussed patient condition and risk of morbidity and/or mortality with RN, RT, Pharmacy, Charge nurse / hot rounds, and QA team    The patient remains critically ill.  Critical care time = 35 minutes in directly providing and coordinating critical care and extensive data review.  No time overlap and excludes procedures.    A Critical  Care Medicine progress note may have been authored by a resident physician or advanced practitioner of nursing under my direct supervision on this date of service.  As the supervising and attending physician, I have either attested to or cosigned that document.  IN THE EVENT THAT DISCREPANCIES EXIST BETWEEN THIS DOCUMENT AND ANY DOCUMENT THAT I HAVE ATTESTED TO OR COSIGNED ON THIS DATE OF SERVICE, THEN THIS DOCUMENT REMAINS THE FINAL AUTHORITY AS TO MY ASSESSMENT AND PLAN REGARDING THE CARE OF THIS PATIENT.    Jos Beltran MD  Pulmonary and Critical Care Medicine

## 2023-03-25 NOTE — PROGRESS NOTES
[facility] Progress Note      Admit Date: 3/23/2023    Resident(s): Ted Foss Jr., M.D.  Attending: MICAH JOSEPH/ Dr. Beltran    Date & Time:   3/25/2023   11:58 AM       Patient ID:    Name:             Naomy Vargas     YOB: 1950  Age:                 73 y.o.  female   MRN:               [id number]    HPI:  73-year-old female, history of non-oxygen dependent COPD 40.5 pack-year history quit smoking 6 years ago, HTN, DLD who was admitted 3/23 after presenting with one month of increasing dyspnea on exertion that worsened the past several days associated with subjective fever and nonproductive cough. Seen by PCP with CT chest 3/7/2023 which demonstrating interval development of diffuse interstitial septal thickening and some groundglass infiltrates with a peripheral predominance suggestive of a interstitial lung disease which has not yet been worked up or followed up on.Admitted to the ICU for sepsis secondary to presumed underlying pulmonary infection complicated by acute hypoxemic respiratory failure requiiring high dose steroids and antibiotics.    Hospital Course:  3/24:increase methylprednisolone from 80->125mg Q6H  Continue ceftriaxone and azithromycin.  High flow nasal cannula.  3/25:cont mgmt as above  Pulm: HFNC 45L/100%  Pressors: none  Sedation: none  Abx: ceftriaxone, azithro (last day)  Fluids: none    Consultants:  none     Procedures:  none    Interval Events:  NO acute events since admission.  Patient denies pain and breathing continues to improve.    Review of Systems   Constitutional:  Negative for chills, fever, malaise/fatigue and weight loss.   HENT:  Negative for congestion and sore throat.    Eyes:  Negative for blurred vision and double vision.   Respiratory:  Positive for shortness of breath. Negative for cough and wheezing.    Cardiovascular:  Negative for chest pain and palpitations.   Gastrointestinal:  Negative for abdominal pain, constipation,  "diarrhea, heartburn, nausea and vomiting.   Genitourinary:  Negative for dysuria, frequency and urgency.   Musculoskeletal:  Negative for falls, joint pain and myalgias.   Skin:  Negative for rash.   Neurological:  Negative for dizziness, weakness and headaches.   Psychiatric/Behavioral:  Negative for depression, substance abuse and suicidal ideas. The patient is not nervous/anxious.    All other systems reviewed and are negative.    PHYSICAL EXAM  Vitals:    03/25/23 0900 03/25/23 0957 03/25/23 1000 03/25/23 1100   BP: 115/57  109/65 107/52   Pulse: 83 87 89 88   Resp: (!) 25 (!) 24 (!) 42 (!) 33   Temp:       TempSrc:       SpO2: 93% 90% 89% 93%   Weight:       Height:         Body mass index is 24.98 kg/m².  /52   Pulse 88   Temp 36.4 °C (97.5 °F) (Temporal)   Resp (!) 33   Ht 1.676 m (5' 6\")   Wt 70.2 kg (154 lb 12.2 oz)   LMP  (LMP Unknown)   SpO2 93%   BMI 24.98 kg/m²   O2 therapy: Pulse Oximetry: 93 %, O2 (LPM): 45, O2 Delivery Device: Heated High Flow Nasal Cannula    Physical Exam  Vitals and nursing note reviewed.   Constitutional:       Appearance: Normal appearance. She is ill-appearing.   HENT:      Head: Normocephalic and atraumatic.      Mouth/Throat:      Mouth: Mucous membranes are dry.      Pharynx: No oropharyngeal exudate or posterior oropharyngeal erythema.   Eyes:      Conjunctiva/sclera: Conjunctivae normal.   Cardiovascular:      Rate and Rhythm: Normal rate and regular rhythm.      Pulses: Normal pulses.      Heart sounds: Normal heart sounds. No murmur heard.    No friction rub. No gallop.   Pulmonary:      Effort: Pulmonary effort is normal. No respiratory distress.      Breath sounds: Normal breath sounds. No stridor. No wheezing, rhonchi or rales.      Comments: On HFNC  Abdominal:      General: Abdomen is flat. Bowel sounds are normal. There is no distension.      Palpations: Abdomen is soft.      Tenderness: There is no abdominal tenderness.   Musculoskeletal:         " General: No swelling or tenderness. Normal range of motion.      Cervical back: Neck supple.      Right lower leg: No edema.      Left lower leg: No edema.   Skin:     General: Skin is warm and dry.      Capillary Refill: Capillary refill takes less than 2 seconds.      Findings: No bruising or rash.   Neurological:      Mental Status: She is alert and oriented to person, place, and time. Mental status is at baseline.      Cranial Nerves: No cranial nerve deficit.      Sensory: No sensory deficit.      Coordination: Coordination normal.   Psychiatric:         Mood and Affect: Mood normal.         Behavior: Behavior normal.     Respiratory:     Respiration: (!) 33, Pulse Oximetry: 93 %    Chest Tube Drains:          HemoDynamics:  Pulse: 88 Blood Pressure : 107/52      Neuro:      Fluids:  Date 23 0700 - 23 0659   Shift 1786-5704 3205-1928 2730-4547 24 Hour Total   INTAKE   P.O. 300   300     P.O. 300   300   Shift Total 300   300   OUTPUT   Shift Total          300          Intake/Output Summary (Last 24 hours) at 3/24/2023 0726  Last data filed at 3/24/2023 0600  Gross per 24 hour   Intake 2309 ml   Output 600 ml   Net 1709 ml          Body mass index is 24.98 kg/m².    Recent Labs     23  1521 23  0534 23  0101   SODIUM 134* 134* 133*   POTASSIUM 5.2 4.4 4.1   CHLORIDE 99 103 105   CO2 15* 16* 17*   BUN 23* 27* 30*   CREATININE 1.34 1.08 1.00   MAGNESIUM  --  1.8 2.4   PHOSPHORUS  --  4.4 3.3   CALCIUM 9.1 7.8* 8.0*       GI/Nutrition:  Recent Labs     23  1521 23  0534 23  0101   ALTSGPT 43 34 27   ASTSGOT 55* 43 29   ALKPHOSPHAT 142* 155* 135*   TBILIRUBIN 1.2 0.8 0.7   GLUCOSE 152* 155* 185*       Heme:  Recent Labs     23  1521 23  0534 23  0101   RBC 5.32 4.48 4.18*   HEMOGLOBIN 16.6* 13.8 13.2   HEMATOCRIT 51.6* 42.5 40.2   PLATELETCT 180 144* 167       Infectious Disease:  Temp  Av.6 °C (97.8 °F)  Min: 36.4 °C (97.5 °F)  Max: 36.7  °C (98 °F)  Recent Labs     03/23/23  1521 03/24/23  0534 03/25/23  0101   WBC 13.2* 8.6 17.4*   NEUTSPOLYS 80.10* 82.30* 87.60*   LYMPHOCYTES 8.10* 13.40* 5.70*   MONOCYTES 9.60 3.30 5.60   EOSINOPHILS 0.20 0.00 0.00   BASOPHILS 0.70 0.10 0.20   ASTSGOT 55* 43 29   ALTSGPT 43 34 27   ALKPHOSPHAT 142* 155* 135*   TBILIRUBIN 1.2 0.8 0.7       Meds:   melatonin  5 mg      albuterol  2.5 mg      albuterol  2.5 mg      insulin regular  2-9 Units      And    dextrose bolus  25 g      omeprazole  20 mg      methylPREDNISolone  125.2 mg      senna-docusate  2 Tablet      And    polyethylene glycol/lytes  1 Packet      And    magnesium hydroxide  30 mL      And    bisacodyl  10 mg      Respiratory Therapy Consult        enoxaparin (LOVENOX) injection  40 mg      labetalol  10 mg      ipratropium-albuterol  3 mL      cefTRIAXone (ROCEPHIN) IV  1,000 mg      budesonide-formoterol  2 Puff      levothyroxine  100 mcg      lisinopril  20 mg      rosuvastatin  10 mg              Imaging:  DX-CHEST-PORTABLE (1 VIEW)   Final Result         1.  Pulmonary edema and/or infiltrates are identified, which are stable since the prior exam.   2.  Trace bilateral pleural effusions   3.  Cardiomegaly   4.  Atherosclerosis      EC-ECHOCARDIOGRAM COMPLETE W/O CONT   Final Result      DX-CHEST-PORTABLE (1 VIEW)   Final Result         1.  Pulmonary edema and/or infiltrates are identified, which are stable since the prior exam.   2.  Trace bilateral pleural effusions   3.  Cardiomegaly   4.  Atherosclerosis      DX-CHEST-PORTABLE (1 VIEW)   Final Result      1.  Diffuse bilateral pulmonary infiltrates. Differential diagnosis includes atypical pneumonia, interstitial edema and chronic interstitial lung disease.      2.  Cardiomegaly.      3.  Small bilateral pleural effusions.        Assessment and Plan:  73-year-old female, history of non-oxygen dependent COPD with 40.5 pack years quit smoking six years ago, HTN, DLD who was admitted 3/23 after  presenting with one month of increasing dyspnea on exertion that worsened the past several days associated with subjective fever and nonproductive cough. Seen by PCP with CT chest 3/7/2023 which demonstrated interval development of diffuse interstitial septal thickening and some groundglass infiltrates with a peripheral predominance suggestive of a interstitial lung disease which has not yet been worked up or followed up on. Admitted to the ICU for sepsis secondary to presumed underlying pulmonary infection complicated by acute hypoxemic respiratory failure requiring high dose steroids and abx.    * Acute hypoxemic respiratory failure (HCC)- (present on admission)  Assessment & Plan  Currently requiring HiFLO. Resp panel negative. TTE this admission with LVEF 55% and RVSP 35 mmHg indicating mild pulm HTN possible 2/2 pulm source  Suspicious for progressive ILD, UIP, AIP, acute eosinophilic pneumonia, HSP, , PLCH, NSIP,  other   No history of vasculitis/autoimmune disease  S/p azithro x 3 days    -Ceftriaxone (through 3/28/23)  -Solumedrol 125mg IV Q6H- can likely wean off high dose steroids starting 3/26  -Continue production, HFNC, noninvasive BiPAP if needed  -Follow closely in ICU, high risk of respiratory decompensation  -Start serologic work-up for ILD  -When clinically improves, consider bronchoscopy and transbronchial biopsy versus open lung biopsy if no clear diagnosis  -Wean O2 as able, appreciate RT assistance  -Aspiration precautions in place  -Will likely need to be sent home on steroid taper-- will possibly require PJP ppx with Bactrim at discharge  -Will require follow up with pulmonology as outpt     Nonrheumatic tricuspid valve regurgitation  Assessment & Plan  Mild on TTE this admission    Pulmonary hypertension (HCC)  Assessment & Plan  Mild, TTE this admission with RVSP 35 mmHg   Likely 2/2 primary pulmonary etiology (type 3 pulm HTN)    Hypertension  Assessment & Plan  Controlled  -continue  home Lisinopril    Leukocytosis  Assessment & Plan  resolved    Pleural effusion  Assessment & Plan  Noted on CXR in addition to elevated BNP. TTE with preserved EF this admission (55%).    Sepsis (HCC)  Assessment & Plan  This is Sepsis Present on admission  SIRS criteria identified on my evaluation include: Tachycardia, with heart rate greater than 90 BPM, Tachypnea, with respirations greater than 20 per minute and Leukocytosis, with WBC greater than 12,000  Source is Likely pulmonary  Sepsis protocol initiated  Fluid resuscitation ordered per protocol  Crystalloid Fluid Administration: Fluid resuscitation ordered per standard protocol - 30 mL/kg per current or ideal body weight  IV antibiotics as appropriate for source of sepsis  Reassessment: I have reassessed the patient's hemodynamic status    Abx as above    Elevated troponin  Assessment & Plan  downtrending likely from demand ischemia. No need to repeat.      ILD (interstitial lung disease) ([facility])  Assessment & Plan  Concern for possible ILD. 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  - Will initiate steroid therapy as above  - W/u at this time includes: EBEN, RF, hypersensitivity panels, PJP. Will evaluate further based on results     Thrombocytopenia, unspecified (HCC)- (present on admission)  Assessment & Plan   Suspect secondary to sepsis. No bleeding evident. Continue to monitor.    Chronic obstructive pulmonary disease (HCC)- (present on admission)  Assessment & Plan  Not on home O2  - Cont home inhalers, duo nebs prn  Former smoker 3/4 PPD for 54 years, quit six years ago. On home Symbicort and Albuterol    Hypothyroidism- (present on admission)  Assessment & Plan  Cont home synthroid     Hyperglycemia  Assessment & Plan  Pt gluc consistently >150  Likely 2/2 high dose steroids as above v acute illness    PLAN  -SSI, hypoglycemia protocol   -A1c    DISPO: continue ICU monitoring    CODE STATUS: Full    Quality  Measures:  Feeding: Oral  Analgesia: none  Sedation: none  Thromboprophylaxis: Lovenox  Head of bed: aspiration precautions  Ulcer prophylaxis: Omeprazole  Glycemic control: none  Bowel care:yes  Indwelling lines: PIV x 2  Deescalation of antibiotics: Ceftriaxone, Azithromycin (last day)

## 2023-03-26 ENCOUNTER — APPOINTMENT (OUTPATIENT)
Dept: RADIOLOGY | Facility: MEDICAL CENTER | Age: 73
DRG: 871 | End: 2023-03-26
Attending: INTERNAL MEDICINE
Payer: MEDICARE

## 2023-03-26 LAB
ALBUMIN SERPL BCP-MCNC: 3.1 G/DL (ref 3.2–4.9)
ALBUMIN/GLOB SERPL: 0.9 G/DL
ALP SERPL-CCNC: 123 U/L (ref 30–99)
ALT SERPL-CCNC: 27 U/L (ref 2–50)
ANION GAP SERPL CALC-SCNC: 10 MMOL/L (ref 7–16)
AST SERPL-CCNC: 23 U/L (ref 12–45)
BACTERIA UR CULT: NORMAL
BASOPHILS # BLD AUTO: 0.2 % (ref 0–1.8)
BASOPHILS # BLD: 0.03 K/UL (ref 0–0.12)
BILIRUB SERPL-MCNC: 0.6 MG/DL (ref 0.1–1.5)
BUN SERPL-MCNC: 32 MG/DL (ref 8–22)
CALCIUM ALBUM COR SERPL-MCNC: 8.7 MG/DL (ref 8.5–10.5)
CALCIUM SERPL-MCNC: 8 MG/DL (ref 8.5–10.5)
CHLORIDE SERPL-SCNC: 106 MMOL/L (ref 96–112)
CO2 SERPL-SCNC: 18 MMOL/L (ref 20–33)
CREAT SERPL-MCNC: 0.92 MG/DL (ref 0.5–1.4)
EOSINOPHIL # BLD AUTO: 0 K/UL (ref 0–0.51)
EOSINOPHIL NFR BLD: 0 % (ref 0–6.9)
ERYTHROCYTE [DISTWIDTH] IN BLOOD BY AUTOMATED COUNT: 46.3 FL (ref 35.9–50)
GFR SERPLBLD CREATININE-BSD FMLA CKD-EPI: 66 ML/MIN/1.73 M 2
GLOBULIN SER CALC-MCNC: 3.4 G/DL (ref 1.9–3.5)
GLUCOSE BLD STRIP.AUTO-MCNC: 137 MG/DL (ref 65–99)
GLUCOSE BLD STRIP.AUTO-MCNC: 137 MG/DL (ref 65–99)
GLUCOSE BLD STRIP.AUTO-MCNC: 150 MG/DL (ref 65–99)
GLUCOSE BLD STRIP.AUTO-MCNC: 161 MG/DL (ref 65–99)
GLUCOSE BLD STRIP.AUTO-MCNC: 181 MG/DL (ref 65–99)
GLUCOSE SERPL-MCNC: 166 MG/DL (ref 65–99)
HCT VFR BLD AUTO: 37.9 % (ref 37–47)
HGB BLD-MCNC: 12.5 G/DL (ref 12–16)
IMM GRANULOCYTES # BLD AUTO: 0.07 K/UL (ref 0–0.11)
IMM GRANULOCYTES NFR BLD AUTO: 0.4 % (ref 0–0.9)
LYMPHOCYTES # BLD AUTO: 0.64 K/UL (ref 1–4.8)
LYMPHOCYTES NFR BLD: 4.1 % (ref 22–41)
MAGNESIUM SERPL-MCNC: 2.3 MG/DL (ref 1.5–2.5)
MCH RBC QN AUTO: 31.1 PG (ref 27–33)
MCHC RBC AUTO-ENTMCNC: 33 G/DL (ref 33.6–35)
MCV RBC AUTO: 94.3 FL (ref 81.4–97.8)
MONOCYTES # BLD AUTO: 0.69 K/UL (ref 0–0.85)
MONOCYTES NFR BLD AUTO: 4.4 % (ref 0–13.4)
NEUTROPHILS # BLD AUTO: 14.19 K/UL (ref 2–7.15)
NEUTROPHILS NFR BLD: 90.9 % (ref 44–72)
NRBC # BLD AUTO: 0 K/UL
NRBC BLD-RTO: 0 /100 WBC
NUCLEAR IGG SER QL IA: DETECTED
PHOSPHATE SERPL-MCNC: 3.4 MG/DL (ref 2.5–4.5)
PLATELET # BLD AUTO: 168 K/UL (ref 164–446)
PMV BLD AUTO: 10.3 FL (ref 9–12.9)
POTASSIUM SERPL-SCNC: 3.9 MMOL/L (ref 3.6–5.5)
PROT SERPL-MCNC: 6.5 G/DL (ref 6–8.2)
RBC # BLD AUTO: 4.02 M/UL (ref 4.2–5.4)
SIGNIFICANT IND 70042: NORMAL
SITE SITE: NORMAL
SODIUM SERPL-SCNC: 134 MMOL/L (ref 135–145)
SOURCE SOURCE: NORMAL
WBC # BLD AUTO: 15.6 K/UL (ref 4.8–10.8)

## 2023-03-26 PROCEDURE — 700102 HCHG RX REV CODE 250 W/ 637 OVERRIDE(OP): Performed by: GENERAL PRACTICE

## 2023-03-26 PROCEDURE — 700102 HCHG RX REV CODE 250 W/ 637 OVERRIDE(OP): Performed by: STUDENT IN AN ORGANIZED HEALTH CARE EDUCATION/TRAINING PROGRAM

## 2023-03-26 PROCEDURE — 700111 HCHG RX REV CODE 636 W/ 250 OVERRIDE (IP): Performed by: INTERNAL MEDICINE

## 2023-03-26 PROCEDURE — 770022 HCHG ROOM/CARE - ICU (200)

## 2023-03-26 PROCEDURE — 85025 COMPLETE CBC W/AUTO DIFF WBC: CPT

## 2023-03-26 PROCEDURE — 82962 GLUCOSE BLOOD TEST: CPT

## 2023-03-26 PROCEDURE — 700101 HCHG RX REV CODE 250: Performed by: INTERNAL MEDICINE

## 2023-03-26 PROCEDURE — 71045 X-RAY EXAM CHEST 1 VIEW: CPT

## 2023-03-26 PROCEDURE — 700111 HCHG RX REV CODE 636 W/ 250 OVERRIDE (IP): Performed by: STUDENT IN AN ORGANIZED HEALTH CARE EDUCATION/TRAINING PROGRAM

## 2023-03-26 PROCEDURE — A9270 NON-COVERED ITEM OR SERVICE: HCPCS | Performed by: GENERAL PRACTICE

## 2023-03-26 PROCEDURE — A9270 NON-COVERED ITEM OR SERVICE: HCPCS | Performed by: STUDENT IN AN ORGANIZED HEALTH CARE EDUCATION/TRAINING PROGRAM

## 2023-03-26 PROCEDURE — 84100 ASSAY OF PHOSPHORUS: CPT

## 2023-03-26 PROCEDURE — 83735 ASSAY OF MAGNESIUM: CPT

## 2023-03-26 PROCEDURE — 94640 AIRWAY INHALATION TREATMENT: CPT

## 2023-03-26 PROCEDURE — 80053 COMPREHEN METABOLIC PANEL: CPT

## 2023-03-26 PROCEDURE — 99291 CRITICAL CARE FIRST HOUR: CPT | Performed by: INTERNAL MEDICINE

## 2023-03-26 RX ORDER — METHYLPREDNISOLONE SODIUM SUCCINATE 40 MG/ML
62.5 INJECTION, POWDER, LYOPHILIZED, FOR SOLUTION INTRAMUSCULAR; INTRAVENOUS EVERY 6 HOURS
Status: DISCONTINUED | OUTPATIENT
Start: 2023-03-26 | End: 2023-03-27

## 2023-03-26 RX ADMIN — METHYLPREDNISOLONE SODIUM SUCCINATE 125.2 MG: 40 INJECTION, POWDER, FOR SOLUTION INTRAMUSCULAR; INTRAVENOUS at 11:44

## 2023-03-26 RX ADMIN — METHYLPREDNISOLONE SODIUM SUCCINATE 125.2 MG: 40 INJECTION, POWDER, FOR SOLUTION INTRAMUSCULAR; INTRAVENOUS at 01:03

## 2023-03-26 RX ADMIN — LISINOPRIL 20 MG: 20 TABLET ORAL at 06:04

## 2023-03-26 RX ADMIN — BUDESONIDE AND FORMOTEROL FUMARATE DIHYDRATE 2 PUFF: 80; 4.5 AEROSOL RESPIRATORY (INHALATION) at 19:46

## 2023-03-26 RX ADMIN — ALBUTEROL SULFATE 2.5 MG: 2.5 SOLUTION RESPIRATORY (INHALATION) at 07:12

## 2023-03-26 RX ADMIN — OMEPRAZOLE 20 MG: 20 CAPSULE, DELAYED RELEASE ORAL at 06:04

## 2023-03-26 RX ADMIN — Medication 5 MG: at 19:46

## 2023-03-26 RX ADMIN — ROSUVASTATIN CALCIUM 10 MG: 20 TABLET, FILM COATED ORAL at 17:49

## 2023-03-26 RX ADMIN — ALBUTEROL SULFATE 2.5 MG: 2.5 SOLUTION RESPIRATORY (INHALATION) at 20:04

## 2023-03-26 RX ADMIN — ALBUTEROL SULFATE 2.5 MG: 2.5 SOLUTION RESPIRATORY (INHALATION) at 14:39

## 2023-03-26 RX ADMIN — METHYLPREDNISOLONE SODIUM SUCCINATE 125.2 MG: 40 INJECTION, POWDER, FOR SOLUTION INTRAMUSCULAR; INTRAVENOUS at 06:04

## 2023-03-26 RX ADMIN — ENOXAPARIN SODIUM 40 MG: 100 INJECTION SUBCUTANEOUS at 17:48

## 2023-03-26 RX ADMIN — DOCUSATE SODIUM 50 MG AND SENNOSIDES 8.6 MG 2 TABLET: 8.6; 5 TABLET, FILM COATED ORAL at 17:49

## 2023-03-26 RX ADMIN — CEFTRIAXONE SODIUM 1000 MG: 10 INJECTION, POWDER, FOR SOLUTION INTRAVENOUS at 06:04

## 2023-03-26 RX ADMIN — METHYLPREDNISOLONE SODIUM SUCCINATE 62.4 MG: 40 INJECTION, POWDER, FOR SOLUTION INTRAMUSCULAR; INTRAVENOUS at 17:47

## 2023-03-26 RX ADMIN — ALBUTEROL SULFATE 2.5 MG: 2.5 SOLUTION RESPIRATORY (INHALATION) at 10:35

## 2023-03-26 RX ADMIN — INSULIN HUMAN 2 UNITS: 100 INJECTION, SOLUTION PARENTERAL at 19:48

## 2023-03-26 RX ADMIN — BUDESONIDE AND FORMOTEROL FUMARATE DIHYDRATE 2 PUFF: 80; 4.5 AEROSOL RESPIRATORY (INHALATION) at 06:04

## 2023-03-26 RX ADMIN — LEVOTHYROXINE SODIUM 100 MCG: 0.1 TABLET ORAL at 06:04

## 2023-03-26 ASSESSMENT — FIBROSIS 4 INDEX: FIB4 SCORE: 1.92

## 2023-03-26 ASSESSMENT — PAIN DESCRIPTION - PAIN TYPE
TYPE: ACUTE PAIN;CHRONIC PAIN

## 2023-03-26 NOTE — PROGRESS NOTES
[facility] Progress Note      Admit Date: 3/23/2023    Resident(s): Ted Foss Jr., M.D.  Attending: MICAH JOSEPH/ Dr. Beltran    Date & Time:   3/26/2023   10:59 AM       Patient ID:    Name:             Naomy Vargas     YOB: 1950  Age:                 73 y.o.  female   MRN:               [id number]    HPI:  73-year-old female, history of non-oxygen dependent COPD 40.5 pack-year history quit smoking 6 years ago, HTN, DLD who was admitted 3/23 after presenting with one month of increasing dyspnea on exertion that worsened the past several days associated with subjective fever and nonproductive cough. Seen by PCP with CT chest 3/7/2023 which demonstrating interval development of diffuse interstitial septal thickening and some groundglass infiltrates with a peripheral predominance suggestive of a interstitial lung disease which has not yet been worked up or followed up on.Admitted to the ICU for sepsis secondary to presumed underlying pulmonary infection complicated by acute hypoxemic respiratory failure requiiring high dose steroids and antibiotics.        Hospital Course:  3/24:increase methylprednisolone from 80->125mg Q6H  Continue ceftriaxone and azithromycin.  High flow nasal cannula.  3/25: continue current dose of methylprednisolone.  Continue antibiotics.  High flow nasal cannula.  Keep ICU.  3/26: Taper methylprednisolone.  Continue antibiotics.  Keep ICU.  High flow nasal cannula.    Consultants:  none         Procedures:  none           Interval Events:  No acute events overnight  Patient denies complaints  Telemetry: SR 70s-80s with occasional PACs, PVCs  HFNC 40L/80%  Afebrile  HR  (78)  RR 13-55 (29)  BP /58-65 (124/59)  I/0: net +800  WBC 17->15  Na 133->134  Glucose 180s->160s  Cr wnl  CXR: Stable bilateral coarse interstitial opacities which could be related to chronic lung disease or pneumonitis.  Cultures: negative    Review of Systems  "  Constitutional:  Negative for chills, fever, malaise/fatigue and weight loss.   HENT:  Negative for congestion and sore throat.    Eyes:  Negative for blurred vision and double vision.   Respiratory:  Positive for shortness of breath. Negative for cough and wheezing.    Cardiovascular:  Negative for chest pain and palpitations.   Gastrointestinal:  Negative for abdominal pain, constipation, diarrhea, heartburn, nausea and vomiting.   Genitourinary:  Negative for dysuria, frequency and urgency.   Musculoskeletal:  Negative for falls, joint pain and myalgias.   Skin:  Negative for rash.   Neurological:  Negative for dizziness, weakness and headaches.   Psychiatric/Behavioral:  Negative for depression, substance abuse and suicidal ideas. The patient is not nervous/anxious.    All other systems reviewed and are negative.    PHYSICAL EXAM  Vitals:    03/26/23 0715 03/26/23 0800 03/26/23 0900 03/26/23 1033   BP: 124/61 132/64     Pulse: 78 81  77   Resp: (!) 29 (!) 22  (!) 26   Temp:       TempSrc:       SpO2: 89% 92%  93%   Weight:   70.5 kg (155 lb 6.8 oz)    Height:         Body mass index is 25.09 kg/m².  /64   Pulse 77   Temp 36.6 °C (97.9 °F) (Temporal)   Resp (!) 26   Ht 1.676 m (5' 6\")   Wt 70.5 kg (155 lb 6.8 oz)   LMP  (LMP Unknown)   SpO2 93%   BMI 25.09 kg/m²   O2 therapy: Pulse Oximetry: 93 %, O2 (LPM): 40, O2 Delivery Device: Heated High Flow Nasal Cannula      Physical Exam  Vitals and nursing note reviewed.   Constitutional:       Appearance: Normal appearance. She is ill-appearing, but improved sitting in bed.   HENT:      Head: Normocephalic and atraumatic.      Mouth/Throat:      Mouth: Mucous membranes are dry.      Pharynx: No oropharyngeal exudate or posterior oropharyngeal erythema.   Eyes:      Conjunctiva/sclera: Conjunctivae normal.   Cardiovascular:      Rate and Rhythm: Normal rate and regular rhythm.      Pulses: Normal pulses.      Heart sounds: Normal heart sounds. No murmur " heard.    No friction rub. No gallop.   Pulmonary:      Effort: Pulmonary effort is normal. No respiratory distress.      Breath sounds: Normal breath sounds. No stridor. No wheezing, rhonchi or rales.      Comments: On HFNC  Abdominal:      General: Abdomen is flat. Bowel sounds are normal. There is no distension.      Palpations: Abdomen is soft.      Tenderness: There is no abdominal tenderness.   Musculoskeletal:         General: No swelling or tenderness. Normal range of motion.      Cervical back: Neck supple.      Right lower leg: No edema.      Left lower leg: No edema.   Skin:     General: Skin is warm and dry.      Capillary Refill: Capillary refill takes less than 2 seconds.      Findings: No bruising or rash.   Neurological:      Mental Status: She is alert and oriented to person, place, and time. Mental status is at baseline.      Cranial Nerves: No cranial nerve deficit.      Sensory: No sensory deficit.      Coordination: Coordination normal.   Psychiatric:         Mood and Affect: Mood normal.         Behavior: Behavior normal.       Respiratory:     Respiration: (!) 26, Pulse Oximetry: 93 %    Chest Tube Drains:          HemoDynamics:  Pulse: 77 Blood Pressure : 132/64      Neuro:      Fluids:       Intake/Output Summary (Last 24 hours) at 3/26/2023 0746  Last data filed at 3/26/2023 0600  Gross per 24 hour   Intake 1450 ml   Output 650 ml   Net 800 ml       Weight: 70.5 kg (155 lb 6.8 oz)  Body mass index is 25.09 kg/m².    Recent Labs     03/24/23  0534 03/25/23  0101 03/26/23  0115   SODIUM 134* 133* 134*   POTASSIUM 4.4 4.1 3.9   CHLORIDE 103 105 106   CO2 16* 17* 18*   BUN 27* 30* 32*   CREATININE 1.08 1.00 0.92   MAGNESIUM 1.8 2.4 2.3   PHOSPHORUS 4.4 3.3 3.4   CALCIUM 7.8* 8.0* 8.0*       GI/Nutrition:  Recent Labs     03/24/23  0534 03/25/23  0101 03/26/23  0115   ALTSGPT 34 27 27   ASTSGOT 43 29 23   ALKPHOSPHAT 155* 135* 123*   TBILIRUBIN 0.8 0.7 0.6   GLUCOSE 155* 185* 166*        Heme:  Recent Labs     23  0534 23  0101 23  0115   RBC 4.48 4.18* 4.02*   HEMOGLOBIN 13.8 13.2 12.5   HEMATOCRIT 42.5 40.2 37.9   PLATELETCT 144* 167 168       Infectious Disease:  Temp  Av.5 °C (97.7 °F)  Min: 36.3 °C (97.3 °F)  Max: 36.7 °C (98.1 °F)  Recent Labs     23  0534 23  0101 23  0115   WBC 8.6 17.4* 15.6*   NEUTSPOLYS 82.30* 87.60* 90.90*   LYMPHOCYTES 13.40* 5.70* 4.10*   MONOCYTES 3.30 5.60 4.40   EOSINOPHILS 0.00 0.00 0.00   BASOPHILS 0.10 0.20 0.20   ASTSGOT 43 29 23   ALTSGPT 34 27 27   ALKPHOSPHAT 155* 135* 123*   TBILIRUBIN 0.8 0.7 0.6       Meds:   melatonin  5 mg      albuterol  2.5 mg      albuterol  2.5 mg      insulin regular  2-9 Units      And    dextrose bolus  25 g      omeprazole  20 mg      methylPREDNISolone  125.2 mg      senna-docusate  2 Tablet      And    polyethylene glycol/lytes  1 Packet      And    magnesium hydroxide  30 mL      And    bisacodyl  10 mg      Respiratory Therapy Consult        enoxaparin (LOVENOX) injection  40 mg      labetalol  10 mg      ipratropium-albuterol  3 mL      cefTRIAXone (ROCEPHIN) IV  1,000 mg      budesonide-formoterol  2 Puff      levothyroxine  100 mcg      lisinopril  20 mg      rosuvastatin  10 mg              Imaging:  DX-CHEST-PORTABLE (1 VIEW)   Final Result      1.  Stable bilateral coarse interstitial opacities which could be related to chronic lung disease or pneumonitis.      DX-CHEST-PORTABLE (1 VIEW)   Final Result         1.  Pulmonary edema and/or infiltrates are identified, which are stable since the prior exam.   2.  Trace bilateral pleural effusions   3.  Cardiomegaly   4.  Atherosclerosis      EC-ECHOCARDIOGRAM COMPLETE W/O CONT   Final Result      DX-CHEST-PORTABLE (1 VIEW)   Final Result         1.  Pulmonary edema and/or infiltrates are identified, which are stable since the prior exam.   2.  Trace bilateral pleural effusions   3.  Cardiomegaly   4.  Atherosclerosis       DX-CHEST-PORTABLE (1 VIEW)   Final Result      1.  Diffuse bilateral pulmonary infiltrates. Differential diagnosis includes atypical pneumonia, interstitial edema and chronic interstitial lung disease.      2.  Cardiomegaly.      3.  Small bilateral pleural effusions.          Assessment and Plan:  73-year-old female, history of non-oxygen dependent COPD with 40.5 pack years quit smoking six years ago, HTN, DLD who was admitted 3/23 after presenting with one month of increasing dyspnea on exertion that worsened the past several days associated with subjective fever and nonproductive cough. Seen by PCP with CT chest 3/7/2023 which demonstrating interval development of diffuse interstitial septal thickening and some groundglass infiltrates with a peripheral predominance suggestive of a interstitial lung disease which has not yet been worked up or followed up on.Admitted to the ICU for sepsis secondary to presumed underlying pulmonary infection complicated by acute hypoxemic respiratory failure requiiring high dose steroids and antibiotics.    * Acute hypoxemic respiratory failure (HCC)- (present on admission)  Assessment & Plan  Currently requiring HiFLO. Resp panel negative. TTE this admission with LVEF 55% and RVSP 35 mmHg indicating mild pulm HTN possible 2/2 pulm source  Suspicious for progressive ILD, UIP, AIP, acute eosinophilic pneumonia, HSP, , PLCH, NSIP,  other   No history of vasculitis/autoimmune disease  S/p azithro x 3 days    -Ceftriaxone (through 3/28/23)  -Solumedrol 125mg IV Q6H- can likely wean off high dose steroids starting 3/26  -Continue production, HFNC, noninvasive BiPAP if needed  -Follow closely in ICU, high risk of respiratory decompensation  -Start serologic work-up for ILD  -When clinically improves, consider bronchoscopy and transbronchial biopsy versus open lung biopsy if no clear diagnosis  -Wean O2 as able, appreciate RT assistance  -Aspiration precautions in place  -Will likely  need to be sent home on steroid taper-- will possibly require PJP ppx with Bactrim at discharge  -Will require follow up with pulmonology as outpt     Sepsis (HCC)  Assessment & Plan  Improved  Source is Likely pulmonary  Stopped Azithromycin, continue Ceftriaxone 3/24-3/28  Off Vasopressors          ILD (interstitial lung disease) ([facility])  Assessment & Plan  Concern for possible ILD. 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  - Will initiate steroid therapy as above  - W/u at this time includes: EBEN, RF, hypersensitivity panels, PJP. Will evaluate further based on results     Nonrheumatic tricuspid valve regurgitation  Assessment & Plan  Mild on TTE this admission    Pulmonary hypertension (HCC)  Assessment & Plan  Mild, TTE this admission with RVSP 35 mmHg   Likely 2/2 primary pulmonary etiology (type 3 pulm HTN)    Leukocytosis  Assessment & Plan  Downtrending    Pleural effusion  Assessment & Plan  Noted on CXR in addition to elevated BNP. TTE with preserved EF this admission (55%).    Elevated troponin  Assessment & Plan  downtrending likely from demand ischemia. No need to repeat.      Thrombocytopenia, unspecified (HCC)- (present on admission)  Assessment & Plan  Resolved. Suspect secondary to sepsis. No bleeding evident. Continue to monitor.    Chronic obstructive pulmonary disease (HCC)- (present on admission)  Assessment & Plan  Not on home O2  - Cont home inhalers, duo nebs prn  Former smoker 3/4 PPD for 54 years, quit six years ago. On home Symbicort and Albuterol    Hypothyroidism- (present on admission)  Assessment & Plan  Cont home synthroid     Hyperglycemia  Assessment & Plan  Pt gluc consistently >150  Likely 2/2 high dose steroids as above v acute illness    PLAN  -SSI, hypoglycemia protocol   -A1c    Hypertension  Assessment & Plan  Controlled  -continue home Lisinopril        DISPO: stay in the ICU     CODE STATUS: Full        Quality Measures:  Feeding: Oral  Analgesia:  none  Sedation: none  Thromboprophylaxis: Lovenox  Head of bed: aspiration precautions  Ulcer prophylaxis: Omeprazole  Glycemic control: none  Bowel care:yes  Indwelling lines: PIV x 2, wick  Deescalation of antibiotics: Ceftriaxone

## 2023-03-26 NOTE — CARE PLAN
"The patient is Watcher - Medium risk of patient condition declining or worsening    Shift Goals  Clinical Goals: maintain airway, monitor for worsening WOB/O2 sats  Patient Goals: Rest  Family Goals: CASIMIRO    Progress made toward(s) clinical / shift goals:    Problem: Pain - Standard  Goal: Alleviation of pain or a reduction in pain to the patient’s comfort goal  Outcome: Progressing  Note: Pt remains pain free.     Problem: Psychosocial  Goal: Patient's level of anxiety will decrease  Outcome: Progressing  Note: Pt remains anxiety free.  Goal: Patient's ability to verbalize feelings about condition will improve  Outcome: Progressing  Note: Pt verbalizes feelings. States, \"I can feel I'm getting better.\"     Problem: Urinary Elimination  Goal: Establish and maintain regular urinary output  Outcome: Progressing  Note: Pt is able to void. Adequate output. Purewick in place.       Patient is not progressing towards the following goals:      " Statement Selected

## 2023-03-26 NOTE — RESPIRATORY CARE
"  COPD EDUCATION by COPD CLINICAL EDUCATOR  3/26/2023  at  12:58 PM by Cheyanne Stephen, RRT     Patient interviewed by COPD education team.  Patient unable to participate in full program.  A comprehensive packet including information about COPD, types of treatments to manage their disease and safe home Oxygen usage was provided and reviewed with patient at the bedside.  She quit smoking 6 years ago. We reviewed her current respiratory status and her lung disease. She see's [facility].      COPD Screen  COPD Risk Screening  Do you have a history of COPD?: Yes  Do you have a Pulmonologist?: Yes    COPD Assessment  COPD Clinical Specialists ONLY  COPD Education Initiated: Yes--Short Intervention (Met with pt. hx copd and presents now with ILD/RLD symptoms on high steroids. See's [facility])  DME Company: none  DME Equipment Type: Nebulizer  Pulmonary Follow Up Appointment: 03/29/23  Appt Time: 0800  Pulmonologist Name: [facility] team declined appt at this time - HERB Gambino call to make aware of admission and change appt  Smoking Cessation: No (quit 6 years)  Home Health Care:  (TBD at this time on [facility])  Is this a COPD exacerbation patient?: No    PFT Results  Pulmonary Function Testing: Yes (11/8/20210327-WGE8-27, FEV1/FVC Ratio-69 overlap ILD/COPD/RLD)      Meds to Beds  Would the patient like to opt in for Bedside Medication Delivery at Discharge?: Yes, interested     MY COPD ACTION PLAN     It is recommended that patients and physicians /healthcare providers complete this action plan together. This plan should be discussed at each physician visit and updated as needed.    The green, yellow and red zones show groups of symptoms of COPD. This list of symptoms is not comprehensive, and you may experience other symptoms. In the \"Actions\" column, your healthcare provider has recommended actions for you to take based on your symptoms.    Patient Name: Naomy Vargas   YOB: 1950   Last " "Updated on:     Green Zone:  I am doing well today Actions     Usual activitiy and exercise level   Take daily medications     Usual amounts of cough and phlegm/mucus   Use oxygen as prescribed     Sleep well at night   Continue regular exercise/diet plan     Appetite is good   At all times avoid cigarette smoke, inhaled irritants     Daily Medications (these medications are taken every day):   Budesonide-Formoterol Fumarate (Symbicort) 2 Puffs Twice daily     Additional Information:  Use your spacer and rinse your mouth after using your Symbicort    Yellow Zone:  I am having a bad day or a COPD flare Actions     More breathless than usual   Continue daily medications     I have less energy for my daily activities   Use quick relief inhaler as ordered     Increased or thicker phlegm/mucus   Use oxygen as prescribed     Using quick relief inhaler/nebulizer more often   Get plenty of rest     Swelling of ankles more than usual   Use pursed lip breathing     More coughing than usual   At all times avoid cigarette smoke, inhaled irritants     I feel like I have a \"chest cold\"     Poor sleep and my symptoms woke me up     My appetite is not good     My medicine is not helping      Call provider immediately if symptoms don’t improve     Continue daily medications, add rescue medications:   Albuterol  Albuterol 2 Puffs  3mL via nebulizer Every 6 hours PRN         Medications to be used during a flare up, (as Discussed with Provider):           Additional Information:  Use your nebulizer as directed from you Pulmonary Team    Red Zone:  I need urgent medical care Actions     Severe shortness of breath even at rest   Call 911 or seek medical care immediately     Not able to do any activity because of breathing      Fever or shaking chills      Feeling confused or very drowsy       Chest pains      Coughing up blood                  "

## 2023-03-26 NOTE — PROGRESS NOTES
Monitor summary:  Sinus rhythm with rates from the 70s to 80s  Occasional PACs and PVCs  .12/.08/.37

## 2023-03-27 LAB
ALBUMIN SERPL BCP-MCNC: 3.1 G/DL (ref 3.2–4.9)
ALBUMIN/GLOB SERPL: 1 G/DL
ALP SERPL-CCNC: 109 U/L (ref 30–99)
ALT SERPL-CCNC: 27 U/L (ref 2–50)
ANA INTERPRETIVE COMMENT Q5143: ABNORMAL
ANA PATTERN Q5144: ABNORMAL
ANA TITER Q5145: ABNORMAL
ANION GAP SERPL CALC-SCNC: 11 MMOL/L (ref 7–16)
ANTINUCLEAR ANTIBODY (ANA), HEP-2, IGG Q5142: DETECTED
AST SERPL-CCNC: 27 U/L (ref 12–45)
BASOPHILS # BLD AUTO: 0.1 % (ref 0–1.8)
BASOPHILS # BLD: 0.01 K/UL (ref 0–0.12)
BILIRUB SERPL-MCNC: 0.6 MG/DL (ref 0.1–1.5)
BUN SERPL-MCNC: 31 MG/DL (ref 8–22)
CALCIUM ALBUM COR SERPL-MCNC: 8.7 MG/DL (ref 8.5–10.5)
CALCIUM SERPL-MCNC: 8 MG/DL (ref 8.5–10.5)
CHLORIDE SERPL-SCNC: 106 MMOL/L (ref 96–112)
CO2 SERPL-SCNC: 19 MMOL/L (ref 20–33)
CREAT SERPL-MCNC: 0.86 MG/DL (ref 0.5–1.4)
EOSINOPHIL # BLD AUTO: 0 K/UL (ref 0–0.51)
EOSINOPHIL NFR BLD: 0 % (ref 0–6.9)
ERYTHROCYTE [DISTWIDTH] IN BLOOD BY AUTOMATED COUNT: 47.3 FL (ref 35.9–50)
GFR SERPLBLD CREATININE-BSD FMLA CKD-EPI: 71 ML/MIN/1.73 M 2
GLOBULIN SER CALC-MCNC: 3.1 G/DL (ref 1.9–3.5)
GLUCOSE BLD STRIP.AUTO-MCNC: 128 MG/DL (ref 65–99)
GLUCOSE BLD STRIP.AUTO-MCNC: 130 MG/DL (ref 65–99)
GLUCOSE BLD STRIP.AUTO-MCNC: 159 MG/DL (ref 65–99)
GLUCOSE BLD STRIP.AUTO-MCNC: 182 MG/DL (ref 65–99)
GLUCOSE SERPL-MCNC: 162 MG/DL (ref 65–99)
HCT VFR BLD AUTO: 40.2 % (ref 37–47)
HGB BLD-MCNC: 12.6 G/DL (ref 12–16)
IMM GRANULOCYTES # BLD AUTO: 0.07 K/UL (ref 0–0.11)
IMM GRANULOCYTES NFR BLD AUTO: 0.5 % (ref 0–0.9)
LYMPHOCYTES # BLD AUTO: 0.59 K/UL (ref 1–4.8)
LYMPHOCYTES NFR BLD: 4.5 % (ref 22–41)
MAGNESIUM SERPL-MCNC: 2.3 MG/DL (ref 1.5–2.5)
MCH RBC QN AUTO: 30.4 PG (ref 27–33)
MCHC RBC AUTO-ENTMCNC: 31.3 G/DL (ref 33.6–35)
MCV RBC AUTO: 96.9 FL (ref 81.4–97.8)
MONOCYTES # BLD AUTO: 0.56 K/UL (ref 0–0.85)
MONOCYTES NFR BLD AUTO: 4.3 % (ref 0–13.4)
NEUTROPHILS # BLD AUTO: 11.93 K/UL (ref 2–7.15)
NEUTROPHILS NFR BLD: 90.6 % (ref 44–72)
NRBC # BLD AUTO: 0 K/UL
NRBC BLD-RTO: 0 /100 WBC
PHOSPHATE SERPL-MCNC: 3.1 MG/DL (ref 2.5–4.5)
PLATELET # BLD AUTO: 196 K/UL (ref 164–446)
PMV BLD AUTO: 10.4 FL (ref 9–12.9)
POTASSIUM SERPL-SCNC: 4.3 MMOL/L (ref 3.6–5.5)
PROT SERPL-MCNC: 6.2 G/DL (ref 6–8.2)
RBC # BLD AUTO: 4.15 M/UL (ref 4.2–5.4)
SODIUM SERPL-SCNC: 136 MMOL/L (ref 135–145)
WBC # BLD AUTO: 13.2 K/UL (ref 4.8–10.8)

## 2023-03-27 PROCEDURE — 700111 HCHG RX REV CODE 636 W/ 250 OVERRIDE (IP): Performed by: INTERNAL MEDICINE

## 2023-03-27 PROCEDURE — A9270 NON-COVERED ITEM OR SERVICE: HCPCS | Performed by: STUDENT IN AN ORGANIZED HEALTH CARE EDUCATION/TRAINING PROGRAM

## 2023-03-27 PROCEDURE — 82962 GLUCOSE BLOOD TEST: CPT | Mod: 91

## 2023-03-27 PROCEDURE — 99291 CRITICAL CARE FIRST HOUR: CPT | Performed by: INTERNAL MEDICINE

## 2023-03-27 PROCEDURE — 700111 HCHG RX REV CODE 636 W/ 250 OVERRIDE (IP): Performed by: STUDENT IN AN ORGANIZED HEALTH CARE EDUCATION/TRAINING PROGRAM

## 2023-03-27 PROCEDURE — 80053 COMPREHEN METABOLIC PANEL: CPT

## 2023-03-27 PROCEDURE — 94640 AIRWAY INHALATION TREATMENT: CPT

## 2023-03-27 PROCEDURE — 84100 ASSAY OF PHOSPHORUS: CPT

## 2023-03-27 PROCEDURE — A9270 NON-COVERED ITEM OR SERVICE: HCPCS | Performed by: GENERAL PRACTICE

## 2023-03-27 PROCEDURE — 83735 ASSAY OF MAGNESIUM: CPT

## 2023-03-27 PROCEDURE — 700102 HCHG RX REV CODE 250 W/ 637 OVERRIDE(OP): Performed by: STUDENT IN AN ORGANIZED HEALTH CARE EDUCATION/TRAINING PROGRAM

## 2023-03-27 PROCEDURE — 700102 HCHG RX REV CODE 250 W/ 637 OVERRIDE(OP): Performed by: GENERAL PRACTICE

## 2023-03-27 PROCEDURE — 770022 HCHG ROOM/CARE - ICU (200)

## 2023-03-27 PROCEDURE — 700101 HCHG RX REV CODE 250: Performed by: INTERNAL MEDICINE

## 2023-03-27 PROCEDURE — 85025 COMPLETE CBC W/AUTO DIFF WBC: CPT

## 2023-03-27 RX ORDER — IPRATROPIUM BROMIDE AND ALBUTEROL SULFATE 2.5; .5 MG/3ML; MG/3ML
3 SOLUTION RESPIRATORY (INHALATION)
Status: DISCONTINUED | OUTPATIENT
Start: 2023-03-27 | End: 2023-03-30 | Stop reason: HOSPADM

## 2023-03-27 RX ORDER — METHYLPREDNISOLONE SODIUM SUCCINATE 40 MG/ML
62.5 INJECTION, POWDER, LYOPHILIZED, FOR SOLUTION INTRAMUSCULAR; INTRAVENOUS EVERY 8 HOURS
Status: DISCONTINUED | OUTPATIENT
Start: 2023-03-27 | End: 2023-03-28

## 2023-03-27 RX ADMIN — IPRATROPIUM BROMIDE AND ALBUTEROL SULFATE 3 ML: .5; 2.5 SOLUTION RESPIRATORY (INHALATION) at 19:08

## 2023-03-27 RX ADMIN — ENOXAPARIN SODIUM 40 MG: 100 INJECTION SUBCUTANEOUS at 17:19

## 2023-03-27 RX ADMIN — IPRATROPIUM BROMIDE AND ALBUTEROL SULFATE 3 ML: .5; 2.5 SOLUTION RESPIRATORY (INHALATION) at 11:29

## 2023-03-27 RX ADMIN — CEFTRIAXONE SODIUM 1000 MG: 10 INJECTION, POWDER, FOR SOLUTION INTRAVENOUS at 05:50

## 2023-03-27 RX ADMIN — LEVOTHYROXINE SODIUM 100 MCG: 0.1 TABLET ORAL at 05:50

## 2023-03-27 RX ADMIN — BUDESONIDE AND FORMOTEROL FUMARATE DIHYDRATE 2 PUFF: 80; 4.5 AEROSOL RESPIRATORY (INHALATION) at 17:18

## 2023-03-27 RX ADMIN — METHYLPREDNISOLONE SODIUM SUCCINATE 62.4 MG: 40 INJECTION, POWDER, FOR SOLUTION INTRAMUSCULAR; INTRAVENOUS at 13:04

## 2023-03-27 RX ADMIN — METHYLPREDNISOLONE SODIUM SUCCINATE 62.4 MG: 40 INJECTION, POWDER, FOR SOLUTION INTRAMUSCULAR; INTRAVENOUS at 21:12

## 2023-03-27 RX ADMIN — Medication 5 MG: at 21:13

## 2023-03-27 RX ADMIN — LISINOPRIL 20 MG: 20 TABLET ORAL at 05:50

## 2023-03-27 RX ADMIN — INSULIN HUMAN 2 UNITS: 100 INJECTION, SOLUTION PARENTERAL at 21:13

## 2023-03-27 RX ADMIN — IPRATROPIUM BROMIDE AND ALBUTEROL SULFATE 3 ML: .5; 2.5 SOLUTION RESPIRATORY (INHALATION) at 14:12

## 2023-03-27 RX ADMIN — ROSUVASTATIN CALCIUM 10 MG: 20 TABLET, FILM COATED ORAL at 17:18

## 2023-03-27 RX ADMIN — OMEPRAZOLE 20 MG: 20 CAPSULE, DELAYED RELEASE ORAL at 05:50

## 2023-03-27 RX ADMIN — METHYLPREDNISOLONE SODIUM SUCCINATE 62.4 MG: 40 INJECTION, POWDER, FOR SOLUTION INTRAMUSCULAR; INTRAVENOUS at 05:50

## 2023-03-27 RX ADMIN — ALBUTEROL SULFATE 2.5 MG: 2.5 SOLUTION RESPIRATORY (INHALATION) at 06:22

## 2023-03-27 RX ADMIN — METHYLPREDNISOLONE SODIUM SUCCINATE 62.4 MG: 40 INJECTION, POWDER, FOR SOLUTION INTRAMUSCULAR; INTRAVENOUS at 00:47

## 2023-03-27 RX ADMIN — BUDESONIDE AND FORMOTEROL FUMARATE DIHYDRATE 2 PUFF: 80; 4.5 AEROSOL RESPIRATORY (INHALATION) at 05:51

## 2023-03-27 RX ADMIN — INSULIN HUMAN 2 UNITS: 100 INJECTION, SOLUTION PARENTERAL at 13:05

## 2023-03-27 ASSESSMENT — PAIN DESCRIPTION - PAIN TYPE
TYPE: ACUTE PAIN
TYPE: ACUTE PAIN;CHRONIC PAIN
TYPE: ACUTE PAIN;CHRONIC PAIN
TYPE: ACUTE PAIN

## 2023-03-27 ASSESSMENT — ENCOUNTER SYMPTOMS
SORE THROAT: 0
WEAKNESS: 0
BACK PAIN: 0
SHORTNESS OF BREATH: 1
HEADACHES: 0
NAUSEA: 0
DEPRESSION: 0
HEMOPTYSIS: 0
FOCAL WEAKNESS: 0
HEARTBURN: 0
ABDOMINAL PAIN: 0
DIARRHEA: 0
WHEEZING: 0
MYALGIAS: 0
CONSTIPATION: 0
BLURRED VISION: 0
VOMITING: 0
FALLS: 0
EYES NEGATIVE: 1
FEVER: 0
DOUBLE VISION: 0
CHILLS: 0
COUGH: 0
DIZZINESS: 0
NERVOUS/ANXIOUS: 0
SPUTUM PRODUCTION: 0
PALPITATIONS: 0
BRUISES/BLEEDS EASILY: 0
WEIGHT LOSS: 0

## 2023-03-27 ASSESSMENT — LIFESTYLE VARIABLES: SUBSTANCE_ABUSE: 0

## 2023-03-27 NOTE — PROGRESS NOTES
[facility] ICU Progress Note      Admit Date: 3/23/2023    Resident(s): Carmina Guzman M.D.   Attending:  MICAH JOSEPH/ Dr. Salomon    Patient ID:    Name:  Naomy Vargas     YOB: 1950  Age:  73 y.o.  female   MRN:  [id number]    Hospital Course (carried forward and updated):  Naomy Vargas is a 73 y.o. female with history with a PMHx of non-oxygen dependent COPD (40.5 pack-year history; quit smoking 6 years ago), HTN, DLD who was admitted 3/23 after presenting with one month of increasing dyspnea on exertion that worsened the past several days associated with subjective fever and nonproductive cough. Seen by PCP with CT chest 3/7/2023 which demonstrated interval development of diffuse interstitial septal thickening and some groundglass infiltrates with a peripheral predominance suggestive of a interstitial lung disease which has not yet been worked up or followed up on. Admitted to the ICU for sepsis secondary to presumed underlying pulmonary infection complicated by acute hypoxemic respiratory failure requiiring high dose steroids and antibiotics.     Consultants:  Critical Care      Interval Events:    3/24:increase methylprednisolone from 80->125mg Q6H  Continue ceftriaxone and azithromycin.  High flow nasal cannula.  3/25: continue current dose of methylprednisolone.  Continue antibiotics.  High flow nasal cannula.  Keep ICU.  3/26: Taper methylprednisolone.  Continue antibiotics.  Keep ICU.  High flow nasal cannula.  3/27: Taper methylprednisolone. Continue antibiotics. Continue HFNC. Pending ILD workup    NEURO:   AAOx4, in no acute distress  CARDIOVASC:  P: 68-73, SBP: 157-111   RESPIRATORY:  HFNC  GI/NUTRITION:  Regular  RENAL/FLUID/LYTES: I/O: +290  HEME/ONC:   Leukocytosis  INFECTIOUS D:  Continue C3  ENDOCRINE:   Hyperglycemia    Vitals Range last 24h:  Temp:  [36.4 °C (97.5 °F)-36.6 °C (97.9 °F)] 36.4 °C (97.6 °F)  Pulse:  [68-88] 69  Resp:  [16-41] 31  BP:  (111-157)/(59-85) 121/65  SpO2:  [88 %-94 %] 93 %      Intake/Output Summary (Last 24 hours) at 3/27/2023 1417  Last data filed at 3/27/2023 1200  Gross per 24 hour   Intake 790 ml   Output 500 ml   Net 290 ml        Review of Systems   Constitutional:  Negative for chills, fever, malaise/fatigue and weight loss.   HENT:  Negative for congestion and sore throat.    Eyes:  Negative for blurred vision and double vision.   Respiratory:  Positive for shortness of breath. Negative for cough and wheezing.    Cardiovascular:  Negative for chest pain and palpitations.   Gastrointestinal:  Negative for abdominal pain, constipation, diarrhea, heartburn, nausea and vomiting.   Genitourinary:  Negative for dysuria, frequency and urgency.   Musculoskeletal:  Negative for falls, joint pain and myalgias.   Skin:  Negative for rash.   Neurological:  Negative for dizziness, weakness and headaches.   Psychiatric/Behavioral:  Negative for depression, substance abuse and suicidal ideas. The patient is not nervous/anxious.    All other systems reviewed and are negative.    PHYSICAL EXAM:  Vitals:    03/27/23 1000 03/27/23 1100 03/27/23 1129 03/27/23 1200   BP: (!) 146/77  139/73 121/65   Pulse: 78 87 79 69   Resp: (!) 24 (!) 32 (!) 41 (!) 31   Temp:    36.4 °C (97.6 °F)   TempSrc:    Temporal   SpO2: 91% 92% 93% 93%   Weight:       Height:        Body mass index is 25.09 kg/m².    O2 therapy: Pulse Oximetry: 93 %, O2 (LPM): 40, O2 Delivery Device: Heated High Flow Nasal Cannula    Date 03/27/23 0700 - 03/28/23 0659   Shift 3443-7326 6320-3494 2221-5982 24 Hour Total   INTAKE   Shift Total       OUTPUT   Urine 100   100     Output (mL) (External Urinary Catheter (Female Wick)) 100   100   Emesis 0   0     Emesis 0   0   Stool         Number of Times Stooled 1 x   1 x   Shift Total 100   100   NET -100   -100        Physical Exam  Vitals and nursing note reviewed.   Constitutional:       Appearance: Normal appearance. She is  ill-appearing.   HENT:      Head: Normocephalic and atraumatic.      Mouth/Throat:      Mouth: Mucous membranes are dry.      Pharynx: No oropharyngeal exudate or posterior oropharyngeal erythema.   Eyes:      Conjunctiva/sclera: Conjunctivae normal.   Cardiovascular:      Rate and Rhythm: Normal rate and regular rhythm.      Pulses: Normal pulses.      Heart sounds: Normal heart sounds. No murmur heard.    No friction rub. No gallop.   Pulmonary:      Effort: Pulmonary effort is normal. No respiratory distress.      Breath sounds: Normal breath sounds. No stridor. No wheezing, rhonchi or rales.      Comments: On HFNC  Abdominal:      General: Abdomen is flat. Bowel sounds are normal. There is no distension.      Palpations: Abdomen is soft.      Tenderness: There is no abdominal tenderness.   Musculoskeletal:         General: No swelling or tenderness. Normal range of motion.      Cervical back: Neck supple.      Right lower leg: No edema.      Left lower leg: No edema.   Skin:     General: Skin is warm and dry.      Capillary Refill: Capillary refill takes less than 2 seconds.      Findings: No bruising or rash.   Neurological:      Mental Status: She is alert and oriented to person, place, and time. Mental status is at baseline.      Cranial Nerves: No cranial nerve deficit.      Sensory: No sensory deficit.      Coordination: Coordination normal.   Psychiatric:         Mood and Affect: Mood normal.         Behavior: Behavior normal.           Recent Labs     03/25/23  0101 03/26/23 0115 03/27/23  0048   SODIUM 133* 134* 136   POTASSIUM 4.1 3.9 4.3   CHLORIDE 105 106 106   CO2 17* 18* 19*   BUN 30* 32* 31*   CREATININE 1.00 0.92 0.86   MAGNESIUM 2.4 2.3 2.3   PHOSPHORUS 3.3 3.4 3.1   CALCIUM 8.0* 8.0* 8.0*     Recent Labs     03/25/23  0101 03/26/23  0115 03/27/23  0048   ALTSGPT 27 27 27   ASTSGOT 29 23 27   ALKPHOSPHAT 135* 123* 109*   TBILIRUBIN 0.7 0.6 0.6   GLUCOSE 185* 166* 162*     Recent Labs      03/25/23  0101 03/26/23  0115 03/27/23  0048   RBC 4.18* 4.02* 4.15*   HEMOGLOBIN 13.2 12.5 12.6   HEMATOCRIT 40.2 37.9 40.2   PLATELETCT 167 168 196     Recent Labs     03/25/23  0101 03/26/23  0115 03/27/23  0048   WBC 17.4* 15.6* 13.2*   NEUTSPOLYS 87.60* 90.90* 90.60*   LYMPHOCYTES 5.70* 4.10* 4.50*   MONOCYTES 5.60 4.40 4.30   EOSINOPHILS 0.00 0.00 0.00   BASOPHILS 0.20 0.20 0.10   ASTSGOT 29 23 27   ALTSGPT 27 27 27   ALKPHOSPHAT 135* 123* 109*   TBILIRUBIN 0.7 0.6 0.6       Meds:   methylPREDNISolone  62.4 mg      ipratropium-albuterol  3 mL      melatonin  5 mg      albuterol  2.5 mg      insulin regular  2-9 Units      And    dextrose bolus  25 g      omeprazole  20 mg      senna-docusate  2 Tablet      And    polyethylene glycol/lytes  1 Packet      And    magnesium hydroxide  30 mL      And    bisacodyl  10 mg      Respiratory Therapy Consult        enoxaparin (LOVENOX) injection  40 mg      labetalol  10 mg      ipratropium-albuterol  3 mL      cefTRIAXone (ROCEPHIN) IV  1,000 mg      budesonide-formoterol  2 Puff      levothyroxine  100 mcg      lisinopril  20 mg      rosuvastatin  10 mg          Procedures:  None    Imaging:  DX-CHEST-PORTABLE (1 VIEW)   Final Result      1.  Stable bilateral coarse interstitial opacities which could be related to chronic lung disease or pneumonitis.      DX-CHEST-PORTABLE (1 VIEW)   Final Result         1.  Pulmonary edema and/or infiltrates are identified, which are stable since the prior exam.   2.  Trace bilateral pleural effusions   3.  Cardiomegaly   4.  Atherosclerosis      EC-ECHOCARDIOGRAM COMPLETE W/O CONT   Final Result      DX-CHEST-PORTABLE (1 VIEW)   Final Result         1.  Pulmonary edema and/or infiltrates are identified, which are stable since the prior exam.   2.  Trace bilateral pleural effusions   3.  Cardiomegaly   4.  Atherosclerosis      DX-CHEST-PORTABLE (1 VIEW)   Final Result      1.  Diffuse bilateral pulmonary infiltrates. Differential  diagnosis includes atypical pneumonia, interstitial edema and chronic interstitial lung disease.      2.  Cardiomegaly.      3.  Small bilateral pleural effusions.          ASSESSEMENT and PLAN:  73-year-old female, history of non-oxygen dependent COPD with 40.5 pack years quit smoking six years ago, HTN, DLD who was admitted 3/23 after presenting with one month of increasing dyspnea on exertion that worsened the past several days associated with subjective fever and nonproductive cough. Seen by PCP with CT chest 3/7/2023 which demonstrating interval development of diffuse interstitial septal thickening and some groundglass infiltrates with a peripheral predominance suggestive of a interstitial lung disease which has not yet been worked up or followed up on.Admitted to the ICU for sepsis secondary to presumed underlying pulmonary infection complicated by acute hypoxemic respiratory failure requiiring high dose steroids and antibiotics.    * Acute hypoxemic respiratory failure (HCC)- (present on admission)  Assessment & Plan  Currently requiring HiFLO. Resp panel negative. TTE this admission with LVEF 55% and RVSP 35 mmHg indicating mild pulm HTN possible 2/2 pulm source  Suspicious for progressive ILD, UIP, AIP, acute eosinophilic pneumonia, HSP, , PLCH, NSIP,  other   No history of vasculitis/autoimmune disease  S/p azithro x 3 days    -Ceftriaxone (through 3/28/23)  -Solumedrol 125mg IV Q6H- can likely wean off high dose steroids starting 3/26  -Continue production, HFNC, noninvasive BiPAP if needed  -Follow closely in ICU, high risk of respiratory decompensation  -Start serologic work-up for ILD  -When clinically improves, consider bronchoscopy and transbronchial biopsy versus open lung biopsy if no clear diagnosis  -Wean O2 as able, appreciate RT assistance  -Aspiration precautions in place  -Will likely need to be sent home on steroid taper-- will possibly require PJP ppx with Bactrim at discharge  -Will  require follow up with pulmonology as outpt     ILD (interstitial lung disease) (HCC)  Assessment & Plan  Concern for possible ILD. 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  - Will initiate steroid therapy as above  - Workup at this time includes: EBEN (+ve), RF, hypersensitivity panels, PJP. Will evaluate further based on results     Pulmonary hypertension (HCC)  Assessment & Plan  Mild, TTE this admission with RVSP 35 mmHg   Likely 2/2 primary pulmonary etiology (type 3 pulm HTN)    Hypertension  Assessment & Plan  Controlled  -continue home Lisinopril  - Labetalol PRN if SBP >180    Pleural effusion  Assessment & Plan  Noted on CXR in addition to elevated BNP. TTE with preserved EF this admission (55%).    Sepsis (HCC)  Assessment & Plan  Improved; Source is Likely pulmonary  -Stopped Azithromycin, continue Ceftriaxone 3/24-3/28  -Off Vasopressors    Chronic obstructive pulmonary disease (HCC)- (present on admission)  Assessment & Plan  Not on home O2; Former smoker 3/4 PPD for 54 years, quit six years ago. On home Symbicort and Albuterol  - Cont home inhalers, duo nebs prn  - Solumedrol  - Currently on HFNC    Hyperglycemia  Assessment & Plan  Pt gluc consistently >150  Likely 2/2 high dose steroids as above v acute illness  -SSI  -hypoglycemia protocol   -A1c    Nonrheumatic tricuspid valve regurgitation  Assessment & Plan  Mild on TTE this admission    Leukocytosis  Assessment & Plan  Most likely 2/2 sepsis, ILD  -Downtrending; continue to monitor    Elevated troponin  Assessment & Plan  downtrending likely from demand ischemia. No need to repeat.    Thrombocytopenia, unspecified (HCC)- (present on admission)  Assessment & Plan  Resolved. Suspect secondary to sepsis. No bleeding evident. Continue to monitor.    Hypothyroidism- (present on admission)  Assessment & Plan  Cont home synthroid         DISPO: ICU    CODE STATUS: FULL    Quality Measures:  Feeding: Oral  Analgesia:  None  Sedation: None  Thromboprophylaxis: Lovenox  Head of bed: >30 degrees  Ulcer prophylaxis: Omeprazole  Glycemic control: ISS  Bowel care: bowel regimen: yes  Indwelling lines: pIV x2, purwick  Deescalation of antibiotics: C3      Carmina Guzman M.D.

## 2023-03-27 NOTE — CARE PLAN
The patient is Watcher - Medium risk of patient condition declining or worsening    Shift Goals  Clinical Goals: optimize o2,tolerates activity, remains hemodynamically stable  Patient Goals: ret  Family Goals: kimi    Progress made toward(s) clinical / shift goals:  ***    Patient is not progressing towards the following goals:

## 2023-03-27 NOTE — CARE PLAN
Problem: Humidified High Flow Nasal Cannula  Goal: Maintain adequate oxygenation dependent on patient condition  Description: Target End Date:  resolve prior to discharge or when underlying condition is resolved/stabilized    1.  Implement humidified high flow oxygen therapy  2.  Titrate high flow oxygen to maintain appropriate SpO2  Outcome: Not Progressing         Respiratory Update    Treatment modality / Frequency    DuoNeb/QID  HFNC/Continuous   40L/70%    Pt tolerating current treatments well with no adverse reactions.

## 2023-03-27 NOTE — DISCHARGE PLANNING
Care Transition Team Assessment    Information Source  Orientation Level: Oriented X4  Information Given By: Patient  Informant's Name: Naomy Vargas  Who is responsible for making decisions for patient? : Patient    Readmission Evaluation  Is this a readmission?: No    Elopement Risk  Legal Hold: No  Ambulatory or Self Mobile in Wheelchair: No-Not an Elopement Risk  Disoriented: No  Psychiatric Symptoms: None  History of Wandering: No  Elopement this Admit: No  Vocalizing Wanting to Leave: No  Displays Behaviors, Body Language Wanting to Leave: No-Not at Risk for Elopement  Elopement Risk: Not at Risk for Elopement    Interdisciplinary Discharge Planning  Does Admitting Nurse Feel This Could be a Complex Discharge?: No  Primary Care Physician: Prema ESTEVEZ  Lives with - Patient's Self Care Capacity: Spouse  Support Systems: Spouse / Significant Other  Housing / Facility: 1 Story House (2 stairs)  Do You Take your Prescribed Medications Regularly: Yes  Able to Return to Previous ADL's: Yes  Mobility Issues: No  Prior Services: Home-Independent  Patient Prefers to be Discharged to:: Home  Assistance Needed: No  Durable Medical Equipment: Not Applicable    Discharge Preparedness  What is your plan after discharge?: Home with help  What are your discharge supports?: Spouse  Prior Functional Level: Independent with Activities of Daily Living, Independent with Medication Management, Ambulatory  Difficulty with ADLs: None  Difficulty with IADLs: None    Functional Assessment  Prior Functional Level: Independent with Activities of Daily Living, Independent with Medication Management, Ambulatory    Finances  Financial Barriers to Discharge: No  Prescription Coverage: Yes ([facility], [address], [place] [id number],  [phone])    Vision / Hearing Impairment  Vision Impairment : Yes  Right Eye Vision: Wears Glasses  Left Eye Vision: Wears Glasses    Values / Beliefs / Concerns  Values / Beliefs Concerns :  No    Advance Directive  Advance Directive?: None  Advance Directive offered?: AD Booklet given    Domestic Abuse  Have you ever been the victim of abuse or violence?: No    Psychological Assessment  History of Substance Abuse: None  History of Psychiatric Problems: No  Non-compliant with Treatment: No    Discharge Risks or Barriers  Discharge risks or barriers?: Complex medical needs  Patient risk factors: Cognitive / sensory / physical deficit    Anticipated Discharge Information  Discharge Disposition: Discharged to home/self care (01)  Discharge Address: [address] [place] [address]  Discharge Contact Phone Number: [phone]    RN RICK met with patient at bedside, introduced self and role. Patient lives with spouse and roommate (who is there only on weekends). Patient is independent with ADLs/IADLs. Patient has no DME or respiratory equipment.      Patient has [facility] and will need an IMM prior to discharge.      Cecy XIAO RN Case Manager  [phone]

## 2023-03-27 NOTE — PROGRESS NOTES
Monitor summary:  Sinus rhythm with rates from the 60s to 80s  Frequent PVCs and PACs  .13/.10/.38

## 2023-03-27 NOTE — CARE PLAN
The patient is Watcher - Medium risk of patient condition declining or worsening    Shift Goals  Clinical Goals: maintain airway, wean high flow settings, monitor blood glucose  Patient Goals: Rest  Family Goals: CASIMIRO    Progress made toward(s) clinical / shift goals:    Problem: Pain - Standard  Goal: Alleviation of pain or a reduction in pain to the patient’s comfort goal  Outcome: Progressing  Note: Pt remains pain free     Problem: Respiratory  Goal: Patient will achieve/maintain optimum respiratory ventilation and gas exchange  Outcome: Progressing  Flowsheets  Taken 3/27/2023 0400  O2 Delivery Device: Heated High Flow Nasal Cannula  Taken 3/26/2023 2000  Incentive Spirometer:   Effective   Self Motivated  Incentive Spirometer Volume: 1000 mL  Deep Breathe and Cough: Performs Correctly  Note: Pt still requires O2 support via heat high flow nasal cannula.        Patient is not progressing towards the following goals:

## 2023-03-27 NOTE — CARE PLAN
The patient is Watcher - Medium risk of patient condition declining or worsening    Shift Goals  Clinical Goals: keep hemodynamically stable, wean 02, mobilize as tolerated  Patient Goals: Get out off the unit  Family Goals: CASIMIRO    Progress made toward(s) clinical / shift goals:      -VSS, remains on HFNC 40/70%, able to sit on the chair for meals-well tolerated    Problem: Knowledge Deficit - Standard  Goal: Patient and family/care givers will demonstrate understanding of plan of care, disease process/condition, diagnostic tests and medications  Outcome: Progressing     Problem: Pain - Standard  Goal: Alleviation of pain or a reduction in pain to the patient’s comfort goal  Outcome: Progressing     Problem: Skin Integrity  Goal: Skin integrity is maintained or improved  Outcome: Progressing     Problem: Psychosocial  Goal: Patient's ability to verbalize feelings about condition will improve  Outcome: Progressing     Problem: Communication  Goal: The ability to communicate needs accurately and effectively will improve  Outcome: Progressing     Problem: Hemodynamics  Goal: Patient's hemodynamics, fluid balance and neurologic status will be stable or improve  Outcome: Progressing     Problem: Respiratory  Goal: Patient will achieve/maintain optimum respiratory ventilation and gas exchange  Outcome: Progressing     Problem: Risk for Aspiration  Goal: Patient's risk for aspiration will be absent or decrease  Outcome: Progressing     Problem: Bowel Elimination  Goal: Establish and maintain regular bowel function  Outcome: Progressing     Problem: Fall Risk  Goal: Patient will remain free from falls  Outcome: Progressing

## 2023-03-27 NOTE — PROGRESS NOTES
CRITICAL CARE MEDICINE ATTENDING PROGRESS NOTE    Date of admission  3/23/2023    Chief Complaint  73 y.o. female admitted 3/23/2023 with acute respiratory failure.  She has a history of COPD, HTN, dyslipidemia    Hospital Course      3/24 -    increase methylprednisolone.  Continue ceftriaxone and azithromycin.  High flow nasal cannula.  3/25 -    continue current dose of methylprednisolone.  Continue antibiotics.  High flow nasal cannula.  Keep ICU.  3/26 -    Taper methylprednisolone.  Continue antibiotics.  Keep ICU.  High flow nasal cannula.  3/27 HFNC 70%/40L, IV Medrol, antibiotics      Interval Problem Update  Reviewed last 24 hour events:    A&O x4  Follows well, much bewtter vs admit  SR 70s, PACs  SBp 120-150s  UO adequate  ARB + 2.65 L  B/c 31/0.86  HFNC 40/70  Sats 90-93%  WOB low  CXR no change  CT ILD?  IS 1000  Albuterol neb 4 times daily  Symbicort every 12  Lovenox  Tm 97.9  WBC 13.2, better  Solu-Medrol 62.6 Q6H  PPI on steroids  Glucose: 181, 137, 150, 137, 161  K 0.3, Mg 2.3, Phos 3.1  HCO3 19, trending up AG 11  PI, statin, ACE, SSI, Lovenox    Wean steroids  Check markers    Serial follow-up, O2 saturations better, work of breathing the same, hemodynamics unchanged     YESTERDAY  SR  HFNC  98.1  +800 mL in the last 24  +2366 mL since admission      Review of Systems  Review of Systems   Constitutional:  Positive for malaise/fatigue. Negative for chills and fever.   HENT:  Negative for congestion and sore throat.    Eyes: Negative.    Respiratory:  Positive for shortness of breath. Negative for hemoptysis and sputum production.    Cardiovascular:  Negative for chest pain and palpitations.   Gastrointestinal:  Negative for abdominal pain, nausea and vomiting.   Genitourinary:  Negative for hematuria.   Musculoskeletal:  Negative for back pain.   Neurological:  Negative for focal weakness and headaches.   Endo/Heme/Allergies:  Does not bruise/bleed easily.   Psychiatric/Behavioral:  The patient  is not nervous/anxious.      Vital Signs for the last 24 hours  Temp:  [36.4 °C (97.5 °F)-36.6 °C (97.9 °F)] 36.4 °C (97.6 °F)  Pulse:  [68-88] 72  Resp:  [16-41] 22  BP: (111-157)/() 139/101  SpO2:  [88 %-97 %] 97 %    Hemodynamic parameters for the last 24 hours       Vent Settings for the last 24 hours       Physical Exam  Physical Exam  Vitals reviewed.   Constitutional:       Appearance: She is normal weight. She is not diaphoretic.      Comments: [facility]   HENT:      Head: Normocephalic.      Mouth/Throat:      Mouth: Mucous membranes are moist.      Pharynx: Oropharynx is clear.   Eyes:      General: No scleral icterus.     Pupils: Pupils are equal, round, and reactive to light.   Cardiovascular:      Rate and Rhythm: Normal rate and regular rhythm.      Heart sounds: No murmur heard.     Comments: Sinus rhythm  Pulmonary:      Breath sounds: Rales (Significant improvement in coarse bibasilar crackles.  Fine, Velcro-like crackles persist.) present. No wheezing.   Abdominal:      General: There is no distension.      Tenderness: There is no abdominal tenderness. There is no right CVA tenderness or left CVA tenderness.   Musculoskeletal:      Cervical back: Normal range of motion.      Right lower leg: No edema.      Left lower leg: No edema.   Skin:     General: Skin is warm.      Capillary Refill: Capillary refill takes less than 2 seconds.   Neurological:      General: No focal deficit present.      Mental Status: She is alert and oriented to person, place, and time.      Cranial Nerves: No cranial nerve deficit.   Psychiatric:         Mood and Affect: Mood normal.         Behavior: Behavior normal. Behavior is cooperative.         Thought Content: Thought content normal.       Medications  Current Facility-Administered Medications   Medication Dose Route Frequency Provider Last Rate Last Admin    methylPREDNISolone (SOLU-MEDROL) 40 MG injection 62.4 mg  62.4 mg Intravenous Q8HRS Perry Salomon M.D.    62.4 mg at 03/27/23 1304    ipratropium-albuterol (DUONEB) nebulizer solution  3 mL Nebulization 4X/DAY (RT) Perry Salomon M.D.   3 mL at 03/27/23 1412    melatonin tablet 5 mg  5 mg Oral Nightly Bhavesh Muhammad M.D.   5 mg at 03/26/23 1946    albuterol (PROVENTIL) 2.5mg/0.5ml nebulizer solution 2.5 mg  2.5 mg Nebulization Q2HRS PRN (RT) Jos Beltran M.D.        insulin regular (HumuLIN R,NovoLIN R) injection  2-9 Units Subcutaneous 4X/DAY ACHS Jos Beltran M.D.   2 Units at 03/27/23 1305    And    dextrose 10 % BOLUS 25 g  25 g Intravenous Q15 MIN PRN Jos Beltran M.D.        omeprazole (PRILOSEC) capsule 20 mg  20 mg Oral DAILY Ted Foss Jr., M.D.   20 mg at 03/27/23 0550    senna-docusate (PERICOLACE or SENOKOT S) 8.6-50 MG per tablet 2 Tablet  2 Tablet Oral BID Bhavesh Muhammad M.D.   2 Tablet at 03/26/23 1749    And    polyethylene glycol/lytes (MIRALAX) PACKET 1 Packet  1 Packet Oral QDAY PRN Bhavesh Muhammad M.D.        And    magnesium hydroxide (MILK OF MAGNESIA) suspension 30 mL  30 mL Oral QDAY PRN Bhavesh Muhammad M.D.        And    bisacodyl (DULCOLAX) suppository 10 mg  10 mg Rectal QDAY PRN Bhavesh Muhammad M.D.        Respiratory Therapy Consult   Nebulization Continuous RT Bhavesh Muhammad M.D.        enoxaparin (Lovenox) inj 40 mg  40 mg Subcutaneous DAILY AT 1800 Bhavesh Muhammad M.D.   40 mg at 03/26/23 1748    labetalol (NORMODYNE/TRANDATE) injection 10 mg  10 mg Intravenous Q4HRS PRN Bhavesh Muhammad M.D.        ipratropium-albuterol (DUONEB) nebulizer solution  3 mL Nebulization Q4H PRN (RT) Bhavesh Muhammad M.D.   3 mL at 03/25/23 0224    cefTRIAXone (Rocephin) syringe 1,000 mg  1,000 mg Intravenous Q24HRS Bhavesh Muhammad M.D.   1,000 mg at 03/27/23 0550    budesonide-formoterol (SYMBICORT) 80-4.5 MCG/ACT inhaler 2 Puff  2 Puff Inhalation BID Bhavesh Muhammad M.D.   2 Puff at 03/27/23 0551    levothyroxine (SYNTHROID) tablet 100 mcg   100 mcg Oral QAM AC Bhavesh Muhammad M.D.   100 mcg at 03/27/23 0550    lisinopril (PRINIVIL) tablet 20 mg  20 mg Oral DAILY Bhavesh Muhammad M.D.   20 mg at 03/27/23 0550    rosuvastatin (CRESTOR) tablet 10 mg  10 mg Oral Q EVENING Bhavesh Muhammad M.D.   10 mg at 03/26/23 1749       Fluids    Intake/Output Summary (Last 24 hours) at 3/27/2023 1458  Last data filed at 3/27/2023 1200  Gross per 24 hour   Intake 1040 ml   Output 500 ml   Net 540 ml       Laboratory      Recent Labs     03/25/23  0101 03/26/23  0115 03/27/23  0048   SODIUM 133* 134* 136   POTASSIUM 4.1 3.9 4.3   CHLORIDE 105 106 106   CO2 17* 18* 19*   BUN 30* 32* 31*   CREATININE 1.00 0.92 0.86   MAGNESIUM 2.4 2.3 2.3   PHOSPHORUS 3.3 3.4 3.1   CALCIUM 8.0* 8.0* 8.0*     Recent Labs     03/25/23  0101 03/26/23  0115 03/27/23  0048   ALTSGPT 27 27 27   ASTSGOT 29 23 27   ALKPHOSPHAT 135* 123* 109*   TBILIRUBIN 0.7 0.6 0.6   GLUCOSE 185* 166* 162*     Recent Labs     03/25/23  0101 03/26/23  0115 03/27/23  0048   WBC 17.4* 15.6* 13.2*   NEUTSPOLYS 87.60* 90.90* 90.60*   LYMPHOCYTES 5.70* 4.10* 4.50*   MONOCYTES 5.60 4.40 4.30   EOSINOPHILS 0.00 0.00 0.00   BASOPHILS 0.20 0.20 0.10   ASTSGOT 29 23 27   ALTSGPT 27 27 27   ALKPHOSPHAT 135* 123* 109*   TBILIRUBIN 0.7 0.6 0.6     Recent Labs     03/25/23  0101 03/26/23  0115 03/27/23  0048   RBC 4.18* 4.02* 4.15*   HEMOGLOBIN 13.2 12.5 12.6   HEMATOCRIT 40.2 37.9 40.2   PLATELETCT 167 168 196       Imaging  X-Ray:  I have personally reviewed the images and compared with prior images.  EKG:  I have personally reviewed the images and compared with prior images.  CT:    Reviewed  Echo:   Reviewed    Assessment/Plan      Acute hypoxemic respiratory failure  She requires very high supplemental oxygen on a high flow nasal cannula   High risk of deterioration   WOB and O2 requirements improved   Monitoring for need to intubate   BiPAP for rescue first    Abnormal CT of the thorax  Evidence of interstitial lung  disease - extensive differential diagnosis   She has 2 dogs which are not new pets  No cats or birds - she has an outside bird feeder for hummingbirds  Neighbors have horses, goats, pigs, chickens, roosters - does not spend any significant time around their pens  No evidence of collagen vascular or rheumatologic disease on exam - Negative Hx as well   Urinalysis without an active sediment  RA factor normal, MPO normal, serine protease 3 negative   ESR and CRP slightly elevated   Negative screening for influenza, RSV and SARS-CoV-2   Negative respiratory viral panel   Blood cultures negative so far from 3/23  Clinically, cannot exclude an acute infectious process with pneumonia - procalcitonin elevated (for what it's worth ) - continue empiric ceftriaxone and azithromycin   Taper methylprednisolone, 62.5 mg IV every 8 hours   Pulmonary consult when she transfers out of ICU    COPD   No acute exacerbation   Changed to DuoNeb 4 times daily and PRN    Primary hypertension   Goal SBP less than 160    Dyslipidemia   Continue statin      VTE:  Lovenox  Ulcer: Not Indicated  Lines: None    I have performed a physical exam and reviewed and updated ROS and Plan today (3/27/2023). In review of yesterday's note (3/26/2023), there are no changes except as documented above.     I have assessed and reassessed her respiratory status.  She is on very high supplemental oxygen requiring high flow nasal cannula.  She is at increased risk for worsening respiratory system dysfunction.    Discussed patient condition and risk of morbidity and/or mortality with RN, RT, Pharmacy, [employer] resident, Charge nurse / hot rounds, and Patient    The patient remains critically ill.  Critical care time = 45  minutes in directly providing and coordinating critical care and extensive data review.  No time overlap and excludes procedures.

## 2023-03-28 LAB
ALBUMIN SERPL BCP-MCNC: 3 G/DL (ref 3.2–4.9)
ALBUMIN/GLOB SERPL: 0.9 G/DL
ALP SERPL-CCNC: 99 U/L (ref 30–99)
ALT SERPL-CCNC: 34 U/L (ref 2–50)
ANION GAP SERPL CALC-SCNC: 10 MMOL/L (ref 7–16)
AST SERPL-CCNC: 23 U/L (ref 12–45)
BACTERIA BLD CULT: NORMAL
BACTERIA BLD CULT: NORMAL
BASOPHILS # BLD AUTO: 0.1 % (ref 0–1.8)
BASOPHILS # BLD: 0.01 K/UL (ref 0–0.12)
BILIRUB SERPL-MCNC: 0.6 MG/DL (ref 0.1–1.5)
BUN SERPL-MCNC: 30 MG/DL (ref 8–22)
CALCIUM ALBUM COR SERPL-MCNC: 8.9 MG/DL (ref 8.5–10.5)
CALCIUM SERPL-MCNC: 8.1 MG/DL (ref 8.5–10.5)
CHLORIDE SERPL-SCNC: 105 MMOL/L (ref 96–112)
CO2 SERPL-SCNC: 19 MMOL/L (ref 20–33)
CREAT SERPL-MCNC: 0.8 MG/DL (ref 0.5–1.4)
DSDNA AB TITR SER CLIF: NORMAL {TITER}
ENA JO1 AB TITR SER: 2 AU/ML (ref 0–40)
ENA SCL70 IGG SER QL: 1 AU/ML (ref 0–40)
ENA SM IGG SER-ACNC: 1 AU/ML (ref 0–40)
ENA SS-B IGG SER IA-ACNC: 1 AU/ML (ref 0–40)
EOSINOPHIL # BLD AUTO: 0 K/UL (ref 0–0.51)
EOSINOPHIL NFR BLD: 0 % (ref 0–6.9)
ERYTHROCYTE [DISTWIDTH] IN BLOOD BY AUTOMATED COUNT: 46.7 FL (ref 35.9–50)
GFR SERPLBLD CREATININE-BSD FMLA CKD-EPI: 78 ML/MIN/1.73 M 2
GLOBULIN SER CALC-MCNC: 3.3 G/DL (ref 1.9–3.5)
GLUCOSE BLD STRIP.AUTO-MCNC: 123 MG/DL (ref 65–99)
GLUCOSE BLD STRIP.AUTO-MCNC: 138 MG/DL (ref 65–99)
GLUCOSE BLD STRIP.AUTO-MCNC: 139 MG/DL (ref 65–99)
GLUCOSE BLD STRIP.AUTO-MCNC: 151 MG/DL (ref 65–99)
GLUCOSE SERPL-MCNC: 133 MG/DL (ref 65–99)
HCT VFR BLD AUTO: 42.3 % (ref 37–47)
HGB BLD-MCNC: 13.7 G/DL (ref 12–16)
IMM GRANULOCYTES # BLD AUTO: 0.06 K/UL (ref 0–0.11)
IMM GRANULOCYTES NFR BLD AUTO: 0.5 % (ref 0–0.9)
LYMPHOCYTES # BLD AUTO: 0.61 K/UL (ref 1–4.8)
LYMPHOCYTES NFR BLD: 5.1 % (ref 22–41)
MAGNESIUM SERPL-MCNC: 2.2 MG/DL (ref 1.5–2.5)
MCH RBC QN AUTO: 31.3 PG (ref 27–33)
MCHC RBC AUTO-ENTMCNC: 32.4 G/DL (ref 33.6–35)
MCV RBC AUTO: 96.6 FL (ref 81.4–97.8)
MONOCYTES # BLD AUTO: 0.71 K/UL (ref 0–0.85)
MONOCYTES NFR BLD AUTO: 6 % (ref 0–13.4)
NEUTROPHILS # BLD AUTO: 10.53 K/UL (ref 2–7.15)
NEUTROPHILS NFR BLD: 88.3 % (ref 44–72)
NRBC # BLD AUTO: 0 K/UL
NRBC BLD-RTO: 0 /100 WBC
PHOSPHATE SERPL-MCNC: 3.5 MG/DL (ref 2.5–4.5)
PLATELET # BLD AUTO: 172 K/UL (ref 164–446)
PMV BLD AUTO: 9.8 FL (ref 9–12.9)
POTASSIUM SERPL-SCNC: 4.5 MMOL/L (ref 3.6–5.5)
PROT SERPL-MCNC: 6.3 G/DL (ref 6–8.2)
RBC # BLD AUTO: 4.38 M/UL (ref 4.2–5.4)
SIGNIFICANT IND 70042: NORMAL
SIGNIFICANT IND 70042: NORMAL
SITE SITE: NORMAL
SITE SITE: NORMAL
SODIUM SERPL-SCNC: 134 MMOL/L (ref 135–145)
SOURCE SOURCE: NORMAL
SOURCE SOURCE: NORMAL
SSA52 R0ENA AB IGG Q0420: 22 AU/ML (ref 0–40)
SSA60 R0ENA AB IGG Q0419: 6 AU/ML (ref 0–40)
U1 SNRNP IGG SER QL: 5 UNITS (ref 0–19)
WBC # BLD AUTO: 11.9 K/UL (ref 4.8–10.8)

## 2023-03-28 PROCEDURE — 85025 COMPLETE CBC W/AUTO DIFF WBC: CPT

## 2023-03-28 PROCEDURE — 84100 ASSAY OF PHOSPHORUS: CPT

## 2023-03-28 PROCEDURE — A9270 NON-COVERED ITEM OR SERVICE: HCPCS | Performed by: GENERAL PRACTICE

## 2023-03-28 PROCEDURE — A9270 NON-COVERED ITEM OR SERVICE: HCPCS | Performed by: INTERNAL MEDICINE

## 2023-03-28 PROCEDURE — 700102 HCHG RX REV CODE 250 W/ 637 OVERRIDE(OP): Performed by: INTERNAL MEDICINE

## 2023-03-28 PROCEDURE — 80053 COMPREHEN METABOLIC PANEL: CPT

## 2023-03-28 PROCEDURE — 700101 HCHG RX REV CODE 250: Performed by: INTERNAL MEDICINE

## 2023-03-28 PROCEDURE — 82962 GLUCOSE BLOOD TEST: CPT

## 2023-03-28 PROCEDURE — 700111 HCHG RX REV CODE 636 W/ 250 OVERRIDE (IP): Performed by: STUDENT IN AN ORGANIZED HEALTH CARE EDUCATION/TRAINING PROGRAM

## 2023-03-28 PROCEDURE — 99223 1ST HOSP IP/OBS HIGH 75: CPT | Performed by: HOSPITALIST

## 2023-03-28 PROCEDURE — 770000 HCHG ROOM/CARE - INTERMEDIATE ICU *

## 2023-03-28 PROCEDURE — 700102 HCHG RX REV CODE 250 W/ 637 OVERRIDE(OP): Performed by: STUDENT IN AN ORGANIZED HEALTH CARE EDUCATION/TRAINING PROGRAM

## 2023-03-28 PROCEDURE — 99222 1ST HOSP IP/OBS MODERATE 55: CPT | Performed by: INTERNAL MEDICINE

## 2023-03-28 PROCEDURE — 99233 SBSQ HOSP IP/OBS HIGH 50: CPT | Performed by: INTERNAL MEDICINE

## 2023-03-28 PROCEDURE — 94640 AIRWAY INHALATION TREATMENT: CPT

## 2023-03-28 PROCEDURE — 700102 HCHG RX REV CODE 250 W/ 637 OVERRIDE(OP): Performed by: GENERAL PRACTICE

## 2023-03-28 PROCEDURE — A9270 NON-COVERED ITEM OR SERVICE: HCPCS | Performed by: STUDENT IN AN ORGANIZED HEALTH CARE EDUCATION/TRAINING PROGRAM

## 2023-03-28 PROCEDURE — 700111 HCHG RX REV CODE 636 W/ 250 OVERRIDE (IP): Performed by: INTERNAL MEDICINE

## 2023-03-28 PROCEDURE — 83735 ASSAY OF MAGNESIUM: CPT

## 2023-03-28 RX ORDER — AMLODIPINE BESYLATE 5 MG/1
5 TABLET ORAL
Status: DISCONTINUED | OUTPATIENT
Start: 2023-03-28 | End: 2023-03-30 | Stop reason: HOSPADM

## 2023-03-28 RX ORDER — METHYLPREDNISOLONE SODIUM SUCCINATE 40 MG/ML
62.5 INJECTION, POWDER, LYOPHILIZED, FOR SOLUTION INTRAMUSCULAR; INTRAVENOUS EVERY 12 HOURS
Status: DISCONTINUED | OUTPATIENT
Start: 2023-03-28 | End: 2023-03-29

## 2023-03-28 RX ADMIN — ROSUVASTATIN CALCIUM 10 MG: 20 TABLET, FILM COATED ORAL at 17:12

## 2023-03-28 RX ADMIN — IPRATROPIUM BROMIDE AND ALBUTEROL SULFATE 3 ML: .5; 2.5 SOLUTION RESPIRATORY (INHALATION) at 11:00

## 2023-03-28 RX ADMIN — AMLODIPINE BESYLATE 5 MG: 10 TABLET ORAL at 11:33

## 2023-03-28 RX ADMIN — METHYLPREDNISOLONE SODIUM SUCCINATE 62.4 MG: 40 INJECTION, POWDER, FOR SOLUTION INTRAMUSCULAR; INTRAVENOUS at 04:57

## 2023-03-28 RX ADMIN — IPRATROPIUM BROMIDE AND ALBUTEROL SULFATE 3 ML: .5; 2.5 SOLUTION RESPIRATORY (INHALATION) at 06:40

## 2023-03-28 RX ADMIN — Medication 5 MG: at 21:29

## 2023-03-28 RX ADMIN — INSULIN HUMAN 2 UNITS: 100 INJECTION, SOLUTION PARENTERAL at 21:31

## 2023-03-28 RX ADMIN — OMEPRAZOLE 20 MG: 20 CAPSULE, DELAYED RELEASE ORAL at 04:57

## 2023-03-28 RX ADMIN — LISINOPRIL 20 MG: 20 TABLET ORAL at 04:56

## 2023-03-28 RX ADMIN — ENOXAPARIN SODIUM 40 MG: 100 INJECTION SUBCUTANEOUS at 17:12

## 2023-03-28 RX ADMIN — LEVOTHYROXINE SODIUM 100 MCG: 0.1 TABLET ORAL at 05:00

## 2023-03-28 RX ADMIN — CEFTRIAXONE SODIUM 1000 MG: 10 INJECTION, POWDER, FOR SOLUTION INTRAVENOUS at 04:57

## 2023-03-28 RX ADMIN — BUDESONIDE AND FORMOTEROL FUMARATE DIHYDRATE 2 PUFF: 80; 4.5 AEROSOL RESPIRATORY (INHALATION) at 04:57

## 2023-03-28 RX ADMIN — METHYLPREDNISOLONE SODIUM SUCCINATE 62.4 MG: 40 INJECTION, POWDER, FOR SOLUTION INTRAMUSCULAR; INTRAVENOUS at 17:12

## 2023-03-28 ASSESSMENT — ENCOUNTER SYMPTOMS
NAUSEA: 0
MYALGIAS: 0
PALPITATIONS: 0
NECK PAIN: 0
DIARRHEA: 0
HEARTBURN: 0
COUGH: 0
MUSCULOSKELETAL NEGATIVE: 1
POLYDIPSIA: 0
FOCAL WEAKNESS: 0
HEMOPTYSIS: 0
GASTROINTESTINAL NEGATIVE: 1
EYES NEGATIVE: 1
SORE THROAT: 0
FEVER: 0
BACK PAIN: 0
WEIGHT LOSS: 0
DIZZINESS: 0
CHILLS: 0
ABDOMINAL PAIN: 0
NERVOUS/ANXIOUS: 1
CARDIOVASCULAR NEGATIVE: 1
DEPRESSION: 0
BRUISES/BLEEDS EASILY: 0
VOMITING: 0
CONSTIPATION: 0
DOUBLE VISION: 0
FALLS: 0
SHORTNESS OF BREATH: 1
BLURRED VISION: 0
SPUTUM PRODUCTION: 0
HEADACHES: 0
NEUROLOGICAL NEGATIVE: 1
NERVOUS/ANXIOUS: 0
WEAKNESS: 0
WHEEZING: 0

## 2023-03-28 ASSESSMENT — PAIN DESCRIPTION - PAIN TYPE
TYPE: ACUTE PAIN

## 2023-03-28 ASSESSMENT — LIFESTYLE VARIABLES: SUBSTANCE_ABUSE: 0

## 2023-03-28 ASSESSMENT — FIBROSIS 4 INDEX: FIB4 SCORE: 1.52

## 2023-03-28 NOTE — PROGRESS NOTES
Report given to YAMEL Savage for continuity of care.  Patient transferred out to [facility] Rm 631 via wheelchair.

## 2023-03-28 NOTE — PROGRESS NOTES
CRITICAL CARE MEDICINE ATTENDING PROGRESS NOTE    Date of admission  3/23/2023    Chief Complaint  73 y.o. female admitted 3/23/2023 with acute respiratory failure.  She has a history of COPD, HTN, dyslipidemia    Hospital Course      3/24 -    increase methylprednisolone.  Continue ceftriaxone and azithromycin.  High flow nasal cannula.  3/25 -    continue current dose of methylprednisolone.  Continue antibiotics.  High flow nasal cannula.  Keep ICU.  3/26 -    Taper methylprednisolone.  Continue antibiotics.  Keep ICU.  High flow nasal cannula.  3/27 - HFNC 70%/40L, IV Medrol, antibiotics  3/28 - HFNC 60%/35L, IV solumedrol, ABX      Interval Problem Update  Reviewed last 24 hour events:    A&O x4  Feels  better, ARAUJO  SR 60-70s  SBp 110-150s  UO adequate  HFNC 35/60%  Sats 91-97%  WOB low  DuoNeb QID  CXR no film  IS 1000  Tm 97.7  WBC 7.9  Culture neg  Solumedrol  ABX  PPI PPx  EBEN + 1: 640 homogeneous pattern further antibody levels pending  ERP 8.22 on 3/24    Patient pulmonary notes are reviewed back to May 2020 when Dr. Velásquez last saw her with multiple PA visits since then.    Reduce steroids to every 12 hours  Request PT & OT, mobilize as oxygenation allows  Repeat procalcitonin and CRP in the a.m.  Transfer to [facility]  Pulm consult       YESTERDAY  A&O x4  Follows well, much bewtter vs admit  SR 70s, PACs  SBp 120-150s  UO adequate  ARB + 2.65 L  B/c 31/0.86  HFNC 40/70  Sats 90-93%  WOB low  CXR no change  CT ILD?  IS 1000  Albuterol neb 4 times daily  Symbicort every 12  Lovenox  Tm 97.9  WBC 13.2, better  Solu-Medrol 62.6 Q6H  PPI on steroids  Glucose: 181, 137, 150, 137, 161  K 0.3, Mg 2.3, Phos 3.1  HCO3 19, trending up AG 11  PI, statin, ACE, SSI, Lovenox    Wean steroids  Check markers    Serial follow-up, O2 saturations better, work of breathing the same, hemodynamics unchanged    Review of Systems  Review of Systems   Constitutional:  Positive for malaise/fatigue (Improved). Negative for chills and  fever.   HENT:  Negative for congestion and sore throat.    Eyes: Negative.    Respiratory:  Positive for shortness of breath (Proved, primarily an issue with exertion or if oxygen somehow comes off). Negative for cough (Improved), hemoptysis, sputum production and wheezing.    Cardiovascular:  Negative for chest pain and palpitations.   Gastrointestinal:  Negative for abdominal pain, nausea and vomiting.   Genitourinary:  Negative for hematuria.   Musculoskeletal:  Negative for back pain.   Neurological:  Negative for focal weakness and headaches.   Endo/Heme/Allergies:  Does not bruise/bleed easily.   Psychiatric/Behavioral:  The patient is not nervous/anxious.      Vital Signs for the last 24 hours  Temp:  [36.4 °C (97.5 °F)-36.4 °C (97.6 °F)] 36.4 °C (97.6 °F)  Pulse:  [67-87] 68  Resp:  [14-42] 19  BP: (121-157)/() 129/62  SpO2:  [90 %-97 %] 91 %    Hemodynamic parameters for the last 24 hours       Vent Settings for the last 24 hours       Physical Exam  Physical Exam  Vitals reviewed.   Constitutional:       Appearance: She is normal weight. She is not diaphoretic.      Comments: HFNC   HENT:      Head: Normocephalic.      Mouth/Throat:      Mouth: Mucous membranes are moist.      Pharynx: Oropharynx is clear.   Eyes:      General: No scleral icterus.     Pupils: Pupils are equal, round, and reactive to light.   Cardiovascular:      Rate and Rhythm: Normal rate and regular rhythm.      Heart sounds: No murmur heard.     Comments: Sinus rhythm  Pulmonary:      Breath sounds: Rales (Primarily dependent Velcro rales.) present. No wheezing.   Abdominal:      General: There is no distension.      Tenderness: There is no abdominal tenderness. There is no right CVA tenderness or left CVA tenderness.   Musculoskeletal:      Cervical back: Normal range of motion.      Right lower leg: No edema.      Left lower leg: No edema.   Lymphadenopathy:      Cervical: No cervical adenopathy.   Skin:     General: Skin is  warm.      Capillary Refill: Capillary refill takes less than 2 seconds.      Coloration: Skin is not cyanotic or mottled.      Findings: No lesion or rash.      Nails: There is no clubbing.   Neurological:      General: No focal deficit present.      Mental Status: She is alert and oriented to person, place, and time.      Cranial Nerves: No cranial nerve deficit.   Psychiatric:         Mood and Affect: Mood normal.         Behavior: Behavior normal. Behavior is cooperative.         Thought Content: Thought content normal.       Medications  Current Facility-Administered Medications   Medication Dose Route Frequency Provider Last Rate Last Admin    methylPREDNISolone (SOLU-MEDROL) 40 MG injection 62.4 mg  62.4 mg Intravenous Q8HRS Perry Salomon M.D.   62.4 mg at 03/28/23 0457    ipratropium-albuterol (DUONEB) nebulizer solution  3 mL Nebulization 4X/DAY (RT) Perry Salomon M.D.   3 mL at 03/27/23 1908    melatonin tablet 5 mg  5 mg Oral Nightly Bhavesh Muhammad M.D.   5 mg at 03/27/23 2113    albuterol (PROVENTIL) 2.5mg/0.5ml nebulizer solution 2.5 mg  2.5 mg Nebulization Q2HRS PRN (RT) Jos Beltran M.D.        insulin regular (HumuLIN R,NovoLIN R) injection  2-9 Units Subcutaneous 4X/DAY ACHS Jos Beltran M.D.   2 Units at 03/27/23 2113    And    dextrose 10 % BOLUS 25 g  25 g Intravenous Q15 MIN PRN Jos Beltran M.D.        omeprazole (PRILOSEC) capsule 20 mg  20 mg Oral DAILY Ted Foss Jr., M.D.   20 mg at 03/28/23 0457    senna-docusate (PERICOLACE or SENOKOT S) 8.6-50 MG per tablet 2 Tablet  2 Tablet Oral BID Bhavesh Muhammad M.D.   2 Tablet at 03/26/23 1749    And    polyethylene glycol/lytes (MIRALAX) PACKET 1 Packet  1 Packet Oral QDAY PRN Bhavesh Muhammad M.D.        And    magnesium hydroxide (MILK OF MAGNESIA) suspension 30 mL  30 mL Oral QDAY PRN Bhavesh Muhammad M.D.        And    bisacodyl (DULCOLAX) suppository 10 mg  10 mg Rectal QDAY REJIN Bhavesh  KANA Muhammad        Respiratory Therapy Consult   Nebulization Continuous RT Bhavesh Muhammad M.D.        enoxaparin (Lovenox) inj 40 mg  40 mg Subcutaneous DAILY AT 1800 Bhavesh Muhammad M.D.   40 mg at 03/27/23 1719    labetalol (NORMODYNE/TRANDATE) injection 10 mg  10 mg Intravenous Q4HRS PRN Bhavesh Muhammad M.D.        ipratropium-albuterol (DUONEB) nebulizer solution  3 mL Nebulization Q4H PRN (RT) Bhavesh Muhammad M.D.   3 mL at 03/25/23 0224    budesonide-formoterol (SYMBICORT) 80-4.5 MCG/ACT inhaler 2 Puff  2 Puff Inhalation BID Bhavesh Muhammad M.D.   2 Puff at 03/28/23 0457    levothyroxine (SYNTHROID) tablet 100 mcg  100 mcg Oral QAM AC Bhavesh Muhammad M.D.   100 mcg at 03/28/23 0500    lisinopril (PRINIVIL) tablet 20 mg  20 mg Oral DAILY Bhavesh Muhammad M.D.   20 mg at 03/28/23 0456    rosuvastatin (CRESTOR) tablet 10 mg  10 mg Oral Q EVENING Bhavesh Muhammad M.D.   10 mg at 03/27/23 1718       Fluids    Intake/Output Summary (Last 24 hours) at 3/28/2023 0524  Last data filed at 3/28/2023 0400  Gross per 24 hour   Intake 1240 ml   Output 800 ml   Net 440 ml         Laboratory      Recent Labs     03/26/23  0115 03/27/23 0048   SODIUM 134* 136   POTASSIUM 3.9 4.3   CHLORIDE 106 106   CO2 18* 19*   BUN 32* 31*   CREATININE 0.92 0.86   MAGNESIUM 2.3 2.3   PHOSPHORUS 3.4 3.1   CALCIUM 8.0* 8.0*       Recent Labs     03/26/23  0115 03/27/23 0048   ALTSGPT 27 27   ASTSGOT 23 27   ALKPHOSPHAT 123* 109*   TBILIRUBIN 0.6 0.6   GLUCOSE 166* 162*       Recent Labs     03/26/23  0115 03/27/23  0048 03/28/23  0452   WBC 15.6* 13.2* 11.9*   NEUTSPOLYS 90.90* 90.60* 88.30*   LYMPHOCYTES 4.10* 4.50* 5.10*   MONOCYTES 4.40 4.30 6.00   EOSINOPHILS 0.00 0.00 0.00   BASOPHILS 0.20 0.10 0.10   ASTSGOT 23 27  --    ALTSGPT 27 27  --    ALKPHOSPHAT 123* 109*  --    TBILIRUBIN 0.6 0.6  --        Recent Labs     03/26/23  0115 03/27/23  0048 03/28/23  0452   RBC 4.02* 4.15* 4.38   HEMOGLOBIN 12.5 12.6 13.7    HEMATOCRIT 37.9 40.2 42.3   PLATELETCT 168 196 172         Imaging  X-Ray:  I have personally reviewed the images and compared with prior images.  EKG:  I have personally reviewed the images and compared with prior images.  CT:    Reviewed  Echo:   Reviewed    Assessment/Plan      Acute hypoxemic respiratory failure  She requires very high supplemental oxygen on a high flow nasal cannula   High risk of deterioration   WOB and O2 requirements improved   Monitoring for need to intubate   BiPAP for rescue first    Abnormal CT of the thorax  Evidence of interstitial lung disease - extensive DDx   She has 2 dogs which are not new pets  No cats or birds - she has an outside bird feeder for humMoxie Jeanbirds  Neighbors have horses, goats, pigs, chickens, roosters - does not spend any significant time around their pens  No evidence of collagen vascular or rheumatologic disease on exam - Negative Hx as well   Urinalysis without an active sediment  RA factor normal, MPO normal, serine protease 3 negative  EBEN +, f/u ABs pending   ESR and CRP elevated -> repeat   Procalcitonin elevated -> repeat   Negative screening for influenza, RSV and SARS-CoV-2   Negative respiratory viral panel   Blood cultures negative so far from 3/23  Clinically, cannot exclude an acute infectious process with pneumonia - procalcitonin elevated (for what it's worth ) - continue empiric ceftriaxone and azithromycin   Taper methylprednisolone, 62.5 mg IV every 12 hours   Pulmonary consult - I reviewed case with Dr Crocker    COPD   No acute exacerbation   Changed to DuoNeb 4 times daily and PRN    Primary hypertension   Goal SBP less than 160  SBP now running in the 150s, not related to increased WOB   On lisinopril, add back home amlodipine    Dyslipidemia   Continue statin      VTE:  Lovenox  Ulcer: Not Indicated  Lines: None    I have performed a physical exam and reviewed and updated ROS and Plan today (3/28/2023). In review of yesterday's note  (3/27/2023), there are no changes except as documented above.       Discussed patient condition and risk of morbidity and/or mortality with RN, RT, Pharmacy, [employer] resident, Charge nurse / hot rounds, and Patient      Case reviewed with Dr Griggs (hospitalist) and Dr Machado (pulmonary) who kindly accept the patient in transfer to [facility].

## 2023-03-28 NOTE — PROGRESS NOTES
[facility] ICU Progress Note      Admit Date: 3/23/2023    Resident(s): Carmina Guzman M.D.   Attending:  MICAH JOSEPH/ Dr. Salomon    Patient ID:    Name:  Naomy Vargas     YOB: 1950  Age:  73 y.o.  female   MRN:  [id number]    Hospital Course (carried forward and updated):  Naomy Vargas is a 73 y.o. female with history with a PMHx of non-oxygen dependent COPD (40.5 pack-year history; quit smoking 6 years ago), HTN, DLD who was admitted 3/23 after presenting with one month of increasing dyspnea on exertion that worsened the past several days associated with subjective fever and nonproductive cough. Seen by PCP with CT chest 3/7/2023 which demonstrated interval development of diffuse interstitial septal thickening and some groundglass infiltrates with a peripheral predominance suggestive of a interstitial lung disease which has not yet been worked up or followed up on. Admitted to the ICU for sepsis secondary to presumed underlying pulmonary infection complicated by acute hypoxemic respiratory failure requiring high dose steroids and antibiotics.     Consultants:  Critical Care      Interval Events:    3/24:increase methylprednisolone from 80->125mg Q6H  Continue ceftriaxone and azithromycin.  High flow nasal cannula.  3/25: continue current dose of methylprednisolone.  Continue antibiotics.  High flow nasal cannula.  Keep ICU.  3/26: Taper methylprednisolone.  Continue antibiotics.  Keep ICU.  High flow nasal cannula.  3/27: Taper methylprednisolone. Continue antibiotics. Continue HFNC. Pending ILD workup  3/28: Taper methylprednisolone. Abx completed. Continue HFNC. ILD workup: titer 1:640, EBEN IgG +ve, homogenous pattern, most consistent w SLE vs SLE drug induced vs DARSHAN. Pulm consult. PT/OT consult. Transfer to [facility]    NEURO:   AAOx4, in no acute distress  CARDIOVASC:  P: 60s-80s, SBP: 110s-150s  RESPIRATORY:  HFNC  GI/NUTRITION:  Regular; 2x BM  RENAL/FLUID/LYTES: I/O:  +490  HEME/ONC:   Leukocytosis downtrendign  INFECTIOUS D:  Continue C3  ENDOCRINE:   Hyperglycemia    Vitals Range last 24h:  Temp:  [36.4 °C (97.5 °F)-36.5 °C (97.7 °F)] 36.5 °C (97.7 °F)  Pulse:  [67-82] 75  Resp:  [14-42] 23  BP: (121-158)/() 158/82  SpO2:  [90 %-97 %] 95 %      Intake/Output Summary (Last 24 hours) at 3/28/2023 1139  Last data filed at 3/28/2023 0800  Gross per 24 hour   Intake 990 ml   Output 800 ml   Net 190 ml        Review of Systems   Constitutional:  Negative for chills, fever, malaise/fatigue and weight loss.   HENT:  Negative for congestion and sore throat.    Eyes:  Negative for blurred vision and double vision.   Respiratory:  Positive for shortness of breath. Negative for cough and wheezing.    Cardiovascular:  Negative for chest pain and palpitations.   Gastrointestinal:  Negative for abdominal pain, constipation, diarrhea, heartburn, nausea and vomiting.   Genitourinary:  Negative for dysuria, frequency and urgency.   Musculoskeletal:  Negative for falls, joint pain and myalgias.   Skin:  Negative for rash.   Neurological:  Negative for dizziness, weakness and headaches.   Psychiatric/Behavioral:  Negative for depression, substance abuse and suicidal ideas. The patient is not nervous/anxious.    All other systems reviewed and are negative.    PHYSICAL EXAM:  Vitals:    03/28/23 0600 03/28/23 0800 03/28/23 0900 03/28/23 1000   BP: (!) 151/74 138/65 (!) 147/70 (!) 158/82   Pulse: 71 75 72 75   Resp: 16 (!) 23 17 (!) 23   Temp:  36.5 °C (97.7 °F)     TempSrc:  Temporal     SpO2: 91% 91% 95% 95%   Weight:       Height:        Body mass index is 25.09 kg/m².    O2 therapy: Pulse Oximetry: 95 %, O2 (LPM): 35, O2 Delivery Device: Heated High Flow Nasal Cannula    Date 03/28/23 0700 - 03/29/23 0659   Shift 2651-7395 1301-9938 0133-1116 24 Hour Total   INTAKE   Shift Total       OUTPUT   Urine 100   100     Number of Times Voided 1 x   1 x     Output (mL) (External Urinary Catheter  (Female Witrino)) 100   100   Stool         Number of Times Stooled 1 x   1 x   Shift Total 100   100   NET -100   -100        Physical Exam  Vitals and nursing note reviewed.   Constitutional:       Appearance: Normal appearance. She is ill-appearing.   HENT:      Head: Normocephalic and atraumatic.      Mouth/Throat:      Mouth: Mucous membranes are dry.      Pharynx: No oropharyngeal exudate or posterior oropharyngeal erythema.   Eyes:      Conjunctiva/sclera: Conjunctivae normal.   Cardiovascular:      Rate and Rhythm: Normal rate and regular rhythm.      Pulses: Normal pulses.      Heart sounds: Normal heart sounds. No murmur heard.    No friction rub. No gallop.   Pulmonary:      Effort: Pulmonary effort is normal. No respiratory distress.      Breath sounds: Normal breath sounds. No stridor. No wheezing, rhonchi or rales.      Comments: On HFNC  Abdominal:      General: Abdomen is flat. Bowel sounds are normal. There is no distension.      Palpations: Abdomen is soft.      Tenderness: There is no abdominal tenderness.   Musculoskeletal:         General: No swelling or tenderness. Normal range of motion.      Cervical back: Neck supple.      Right lower leg: No edema.      Left lower leg: No edema.   Skin:     General: Skin is warm and dry.      Capillary Refill: Capillary refill takes less than 2 seconds.      Findings: No bruising or rash.   Neurological:      Mental Status: She is alert and oriented to person, place, and time. Mental status is at baseline.      Cranial Nerves: No cranial nerve deficit.      Sensory: No sensory deficit.      Coordination: Coordination normal.   Psychiatric:         Mood and Affect: Mood normal.         Behavior: Behavior normal.           Recent Labs     03/26/23  0115 03/27/23  0048 03/28/23  0452   SODIUM 134* 136 134*   POTASSIUM 3.9 4.3 4.5   CHLORIDE 106 106 105   CO2 18* 19* 19*   BUN 32* 31* 30*   CREATININE 0.92 0.86 0.80   MAGNESIUM 2.3 2.3 2.2   PHOSPHORUS 3.4 3.1 3.5    CALCIUM 8.0* 8.0* 8.1*     Recent Labs     03/26/23  0115 03/27/23  0048 03/28/23  0452   ALTSGPT 27 27 34   ASTSGOT 23 27 23   ALKPHOSPHAT 123* 109* 99   TBILIRUBIN 0.6 0.6 0.6   GLUCOSE 166* 162* 133*     Recent Labs     03/26/23  0115 03/27/23  0048 03/28/23  0452   RBC 4.02* 4.15* 4.38   HEMOGLOBIN 12.5 12.6 13.7   HEMATOCRIT 37.9 40.2 42.3   PLATELETCT 168 196 172     Recent Labs     03/26/23  0115 03/27/23  0048 03/28/23  0452   WBC 15.6* 13.2* 11.9*   NEUTSPOLYS 90.90* 90.60* 88.30*   LYMPHOCYTES 4.10* 4.50* 5.10*   MONOCYTES 4.40 4.30 6.00   EOSINOPHILS 0.00 0.00 0.00   BASOPHILS 0.20 0.10 0.10   ASTSGOT 23 27 23   ALTSGPT 27 27 34   ALKPHOSPHAT 123* 109* 99   TBILIRUBIN 0.6 0.6 0.6       Meds:   methylPREDNISolone  62.4 mg      amLODIPine  5 mg      ipratropium-albuterol  3 mL      melatonin  5 mg      albuterol  2.5 mg      insulin regular  2-9 Units      And    dextrose bolus  25 g      omeprazole  20 mg      senna-docusate  2 Tablet      And    polyethylene glycol/lytes  1 Packet      And    magnesium hydroxide  30 mL      And    bisacodyl  10 mg      Respiratory Therapy Consult        enoxaparin (LOVENOX) injection  40 mg      labetalol  10 mg      ipratropium-albuterol  3 mL      budesonide-formoterol  2 Puff      levothyroxine  100 mcg      lisinopril  20 mg      rosuvastatin  10 mg          Procedures:  None    Imaging:  DX-CHEST-PORTABLE (1 VIEW)   Final Result      1.  Stable bilateral coarse interstitial opacities which could be related to chronic lung disease or pneumonitis.      DX-CHEST-PORTABLE (1 VIEW)   Final Result         1.  Pulmonary edema and/or infiltrates are identified, which are stable since the prior exam.   2.  Trace bilateral pleural effusions   3.  Cardiomegaly   4.  Atherosclerosis      EC-ECHOCARDIOGRAM COMPLETE W/O CONT   Final Result      DX-CHEST-PORTABLE (1 VIEW)   Final Result         1.  Pulmonary edema and/or infiltrates are identified, which are stable since the  prior exam.   2.  Trace bilateral pleural effusions   3.  Cardiomegaly   4.  Atherosclerosis      DX-CHEST-PORTABLE (1 VIEW)   Final Result      1.  Diffuse bilateral pulmonary infiltrates. Differential diagnosis includes atypical pneumonia, interstitial edema and chronic interstitial lung disease.      2.  Cardiomegaly.      3.  Small bilateral pleural effusions.          ASSESSEMENT and PLAN:  73-year-old female, history of non-oxygen dependent COPD with 40.5 pack years quit smoking six years ago, HTN, DLD who was admitted 3/23 after presenting with one month of increasing dyspnea on exertion that worsened the past several days associated with subjective fever and nonproductive cough. Seen by PCP with CT chest 3/7/2023 which demonstrating interval development of diffuse interstitial septal thickening and some groundglass infiltrates with a peripheral predominance suggestive of a interstitial lung disease which has not yet been worked up or followed up on.Admitted to the ICU for sepsis secondary to presumed underlying pulmonary infection complicated by acute hypoxemic respiratory failure requiiring high dose steroids and antibiotics. Transfer to [facility] with pulm (ILD workup), PT/OT consult (for generalized weakness).    * Acute hypoxemic respiratory failure (HCC)- (present on admission)  Assessment & Plan  Currently requiring HiFLO. Resp panel negative. TTE this admission with LVEF 55% and RVSP 35 mmHg indicating mild pulm HTN possible 2/2 pulm source  Suspicious for progressive ILD, UIP, AIP, acute eosinophilic pneumonia, HSP, , PLCH, NSIP,  other   No history of vasculitis/autoimmune disease  S/p azithro x 3 days    -Ceftriaxone (through 3/28/23)  -Solumedrol 62.4mg IV Q12H- weaning  -Continue production, HFNC, noninvasive BiPAP if needed  -Follow closely in ICU, high risk of respiratory decompensation  -Start serologic work-up for ILD  -Pulm consult for further evaluation of ILD  -When clinically improves,  consider bronchoscopy and transbronchial biopsy versus open lung biopsy if no clear diagnosis  -Wean O2 as able, appreciate RT assistance  -Aspiration precautions in place  -Will likely need to be sent home on steroid taper-- will possibly require PJP ppx with Bactrim at discharge  -Will require follow up with pulmonology as outpt   -PT/OT  -Will transfer to [facility]    ILD (interstitial lung disease) (HCC)  Assessment & Plan  Concern for possible ILD. 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  - Will initiate steroid therapy as above  - Workup at this time includes: EBEN (+ve), RF, hypersensitivity panels, PJP. Will evaluate further based on results   -Pulm consult    Pulmonary hypertension (HCC)  Assessment & Plan  Mild, TTE this admission with RVSP 35 mmHg   Likely 2/2 primary pulmonary etiology (type 3 pulm HTN)    Hypertension  Assessment & Plan  Controlled  -continue home Lisinopril  - Labetalol PRN if SBP >180    Pleural effusion  Assessment & Plan  Noted on CXR in addition to elevated BNP. TTE with preserved EF this admission (55%).    Sepsis (HCC)  Assessment & Plan  Improved; Source is Likely pulmonary  -Stopped Azithromycin, continue Ceftriaxone 3/24-3/28  -Off Vasopressors    Chronic obstructive pulmonary disease (HCC)- (present on admission)  Assessment & Plan  Not on home O2; Former smoker 3/4 PPD for 54 years, quit six years ago. On home Symbicort and Albuterol  - Cont home inhalers, duo nebs prn  - Solumedrol  - Currently on HFNC    Hyperglycemia  Assessment & Plan  Pt gluc consistently >150  Likely 2/2 high dose steroids as above v acute illness  -SSI  -hypoglycemia protocol   -A1c    Nonrheumatic tricuspid valve regurgitation  Assessment & Plan  Mild on TTE this admission    Leukocytosis  Assessment & Plan  Most likely 2/2 sepsis, ILD  -Downtrending; continue to monitor    Elevated troponin  Assessment & Plan  downtrending likely from demand ischemia. No need to  repeat.    Thrombocytopenia, unspecified (HCC)- (present on admission)  Assessment & Plan  Resolved. Suspect secondary to sepsis. No bleeding evident. Continue to monitor.    Hypothyroidism- (present on admission)  Assessment & Plan  Cont home synthroid         DISPO: ICU    CODE STATUS: FULL    Quality Measures:  Feeding: Oral  Analgesia: None  Sedation: None  Thromboprophylaxis: Lovenox  Head of bed: >30 degrees  Ulcer prophylaxis: Omeprazole  Glycemic control: ISS  Bowel care: bowel regimen: yes  Indwelling lines: pIV x2, purwick  Deescalation of antibiotics: C3      Carmina Guzman M.D.

## 2023-03-28 NOTE — CARE PLAN
The patient is Stable - Low risk of patient condition declining or worsening    Shift Goals  Clinical Goals: Wean O2, mobilize  Patient Goals: Get home  Family Goals: CASIMIRO    Progress made toward(s) clinical / shift goals: Pt aware of plan of care, no c/o pain, pt weaned to 6L NC maintaining )2 sats > 90%    Patient is not progressing towards the following goals:      Problem: Knowledge Deficit - Standard  Goal: Patient and family/care givers will demonstrate understanding of plan of care, disease process/condition, diagnostic tests and medications  Outcome: Progressing     Problem: Pain - Standard  Goal: Alleviation of pain or a reduction in pain to the patient’s comfort goal  Outcome: Progressing     Problem: Respiratory  Goal: Patient will achieve/maintain optimum respiratory ventilation and gas exchange  Outcome: Progressing

## 2023-03-28 NOTE — CONSULTS
"Pulmonary Consult    Date of Admission: 3/23/23  Date of Consult: 3/28/23  Consulted by: Dr. Carmine Griggs     Reason for consult: Acute hypoxic respiratory failure, possible CTD-ILD    Chief Complaint:  Chief Complaint   Patient presents with    Shortness of Breath     X 1 month progressively worse, unable to obtain pulse ox initially with increased WOB, placed on NRB 15L with a sat of 71% in triage, pt roomed with an eventual saturation of 92% on 15L NRB, pt denies respiratory hx or home oxygen requirement, denies cardiac hx or chest pain. Pt remains alert/oriented throughout     HPI:   From Dr. Fermin Guzman's note: \"Naomy Vargas is a 73 y.o. female with history with a PMHx of non-oxygen dependent COPD (40.5 pack-year history; quit smoking 6 years ago), HTN, DLD who was admitted 3/23 after presenting with one month of increasing dyspnea on exertion that worsened the past several days associated with subjective fever and nonproductive cough. Seen by PCP with CT chest 3/7/2023 which demonstrated interval development of diffuse interstitial septal thickening and some groundglass infiltrates with a peripheral predominance suggestive of a interstitial lung disease which has not yet been worked up or followed up on. Admitted to the ICU for sepsis secondary to presumed underlying pulmonary infection complicated by acute hypoxemic respiratory failure requiring high dose steroids and antibiotics.    3/24:increase methylprednisolone from 80->125mg Q6H  Continue ceftriaxone and azithromycin.  High flow nasal cannula.  3/25: continue current dose of methylprednisolone.  Continue antibiotics.  High flow nasal cannula.  Keep ICU.  3/26: Taper methylprednisolone.  Continue antibiotics.  Keep ICU.  High flow nasal cannula.  3/27: Taper methylprednisolone. Continue antibiotics. Continue HFNC. Pending ILD workup  3/28: Taper methylprednisolone. Abx completed. Continue HFNC. ILD workup: titer 1:640, EBEN IgG +ve, " "homogenous pattern, most consistent w SLE vs SLE drug induced vs DARSHAN. Pulm consult. PT/OT consult. Transfer to [facility]\"    Patient has been seen by pulmonary at [facility] in the past: Dr. Do Velásquez in 201 for a RUL nodule.   Recent imaging:         She has prior PFTs in 2021: Normal by NHANEs criteria but mild obstruction by ATS. Air trapping. Normal DLCO.    She denies any known personal or family history of autoimmune disease.  She denies any small joint issues, uveitis, burning with urination.  Renal function is normal.  She does acknowledge that she tested positive at home for COVID about a year ago.  Her respiratory symptoms started about a month ago.  She was standing near a pill burning stove when she became symptomatic.  She denies any new medications or new environment around the time that she became sick.  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      Past Medical History:   Diagnosis Date    Anxiety     Arthritis     wrists    Back pain     Blood transfusion without reported diagnosis     with ectopic preg x 2     Breath shortness     with exertion    Cataract     bilateral removal-Dr. Duke Talamantes    Cellulitis     right lower leg    Cellulitis of leg 11/1/2013    Followed by dermatology. Managed with fluocinonide and Bactroban on right leg Left leg she uses triamcinolone and Sarna pramocaine (anesthetic, antipruretic) on both.      Chickenpox     COPD (chronic obstructive pulmonary disease) ([facility])     Dental disorder     upper denture    Earache     Fatigue     Frequent urination     Hypertension 02/23/2018    on no meds at this time    Hypothyroidism     Influenza     Insomnia     Kidney disease     reports 1 kidney is smaller than the other    Mumps     OAB (overactive bladder) 2/11/2019    Pain 02/23/2018    right leg and back, 5/10    Peripheral vascular disease, unspecified ([facility]) 10/13/2021    Pneumonia     Rash     Ringing in ears     Shortness of breath     " Swelling of lower extremity     Thyroid disease     Tonsillitis     Toothache     Venous stasis dermatitis     right leg       Past Surgical History:   Procedure Laterality Date    VEIN LIGATION RADIO FREQUENCY Right 2018    Procedure: VEIN LIGATION RADIO FREQUENCY- LONG SAPENOUS;  Surgeon: Jim Alas M.D.;  Location: SURGERY [facility];  Service: General    ABDOMINAL EXPLORATION      2 ectopic preg    BREAST BIOPSY      hx of benign left breast biopsy    EYE SURGERY      cataract x2    HYSTERECTOMY LAPAROSCOPY      HYSTERECTOMY, TOTAL ABDOMINAL      OOPHORECTOMY      PB MAMMARY DUCTOGRAM, SINGLE      MA REMV 2ND CATARACT,CORN-SCLER SECTN      TONSILLECTOMY         Social History     Socioeconomic History    Marital status:      Spouse name: Not on file    Number of children: 2    Years of education: Not on file    Highest education level: Not on file   Occupational History    Occupation: retired   Tobacco Use    Smoking status: Former     Packs/day: 0.50     Years: 53.00     Pack years: 26.50     Types: Cigarettes     Quit date: 2017     Years since quittin.6    Smokeless tobacco: Never   Vaping Use    Vaping Use: Never used   Substance and Sexual Activity    Alcohol use: Yes     Comment: 2 per day    Drug use: No    Sexual activity: Not Currently     Partners: Male   Other Topics Concern    Not on file   Social History Narrative    Not on file     Social Determinants of Health     Financial Resource Strain: Not on file   Food Insecurity: Not on file   Transportation Needs: Not on file   Physical Activity: Not on file   Stress: Not on file   Social Connections: Not on file   Intimate Partner Violence: Not on file   Housing Stability: Not on file          Family History   Problem Relation Age of Onset    Arthritis Mother         hip fracture    Diabetes Mother     Cancer Mother         skin    Arthritis Father         lung    Alcohol/Drug Father         etoh    Lung Disease  Sister         smoker/copd    Hypertension Sister     Other Brother         hep c    Arthritis Maternal Grandmother         hip fracture    Diabetes Maternal Grandfather     No Known Problems Son     No Known Problems Son     No Known Problems Brother     No Known Problems Sister     No Known Problems Brother     Hyperlipidemia Neg Hx     Stroke Neg Hx        No current facility-administered medications on file prior to encounter.     Current Outpatient Medications on File Prior to Encounter   Medication Sig Dispense Refill    budesonide-formoterol (SYMBICORT) 80-4.5 MCG/ACT Aerosol Inhale 2 Puffs 2 times a day. 3 Each 0    PROAIR  (90 Base) MCG/ACT Aero Soln inhalation aerosol Inhale 1 Puff every 6 hours as needed for Shortness of Breath. 8.5 g 1    amLODIPine (NORVASC) 5 MG Tab TAKE 1 TABLET BY MOUTH EVERY DAY 90 Tablet 1    levothyroxine (SYNTHROID) 100 MCG Tab TAKE 1 TABLET BY MOUTH EVERY DAY BEFORE BREAKFAST 100 Tablet 0    rosuvastatin (CRESTOR) 10 MG Tab TAKE 1 TABLET BY MOUTH EVERY DAY IN THE EVENING 100 Tablet 1    lisinopril (PRINIVIL) 20 MG Tab TAKE 1 TABLET BY MOUTH EVERY  Tablet 3    Mirabegron ER (MYRBETRIQ) 50 MG TABLET SR 24 HR Myrbetriq 50 mg tablet,extended release   Take 1 tablet every day by oral route for 90 days.      Ascorbic Acid (VITAMIN C PO) Take  by mouth.      Doxylamine Succinate, Sleep, (SLEEP AID PO) Take  by mouth.      Dextromethorphan-guaiFENesin (MUCINEX DM PO) Take  by mouth.      triamcinolone acetonide (KENALOG) 0.1 % Cream Apply 1 Application topically 2 times a day as needed. 80 g 2    loperamide (IMODIUM) 2 MG Cap Take 1 Capsule by mouth 4 times a day as needed for Diarrhea. 30 Capsule 1    cyanocobalamin (VITAMIN B-12) 100 MCG Tab Take 100 mcg by mouth every day.      vitamin D (CHOLECALCIFEROL) 1000 Unit (25 mcg) Tab Take 1,000 Units by mouth every day.      nitrofurantoin (MACRODANTIN) 50 MG Cap TAKE 1 CAPSULE BY MOUTH EVERYDAY AT BEDTIME      Melatonin 1 MG  "Tab Take  by mouth.      diphenhydrAMINE (BENADRYL) 25 MG Tab Take 25 mg by mouth every 6 hours as needed for Sleep.      Acetaminophen 500 MG Cap Take  by mouth.      Oxymetazoline HCl (NASAL SPRAY NA) Spray  in nose.         Allergies: Colophony [pinus strobus], Latex, Neosporin [neomycin-polymyxin b], Phenylene, Soap, Tape, and Tea tree oil      ROS: A 12 point ROS was performed on intake and during my interview. ROS negative unless specifically noted in HPI.     Vitals:  BP (!) 158/82   Pulse 75   Temp 36.5 °C (97.7 °F) (Temporal)   Resp (!) 23   Ht 1.676 m (5' 6\")   Wt 70.5 kg (155 lb 6.8 oz)   SpO2 95%     Physical Exam:  Physical Exam  Vitals reviewed.   Constitutional:       General: She is not in acute distress.     Appearance: She is ill-appearing. She is not toxic-appearing or diaphoretic.   Eyes:      General:         Right eye: No discharge.         Left eye: No discharge.      Conjunctiva/sclera: Conjunctivae normal.   Cardiovascular:      Rate and Rhythm: Normal rate.   Pulmonary:      Effort: Pulmonary effort is normal.      Breath sounds: Normal breath sounds.   Musculoskeletal:      Right lower leg: No edema.      Left lower leg: No edema.   Skin:     General: Skin is warm.      Coloration: Skin is not jaundiced.   Neurological:      General: No focal deficit present.      Mental Status: She is alert and oriented to person, place, and time.       Laboratory Data: I personally reviewed labs including historical lab trends.     Assessment/Plan:    Pulmonary problem list:  #Acute hypoxic respiratory failure, possible CTD-ILD  #Former smoker  #History of COVID 19 infection    Assessment: I personally reviewed her CT images.  She has diffuse fibrotic changes as well as traction bronchiectasis.  Differential includes progressive NSIP, CTD-ILD, chronic HP, drug toxicity, COVID-19 related lung changes.  Her EBEN titer is remarkably elevated, but she does not have other findings suggestive of " lupus.    Recommendations/Plan:  -Chest ILD questionnaire given to the patient to fill out  -Continue empiric steroids  -Follow-up pending testing  -Wean oxygen as tolerated.  Target pulse oximetry 88 to 92%    Total consult time: 65 minutes which included time spent on chart review, personally reviewing pertinent images and labs, time spent counseling and educating the patient and/or family members, and coordinating care with the healthcare team to include consultants.   __________  Levi Perez, DO  Pulmonary and Critical Care Medicine  [facility]    Please note that this dictation was created using voice recognition software. The accuracy of the dictation is limited to the abilities of the software. I have made every reasonable attempt to correct obvious errors, but I expect that there are errors of grammar and possibly content that I did not discover before finalizing the note.

## 2023-03-28 NOTE — PROGRESS NOTES
Monitor summary:  Sinus rhythm with rates from the 60s to 80s  Frequent PACs and PVCs  .12/.09/.39

## 2023-03-28 NOTE — CARE PLAN
The patient is Watcher - Medium risk of patient condition declining or worsening    Shift Goals  Clinical Goals: wean high flow settings, maintain airway, mobility  Patient Goals: Rest  Family Goals: CASIMIRO    Progress made toward(s) clinical / shift goals:    Problem: Pain - Standard  Goal: Alleviation of pain or a reduction in pain to the patient’s comfort goal  Outcome: Progressing  Flowsheets (Taken 3/28/2023 0000)  Pain Rating Scale (NPRS): 0  Note: Pt remains pain free     Problem: Respiratory  Goal: Patient will achieve/maintain optimum respiratory ventilation and gas exchange  Outcome: Progressing  Flowsheets  Taken 3/28/2023 0000  O2 Delivery Device: Heated High Flow Nasal Cannula  Taken 3/27/2023 2000  Incentive Spirometer:   Effective   Self Motivated  Incentive Spirometer Volume: 1000 mL  Deep Breathe and Cough: Performs Correctly  Note: Pt still requires heated high flow nasal cannula.        Patient is not progressing towards the following goals:

## 2023-03-29 ENCOUNTER — APPOINTMENT (OUTPATIENT)
Dept: RADIOLOGY | Facility: MEDICAL CENTER | Age: 73
DRG: 871 | End: 2023-03-29
Attending: HOSPITALIST
Payer: MEDICARE

## 2023-03-29 LAB
A FLAVUS AB SER QL ID: NORMAL
A FUMIGATUS1 AB SER QL ID: NORMAL
A FUMIGATUS2 AB SER QL: NORMAL
A FUMIGATUS3 AB SER QL ID: NORMAL
A FUMIGATUS6 AB SER QL ID: NORMAL
A PULLULANS AB SER QL ID: NORMAL
ALBUMIN SERPL BCP-MCNC: 3 G/DL (ref 3.2–4.9)
ALBUMIN/GLOB SERPL: 1.1 G/DL
ALP SERPL-CCNC: 95 U/L (ref 30–99)
ALT SERPL-CCNC: 38 U/L (ref 2–50)
ANION GAP SERPL CALC-SCNC: 8 MMOL/L (ref 7–16)
AST SERPL-CCNC: 22 U/L (ref 12–45)
BASOPHILS # BLD AUTO: 0.1 % (ref 0–1.8)
BASOPHILS # BLD: 0.01 K/UL (ref 0–0.12)
BILIRUB SERPL-MCNC: 0.6 MG/DL (ref 0.1–1.5)
BUN SERPL-MCNC: 28 MG/DL (ref 8–22)
CALCIUM ALBUM COR SERPL-MCNC: 8.7 MG/DL (ref 8.5–10.5)
CALCIUM SERPL-MCNC: 7.9 MG/DL (ref 8.5–10.5)
CHLORIDE SERPL-SCNC: 106 MMOL/L (ref 96–112)
CO2 SERPL-SCNC: 21 MMOL/L (ref 20–33)
CREAT SERPL-MCNC: 0.83 MG/DL (ref 0.5–1.4)
CRP SERPL HS-MCNC: <0.3 MG/DL (ref 0–0.75)
DSDNA IGG TITR SER CLIF: NORMAL {TITER}
EOSINOPHIL # BLD AUTO: 0 K/UL (ref 0–0.51)
EOSINOPHIL NFR BLD: 0 % (ref 0–6.9)
ERYTHROCYTE [DISTWIDTH] IN BLOOD BY AUTOMATED COUNT: 45.2 FL (ref 35.9–50)
GFR SERPLBLD CREATININE-BSD FMLA CKD-EPI: 74 ML/MIN/1.73 M 2
GLOBULIN SER CALC-MCNC: 2.7 G/DL (ref 1.9–3.5)
GLUCOSE BLD STRIP.AUTO-MCNC: 111 MG/DL (ref 65–99)
GLUCOSE BLD STRIP.AUTO-MCNC: 115 MG/DL (ref 65–99)
GLUCOSE BLD STRIP.AUTO-MCNC: 144 MG/DL (ref 65–99)
GLUCOSE BLD STRIP.AUTO-MCNC: 157 MG/DL (ref 65–99)
GLUCOSE SERPL-MCNC: 135 MG/DL (ref 65–99)
HCT VFR BLD AUTO: 40.2 % (ref 37–47)
HGB BLD-MCNC: 13.2 G/DL (ref 12–16)
IMM GRANULOCYTES # BLD AUTO: 0.06 K/UL (ref 0–0.11)
IMM GRANULOCYTES NFR BLD AUTO: 0.6 % (ref 0–0.9)
LYMPHOCYTES # BLD AUTO: 0.58 K/UL (ref 1–4.8)
LYMPHOCYTES NFR BLD: 5.8 % (ref 22–41)
MAGNESIUM SERPL-MCNC: 2.2 MG/DL (ref 1.5–2.5)
MCH RBC QN AUTO: 30.9 PG (ref 27–33)
MCHC RBC AUTO-ENTMCNC: 32.8 G/DL (ref 33.6–35)
MCV RBC AUTO: 94.1 FL (ref 81.4–97.8)
MONOCYTES # BLD AUTO: 0.77 K/UL (ref 0–0.85)
MONOCYTES NFR BLD AUTO: 7.7 % (ref 0–13.4)
NEUTROPHILS # BLD AUTO: 8.62 K/UL (ref 2–7.15)
NEUTROPHILS NFR BLD: 85.8 % (ref 44–72)
NRBC # BLD AUTO: 0 K/UL
NRBC BLD-RTO: 0 /100 WBC
NT-PROBNP SERPL IA-MCNC: 2922 PG/ML (ref 0–125)
PHOSPHATE SERPL-MCNC: 3.4 MG/DL (ref 2.5–4.5)
PIGEON SERUM AB QL ID: NORMAL
PLATELET # BLD AUTO: 171 K/UL (ref 164–446)
PMV BLD AUTO: 9.8 FL (ref 9–12.9)
POTASSIUM SERPL-SCNC: 4.5 MMOL/L (ref 3.6–5.5)
PROCALCITONIN SERPL-MCNC: 0.17 NG/ML
PROT SERPL-MCNC: 5.7 G/DL (ref 6–8.2)
RBC # BLD AUTO: 4.27 M/UL (ref 4.2–5.4)
S RECTIVIRGULA AB SER QL ID: NORMAL
S VIRIDIS AB SER QL: NORMAL
SODIUM SERPL-SCNC: 135 MMOL/L (ref 135–145)
T CANDIDUS AB SER QL: NORMAL
WBC # BLD AUTO: 10 K/UL (ref 4.8–10.8)

## 2023-03-29 PROCEDURE — 99233 SBSQ HOSP IP/OBS HIGH 50: CPT | Performed by: HOSPITALIST

## 2023-03-29 PROCEDURE — 770006 HCHG ROOM/CARE - MED/SURG/GYN SEMI*

## 2023-03-29 PROCEDURE — 700111 HCHG RX REV CODE 636 W/ 250 OVERRIDE (IP): Performed by: STUDENT IN AN ORGANIZED HEALTH CARE EDUCATION/TRAINING PROGRAM

## 2023-03-29 PROCEDURE — 700111 HCHG RX REV CODE 636 W/ 250 OVERRIDE (IP): Performed by: INTERNAL MEDICINE

## 2023-03-29 PROCEDURE — 71045 X-RAY EXAM CHEST 1 VIEW: CPT

## 2023-03-29 PROCEDURE — 97166 OT EVAL MOD COMPLEX 45 MIN: CPT

## 2023-03-29 PROCEDURE — 84100 ASSAY OF PHOSPHORUS: CPT

## 2023-03-29 PROCEDURE — A9270 NON-COVERED ITEM OR SERVICE: HCPCS | Performed by: STUDENT IN AN ORGANIZED HEALTH CARE EDUCATION/TRAINING PROGRAM

## 2023-03-29 PROCEDURE — 700102 HCHG RX REV CODE 250 W/ 637 OVERRIDE(OP): Performed by: STUDENT IN AN ORGANIZED HEALTH CARE EDUCATION/TRAINING PROGRAM

## 2023-03-29 PROCEDURE — 80053 COMPREHEN METABOLIC PANEL: CPT

## 2023-03-29 PROCEDURE — 82962 GLUCOSE BLOOD TEST: CPT

## 2023-03-29 PROCEDURE — 85025 COMPLETE CBC W/AUTO DIFF WBC: CPT

## 2023-03-29 PROCEDURE — 94640 AIRWAY INHALATION TREATMENT: CPT

## 2023-03-29 PROCEDURE — A9270 NON-COVERED ITEM OR SERVICE: HCPCS | Performed by: GENERAL PRACTICE

## 2023-03-29 PROCEDURE — 700102 HCHG RX REV CODE 250 W/ 637 OVERRIDE(OP): Performed by: INTERNAL MEDICINE

## 2023-03-29 PROCEDURE — 700101 HCHG RX REV CODE 250: Performed by: INTERNAL MEDICINE

## 2023-03-29 PROCEDURE — 83880 ASSAY OF NATRIURETIC PEPTIDE: CPT

## 2023-03-29 PROCEDURE — 700111 HCHG RX REV CODE 636 W/ 250 OVERRIDE (IP): Performed by: HOSPITALIST

## 2023-03-29 PROCEDURE — 700102 HCHG RX REV CODE 250 W/ 637 OVERRIDE(OP): Performed by: GENERAL PRACTICE

## 2023-03-29 PROCEDURE — A9270 NON-COVERED ITEM OR SERVICE: HCPCS | Performed by: INTERNAL MEDICINE

## 2023-03-29 PROCEDURE — 99232 SBSQ HOSP IP/OBS MODERATE 35: CPT | Performed by: INTERNAL MEDICINE

## 2023-03-29 PROCEDURE — 86140 C-REACTIVE PROTEIN: CPT

## 2023-03-29 PROCEDURE — 84145 PROCALCITONIN (PCT): CPT

## 2023-03-29 PROCEDURE — 83735 ASSAY OF MAGNESIUM: CPT

## 2023-03-29 RX ORDER — PREDNISONE 20 MG/1
60 TABLET ORAL DAILY
Status: DISCONTINUED | OUTPATIENT
Start: 2023-03-30 | End: 2023-03-30 | Stop reason: HOSPADM

## 2023-03-29 RX ORDER — FUROSEMIDE 10 MG/ML
40 INJECTION INTRAMUSCULAR; INTRAVENOUS ONCE
Status: COMPLETED | OUTPATIENT
Start: 2023-03-29 | End: 2023-03-29

## 2023-03-29 RX ADMIN — IPRATROPIUM BROMIDE AND ALBUTEROL SULFATE 3 ML: .5; 2.5 SOLUTION RESPIRATORY (INHALATION) at 06:52

## 2023-03-29 RX ADMIN — LEVOTHYROXINE SODIUM 100 MCG: 0.1 TABLET ORAL at 08:11

## 2023-03-29 RX ADMIN — OMEPRAZOLE 20 MG: 20 CAPSULE, DELAYED RELEASE ORAL at 05:42

## 2023-03-29 RX ADMIN — IPRATROPIUM BROMIDE AND ALBUTEROL SULFATE 3 ML: .5; 2.5 SOLUTION RESPIRATORY (INHALATION) at 11:33

## 2023-03-29 RX ADMIN — METHYLPREDNISOLONE SODIUM SUCCINATE 62.4 MG: 40 INJECTION, POWDER, FOR SOLUTION INTRAMUSCULAR; INTRAVENOUS at 05:42

## 2023-03-29 RX ADMIN — ROSUVASTATIN CALCIUM 10 MG: 20 TABLET, FILM COATED ORAL at 17:43

## 2023-03-29 RX ADMIN — LISINOPRIL 20 MG: 20 TABLET ORAL at 05:42

## 2023-03-29 RX ADMIN — Medication 5 MG: at 20:36

## 2023-03-29 RX ADMIN — FUROSEMIDE 40 MG: 10 INJECTION, SOLUTION INTRAMUSCULAR; INTRAVENOUS at 10:55

## 2023-03-29 RX ADMIN — AMLODIPINE BESYLATE 5 MG: 10 TABLET ORAL at 05:42

## 2023-03-29 RX ADMIN — IPRATROPIUM BROMIDE AND ALBUTEROL SULFATE 3 ML: .5; 2.5 SOLUTION RESPIRATORY (INHALATION) at 15:43

## 2023-03-29 RX ADMIN — INSULIN HUMAN 2 UNITS: 100 INJECTION, SOLUTION PARENTERAL at 10:55

## 2023-03-29 RX ADMIN — ENOXAPARIN SODIUM 40 MG: 100 INJECTION SUBCUTANEOUS at 17:43

## 2023-03-29 RX ADMIN — BUDESONIDE AND FORMOTEROL FUMARATE DIHYDRATE 2 PUFF: 80; 4.5 AEROSOL RESPIRATORY (INHALATION) at 05:46

## 2023-03-29 ASSESSMENT — ENCOUNTER SYMPTOMS
DEPRESSION: 0
BACK PAIN: 0
NAUSEA: 0
SPUTUM PRODUCTION: 1
GASTROINTESTINAL NEGATIVE: 1
NERVOUS/ANXIOUS: 1
DIZZINESS: 0
FEVER: 0
HEARTBURN: 0
WEAKNESS: 0
CARDIOVASCULAR NEGATIVE: 1
EYES NEGATIVE: 1
NECK PAIN: 0
CHILLS: 0
FOCAL WEAKNESS: 0
VOMITING: 0
POLYDIPSIA: 0
MUSCULOSKELETAL NEGATIVE: 1
COUGH: 1
PALPITATIONS: 0
BRUISES/BLEEDS EASILY: 0
SHORTNESS OF BREATH: 1
HEADACHES: 0
NEUROLOGICAL NEGATIVE: 1

## 2023-03-29 ASSESSMENT — COGNITIVE AND FUNCTIONAL STATUS - GENERAL
MOVING FROM LYING ON BACK TO SITTING ON SIDE OF FLAT BED: A LITTLE
WALKING IN HOSPITAL ROOM: A LITTLE
DRESSING REGULAR UPPER BODY CLOTHING: A LITTLE
HELP NEEDED FOR BATHING: A LITTLE
CLIMB 3 TO 5 STEPS WITH RAILING: A LITTLE
TOILETING: A LITTLE
HELP NEEDED FOR BATHING: A LITTLE
SUGGESTED CMS G CODE MODIFIER DAILY ACTIVITY: CI
STANDING UP FROM CHAIR USING ARMS: A LITTLE
MOBILITY SCORE: 20
DAILY ACTIVITIY SCORE: 21
DAILY ACTIVITIY SCORE: 23
SUGGESTED CMS G CODE MODIFIER DAILY ACTIVITY: CJ
SUGGESTED CMS G CODE MODIFIER MOBILITY: CJ

## 2023-03-29 ASSESSMENT — PATIENT HEALTH QUESTIONNAIRE - PHQ9
2. FEELING DOWN, DEPRESSED, IRRITABLE, OR HOPELESS: NOT AT ALL
1. LITTLE INTEREST OR PLEASURE IN DOING THINGS: NOT AT ALL
SUM OF ALL RESPONSES TO PHQ9 QUESTIONS 1 AND 2: 0

## 2023-03-29 ASSESSMENT — FIBROSIS 4 INDEX: FIB4 SCORE: 1.52

## 2023-03-29 ASSESSMENT — PAIN DESCRIPTION - PAIN TYPE: TYPE: ACUTE PAIN

## 2023-03-29 ASSESSMENT — ACTIVITIES OF DAILY LIVING (ADL): TOILETING: INDEPENDENT

## 2023-03-29 NOTE — CARE PLAN
Problem: Respiratory  Goal: Patient will achieve/maintain optimum respiratory ventilation and gas exchange  Outcome: Progressing   The patient is Stable - Low risk of patient condition declining or worsening    Shift Goals  Clinical Goals: Wean O2, mobilize  Patient Goals: Get home  Family Goals: CASIMIRO    Progress made toward(s) clinical / shift goals:  Patient remained stable on 5L nasal cannula.     Patient is not progressing towards the following goals:

## 2023-03-29 NOTE — ASSESSMENT & PLAN NOTE
>>ASSESSMENT AND PLAN FOR HYPERTENSION WRITTEN ON 3/28/2023  5:34 PM BY ALVINO RAINEY M.D.    History of outpatient treatment,  Monitor closely, restart and adjust medication as indicated

## 2023-03-29 NOTE — DISCHARGE PLANNING
Case Management Discharge Planning    Admission Date: 3/23/2023  GMLOS: 5  ALOS: 6    6-Clicks ADL Score: 21  6-Clicks Mobility Score: 20      Anticipated Discharge Dispo: Discharge Disposition: Discharged to home/self care (01)  Discharge Address: [address] [place] [address]  Discharge Contact Phone Number: [phone]    DME Needed: Yes    DME Ordered: No    Action(s) Taken: RNCM met with patient at bedside. Patient states she is not currently on oxygen at home. Patient is pending walking O2 eval and orders.   Escalations Completed: None    Medically Clear: Yes    Next Steps: RNCM obtained choice and will have scanned into media for when orders are placed. RNCM entered referral to case management and faxed choice form to Candice GAUTHIER.     Barriers to Discharge: Oxygen Delivery    Is the patient up for discharge tomorrow: Yes   patient states she has transportation home.

## 2023-03-29 NOTE — PROGRESS NOTES
Hospital Medicine Daily Progress Note    Date of Service  3/29/2023    Chief Complaint  Naomy Vargas is a 73 y.o. female admitted 3/23/2023 with  respiratory failure    Hospital Course  73 y.o. female who presented 3/23/2023 with a history of COPD, the patient has a 40-pack-year history of smoking, quit approximately 6 years ago, hypertension, dyslipidemia, chronic back pain, hypothyroidism, peripheral vascular disease, history of pneumonia, admitted with increasing dyspnea, subjectively worsening over approximately a month, increasing dyspnea on exertion, subjective fevers and nonproductive cough.  Seen outpatient on 3/7/2023, CT showed a diffuse interstitial septal thickening and groundglass infiltrate picture, suggestive of possible interstitial lung disease, initially found hypoxic with room air saturations in the 70s, previously not on oxygen, the patient tested negative for viral pulmonary disease including COVID-19, influenza, RSV, patient admitted, started on IV steroids, breathing treatments, empiric antibiotics for possible pulmonary infection component, no additional exposure or other pulmonary disease component identified so far.  The patient treated with high flow nasal cannula oxygen, currently titrating down slowly,  The patient being worked up for interstitial lung disease with extensive DDx, ESR and CRP found elevated, EBEN positive, pulmonary medicine consulted for inpatient follow-up.    Interval Problem Update  Patient seen and examined today.  Data, Medication data reviewed.  Case discussed with nursing as available.  Plan of Care reviewed with patient and notified of changes.   3/29 the patient states that she feels overall better, she is off high flow nasal cannula currently still requiring 6 to 8 L nasal cannula oxygen, desaturates with movement, not tolerating PT, pulmonary medicine follow-up noted, the patient evaluated for ILD causative cofactors, discussed steroid taper, currently  patient is afebrile heart rate in the 70s to 90s, respiration unlabored, oxygen in the low 90s to mid 90s on 5 to 8 L nasal cannula oxygen, blood pressure is fluctuating between 1 teens to 160s over 80s to 60s, laboratory data indicates a white cell count of 10, hemoglobin 13.2, platelet count 171, BNP slightly elevated 2922, glucose control in the 1 teens to 150s, low procalcitonin, low C-reactive protein, chest x-ray with improving infiltrates.  I have discussed this patient's plan of care and discharge plan at IDT rounds today with Case Management, Nursing, Nursing leadership, and other members of the IDT team.    Consultants/Specialty  critical care and pulmonary    Code Status  Full Code    Disposition  Patient is not medically cleared for discharge.   Anticipate discharge to to home with close outpatient follow-up.  I have placed the appropriate orders for post-discharge needs.    Review of Systems  Review of Systems   Constitutional:  Positive for malaise/fatigue. Negative for chills and fever.   HENT: Negative.     Eyes: Negative.    Respiratory:  Positive for cough, sputum production and shortness of breath.    Cardiovascular: Negative.  Negative for chest pain and palpitations.   Gastrointestinal: Negative.  Negative for heartburn, nausea and vomiting.   Genitourinary: Negative.  Negative for dysuria and frequency.   Musculoskeletal: Negative.  Negative for back pain and neck pain.   Skin: Negative.  Negative for itching and rash.   Neurological: Negative.  Negative for dizziness, focal weakness, weakness and headaches.   Endo/Heme/Allergies: Negative.  Negative for polydipsia. Does not bruise/bleed easily.   Psychiatric/Behavioral:  Negative for depression. The patient is nervous/anxious.       Physical Exam  Temp:  [36 °C (96.8 °F)-36.6 °C (97.9 °F)] 36.3 °C (97.3 °F)  Pulse:  [65-94] 88  Resp:  [14-58] 30  BP: (118-158)/(56-77) 118/59  SpO2:  [84 %-97 %] 93 %    Physical Exam  Vitals and nursing note  reviewed.   Constitutional:       Appearance: She is well-developed. She is ill-appearing. She is not diaphoretic.   HENT:      Head: Normocephalic and atraumatic.      Nose: Nose normal.   Eyes:      Conjunctiva/sclera: Conjunctivae normal.      Pupils: Pupils are equal, round, and reactive to light.   Neck:      Thyroid: No thyromegaly.      Vascular: No JVD.   Cardiovascular:      Rate and Rhythm: Normal rate and regular rhythm.      Heart sounds: Normal heart sounds.     No friction rub. No gallop.   Pulmonary:      Effort: Pulmonary effort is normal.      Breath sounds: Rhonchi and rales present. No wheezing.   Abdominal:      General: Bowel sounds are normal. There is no distension.      Palpations: Abdomen is soft. There is no mass.      Tenderness: There is no abdominal tenderness. There is no guarding or rebound.   Musculoskeletal:         General: No tenderness. Normal range of motion.      Cervical back: Normal range of motion and neck supple.   Lymphadenopathy:      Cervical: No cervical adenopathy.   Skin:     General: Skin is warm and dry.   Neurological:      Mental Status: She is alert and oriented to person, place, and time.      Cranial Nerves: No cranial nerve deficit.   Psychiatric:         Behavior: Behavior normal.       Fluids    Intake/Output Summary (Last 24 hours) at 3/29/2023 1509  Last data filed at 3/29/2023 1400  Gross per 24 hour   Intake 700 ml   Output 1300 ml   Net -600 ml       Laboratory  Recent Labs     03/27/23  0048 03/28/23  0452 03/29/23  0310   WBC 13.2* 11.9* 10.0   RBC 4.15* 4.38 4.27   HEMOGLOBIN 12.6 13.7 13.2   HEMATOCRIT 40.2 42.3 40.2   MCV 96.9 96.6 94.1   MCH 30.4 31.3 30.9   MCHC 31.3* 32.4* 32.8*   RDW 47.3 46.7 45.2   PLATELETCT 196 172 171   MPV 10.4 9.8 9.8     Recent Labs     03/27/23  0048 03/28/23  0452 03/29/23  0310   SODIUM 136 134* 135   POTASSIUM 4.3 4.5 4.5   CHLORIDE 106 105 106   CO2 19* 19* 21   GLUCOSE 162* 133* 135*   BUN 31* 30* 28*   CREATININE  0.86 0.80 0.83   CALCIUM 8.0* 8.1* 7.9*                   Imaging  DX-CHEST-PORTABLE (1 VIEW)   Final Result      1.  Improving bilateral coarse interstitial opacities likely on the basis of resolving acute on chronic interstitial lung disease.   2.  Unchanged blunting of the bilateral costophrenic angles which may related to small effusions or pleural scarring.      DX-CHEST-PORTABLE (1 VIEW)   Final Result      1.  Stable bilateral coarse interstitial opacities which could be related to chronic lung disease or pneumonitis.      DX-CHEST-PORTABLE (1 VIEW)   Final Result         1.  Pulmonary edema and/or infiltrates are identified, which are stable since the prior exam.   2.  Trace bilateral pleural effusions   3.  Cardiomegaly   4.  Atherosclerosis      EC-ECHOCARDIOGRAM COMPLETE W/O CONT   Final Result      DX-CHEST-PORTABLE (1 VIEW)   Final Result         1.  Pulmonary edema and/or infiltrates are identified, which are stable since the prior exam.   2.  Trace bilateral pleural effusions   3.  Cardiomegaly   4.  Atherosclerosis      DX-CHEST-PORTABLE (1 VIEW)   Final Result      1.  Diffuse bilateral pulmonary infiltrates. Differential diagnosis includes atypical pneumonia, interstitial edema and chronic interstitial lung disease.      2.  Cardiomegaly.      3.  Small bilateral pleural effusions.           Assessment/Plan  * Acute hypoxemic respiratory failure (HCC)- (present on admission)  Assessment & Plan  Currently still requiring high flow nasal cannula oxygen  Monitor closely, BiPAP as needed nocturnally or with worsening dyspnea  Brought and white differential diagnosis given the patient's presentation, CT findings of possible ILD  Pulmonary medicine consulting  Ongoing steroid use, antibiotics  Respiratory care, respiratory therapy assist    ILD (interstitial lung disease) (HCC)  Assessment & Plan  With broad differential diagnosis, EBEN positive currently  Ongoing steroid use  Pulmonary medicine  consulting,  High oxygen support, wean as tolerated  Evaluate for alternative diagnosis  Will need pulmonary medicine follow-up, pulmonary rehab    Chronic obstructive pulmonary disease (HCC)- (present on admission)  Assessment & Plan  History of extensive tobacco abuse, currently without wheezing, monitor closely, steroids, antibiotics, breathing treatments    Nonrheumatic tricuspid valve regurgitation  Assessment & Plan  History of, currently per echocardiogram only mild      Pulmonary hypertension (HCC)  Assessment & Plan  Mild, RVSP 35, monitor    Hypertension  Assessment & Plan  History of outpatient treatment,  Monitor closely, restart and adjust medication as indicated    Leukocytosis  Assessment & Plan  Likely reactive, monitor    Pleural effusion  Assessment & Plan  Monitor, outpatient follow-up, currently not enough to perform a thoracentesis    Sepsis (HCC)  Assessment & Plan  Initially suspected, the patient empiric antibiotics, resolved    Elevated troponin  Assessment & Plan  Likely type II injury, monitor, no chest pain    Hypothyroidism- (present on admission)  Assessment & Plan  Continue replacement therapy, TSH is normal in the recent past    Hyperglycemia  Assessment & Plan  Monitor, secondary to steroids, treat as indicated    Plan  Forced diuresis  Ongoing steroids, transition to oral medication with slow taper  Ongoing weaning of oxygen, still has significant desaturation with activity  Discharge planning  Will likely need home oxygen  Further taper her current medication regimen as tolerated,  Close laboratory follow-up  PT OT, mobilization and evaluate for tolerance  Medically complex hours patient      VTE prophylaxis: enoxaparin ppx    I have performed a physical exam and reviewed and updated ROS and Plan today (3/29/2023). In review of yesterday's note (3/28/2023), there are no changes except as documented above.      Please note that this dictation was created using voice recognition  software. I have made every reasonable attempt to correct obvious errors, but I expect that there are errors of grammar and possibly context that I did not discover before finalizing the note.

## 2023-03-29 NOTE — PROGRESS NOTES
"Pulmonary Consult    Date of Admission: 3/23/23  Date of Consult: 3/28/23  Consulted by: Dr. Carmine Griggs     Reason for consult: Acute hypoxic respiratory failure, possible CTD-ILD    Chief Complaint:  Chief Complaint   Patient presents with    Shortness of Breath     X 1 month progressively worse, unable to obtain pulse ox initially with increased WOB, placed on NRB 15L with a sat of 71% in triage, pt roomed with an eventual saturation of 92% on 15L NRB, pt denies respiratory hx or home oxygen requirement, denies cardiac hx or chest pain. Pt remains alert/oriented throughout     HPI:   From Dr. Fermin Guzman's note: \"Naomy Vargas is a 73 y.o. female with history with a PMHx of non-oxygen dependent COPD (40.5 pack-year history; quit smoking 6 years ago), HTN, DLD who was admitted 3/23 after presenting with one month of increasing dyspnea on exertion that worsened the past several days associated with subjective fever and nonproductive cough. Seen by PCP with CT chest 3/7/2023 which demonstrated interval development of diffuse interstitial septal thickening and some groundglass infiltrates with a peripheral predominance suggestive of a interstitial lung disease which has not yet been worked up or followed up on. Admitted to the ICU for sepsis secondary to presumed underlying pulmonary infection complicated by acute hypoxemic respiratory failure requiring high dose steroids and antibiotics.    3/24:increase methylprednisolone from 80->125mg Q6H  Continue ceftriaxone and azithromycin.  High flow nasal cannula.  3/25: continue current dose of methylprednisolone.  Continue antibiotics.  High flow nasal cannula.  Keep ICU.  3/26: Taper methylprednisolone.  Continue antibiotics.  Keep ICU.  High flow nasal cannula.  3/27: Taper methylprednisolone. Continue antibiotics. Continue HFNC. Pending ILD workup  3/28: Taper methylprednisolone. Abx completed. Continue HFNC. ILD workup: titer 1:640, EBEN IgG +ve, " "homogenous pattern, most consistent w SLE vs SLE drug induced vs DARSHAN. Pulm consult. PT/OT consult. Transfer to [facility]\"  3/29: Down to 5 lpm. Procal improving. BNP 2922. She noted on her ILD questionnaire that she chronically takes Macrobid.  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  She does endorse dry eyes and mouth as well as sensitivity to light.    Patient has been seen by pulmonary at [facility] in the past: Dr. Do Velásquez in 201 for a RUL nodule.   Recent imaging:         She has prior PFTs in 2021: Normal by NHANEs criteria but mild obstruction by ATS. Air trapping. Normal DLCO.    She denies any known personal or family history of autoimmune disease.  She denies any small joint issues, uveitis, burning with urination.  Renal function is normal.  She does acknowledge that she tested positive at home for COVID about a year ago.  Her respiratory symptoms started about a month ago.  She was standing near a pill burning stove when she became symptomatic.  She denies any new medications or new environment around the time that she became sick.  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      Past Medical History:   Diagnosis Date    Anxiety     Arthritis     wrists    Back pain     Blood transfusion without reported diagnosis     with ectopic preg x 2     Breath shortness     with exertion    Cataract     bilateral removal-Dr. Duke Talamantes    Cellulitis     right lower leg    Cellulitis of leg 11/1/2013    Followed by dermatology. Managed with fluocinonide and Bactroban on right leg Left leg she uses triamcinolone and Sarna pramocaine (anesthetic, antipruretic) on both.      Chickenpox     COPD (chronic obstructive pulmonary disease) ([facility])     Dental disorder     upper denture    Earache     Fatigue     Frequent urination     Hypertension 02/23/2018    on no meds at this time    " Hypothyroidism     Influenza     Insomnia     Kidney disease     reports 1 kidney is smaller than the other    Mumps     OAB (overactive bladder) 2019    Pain 2018    right leg and back, 5/10    Peripheral vascular disease, unspecified (HCC) 10/13/2021    Pneumonia     Rash     Ringing in ears     Shortness of breath     Swelling of lower extremity     Thyroid disease     Tonsillitis     Toothache     Venous stasis dermatitis     right leg       Past Surgical History:   Procedure Laterality Date    VEIN LIGATION RADIO FREQUENCY Right 2018    Procedure: VEIN LIGATION RADIO FREQUENCY- LONG SAPENOUS;  Surgeon: Jim Alas M.D.;  Location: SURGERY [facility];  Service: General    ABDOMINAL EXPLORATION      2 ectopic preg    BREAST BIOPSY      hx of benign left breast biopsy    EYE SURGERY      cataract x2    HYSTERECTOMY LAPAROSCOPY      HYSTERECTOMY, TOTAL ABDOMINAL      OOPHORECTOMY      PB MAMMARY DUCTOGRAM, SINGLE      AR REMV 2ND CATARACT,CORN-SCLER SECTN      TONSILLECTOMY         Social History     Socioeconomic History    Marital status:      Spouse name: Not on file    Number of children: 2    Years of education: Not on file    Highest education level: Not on file   Occupational History    Occupation: retired   Tobacco Use    Smoking status: Former     Packs/day: 0.50     Years: 53.00     Pack years: 26.50     Types: Cigarettes     Quit date: 2017     Years since quittin.6    Smokeless tobacco: Never   Vaping Use    Vaping Use: Never used   Substance and Sexual Activity    Alcohol use: Yes     Comment: 2 per day    Drug use: No    Sexual activity: Not Currently     Partners: Male   Other Topics Concern    Not on file   Social History Narrative    Not on file     Social Determinants of Health     Financial Resource Strain: Not on file   Food Insecurity: Not on file   Transportation Needs: Not on file   Physical Activity: Not on file   Stress: Not on file   Social  Connections: Not on file   Intimate Partner Violence: Not on file   Housing Stability: Not on file          Family History   Problem Relation Age of Onset    Arthritis Mother         hip fracture    Diabetes Mother     Cancer Mother         skin    Arthritis Father         lung    Alcohol/Drug Father         etoh    Lung Disease Sister         smoker/copd    Hypertension Sister     Other Brother         hep c    Arthritis Maternal Grandmother         hip fracture    Diabetes Maternal Grandfather     No Known Problems Son     No Known Problems Son     No Known Problems Brother     No Known Problems Sister     No Known Problems Brother     Hyperlipidemia Neg Hx     Stroke Neg Hx        No current facility-administered medications on file prior to encounter.     Current Outpatient Medications on File Prior to Encounter   Medication Sig Dispense Refill    budesonide-formoterol (SYMBICORT) 80-4.5 MCG/ACT Aerosol Inhale 2 Puffs 2 times a day. 3 Each 0    PROAIR  (90 Base) MCG/ACT Aero Soln inhalation aerosol Inhale 1 Puff every 6 hours as needed for Shortness of Breath. 8.5 g 1    amLODIPine (NORVASC) 5 MG Tab TAKE 1 TABLET BY MOUTH EVERY DAY 90 Tablet 1    levothyroxine (SYNTHROID) 100 MCG Tab TAKE 1 TABLET BY MOUTH EVERY DAY BEFORE BREAKFAST 100 Tablet 0    rosuvastatin (CRESTOR) 10 MG Tab TAKE 1 TABLET BY MOUTH EVERY DAY IN THE EVENING 100 Tablet 1    lisinopril (PRINIVIL) 20 MG Tab TAKE 1 TABLET BY MOUTH EVERY  Tablet 3    Mirabegron ER (MYRBETRIQ) 50 MG TABLET SR 24 HR Myrbetriq 50 mg tablet,extended release   Take 1 tablet every day by oral route for 90 days.      Ascorbic Acid (VITAMIN C PO) Take  by mouth.      Doxylamine Succinate, Sleep, (SLEEP AID PO) Take  by mouth.      Dextromethorphan-guaiFENesin (MUCINEX DM PO) Take  by mouth.      triamcinolone acetonide (KENALOG) 0.1 % Cream Apply 1 Application topically 2 times a day as needed. 80 g 2    loperamide (IMODIUM) 2 MG Cap Take 1 Capsule by  "mouth 4 times a day as needed for Diarrhea. 30 Capsule 1    cyanocobalamin (VITAMIN B-12) 100 MCG Tab Take 100 mcg by mouth every day.      vitamin D (CHOLECALCIFEROL) 1000 Unit (25 mcg) Tab Take 1,000 Units by mouth every day.      nitrofurantoin (MACRODANTIN) 50 MG Cap TAKE 1 CAPSULE BY MOUTH EVERYDAY AT BEDTIME      Melatonin 1 MG Tab Take  by mouth.      diphenhydrAMINE (BENADRYL) 25 MG Tab Take 25 mg by mouth every 6 hours as needed for Sleep.      Acetaminophen 500 MG Cap Take  by mouth.      Oxymetazoline HCl (NASAL SPRAY NA) Spray  in nose.         Allergies: Colophony [pinus strobus], Latex, Neosporin [neomycin-polymyxin b], Phenylene, Soap, Tape, and Tea tree oil      ROS: A 12 point ROS was performed on intake and during my interview. ROS negative unless specifically noted in HPI.     Vitals:  /67   Pulse 94   Temp 36 °C (96.8 °F) (Temporal)   Resp 18   Ht 1.676 m (5' 6\")   Wt 73.6 kg (162 lb 4.1 oz)   SpO2 89%     Physical Exam:  Physical Exam  Vitals reviewed.   Constitutional:       General: She is not in acute distress.     Appearance: She is ill-appearing. She is not toxic-appearing or diaphoretic.   Eyes:      General:         Right eye: No discharge.         Left eye: No discharge.      Conjunctiva/sclera: Conjunctivae normal.   Cardiovascular:      Rate and Rhythm: Normal rate.   Pulmonary:      Effort: Pulmonary effort is normal.      Breath sounds: Normal breath sounds.   Musculoskeletal:      Right lower leg: No edema.      Left lower leg: No edema.   Skin:     General: Skin is warm.      Coloration: Skin is not jaundiced.   Neurological:      General: No focal deficit present.      Mental Status: She is alert and oriented to person, place, and time.       Laboratory Data: I personally reviewed labs including historical lab trends.     Assessment/Plan:    Pulmonary problem list:  #Acute hypoxic respiratory failure, possible CTD-ILD  #Former smoker  #History of COVID 19 " "infection    Assessment: I personally reviewed her CT images.  She has diffuse fibrotic changes as well as traction bronchiectasis.  Differential includes progressive NSIP, CTD-ILD, chronic HP, drug toxicity, COVID-19 related lung changes.  Her EBEN titer is remarkably elevated, but she does not have other findings suggestive of lupus.    Recommendations/Plan:  -Chest ILD questionnaire given to the patient to fill out: Chronic Macrobid use and her employment history stand out as possible contributing factors. Macrobid is described in the literature as causing a reversible ILD pattern. I counseled her that she should stop this medication. It is not clear why it would be indicated chronically but she states she takes it nightly for years for a \"kidney infection\".   -Continue empiric steroids: Transition to 60 mg p.o. prednisone today.  Decrease by 10 mg every 7 days until down to 2.5 mg for 7 days.  She was counseled that if she has to go up on her oxygen for 48 hours after decreasing the steroid dose, she should go back to the most effective previous dose.  -Wean oxygen as tolerated.  Target pulse oximetry 88 to 92%.  She will need to discharge with oxygen.  -Discussed vaccines.  She is up-to-date.  I did explain to her that she is a high risk patient.  -She will need follow-up as an outpatient with pulmonary medicine within 4 to 6 weeks.  She will also need an outpatient referral to rheumatology.  She does have traits of Sjogren's.   -Likely ready for discharge tomorrow.    Total consult time: 45 minutes which included time spent on chart review, personally reviewing pertinent images and labs, time spent counseling and educating the patient and/or family members, and coordinating care with the healthcare team to include consultants.   __________  Levi Perez, DO  Pulmonary and Critical Care Medicine  [facility]    Please note that this dictation was created using voice recognition software. The accuracy of " the dictation is limited to the abilities of the software. I have made every reasonable attempt to correct obvious errors, but I expect that there are errors of grammar and possibly content that I did not discover before finalizing the note.     [employer]rics

## 2023-03-29 NOTE — ASSESSMENT & PLAN NOTE
Currently still requiring high flow nasal cannula oxygen  Monitor closely, BiPAP as needed nocturnally or with worsening dyspnea  Brought and white differential diagnosis given the patient's presentation, CT findings of possible ILD  Pulmonary medicine consulting  Ongoing steroid use, antibiotics  Respiratory care, respiratory therapy assist   Hepatitis B

## 2023-03-29 NOTE — ASSESSMENT & PLAN NOTE
History of extensive tobacco abuse, currently without wheezing, monitor closely, steroids, antibiotics, breathing treatments

## 2023-03-29 NOTE — CONSULTS
Hospital Medicine Consultation    Date of Service  3/28/2023    Referring Physician  Perry Salomon M.D.    Consulting Physician  Carmine Griggs M.D.    Reason for Consultation  Hospital medicine consultation requested in a patient admitted with respiratory failure.    History of Presenting Illness  73 y.o. female who presented 3/23/2023 with a history of COPD, the patient has a 40-pack-year history of smoking, quit approximately 6 years ago, hypertension, dyslipidemia, chronic back pain, hypothyroidism, peripheral vascular disease, history of pneumonia, admitted with increasing dyspnea, subjectively worsening over approximately a month, increasing dyspnea on exertion, subjective fevers and nonproductive cough.  Seen outpatient on 3/7/2023, CT showed a diffuse interstitial septal thickening and groundglass infiltrate picture, suggestive of possible interstitial lung disease, initially found hypoxic with room air saturations in the 70s, previously not on oxygen, the patient tested negative for viral pulmonary disease including COVID-19, influenza, RSV, patient admitted, started on IV steroids, breathing treatments, empiric antibiotics for possible pulmonary infection component, no additional exposure or other pulmonary disease component identified so far.  The patient treated with high flow nasal cannula oxygen, currently titrating down slowly,  The patient being worked up for interstitial lung disease with extensive DDx, ESR and CRP found elevated, EBEN positive, pulmonary medicine consulted for inpatient follow-up.  On my evaluation today the patient feels better, she remains on a high flow nasal cannula, she denies excessive sputum production or increase in dyspnea today.  The patient denies chest pain, she denies any other acute pain currently or fevers chills, nausea or vomiting.    Review of Systems  Review of Systems   Constitutional:  Positive for malaise/fatigue. Negative for chills and fever.   HENT:  Negative.     Eyes: Negative.    Respiratory:  Positive for shortness of breath. Negative for cough, hemoptysis, sputum production and wheezing.    Cardiovascular: Negative.  Negative for chest pain and palpitations.   Gastrointestinal: Negative.  Negative for heartburn, nausea and vomiting.   Genitourinary: Negative.  Negative for dysuria and frequency.   Musculoskeletal: Negative.  Negative for back pain and neck pain.   Skin: Negative.  Negative for itching and rash.   Neurological: Negative.  Negative for dizziness, focal weakness, weakness and headaches.   Endo/Heme/Allergies: Negative.  Negative for polydipsia. Does not bruise/bleed easily.   Psychiatric/Behavioral:  Negative for depression. The patient is nervous/anxious.      Past Medical History   has a past medical history of Anxiety, Arthritis, Back pain, Blood transfusion without reported diagnosis, Breath shortness, Cataract, Cellulitis, Cellulitis of leg (11/1/2013), Chickenpox, COPD (chronic obstructive pulmonary disease) ([facility]), Dental disorder, Earache, Fatigue, Frequent urination, Hypertension (02/23/2018), Hypothyroidism, Influenza, Insomnia, Kidney disease, Mumps, OAB (overactive bladder) (2/11/2019), Pain (02/23/2018), Peripheral vascular disease, unspecified ([facility]) (10/13/2021), Pneumonia, Rash, Ringing in ears, Shortness of breath, Swelling of lower extremity, Thyroid disease, Tonsillitis, Toothache, and Venous stasis dermatitis.    She has no past medical history of Anemia, Clotting disorder ([facility]), Depression, Diabetic neuropathy ([facility]), Headache(784.0), HIV (human immunodeficiency virus infection) ([facility]), Hyperlipidemia, IBD (inflammatory bowel disease), Meningitis, Osteoporosis, or Ulcer.    Surgical History   has a past surgical history that includes pr mammary ductogram, single; breast biopsy; abdominal exploration; oophorectomy; hysterectomy, total abdominal (1977/1978); eye surgery; vein ligation radio frequency (Right, 2/26/2018);  hysterectomy laparoscopy; pr remv 2nd cataract,corn-scler sectn; and tonsillectomy.    Family History  family history includes Alcohol/Drug in her father; Arthritis in her father, maternal grandmother, and mother; Cancer in her mother; Diabetes in her maternal grandfather and mother; Hypertension in her sister; Lung Disease in her sister; No Known Problems in her brother, brother, sister, son, and son; Other in her brother.    Social History   reports that she quit smoking about 5 years ago. Her smoking use included cigarettes. She has a 26.50 pack-year smoking history. She has never used smokeless tobacco. She reports current alcohol use. She reports that she does not use drugs.    Medications  Prior to Admission Medications   Prescriptions Last Dose Informant Patient Reported? Taking?   Acetaminophen 500 MG Cap   Yes No   Sig: Take  by mouth.   Ascorbic Acid (VITAMIN C PO)   Yes No   Sig: Take  by mouth.   Dextromethorphan-guaiFENesin (MUCINEX DM PO)   Yes No   Sig: Take  by mouth.   Doxylamine Succinate, Sleep, (SLEEP AID PO)   Yes No   Sig: Take  by mouth.   Melatonin 1 MG Tab   Yes No   Sig: Take  by mouth.   Mirabegron ER (MYRBETRIQ) 50 MG TABLET SR 24 HR   Yes No   Sig: Myrbetriq 50 mg tablet,extended release   Take 1 tablet every day by oral route for 90 days.   Oxymetazoline HCl (NASAL SPRAY NA)   Yes No   Sig: Spray  in nose.   PROAIR  (90 Base) MCG/ACT Aero Soln inhalation aerosol   No No   Sig: Inhale 1 Puff every 6 hours as needed for Shortness of Breath.   amLODIPine (NORVASC) 5 MG Tab   No No   Sig: TAKE 1 TABLET BY MOUTH EVERY DAY   budesonide-formoterol (SYMBICORT) 80-4.5 MCG/ACT Aerosol   No No   Sig: Inhale 2 Puffs 2 times a day.   cyanocobalamin (VITAMIN B-12) 100 MCG Tab   Yes No   Sig: Take 100 mcg by mouth every day.   diphenhydrAMINE (BENADRYL) 25 MG Tab   Yes No   Sig: Take 25 mg by mouth every 6 hours as needed for Sleep.   levothyroxine (SYNTHROID) 100 MCG Tab   No No   Sig: TAKE 1  TABLET BY MOUTH EVERY DAY BEFORE BREAKFAST   lisinopril (PRINIVIL) 20 MG Tab   No No   Sig: TAKE 1 TABLET BY MOUTH EVERY DAY   loperamide (IMODIUM) 2 MG Cap   No No   Sig: Take 1 Capsule by mouth 4 times a day as needed for Diarrhea.   nitrofurantoin (MACRODANTIN) 50 MG Cap   Yes No   Sig: TAKE 1 CAPSULE BY MOUTH EVERYDAY AT BEDTIME   rosuvastatin (CRESTOR) 10 MG Tab   No No   Sig: TAKE 1 TABLET BY MOUTH EVERY DAY IN THE EVENING   triamcinolone acetonide (KENALOG) 0.1 % Cream   No No   Sig: Apply 1 Application topically 2 times a day as needed.   vitamin D (CHOLECALCIFEROL) 1000 Unit (25 mcg) Tab   Yes No   Sig: Take 1,000 Units by mouth every day.      Facility-Administered Medications: None       Allergies  Allergies   Allergen Reactions    Colophony [Pinus Strobus] Itching    Latex Rash     .    Neosporin [Neomycin-Polymyxin B] Rash     .    Phenylene Itching    Soap Rash     Certain soaps cause rash    Tape Itching     PAPER TAPE OK    Tea Tree Oil Rash     .       Physical Exam  Temp:  [36.4 °C (97.5 °F)-36.6 °C (97.9 °F)] 36.6 °C (97.9 °F)  Pulse:  [67-82] 74  Resp:  [14-38] 31  BP: (128-158)/(60-96) 145/68  SpO2:  [90 %-97 %] 92 %    Physical Exam  Vitals and nursing note reviewed.   Constitutional:       Appearance: She is well-developed. She is not diaphoretic.   HENT:      Head: Normocephalic and atraumatic.      Nose: Nose normal.   Eyes:      Conjunctiva/sclera: Conjunctivae normal.      Pupils: Pupils are equal, round, and reactive to light.   Neck:      Thyroid: No thyromegaly.      Vascular: No JVD.   Cardiovascular:      Rate and Rhythm: Normal rate and regular rhythm.      Heart sounds: Normal heart sounds.     No friction rub. No gallop.   Pulmonary:      Effort: Pulmonary effort is normal.      Breath sounds: Rhonchi and rales present. No wheezing.   Abdominal:      General: Bowel sounds are normal. There is no distension.      Palpations: Abdomen is soft. There is no mass.      Tenderness: There  is no abdominal tenderness. There is no guarding or rebound.   Musculoskeletal:         General: No tenderness. Normal range of motion.      Cervical back: Normal range of motion and neck supple.   Lymphadenopathy:      Cervical: No cervical adenopathy.   Skin:     General: Skin is warm and dry.      Coloration: Skin is pale.   Neurological:      Mental Status: She is alert and oriented to person, place, and time.      Cranial Nerves: No cranial nerve deficit.   Psychiatric:         Behavior: Behavior normal.       Fluids  Date 03/28/23 0700 - 03/29/23 0659   Shift 9150-8877 2164-8301 9536-4703 24 Hour Total   INTAKE   P.O. 200   200   Shift Total 200   200   OUTPUT   Urine 200   200   Shift Total 200   200   Weight (kg) 70.5 70.5 70.5 70.5       Laboratory  Recent Labs     03/26/23  0115 03/27/23  0048 03/28/23  0452   WBC 15.6* 13.2* 11.9*   RBC 4.02* 4.15* 4.38   HEMOGLOBIN 12.5 12.6 13.7   HEMATOCRIT 37.9 40.2 42.3   MCV 94.3 96.9 96.6   MCH 31.1 30.4 31.3   MCHC 33.0* 31.3* 32.4*   RDW 46.3 47.3 46.7   PLATELETCT 168 196 172   MPV 10.3 10.4 9.8     Recent Labs     03/26/23  0115 03/27/23  0048 03/28/23  0452   SODIUM 134* 136 134*   POTASSIUM 3.9 4.3 4.5   CHLORIDE 106 106 105   CO2 18* 19* 19*   GLUCOSE 166* 162* 133*   BUN 32* 31* 30*   CREATININE 0.92 0.86 0.80   CALCIUM 8.0* 8.0* 8.1*                     Imaging  DX-CHEST-PORTABLE (1 VIEW)   Final Result      1.  Stable bilateral coarse interstitial opacities which could be related to chronic lung disease or pneumonitis.      DX-CHEST-PORTABLE (1 VIEW)   Final Result         1.  Pulmonary edema and/or infiltrates are identified, which are stable since the prior exam.   2.  Trace bilateral pleural effusions   3.  Cardiomegaly   4.  Atherosclerosis      EC-ECHOCARDIOGRAM COMPLETE W/O CONT   Final Result      DX-CHEST-PORTABLE (1 VIEW)   Final Result         1.  Pulmonary edema and/or infiltrates are identified, which are stable since the prior exam.   2.   Trace bilateral pleural effusions   3.  Cardiomegaly   4.  Atherosclerosis      DX-CHEST-PORTABLE (1 VIEW)   Final Result      1.  Diffuse bilateral pulmonary infiltrates. Differential diagnosis includes atypical pneumonia, interstitial edema and chronic interstitial lung disease.      2.  Cardiomegaly.      3.  Small bilateral pleural effusions.          Assessment/Plan  * Acute hypoxemic respiratory failure (HCC)- (present on admission)  Assessment & Plan  Currently still requiring high flow nasal cannula oxygen  Monitor closely, BiPAP as needed nocturnally or with worsening dyspnea  Brought and white differential diagnosis given the patient's presentation, CT findings of possible ILD  Pulmonary medicine consulting  Ongoing steroid use, antibiotics  Respiratory care, respiratory therapy assist    ILD (interstitial lung disease) (HCC)  Assessment & Plan  With broad differential diagnosis, EBEN positive currently  Ongoing steroid use  Pulmonary medicine consulting,  High oxygen support, wean as tolerated  Evaluate for alternative diagnosis  Will need pulmonary medicine follow-up, pulmonary rehab    Chronic obstructive pulmonary disease (HCC)- (present on admission)  Assessment & Plan  History of extensive tobacco abuse, currently without wheezing, monitor closely, steroids, antibiotics, breathing treatments    Nonrheumatic tricuspid valve regurgitation  Assessment & Plan  History of, currently per echocardiogram only mild      Pulmonary hypertension (HCC)  Assessment & Plan  Mild, RVSP 35, monitor    Hypertension  Assessment & Plan  History of outpatient treatment,  Monitor closely, restart and adjust medication as indicated    Leukocytosis  Assessment & Plan  Likely reactive, monitor    Pleural effusion  Assessment & Plan  Monitor, outpatient follow-up, currently not enough to perform a thoracentesis    Sepsis ([facility])  Assessment & Plan  Initially suspected, the patient empiric antibiotics, resolved    Elevated  troponin  Assessment & Plan  Likely type II injury, monitor, no chest pain    Hypothyroidism- (present on admission)  Assessment & Plan  Continue replacement therapy, TSH is normal in the recent past    Hyperglycemia  Assessment & Plan  Monitor, secondary to steroids, treat as indicated      Plan  Continue with steroid therapy, wean slowly  Any antihypertensive regimen  Consider mild forced diuresis  Recheck a.m. labs  Wean oxygen as tolerated  Currently the patient is critically ill given her oxygen needs requiring high flow nasal cannula oxygen currently at 35 L, 60% FiO2  Critical care time 35 minutes, consultation time 55 minutes  Medically complex high-risk patient    Thank you for consulting with us, we will follow closely while the patient is hospitalized    Please note that this dictation was created using voice recognition software. I have made every reasonable attempt to correct obvious errors, but I expect that there are errors of grammar and possibly context that I did not discover before finalizing the note.

## 2023-03-29 NOTE — THERAPY
03/29/23 1501   Interdisciplinary Plan of Care Collaboration   Collaboration Comments Defer PT eval this AM.  Per OT pts o2 dropped to low 80's w/ commode transfer.

## 2023-03-29 NOTE — ASSESSMENT & PLAN NOTE
With broad differential diagnosis, EBEN positive currently  Ongoing steroid use  Pulmonary medicine consulting,  High oxygen support, wean as tolerated  Evaluate for alternative diagnosis  Will need pulmonary medicine follow-up, pulmonary rehab

## 2023-03-29 NOTE — THERAPY
"Occupational Therapy   Initial Evaluation     Patient Name: Naomy Vargas  Age:  73 y.o., Sex:  female  Medical Record #: [id number]  Today's Date: 3/29/2023     Assessment    Patient is 73 y.o. female admitted with acute hypoxic respiratory failure and suspected sepsis. Pt presents to OT eval able to demonstrate basic self-care ADLs at SPV level but is limited by increased O2 needs. Pt desaturated from 92% to 83% during transfer from bedside commode to bedside chair, require 1min pursed lip breathing to recover above 90%. Pt provided education regarding home O2 safety considerations like hose management and keeping O2 on during showers. Pt very receptive. Anticipate DC home once medically cleared.     Plan    Occupational Therapy Initial Treatment Plan   Duration: Discharge Needs Only    DC Equipment Recommendations: None  Discharge Recommendations: Recommend home health for continued occupational therapy services.       Subjective    \"We hunt and hike, normally very active.\"     Objective       03/29/23 1131   Prior Living Situation   Prior Services None   Housing / Facility 1 Story House   Steps Into Home 4   Steps In Home 0   Bathroom Set up Shower Chair;Bathtub / Shower Combination   Equipment Owned Tub / Shower Seat   Lives with - Patient's Self Care Capacity Spouse   Comments supportive , son lives nearby   Prior Level of ADL Function   Self Feeding Independent   Grooming / Hygiene Independent   Bathing Independent   Dressing Independent   Toileting Independent   Prior Level of IADL Function   Medication Management Independent   Laundry Independent   Kitchen Mobility Independent   Finances Independent   Home Management Independent   Shopping Independent   Prior Level Of Mobility Independent Without Device in Community   Driving / Transportation Driving Independent   Occupation (Pre-Hospital Vocational) Retired Due To Age   Vitals   O2 (LPM) 7   O2 Delivery Device Silicone Nasal Cannula   Pain 0 - 10 " Group   Therapist Pain Assessment Nurse Notified;Post Activity Pain Same as Prior to Activity;0   Cognition    Cognition / Consciousness WDL   Level of Consciousness Alert   Comments pleasant, receptive   Strength Upper Body   Upper Body Strength  WDL   Coordination Upper Body   Coordination WDL   Balance Assessment   Sitting Balance (Static) Good   Sitting Balance (Dynamic) Good   Standing Balance (Static) Good   Standing Balance (Dynamic) Good   Weight Shift Sitting Good   Weight Shift Standing Good   Comments no AD   Bed Mobility    Comments not observed but pt and RN state no assist required   ADL Assessment   Eating Modified Independent   Grooming Modified Independent;Seated   Upper Body Dressing Supervision   Lower Body Dressing Supervision   Toileting Supervision   How much help from another person does the patient currently need...   Putting on and taking off regular lower body clothing? 4   Bathing (including washing, rinsing, and drying)? 3   Toileting, which includes using a toilet, bedpan, or urinal? 4   Putting on and taking off regular upper body clothing? 4   Taking care of personal grooming such as brushing teeth? 4   Eating meals? 4   6 Clicks Daily Activity Score 23   Functional Mobility   Sit to Stand Supervised   Bed, Chair, Wheelchair Transfer Supervised   Toilet Transfers Supervised   Mobility transfers only 2/2 desaturates into low 80s with minimal exertion on 7L NC 02   Edema / Skin Assessment   Edema / Skin  WDL   Activity Tolerance   Sitting in Chair 10+min   Comments limited by respiratory status, not functional ability   Occupational Therapy Initial Treatment Plan    Duration Discharge Needs Only   Problem List   Problem List None   Anticipated Discharge Equipment and Recommendations   DC Equipment Recommendations None   Discharge Recommendations Recommend home health for continued occupational therapy services.

## 2023-03-30 ENCOUNTER — HOME HEALTH ADMISSION (OUTPATIENT)
Dept: HOME HEALTH SERVICES | Facility: HOME HEALTHCARE | Age: 73
End: 2023-03-30
Payer: MEDICARE

## 2023-03-30 ENCOUNTER — PHARMACY VISIT (OUTPATIENT)
Dept: PHARMACY | Facility: MEDICAL CENTER | Age: 73
End: 2023-03-30
Payer: MEDICARE

## 2023-03-30 VITALS
HEIGHT: 66 IN | RESPIRATION RATE: 16 BRPM | DIASTOLIC BLOOD PRESSURE: 61 MMHG | HEART RATE: 76 BPM | TEMPERATURE: 99.3 F | SYSTOLIC BLOOD PRESSURE: 124 MMHG | OXYGEN SATURATION: 97 % | WEIGHT: 163.14 LBS | BODY MASS INDEX: 26.22 KG/M2

## 2023-03-30 LAB
ALBUMIN SERPL BCP-MCNC: 3.1 G/DL (ref 3.2–4.9)
ALBUMIN/GLOB SERPL: 1.1 G/DL
ALP SERPL-CCNC: 100 U/L (ref 30–99)
ALT SERPL-CCNC: 35 U/L (ref 2–50)
ANION GAP SERPL CALC-SCNC: 12 MMOL/L (ref 7–16)
AST SERPL-CCNC: 22 U/L (ref 12–45)
BASOPHILS # BLD AUTO: 0.1 % (ref 0–1.8)
BASOPHILS # BLD: 0.01 K/UL (ref 0–0.12)
BILIRUB SERPL-MCNC: 0.6 MG/DL (ref 0.1–1.5)
BUN SERPL-MCNC: 28 MG/DL (ref 8–22)
CALCIUM ALBUM COR SERPL-MCNC: 8.8 MG/DL (ref 8.5–10.5)
CALCIUM SERPL-MCNC: 8.1 MG/DL (ref 8.5–10.5)
CHLORIDE SERPL-SCNC: 103 MMOL/L (ref 96–112)
CO2 SERPL-SCNC: 21 MMOL/L (ref 20–33)
CREAT SERPL-MCNC: 0.86 MG/DL (ref 0.5–1.4)
EOSINOPHIL # BLD AUTO: 0.05 K/UL (ref 0–0.51)
EOSINOPHIL NFR BLD: 0.4 % (ref 0–6.9)
ERYTHROCYTE [DISTWIDTH] IN BLOOD BY AUTOMATED COUNT: 45.1 FL (ref 35.9–50)
GFR SERPLBLD CREATININE-BSD FMLA CKD-EPI: 71 ML/MIN/1.73 M 2
GLOBULIN SER CALC-MCNC: 2.7 G/DL (ref 1.9–3.5)
GLUCOSE BLD STRIP.AUTO-MCNC: 138 MG/DL (ref 65–99)
GLUCOSE BLD STRIP.AUTO-MCNC: 83 MG/DL (ref 65–99)
GLUCOSE SERPL-MCNC: 108 MG/DL (ref 65–99)
HCT VFR BLD AUTO: 44.3 % (ref 37–47)
HGB BLD-MCNC: 14.1 G/DL (ref 12–16)
IMM GRANULOCYTES # BLD AUTO: 0.08 K/UL (ref 0–0.11)
IMM GRANULOCYTES NFR BLD AUTO: 0.7 % (ref 0–0.9)
LYMPHOCYTES # BLD AUTO: 0.99 K/UL (ref 1–4.8)
LYMPHOCYTES NFR BLD: 8.4 % (ref 22–41)
MAGNESIUM SERPL-MCNC: 2.1 MG/DL (ref 1.5–2.5)
MCH RBC QN AUTO: 29.9 PG (ref 27–33)
MCHC RBC AUTO-ENTMCNC: 31.8 G/DL (ref 33.6–35)
MCV RBC AUTO: 94.1 FL (ref 81.4–97.8)
MONOCYTES # BLD AUTO: 0.64 K/UL (ref 0–0.85)
MONOCYTES NFR BLD AUTO: 5.5 % (ref 0–13.4)
NEUTROPHILS # BLD AUTO: 9.96 K/UL (ref 2–7.15)
NEUTROPHILS NFR BLD: 84.9 % (ref 44–72)
NRBC # BLD AUTO: 0 K/UL
NRBC BLD-RTO: 0 /100 WBC
NT-PROBNP SERPL IA-MCNC: 2106 PG/ML (ref 0–125)
PHOSPHATE SERPL-MCNC: 3.1 MG/DL (ref 2.5–4.5)
PLATELET # BLD AUTO: 186 K/UL (ref 164–446)
PMV BLD AUTO: 9.8 FL (ref 9–12.9)
POTASSIUM SERPL-SCNC: 3.6 MMOL/L (ref 3.6–5.5)
PROT SERPL-MCNC: 5.8 G/DL (ref 6–8.2)
RBC # BLD AUTO: 4.71 M/UL (ref 4.2–5.4)
SODIUM SERPL-SCNC: 136 MMOL/L (ref 135–145)
WBC # BLD AUTO: 11.7 K/UL (ref 4.8–10.8)

## 2023-03-30 PROCEDURE — 94669 MECHANICAL CHEST WALL OSCILL: CPT

## 2023-03-30 PROCEDURE — RXMED WILLOW AMBULATORY MEDICATION CHARGE: Performed by: HOSPITALIST

## 2023-03-30 PROCEDURE — A9270 NON-COVERED ITEM OR SERVICE: HCPCS | Performed by: GENERAL PRACTICE

## 2023-03-30 PROCEDURE — 85025 COMPLETE CBC W/AUTO DIFF WBC: CPT

## 2023-03-30 PROCEDURE — A9270 NON-COVERED ITEM OR SERVICE: HCPCS | Performed by: STUDENT IN AN ORGANIZED HEALTH CARE EDUCATION/TRAINING PROGRAM

## 2023-03-30 PROCEDURE — 700101 HCHG RX REV CODE 250: Performed by: INTERNAL MEDICINE

## 2023-03-30 PROCEDURE — 83735 ASSAY OF MAGNESIUM: CPT

## 2023-03-30 PROCEDURE — A9270 NON-COVERED ITEM OR SERVICE: HCPCS | Performed by: INTERNAL MEDICINE

## 2023-03-30 PROCEDURE — 99239 HOSP IP/OBS DSCHRG MGMT >30: CPT | Performed by: HOSPITALIST

## 2023-03-30 PROCEDURE — 700102 HCHG RX REV CODE 250 W/ 637 OVERRIDE(OP): Performed by: INTERNAL MEDICINE

## 2023-03-30 PROCEDURE — 94640 AIRWAY INHALATION TREATMENT: CPT

## 2023-03-30 PROCEDURE — 36415 COLL VENOUS BLD VENIPUNCTURE: CPT

## 2023-03-30 PROCEDURE — 700102 HCHG RX REV CODE 250 W/ 637 OVERRIDE(OP): Performed by: GENERAL PRACTICE

## 2023-03-30 PROCEDURE — 83880 ASSAY OF NATRIURETIC PEPTIDE: CPT

## 2023-03-30 PROCEDURE — 84100 ASSAY OF PHOSPHORUS: CPT

## 2023-03-30 PROCEDURE — 80053 COMPREHEN METABOLIC PANEL: CPT

## 2023-03-30 PROCEDURE — 700111 HCHG RX REV CODE 636 W/ 250 OVERRIDE (IP): Performed by: HOSPITALIST

## 2023-03-30 PROCEDURE — 700102 HCHG RX REV CODE 250 W/ 637 OVERRIDE(OP): Performed by: STUDENT IN AN ORGANIZED HEALTH CARE EDUCATION/TRAINING PROGRAM

## 2023-03-30 PROCEDURE — 82962 GLUCOSE BLOOD TEST: CPT | Mod: 91

## 2023-03-30 RX ORDER — SULFAMETHOXAZOLE AND TRIMETHOPRIM 400; 80 MG/1; MG/1
1 TABLET ORAL DAILY
Qty: 56 TABLET | Refills: 0 | Status: ACTIVE | OUTPATIENT
Start: 2023-03-30 | End: 2023-03-30 | Stop reason: SDUPTHER

## 2023-03-30 RX ORDER — SULFAMETHOXAZOLE AND TRIMETHOPRIM 400; 80 MG/1; MG/1
1 TABLET ORAL DAILY
Qty: 56 TABLET | Refills: 0 | Status: ACTIVE | OUTPATIENT
Start: 2023-03-30 | End: 2023-05-25

## 2023-03-30 RX ORDER — PREDNISONE 10 MG/1
TABLET ORAL
Qty: 160 TABLET | Refills: 0 | Status: SHIPPED | OUTPATIENT
Start: 2023-03-31 | End: 2023-07-17

## 2023-03-30 RX ADMIN — LEVOTHYROXINE SODIUM 100 MCG: 0.1 TABLET ORAL at 06:13

## 2023-03-30 RX ADMIN — IPRATROPIUM BROMIDE AND ALBUTEROL SULFATE 3 ML: .5; 2.5 SOLUTION RESPIRATORY (INHALATION) at 10:09

## 2023-03-30 RX ADMIN — IPRATROPIUM BROMIDE AND ALBUTEROL SULFATE 3 ML: .5; 2.5 SOLUTION RESPIRATORY (INHALATION) at 07:44

## 2023-03-30 RX ADMIN — OMEPRAZOLE 20 MG: 20 CAPSULE, DELAYED RELEASE ORAL at 06:13

## 2023-03-30 RX ADMIN — BUDESONIDE AND FORMOTEROL FUMARATE DIHYDRATE 2 PUFF: 80; 4.5 AEROSOL RESPIRATORY (INHALATION) at 06:00

## 2023-03-30 RX ADMIN — AMLODIPINE BESYLATE 5 MG: 10 TABLET ORAL at 06:13

## 2023-03-30 RX ADMIN — PREDNISONE 60 MG: 20 TABLET ORAL at 06:14

## 2023-03-30 RX ADMIN — LISINOPRIL 20 MG: 20 TABLET ORAL at 06:13

## 2023-03-30 ASSESSMENT — FIBROSIS 4 INDEX: FIB4 SCORE: 1.46

## 2023-03-30 ASSESSMENT — LIFESTYLE VARIABLES
TOTAL SCORE: 0
HOW MANY TIMES IN THE PAST YEAR HAVE YOU HAD 5 OR MORE DRINKS IN A DAY: 0
HAVE PEOPLE ANNOYED YOU BY CRITICIZING YOUR DRINKING: NO
DOES PATIENT WANT TO STOP DRINKING: NO
ALCOHOL_USE: NO
AVERAGE NUMBER OF DAYS PER WEEK YOU HAVE A DRINK CONTAINING ALCOHOL: 0
TOTAL SCORE: 0
TOTAL SCORE: 0
EVER HAD A DRINK FIRST THING IN THE MORNING TO STEADY YOUR NERVES TO GET RID OF A HANGOVER: NO
EVER FELT BAD OR GUILTY ABOUT YOUR DRINKING: NO
ON A TYPICAL DAY WHEN YOU DRINK ALCOHOL HOW MANY DRINKS DO YOU HAVE: 0
HAVE YOU EVER FELT YOU SHOULD CUT DOWN ON YOUR DRINKING: NO
CONSUMPTION TOTAL: NEGATIVE

## 2023-03-30 ASSESSMENT — PAIN SCALES - WONG BAKER: WONGBAKER_NUMERICALRESPONSE: DOESN'T HURT AT ALL

## 2023-03-30 NOTE — CARE PLAN
The patient is Stable - Low risk of patient condition declining or worsening    Shift Goals  Clinical Goals: respiratory care, comfort, safety  Patient Goals: respiratory care, rest  Family Goals: n/a    Progress made toward(s) clinical / shift goals:  A&Ox4. Pt denies pain, no pain identifiers noted. Pt on 6L NC, sating > 95%, tolerating well. Humidifier placed for comfort. Pt denies SOB, no acute distress noted. Scheduled RT treatments on board. Bed kept in lowest position, call light within reach, and bed alarm on. Pt able to self turn. Frequent rounding done. Pt calls appropriately. Pt free from falls.     Problem: Skin Integrity  Goal: Skin integrity is maintained or improved  Outcome: Progressing     Problem: Respiratory  Goal: Patient will achieve/maintain optimum respiratory ventilation and gas exchange  Outcome: Progressing     Problem: Mechanical Ventilation  Goal: Safe management of artificial airway and ventilation  Outcome: Progressing     Problem: Fall Risk  Goal: Patient will remain free from falls  Outcome: Progressing       Patient is not progressing towards the following goals:

## 2023-03-30 NOTE — DISCHARGE PLANNING
[facility] TCN chart review completed. Collaborated with KEL Aldana prior to meeting with the pt. The most current review of medical record, knowledge of pt's PLOF and social support, LACE+ score of 77, 6 clicks scores of 20 mobility were considered.      Pt seen at bedside. Introduced TCN program. Provided education regarding post acute levels of care. Discussed HTH/SCP plan benefits (Meds to Beds, medical uber and [facility] transitional care). Pt verbalizes understanding.     Patient reports primary concern is O2 needs, as she is not on O2 at baseline.  Additionally, she does feel a little off her normal functional mobility, but her  is getting her a FWW.  DME choice for AD/O2 obtained if needed.   can assist per patient.  Patient reports she does not think she will need HH, however per chart review OT recommends HH and patient is agreeable if recommended.  Choice obtained.  Patient is agreeable to [facility].       Choice proactively obtained for HH, DME and faxed to [facility]. TCN will continue to follow and collaborate with discharge planning team as additional post acute needs arise. Thank you.     Completed today:  Awaiting PT recs   OT recommending home with home health  Choice obtained: HH and DME (AD/O2 if needed)  [facility] referral sent

## 2023-03-30 NOTE — DISCHARGE PLANNING
Received Choice form @: 1020  [facility]Fac[facility]  Referral sent per Choice form @: 1046    1056  Received Choice form @: 101  Agency/Facility Name: [facility]  Referral sent per Choice form @: 2979

## 2023-03-30 NOTE — DISCHARGE PLANNING
Thank you for your request. At this time there is no valid referral placed. Once placed we will then review.

## 2023-03-30 NOTE — CARE PLAN
The patient is Stable - Low risk of patient condition declining or worsening    Shift Goals  Clinical Goals: Wean O2, mobilize  Patient Goals: Get home  Family Goals: CASIMIRO    Progress made toward(s) clinical / shift goals:  Patient up to chair and to commode multiple times.     Patient is not progressing towards the following goals: 02 remains at 6l. Had to go up to 8l when ambulating

## 2023-03-30 NOTE — FACE TO FACE
"Face to Face Note  -  Durable Medical Equipment    Carmine Griggs M.D. - NPI: [id number]  I certify that this patient is under my care and that they had a durable medical equipment(DME)face to face encounter by myself that meets the physician DME face-to-face encounter requirements with this patient on:    Date of encounter:   Patient:                    MRN:                       YOB: 2023  Naomy Vargas  [id number]  1950     The encounter with the patient was in whole, or in part, for the following medical condition, which is the primary reason for durable medical equipment:  Other - ILD, respiratory Failure    I certify that, based on my findings, the following durable medical equipment is medically necessary:    Oxygen   HOME O2 Saturation Measurements:(Values must be present for Home Oxygen orders)        With liters of O2: 6, O2 sat at rest with O2: 92  With Liters of O2: 6, O2 sat with amb with O2 : 84  Is the patient mobile?: Yes  If patient feels more short of breath, they can go up to 6 liters per minute and contact healthcare provider.    Supporting Symptoms: The patient requires supplemental oxygen, as the following interventions have been tried with limited or no improvement: \"Oral and/or IV steroids, \"Ambulation with oximetry, and \"Incentive spirometry.    My Clinical findings support the need for the above equipment due to:  Hypoxia  "

## 2023-03-30 NOTE — DISCHARGE PLANNING
ATTN: Case Management  RE: Referral for Home Health    Reason for referral denial: Patient DC'd without HH order in place              Unfortunately, we are not able to accept this referral for the reason listed above. If further clarity is needed, our Transitional Care Specialists are available to discuss any barriers to service at [phone].      We look forward to collaborating with you in the future,  [facility] Team

## 2023-03-30 NOTE — DISCHARGE SUMMARY
Discharge Summary    CHIEF COMPLAINT ON ADMISSION  Chief Complaint   Patient presents with    Shortness of Breath     X 1 month progressively worse, unable to obtain pulse ox initially with increased WOB, placed on NRB 15L with a sat of 71% in triage, pt roomed with an eventual saturation of 92% on 15L NRB, pt denies respiratory hx or home oxygen requirement, denies cardiac hx or chest pain. Pt remains alert/oriented throughout       Reason for Admission  Shortness of breath, respiratory failure    Admission Date  3/23/2023    CODE STATUS  Full Code    HPI & HOSPITAL COURSE  73 y.o. female who presented 3/23/2023 with a history of COPD, the patient has a 40-pack-year history of smoking, quit approximately 6 years ago, hypertension, dyslipidemia, chronic back pain, hypothyroidism, peripheral vascular disease, history of pneumonia, admitted with increasing dyspnea, subjectively worsening over approximately a month, increasing dyspnea on exertion, subjective fevers and nonproductive cough.  Seen outpatient on 3/7/2023, CT showed a diffuse interstitial septal thickening and groundglass infiltrate picture, suggestive of possible interstitial lung disease, initially found hypoxic with room air saturations in the 70s, previously not on oxygen, the patient tested negative for viral pulmonary disease including COVID-19, influenza, RSV, patient admitted, started on IV steroids, breathing treatments, empiric antibiotics for possible pulmonary infection component, no additional exposure or other pulmonary disease component identified so far.  The patient treated with high flow nasal cannula oxygen, currently titrating down slowly,  The patient being worked up for interstitial lung disease with extensive DDx, ESR and CRP found elevated, EBEN positive, pulmonary medicine consulted for inpatient follow-up.  Patient is oxygenation difficulty with ongoing medical treatment including high-dose steroids, slight diuresis, she titrated  down to 6 L nasal cannula oxygen without significant dyspnea on exertion on current settings, the patient will need close follow-up with the pulmonary clinic to establish a firm diagnosis, currently highly suspicious for immune mediated pulmonary fibrosis.  The patient understands instructions, she does have a walker for ambulation at home, she is prescribed her new and change medication regimen as well as oxygen therapy for at home, referred to pulmonary medicine for follow-up, referred to home oxygen monitoring.        Therefore, she is discharged in fair and stable condition to home with organized home healthcare and close outpatient follow-up.    The patient met 2-midnight criteria for an inpatient stay at the time of discharge.    Discharge Date  3/30/2023    FOLLOW UP ITEMS POST DISCHARGE  Close pulmonary follow-up to for to follow-up on pending laboratory data, antibodies and to establish a firm diagnosis  A outpatient referral to rheumatology is likely also in order.    DISCHARGE DIAGNOSES  Principal Problem:    Acute hypoxemic respiratory failure (HCC) POA: Yes  Active Problems:    Chronic obstructive pulmonary disease (HCC) POA: Yes    ILD (interstitial lung disease) (HCC) POA: Unknown    Hyperglycemia POA: Unknown      Overview: During illness/hospitalizations. Mother with DM, now diet controlled.     Hypothyroidism POA: Yes      Overview: Currently taking levothyroxine 88 mcg daily as directed on empty stomach       with water and waits at least 30 minutes to consume other food, beverages,       or medications.      Denies  temperature intolerance, or changes in bowel habits.      Recent TSH increased even with increased dose of levothyroxine.          Thrombocytopenia, unspecified (HCC) POA: Yes    Elevated troponin POA: Unknown    Sepsis (HCC) POA: Unknown    Pleural effusion POA: Unknown    Leukocytosis POA: Unknown    Hypertension POA: Unknown    Pulmonary hypertension (HCC) POA: Unknown     Nonrheumatic tricuspid valve regurgitation POA: Unknown  Resolved Problems:    * No resolved hospital problems. *      FOLLOW UP  Future Appointments   Date Time Provider Department Center   4/5/2023  8:00 AM Nika Wing P.A.-C. [facility] None   6/15/2023  2:30 PM Tali Lawrence M.D. [address] [place]  [address]  [facility] [id number] [phone]    Schedule an appointment as soon as possible for a visit in 2 day(s)        MEDICATIONS ON DISCHARGE     Medication List        START taking these medications        Instructions   predniSONE 10 MG Tabs  Start taking on: March 31, 2023  Commonly known as: DELTASONE   Take 6 tabs (60 mg) daily x 7 days then 5 tabs (50 mg) daily x 7 days, then 4 tabs (40 mg) daily x 7 days, then 3 tabs (30 mg) daily x 7 days, then 2 tabs (20 mg) daily x 7 days, then 1 tab (10 mg) daily x 7 days, then 1/2 tab (5 mg) daily x 7 days then 1/4 tab (2.5 mg) daily 7 days then stop     sulfamethoxazole-trimethoprim 400-80 MG Tabs  Commonly known as: Bactrim   Take 1 Tablet by mouth every day for 56 days.  Dose: 1 Tablet            CONTINUE taking these medications        Instructions   Acetaminophen 500 MG Caps   Take  by mouth.     amLODIPine 5 MG Tabs  Commonly known as: NORVASC   TAKE 1 TABLET BY MOUTH EVERY DAY  Dose: 5 mg     budesonide-formoterol 80-4.5 MCG/ACT Aero  Commonly known as: Symbicort   Inhale 2 Puffs 2 times a day.  Dose: 2 Puff     cyanocobalamin 100 MCG Tabs  Commonly known as: VITAMIN B-12   Take 100 mcg by mouth every day.  Dose: 100 mcg     diphenhydrAMINE 25 MG Tabs  Commonly known as: BENADRYL   Take 25 mg by mouth every 6 hours as needed for Sleep.  Dose: 25 mg     levothyroxine 100 MCG Tabs  Commonly known as: SYNTHROID   TAKE 1 TABLET BY MOUTH EVERY DAY BEFORE BREAKFAST     lisinopril 20 MG Tabs  Commonly known as: PRINIVIL   TAKE 1 TABLET BY MOUTH EVERY DAY     loperamide 2 MG Caps  Commonly known as: IMODIUM   Take 1 Capsule by mouth  4 times a day as needed for Diarrhea.  Dose: 2 mg     melatonin 1 mg Tabs   Take  by mouth.     MUCINEX DM PO   Take  by mouth.     Myrbetriq 50 MG Tb24  Generic drug: Mirabegron ER   Myrbetriq 50 mg tablet,extended release   Take 1 tablet every day by oral route for 90 days.     NASAL SPRAY NA   [place]  in nose.     ProAir  (90 Base) MCG/ACT Aers inhalation aerosol  Generic drug: albuterol   Inhale 1 Puff every 6 hours as needed for Shortness of Breath.  Dose: 1 Puff     rosuvastatin 10 MG Tabs  Commonly known as: CRESTOR   TAKE 1 TABLET BY MOUTH EVERY DAY IN THE EVENING     SLEEP AID PO   Take  by mouth.     triamcinolone acetonide 0.1 % Crea  Commonly known as: KENALOG   Apply 1 Application topically 2 times a day as needed.  Dose: 1 Application.     VITAMIN C PO   Take  by mouth.     vitamin D3 1000 Unit (25 mcg) Tabs  Commonly known as: cholecalciferol   Take 1,000 Units by mouth every day.  Dose: 1,000 Units            STOP taking these medications      nitrofurantoin 50 MG Caps  Commonly known as: MACRODANTIN              Allergies  Allergies   Allergen Reactions    Colophony [Pinus Strobus] Itching    Latex Rash     .    Neosporin [Neomycin-Polymyxin B] Rash     .    Phenylene Itching    Soap Rash     Certain soaps cause rash    Tape Itching     PAPER TAPE OK    Tea Tree Oil Rash     .       DIET  Orders Placed This Encounter   Procedures    Diet Order Diet: Regular     Standing Status:   Standing     Number of Occurrences:   1     Order Specific Question:   Diet:     Answer:   Regular [1]    Discontinue Diet Tray     Standing Status:   Standing     Number of Occurrences:   1       ACTIVITY  As tolerated.  Weight bearing as tolerated    CONSULTATIONS  Critical care, pulmonary medicine    PROCEDURES  Chest CT  3/7/2023 8:57 AM     HISTORY/REASON FOR EXAM:  Follow-up pulmonary nodule.        TECHNIQUE/EXAM DESCRIPTION:  CT scan of the chest without contrast.     Noncontrast helical scanning of the chest  was obtained from the lung apices through the adrenal glands.     Low dose optimization technique was utilized for this CT exam including automated exposure control and adjustment of the mA and/or kV according to patient size.     COMPARISON: 11/5/2021     FINDINGS:  Lungs: There is interval appearance of diffuse widespread interstitial and ill-defined groundglass infiltrates. This has a peripheral predominance with areas of fibrosis and ill-defined parenchymal density involving all of the lungs. This is all new from   comparison. There is emphysematous change of the lungs. The previously described right apical nodule is obscured by these ill-defined infiltrates. Areas of bronchiectasis are present as well.     Mediastinum/Hiral: There are multiple enlarged mediastinal lymph nodes which are new from comparison. There is a no pretracheal lymph node measuring  15 mm in short axis dimension. Precarinal lymph node measures 2.8 cm in short axis dimension. Prevascular   lymph nodes measure 11 mm in short axis dimension. There are also enlarged aorticopulmonary window lymph nodes measuring over 1 cm in size and likely bilateral hilar lymph nodes.     Pleura: No pleural effusion.     Cardiac: There is a small amount of pericardial fluid.     Vascular: There is extensive atherosclerotic vascular calcification involving the aorta and coronary arteries..     Soft tissues: Unremarkable.     Bones: No acute or destructive process.     There is fatty change of the liver. Spleen is enlarged. Right kidney is atrophic.     IMPRESSION:     1.  Interval appearance of diffuse ill-defined pulmonary infiltrates, groundglass opacities and interstitial septal thickening present throughout the lungs bilaterally with a peripheral predominance. There is also extensive bronchiectasis. These changes   are new from comparison and obscure the previously seen right upper lobe pulmonary nodule. Differential diagnosis includes sequelae of Covid  pneumonia, interstitial lung disease, postinflammatory process as well as cryptogenic organizing pneumonia and   atypical infection.     2.  Multiple new enlarged mediastinal and hilar lymph nodes.     3.  Extensive atherosclerotic change of aorta and coronary arteries.     4.  Splenomegaly.     5.  Atrophic right kidney.     Fleischner Society pulmonary nodule recommendations:  Not Applicable         Echocardiogram with ejection fraction of 55%      LABORATORY  Lab Results   Component Value Date    SODIUM 136 03/30/2023    POTASSIUM 3.6 03/30/2023    CHLORIDE 103 03/30/2023    CO2 21 03/30/2023    GLUCOSE 108 (H) 03/30/2023    BUN 28 (H) 03/30/2023    CREATININE 0.86 03/30/2023        Lab Results   Component Value Date    WBC 11.7 (H) 03/30/2023    HEMOGLOBIN 14.1 03/30/2023    HEMATOCRIT 44.3 03/30/2023    PLATELETCT 186 03/30/2023      The patient has been prescribed oxygen as well as prednisone taper and Bactrim for PJP prophylaxis    Total time of the discharge process to the extent of 65 minutes.

## 2023-03-30 NOTE — PROGRESS NOTES
Discharge Instructions    Discharged to home by car with relative. Discharged via wheelchair, hospital escort: Yes.  Special equipment needed: Oxygen    Be sure to schedule a follow-up appointment with your primary care doctor or any specialists as instructed.     Discharge Plan:   Diet Plan: Discussed  Activity Level: Discussed  Confirmed Follow up Appointment: Appointment Scheduled  Confirmed Symptoms Management: Discussed  Medication Reconciliation Updated: Yes  Influenza Vaccine Indication: Patient Refuses (per pt, up to date)    I understand that a diet low in cholesterol, fat, and sodium is recommended for good health. Unless I have been given specific instructions below for another diet, I accept this instruction as my diet prescription.   Other diet: regular    Special Instructions: None    -Is this patient being discharged with medication to prevent blood clots?  No    Is patient discharged on Warfarin / Coumadin?   No            patient sent home with her home o2 and home meds, discharge intructions given regarding how to take her meds and her appointments, additional educational copies given and all questions answered, All her belongings were rechecked and made sure she has all of it before being wheeled down by escort to the [facility] entrance

## 2023-03-30 NOTE — PROGRESS NOTES
Patient arrived to [place] [address] from [facility]. Awake and alert. Continued on nasal cannula at 6 lpm. Ambulated from stretcher to bed with stand by assistance.

## 2023-03-30 NOTE — PROGRESS NOTES
4 Eyes Skin Assessment Completed by YAMEL Gardner and YAMEL Dunham.    Head WDL  Ears WDL  Nose WDL  Mouth WDL  Neck WDL  Breast/Chest WDL  Shoulder Blades WDL  Spine WDL  (R) Arm/Elbow/Hand WDL  (L) Arm/Elbow/Hand WDL  Abdomen WDL  Groin WDL  Scrotum/Coccyx/Buttocks WDL  (R) Leg WDL  (L) Leg WDL  (R) Heel/Foot/Toe Edema  (L) Heel/Foot/Toe Edema          Devices In Places Nasal Cannula      Interventions In Place NC W/Ear Foams    Possible Skin Injury No    Pictures Uploaded Into Epic N/A  Wound Consult Placed N/A  RN Wound Prevention Protocol Ordered No

## 2023-04-05 ENCOUNTER — OFFICE VISIT (OUTPATIENT)
Dept: SLEEP MEDICINE | Facility: MEDICAL CENTER | Age: 73
End: 2023-04-05
Attending: PHYSICIAN ASSISTANT
Payer: MEDICARE

## 2023-04-05 VITALS
RESPIRATION RATE: 16 BRPM | WEIGHT: 150 LBS | HEART RATE: 76 BPM | BODY MASS INDEX: 24.11 KG/M2 | OXYGEN SATURATION: 94 % | HEIGHT: 66 IN | DIASTOLIC BLOOD PRESSURE: 62 MMHG | SYSTOLIC BLOOD PRESSURE: 118 MMHG

## 2023-04-05 DIAGNOSIS — J96.01 ACUTE HYPOXEMIC RESPIRATORY FAILURE (HCC): ICD-10-CM

## 2023-04-05 DIAGNOSIS — J44.9 CHRONIC OBSTRUCTIVE PULMONARY DISEASE, UNSPECIFIED COPD TYPE (HCC): ICD-10-CM

## 2023-04-05 DIAGNOSIS — J84.9 INTERSTITIAL PULMONARY DISEASE (HCC): ICD-10-CM

## 2023-04-05 PROCEDURE — 99213 OFFICE O/P EST LOW 20 MIN: CPT | Performed by: PHYSICIAN ASSISTANT

## 2023-04-05 PROCEDURE — 99212 OFFICE O/P EST SF 10 MIN: CPT | Performed by: PHYSICIAN ASSISTANT

## 2023-04-05 PROCEDURE — 3074F SYST BP LT 130 MM HG: CPT | Performed by: PHYSICIAN ASSISTANT

## 2023-04-05 PROCEDURE — 3078F DIAST BP <80 MM HG: CPT | Performed by: PHYSICIAN ASSISTANT

## 2023-04-05 RX ORDER — ALBUTEROL SULFATE 90 UG/1
1-2 AEROSOL, METERED RESPIRATORY (INHALATION) EVERY 6 HOURS PRN
Qty: 8.5 G | Refills: 2 | Status: SHIPPED | OUTPATIENT
Start: 2023-04-05

## 2023-04-05 ASSESSMENT — FIBROSIS 4 INDEX: FIB4 SCORE: 1.46

## 2023-04-05 ASSESSMENT — ENCOUNTER SYMPTOMS
INSOMNIA: 1
COUGH: 0
SORE THROAT: 0
CHILLS: 0
DIZZINESS: 1
SHORTNESS OF BREATH: 1
ROS GI COMMENTS: UPPER AND LOWER DENTURES, NO SWALLOWING DIFFICULTY
WHEEZING: 0
WEIGHT LOSS: 0
FEVER: 0
SINUS PAIN: 0
SPUTUM PRODUCTION: 0
ORTHOPNEA: 0
HEARTBURN: 0
PALPITATIONS: 0

## 2023-04-05 NOTE — PATIENT INSTRUCTIONS
1-patient requalified or oxygen in clinic at 4L at rest and 5L with activity  2- place order for a humidifier on oxygen and in line water trap   3-consider use of Nasogel or Ponaris to help with nasal dryness   4-continue current regimen including slow steroid taper and antibiotics  5- using spacer with symbicort  6-refill on albuterol inhaler   7-obtain high resolution chest CT   8-follow with MD Dr. Crocker May 9th at 3pm

## 2023-04-05 NOTE — PROGRESS NOTES
"CC: Hospital follow-up    HPI:  Naomy Vargas is a 73 y.o. year old female here today for follow-up on recent hospitalization, COPD, ILD, chronic respiratory failure with hypoxia.  Patient is a former smoker with reported quit date in 2017 and 26.5-pack-year history.  Previously evaluated/diagnosed by Dr. Do Velásquez.    Pertinent past medical history includes stage III chronic kidney disease, hypertension, AAA, peripheral vascular disease, venous stasis dermatitis and thrombocytopenia.    Reviewed in clinic vitals  /62 (BP Location: Left arm, Patient Position: Sitting, BP Cuff Size: Adult)   Pulse 76   Resp 16   Ht 1.676 m (5' 6\")   Wt 68 kg (150 lb)   SpO2 94%     Reviewed home medication regimen including albuterol, prednisone taper, Symbicort 80 mcg, ProAir, lisinopril-denies dry or persistent cough, oxymetazoline nasal spray.  Patient does use Mucinex to help thin secretions.  Does use doxylamine succinate for sleep.  She is O2 dependent, currently on 5 L with activity normally on 6 L with activity, 4 L at rest.  Presents today with with O2 sat of 83% on room air.    Reviewed most recent imaging CXR obtained 3/29/23 demonstrated improving bilateral coarse interstitial opacities likely on basis of resolving acute on chronic interstitial lung disease. Unchanged blunting of the bilateral costophrenic angles.         Echocardiogram obtained 3/24/2023 demonstrated normal left ventricular chamber size, wall thickness, systolic and diastolic function, LVEF estimated 55%.  Mildly dilated right ventricle, normal right ventricular systolic function, enlarged right atrium, mild tricuspid regurgitation with RVSP estimated at 35 mmHg.      Pulmonary function testing obtained 11/8/2021 demonstrated    Per pulmonologist interpretation airflow obstruction, significant bronchodilator response based on absolute increase in FVC, mild air trapping, normal diffusion capacity.  Findings could be consistent with " asthma versus early COPD correlate clinically.    Review of Systems   Constitutional:  Negative for chills, fever, malaise/fatigue and weight loss.   HENT:  Positive for congestion (saline drops) and tinnitus (constant). Negative for hearing loss, nosebleeds, sinus pain and sore throat.    Respiratory:  Positive for shortness of breath (with activity or without oxygen). Negative for cough, sputum production and wheezing.    Cardiovascular:  Positive for leg swelling. Negative for chest pain, palpitations and orthopnea.   Gastrointestinal:  Negative for heartburn.        Upper and lower dentures, no swallowing difficulty    Neurological:  Positive for dizziness.   Psychiatric/Behavioral:  The patient has insomnia (maintaining sleep).        Past Medical History:   Diagnosis Date    Anxiety     Arthritis     wrists    Back pain     Blood transfusion without reported diagnosis     with ectopic preg x 2     Breath shortness     with exertion    Cataract     bilateral removal-Dr. Duke Talamantes    Cellulitis     right lower leg    Cellulitis of leg 11/1/2013    Followed by dermatology. Managed with fluocinonide and Bactroban on right leg Left leg she uses triamcinolone and Sarna pramocaine (anesthetic, antipruretic) on both.      Chickenpox     COPD (chronic obstructive pulmonary disease) ([facility])     Dental disorder     upper denture    Earache     Fatigue     Frequent urination     Hypertension 02/23/2018    on no meds at this time    Hypothyroidism     Influenza     Insomnia     Kidney disease     reports 1 kidney is smaller than the other    Mumps     OAB (overactive bladder) 2/11/2019    Pain 02/23/2018    right leg and back, 5/10    Peripheral vascular disease, unspecified ([facility]) 10/13/2021    Pneumonia     Rash     Ringing in ears     Shortness of breath     Swelling of lower extremity     Thyroid disease     Tonsillitis     Toothache     Venous stasis dermatitis     right leg       Past Surgical History:   Procedure  Laterality Date    VEIN LIGATION RADIO FREQUENCY Right 2018    Procedure: VEIN LIGATION RADIO FREQUENCY- LONG SAPENOUS;  Surgeon: Jim Alas M.D.;  Location: SURGERY [facility];  Service: General    ABDOMINAL EXPLORATION      2 ectopic preg    BREAST BIOPSY      hx of benign left breast biopsy    EYE SURGERY      cataract x2    HYSTERECTOMY LAPAROSCOPY      HYSTERECTOMY, TOTAL ABDOMINAL      OOPHORECTOMY      PB MAMMARY DUCTOGRAM, SINGLE      TN REMV 2ND CATARACT,CORN-SCLER SECTN      TONSILLECTOMY         Family History   Problem Relation Age of Onset    Arthritis Mother         hip fracture    Diabetes Mother     Cancer Mother         skin    Arthritis Father         lung    Alcohol/Drug Father         etoh    Lung Disease Sister         smoker/copd    Hypertension Sister     Other Brother         hep c    Arthritis Maternal Grandmother         hip fracture    Diabetes Maternal Grandfather     No Known Problems Son     No Known Problems Son     No Known Problems Brother     No Known Problems Sister     No Known Problems Brother     Hyperlipidemia Neg Hx     Stroke Neg Hx        Social History     Socioeconomic History    Marital status:      Spouse name: Not on file    Number of children: 2    Years of education: Not on file    Highest education level: Not on file   Occupational History    Occupation: retired   Tobacco Use    Smoking status: Former     Packs/day: 0.50     Years: 53.00     Pack years: 26.50     Types: Cigarettes     Quit date: 2017     Years since quittin.7    Smokeless tobacco: Never   Vaping Use    Vaping Use: Never used   Substance and Sexual Activity    Alcohol use: Yes     Comment: 2 per day    Drug use: No    Sexual activity: Not Currently     Partners: Male   Other Topics Concern    Not on file   Social History Narrative    Not on file     Social Determinants of Health     Financial Resource Strain: Not on file   Food Insecurity: Not on file    Transportation Needs: Not on file   Physical Activity: Not on file   Stress: Not on file   Social Connections: Not on file   Intimate Partner Violence: Not on file   Housing Stability: Not on file       Allergies as of 04/05/2023 - Reviewed 04/05/2023   Allergen Reaction Noted    Colophony [pinus strobus] Itching 05/31/2018    Latex Rash 02/23/2018    Neosporin [neomycin-polymyxin b] Rash 02/23/2018    Phenylene Itching 05/31/2018    Soap Rash 02/23/2018    Tape Itching 02/23/2018    Tea tree oil Rash 02/23/2018          Current medications as of today   Current Outpatient Medications   Medication Sig Dispense Refill    predniSONE (DELTASONE) 10 MG Tab Take 6 tabs (60 mg) daily x 7 days then 5 tabs (50 mg) daily x 7 days, then 4 tabs (40 mg) daily x 7 days, then 3 tabs (30 mg) daily x 7 days, then 2 tabs (20 mg) daily x 7 days, then 1 tab (10 mg) daily x 7 days, then 1/2 tab (5 mg) daily x 7 days then 1/4 tab (2.5 mg) daily 7 days then stop 160 Tablet 0    sulfamethoxazole-trimethoprim (BACTRIM) 400-80 MG Tab Take 1 Tablet by mouth every day for 56 days. 56 Tablet 0    budesonide-formoterol (SYMBICORT) 80-4.5 MCG/ACT Aerosol Inhale 2 Puffs 2 times a day. 3 Each 0    PROAIR  (90 Base) MCG/ACT Aero Soln inhalation aerosol Inhale 1 Puff every 6 hours as needed for Shortness of Breath. 8.5 g 1    amLODIPine (NORVASC) 5 MG Tab TAKE 1 TABLET BY MOUTH EVERY DAY 90 Tablet 1    levothyroxine (SYNTHROID) 100 MCG Tab TAKE 1 TABLET BY MOUTH EVERY DAY BEFORE BREAKFAST 100 Tablet 0    rosuvastatin (CRESTOR) 10 MG Tab TAKE 1 TABLET BY MOUTH EVERY DAY IN THE EVENING 100 Tablet 1    lisinopril (PRINIVIL) 20 MG Tab TAKE 1 TABLET BY MOUTH EVERY  Tablet 3    Mirabegron ER (MYRBETRIQ) 50 MG TABLET SR 24 HR Myrbetriq 50 mg tablet,extended release   Take 1 tablet every day by oral route for 90 days.      Ascorbic Acid (VITAMIN C PO) Take  by mouth.      Doxylamine Succinate, Sleep, (SLEEP AID PO) Take  by mouth.       Dextromethorphan-guaiFENesin (MUCINEX DM PO) Take  by mouth.      triamcinolone acetonide (KENALOG) 0.1 % Cream Apply 1 Application topically 2 times a day as needed. 80 g 2    loperamide (IMODIUM) 2 MG Cap Take 1 Capsule by mouth 4 times a day as needed for Diarrhea. 30 Capsule 1    cyanocobalamin (VITAMIN B-12) 100 MCG Tab Take 100 mcg by mouth every day.      vitamin D (CHOLECALCIFEROL) 1000 Unit (25 mcg) Tab Take 1,000 Units by mouth every day.      Melatonin 1 MG Tab Take  by mouth.      diphenhydrAMINE (BENADRYL) 25 MG Tab Take 25 mg by mouth every 6 hours as needed for Sleep.      Acetaminophen 500 MG Cap Take  by mouth.      Oxymetazoline HCl (NASAL SPRAY NA) Spray  in nose.       No current facility-administered medications for this visit.         Physical Exam:   Gen:           Alert and oriented, No apparent distress. Mood and affect appropriate, normal interaction with provider.  Eyes:          sclere white, conjunctive moist.  Hearing:     Grossly intact.  Dentition:    Good dentition.  Oropharynx:   Tongue normal, posterior pharynx without erythema or exudate.  Neck:        Supple, trachea midline, no masses.  Respiratory Effort: No intercostal retractions or use of accessory muscles.   Lung Auscultation:      Clear to auscultation bilaterally; no rales, rhonchi or wheezing.  CV:            Regular rate and rhythm. No edema. No murmurs, rubs or gallops.  Digits, Nails, Ext: No clubbing, cyanosis, petechiae, or nodes.   Skin:        No rashes, lesions or ulcers noted on exposed skin surfaces.                     Assessment:  1. Acute hypoxemic respiratory failure (HCC)  Multiple Oximetry      2. Interstitial pulmonary disease (HCC)  CT-CHEST, HIGH RESOLUTION LUNG      3. Chronic obstructive pulmonary disease, unspecified COPD type ([facility])  albuterol 108 (90 Base) MCG/ACT Aero Soln inhalation aerosol          Immunizations:    Flu: 10/28/2022  Pneumovax 23: 2/8/2019, 11/1/2013  Prevnar 13:  2/28/2017  Pfizer booster SARS CoV2 vaccine: 10/28/2022  Pfizer SARS-CoV-2 vaccine: 11/22/2021, 3/10/2021, 2/17/2021        Plan:    Acute hypoxic respiratory failure: Patient requalified in clinic for O2 at 4 L at rest and 5 L with activity, place order for humidifier on oxygen with inline water drop, consider use of nasal gel or Ponaris to help with nasal dryness.    Interstitial lung disease: Patient is pending follow-up with Dr. Crocker, obtain high-resolution chest CT, continue steroid taper.     COPD: Continued use of spacer with Symbicort pending follow-up.     Patient follow-up as scheduled with pulmonologist.    This dictation was created using voice recognition software. The accuracy of the dictation is limited to the abilities of the software. I expect there may be some errors of grammar and possibly content.

## 2023-04-06 ENCOUNTER — DOCUMENTATION (OUTPATIENT)
Dept: HEALTH INFORMATION MANAGEMENT | Facility: OTHER | Age: 73
End: 2023-04-06

## 2023-04-10 ENCOUNTER — OFFICE VISIT (OUTPATIENT)
Dept: MEDICAL GROUP | Facility: PHYSICIAN GROUP | Age: 73
End: 2023-04-10
Payer: MEDICARE

## 2023-04-10 VITALS
DIASTOLIC BLOOD PRESSURE: 68 MMHG | SYSTOLIC BLOOD PRESSURE: 110 MMHG | TEMPERATURE: 97.5 F | RESPIRATION RATE: 20 BRPM | HEART RATE: 72 BPM | OXYGEN SATURATION: 99 % | HEIGHT: 66 IN | BODY MASS INDEX: 23.54 KG/M2 | WEIGHT: 146.5 LBS

## 2023-04-10 DIAGNOSIS — R79.89 ELEVATED BRAIN NATRIURETIC PEPTIDE (BNP) LEVEL: ICD-10-CM

## 2023-04-10 DIAGNOSIS — R76.8 POSITIVE ANA (ANTINUCLEAR ANTIBODY): ICD-10-CM

## 2023-04-10 DIAGNOSIS — D72.829 LEUKOCYTOSIS, UNSPECIFIED TYPE: ICD-10-CM

## 2023-04-10 DIAGNOSIS — R73.9 HYPERGLYCEMIA: ICD-10-CM

## 2023-04-10 DIAGNOSIS — J96.01 ACUTE HYPOXEMIC RESPIRATORY FAILURE (HCC): ICD-10-CM

## 2023-04-10 DIAGNOSIS — E03.9 HYPOTHYROIDISM, UNSPECIFIED TYPE: ICD-10-CM

## 2023-04-10 DIAGNOSIS — I27.20 PULMONARY HYPERTENSION (HCC): ICD-10-CM

## 2023-04-10 PROCEDURE — 99215 OFFICE O/P EST HI 40 MIN: CPT | Performed by: PHYSICIAN ASSISTANT

## 2023-04-10 ASSESSMENT — FIBROSIS 4 INDEX: FIB4 SCORE: 1.46

## 2023-04-10 NOTE — PROGRESS NOTES
Subjective:     CC: Hospital follow-up acute respiratory failure    HPI:   Naomy presents today for hospital follow-up.  Patient originally presented to the emergency room on 3/23/2023 with concerns for acute respiratory failure with hypoxia.  Patient was originally admitted to the ICU.  Pulm was consulted and patient was started on steroid taper and antibiotics empirically.    Past Medical History:   Diagnosis Date    Anxiety     Arthritis     wrists    Back pain     Blood transfusion without reported diagnosis     with ectopic preg x 2     Breath shortness     with exertion    Cataract     bilateral removal-Dr. Duke Talamantes    Cellulitis     right lower leg    Cellulitis of leg 2013    Followed by dermatology. Managed with fluocinonide and Bactroban on right leg Left leg she uses triamcinolone and Sarna pramocaine (anesthetic, antipruretic) on both.      Chickenpox     COPD (chronic obstructive pulmonary disease) ([facility])     Dental disorder     upper denture    Earache     Fatigue     Frequent urination     Hypertension 2018    on no meds at this time    Hypothyroidism     Influenza     Insomnia     Kidney disease     reports 1 kidney is smaller than the other    Mumps     OAB (overactive bladder) 2019    Pain 2018    right leg and back, 5/10    Peripheral vascular disease, unspecified ([facility]) 10/13/2021    Pneumonia     Rash     Ringing in ears     Shortness of breath     Swelling of lower extremity     Thyroid disease     Tonsillitis     Toothache     Venous stasis dermatitis     right leg       Social History     Tobacco Use    Smoking status: Former     Packs/day: 0.50     Years: 53.00     Pack years: 26.50     Types: Cigarettes     Quit date: 2017     Years since quittin.7    Smokeless tobacco: Never   Vaping Use    Vaping Use: Never used   Substance Use Topics    Alcohol use: Yes     Comment: 2 per day    Drug use: No       Current Outpatient Medications Ordered in Epic    Medication Sig Dispense Refill    albuterol 108 (90 Base) MCG/ACT Aero Soln inhalation aerosol Inhale 1-2 Puffs every 6 hours as needed for Shortness of Breath. 8.5 g 2    predniSONE (DELTASONE) 10 MG Tab Take 6 tabs (60 mg) daily x 7 days then 5 tabs (50 mg) daily x 7 days, then 4 tabs (40 mg) daily x 7 days, then 3 tabs (30 mg) daily x 7 days, then 2 tabs (20 mg) daily x 7 days, then 1 tab (10 mg) daily x 7 days, then 1/2 tab (5 mg) daily x 7 days then 1/4 tab (2.5 mg) daily 7 days then stop 160 Tablet 0    sulfamethoxazole-trimethoprim (BACTRIM) 400-80 MG Tab Take 1 Tablet by mouth every day for 56 days. 56 Tablet 0    budesonide-formoterol (SYMBICORT) 80-4.5 MCG/ACT Aerosol Inhale 2 Puffs 2 times a day. 3 Each 0    PROAIR  (90 Base) MCG/ACT Aero Soln inhalation aerosol Inhale 1 Puff every 6 hours as needed for Shortness of Breath. 8.5 g 1    amLODIPine (NORVASC) 5 MG Tab TAKE 1 TABLET BY MOUTH EVERY DAY 90 Tablet 1    levothyroxine (SYNTHROID) 100 MCG Tab TAKE 1 TABLET BY MOUTH EVERY DAY BEFORE BREAKFAST 100 Tablet 0    rosuvastatin (CRESTOR) 10 MG Tab TAKE 1 TABLET BY MOUTH EVERY DAY IN THE EVENING 100 Tablet 1    lisinopril (PRINIVIL) 20 MG Tab TAKE 1 TABLET BY MOUTH EVERY  Tablet 3    Mirabegron ER (MYRBETRIQ) 50 MG TABLET SR 24 HR Myrbetriq 50 mg tablet,extended release   Take 1 tablet every day by oral route for 90 days.      Ascorbic Acid (VITAMIN C PO) Take  by mouth.      Doxylamine Succinate, Sleep, (SLEEP AID PO) Take  by mouth.      Dextromethorphan-guaiFENesin (MUCINEX DM PO) Take  by mouth.      triamcinolone acetonide (KENALOG) 0.1 % Cream Apply 1 Application topically 2 times a day as needed. 80 g 2    loperamide (IMODIUM) 2 MG Cap Take 1 Capsule by mouth 4 times a day as needed for Diarrhea. 30 Capsule 1    cyanocobalamin (VITAMIN B-12) 100 MCG Tab Take 100 mcg by mouth every day.      vitamin D (CHOLECALCIFEROL) 1000 Unit (25 mcg) Tab Take 1,000 Units by mouth every day.       "Melatonin 1 MG Tab Take  by mouth.      diphenhydrAMINE (BENADRYL) 25 MG Tab Take 25 mg by mouth every 6 hours as needed for Sleep.      Acetaminophen 500 MG Cap Take  by mouth.      Oxymetazoline HCl (NASAL SPRAY NA) Spray  in nose.       No current [facility]-ordered facility-administered medications on file.       Allergies:  Colophony [pinus strobus], Latex, Neosporin [neomycin-polymyxin b], Phenylene, Soap, Tape, and Tea tree oil    Health Maintenance: Completed    ROS:  Gen: no fevers/chills  Eyes: no changes in vision  ENT: no sore throat  Pulm: Positive for shortness of breath  CV: no chest pain  GI: no nausea/vomiting  : no dysuria  MSk: no myalgias  Skin: no rash  Neuro: no headaches    Objective:       Exam:  /68 (BP Location: Left arm, Patient Position: Sitting, BP Cuff Size: Adult)   Pulse 72   Temp 36.4 °C (97.5 °F) (Temporal)   Resp 20   Ht 1.676 m (5' 6\")   Wt 66.5 kg (146 lb 8 oz)   LMP  (LMP Unknown)   SpO2 99%   Breastfeeding No   BMI 23.65 kg/m²  Body mass index is 23.65 kg/m².    Gen: Alert and oriented, No apparent distress.  Skin: Warm, dry, good turgor, no rashes in visible areas.  HEENT: Normocephalic. Eyes conjunctiva clear lids without ptosis, pupils equal and reactive to light accommodation  Neck: Trachea midline, no masses, no thyromegaly  Lungs: Normal effort, decreased throughout, no wheezes, rhonchi, or rales  CV: Regular rate and rhythm. No murmurs, rubs, or gallops.  MSK: Normal gait, moves all extremities.  Neuro: Grossly non-focal.  Ext: No clubbing, cyanosis, edema.  Psych: Alert and oriented x3, normal affect and mood.     Labs: Labs from 3/23/2023-3/28/2023 were reviewed and discussed with the patient. All questions were answered.     Imaging:    CT chest from 3/7/2023  IMPRESSION:     1.  Interval appearance of diffuse ill-defined pulmonary infiltrates, groundglass opacities and interstitial septal thickening present throughout the lungs bilaterally with a " peripheral predominance. There is also extensive bronchiectasis. These changes   are new from comparison and obscure the previously seen right upper lobe pulmonary nodule. Differential diagnosis includes sequelae of Covid pneumonia, interstitial lung disease, postinflammatory process as well as cryptogenic organizing pneumonia and   atypical infection.     2.  Multiple new enlarged mediastinal and hilar lymph nodes.     3.  Extensive atherosclerotic change of aorta and coronary arteries.     4.  Splenomegaly.     5.  Atrophic right kidney.    Assessment & Plan:     73 y.o. female with the following -     1. Acute hypoxemic respiratory failure (HCC)  Acute.  Patient is continued with oxygen between 4 and 5 L at home since being discharged.  Patient has already been evaluated by pulmonology and a high-resolution CT chest has been ordered.  Recommend continue with prednisone taper as prescribed as well as Bactrim.  At this point, no clear etiology for her ILD.  Review of notes from hospital did recommend outpatient rheumatology referral for possible Sjogren's.  Sjogren's antibodies have come back negative so referral to rheumatology will likely be declined, though she does have a markedly elevated EBEN, ESR, and CRP and there is concerns for immune mediated pulmonary fibrosis so I did place a referral today.  Oxygen in the office is 99% on 4 L. recommended continuing the current regimen including steroid taper and antibiotics until her upcoming appointment with pulmonology in a month.  Patient does seem somewhat confused on what has happened up to this point so we discussed imaging and lab results and all questions were answered.  Follow-up labs/ for any new or worsening concerns.    2. Pulmonary hypertension (HCC)  This is a new diagnosis.  Originally diagnosed after a TTE during admission showing an RVSP of 35 mmHg indicating mild pulmonary hypertension.  They did think this was possibly secondary to a pulmonary  source.  Patient is going to continue to follow-up with pulmonology outpatient.    3. Elevated brain natriuretic peptide (BNP) level  - proBrain Natriuretic Peptide, NT; Future    4. Hyperglycemia  - Comp Metabolic Panel; Future    5. Hypothyroidism, unspecified type  - TSH WITH REFLEX TO FT4; Future    6. Leukocytosis, unspecified type  - CBC WITH DIFFERENTIAL; Future    7. Positive EBEN (antinuclear antibody)    I spent a total of 45 minutes with record review (including external notes and labs), exam, communication with the patient, communication with other providers, and documentation of this encounter.     Return for follow up labs.    Please note that this dictation was created using voice recognition software. I have made every reasonable attempt to correct obvious errors, but I expect that there are errors of grammar and possibly content that I did not discover before finalizing the note.    Electronically signed by Prema Patel PA-C on April 10, 2023

## 2023-05-09 ENCOUNTER — OFFICE VISIT (OUTPATIENT)
Dept: SLEEP MEDICINE | Facility: MEDICAL CENTER | Age: 73
End: 2023-05-09
Attending: INTERNAL MEDICINE
Payer: MEDICARE

## 2023-05-09 VITALS
BODY MASS INDEX: 22.44 KG/M2 | HEIGHT: 67 IN | SYSTOLIC BLOOD PRESSURE: 112 MMHG | DIASTOLIC BLOOD PRESSURE: 66 MMHG | OXYGEN SATURATION: 97 % | WEIGHT: 143 LBS | HEART RATE: 96 BPM

## 2023-05-09 DIAGNOSIS — J84.9 ILD (INTERSTITIAL LUNG DISEASE) (HCC): ICD-10-CM

## 2023-05-09 DIAGNOSIS — J96.01 ACUTE HYPOXEMIC RESPIRATORY FAILURE (HCC): ICD-10-CM

## 2023-05-09 PROCEDURE — 99213 OFFICE O/P EST LOW 20 MIN: CPT | Performed by: INTERNAL MEDICINE

## 2023-05-09 PROCEDURE — 99215 OFFICE O/P EST HI 40 MIN: CPT | Performed by: INTERNAL MEDICINE

## 2023-05-09 ASSESSMENT — FIBROSIS 4 INDEX: FIB4 SCORE: 1.46

## 2023-05-09 NOTE — PATIENT INSTRUCTIONS
Please continue to wean your oxygen. Your target oxygen level is 88-90%. It does not need to be higher than that.  Please continue to wean your prednisone as prescribed. If you notice a worsening of your breathing when you go down to a lower dose, please reach out to your primary care provider or our office.   3. Please make sure to go to your rheumatology appointment in September.

## 2023-05-09 NOTE — PROGRESS NOTES
"Pulmonary Consult    Date of Initial Consult: 3/28/23 (inpatient)    Reason for consult: Acute hypoxic respiratory failure, possible CTD-ILD (Sjogrens?) vs. Macrobid-induced ILD, Former smoker, History of COVID 19 infection    Chief Complaint:  Chief Complaint   Patient presents with    Follow-Up     Last seen 4/5/23 with Nika Wing      HPI:   From Dr. Fermin Guzman's note: \"Naomy Vargas is a 73 y.o. female with history with a PMHx of non-oxygen dependent COPD (40.5 pack-year history; quit smoking 6 years ago), HTN, DLD who was admitted 3/23 after presenting with one month of increasing dyspnea on exertion that worsened the past several days associated with subjective fever and nonproductive cough. Seen by PCP with CT chest 3/7/2023 which demonstrated interval development of diffuse interstitial septal thickening and some groundglass infiltrates with a peripheral predominance suggestive of a interstitial lung disease which has not yet been worked up or followed up on. Admitted to the ICU for sepsis secondary to presumed underlying pulmonary infection complicated by acute hypoxemic respiratory failure requiring high dose steroids and antibiotics.    3/24:increase methylprednisolone from 80->125mg Q6H  Continue ceftriaxone and azithromycin.  High flow nasal cannula.  3/25: continue current dose of methylprednisolone.  Continue antibiotics.  High flow nasal cannula.  Keep ICU.  3/26: Taper methylprednisolone.  Continue antibiotics.  Keep ICU.  High flow nasal cannula.  3/27: Taper methylprednisolone. Continue antibiotics. Continue HFNC. Pending ILD workup  3/28: Taper methylprednisolone. Abx completed. Continue HFNC. ILD workup: titer 1:640, EBEN IgG +ve, homogenous pattern, most consistent w SLE vs SLE drug induced vs DARSHAN. Pulm consult. PT/OT consult. Transfer to [facility]\"    Patient has been seen by pulmonary at [facility] in the past: Dr. Do Velásquez in 201 for a RUL nodule.   Recent imaging:     She has prior " PFTs in 2021: Normal by NHANEs criteria but mild obstruction by ATS. Air trapping. Normal DLCO.    She denies any known personal or family history of autoimmune disease.  She denies any small joint issues, uveitis, burning with urination.  Renal function is normal.  She does acknowledge that she tested positive at home for COVID about a year ago.  Her respiratory symptoms started about a month ago.  She was standing near a pill burning stove when she became symptomatic.  She denies any new medications or new environment around the time that she became sick.  She states she previously worked at a  for [employer].    Today: She is seen as a hospital follow-up.She is doing better.  She is using 3 L of oxygen today with a saturation of 97%.  She has not been actively weaning the oxygen because she was not sure she was supposed to.  She is down to 10 mg daily on prednisone and has not noticed any rebound symptoms as she has come down on the dose.     Past Medical History:   Diagnosis Date    Anxiety     Arthritis     wrists    Back pain     Blood transfusion without reported diagnosis     with ectopic preg x 2     Breath shortness     with exertion    Cataract     bilateral removal-Dr. Duke Talamantes    Cellulitis     right lower leg    Cellulitis of leg 11/1/2013    Followed by dermatology. Managed with fluocinonide and Bactroban on right leg Left leg she uses triamcinolone and Sarna pramocaine (anesthetic, antipruretic) on both.      Chickenpox     COPD (chronic obstructive pulmonary disease) ([facility])     Dental disorder     upper denture    Earache     Fatigue     Frequent urination     Hypertension 02/23/2018    on no meds at this time    Hypothyroidism     Influenza     Insomnia     Kidney disease     reports 1 kidney is smaller than the other    Mumps     OAB (overactive bladder) 2/11/2019    Pain 02/23/2018    right leg and back, 5/10    Peripheral vascular disease, unspecified ([facility])  10/13/2021    Pneumonia     Rash     Ringing in ears     Shortness of breath     Swelling of lower extremity     Thyroid disease     Tonsillitis     Toothache     Venous stasis dermatitis     right leg       Past Surgical History:   Procedure Laterality Date    VEIN LIGATION RADIO FREQUENCY Right 2018    Procedure: VEIN LIGATION RADIO FREQUENCY- LONG SAPENOUS;  Surgeon: Jim Alas M.D.;  Location: SURGERY [facility];  Service: General    ABDOMINAL EXPLORATION      2 ectopic preg    BREAST BIOPSY      hx of benign left breast biopsy    EYE SURGERY      cataract x2    HYSTERECTOMY LAPAROSCOPY      HYSTERECTOMY, TOTAL ABDOMINAL      OOPHORECTOMY      PB MAMMARY DUCTOGRAM, SINGLE      MI REMV 2ND CATARACT,CORN-SCLER SECTN      TONSILLECTOMY         Social History     Socioeconomic History    Marital status:      Spouse name: Not on file    Number of children: 2    Years of education: Not on file    Highest education level: Not on file   Occupational History    Occupation: retired   Tobacco Use    Smoking status: Former     Packs/day: 0.50     Years: 53.00     Pack years: 26.50     Types: Cigarettes     Quit date: 2017     Years since quittin.8    Smokeless tobacco: Never   Vaping Use    Vaping Use: Never used   Substance and Sexual Activity    Alcohol use: Yes     Comment: 2 per day    Drug use: No    Sexual activity: Not Currently     Partners: Male   Other Topics Concern    Not on file   Social History Narrative    Not on file     Social Determinants of Health     Financial Resource Strain: Not on file   Food Insecurity: Not on file   Transportation Needs: Not on file   Physical Activity: Not on file   Stress: Not on file   Social Connections: Not on file   Intimate Partner Violence: Not on file   Housing Stability: Not on file          Family History   Problem Relation Age of Onset    Arthritis Mother         hip fracture    Diabetes Mother     Cancer Mother         skin     Arthritis Father         lung    Alcohol/Drug Father         etoh    Lung Disease Sister         smoker/copd    Hypertension Sister     Other Brother         hep c    Arthritis Maternal Grandmother         hip fracture    Diabetes Maternal Grandfather     No Known Problems Son     No Known Problems Son     No Known Problems Brother     No Known Problems Sister     No Known Problems Brother     Hyperlipidemia Neg Hx     Stroke Neg Hx        Current Outpatient Medications on File Prior to Visit   Medication Sig Dispense Refill    albuterol 108 (90 Base) MCG/ACT Aero Soln inhalation aerosol Inhale 1-2 Puffs every 6 hours as needed for Shortness of Breath. 8.5 g 2    predniSONE (DELTASONE) 10 MG Tab Take 6 tabs (60 mg) daily x 7 days then 5 tabs (50 mg) daily x 7 days, then 4 tabs (40 mg) daily x 7 days, then 3 tabs (30 mg) daily x 7 days, then 2 tabs (20 mg) daily x 7 days, then 1 tab (10 mg) daily x 7 days, then 1/2 tab (5 mg) daily x 7 days then 1/4 tab (2.5 mg) daily 7 days then stop 160 Tablet 0    sulfamethoxazole-trimethoprim (BACTRIM) 400-80 MG Tab Take 1 Tablet by mouth every day for 56 days. 56 Tablet 0    budesonide-formoterol (SYMBICORT) 80-4.5 MCG/ACT Aerosol Inhale 2 Puffs 2 times a day. 3 Each 0    PROAIR  (90 Base) MCG/ACT Aero Soln inhalation aerosol Inhale 1 Puff every 6 hours as needed for Shortness of Breath. 8.5 g 1    amLODIPine (NORVASC) 5 MG Tab TAKE 1 TABLET BY MOUTH EVERY DAY 90 Tablet 1    levothyroxine (SYNTHROID) 100 MCG Tab TAKE 1 TABLET BY MOUTH EVERY DAY BEFORE BREAKFAST 100 Tablet 0    rosuvastatin (CRESTOR) 10 MG Tab TAKE 1 TABLET BY MOUTH EVERY DAY IN THE EVENING 100 Tablet 1    lisinopril (PRINIVIL) 20 MG Tab TAKE 1 TABLET BY MOUTH EVERY  Tablet 3    Mirabegron ER (MYRBETRIQ) 50 MG TABLET SR 24 HR Myrbetriq 50 mg tablet,extended release   Take 1 tablet every day by oral route for 90 days.      Ascorbic Acid (VITAMIN C PO) Take  by mouth.      Doxylamine Succinate, Sleep,  "(SLEEP AID PO) Take  by mouth.      Dextromethorphan-guaiFENesin (MUCINEX DM PO) Take  by mouth.      triamcinolone acetonide (KENALOG) 0.1 % Cream Apply 1 Application topically 2 times a day as needed. 80 g 2    loperamide (IMODIUM) 2 MG Cap Take 1 Capsule by mouth 4 times a day as needed for Diarrhea. 30 Capsule 1    cyanocobalamin (VITAMIN B-12) 100 MCG Tab Take 100 mcg by mouth every day.      vitamin D (CHOLECALCIFEROL) 1000 Unit (25 mcg) Tab Take 1,000 Units by mouth every day.      Melatonin 1 MG Tab Take  by mouth.      diphenhydrAMINE (BENADRYL) 25 MG Tab Take 25 mg by mouth every 6 hours as needed for Sleep.      Acetaminophen 500 MG Cap Take  by mouth.      Oxymetazoline HCl (NASAL SPRAY NA) Spray  in nose.       No current facility-administered medications on file prior to visit.       Allergies: Colophony [pinus strobus], Latex, Neosporin [neomycin-polymyxin b], Phenylene, Soap, Tape, and Tea tree oil      ROS: A 12 point ROS was performed on intake and during my interview. ROS negative unless specifically noted in HPI.     Vitals:  /66 (BP Location: Left arm, Patient Position: Sitting, BP Cuff Size: Adult)   Pulse 96   Ht 1.702 m (5' 7\")   Wt 64.9 kg (143 lb)   SpO2 97%     Physical Exam:  Physical Exam  Vitals reviewed.   Constitutional:       General: She is not in acute distress.     Appearance: She is ill-appearing. She is not toxic-appearing or diaphoretic.   Eyes:      General:         Right eye: No discharge.         Left eye: No discharge.      Conjunctiva/sclera: Conjunctivae normal.   Cardiovascular:      Rate and Rhythm: Normal rate.   Pulmonary:      Effort: Pulmonary effort is normal.      Breath sounds: Normal breath sounds.   Musculoskeletal:      Right lower leg: No edema.      Left lower leg: No edema.   Skin:     General: Skin is warm.      Coloration: Skin is not jaundiced.   Neurological:      General: No focal deficit present.      Mental Status: She is alert and oriented " to person, place, and time.   Psychiatric:         Mood and Affect: Mood normal.         Behavior: Behavior normal.       Laboratory Data: I personally reviewed labs including historical lab trends.     March 2023 CT scan:        Assessment/Plan:    Pulmonary problem list:  #Acute hypoxic respiratory failure, possible CTD-ILD (?Sjogrens) vs. Nitrofurantoin induced ILD   #Former smoker  #History of COVID 19 infection    Assessment: I personally reviewed her CT images.  She has diffuse fibrotic changes as well as traction bronchiectasis.  Differential includes progressive NSIP, CTD-ILD, chronic HP, drug toxicity, COVID-19 related lung changes.  Her EBEN titer is remarkably elevated, but she does not have other findings suggestive of lupus.    Recommendations/Plan:  -Continue steroid taper as prescribed   -CT scheduled for 2 weeks from now  -Wean oxygen as tolerated.  Target pulse oximetry 88 to 92%  -reminded to keep appointment with Rheumatology in Sept 2023  -reminded to avoid Nitrofurantoin use  -reminded to continue with vaccine vigilance     Return to clinic within the next month. She should be off prednisone and weaned further from O2 by then. She should also have her repeat CT done before then.     Total consult time: 40 minutes which included time spent on chart review, personally reviewing pertinent images and labs, time spent counseling and educating the patient and/or family members, and coordinating care with the healthcare team to include consultants.   __________  Levi Perez, DO  Pulmonary and Critical Care Medicine  [facility]    Please note that this dictation was created using voice recognition software. The accuracy of the dictation is limited to the abilities of the software. I have made every reasonable attempt to correct obvious errors, but I expect that there are errors of grammar and possibly content that I did not discover before finalizing the note.

## 2023-05-16 ENCOUNTER — HOSPITAL ENCOUNTER (OUTPATIENT)
Dept: RADIOLOGY | Facility: MEDICAL CENTER | Age: 73
End: 2023-05-16
Attending: PHYSICIAN ASSISTANT
Payer: MEDICARE

## 2023-05-16 DIAGNOSIS — J84.9 INTERSTITIAL PULMONARY DISEASE (HCC): ICD-10-CM

## 2023-05-16 PROCEDURE — 71250 CT THORAX DX C-: CPT

## 2023-05-30 RX ORDER — DILTIAZEM HYDROCHLORIDE 60 MG/1
TABLET, FILM COATED ORAL
Qty: 30.6 EACH | Refills: 2 | Status: SHIPPED | OUTPATIENT
Start: 2023-05-30

## 2023-06-07 ENCOUNTER — PATIENT MESSAGE (OUTPATIENT)
Dept: HEALTH INFORMATION MANAGEMENT | Facility: OTHER | Age: 73
End: 2023-06-07

## 2023-06-07 ENCOUNTER — DOCUMENTATION (OUTPATIENT)
Dept: HEALTH INFORMATION MANAGEMENT | Facility: OTHER | Age: 73
End: 2023-06-07
Payer: MEDICARE

## 2023-06-12 DIAGNOSIS — E03.1 CONGENITAL HYPOTHYROIDISM WITHOUT GOITER: ICD-10-CM

## 2023-06-13 ENCOUNTER — HOSPITAL ENCOUNTER (OUTPATIENT)
Dept: LAB | Facility: MEDICAL CENTER | Age: 73
End: 2023-06-13
Attending: INTERNAL MEDICINE
Payer: MEDICARE

## 2023-06-13 DIAGNOSIS — R80.9 MICROALBUMINURIA: ICD-10-CM

## 2023-06-13 DIAGNOSIS — N18.2 CKD (CHRONIC KIDNEY DISEASE), STAGE II: ICD-10-CM

## 2023-06-13 DIAGNOSIS — N13.30 HYDRONEPHROSIS, UNSPECIFIED HYDRONEPHROSIS TYPE: ICD-10-CM

## 2023-06-13 DIAGNOSIS — D64.9 ANEMIA, UNSPECIFIED TYPE: ICD-10-CM

## 2023-06-13 DIAGNOSIS — I10 ESSENTIAL HYPERTENSION: ICD-10-CM

## 2023-06-13 LAB
25(OH)D3 SERPL-MCNC: 58 NG/ML (ref 30–100)
ANION GAP SERPL CALC-SCNC: 15 MMOL/L (ref 7–16)
APPEARANCE UR: CLEAR
BACTERIA #/AREA URNS HPF: NEGATIVE /HPF
BILIRUB UR QL STRIP.AUTO: NEGATIVE
BUN SERPL-MCNC: 27 MG/DL (ref 8–22)
CALCIUM SERPL-MCNC: 9.3 MG/DL (ref 8.5–10.5)
CHLORIDE SERPL-SCNC: 105 MMOL/L (ref 96–112)
CO2 SERPL-SCNC: 23 MMOL/L (ref 20–33)
COLOR UR: YELLOW
CREAT SERPL-MCNC: 0.98 MG/DL (ref 0.5–1.4)
CREAT UR-MCNC: 58.03 MG/DL
EPI CELLS #/AREA URNS HPF: NEGATIVE /HPF
ERYTHROCYTE [DISTWIDTH] IN BLOOD BY AUTOMATED COUNT: 62.1 FL (ref 35.9–50)
GFR SERPLBLD CREATININE-BSD FMLA CKD-EPI: 61 ML/MIN/1.73 M 2
GLUCOSE SERPL-MCNC: 116 MG/DL (ref 65–99)
GLUCOSE UR STRIP.AUTO-MCNC: NEGATIVE MG/DL
HCT VFR BLD AUTO: 41.4 % (ref 37–47)
HGB BLD-MCNC: 13.2 G/DL (ref 12–16)
HYALINE CASTS #/AREA URNS LPF: NORMAL /LPF
KETONES UR STRIP.AUTO-MCNC: NEGATIVE MG/DL
LEUKOCYTE ESTERASE UR QL STRIP.AUTO: ABNORMAL
MCH RBC QN AUTO: 32.4 PG (ref 27–33)
MCHC RBC AUTO-ENTMCNC: 31.9 G/DL (ref 32.2–35.5)
MCV RBC AUTO: 101.5 FL (ref 81.4–97.8)
MICRO URNS: ABNORMAL
MICROALBUMIN UR-MCNC: <1.2 MG/DL
MICROALBUMIN/CREAT UR: NORMAL MG/G (ref 0–30)
NITRITE UR QL STRIP.AUTO: NEGATIVE
PH UR STRIP.AUTO: 6.5 [PH] (ref 5–8)
PLATELET # BLD AUTO: 134 K/UL (ref 164–446)
PMV BLD AUTO: 11.6 FL (ref 9–12.9)
POTASSIUM SERPL-SCNC: 4.5 MMOL/L (ref 3.6–5.5)
PROT UR QL STRIP: NEGATIVE MG/DL
RBC # BLD AUTO: 4.08 M/UL (ref 4.2–5.4)
RBC # URNS HPF: NORMAL /HPF
RBC UR QL AUTO: NEGATIVE
SODIUM SERPL-SCNC: 143 MMOL/L (ref 135–145)
SP GR UR STRIP.AUTO: 1.01
UROBILINOGEN UR STRIP.AUTO-MCNC: 1 MG/DL
WBC # BLD AUTO: 7.8 K/UL (ref 4.8–10.8)
WBC #/AREA URNS HPF: NORMAL /HPF

## 2023-06-13 PROCEDURE — 85027 COMPLETE CBC AUTOMATED: CPT

## 2023-06-13 PROCEDURE — 82043 UR ALBUMIN QUANTITATIVE: CPT

## 2023-06-13 PROCEDURE — 82306 VITAMIN D 25 HYDROXY: CPT

## 2023-06-13 PROCEDURE — 81001 URINALYSIS AUTO W/SCOPE: CPT

## 2023-06-13 PROCEDURE — 82570 ASSAY OF URINE CREATININE: CPT

## 2023-06-13 PROCEDURE — 36415 COLL VENOUS BLD VENIPUNCTURE: CPT

## 2023-06-13 PROCEDURE — 80048 BASIC METABOLIC PNL TOTAL CA: CPT

## 2023-06-15 ENCOUNTER — OFFICE VISIT (OUTPATIENT)
Dept: NEPHROLOGY | Facility: MEDICAL CENTER | Age: 73
End: 2023-06-15
Payer: MEDICARE

## 2023-06-15 DIAGNOSIS — N18.2 CKD (CHRONIC KIDNEY DISEASE), STAGE II: ICD-10-CM

## 2023-06-15 DIAGNOSIS — I10 ESSENTIAL HYPERTENSION: ICD-10-CM

## 2023-06-15 DIAGNOSIS — E55.9 VITAMIN D DEFICIENCY: ICD-10-CM

## 2023-06-15 DIAGNOSIS — R80.9 MICROALBUMINURIA: ICD-10-CM

## 2023-06-15 DIAGNOSIS — D64.9 ANEMIA, UNSPECIFIED TYPE: ICD-10-CM

## 2023-06-15 DIAGNOSIS — N13.30 HYDRONEPHROSIS, UNSPECIFIED HYDRONEPHROSIS TYPE: ICD-10-CM

## 2023-06-15 PROCEDURE — 99214 OFFICE O/P EST MOD 30 MIN: CPT | Performed by: INTERNAL MEDICINE

## 2023-06-15 ASSESSMENT — ENCOUNTER SYMPTOMS
DIARRHEA: 0
FLANK PAIN: 0
SHORTNESS OF BREATH: 0
FEVER: 0
WEIGHT LOSS: 0
ABDOMINAL PAIN: 0
EYES NEGATIVE: 1
WHEEZING: 0
ORTHOPNEA: 0
PALPITATIONS: 0
NAUSEA: 0
CHILLS: 0
VOMITING: 0
SINUS PAIN: 0
COUGH: 0
HEMOPTYSIS: 0

## 2023-06-15 ASSESSMENT — FIBROSIS 4 INDEX: FIB4 SCORE: 2.03

## 2023-06-15 NOTE — PROGRESS NOTES
Subjective:      Naomy Vargas is a 73 y.o. female who presents with CKD          Chronic Kidney Disease  Pertinent negatives include no abdominal pain, chest pain, chills, congestion, coughing, fever, nausea or vomiting.     Naomy is coming today for f/u of CKD II  Doing well, no complaints.  No dysuria/hematuria/flank pain  Pedal edema improved -well controlled with low salt diet  No NSAID's  CKD II -creat improved -stable ow at 0.9's  HTN: BP well controlled  CT abd from 2008 revealed scarred right kidney  Renal US small right kidney with hydronephrosis f/u with Urology    Review of Systems   Constitutional:  Negative for chills, fever, malaise/fatigue and weight loss.   HENT:  Negative for congestion, hearing loss and sinus pain.    Eyes: Negative.    Respiratory:  Negative for cough, hemoptysis, shortness of breath and wheezing.    Cardiovascular:  Negative for chest pain, palpitations, orthopnea and leg swelling.   Gastrointestinal:  Negative for abdominal pain, diarrhea, nausea and vomiting.   Genitourinary:  Negative for dysuria, flank pain, frequency, hematuria and urgency.   Skin: Negative.    All other systems reviewed and are negative.    Past Medical History:   Diagnosis Date    Anxiety     Arthritis     wrists    Back pain     Blood transfusion without reported diagnosis     with ectopic preg x 2     Breath shortness     with exertion    Cataract     bilateral removal-Dr. Duke Talamantes    Cellulitis     right lower leg    Cellulitis of leg 11/01/2013    Followed by dermatology. Managed with fluocinonide and Bactroban on right leg Left leg she uses triamcinolone and Sarna pramocaine (anesthetic, antipruretic) on both.      Chickenpox     COPD (chronic obstructive pulmonary disease) ([facility])     Dental disorder     upper denture    Earache     Fatigue     Frequent urination     Hypertension 02/23/2018    on no meds at this time    Hypothyroidism     Influenza     Insomnia     Kidney disease      "reports 1 kidney is smaller than the other    Mumps     OAB (overactive bladder) 2019    Pain 2018    right leg and back, 5/10    Peripheral vascular disease, unspecified ([facility]) 10/13/2021    Pneumonia     Rash     Ringing in ears     Shortness of breath     Swelling of lower extremity     Thyroid disease     Tonsillitis     Toothache     Venous stasis dermatitis     right leg       Family History   Problem Relation Age of Onset    Arthritis Mother         hip fracture    Diabetes Mother     Cancer Mother         skin    Arthritis Father         lung    Alcohol/Drug Father         etoh    Lung Disease Sister         smoker/copd    Hypertension Sister     Other Brother         hep c    Arthritis Maternal Grandmother         hip fracture    Diabetes Maternal Grandfather     No Known Problems Son     No Known Problems Son     No Known Problems Brother     No Known Problems Sister     No Known Problems Brother     Hyperlipidemia Neg Hx     Stroke Neg Hx        Social History     Socioeconomic History    Marital status:     Number of children: 2   Occupational History    Occupation: retired   Tobacco Use    Smoking status: Former     Packs/day: 0.50     Years: 53.00     Pack years: 26.50     Types: Cigarettes     Quit date: 2017     Years since quittin.9    Smokeless tobacco: Never   Vaping Use    Vaping Use: Never used   Substance and Sexual Activity    Alcohol use: Yes     Comment: 2 per day    Drug use: No    Sexual activity: Not Currently     Partners: Male          Objective:     BP (P) 114/68 (BP Location: Right arm, Patient Position: Sitting, BP Cuff Size: Adult)   Pulse (P) 85   Temp (P) 36.5 °C (97.7 °F) (Temporal)   Ht (P) 1.702 m (5' 7\")   Wt (P) 70.8 kg (156 lb)   LMP  (LMP Unknown)   SpO2 (P) 97%   BMI (P) 24.43 kg/m²      Physical Exam  Vitals reviewed.   Constitutional:       General: She is not in acute distress.     Appearance: Normal appearance. She is well-developed. " She is not diaphoretic.   HENT:      Head: Normocephalic and atraumatic.      Nose: Nose normal.      Mouth/Throat:      Mouth: Mucous membranes are moist.      Pharynx: Oropharynx is clear.   Eyes:      Extraocular Movements: Extraocular movements intact.      Conjunctiva/sclera: Conjunctivae normal.      Pupils: Pupils are equal, round, and reactive to light.   Cardiovascular:      Rate and Rhythm: Normal rate and regular rhythm.      Pulses: Normal pulses.      Heart sounds: Normal heart sounds.   Pulmonary:      Effort: Pulmonary effort is normal. No respiratory distress.      Breath sounds: Normal breath sounds. No wheezing or rales.   Abdominal:      General: Bowel sounds are normal. There is no distension.      Palpations: Abdomen is soft. There is no mass.      Tenderness: There is no abdominal tenderness. There is no right CVA tenderness or left CVA tenderness.   Musculoskeletal:      Cervical back: Normal range of motion and neck supple.      Right lower leg: No edema.      Left lower leg: No edema.   Skin:     General: Skin is warm.      Coloration: Skin is not pale.      Findings: No erythema or rash.   Neurological:      General: No focal deficit present.      Mental Status: She is alert and oriented to person, place, and time.      Cranial Nerves: No cranial nerve deficit.      Coordination: Coordination normal.   Psychiatric:         Mood and Affect: Mood normal.         Behavior: Behavior normal.         Thought Content: Thought content normal.         Judgment: Judgment normal.            Laboratory and renal imaging results reviewed : d/w pt  Lab Results   Component Value Date/Time    CREATININE 0.98 06/13/2023 11:37 AM    POTASSIUM 4.5 06/13/2023 11:37 AM          Assessment/Plan:       1. CKD (chronic kidney disease) stage II, GFR > 60 ml/min ([facility])       Creat level improved - stable at new baseline to monitor      Keep well hydrated    2. Vitamin D deficiency      Well controlled      PTH  WNL    3. Essential hypertension      BP well controlled       To monitor BP at home nd call clinic if BP> 135/85    4. Microalbuminuria      Very mild -on ACEi      5. Anemia, unspecified type      Hb stable WNL    6. Right hydronephrosis - stable -f/u with Urology    Recs:  Continue current treatment  Urology f/u  Keep well hydrated  Low salt diet  Monitor BP  Avoid NSAID's  F/u in 6 months

## 2023-06-19 DIAGNOSIS — Z91.89 CANDIDATE FOR STATIN THERAPY DUE TO RISK OF FUTURE CARDIOVASCULAR EVENT: ICD-10-CM

## 2023-06-19 DIAGNOSIS — I77.1 BILATERAL ILIAC ARTERY STENOSIS (HCC): ICD-10-CM

## 2023-06-19 RX ORDER — LEVOTHYROXINE SODIUM 0.1 MG/1
TABLET ORAL
Qty: 100 TABLET | Refills: 0 | Status: SHIPPED | OUTPATIENT
Start: 2023-06-19 | End: 2023-10-03

## 2023-06-26 RX ORDER — ROSUVASTATIN CALCIUM 10 MG/1
TABLET, COATED ORAL
Qty: 100 TABLET | Refills: 1 | Status: SHIPPED | OUTPATIENT
Start: 2023-06-26 | End: 2024-01-09

## 2023-07-17 ENCOUNTER — OFFICE VISIT (OUTPATIENT)
Dept: MEDICAL GROUP | Facility: PHYSICIAN GROUP | Age: 73
End: 2023-07-17
Payer: MEDICARE

## 2023-07-17 VITALS
TEMPERATURE: 98.2 F | RESPIRATION RATE: 16 BRPM | SYSTOLIC BLOOD PRESSURE: 126 MMHG | BODY MASS INDEX: 24.48 KG/M2 | HEIGHT: 67 IN | OXYGEN SATURATION: 93 % | WEIGHT: 156 LBS | DIASTOLIC BLOOD PRESSURE: 70 MMHG | HEART RATE: 87 BPM

## 2023-07-17 DIAGNOSIS — J96.01 ACUTE HYPOXEMIC RESPIRATORY FAILURE (HCC): ICD-10-CM

## 2023-07-17 DIAGNOSIS — B35.9 RINGWORM: ICD-10-CM

## 2023-07-17 DIAGNOSIS — I10 ESSENTIAL HYPERTENSION: ICD-10-CM

## 2023-07-17 DIAGNOSIS — Z12.11 COLON CANCER SCREENING: ICD-10-CM

## 2023-07-17 PROCEDURE — 3074F SYST BP LT 130 MM HG: CPT | Performed by: PHYSICIAN ASSISTANT

## 2023-07-17 PROCEDURE — 99214 OFFICE O/P EST MOD 30 MIN: CPT | Performed by: PHYSICIAN ASSISTANT

## 2023-07-17 PROCEDURE — 3078F DIAST BP <80 MM HG: CPT | Performed by: PHYSICIAN ASSISTANT

## 2023-07-17 RX ORDER — ROSUVASTATIN CALCIUM 10 MG/1
1 TABLET, COATED ORAL EVERY EVENING
COMMUNITY
End: 2023-07-17

## 2023-07-17 RX ORDER — AMLODIPINE BESYLATE 5 MG/1
5 TABLET ORAL
Qty: 100 TABLET | Refills: 2 | Status: SHIPPED | OUTPATIENT
Start: 2023-07-17 | End: 2024-03-25

## 2023-07-17 RX ORDER — LISINOPRIL 20 MG/1
1 TABLET ORAL
COMMUNITY
End: 2023-07-17

## 2023-07-17 RX ORDER — PRENATAL VIT 91/IRON/FOLIC/DHA 28-975-200
COMBINATION PACKAGE (EA) ORAL
Qty: 42 G | Refills: 1 | Status: SHIPPED | OUTPATIENT
Start: 2023-07-17

## 2023-07-17 RX ORDER — CLOTRIMAZOLE 1 %
1 CREAM (GRAM) TOPICAL 2 TIMES DAILY PRN
Qty: 60 G | Refills: 1 | Status: SHIPPED | OUTPATIENT
Start: 2023-07-17 | End: 2023-07-17 | Stop reason: CLARIF

## 2023-07-17 RX ORDER — BUDESONIDE AND FORMOTEROL FUMARATE DIHYDRATE 80; 4.5 UG/1; UG/1
2 AEROSOL RESPIRATORY (INHALATION) 2 TIMES DAILY
COMMUNITY
End: 2023-09-20

## 2023-07-17 RX ORDER — BUDESONIDE AND FORMOTEROL FUMARATE DIHYDRATE 80; 4.5 UG/1; UG/1
AEROSOL RESPIRATORY (INHALATION)
COMMUNITY
End: 2023-07-17

## 2023-07-17 RX ORDER — AMLODIPINE BESYLATE 5 MG/1
1 TABLET ORAL
COMMUNITY
End: 2023-07-17

## 2023-07-17 RX ORDER — LEVOTHYROXINE SODIUM 0.1 MG/1
1 TABLET ORAL
COMMUNITY
End: 2023-07-17

## 2023-07-17 RX ORDER — SULFAMETHOXAZOLE AND TRIMETHOPRIM 400; 80 MG/1; MG/1
TABLET ORAL
COMMUNITY
End: 2023-07-17

## 2023-07-17 ASSESSMENT — FIBROSIS 4 INDEX: FIB4 SCORE: 2.03

## 2023-07-17 NOTE — PROGRESS NOTES
(1) Other Diagnosis Subjective:     CC: rash    HPI:   Naomy presents today to follow-up on her acute respiratory failure. Has been weaning herself off of her oxygen and O2 has been running 88-90s.     Had a rash on her right inner thigh that is super itchy. Has been using lotrimin and it has lightened up.     Past Medical History:   Diagnosis Date    Anxiety     Arthritis     wrists    Back pain     Blood transfusion without reported diagnosis     with ectopic preg x 2     Breath shortness     with exertion    Cataract     bilateral removal-Dr. Duke Talamantes    Cellulitis     right lower leg    Cellulitis of leg 2013    Followed by dermatology. Managed with fluocinonide and Bactroban on right leg Left leg she uses triamcinolone and Sarna pramocaine (anesthetic, antipruretic) on both.      Chickenpox     COPD (chronic obstructive pulmonary disease) ([facility])     Dental disorder     upper denture    Earache     Fatigue     Frequent urination     Hypertension 2018    on no meds at this time    Hypothyroidism     Influenza     Insomnia     Kidney disease     reports 1 kidney is smaller than the other    Mumps     OAB (overactive bladder) 2019    Pain 2018    right leg and back, 5/10    Peripheral vascular disease, unspecified ([facility]) 10/13/2021    Pneumonia     Rash     Ringing in ears     Shortness of breath     Swelling of lower extremity     Thyroid disease     Tonsillitis     Toothache     Venous stasis dermatitis     right leg       Social History     Tobacco Use    Smoking status: Former     Packs/day: 0.50     Years: 53.00     Pack years: 26.50     Types: Cigarettes     Quit date: 2017     Years since quittin.0    Smokeless tobacco: Never   Vaping Use    Vaping Use: Never used   Substance Use Topics    Alcohol use: Yes     Comment: 2 per day    Drug use: No       Current Outpatient Medications Ordered in Epic   Medication Sig Dispense Refill    budesonide-formoterol (SYMBICORT) 80-4.5 MCG/ACT Aerosol  Inhale 2 Puffs 2 times a day.      amLODIPine (NORVASC) 5 MG Tab Take 1 Tablet by mouth every day. 100 Tablet 2    terbinafine (LAMISIL) 1 % cream Apply to affected areas 1-2 times per day as needed for rash 42 g 1    rosuvastatin (CRESTOR) 10 MG Tab TAKE 1 TABLET BY MOUTH EVERY DAY IN THE EVENING 100 Tablet 1    levothyroxine (SYNTHROID) 100 MCG Tab TAKE 1 TABLET BY MOUTH EVERY DAY BEFORE BREAKFAST 100 Tablet 0    SYMBICORT 80-4.5 MCG/ACT Aerosol INHALE 2 PUFFS BY MOUTH TWICE A DAY 30.6 Each 2    albuterol 108 (90 Base) MCG/ACT Aero Soln inhalation aerosol Inhale 1-2 Puffs every 6 hours as needed for Shortness of Breath. 8.5 g 2    PROAIR  (90 Base) MCG/ACT Aero Soln inhalation aerosol Inhale 1 Puff every 6 hours as needed for Shortness of Breath. 8.5 g 1    lisinopril (PRINIVIL) 20 MG Tab TAKE 1 TABLET BY MOUTH EVERY  Tablet 3    Mirabegron ER (MYRBETRIQ) 50 MG TABLET SR 24 HR Myrbetriq 50 mg tablet,extended release   Take 1 tablet every day by oral route for 90 days.      Ascorbic Acid (VITAMIN C PO) Take  by mouth.      Doxylamine Succinate, Sleep, (SLEEP AID PO) Take  by mouth.      Dextromethorphan-guaiFENesin (MUCINEX DM PO) Take  by mouth.      triamcinolone acetonide (KENALOG) 0.1 % Cream Apply 1 Application topically 2 times a day as needed. 80 g 2    loperamide (IMODIUM) 2 MG Cap Take 1 Capsule by mouth 4 times a day as needed for Diarrhea. 30 Capsule 1    cyanocobalamin (VITAMIN B-12) 100 MCG Tab Take 100 mcg by mouth every day.      vitamin D (CHOLECALCIFEROL) 1000 Unit (25 mcg) Tab Take 1,000 Units by mouth every day.      Melatonin 1 MG Tab Take  by mouth.      diphenhydrAMINE (BENADRYL) 25 MG Tab Take 25 mg by mouth every 6 hours as needed for Sleep.      Acetaminophen 500 MG Cap Take  by mouth.      Oxymetazoline HCl (NASAL SPRAY NA) Spray  in nose.       No current Epic-ordered facility-administered medications on file.       Allergies:  Colophony [pinus strobus], Latex, Neosporin  "[neomycin-polymyxin b], Phenylene, Soap, Tape, and Tea tree oil    Health Maintenance: Completed    ROS:  Gen: no fevers/chills  Eyes: no changes in vision  ENT: no sore throat  Pulm: no sob, no cough  CV: no chest pain  GI: no nausea/vomiting  : no dysuria  MSk: no myalgias  Skin: Positive for rash  Neuro: no headaches    Objective:       Exam:  /70   Pulse 87   Temp 36.8 °C (98.2 °F) (Temporal)   Resp 16   Ht 1.702 m (5' 7\")   Wt 70.8 kg (156 lb)   LMP  (LMP Unknown)   SpO2 93% Comment: 1 LPM  BMI 24.43 kg/m²  Body mass index is 24.43 kg/m².    Gen: Alert and oriented, No apparent distress.  Skin: Warm, dry, good turgor, approximately 1 inch diameter annular lesion with surrounding erythema with central clearing   HEENT: Normocephalic. Eyes conjunctiva clear lids without ptosis, pupils equal and reactive to light accommodation, ears normal shape and contour  Neck: Trachea midline, no masses, no thyromegaly  Lungs: Normal effort, CTA bilaterally, no wheezes, rhonchi, or rales  CV: Regular rate and rhythm. No murmurs, rubs, or gallops.  MSK: Normal gait, moves all extremities.  Neuro: Grossly non-focal.  Ext: No clubbing, cyanosis, edema.  Psych: Alert and oriented x3, normal affect and mood.     Labs: Labs from 6/13/2023 were reviewed and discussed with the patient. All questions were answered.     Imaging:   CT chest from 5/16/2023  IMPRESSION:     1.  Interstitial opacifications, thickening of interlobular septa, and scattered areas of honeycombing are identified all of which are most prominent in the lung periphery. Findings are consistent with interstitial lung disease such as UIP.     2.  Bronchiectasis again noted.     3.  No change in mediastinal and hilar adenopathy.     4.  No change in hiatal hernia    Assessment & Plan:     73 y.o. female with the following -     1. Acute hypoxemic respiratory failure (HCC)  Chronic, improving.  Patient has follow-up scheduled with pulmonology in 3 days " for reevaluation and to discuss her CT results which we briefly discussed today.  She also has an appointment with rheumatology scheduled in about 2 months to further evaluate as well.  Patient has been weaning off of the oxygen and is down to about 1 lpm most of the time.  Follow-up in 3 months or sooner if needed for reevaluation.     2. Essential hypertension  Chronic, well controlled.  Blood pressure in office today is 126/70.  Refill of medication sent in today.  - amLODIPine (NORVASC) 5 MG Tab; Take 1 Tablet by mouth every day.  Dispense: 100 Tablet; Refill: 2    3. Ringworm  Acute.  Reported history and exam findings consistent with ringworm.  Prescription for terbinafine sent in.  Follow-up for new or worsening concerns or if rash is not improving.  - terbinafine (LAMISIL) 1 % cream; Apply to affected areas 1-2 times per day as needed for rash  Dispense: 42 g; Refill: 1    4. Colon cancer screening  Patient declined being referred to GI for a colonoscopy at this time. Instead they would like to proceed with Cologuard. I informed the patient that if the Cologuard comes back abnormal they will need to be referred to GI and will need a colonoscopy for further evaluation. They express understanding.     - COLOGUARD (FIT DNA)    I spent a total of 30 minutes with record review (including external notes and labs), exam, communication with the patient, communication with other providers, and documentation of this encounter.     Return in about 3 months (around 10/17/2023).    Please note that this dictation was created using voice recognition software. I have made every reasonable attempt to correct obvious errors, but I expect that there are errors of grammar and possibly content that I did not discover before finalizing the note.    Electronically signed by Prema Patel PA-C on July 17, 2023

## 2023-07-20 ENCOUNTER — OFFICE VISIT (OUTPATIENT)
Dept: SLEEP MEDICINE | Facility: MEDICAL CENTER | Age: 73
End: 2023-07-20
Attending: INTERNAL MEDICINE
Payer: MEDICARE

## 2023-07-20 VITALS
HEART RATE: 91 BPM | BODY MASS INDEX: 24.01 KG/M2 | HEIGHT: 67 IN | SYSTOLIC BLOOD PRESSURE: 102 MMHG | DIASTOLIC BLOOD PRESSURE: 64 MMHG | OXYGEN SATURATION: 93 % | WEIGHT: 153 LBS

## 2023-07-20 DIAGNOSIS — J84.10 LUNG FIBROSIS (HCC): ICD-10-CM

## 2023-07-20 DIAGNOSIS — J43.2 CENTRILOBULAR EMPHYSEMA (HCC): ICD-10-CM

## 2023-07-20 PROCEDURE — 3074F SYST BP LT 130 MM HG: CPT | Performed by: INTERNAL MEDICINE

## 2023-07-20 PROCEDURE — 99213 OFFICE O/P EST LOW 20 MIN: CPT | Performed by: INTERNAL MEDICINE

## 2023-07-20 PROCEDURE — 99215 OFFICE O/P EST HI 40 MIN: CPT | Performed by: INTERNAL MEDICINE

## 2023-07-20 PROCEDURE — 3078F DIAST BP <80 MM HG: CPT | Performed by: INTERNAL MEDICINE

## 2023-07-20 RX ORDER — IPRATROPIUM BROMIDE AND ALBUTEROL SULFATE 2.5; .5 MG/3ML; MG/3ML
3 SOLUTION RESPIRATORY (INHALATION) EVERY 4 HOURS PRN
Qty: 120 EACH | Refills: 3 | Status: SHIPPED | OUTPATIENT
Start: 2023-07-20

## 2023-07-20 ASSESSMENT — FIBROSIS 4 INDEX: FIB4 SCORE: 2.03

## 2023-07-20 NOTE — PROGRESS NOTES
"Pulmonary Consult    Date of Initial Consult: 3/28/23 (inpatient)    Reason for consult: Acute hypoxic respiratory failure, possible CTD-ILD (Sjogrens?) vs. Macrobid-induced ILD, Former smoker, History of COVID 19 infection    Chief Complaint:  Chief Complaint   Patient presents with    Follow-Up     Last seen 5/9/23 with Dr. Perez     Results     CT-Chest 5/16/23     HPI:   From Dr. Fermin Guzman's note: \"Naomy Vargas is a 73 y.o. female with history with a PMHx of non-oxygen dependent COPD (40.5 pack-year history; quit smoking 6 years ago), HTN, DLD who was admitted 3/23 after presenting with one month of increasing dyspnea on exertion that worsened the past several days associated with subjective fever and nonproductive cough. Seen by PCP with CT chest 3/7/2023 which demonstrated interval development of diffuse interstitial septal thickening and some groundglass infiltrates with a peripheral predominance suggestive of a interstitial lung disease which has not yet been worked up or followed up on. Admitted to the ICU for sepsis secondary to presumed underlying pulmonary infection complicated by acute hypoxemic respiratory failure requiring high dose steroids and antibiotics.    3/24:increase methylprednisolone from 80->125mg Q6H  Continue ceftriaxone and azithromycin.  High flow nasal cannula.  3/25: continue current dose of methylprednisolone.  Continue antibiotics.  High flow nasal cannula.  Keep ICU.  3/26: Taper methylprednisolone.  Continue antibiotics.  Keep ICU.  High flow nasal cannula.  3/27: Taper methylprednisolone. Continue antibiotics. Continue HFNC. Pending ILD workup  3/28: Taper methylprednisolone. Abx completed. Continue HFNC. ILD workup: titer 1:640, EBEN IgG +ve, homogenous pattern, most consistent w SLE vs SLE drug induced vs DARSHAN. Pulm consult. PT/OT consult. Transfer to [facility]\"    Patient has been seen by pulmonary at [facility] in the past: Dr. Do Velásquez in 201 for a RUL nodule. "   Recent imaging:     She has prior PFTs in 2021: Normal by NHANEs criteria but mild obstruction by ATS. Air trapping. Normal DLCO.    She denies any known personal or family history of autoimmune disease.  She denies any small joint issues, uveitis, burning with urination.  Renal function is normal.  She does acknowledge that she tested positive at home for COVID about a year ago.  Her respiratory symptoms started about a month ago.  She was standing near a pill burning stove when she became symptomatic.  She denies any new medications or new environment around the time that she became sick.  She states she previously worked at a  for commercial [employer].    May 2023: She is seen as a hospital follow-up.She is doing better.  She is using 3 L of oxygen today with a saturation of 97%.  She has not been actively weaning the oxygen because she was not sure she was supposed to.  She is down to 10 mg daily on prednisone and has not noticed any rebound symptoms as she has come down on the dose.     Today: Doing well. No hospitalizations or ER visits since our last appt. Weaned down to 1 lpm oxygen.     Past Medical History:   Diagnosis Date    Anxiety     Arthritis     wrists    Back pain     Blood transfusion without reported diagnosis     with ectopic preg x 2     Breath shortness     with exertion    Cataract     bilateral removal-Dr. Duke Talamantes    Cellulitis     right lower leg    Cellulitis of leg 11/01/2013    Followed by dermatology. Managed with fluocinonide and Bactroban on right leg Left leg she uses triamcinolone and Sarna pramocaine (anesthetic, antipruretic) on both.      Chickenpox     COPD (chronic obstructive pulmonary disease) (HCC)     Dental disorder     upper denture    Earache     Fatigue     Frequent urination     Hypertension 02/23/2018    on no meds at this time    Hypothyroidism     Influenza     Insomnia     Kidney disease     reports 1 kidney is smaller than the other     Mumps     OAB (overactive bladder) 2019    Pain 2018    right leg and back, 5/10    Peripheral vascular disease, unspecified (HCC) 10/13/2021    Pneumonia     Rash     Ringing in ears     Shortness of breath     Swelling of lower extremity     Thyroid disease     Tonsillitis     Toothache     Venous stasis dermatitis     right leg       Past Surgical History:   Procedure Laterality Date    VEIN LIGATION RADIO FREQUENCY Right 2018    Procedure: VEIN LIGATION RADIO FREQUENCY- LONG SAPENOUS;  Surgeon: Jim Alas M.D.;  Location: SURGERY [facility];  Service: General    ABDOMINAL EXPLORATION      2 ectopic preg    BREAST BIOPSY      hx of benign left breast biopsy    EYE SURGERY      cataract x2    HYSTERECTOMY LAPAROSCOPY      HYSTERECTOMY, TOTAL ABDOMINAL      OOPHORECTOMY      PB MAMMARY DUCTOGRAM, SINGLE      VA REMV 2ND CATARACT,CORN-SCLER SECTN      TONSILLECTOMY         Social History     Socioeconomic History    Marital status:      Spouse name: Not on file    Number of children: 2    Years of education: Not on file    Highest education level: Not on file   Occupational History    Occupation: retired   Tobacco Use    Smoking status: Former     Packs/day: 0.50     Years: 53.00     Pack years: 26.50     Types: Cigarettes     Quit date: 2017     Years since quittin.0    Smokeless tobacco: Never   Vaping Use    Vaping Use: Never used   Substance and Sexual Activity    Alcohol use: Yes     Comment: 2 per day    Drug use: No    Sexual activity: Not Currently     Partners: Male   Other Topics Concern    Not on file   Social History Narrative    Not on file     Social Determinants of Health     Financial Resource Strain: Not on file   Food Insecurity: Not on file   Transportation Needs: Not on file   Physical Activity: Not on file   Stress: Not on file   Social Connections: Not on file   Intimate Partner Violence: Not on file   Housing Stability: Not on file           Family History   Problem Relation Age of Onset    Arthritis Mother         hip fracture    Diabetes Mother     Cancer Mother         skin    Arthritis Father         lung    Alcohol/Drug Father         etoh    Lung Disease Sister         smoker/copd    Hypertension Sister     Other Brother         hep c    Arthritis Maternal Grandmother         hip fracture    Diabetes Maternal Grandfather     No Known Problems Son     No Known Problems Son     No Known Problems Brother     No Known Problems Sister     No Known Problems Brother     Hyperlipidemia Neg Hx     Stroke Neg Hx        Current Outpatient Medications on File Prior to Visit   Medication Sig Dispense Refill    budesonide-formoterol (SYMBICORT) 80-4.5 MCG/ACT Aerosol Inhale 2 Puffs 2 times a day.      amLODIPine (NORVASC) 5 MG Tab Take 1 Tablet by mouth every day. 100 Tablet 2    terbinafine (LAMISIL) 1 % cream Apply to affected areas 1-2 times per day as needed for rash 42 g 1    rosuvastatin (CRESTOR) 10 MG Tab TAKE 1 TABLET BY MOUTH EVERY DAY IN THE EVENING 100 Tablet 1    levothyroxine (SYNTHROID) 100 MCG Tab TAKE 1 TABLET BY MOUTH EVERY DAY BEFORE BREAKFAST 100 Tablet 0    SYMBICORT 80-4.5 MCG/ACT Aerosol INHALE 2 PUFFS BY MOUTH TWICE A DAY 30.6 Each 2    albuterol 108 (90 Base) MCG/ACT Aero Soln inhalation aerosol Inhale 1-2 Puffs every 6 hours as needed for Shortness of Breath. 8.5 g 2    PROAIR  (90 Base) MCG/ACT Aero Soln inhalation aerosol Inhale 1 Puff every 6 hours as needed for Shortness of Breath. 8.5 g 1    lisinopril (PRINIVIL) 20 MG Tab TAKE 1 TABLET BY MOUTH EVERY  Tablet 3    Mirabegron ER (MYRBETRIQ) 50 MG TABLET SR 24 HR Myrbetriq 50 mg tablet,extended release   Take 1 tablet every day by oral route for 90 days.      Ascorbic Acid (VITAMIN C PO) Take  by mouth.      Doxylamine Succinate, Sleep, (SLEEP AID PO) Take  by mouth.      Dextromethorphan-guaiFENesin (MUCINEX DM PO) Take  by mouth.      triamcinolone acetonide  "(KENALOG) 0.1 % Cream Apply 1 Application topically 2 times a day as needed. 80 g 2    loperamide (IMODIUM) 2 MG Cap Take 1 Capsule by mouth 4 times a day as needed for Diarrhea. 30 Capsule 1    cyanocobalamin (VITAMIN B-12) 100 MCG Tab Take 100 mcg by mouth every day.      vitamin D (CHOLECALCIFEROL) 1000 Unit (25 mcg) Tab Take 1,000 Units by mouth every day.      Melatonin 1 MG Tab Take  by mouth.      diphenhydrAMINE (BENADRYL) 25 MG Tab Take 25 mg by mouth every 6 hours as needed for Sleep.      Acetaminophen 500 MG Cap Take  by mouth.      Oxymetazoline HCl (NASAL SPRAY NA) Spray  in nose.       No current facility-administered medications on file prior to visit.       Allergies: Colophony [pinus strobus], Latex, Neosporin [neomycin-polymyxin b], Phenylene, Soap, Tape, and Tea tree oil      ROS: A 12 point ROS was performed on intake and during my interview. ROS negative unless specifically noted in HPI.     Vitals:  /64 (BP Location: Right arm, Patient Position: Sitting, BP Cuff Size: Adult)   Pulse 91   Ht 1.702 m (5' 7\")   Wt 69.4 kg (153 lb)   SpO2 93%     Physical Exam:  Physical Exam  Vitals reviewed.   Constitutional:       General: She is not in acute distress.     Appearance: She is ill-appearing. She is not toxic-appearing or diaphoretic.   Eyes:      General:         Right eye: No discharge.         Left eye: No discharge.      Conjunctiva/sclera: Conjunctivae normal.   Cardiovascular:      Rate and Rhythm: Normal rate.   Pulmonary:      Effort: Pulmonary effort is normal.      Breath sounds: Normal breath sounds.   Musculoskeletal:      Right lower leg: No edema.      Left lower leg: No edema.   Skin:     General: Skin is warm.      Coloration: Skin is not jaundiced.   Neurological:      General: No focal deficit present.      Mental Status: She is alert and oriented to person, place, and time.   Psychiatric:         Mood and Affect: Mood normal.         Behavior: Behavior normal. "         Laboratory Data: I personally reviewed labs including historical lab trends.     March 2023 CT scan:    May 2023:        Immunization History   Administered Date(s) Administered    INFLUENZA TIV (IM) 11/01/2013    Influenza Seasonal Injectable - Historical Data 11/01/2013    Influenza Vaccine Adult HD 01/21/2017, 10/12/2017, 10/08/2018, 09/21/2019, 08/27/2020, 09/22/2021, 10/28/2022    Influenza Vaccine H1N1 - HISTORICAL DATA 01/11/2010    Influenza Vaccine Pediatric Split - Historical Data 11/01/2013    PFIZER BIVALENT SARS-COV-2 VACCINE (12+) 10/28/2022    PFIZER PURPLE CAP SARS-COV-2 VACCINATION (12+) 02/17/2021, 03/10/2021, 11/22/2021    Pneumococcal Conjugate Vaccine (Prevnar/PCV-13) 02/28/2017    Pneumococcal polysaccharide vaccine (PPSV-23) 11/01/2013, 02/08/2019    Tdap Vaccine 03/15/2017    Zoster Vaccine Live (ZVL) (Zostavax) - HISTORICAL DATA 03/15/2017    Zoster Vaccine Recombinant (RZV) (SHINGRIX) 02/08/2019, 06/24/2020       Assessment/Plan:    Pulmonary problem list:  #Chronic hypoxic respiratory failure, possible CTD-ILD (?Sjogrens) vs. Nitrofurantoin induced ILD   #Former smoker  #History of COVID 19 infection    Assessment: I personally reviewed her CT images.  She has diffuse fibrotic changes as well as traction bronchiectasis.  Differential includes progressive NSIP, CTD-ILD, chronic HP, drug toxicity, COVID-19 related lung changes.  Her EBEN titer is remarkably elevated, but she does not have other findings suggestive of lupus.    Recommendations/Plan:  -Wean oxygen as tolerated.  Target pulse oximetry 88 to 92%  -reminded to keep appointment with Rheumatology in Sept 2023  -reminded to avoid Nitrofurantoin use  -reminded to continue with vaccine vigilance - UTD  -nebulizer and Duonebs ordered; previous PFTs with suggestion of COPD  -new PFTs ordered for benchmarking  -pulm rehab referral placed    Return to clinic in 3 months; sooner if needed     Total consult time: 41 minutes which  included time spent on chart review, personally reviewing pertinent images and labs, time spent counseling and educating the patient and/or family members, and coordinating care with the healthcare team to include consultants.   __________  Levi Perez, DO  Pulmonary and Critical Care Medicine  [facility]    Please note that this dictation was created using voice recognition software. The accuracy of the dictation is limited to the abilities of the software. I have made every reasonable attempt to correct obvious errors, but I expect that there are errors of grammar and possibly content that I did not discover before finalizing the note.

## 2023-07-20 NOTE — PATIENT INSTRUCTIONS
Please schedule pulmonary rehab.   Please schedule your PFT (pulmonary function test, breathing test) and 6 minute walk test.   I ordered a nebulizer for you.   The medication for your nebulizer was sent to the pharmacy.

## 2023-08-03 ENCOUNTER — NON-PROVIDER VISIT (OUTPATIENT)
Dept: SLEEP MEDICINE | Facility: MEDICAL CENTER | Age: 73
End: 2023-08-03
Attending: INTERNAL MEDICINE
Payer: MEDICARE

## 2023-08-03 VITALS — BODY MASS INDEX: 24.33 KG/M2 | HEIGHT: 67 IN | WEIGHT: 155 LBS

## 2023-08-03 VITALS — WEIGHT: 155 LBS | BODY MASS INDEX: 24.33 KG/M2 | HEIGHT: 67 IN

## 2023-08-03 DIAGNOSIS — R06.02 SOB (SHORTNESS OF BREATH): ICD-10-CM

## 2023-08-03 DIAGNOSIS — J84.10 LUNG FIBROSIS (HCC): ICD-10-CM

## 2023-08-03 PROCEDURE — 94060 EVALUATION OF WHEEZING: CPT | Mod: 26 | Performed by: INTERNAL MEDICINE

## 2023-08-03 PROCEDURE — 94726 PLETHYSMOGRAPHY LUNG VOLUMES: CPT | Performed by: INTERNAL MEDICINE

## 2023-08-03 PROCEDURE — 94618 PULMONARY STRESS TESTING: CPT | Mod: 26 | Performed by: INTERNAL MEDICINE

## 2023-08-03 PROCEDURE — 94726 PLETHYSMOGRAPHY LUNG VOLUMES: CPT | Mod: 26 | Performed by: INTERNAL MEDICINE

## 2023-08-03 PROCEDURE — 94729 DIFFUSING CAPACITY: CPT | Performed by: INTERNAL MEDICINE

## 2023-08-03 PROCEDURE — 94618 PULMONARY STRESS TESTING: CPT | Performed by: INTERNAL MEDICINE

## 2023-08-03 PROCEDURE — 94060 EVALUATION OF WHEEZING: CPT | Performed by: INTERNAL MEDICINE

## 2023-08-03 PROCEDURE — 94729 DIFFUSING CAPACITY: CPT | Mod: 26 | Performed by: INTERNAL MEDICINE

## 2023-08-03 ASSESSMENT — 6 MINUTE WALK TEST (6MWT)
HEART RATE AT 5 MIN: 99
COMMENTS: ON 2 LPM
PERCEIVED BREATHLESSNESS AT 2 MIN: 0.5
PERCEIVED FATIGUE AT 1 MIN: 0
HEART RATE AT 6 MIN: 101
SAO2 AT 2 MIN: 94
HEART RATE AT 4 MIN: 103
NUMBER OF RESTS: 0
PERCEIVED BREATHLESSNESS AT 4 MIN: 1
BLOOD PRESSURE AT 1 MIN: 100/72
SAO2 AT 2 MIN: 85
SAO2 AT 6 MIN: 89
PERCEIVED BREATHLESSNESS AT 3 MIN: 0
HEART RATE AT 1 MIN: 101
SAO2 AT 4 MIN: 87
HEART RATE AT 2 MIN: 103
SITTING BLOOD PRESSURE: 104/72
HEART RATE AT 2 MIN: 86
HEART RATE AT 1 MIN: 99
PERCEIVED BREATHLESSNESS AT 2 MIN: 0
PERCEIVED FATIGUE AT 5 MIN: 2
PERCEIVED BREATHLESSNESS AT 1 MIN: 0.5
SAO2 AT 1 MIN: 85
PERCEIVED FATIGUE AT 3 MIN: 1
SAO2 AT 5 MIN: 90
PERCEIVED FATIGUE AT 2 MIN: 0
COMMENTS: ON 2 LPM
PERCEIVED FATIGUE AT 6 MIN: 2
PERCEIVED BREATHLESSNESS AT 1 MIN: 0.5
BLOOD PRESSURE: LEFT ARM
COMMENTS: ON 3 LPM
SAO2 AT 1 MIN: 90
PERCEIVED FATIGUE AT 1 MIN: 0.5
O2 SAT PERCENT ROOM AIR: 90
PERCEIVED BREATHLESSNESS AT 5 MIN: 1
PERCEIVED BREATHLESSNESS AT 6 MIN: 1
PERCEIVED FATIGUE AT 4 MIN: 1
COMMENTS: ON 3 LPM
HEART RATE: 82
PERCENT OF NORMAL WALKED: 66
HEART RATE AT 3 MIN: 95
SAO2 AT 3 MIN: 92

## 2023-08-03 ASSESSMENT — PULMONARY FUNCTION TESTS
FEV1/FVC_PERCENT_PREDICTED: 105
FVC_PERCENT_PREDICTED: 83
FEV1/FVC: 81
FVC: 2.55
FVC_PERCENT_PREDICTED: 86
FVC_LLN: 2.54
FEV1/FVC: 80
FEV1/FVC_PERCENT_CHANGE: 33
FEV1: 2.11
FEV1/FVC: 79.92
FEV1_PERCENT_PREDICTED: 90
FEV1_PREDICTED: 2.33
FEV1/FVC_PERCENT_LLN: 65
FEV1: 2.07
FEV1/FVC_PERCENT_PREDICTED: 105
FEV1/FVC_PERCENT_PREDICTED: 77
FEV1_PERCENT_CHANGE: 1
FVC: 2.64
FEV1/FVC_PERCENT_PREDICTED: 103
FEV1_PERCENT_CHANGE: 3
FEV1/FVC: 81
FEV1/FVC_PERCENT_CHANGE: -1
FEV1_LLN: 1.94
FVC_PREDICTED: 3.04
FEV1/FVC_PERCENT_PREDICTED: 107
FEV1/FVC_PREDICTED: 77
FEV1_PERCENT_PREDICTED: 89

## 2023-08-03 ASSESSMENT — FIBROSIS 4 INDEX
FIB4 SCORE: 2.03
FIB4 SCORE: 2.03

## 2023-08-03 NOTE — PROCEDURES
Technician: JUMANA Dietz    Technician Comment:  Good patient effort & cooperation.  The results of this test meet the ATS/ERS standards for acceptability & reproducibility.  Test was performed on the [employer] Body Plethysmograph-Elite DX system.  Predicted values were GLI-2012 for spirometry, GLI-2017 for DLCO, ITS for Lung Volumes.  The DLCO was uncorrected for Hgb.  A bronchodilator of Albuterol HFA -2puffs via spacer administered.  DLCO performed during dilation period.    Interpretation:   There is no significant obstructive ventilatory defect on spirometry.  There is no significant response to bronchodilators.    There is a mild restrictive ventilatory defect shown on lung volumes.  T  here is a moderate diffusion impairment.  The reduced DLCO and DL/VA ratio are consistent with reported diagnosis of interstitial lung disease.    Flow-volume loop is consistent with the above interpretation.    Compared to prior testing in 2021, the total lung capacity has declined significantly from 6.36 L to 3.92 L, or 71% predicted.    IEdmundo M.D. am the author of this note (PFT interpretation).    __________  Edmundo Partida MD  Pulmonary and Critical Care Medicine  [facility]

## 2023-08-04 NOTE — PROCEDURES
Test began on RA; O2 was added in minute 3 at 2 LPM continuous. O2 was increased to 3 LPM in min 5. Pt walked 960' or 66%

## 2023-08-18 ENCOUNTER — OFFICE VISIT (OUTPATIENT)
Dept: SLEEP MEDICINE | Facility: MEDICAL CENTER | Age: 73
End: 2023-08-18
Attending: NURSE PRACTITIONER
Payer: MEDICARE

## 2023-08-18 VITALS
HEIGHT: 67 IN | BODY MASS INDEX: 23.83 KG/M2 | OXYGEN SATURATION: 90 % | WEIGHT: 151.8 LBS | DIASTOLIC BLOOD PRESSURE: 60 MMHG | SYSTOLIC BLOOD PRESSURE: 112 MMHG | HEART RATE: 82 BPM | RESPIRATION RATE: 16 BRPM

## 2023-08-18 DIAGNOSIS — R06.02 SOB (SHORTNESS OF BREATH): ICD-10-CM

## 2023-08-18 DIAGNOSIS — J84.10 LUNG FIBROSIS (HCC): ICD-10-CM

## 2023-08-18 DIAGNOSIS — J43.2 CENTRILOBULAR EMPHYSEMA (HCC): ICD-10-CM

## 2023-08-18 DIAGNOSIS — J96.11 CHRONIC RESPIRATORY FAILURE WITH HYPOXIA (HCC): ICD-10-CM

## 2023-08-18 DIAGNOSIS — J84.9 ILD (INTERSTITIAL LUNG DISEASE) (HCC): ICD-10-CM

## 2023-08-18 PROCEDURE — 99212 OFFICE O/P EST SF 10 MIN: CPT | Performed by: NURSE PRACTITIONER

## 2023-08-18 PROCEDURE — 99214 OFFICE O/P EST MOD 30 MIN: CPT | Performed by: NURSE PRACTITIONER

## 2023-08-18 PROCEDURE — 3078F DIAST BP <80 MM HG: CPT | Performed by: NURSE PRACTITIONER

## 2023-08-18 PROCEDURE — 3074F SYST BP LT 130 MM HG: CPT | Performed by: NURSE PRACTITIONER

## 2023-08-18 ASSESSMENT — FIBROSIS 4 INDEX: FIB4 SCORE: 2.03

## 2023-08-18 NOTE — PROGRESS NOTES
No chief complaint on file.      HPI:  Naomy Vargas is a 73 y.o. year old female here today for follow-up on Pittore failure with hypoxia, possible CTD-ILD versus Macrobid induced ILD, with history of COVID-19 infection.  Patient is a former smoker.  Last seen by Dr. Perez on 7/20/2023.  Also has a history of COPD with a 40.5-pack-year history quit 6 years ago, hypertension, DLD. Seen by PCP with CT chest 3/7/2023 which demonstrated interval development of diffuse interstitial septal thickening and some groundglass infiltrates with a peripheral predominance suggestive of a interstitial lung disease which has not yet been worked up or followed up on. Admitted to the ICU for sepsis secondary to presumed underlying pulmonary infection complicated by acute hypoxemic respiratory failure requiring high dose steroids and antibiotics.    She denies any known personal or family history of autoimmune disease. She denies any small joint issues, uveitis, burning with urination. Renal function is normal.  She does acknowledge that she tested positive at home for COVID about a year ago.  Her respiratory symptoms started about a month ago.  She was standing near a pill burning stove when she became symptomatic.  She denies any new medications or new environment around the time that she became sick.  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    Recommended the patient to see rheumatology in September 2023.  Patient was reminded to avoid Bactrim use.  An order was placed for DuoNebs and nebulizer.  Referral to pulmonary rehab was also placed.    6-minute walk test (8/3/2023):        PFT (8/3/2023):             March 2023 CT scan:    May 2023:      She has diffuse fibrotic changes as well as traction bronchiectasis.  Differential includes progressive NSIP, CTD-ILD, chronic HP, drug toxicity, COVID-19 related lung changes.  Her EBEN titer is remarkably elevated, but she does not  have other findings suggestive of lupus.    ROS: As per HPI and otherwise negative if not stated.    Past Medical History:   Diagnosis Date    Anxiety     Arthritis     wrists    Back pain     Blood transfusion without reported diagnosis     with ectopic preg x 2     Breath shortness     with exertion    Cataract     bilateral removal-Dr. Duke Talamantes    Cellulitis     right lower leg    Cellulitis of leg 11/01/2013    Followed by dermatology. Managed with fluocinonide and Bactroban on right leg Left leg she uses triamcinolone and Sarna pramocaine (anesthetic, antipruretic) on both.      Chickenpox     COPD (chronic obstructive pulmonary disease) ([facility]ly [facility])     Dental disorder     upper denture    Earache     Fatigue     Frequent urination     Hypertension 02/23/2018    on no meds at this time    Hypothyroidism     Influenza     Insomnia     Kidney disease     reports 1 kidney is smaller than the other    Mumps     OAB (overactive bladder) 02/11/2019    Pain 02/23/2018    right leg and back, 5/10    Peripheral vascular disease, unspecified (Formerly [facility]) 10/13/2021    Pneumonia     Rash     Ringing in ears     Shortness of breath     Swelling of lower extremity     Thyroid disease     Tonsillitis     Toothache     Venous stasis dermatitis     right leg       Past Surgical History:   Procedure Laterality Date    VEIN LIGATION RADIO FREQUENCY Right 2/26/2018    Procedure: VEIN LIGATION RADIO FREQUENCY- LONG SAPENOUS;  Surgeon: Jim Alas M.D.;  Location: SURGERY [facility];  Service: General    ABDOMINAL EXPLORATION      2 ectopic preg    BREAST BIOPSY      hx of benign left breast biopsy    EYE SURGERY      cataract x2    HYSTERECTOMY LAPAROSCOPY      HYSTERECTOMY, TOTAL ABDOMINAL  1977/1978    OOPHORECTOMY      PB MAMMARY DUCTOGRAM, SINGLE      IL REMV 2ND CATARACT,CORN-SCLER SECTN      TONSILLECTOMY         Family History   Problem Relation Age of Onset    Arthritis Mother         hip fracture    Diabetes Mother      Cancer Mother         skin    Arthritis Father         lung    Alcohol/Drug Father         etoh    Lung Disease Sister         smoker/copd    Hypertension Sister     Other Brother         hep c    Arthritis Maternal Grandmother         hip fracture    Diabetes Maternal Grandfather     No Known Problems Son     No Known Problems Son     No Known Problems Brother     No Known Problems Sister     No Known Problems Brother     Hyperlipidemia Neg Hx     Stroke Neg Hx        Allergies as of 08/18/2023 - Reviewed 07/20/2023   Allergen Reaction Noted    Colophony [pinus strobus] Itching 05/31/2018    Latex Rash 02/23/2018    Neosporin [neomycin-polymyxin b] Rash 02/23/2018    Phenylene Itching 05/31/2018    Soap Rash 02/23/2018    Tape Itching 02/23/2018    Tea tree oil Rash 02/23/2018        Vitals:  There were no vitals taken for this visit.    Current medications as of today   Current Outpatient Medications   Medication Sig Dispense Refill    ipratropium-albuterol (DUONEB) 0.5-2.5 (3) MG/3ML nebulizer solution Take 3 mL by nebulization every four hours as needed for Shortness of Breath (Wheezing). 120 Each 3    budesonide-formoterol (SYMBICORT) 80-4.5 MCG/ACT Aerosol Inhale 2 Puffs 2 times a day.      amLODIPine (NORVASC) 5 MG Tab Take 1 Tablet by mouth every day. 100 Tablet 2    terbinafine (LAMISIL) 1 % cream Apply to affected areas 1-2 times per day as needed for rash 42 g 1    rosuvastatin (CRESTOR) 10 MG Tab TAKE 1 TABLET BY MOUTH EVERY DAY IN THE EVENING 100 Tablet 1    levothyroxine (SYNTHROID) 100 MCG Tab TAKE 1 TABLET BY MOUTH EVERY DAY BEFORE BREAKFAST 100 Tablet 0    SYMBICORT 80-4.5 MCG/ACT Aerosol INHALE 2 PUFFS BY MOUTH TWICE A DAY 30.6 Each 2    albuterol 108 (90 Base) MCG/ACT Aero Soln inhalation aerosol Inhale 1-2 Puffs every 6 hours as needed for Shortness of Breath. 8.5 g 2    PROAIR  (90 Base) MCG/ACT Aero Soln inhalation aerosol Inhale 1 Puff every 6 hours as needed for Shortness of Breath.  8.5 g 1    lisinopril (PRINIVIL) 20 MG Tab TAKE 1 TABLET BY MOUTH EVERY  Tablet 3    Mirabegron ER (MYRBETRIQ) 50 MG TABLET SR 24 HR Myrbetriq 50 mg tablet,extended release   Take 1 tablet every day by oral route for 90 days.      Ascorbic Acid (VITAMIN C PO) Take  by mouth.      Doxylamine Succinate, Sleep, (SLEEP AID PO) Take  by mouth.      Dextromethorphan-guaiFENesin (MUCINEX DM PO) Take  by mouth.      triamcinolone acetonide (KENALOG) 0.1 % Cream Apply 1 Application topically 2 times a day as needed. 80 g 2    loperamide (IMODIUM) 2 MG Cap Take 1 Capsule by mouth 4 times a day as needed for Diarrhea. 30 Capsule 1    cyanocobalamin (VITAMIN B-12) 100 MCG Tab Take 100 mcg by mouth every day.      vitamin D (CHOLECALCIFEROL) 1000 Unit (25 mcg) Tab Take 1,000 Units by mouth every day.      Melatonin 1 MG Tab Take  by mouth.      diphenhydrAMINE (BENADRYL) 25 MG Tab Take 25 mg by mouth every 6 hours as needed for Sleep.      Acetaminophen 500 MG Cap Take  by mouth.      Oxymetazoline HCl (NASAL SPRAY NA) Spray  in nose.       No current facility-administered medications for this visit.         Physical Exam:   Gen:           Alert and oriented, No apparent distress. Mood and affect appropriate, normal interaction with examiner.  Eyes:          PERRL, EOM intact, sclere white, conjunctive moist.  Ears:          Not examined.   Hearing:     Grossly intact.  Nose:          Normal, no lesions or deformities.  Dentition:    Good dentition.  Oropharynx:   Tongue normal, posterior pharynx without erythema or exudate  Neck:        Supple, trachea midline, no masses.  Respiratory Effort: No intercostal retractions or use of accessory muscles.   Lung Auscultation:      Clear to auscultation bilaterally; no rales, rhonchi or wheezing.  CV:            Regular rate and rhythm. No murmurs, rubs or gallops.  Abd:           Not examined.   Lymphadenopathy: Not examined.  Gait and Station: Normal.  Digits and Nails: No  clubbing, cyanosis, petechiae, or nodes.   Cranial Nerves: II-XII grossly intact.  Skin:        No rashes, lesions or ulcers noted.               Ext:           No cyanosis or edema.      Assessment:  1. Lung fibrosis (HCC)        2. SOB (shortness of breath)        3. Centrilobular emphysema (HCC)        4. ILD (interstitial lung disease) (HCC)        5. Chronic respiratory failure with hypoxia (HCC)            Plan:  As fibrosis of the lung secondary to ILD.  Breathing is stable.  Reviewed PFTs and 6-minute walk test.  At this time not needing supplemental oxygen.  Discussed antifibrotic's with patient, however patient is not amendable due to potential side effects.  Patient will follow-up in 6 months for PFT and 6-minute walk test.  Can be seen sooner if needed.  Advised to continue current regimen of albuterol or DuoNebs as needed.  Not complaining of cough or chest congestion at this time.  Has pending follow-up with rheumatology for potential CTD-ILD.  We will follow-up in 6 months, but can be seen sooner if needed.    Please note that this dictation was created using voice recognition software. I have made every reasonable attempt to correct obvious errors, but it is possible there are errors of grammar and possibly content that I did not discover before finalizing the note.

## 2023-09-20 ENCOUNTER — OFFICE VISIT (OUTPATIENT)
Dept: RHEUMATOLOGY | Facility: MEDICAL CENTER | Age: 73
End: 2023-09-20
Attending: STUDENT IN AN ORGANIZED HEALTH CARE EDUCATION/TRAINING PROGRAM
Payer: MEDICARE

## 2023-09-20 VITALS
OXYGEN SATURATION: 96 % | TEMPERATURE: 96.7 F | SYSTOLIC BLOOD PRESSURE: 118 MMHG | HEIGHT: 68 IN | WEIGHT: 151 LBS | HEART RATE: 83 BPM | DIASTOLIC BLOOD PRESSURE: 74 MMHG | BODY MASS INDEX: 22.88 KG/M2

## 2023-09-20 DIAGNOSIS — J84.9 INTERSTITIAL LUNG DISEASE (HCC): ICD-10-CM

## 2023-09-20 DIAGNOSIS — J96.11 CHRONIC HYPOXEMIC RESPIRATORY FAILURE (HCC): ICD-10-CM

## 2023-09-20 DIAGNOSIS — R76.8 SEROLOGIC AUTOIMMUNITY: ICD-10-CM

## 2023-09-20 PROCEDURE — 99212 OFFICE O/P EST SF 10 MIN: CPT | Performed by: STUDENT IN AN ORGANIZED HEALTH CARE EDUCATION/TRAINING PROGRAM

## 2023-09-20 PROCEDURE — 99204 OFFICE O/P NEW MOD 45 MIN: CPT | Performed by: STUDENT IN AN ORGANIZED HEALTH CARE EDUCATION/TRAINING PROGRAM

## 2023-09-20 PROCEDURE — 3078F DIAST BP <80 MM HG: CPT | Performed by: STUDENT IN AN ORGANIZED HEALTH CARE EDUCATION/TRAINING PROGRAM

## 2023-09-20 PROCEDURE — 3074F SYST BP LT 130 MM HG: CPT | Performed by: STUDENT IN AN ORGANIZED HEALTH CARE EDUCATION/TRAINING PROGRAM

## 2023-09-20 ASSESSMENT — FIBROSIS 4 INDEX: FIB4 SCORE: 2.03

## 2023-09-20 NOTE — PATIENT INSTRUCTIONS
AFTER VISIT INSTRUCTIONS    Below are important information to help you navigate your healthcare needs and help us serve you safely and effectively:  If laboratory tests and/or imaging studies were ordered, remember to go get them done as instructed.  If new prescriptions and/or refills were sent, remember to go pick them up from your local pharmacy, or call the specialty pharmacy to request shipment.  Always take your prescription medications exactly as prescribed unless instructed otherwise.  Note that antirheumatic drugs and steroids are immunosuppressive which means they increase your risk of infections and have multiple potential adverse effects on various organ systems in your body, though most of them are uncommon.  It is important that you are up-to-date on age-appropriate immunizations, particularly shingles and bacterial/viral pneumonia vaccines, which you can request from me or your primary care provider.  Be sure to read the drug package inserts to learn about the potential side effects of your medications before you start taking them.  If you experience any significant drug side effects, stop taking the medication and notify me promptly, and depending on the severity of the side effects, consider going to an urgent care or emergency department for immediate attention.  If there are significant findings on your lab tests and imaging studies that warrant further action, I will notify you with explanations via [employer]io[employer] or phone call, otherwise you can view them on [employer] and let me know if you have any questions.  Note that [employer] messages are typically read during office hours and may take 1-7 business days before a response depending on the urgency of the situation and how busy my clinic schedule is.  In general, [employer] messaging is for non-urgent matters that do not require immediate attention, so for urgent matters that cannot wait, you are advised to go to an urgent care.  Lastly, you are granted  [employer] access to my documentation of your visit and are encouraged to read my note which details my assessment and plan for your condition.  To learn more about your condition and rheumatic diseases evaluated and treated by rheumatologists, as well as gain access to many helpful resources about these diseases, visit our website at [email or web address].

## 2023-09-20 NOTE — PROGRESS NOTES
[facility] RHEUMATOLOGY  [address], [address], [place] [address]  Phone: [phone] ? Fax: [phone]  Website: [facility]he[facility]ology    RHEUMATOLOGY NEW PATIENT VISIT NOTE      DATE OF SERVICE: 09/20/2023         Subjective     REFERRING PRACTITIONER:  Prema Patel P.A.-C.  [address] [place] [address]    PATIENT IDENTIFICATION:  Naomy Vargas  [address] [place] [address]    YOB: 1950    MEDICAL RECORD NUMBER: [id number]         CHIEF COMPLAINT:   Chief Complaint   Patient presents with    New Patient     Positive EBEN (antinuclear antibody)       HISTORY OF PRESENT ILLNESS:  Naomy Vargas is a 73 y.o. female with pertinent history notable for ILD with hypoxemic respiratory failure requiring home oxygen PRN for activity (diagnosed in 3/2023 during hospitalization for unexplained hypoxia s/p high-dose IV and PO steroids), COPD, abdominal aortic aneurysm, venous stasis dermatitis of legs, osteoarthritis of multiple joints (hands and feet), DJD/DD of lumbosacral spine with scoliosis, osteopenia of femur, and multiple comorbidities. Presents for rheumatologic evaluation in the setting of positive EBEN with concern for underlying connective tissue disease. Reports mild intermittent pain in her knees, feet, and lower back, but otherwise doing quite well with her respiratory function only requiring supplemental oxygen during more than usual physical activity. Reports other aspects of her symptoms and medical history as noted on the questionnaire below or scanned under media tab.      Pertinent laboratory results: Positive EBEN 1:640 homogenous pattern and anti-dsDNA 61 with negative titer at 1:10, and elevated ESR 28 with normal CRP 0.3 (in 3/2023), low <186 (in 6/2023<3/2023); negative/normal HCV (in 7/2020), TSH (in 6/2022), RF, anti-MPO, anti-PR3, Hypersensitivity Pneumonitis Panel, and Respiratory Viral Panel (in 3/2023), vitamin D,  MACR, eGFR and creatinine (in 6/2023).    HRCT of chest in 5/2023: Interstitial opacifications, thickening of interlobular septa, and scattered areas of honeycombing all of which are most prominent in the lung periphery, extensive bronchiectasis, and multiple enlarged mediastinal and hilar lymph nodes; findings are consistent with interstitial lung disease such as UIP.    2D ECHO in 3/2023: Enlarged right atrium, mild tricuspid regurgitation, mildly dilated right ventricle with mildly elevated estimated systolic pressure of 35 mmHg, and estimated LVEF of 55%.    REVIEW OF SYSTEMS:  Except as noted in the history above, relevant review of systems with emphasis on autoimmune rheumatic diseases was otherwise negative.      ACTIVE PROBLEM LIST:  Patient Active Problem List    Diagnosis Date Noted    Serologic autoimmunity (positive EBEN with anti-dsDNA) 09/20/2023    Chronic hypoxemic respiratory failure (HCC) 09/20/2023    Pulmonary hypertension (HCC) 03/25/2023    Nonrheumatic tricuspid valve regurgitation 03/25/2023    Abnormal CT of the chest 03/24/2023    Leukocytosis 03/24/2023    Hypertension 03/24/2023    Acute hypoxemic respiratory failure (HCC) 03/23/2023    Interstitial lung disease (HCC) 03/23/2023    Elevated troponin 03/23/2023    Sepsis (HCC) 03/23/2023    Pleural effusion 03/23/2023    Disorder of arteries and arterioles (HCC) 04/12/2022    Overweight with body mass index (BMI) of 25 to 25.9 in adult 10/13/2021    Aortic atherosclerosis (HCC) 10/13/2021    B12 deficiency 07/22/2020    Thrombocytopenia, unspecified (HCC) 06/24/2020    OAB (overactive bladder) 02/11/2019    Abdominal aortic aneurysm (AAA) 3.0 cm to 5.0 cm in diameter in female (HCC) 11/16/2018    Bilateral iliac artery stenosis (HCC) 11/16/2018    Essential hypertension 08/06/2018    Chronic obstructive pulmonary disease (HCC) 04/17/2018    Pulmonary nodule, RUL CT , 4/18 04/17/2018    Osteopenia 04/28/2017    Chronic right-sided low back  pain without sciatica 06/06/2016    Venous stasis dermatitis of both lower extremities 07/15/2015    Hypothyroidism 07/14/2015    CKD (chronic kidney disease) stage 3, GFR 30-59 ml/min ([facility]) 11/02/2013    Hyperglycemia 11/02/2013       PAST MEDICAL HISTORY:  Past Medical History:   Diagnosis Date    Anxiety     Arthritis     wrists    Back pain     Blood transfusion without reported diagnosis     with ectopic preg x 2     Breath shortness     with exertion    Cataract     bilateral removal-Dr. Duke Talamantes    Cellulitis     right lower leg    Cellulitis of leg 11/01/2013    Followed by dermatology. Managed with fluocinonide and Bactroban on right leg Left leg she uses triamcinolone and Sarna pramocaine (anesthetic, antipruretic) on both.      Chickenpox     COPD (chronic obstructive pulmonary disease) ([facility])     Dental disorder     upper denture    Earache     Fatigue     Frequent urination     Hypertension 02/23/2018    on no meds at this time    Hypothyroidism     Influenza     Insomnia     Kidney disease     reports 1 kidney is smaller than the other    Mumps     OAB (overactive bladder) 02/11/2019    Pain 02/23/2018    right leg and back, 5/10    Peripheral vascular disease, unspecified ([facility]) 10/13/2021    Pneumonia     Rash     Ringing in ears     Shortness of breath     Swelling of lower extremity     Thyroid disease     Tonsillitis     Toothache     Venous stasis dermatitis     right leg       PAST SURGICAL HISTORY:  Past Surgical History:   Procedure Laterality Date    VEIN LIGATION RADIO FREQUENCY Right 2/26/2018    Procedure: VEIN LIGATION RADIO FREQUENCY- LONG SAPENOUS;  Surgeon: Jim Alas M.D.;  Location: SURGERY [facility];  Service: General    ABDOMINAL EXPLORATION      2 ectopic preg    BREAST BIOPSY      hx of benign left breast biopsy    EYE SURGERY      cataract x2    HYSTERECTOMY LAPAROSCOPY      HYSTERECTOMY, TOTAL ABDOMINAL  1977/1978    OOPHORECTOMY      PB MAMMARY DUCTOGRAM, SINGLE       IN REMV 2ND CATARACT,CORN-SCLER SECTN      TONSILLECTOMY         MEDICATIONS:  Current Outpatient Medications   Medication Sig    ipratropium-albuterol (DUONEB) 0.5-2.5 (3) MG/3ML nebulizer solution Take 3 mL by nebulization every four hours as needed for Shortness of Breath (Wheezing).    amLODIPine (NORVASC) 5 MG Tab Take 1 Tablet by mouth every day.    terbinafine (LAMISIL) 1 % cream Apply to affected areas 1-2 times per day as needed for rash    rosuvastatin (CRESTOR) 10 MG Tab TAKE 1 TABLET BY MOUTH EVERY DAY IN THE EVENING    levothyroxine (SYNTHROID) 100 MCG Tab TAKE 1 TABLET BY MOUTH EVERY DAY BEFORE BREAKFAST    SYMBICORT 80-4.5 MCG/ACT Aerosol INHALE 2 PUFFS BY MOUTH TWICE A DAY    albuterol 108 (90 Base) MCG/ACT Aero Soln inhalation aerosol Inhale 1-2 Puffs every 6 hours as needed for Shortness of Breath.    PROAIR  (90 Base) MCG/ACT Aero Soln inhalation aerosol Inhale 1 Puff every 6 hours as needed for Shortness of Breath.    lisinopril (PRINIVIL) 20 MG Tab TAKE 1 TABLET BY MOUTH EVERY DAY    Mirabegron ER (MYRBETRIQ) 50 MG TABLET SR 24 HR Myrbetriq 50 mg tablet,extended release   Take 1 tablet every day by oral route for 90 days.    Ascorbic Acid (VITAMIN C PO) Take  by mouth.    Doxylamine Succinate, Sleep, (SLEEP AID PO) Take  by mouth.    Dextromethorphan-guaiFENesin (MUCINEX DM PO) Take  by mouth.    triamcinolone acetonide (KENALOG) 0.1 % Cream Apply 1 Application topically 2 times a day as needed.    loperamide (IMODIUM) 2 MG Cap Take 1 Capsule by mouth 4 times a day as needed for Diarrhea.    cyanocobalamin (VITAMIN B-12) 100 MCG Tab Take 100 mcg by mouth every day.    vitamin D (CHOLECALCIFEROL) 1000 Unit (25 mcg) Tab Take 1,000 Units by mouth every day.    Melatonin 1 MG Tab Take  by mouth.    diphenhydrAMINE (BENADRYL) 25 MG Tab Take 25 mg by mouth every 6 hours as needed for Sleep.    Acetaminophen 500 MG Cap Take  by mouth.    Oxymetazoline HCl (NASAL SPRAY NA) Spray  in nose.        ALLERGIES:   Allergies   Allergen Reactions    Colophony [Pinus Strobus] Itching    Latex Rash and Unspecified     .    Neosporin [Neomycin-Polymyxin B] Rash     .    Phenylene Itching    Soap Rash     Certain soaps cause rash    Tape Itching     PAPER TAPE OK    Tea Tree Oil Rash     .       IMMUNIZATIONS:  Immunization History   Administered Date(s) Administered    INFLUENZA TIV (IM) 2013    Influenza Seasonal Injectable - Historical Data 2013    Influenza Vaccine Adult HD 2017, 10/12/2017, 10/08/2018, 2019, 2020, 2021, 10/28/2022    Influenza Vaccine H1N1 - HISTORICAL DATA 2010    Influenza Vaccine Pediatric Split - Historical Data 2013    PFIZER BIVALENT SARS-COV-2 VACCINE (12+) 10/28/2022    PFIZER PURPLE CAP SARS-COV-2 VACCINATION (12+) 2021, 03/10/2021, 2021    Pneumococcal Conjugate Vaccine (Prevnar/PCV-13) 2017    Pneumococcal polysaccharide vaccine (PPSV-23) 2013, 2019    Tdap Vaccine 03/15/2017    Zoster Vaccine Live (ZVL) (Zostavax) - HISTORICAL DATA 03/15/2017    Zoster Vaccine Recombinant (RZV) (SHINGRIX) 2019, 2020       SOCIAL HISTORY:   Social History     Socioeconomic History    Marital status:     Number of children: 2   Occupational History    Occupation: retired   Tobacco Use    Smoking status: Former     Current packs/day: 0.00     Average packs/day: 0.5 packs/day for 53.0 years (26.5 ttl pk-yrs)     Types: Cigarettes     Start date: 1964     Quit date: 2017     Years since quittin.1    Smokeless tobacco: Never   Vaping Use    Vaping Use: Never used   Substance and Sexual Activity    Alcohol use: Yes     Comment: 2 per day    Drug use: No    Sexual activity: Not Currently     Partners: Male       FAMILY HISTORY:  Family History   Problem Relation Age of Onset    Arthritis Mother         hip fracture    Diabetes Mother     Cancer Mother         skin    Arthritis Father          "lung    Alcohol/Drug Father         etoh    Lung Disease Sister         smoker/copd    Hypertension Sister     Other Brother         hep c    Arthritis Maternal Grandmother         hip fracture    Diabetes Maternal Grandfather     No Known Problems Son     No Known Problems Son     No Known Problems Brother     No Known Problems Sister     No Known Problems Brother     Hyperlipidemia Neg Hx     Stroke Neg Hx             Objective     Vital Signs: /74 (BP Location: Left arm, Patient Position: Sitting, BP Cuff Size: Adult)   Pulse 83   Temp 35.9 °C (96.7 °F) (Temporal)   Ht 1.715 m (5' 7.5\")   Wt 68.5 kg (151 lb)   SpO2 96% Body mass index is 23.3 kg/m².    General: Appears well and comfortable  Eyes: No scleral or conjunctival lesions  ENT: No apparent oral or nasal lesions  Head/Neck: No apparent scalp or neck lesions  Cardiovascular: Regular rate and rhythm; no pericardial rubs  Respiratory: Coarse breath sounds on mid/lower lung fields but no overt rales; breathing quiet and unlabored  Gastrointestinal: No apparent organomegaly or abdominal masses  Integumentary: No significant cutaneous lesions or discolorations  Musculoskeletal: No significant joint tenderness, periarticular soft tissue swelling, warmth, erythema, or overt synovitis; no significant restriction in range of motion of joints examined  Neurologic: No focal sensory or motor deficits  Psychiatric: Mood and affect appropriate      LABORATORY RESULTS REVIEWED AND INTERPRETED BY ME:  Lab Results   Component Value Date/Time    SEDRATEWES 28 (H) 03/24/2023 05:34 AM    CREACTPROT <0.30 03/29/2023 03:10 AM     Lab Results   Component Value Date/Time    RHEUMFACTN 11 03/23/2023 10:00 PM     Lab Results   Component Value Date/Time    ANTINUCAB Detected (A) 03/23/2023 10:00 PM     Lab Results   Component Value Date/Time    ANTIDNADS SEE BELOW 03/23/2023 10:00 PM    SMITHAB 1 03/23/2023 10:00 PM    RNPAB 5 03/23/2023 10:00 PM    QAPMCRG44 1 " 03/23/2023 10:00 PM    SSA60 6 03/23/2023 10:00 PM    SSA52 22 03/23/2023 10:00 PM    ANTISSBSJ 1 03/23/2023 10:00 PM    JO1AB 2 03/23/2023 10:00 PM     Lab Results   Component Value Date/Time    ANCAIGG <1:20 07/13/2015 07:45 AM     Lab Results   Component Value Date/Time     07/20/2017 12:35 PM     07/20/2017 12:35 PM     Lab Results   Component Value Date/Time    TSHULTRASEN 2.770 06/09/2022 09:41 AM    FREET4 0.97 12/04/2017 12:22 PM     Lab Results   Component Value Date/Time    25HYDROXY 58 06/13/2023 11:37 AM    PTHINTACT 42.9 06/09/2022 09:41 AM     Lab Results   Component Value Date/Time    FOLATE 19.7 07/15/2020 02:01 PM    PROTHROMBTM 14.6 11/02/2013 02:44 AM    INR 1.12 11/02/2013 02:44 AM     Lab Results   Component Value Date/Time    WBC 7.8 06/13/2023 11:37 AM    RBC 4.08 (L) 06/13/2023 11:37 AM    HEMOGLOBIN 13.2 06/13/2023 11:37 AM    HEMATOCRIT 41.4 06/13/2023 11:37 AM    .5 (H) 06/13/2023 11:37 AM    MCH 32.4 06/13/2023 11:37 AM    MCHC 31.9 (L) 06/13/2023 11:37 AM    RDW 62.1 (H) 06/13/2023 11:37 AM    PLATELETCT 134 (L) 06/13/2023 11:37 AM    MPV 11.6 06/13/2023 11:37 AM    NEUTS 9.96 (H) 03/30/2023 01:03 AM    LYMPHOCYTES 8.40 (L) 03/30/2023 01:03 AM    MONOCYTES 5.50 03/30/2023 01:03 AM    EOSINOPHILS 0.40 03/30/2023 01:03 AM    BASOPHILS 0.10 03/30/2023 01:03 AM    ANISOCYTOSIS 2+ 07/09/2020 09:37 AM     Lab Results   Component Value Date/Time    ASTSGOT 22 03/30/2023 01:03 AM    ALTSGPT 35 03/30/2023 01:03 AM    ALKPHOSPHAT 100 (H) 03/30/2023 01:03 AM    TBILIRUBIN 0.6 03/30/2023 01:03 AM    TOTPROTEIN 5.8 (L) 03/30/2023 01:03 AM    TOTPROTEIN 7.00 07/20/2017 12:35 PM    ALBUMIN 3.1 (L) 03/30/2023 01:03 AM    ALBUMIN 4.21 07/20/2017 12:35 PM     Lab Results   Component Value Date/Time    SODIUM 143 06/13/2023 11:37 AM    POTASSIUM 4.5 06/13/2023 11:37 AM    CHLORIDE 105 06/13/2023 11:37 AM    CO2 23 06/13/2023 11:37 AM    GLUCOSE 116 (H) 06/13/2023 11:37 AM    BUN 27 (H)  06/13/2023 11:37 AM    CREATININE 0.98 06/13/2023 11:37 AM    CALCIUM 9.3 06/13/2023 11:37 AM    MAGNESIUM 2.1 03/30/2023 01:03 AM     Lab Results   Component Value Date/Time    TOTALVOLUME random 07/20/2017 12:35 PM    TOTPROTUR See Note 07/20/2017 12:35 PM    MICROALBUR <1.2 06/13/2023 11:37 AM    MALBCRT see below 06/13/2023 11:37 AM     Lab Results   Component Value Date/Time    COLORURINE Yellow 06/13/2023 11:37 AM    SPECGRAVITY 1.015 06/13/2023 11:37 AM    PHURINE 6.5 06/13/2023 11:37 AM    GLUCOSEUR Negative 06/13/2023 11:37 AM    KETONES Negative 06/13/2023 11:37 AM    PROTEINURIN Negative 06/13/2023 11:37 AM     Lab Results   Component Value Date/Time    HEPCAB Non-Reactive 07/09/2020 09:37 AM     Lab Results   Component Value Date/Time    CHOLSTRLTOT 112 06/09/2022 09:41 AM    LDL 42 06/09/2022 09:41 AM    HDL 57 06/09/2022 09:41 AM    TRIGLYCERIDE 64 06/09/2022 09:41 AM    HBA1C 5.0 03/25/2023 01:01 AM       RADIOLOGY RESULTS REVIEWED AND INTERPRETED BY ME:  Results for orders placed during the hospital encounter of 12/31/19    DX-FOOT-COMPLETE 3+ RIGHT    Impression  1.  Negative for right foot fracture    2.  Bunion    3.  Mild midfoot osteoarthritis    Results for orders placed during the hospital encounter of 12/31/19    DX-KNEE COMPLETE 4+ RIGHT    Impression  Negative right knee series    Results for orders placed in visit on 09/20/20    DX-WRIST-COMPLETE 3+ RIGHT    Impression  1.  Nondisplaced distal radial metaphysis fracture.  2.  Suggestion of irregularity of the lunate which is new from prior. This is age indeterminate. Follow-up is suggested.    Results for orders placed during the hospital encounter of 12/22/20    DS-BONE DENSITY STUDY (DEXA)    Impression  According to the World Health Organization classification, bone mineral density of this patient is osteopenia with increased fracture risk left femur, normal density lumbar spine.    10-year Probability of Fracture:  Major Osteoporotic      41.3%  Hip     22.8%  Population      [place] ()    Based on left femur neck BMD    Results for orders placed during the hospital encounter of 05/16/23    CT-CHEST, HIGH RESOLUTION LUNG    Impression  1.  Interstitial opacifications, thickening of interlobular septa, and scattered areas of honeycombing are identified all of which are most prominent in the lung periphery. Findings are consistent with interstitial lung disease such as UIP.    2.  Bronchiectasis again noted.    3.  No change in mediastinal and hilar adenopathy.    4.  No change in hiatal hernia    Fleischner Society pulmonary nodule recommendations:  Not Applicable    Results for orders placed during the hospital encounter of 03/07/23    CT-CHEST (THORAX) W/O    Impression  1.  Interval appearance of diffuse ill-defined pulmonary infiltrates, groundglass opacities and interstitial septal thickening present throughout the lungs bilaterally with a peripheral predominance. There is also extensive bronchiectasis. These changes  are new from comparison and obscure the previously seen right upper lobe pulmonary nodule. Differential diagnosis includes sequelae of Covid pneumonia, interstitial lung disease, postinflammatory process as well as cryptogenic organizing pneumonia and  atypical infection.    2.  Multiple new enlarged mediastinal and hilar lymph nodes.    3.  Extensive atherosclerotic change of aorta and coronary arteries.    4.  Splenomegaly.    5.  Atrophic right kidney.    Fleischner Society pulmonary nodule recommendations:  Not Applicable    Results for orders placed during the hospital encounter of 08/18/20    US-RENAL    Impression  1. Mild right hydronephrosis. The right kidney is smaller relative to the left.  2. No renal stones are detected.      All relevant laboratory and imaging results reported on this note were reviewed and interpreted by me.         Assessment & Plan     Naomy Vargas is a 73 y.o. female with history as  noted above whose presentation merits the following diagnostic and clinical status impressions and recommendations:    1. Interstitial lung disease (HCC)  UIP pattern of unclear etiology as extensive diagnostic investigation so far have been unrevealing. There is presently no historical or physical evidence to suggest the presence of an underlying autoimmune connective tissue disease. However, in the setting of her serologic autoimmunity described below, the possibility of an evolving subclinical process cannot be excluded at this time. Reasonable to undertake the additional investigations noted below for risk stratification purposes.  - EXTENDED MYOSITIS PANEL; Future  - Supplemental home oxygen PRN PER pulmonology  - Routine follow-up with pulmonology    2. Chronic hypoxemic respiratory failure (HCC)  Presumably secondary to her ILD as described above for which additional investigations are being undertaken based on which additional recommendations may be provided.  - EXTENDED MYOSITIS PANEL; Future  - Supplemental home oxygen PRN PER pulmonology  - Routine follow-up with pulmonology    3. Serologic autoimmunity (positive EBEN with anti-dsDNA)  Serologic evidence of immunologic activity with no objective clinical evidence of an underlying EBEN-associated autoimmune disease, such as systemic or drug-induced lupus, inflammatory myopathy, or systemic sclerosis. Notably, the EBEN test is highly sensitive and lacks diagnostic specificity as it can be seen in a variety of non-rheumatic conditions, such as acute or chronic viral or bacterial infections (presumably via molecular mimicry), autoimmune thyroid disease (Hashimoto's thyroiditis or Graves' disease), autoimmune hepatobiliary disease, inflammatory bowel disease, and lymphoproliferative disease or malignancy, as well as in over 10% of healthy individuals with no clinical evidence of autoimmune rheumatic disease. In any case, it must be interpreted in the context of  the overall clinical picture with close attention to disease-specific manifestations and disease-specific antibody subtypes. That said, the presence of persistently positive EBEN, especially in high or rising titers, does confer some risk of EBEN-associated disease, so if suspicious symptoms develop, clinical follow-up for re-evaluation would be reasonable. In the setting of her ILD with hypoxemic respiratory failure, reasonable to undertake the additional workup noted below for exclusionary and risk stratification purposes.  - CHROMATIN AB,IGG; Future  - ANTI-HISTONE ABS; Future  - CCP ANTIBODIES, IGG/IGA; Future  - CARBAMYLATED PROTEIN AB, IGG; Future      The above assessment and plan were discussed with the patient who acknowledged understanding of the plan.    FOLLOW-UP: Return in about 3 months (around 12/20/2023) for Short.         Thank you for the opportunity to participate in the care of Naomy Vargas.    Robert Pryor MD, MS  Rheumatologist, [facility] ? [facility]   of Clinical Medicine, Department of Internal Medicine  [facility] ? [facility]

## 2023-09-25 ENCOUNTER — HOSPITAL ENCOUNTER (OUTPATIENT)
Dept: LAB | Facility: MEDICAL CENTER | Age: 73
End: 2023-09-25
Attending: STUDENT IN AN ORGANIZED HEALTH CARE EDUCATION/TRAINING PROGRAM
Payer: MEDICARE

## 2023-09-25 DIAGNOSIS — J84.9 INTERSTITIAL LUNG DISEASE (HCC): ICD-10-CM

## 2023-09-25 DIAGNOSIS — J96.11 CHRONIC HYPOXEMIC RESPIRATORY FAILURE (HCC): ICD-10-CM

## 2023-09-25 DIAGNOSIS — R76.8 SEROLOGIC AUTOIMMUNITY: ICD-10-CM

## 2023-09-25 PROCEDURE — 83516 IMMUNOASSAY NONANTIBODY: CPT | Mod: 91

## 2023-09-25 PROCEDURE — 36415 COLL VENOUS BLD VENIPUNCTURE: CPT

## 2023-09-25 PROCEDURE — 86200 CCP ANTIBODY: CPT

## 2023-09-25 PROCEDURE — 84182 PROTEIN WESTERN BLOT TEST: CPT

## 2023-09-25 PROCEDURE — 86235 NUCLEAR ANTIGEN ANTIBODY: CPT | Mod: 91

## 2023-09-27 LAB
CCP IGA+IGG SERPL IA-ACNC: 8 UNITS (ref 0–19)
CHROMATIN IGG SERPL-ACNC: 69 UNITS (ref 0–19)

## 2023-09-28 LAB
CARBAMYLATED PROTEIN ANTIBODY, IGG Q6043: 9 UNITS (ref 0–19)
HISTONE IGG SER IA-ACNC: 0.7 UNITS (ref 0–0.9)

## 2023-10-02 DIAGNOSIS — E03.1 CONGENITAL HYPOTHYROIDISM WITHOUT GOITER: ICD-10-CM

## 2023-10-02 RX ORDER — LISINOPRIL 20 MG/1
TABLET ORAL
Qty: 100 TABLET | Refills: 3 | Status: SHIPPED | OUTPATIENT
Start: 2023-10-02

## 2023-10-03 RX ORDER — LEVOTHYROXINE SODIUM 0.1 MG/1
TABLET ORAL
Qty: 100 TABLET | Refills: 0 | Status: SHIPPED | OUTPATIENT
Start: 2023-10-03 | End: 2024-01-09

## 2023-10-04 LAB
ANNOTATION COMMENT IMP: NORMAL
EJ AB SER QL: NEGATIVE
ENA JO1 AB TITR SER: 0 AU/ML (ref 0–40)
FIBRILLARIN AB SER QL: NEGATIVE
KU AB SER QL: NEGATIVE
MDA5 AB SER QL LINE BLOT: NEGATIVE
MI2 AB SER QL: NEGATIVE
MJ AB SER QL LINE BLOT: NEGATIVE
OJ AB SER QL: NEGATIVE
P155 140  ANTIBODY Q6011: NEGATIVE
PL12 AB SER QL: NEGATIVE
PL7 AB SER QL: NEGATIVE
PM/SCL-100 AB SER QL LINE BLOT: NEGATIVE
SAE1 AB SER QL LINE BLOT: NEGATIVE
SRP AB SERPL QL: NEGATIVE
SSA52 R0ENA AB IGG Q0420: 24 AU/ML (ref 0–40)
SSA60 R0ENA AB IGG Q0419: 4 AU/ML (ref 0–40)
TIF1-GAMMA AB SER QL LINE BLOT: NEGATIVE
U1 SNRNP IGG SER QL: 2 UNITS (ref 0–19)

## 2023-10-11 DIAGNOSIS — I87.2 VENOUS STASIS DERMATITIS OF BOTH LOWER EXTREMITIES: ICD-10-CM

## 2023-10-16 RX ORDER — TRIAMCINOLONE ACETONIDE 1 MG/G
1 CREAM TOPICAL 2 TIMES DAILY PRN
Qty: 80 G | Refills: 2 | Status: SHIPPED | OUTPATIENT
Start: 2023-10-16

## 2023-10-25 ENCOUNTER — HOSPITAL ENCOUNTER (OUTPATIENT)
Dept: PULMONOLOGY | Facility: MEDICAL CENTER | Age: 73
End: 2023-10-25
Attending: INTERNAL MEDICINE
Payer: MEDICARE

## 2023-10-25 PROCEDURE — 94625 PHY/QHP OP PULM RHB W/O MNTR: CPT

## 2023-10-31 ENCOUNTER — HOSPITAL ENCOUNTER (OUTPATIENT)
Dept: PULMONOLOGY | Facility: MEDICAL CENTER | Age: 73
End: 2023-10-31
Attending: INTERNAL MEDICINE
Payer: MEDICARE

## 2023-10-31 PROCEDURE — 94625 PHY/QHP OP PULM RHB W/O MNTR: CPT | Mod: XU

## 2023-10-31 PROCEDURE — 94626 PHY/QHP OP PULM RHB W/MNTR: CPT

## 2023-11-02 ENCOUNTER — HOSPITAL ENCOUNTER (OUTPATIENT)
Dept: PULMONOLOGY | Facility: MEDICAL CENTER | Age: 73
End: 2023-11-02
Attending: INTERNAL MEDICINE
Payer: MEDICARE

## 2023-11-02 PROCEDURE — 94625 PHY/QHP OP PULM RHB W/O MNTR: CPT | Mod: XU

## 2023-11-02 PROCEDURE — 94626 PHY/QHP OP PULM RHB W/MNTR: CPT

## 2023-11-07 ENCOUNTER — HOSPITAL ENCOUNTER (OUTPATIENT)
Dept: PULMONOLOGY | Facility: MEDICAL CENTER | Age: 73
End: 2023-11-07
Attending: INTERNAL MEDICINE
Payer: MEDICARE

## 2023-11-07 PROCEDURE — 94626 PHY/QHP OP PULM RHB W/MNTR: CPT

## 2023-11-07 PROCEDURE — 94625 PHY/QHP OP PULM RHB W/O MNTR: CPT | Mod: XU

## 2023-11-09 ENCOUNTER — HOSPITAL ENCOUNTER (OUTPATIENT)
Dept: PULMONOLOGY | Facility: MEDICAL CENTER | Age: 73
End: 2023-11-09
Attending: INTERNAL MEDICINE
Payer: MEDICARE

## 2023-11-09 PROCEDURE — 94625 PHY/QHP OP PULM RHB W/O MNTR: CPT | Mod: XU

## 2023-11-09 PROCEDURE — 94626 PHY/QHP OP PULM RHB W/MNTR: CPT

## 2023-11-14 ENCOUNTER — HOSPITAL ENCOUNTER (OUTPATIENT)
Dept: PULMONOLOGY | Facility: MEDICAL CENTER | Age: 73
End: 2023-11-14
Attending: INTERNAL MEDICINE
Payer: MEDICARE

## 2023-11-14 PROCEDURE — 94626 PHY/QHP OP PULM RHB W/MNTR: CPT

## 2023-11-14 PROCEDURE — 94625 PHY/QHP OP PULM RHB W/O MNTR: CPT | Mod: XU

## 2023-11-16 ENCOUNTER — HOSPITAL ENCOUNTER (OUTPATIENT)
Dept: PULMONOLOGY | Facility: MEDICAL CENTER | Age: 73
End: 2023-11-16
Attending: INTERNAL MEDICINE
Payer: MEDICARE

## 2023-11-16 PROCEDURE — 94626 PHY/QHP OP PULM RHB W/MNTR: CPT

## 2023-11-16 PROCEDURE — 94625 PHY/QHP OP PULM RHB W/O MNTR: CPT | Mod: XU

## 2023-11-21 ENCOUNTER — HOSPITAL ENCOUNTER (OUTPATIENT)
Dept: PULMONOLOGY | Facility: MEDICAL CENTER | Age: 73
End: 2023-11-21
Attending: INTERNAL MEDICINE
Payer: MEDICARE

## 2023-11-21 PROCEDURE — 94625 PHY/QHP OP PULM RHB W/O MNTR: CPT | Mod: XU

## 2023-11-21 PROCEDURE — 94626 PHY/QHP OP PULM RHB W/MNTR: CPT

## 2023-11-28 ENCOUNTER — HOSPITAL ENCOUNTER (OUTPATIENT)
Dept: PULMONOLOGY | Facility: MEDICAL CENTER | Age: 73
End: 2023-11-28
Attending: INTERNAL MEDICINE
Payer: MEDICARE

## 2023-11-28 PROCEDURE — 94626 PHY/QHP OP PULM RHB W/MNTR: CPT

## 2023-11-28 PROCEDURE — 94625 PHY/QHP OP PULM RHB W/O MNTR: CPT | Mod: XU

## 2023-11-30 ENCOUNTER — HOSPITAL ENCOUNTER (OUTPATIENT)
Dept: PULMONOLOGY | Facility: MEDICAL CENTER | Age: 73
End: 2023-11-30
Attending: INTERNAL MEDICINE
Payer: MEDICARE

## 2023-11-30 PROCEDURE — 94626 PHY/QHP OP PULM RHB W/MNTR: CPT

## 2023-11-30 PROCEDURE — 94625 PHY/QHP OP PULM RHB W/O MNTR: CPT | Mod: XU

## 2023-12-05 ENCOUNTER — HOSPITAL ENCOUNTER (OUTPATIENT)
Dept: PULMONOLOGY | Facility: MEDICAL CENTER | Age: 73
End: 2023-12-05
Attending: INTERNAL MEDICINE
Payer: MEDICARE

## 2023-12-05 PROCEDURE — 94625 PHY/QHP OP PULM RHB W/O MNTR: CPT | Mod: XU

## 2023-12-05 PROCEDURE — 94626 PHY/QHP OP PULM RHB W/MNTR: CPT

## 2023-12-06 PROBLEM — J96.01 ACUTE HYPOXEMIC RESPIRATORY FAILURE (HCC): Status: RESOLVED | Noted: 2023-03-23 | Resolved: 2023-12-06

## 2023-12-07 ENCOUNTER — HOSPITAL ENCOUNTER (OUTPATIENT)
Dept: PULMONOLOGY | Facility: MEDICAL CENTER | Age: 73
End: 2023-12-07
Attending: INTERNAL MEDICINE
Payer: MEDICARE

## 2023-12-07 PROCEDURE — 94626 PHY/QHP OP PULM RHB W/MNTR: CPT

## 2023-12-07 PROCEDURE — 94625 PHY/QHP OP PULM RHB W/O MNTR: CPT | Mod: XU

## 2023-12-12 ENCOUNTER — APPOINTMENT (OUTPATIENT)
Dept: MEDICAL GROUP | Facility: PHYSICIAN GROUP | Age: 73
End: 2023-12-12
Payer: MEDICARE

## 2023-12-12 ENCOUNTER — APPOINTMENT (OUTPATIENT)
Dept: PULMONOLOGY | Facility: MEDICAL CENTER | Age: 73
End: 2023-12-12
Attending: INTERNAL MEDICINE
Payer: MEDICARE

## 2023-12-14 ENCOUNTER — APPOINTMENT (OUTPATIENT)
Dept: PULMONOLOGY | Facility: MEDICAL CENTER | Age: 73
End: 2023-12-14
Attending: INTERNAL MEDICINE
Payer: MEDICARE

## 2023-12-19 ENCOUNTER — HOSPITAL ENCOUNTER (OUTPATIENT)
Dept: PULMONOLOGY | Facility: MEDICAL CENTER | Age: 73
End: 2023-12-19
Attending: INTERNAL MEDICINE
Payer: MEDICARE

## 2023-12-19 PROCEDURE — 94625 PHY/QHP OP PULM RHB W/O MNTR: CPT | Mod: XU

## 2023-12-19 PROCEDURE — 94626 PHY/QHP OP PULM RHB W/MNTR: CPT

## 2023-12-20 ENCOUNTER — APPOINTMENT (OUTPATIENT)
Dept: NEPHROLOGY | Facility: MEDICAL CENTER | Age: 73
End: 2023-12-20
Payer: MEDICARE

## 2023-12-20 ENCOUNTER — APPOINTMENT (OUTPATIENT)
Dept: RHEUMATOLOGY | Facility: MEDICAL CENTER | Age: 73
End: 2023-12-20
Attending: STUDENT IN AN ORGANIZED HEALTH CARE EDUCATION/TRAINING PROGRAM
Payer: MEDICARE

## 2023-12-21 ENCOUNTER — HOSPITAL ENCOUNTER (OUTPATIENT)
Dept: PULMONOLOGY | Facility: MEDICAL CENTER | Age: 73
End: 2023-12-21
Attending: INTERNAL MEDICINE
Payer: MEDICARE

## 2023-12-21 PROCEDURE — 94626 PHY/QHP OP PULM RHB W/MNTR: CPT

## 2023-12-21 PROCEDURE — 94625 PHY/QHP OP PULM RHB W/O MNTR: CPT | Mod: XU

## 2023-12-26 ENCOUNTER — APPOINTMENT (OUTPATIENT)
Dept: PULMONOLOGY | Facility: MEDICAL CENTER | Age: 73
End: 2023-12-26
Attending: INTERNAL MEDICINE
Payer: MEDICARE

## 2023-12-28 ENCOUNTER — APPOINTMENT (OUTPATIENT)
Dept: PULMONOLOGY | Facility: MEDICAL CENTER | Age: 73
End: 2023-12-28
Payer: MEDICARE

## 2024-01-02 ENCOUNTER — HOSPITAL ENCOUNTER (OUTPATIENT)
Dept: PULMONOLOGY | Facility: MEDICAL CENTER | Age: 74
End: 2024-01-02
Attending: INTERNAL MEDICINE
Payer: MEDICARE

## 2024-01-02 PROCEDURE — 94625 PHY/QHP OP PULM RHB W/O MNTR: CPT | Mod: XU

## 2024-01-02 PROCEDURE — 94626 PHY/QHP OP PULM RHB W/MNTR: CPT

## 2024-01-04 ENCOUNTER — HOSPITAL ENCOUNTER (OUTPATIENT)
Dept: PULMONOLOGY | Facility: MEDICAL CENTER | Age: 74
End: 2024-01-04
Attending: INTERNAL MEDICINE
Payer: MEDICARE

## 2024-01-04 PROCEDURE — 94625 PHY/QHP OP PULM RHB W/O MNTR: CPT | Mod: XU

## 2024-01-04 PROCEDURE — 94626 PHY/QHP OP PULM RHB W/MNTR: CPT

## 2024-01-08 DIAGNOSIS — Z91.89 CANDIDATE FOR STATIN THERAPY DUE TO RISK OF FUTURE CARDIOVASCULAR EVENT: ICD-10-CM

## 2024-01-08 DIAGNOSIS — I77.1 BILATERAL ILIAC ARTERY STENOSIS (HCC): ICD-10-CM

## 2024-01-09 ENCOUNTER — HOSPITAL ENCOUNTER (OUTPATIENT)
Dept: PULMONOLOGY | Facility: MEDICAL CENTER | Age: 74
End: 2024-01-09
Attending: INTERNAL MEDICINE
Payer: MEDICARE

## 2024-01-09 DIAGNOSIS — E03.1 CONGENITAL HYPOTHYROIDISM WITHOUT GOITER: ICD-10-CM

## 2024-01-09 PROCEDURE — 94625 PHY/QHP OP PULM RHB W/O MNTR: CPT

## 2024-01-09 PROCEDURE — 94626 PHY/QHP OP PULM RHB W/MNTR: CPT

## 2024-01-09 RX ORDER — LEVOTHYROXINE SODIUM 0.1 MG/1
TABLET ORAL
Qty: 100 TABLET | Refills: 0 | Status: SHIPPED | OUTPATIENT
Start: 2024-01-09

## 2024-01-09 RX ORDER — ROSUVASTATIN CALCIUM 10 MG/1
TABLET, COATED ORAL
Qty: 100 TABLET | Refills: 1 | Status: SHIPPED | OUTPATIENT
Start: 2024-01-09

## 2024-01-11 ENCOUNTER — HOSPITAL ENCOUNTER (OUTPATIENT)
Dept: PULMONOLOGY | Facility: MEDICAL CENTER | Age: 74
End: 2024-01-11
Payer: MEDICARE

## 2024-01-11 PROCEDURE — 94626 PHY/QHP OP PULM RHB W/MNTR: CPT

## 2024-01-11 PROCEDURE — 94625 PHY/QHP OP PULM RHB W/O MNTR: CPT

## 2024-01-22 ENCOUNTER — HOSPITAL ENCOUNTER (OUTPATIENT)
Dept: LAB | Facility: MEDICAL CENTER | Age: 74
End: 2024-01-22
Attending: PHYSICIAN ASSISTANT
Payer: MEDICARE

## 2024-01-22 DIAGNOSIS — R73.9 HYPERGLYCEMIA: ICD-10-CM

## 2024-01-22 DIAGNOSIS — R79.89 ELEVATED BRAIN NATRIURETIC PEPTIDE (BNP) LEVEL: ICD-10-CM

## 2024-01-22 DIAGNOSIS — D72.829 LEUKOCYTOSIS, UNSPECIFIED TYPE: ICD-10-CM

## 2024-01-22 DIAGNOSIS — E03.9 HYPOTHYROIDISM, UNSPECIFIED TYPE: ICD-10-CM

## 2024-01-22 LAB
ALBUMIN SERPL BCP-MCNC: 3.9 G/DL (ref 3.2–4.9)
ALBUMIN/GLOB SERPL: 1.1 G/DL
ALP SERPL-CCNC: 93 U/L (ref 30–99)
ALT SERPL-CCNC: 15 U/L (ref 2–50)
ANION GAP SERPL CALC-SCNC: 15 MMOL/L (ref 7–16)
AST SERPL-CCNC: 25 U/L (ref 12–45)
BASOPHILS # BLD AUTO: 0.5 % (ref 0–1.8)
BASOPHILS # BLD: 0.03 K/UL (ref 0–0.12)
BILIRUB SERPL-MCNC: 0.5 MG/DL (ref 0.1–1.5)
BUN SERPL-MCNC: 25 MG/DL (ref 8–22)
CALCIUM ALBUM COR SERPL-MCNC: 8.9 MG/DL (ref 8.5–10.5)
CALCIUM SERPL-MCNC: 8.8 MG/DL (ref 8.5–10.5)
CHLORIDE SERPL-SCNC: 100 MMOL/L (ref 96–112)
CO2 SERPL-SCNC: 22 MMOL/L (ref 20–33)
CREAT SERPL-MCNC: 1.09 MG/DL (ref 0.5–1.4)
EOSINOPHIL # BLD AUTO: 0.22 K/UL (ref 0–0.51)
EOSINOPHIL NFR BLD: 3.5 % (ref 0–6.9)
ERYTHROCYTE [DISTWIDTH] IN BLOOD BY AUTOMATED COUNT: 50.8 FL (ref 35.9–50)
GFR SERPLBLD CREATININE-BSD FMLA CKD-EPI: 53 ML/MIN/1.73 M 2
GLOBULIN SER CALC-MCNC: 3.5 G/DL (ref 1.9–3.5)
GLUCOSE SERPL-MCNC: 81 MG/DL (ref 65–99)
HCT VFR BLD AUTO: 41.5 % (ref 37–47)
HGB BLD-MCNC: 13.2 G/DL (ref 12–16)
IMM GRANULOCYTES # BLD AUTO: 0.02 K/UL (ref 0–0.11)
IMM GRANULOCYTES NFR BLD AUTO: 0.3 % (ref 0–0.9)
LYMPHOCYTES # BLD AUTO: 1.11 K/UL (ref 1–4.8)
LYMPHOCYTES NFR BLD: 17.5 % (ref 22–41)
MCH RBC QN AUTO: 30.6 PG (ref 27–33)
MCHC RBC AUTO-ENTMCNC: 31.8 G/DL (ref 32.2–35.5)
MCV RBC AUTO: 96.1 FL (ref 81.4–97.8)
MONOCYTES # BLD AUTO: 0.79 K/UL (ref 0–0.85)
MONOCYTES NFR BLD AUTO: 12.4 % (ref 0–13.4)
NEUTROPHILS # BLD AUTO: 4.19 K/UL (ref 1.82–7.42)
NEUTROPHILS NFR BLD: 65.8 % (ref 44–72)
NRBC # BLD AUTO: 0 K/UL
NRBC BLD-RTO: 0 /100 WBC (ref 0–0.2)
NT-PROBNP SERPL IA-MCNC: 703 PG/ML (ref 0–125)
PLATELET # BLD AUTO: 162 K/UL (ref 164–446)
PMV BLD AUTO: 11.3 FL (ref 9–12.9)
POTASSIUM SERPL-SCNC: 4.6 MMOL/L (ref 3.6–5.5)
PROT SERPL-MCNC: 7.4 G/DL (ref 6–8.2)
RBC # BLD AUTO: 4.32 M/UL (ref 4.2–5.4)
SODIUM SERPL-SCNC: 137 MMOL/L (ref 135–145)
WBC # BLD AUTO: 6.4 K/UL (ref 4.8–10.8)

## 2024-01-22 PROCEDURE — 36415 COLL VENOUS BLD VENIPUNCTURE: CPT

## 2024-01-22 PROCEDURE — 85025 COMPLETE CBC W/AUTO DIFF WBC: CPT

## 2024-01-22 PROCEDURE — 80053 COMPREHEN METABOLIC PANEL: CPT

## 2024-01-22 PROCEDURE — 84443 ASSAY THYROID STIM HORMONE: CPT

## 2024-01-22 PROCEDURE — 83880 ASSAY OF NATRIURETIC PEPTIDE: CPT

## 2024-01-23 LAB — TSH SERPL DL<=0.005 MIU/L-ACNC: 0.52 UIU/ML (ref 0.38–5.33)

## 2024-01-24 ENCOUNTER — OFFICE VISIT (OUTPATIENT)
Dept: RHEUMATOLOGY | Facility: MEDICAL CENTER | Age: 74
End: 2024-01-24
Attending: STUDENT IN AN ORGANIZED HEALTH CARE EDUCATION/TRAINING PROGRAM
Payer: MEDICARE

## 2024-01-24 ENCOUNTER — HOSPITAL ENCOUNTER (OUTPATIENT)
Dept: RADIOLOGY | Facility: MEDICAL CENTER | Age: 74
End: 2024-01-24
Attending: STUDENT IN AN ORGANIZED HEALTH CARE EDUCATION/TRAINING PROGRAM
Payer: MEDICARE

## 2024-01-24 ENCOUNTER — OFFICE VISIT (OUTPATIENT)
Dept: NEPHROLOGY | Facility: MEDICAL CENTER | Age: 74
End: 2024-01-24
Payer: MEDICARE

## 2024-01-24 VITALS
RESPIRATION RATE: 18 BRPM | BODY MASS INDEX: 23.23 KG/M2 | SYSTOLIC BLOOD PRESSURE: 122 MMHG | OXYGEN SATURATION: 96 % | HEART RATE: 78 BPM | TEMPERATURE: 98 F | WEIGHT: 148 LBS | DIASTOLIC BLOOD PRESSURE: 74 MMHG | HEIGHT: 67 IN

## 2024-01-24 VITALS
HEIGHT: 67 IN | SYSTOLIC BLOOD PRESSURE: 122 MMHG | WEIGHT: 147 LBS | DIASTOLIC BLOOD PRESSURE: 78 MMHG | TEMPERATURE: 97.5 F | OXYGEN SATURATION: 93 % | HEART RATE: 76 BPM | BODY MASS INDEX: 23.07 KG/M2

## 2024-01-24 DIAGNOSIS — I10 ESSENTIAL HYPERTENSION: ICD-10-CM

## 2024-01-24 DIAGNOSIS — R80.9 MICROALBUMINURIA: ICD-10-CM

## 2024-01-24 DIAGNOSIS — D64.9 ANEMIA, UNSPECIFIED TYPE: ICD-10-CM

## 2024-01-24 DIAGNOSIS — M25.511 ARTHRALGIA OF RIGHT SHOULDER REGION: ICD-10-CM

## 2024-01-24 DIAGNOSIS — J84.9 INTERSTITIAL LUNG DISEASE (HCC): ICD-10-CM

## 2024-01-24 DIAGNOSIS — N18.31 STAGE 3A CHRONIC KIDNEY DISEASE: ICD-10-CM

## 2024-01-24 DIAGNOSIS — R76.8 SEROLOGIC AUTOIMMUNITY: ICD-10-CM

## 2024-01-24 DIAGNOSIS — E55.9 VITAMIN D DEFICIENCY: ICD-10-CM

## 2024-01-24 DIAGNOSIS — J96.11 CHRONIC HYPOXEMIC RESPIRATORY FAILURE (HCC): ICD-10-CM

## 2024-01-24 PROCEDURE — 3078F DIAST BP <80 MM HG: CPT | Performed by: INTERNAL MEDICINE

## 2024-01-24 PROCEDURE — 99214 OFFICE O/P EST MOD 30 MIN: CPT | Performed by: STUDENT IN AN ORGANIZED HEALTH CARE EDUCATION/TRAINING PROGRAM

## 2024-01-24 PROCEDURE — 3078F DIAST BP <80 MM HG: CPT | Performed by: STUDENT IN AN ORGANIZED HEALTH CARE EDUCATION/TRAINING PROGRAM

## 2024-01-24 PROCEDURE — 3074F SYST BP LT 130 MM HG: CPT | Performed by: INTERNAL MEDICINE

## 2024-01-24 PROCEDURE — 99212 OFFICE O/P EST SF 10 MIN: CPT | Performed by: STUDENT IN AN ORGANIZED HEALTH CARE EDUCATION/TRAINING PROGRAM

## 2024-01-24 PROCEDURE — 73030 X-RAY EXAM OF SHOULDER: CPT | Mod: RT

## 2024-01-24 PROCEDURE — 3074F SYST BP LT 130 MM HG: CPT | Performed by: STUDENT IN AN ORGANIZED HEALTH CARE EDUCATION/TRAINING PROGRAM

## 2024-01-24 PROCEDURE — 99214 OFFICE O/P EST MOD 30 MIN: CPT | Performed by: INTERNAL MEDICINE

## 2024-01-24 ASSESSMENT — ENCOUNTER SYMPTOMS
FLANK PAIN: 0
PALPITATIONS: 0
NAUSEA: 0
CHILLS: 0
SINUS PAIN: 0
FEVER: 0
DIARRHEA: 0
ORTHOPNEA: 0
WEIGHT LOSS: 0
VOMITING: 0
WHEEZING: 0
EYES NEGATIVE: 1
ABDOMINAL PAIN: 0
HEMOPTYSIS: 0
SHORTNESS OF BREATH: 0
COUGH: 0

## 2024-01-24 ASSESSMENT — FIBROSIS 4 INDEX
FIB4 SCORE: 2.91
FIB4 SCORE: 2.91

## 2024-01-24 ASSESSMENT — PATIENT HEALTH QUESTIONNAIRE - PHQ9: CLINICAL INTERPRETATION OF PHQ2 SCORE: 0

## 2024-01-24 NOTE — PATIENT INSTRUCTIONS
AFTER VISIT INSTRUCTIONS    Below are important information to help you navigate your healthcare needs and help us serve you safely and effectively:  If laboratory tests and/or imaging studies were ordered, remember to go get them done as instructed.  If new prescriptions and/or refills were sent, remember to go pick them up from your local pharmacy, or call the specialty pharmacy to request shipment.  Always take your prescription medications exactly as prescribed unless instructed otherwise.  Note that antirheumatic drugs and steroids are immunosuppressive which means they increase your risk of infections and have multiple potential adverse effects on various organ systems in your body, though most of them are uncommon.  It is important that you are up-to-date on age-appropriate immunizations, particularly shingles and bacterial/viral pneumonia vaccines, which you can request from me or your primary care provider.  Be sure to read the drug package inserts to learn about the potential side effects of your medications before you start taking them.  If you experience any significant drug side effects, stop taking the medication and notify me promptly, and depending on the severity of the side effects, consider going to an urgent care or emergency department for immediate attention.  If there are significant findings on your lab tests and imaging studies that warrant further action, I will notify you with explanations via [employer] or phone call, otherwise you can view them on [employer] and let me know if you have any questions.  Note that [employer] messages are typically read during office hours and may take 1-7 business days before a response depending on the urgency of the situation and how busy my clinic schedule is.  In general, [employer] messaging is for non-urgent matters that do not require immediate attention, so for urgent matters that cannot wait, you are advised to go to an urgent care.  Lastly, you are granted  [employer] access to my documentation of your visit and are encouraged to read my note which details my assessment and plan for your condition.  To learn more about your condition and rheumatic diseases evaluated and treated by rheumatologists, as well as gain access to many helpful resources about these diseases, visit our website at [email or web address].

## 2024-01-24 NOTE — PROGRESS NOTES
Subjective:      Naomy Vargas is a 73 y.o. female who presents with CKD          Chronic Kidney Disease  Pertinent negatives include no abdominal pain, chest pain, chills, congestion, coughing, fever, nausea or vomiting.     Naomy is coming today for f/u of CKD II  Doing well, no complaints.  No dysuria/hematuria/flank pain  Pedal edema improved -well controlled with low salt diet  No NSAID's  CKD -creat level slight;y worse to 1.09 from baseline 0.9 -now with GFR 53 -at CKD IIIa  HTN: BP well controlled  CT abd from 2008 revealed scarred right kidney      Review of Systems   Constitutional:  Negative for chills, fever, malaise/fatigue and weight loss.   HENT:  Negative for congestion, hearing loss and sinus pain.    Eyes: Negative.    Respiratory:  Negative for cough, hemoptysis, shortness of breath and wheezing.    Cardiovascular:  Negative for chest pain, palpitations, orthopnea and leg swelling.   Gastrointestinal:  Negative for abdominal pain, diarrhea, nausea and vomiting.   Genitourinary:  Negative for dysuria, flank pain, frequency, hematuria and urgency.   Skin: Negative.    All other systems reviewed and are negative.    Past Medical History:   Diagnosis Date    Acute hypoxemic respiratory failure (HCC) 03/23/2023    Anxiety     Arthritis     wrists    Back pain     Blood transfusion without reported diagnosis     with ectopic preg x 2     Breath shortness     with exertion    Cataract     bilateral removal-Dr. Duke Talamantes    Cellulitis     right lower leg    Cellulitis of leg 11/01/2013    Followed by dermatology. Managed with fluocinonide and Bactroban on right leg Left leg she uses triamcinolone and Sarna pramocaine (anesthetic, antipruretic) on both.      Chickenpox     COPD (chronic obstructive pulmonary disease) ([facility])     Dental disorder     upper denture    Earache     Fatigue     Frequent urination     Hypertension 02/23/2018    on no meds at this time    Hypothyroidism     Influenza      "Insomnia     Kidney disease     reports 1 kidney is smaller than the other    Mumps     OAB (overactive bladder) 2019    Pain 2018    right leg and back, 5/10    Peripheral vascular disease, unspecified (HCC) 10/13/2021    Pneumonia     Rash     Ringing in ears     Shortness of breath     Swelling of lower extremity     Thyroid disease     Tonsillitis     Toothache     Venous stasis dermatitis     right leg       Family History   Problem Relation Age of Onset    Arthritis Mother         hip fracture    Diabetes Mother     Cancer Mother         skin    Arthritis Father         lung    Alcohol/Drug Father         etoh    Lung Disease Sister         smoker/copd    Hypertension Sister     Other Brother         hep c    Arthritis Maternal Grandmother         hip fracture    Diabetes Maternal Grandfather     No Known Problems Son     No Known Problems Son     No Known Problems Brother     No Known Problems Sister     No Known Problems Brother     Hyperlipidemia Neg Hx     Stroke Neg Hx        Social History     Socioeconomic History    Marital status:     Number of children: 2   Occupational History    Occupation: retired   Tobacco Use    Smoking status: Former     Current packs/day: 0.00     Average packs/day: 0.5 packs/day for 53.0 years (26.5 ttl pk-yrs)     Types: Cigarettes     Start date: 1964     Quit date: 2017     Years since quittin.5    Smokeless tobacco: Never   Vaping Use    Vaping Use: Never used   Substance and Sexual Activity    Alcohol use: Yes     Comment: 2 per day    Drug use: No    Sexual activity: Not Currently     Partners: Male          Objective:     /74 (BP Location: Right arm, Patient Position: Sitting, BP Cuff Size: Adult)   Pulse 78   Temp 36.7 °C (98 °F) (Temporal)   Resp 18   Ht 1.702 m (5' 7\")   Wt 67.1 kg (148 lb)   LMP  (LMP Unknown)   SpO2 96%   BMI 23.18 kg/m²      Physical Exam  Vitals reviewed.   Constitutional:       General: She is not " in acute distress.     Appearance: Normal appearance. She is well-developed. She is not diaphoretic.   HENT:      Head: Normocephalic and atraumatic.      Nose: Nose normal.      Mouth/Throat:      Mouth: Mucous membranes are moist.      Pharynx: Oropharynx is clear.   Eyes:      Extraocular Movements: Extraocular movements intact.      Conjunctiva/sclera: Conjunctivae normal.      Pupils: Pupils are equal, round, and reactive to light.   Cardiovascular:      Rate and Rhythm: Normal rate and regular rhythm.      Pulses: Normal pulses.      Heart sounds: Normal heart sounds.   Pulmonary:      Effort: Pulmonary effort is normal. No respiratory distress.      Breath sounds: Normal breath sounds. No wheezing or rales.   Abdominal:      General: Bowel sounds are normal. There is no distension.      Palpations: Abdomen is soft. There is no mass.      Tenderness: There is no abdominal tenderness. There is no right CVA tenderness or left CVA tenderness.   Musculoskeletal:      Cervical back: Normal range of motion and neck supple.      Right lower leg: No edema.      Left lower leg: No edema.   Skin:     General: Skin is warm.      Coloration: Skin is not pale.      Findings: No erythema or rash.   Neurological:      General: No focal deficit present.      Mental Status: She is alert and oriented to person, place, and time.      Cranial Nerves: No cranial nerve deficit.      Coordination: Coordination normal.   Psychiatric:         Mood and Affect: Mood normal.         Behavior: Behavior normal.         Thought Content: Thought content normal.         Judgment: Judgment normal.            Laboratory and renal imaging results reviewed : d/w pt  Lab Results   Component Value Date/Time    CREATININE 1.09 01/22/2024 11:49 AM    BUN 25 (H) 01/22/2024 11:49 AM    SODIUM 137 01/22/2024 11:49 AM    POTASSIUM 4.6 01/22/2024 11:49 AM          Assessment/Plan:       1. CKD (chronic kidney disease) stage II, GFR > 60 ml/min ([facility]) worse  to CKD IIIa      Creat level worse from baseline- to monitor closely      Keep well hydrated    2. Vitamin D deficiency      Well controlled      PTH WNL    3. Essential hypertension      BP well controlled       To monitor BP at home nd call clinic if BP> 135/85    4. Microalbuminuria      Very mild -on ACEi      5. Anemia, unspecified type      Hb stable WNL    6. Right hydronephrosis - stable -f/u with Urology    Recs:  Continue  current treatment  Keep well hydrated  Low salt diet  Monitor BP  Avoid NSAID's  F/u in 4 months

## 2024-01-24 NOTE — PROGRESS NOTES
[facility] RHEUMATOLOGY  [address], [address], [place] [address]  Phone: [phone] ? Fax: [phone]  [facility]/Health-Services/Rheumatology    FOLLOW-UP VISIT NOTE      DATE OF SERVICE: 01/24/2024         Subjective     PRIMARY CARE PRACTITIONER:  Prema Patel P.A.-C.  [address]  [facility] [address]    PATIENT IDENTIFICATION:  Naomy Vargas  [address] [place] [address]    YOB: 1950    MEDICAL RECORD NUMBER: [id number]          CHIEF COMPLAINT:   Chief Complaint   Patient presents with    Follow-Up     Interstitial lung disease (HCC)       RHEUMATOLOGIC HISTORY:  Naomy Vargas is a 73 y.o. female with pertinent history notable for ILD with hypoxemic respiratory failure requiring home oxygen PRN for activity (diagnosed in 3/2023 during hospitalization for unexplained hypoxia s/p high-dose IV and PO steroids), COPD, abdominal aortic aneurysm, venous stasis dermatitis of legs, osteoarthritis of multiple joints (hands and feet), DJD/DD of lumbosacral spine with scoliosis, osteopenia of femur, and multiple comorbidities. Presented In 9/2023 for rheumatologic evaluation in the setting of positive EBEN with concern for underlying connective tissue disease. Reported mild intermittent pain in her knees, feet, and lower back, but otherwise doing quite well with her respiratory function only requiring supplemental oxygen during more than usual physical activity. Reported other aspects of her symptoms and medical history as noted on the questionnaire below or scanned under media tab.       Pertinent laboratory results:  Positive EBEN 1:640 homogenous pattern and anti-dsDNA 61 with negative titer at 1:10, and elevated ESR 28 with normal CRP 0.3 (in 3/2023), low <134<186 (in 1/2024<6/2023<3/2023). Chromatin Ab, IgG 69 (1/2024)    Negative/normal HCV (in 7/2020), TSH (in 6/2022, 1/2024), RF, anti-MPO, anti-PR3, Hypersensitivity Pneumonitis Panel, and Respiratory Viral  Panel (in 3/2023), vitamin D, MACR, eGFR 53>61 (6/2023, 1/2024) and creatinine (in 6/2023). Neg CCP, chromatin, [place]-1, SSA52, SSA60 (9/2023). Neg glycyl tRNA synthetase, U3 RNP, Ku, MDA5, Mi-2, Myositis panel, NXP2, OJ, P155/140, PL-12, PL-7, PM/Scl 100, SAE1, Smith/RNP, SRP, and TIF-1g (in 9/2023)     HRCT of chest in 5/2023: Interstitial opacifications, thickening of interlobular septa, and scattered areas of honeycombing all of which are most prominent in the lung periphery, extensive bronchiectasis, and multiple enlarged mediastinal and hilar lymph nodes; findings are consistent with interstitial lung disease such as UIP.     2D ECHO in 3/2023: Enlarged right atrium, mild tricuspid regurgitation, mildly dilated right ventricle with mildly elevated estimated systolic pressure of 35 mmHg, and estimated LVEF of 55%.      INTERVAL HISTORY:  Feeling well. Denies shortness of breath and difficulty breathing. Completed pulmonary rehab and is using oxygen tank as needed or when she goes to sleep (1-2L). Strained her right shoulder a couple months ago.  Reports other interval history as noted on the questionnaire scanned under media tab.    REVIEW OF SYSTEMS:  Except as noted in the history above, relevant review of systems with emphasis on autoimmune rheumatic diseases was otherwise negative.      ACTIVE PROBLEM LIST:  Patient Active Problem List    Diagnosis Date Noted    Arthralgia of right shoulder region 01/24/2024    Serologic autoimmunity (positive EBEN with anti-dsDNA) 09/20/2023    Chronic hypoxemic respiratory failure (HCC) 09/20/2023    Pulmonary hypertension (HCC) 03/25/2023    Nonrheumatic tricuspid valve regurgitation 03/25/2023    Abnormal CT of the chest 03/24/2023    Leukocytosis 03/24/2023    Hypertension 03/24/2023    Interstitial lung disease (HCC) 03/23/2023    Elevated troponin 03/23/2023    Sepsis (HCC) 03/23/2023    Pleural effusion 03/23/2023    Disorder of arteries and arterioles (HCC) 04/12/2022     Overweight with body mass index (BMI) of 25 to 25.9 in adult 10/13/2021    Aortic atherosclerosis ([facility]) 10/13/2021    B12 deficiency 07/22/2020    Thrombocytopenia, unspecified ([facility]) 06/24/2020    OAB (overactive bladder) 02/11/2019    Abdominal aortic aneurysm (AAA) 3.0 cm to 5.0 cm in diameter in female ([facility]) 11/16/2018    Bilateral iliac artery stenosis ([facility]) 11/16/2018    Essential hypertension 08/06/2018    Chronic obstructive pulmonary disease ([facility]) 04/17/2018    Pulmonary nodule, RUL CT , 4/18 04/17/2018    Osteopenia 04/28/2017    Chronic right-sided low back pain without sciatica 06/06/2016    Venous stasis dermatitis of both lower extremities 07/15/2015    Hypothyroidism 07/14/2015    CKD (chronic kidney disease) stage 3, GFR 30-59 ml/min ([facility]) 11/02/2013    Hyperglycemia 11/02/2013       PAST MEDICAL HISTORY:  Past Medical History:   Diagnosis Date    Acute hypoxemic respiratory failure ([facility]) 03/23/2023    Anxiety     Arthritis     wrists    Back pain     Blood transfusion without reported diagnosis     with ectopic preg x 2     Breath shortness     with exertion    Cataract     bilateral removal-Dr. Duke Talamantes    Cellulitis     right lower leg    Cellulitis of leg 11/01/2013    Followed by dermatology. Managed with fluocinonide and Bactroban on right leg Left leg she uses triamcinolone and Sarna pramocaine (anesthetic, antipruretic) on both.      Chickenpox     COPD (chronic obstructive pulmonary disease) ([facility])     Dental disorder     upper denture    Earache     Fatigue     Frequent urination     Hypertension 02/23/2018    on no meds at this time    Hypothyroidism     Influenza     Insomnia     Kidney disease     reports 1 kidney is smaller than the other    Mumps     OAB (overactive bladder) 02/11/2019    Pain 02/23/2018    right leg and back, 5/10    Peripheral vascular disease, unspecified ([facility]) 10/13/2021    Pneumonia     Rash     Ringing in ears     Shortness of breath     Swelling of lower  extremity     Thyroid disease     Tonsillitis     Toothache     Venous stasis dermatitis     right leg       PAST SURGICAL HISTORY:  Past Surgical History:   Procedure Laterality Date    VEIN LIGATION RADIO FREQUENCY Right 2/26/2018    Procedure: VEIN LIGATION RADIO FREQUENCY- LONG SAPENOUS;  Surgeon: Jim Alas M.D.;  Location: SURGERY [facility];  Service: General    ABDOMINAL EXPLORATION      2 ectopic preg    BREAST BIOPSY      hx of benign left breast biopsy    EYE SURGERY      cataract x2    HYSTERECTOMY LAPAROSCOPY      HYSTERECTOMY, TOTAL ABDOMINAL  1977/1978    OOPHORECTOMY      PB MAMMARY DUCTOGRAM, SINGLE      WY REMV 2ND CATARACT,CORN-SCLER SECTN      TONSILLECTOMY         MEDICATIONS:  Current Outpatient Medications   Medication Sig    rosuvastatin (CRESTOR) 10 MG Tab TAKE 1 TABLET BY MOUTH EVERY DAY IN THE EVENING    levothyroxine (SYNTHROID) 100 MCG Tab TAKE 1 TABLET BY MOUTH EVERY DAY BEFORE BREAKFAST    triamcinolone acetonide (KENALOG) 0.1 % Cream APPLY 1 APPLICATION TOPICALLY 2 TIMES A DAY AS NEEDED.    lisinopril (PRINIVIL) 20 MG Tab TAKE 1 TABLET BY MOUTH EVERY DAY    ipratropium-albuterol (DUONEB) 0.5-2.5 (3) MG/3ML nebulizer solution Take 3 mL by nebulization every four hours as needed for Shortness of Breath (Wheezing).    amLODIPine (NORVASC) 5 MG Tab Take 1 Tablet by mouth every day.    terbinafine (LAMISIL) 1 % cream Apply to affected areas 1-2 times per day as needed for rash    SYMBICORT 80-4.5 MCG/ACT Aerosol INHALE 2 PUFFS BY MOUTH TWICE A DAY    albuterol 108 (90 Base) MCG/ACT Aero Soln inhalation aerosol Inhale 1-2 Puffs every 6 hours as needed for Shortness of Breath.    PROAIR  (90 Base) MCG/ACT Aero Soln inhalation aerosol Inhale 1 Puff every 6 hours as needed for Shortness of Breath.    Mirabegron ER (MYRBETRIQ) 50 MG TABLET SR 24 HR Myrbetriq 50 mg tablet,extended release   Take 1 tablet every day by oral route for 90 days.    Ascorbic Acid (VITAMIN C PO) Take   by mouth.    Doxylamine Succinate, Sleep, (SLEEP AID PO) Take  by mouth.    Dextromethorphan-guaiFENesin (MUCINEX DM PO) Take  by mouth.    loperamide (IMODIUM) 2 MG Cap Take 1 Capsule by mouth 4 times a day as needed for Diarrhea.    cyanocobalamin (VITAMIN B-12) 100 MCG Tab Take 100 mcg by mouth every day.    vitamin D (CHOLECALCIFEROL) 1000 Unit (25 mcg) Tab Take 1,000 Units by mouth every day.    Melatonin 1 MG Tab Take  by mouth.    diphenhydrAMINE (BENADRYL) 25 MG Tab Take 25 mg by mouth every 6 hours as needed for Sleep.    Acetaminophen 500 MG Cap Take  by mouth.    Oxymetazoline HCl (NASAL SPRAY NA) Spray  in nose.       ALLERGIES:   Allergies   Allergen Reactions    Colophony [Pinus Strobus] Itching    Latex Rash and Unspecified     .    Neosporin [Neomycin-Polymyxin B] Rash     .    Phenylene Itching    Soap Rash     Certain soaps cause rash    Tape Itching     PAPER TAPE OK    Tea Tree Oil Rash     .       IMMUNIZATIONS:  Immunization History   Administered Date(s) Administered    INFLUENZA TIV (IM) 11/01/2013    Influenza Seasonal Injectable - Historical Data 11/01/2013    Influenza Vaccine Adult HD 01/21/2017, 10/12/2017, 10/08/2018, 09/21/2019, 08/27/2020, 09/22/2021, 10/28/2022    Influenza Vaccine H1N1 - HISTORICAL DATA 01/11/2010    Influenza Vaccine Pediatric Split - Historical Data 11/01/2013    PFIZER BIVALENT SARS-COV-2 VACCINE (12+) 10/28/2022    PFIZER PURPLE CAP SARS-COV-2 VACCINATION (12+) 02/17/2021, 03/10/2021, 11/22/2021    Pneumococcal Conjugate Vaccine (Prevnar/PCV-13) 02/28/2017    Pneumococcal polysaccharide vaccine (PPSV-23) 11/01/2013, 02/08/2019    Tdap Vaccine 03/15/2017    Zoster Vaccine Live (ZVL) (Zostavax) - HISTORICAL DATA 03/15/2017    Zoster Vaccine Recombinant (RZV) (SHINGRIX) 02/08/2019, 06/24/2020       SOCIAL HISTORY:   Social History     Socioeconomic History    Marital status:     Number of children: 2   Occupational History    Occupation: retired   Tobacco  "Use    Smoking status: Former     Current packs/day: 0.00     Average packs/day: 0.5 packs/day for 53.0 years (26.5 ttl pk-yrs)     Types: Cigarettes     Start date: 1964     Quit date: 2017     Years since quittin.5    Smokeless tobacco: Never   Vaping Use    Vaping Use: Never used   Substance and Sexual Activity    Alcohol use: Yes     Comment: 2 per day    Drug use: No    Sexual activity: Not Currently     Partners: Male       FAMILY HISTORY:  Family History   Problem Relation Age of Onset    Arthritis Mother         hip fracture    Diabetes Mother     Cancer Mother         skin    Arthritis Father         lung    Alcohol/Drug Father         etoh    Lung Disease Sister         smoker/copd    Hypertension Sister     Other Brother         hep c    Arthritis Maternal Grandmother         hip fracture    Diabetes Maternal Grandfather     No Known Problems Son     No Known Problems Son     No Known Problems Brother     No Known Problems Sister     No Known Problems Brother     Hyperlipidemia Neg Hx     Stroke Neg Hx             Objective     Vital Signs: /78 (BP Location: Left arm, Patient Position: Sitting, BP Cuff Size: Adult)   Pulse 76   Temp 36.4 °C (97.5 °F) (Temporal)   Ht 1.702 m (5' 7\")   Wt 66.7 kg (147 lb)   SpO2 93% Body mass index is 23.02 kg/m².    General: Appears well and comfortable  Eyes: No scleral or conjunctival lesions  ENT: No apparent oral or nasal lesions  Head/Neck: No apparent scalp or neck lesions  Cardiovascular: Regular rate and rhythm  Respiratory: Breathing quiet and unlabored  Gastrointestinal: No apparent organomegaly or abdominal masses  Integumentary: No significant cutaneous lesions or dyspigmentation  Musculoskeletal: Poorly localized mild tenderness of right shoulder on range of motion; no periarticular soft tissue swelling, warmth, erythema, or overt synovitis; no significant restriction in range of motion of joints examined  Neurologic: No focal sensory " or motor deficits  Psychiatric: Mood and affect appropriate      LABORATORY RESULTS REVIEWED AND INTERPRETED BY ME:  Lab Results   Component Value Date/Time    CREACTPROT <0.30 03/29/2023 03:10 AM    SEDRATEWES 28 (H) 03/24/2023 05:34 AM     Lab Results   Component Value Date/Time    RHEUMFACTN 11 03/23/2023 10:00 PM     Lab Results   Component Value Date/Time    ANTINUCAB Detected (A) 03/23/2023 10:00 PM     Lab Results   Component Value Date/Time    ANTIDNADS SEE BELOW 03/23/2023 10:00 PM    SMITHAB 1 03/23/2023 10:00 PM    RNPAB 2 09/25/2023 03:07 PM    OZSFSII82 1 03/23/2023 10:00 PM    SSA60 4 09/25/2023 03:07 PM    SSA52 24 09/25/2023 03:07 PM    ANTISSBSJ 1 03/23/2023 10:00 PM    JO1AB 0 09/25/2023 03:07 PM     Lab Results   Component Value Date/Time    ANCAIGG <1:20 07/13/2015 07:45 AM     Lab Results   Component Value Date/Time     07/20/2017 12:35 PM     07/20/2017 12:35 PM     Lab Results   Component Value Date/Time    TSHULTRASEN 0.520 01/22/2024 11:49 AM    FREET4 0.97 12/04/2017 12:22 PM     Lab Results   Component Value Date/Time    25HYDROXY 58 06/13/2023 11:37 AM    PTHINTACT 42.9 06/09/2022 09:41 AM     Lab Results   Component Value Date/Time    FOLATE 19.7 07/15/2020 02:01 PM    PROTHROMBTM 14.6 11/02/2013 02:44 AM    INR 1.12 11/02/2013 02:44 AM     Lab Results   Component Value Date/Time    WBC 6.4 01/22/2024 11:49 AM    RBC 4.32 01/22/2024 11:49 AM    HEMOGLOBIN 13.2 01/22/2024 11:49 AM    HEMATOCRIT 41.5 01/22/2024 11:49 AM    MCV 96.1 01/22/2024 11:49 AM    MCH 30.6 01/22/2024 11:49 AM    MCHC 31.8 (L) 01/22/2024 11:49 AM    RDW 50.8 (H) 01/22/2024 11:49 AM    PLATELETCT 162 (L) 01/22/2024 11:49 AM    MPV 11.3 01/22/2024 11:49 AM    NEUTS 4.19 01/22/2024 11:49 AM    LYMPHOCYTES 17.50 (L) 01/22/2024 11:49 AM    MONOCYTES 12.40 01/22/2024 11:49 AM    EOSINOPHILS 3.50 01/22/2024 11:49 AM    BASOPHILS 0.50 01/22/2024 11:49 AM    ANISOCYTOSIS 2+ 07/09/2020 09:37 AM     Lab Results    Component Value Date/Time    ASTSGOT 25 01/22/2024 11:49 AM    ALTSGPT 15 01/22/2024 11:49 AM    ALKPHOSPHAT 93 01/22/2024 11:49 AM    TBILIRUBIN 0.5 01/22/2024 11:49 AM    TOTPROTEIN 7.4 01/22/2024 11:49 AM    TOTPROTEIN 7.00 07/20/2017 12:35 PM    ALBUMIN 3.9 01/22/2024 11:49 AM    ALBUMIN 4.21 07/20/2017 12:35 PM     Lab Results   Component Value Date/Time    SODIUM 137 01/22/2024 11:49 AM    POTASSIUM 4.6 01/22/2024 11:49 AM    CHLORIDE 100 01/22/2024 11:49 AM    CO2 22 01/22/2024 11:49 AM    GLUCOSE 81 01/22/2024 11:49 AM    BUN 25 (H) 01/22/2024 11:49 AM    CREATININE 1.09 01/22/2024 11:49 AM    CALCIUM 8.8 01/22/2024 11:49 AM    MAGNESIUM 2.1 03/30/2023 01:03 AM     Lab Results   Component Value Date/Time    TOTALVOLUME random 07/20/2017 12:35 PM    TOTPROTUR See Note 07/20/2017 12:35 PM    MICROALBUR <1.2 06/13/2023 11:37 AM    MALBCRT see below 06/13/2023 11:37 AM     Lab Results   Component Value Date/Time    COLORURINE Yellow 06/13/2023 11:37 AM    SPECGRAVITY 1.015 06/13/2023 11:37 AM    PHURINE 6.5 06/13/2023 11:37 AM    GLUCOSEUR Negative 06/13/2023 11:37 AM    KETONES Negative 06/13/2023 11:37 AM    PROTEINURIN Negative 06/13/2023 11:37 AM     Lab Results   Component Value Date/Time    HEPCAB Non-Reactive 07/09/2020 09:37 AM     Lab Results   Component Value Date/Time    CHOLSTRLTOT 112 06/09/2022 09:41 AM    LDL 42 06/09/2022 09:41 AM    HDL 57 06/09/2022 09:41 AM    TRIGLYCERIDE 64 06/09/2022 09:41 AM    HBA1C 5.0 03/25/2023 01:01 AM       RADIOLOGY RESULTS REVIEWED AND INTERPRETED BY ME:    Results for orders placed during the hospital encounter of 12/31/19    DX-FOOT-COMPLETE 3+ RIGHT    Impression  1.  Negative for right foot fracture    2.  Bunion    3.  Mild midfoot osteoarthritis    Results for orders placed during the hospital encounter of 12/31/19    DX-KNEE COMPLETE 4+ RIGHT    Impression  Negative right knee series    Results for orders placed in visit on 09/20/20    DX-WRIST-COMPLETE 3+  RIGHT    Impression  1.  Nondisplaced distal radial metaphysis fracture.  2.  Suggestion of irregularity of the lunate which is new from prior. This is age indeterminate. Follow-up is suggested.    Results for orders placed during the hospital encounter of 12/22/20    DS-BONE DENSITY STUDY (DEXA)    Impression  According to the World Health Organization classification, bone mineral density of this patient is osteopenia with increased fracture risk left femur, normal density lumbar spine.    10-year Probability of Fracture:  Major Osteoporotic     41.3%  Hip     22.8%  Population      [place] ()    Based on left femur neck BMD    Results for orders placed during the hospital encounter of 05/16/23    CT-CHEST, HIGH RESOLUTION LUNG    Impression  1.  Interstitial opacifications, thickening of interlobular septa, and scattered areas of honeycombing are identified all of which are most prominent in the lung periphery. Findings are consistent with interstitial lung disease such as UIP.    2.  Bronchiectasis again noted.    3.  No change in mediastinal and hilar adenopathy.    4.  No change in hiatal hernia    Fleischner Society pulmonary nodule recommendations:  Not Applicable    Results for orders placed during the hospital encounter of 03/07/23    CT-CHEST (THORAX) W/O    Impression  1.  Interval appearance of diffuse ill-defined pulmonary infiltrates, groundglass opacities and interstitial septal thickening present throughout the lungs bilaterally with a peripheral predominance. There is also extensive bronchiectasis. These changes  are new from comparison and obscure the previously seen right upper lobe pulmonary nodule. Differential diagnosis includes sequelae of Covid pneumonia, interstitial lung disease, postinflammatory process as well as cryptogenic organizing pneumonia and  atypical infection.    2.  Multiple new enlarged mediastinal and hilar lymph nodes.    3.  Extensive atherosclerotic change of aorta  and coronary arteries.    4.  Splenomegaly.    5.  Atrophic right kidney.    Fleischner Society pulmonary nodule recommendations:  Not Applicable    Results for orders placed during the hospital encounter of 08/18/20    US-RENAL    Impression  1. Mild right hydronephrosis. The right kidney is smaller relative to the left.  2. No renal stones are detected.      All relevant laboratory and imaging results reported on this note were reviewed and interpreted by me.         Assessment & Plan     Naomy Vargas is a 73 y.o. female with history as noted above whose presentation merits the following clinical impressions and recommendations:    1. Interstitial lung disease (HCC)  UIP pattern of unclear etiology as extensive diagnostic investigation so far have been unrevealing. There is presently no historical or physical evidence to suggest the presence of an underlying autoimmune connective tissue disease. Previously ordered investigations have been unremarkable. However, in the setting of her serologic autoimmunity described below, the possibility of an evolving subclinical process cannot be entirely excluded. Reasonable to undertake the few more additional investigations noted below under serologic autoimmunity for risk stratification purposes.   - Supplemental home oxygen PRN per pulmonology  - Routine follow-up with pulmonology    2. Chronic hypoxemic respiratory failure (HCC)  Presumably secondary to her ILD as described above for which additional investigations have been unremarkable.  - Supplemental home oxygen PRN per pulmonology  - Routine follow-up with pulmonology    3. Arthralgia of right shoulder region  Presumably post strain due to heavy lifting. Okay to assess with a radiograph, but recommend following up with PCP for further investigation.  - DX-SHOULDER 2+ RIGHT; Future    4. Serologic autoimmunity (positive EBEN with anti-dsDNA)  Serologic evidence of immunologic activity with no objective clinical  evidence of an underlying EBEN-associated autoimmune disease, such as systemic or drug-induced lupus, inflammatory myopathy, or systemic sclerosis. Notably, the EBEN test is highly sensitive and lacks diagnostic specificity as it can be seen in a variety of non-rheumatic conditions, such as acute or chronic viral or bacterial infections (presumably via molecular mimicry), autoimmune thyroid disease (Hashimoto's thyroiditis or Graves' disease), autoimmune hepatobiliary disease, inflammatory bowel disease, and lymphoproliferative disease or malignancy, as well as in over 10% of healthy individuals with no clinical evidence of autoimmune rheumatic disease. In any case, it must be interpreted in the context of the overall clinical picture with close attention to disease-specific manifestations and disease-specific antibody subtypes. That said, the presence of persistently positive EBEN, especially in high or rising titers, does confer some risk of EBEN-associated disease, so if suspicious symptoms develop, clinical follow-up for re-evaluation would be reasonable. In the setting of her ILD with hypoxemic respiratory failure, reasonable to undertake the few additional workup noted below for exclusionary and risk stratification purposes.   - THYROID PEROXIDASE  (TPO) AB; Future  - ANTITHYROGLOBULIN AB; Future  - COMPLEMENT C3; Future  - COMPLEMENT C4; Future  - COMPLEMENT TOTAL (CH50); Future      The above assessment and plan were discussed with the patient who acknowledged understanding of the plan.    FOLLOW-UP: Return for follow-up as needed.         Thank you for the opportunity to participate in the care of Naomy Vargas.    Angela June M.D.  Internal Medicine Resident PGY2      ATTENDING ATTESTATION:  I personally saw and examined this patient, supervised and discussed the case with the resident who participated in the care of the patient. I have carefully reviewed the note in its entirety, made modifications  as deemed appropriate, and agree with its content. I hereby attest that the documentation accurately reflects the history, physical exam, assessment and plan of care for the patient.    Robert Pryor MD, MS, FACR  Rheumatologist, [facility] Rheumatology ? [facility]   of Clinical Medicine, Department of Internal Medicine  [facility] ? [facility]

## 2024-02-08 ENCOUNTER — NON-PROVIDER VISIT (OUTPATIENT)
Dept: SLEEP MEDICINE | Facility: MEDICAL CENTER | Age: 74
End: 2024-02-08
Attending: NURSE PRACTITIONER
Payer: MEDICARE

## 2024-02-08 VITALS — BODY MASS INDEX: 23.07 KG/M2 | WEIGHT: 147 LBS | HEIGHT: 67 IN

## 2024-02-08 DIAGNOSIS — J84.10 LUNG FIBROSIS (HCC): ICD-10-CM

## 2024-02-08 DIAGNOSIS — R06.02 SOB (SHORTNESS OF BREATH): ICD-10-CM

## 2024-02-08 DIAGNOSIS — J43.2 CENTRILOBULAR EMPHYSEMA (HCC): ICD-10-CM

## 2024-02-08 DIAGNOSIS — J84.9 ILD (INTERSTITIAL LUNG DISEASE) (HCC): ICD-10-CM

## 2024-02-08 DIAGNOSIS — J96.11 CHRONIC RESPIRATORY FAILURE WITH HYPOXIA (HCC): ICD-10-CM

## 2024-02-08 PROCEDURE — 94729 DIFFUSING CAPACITY: CPT | Performed by: NURSE PRACTITIONER

## 2024-02-08 PROCEDURE — 94729 DIFFUSING CAPACITY: CPT | Mod: 26 | Performed by: INTERNAL MEDICINE

## 2024-02-08 PROCEDURE — 94726 PLETHYSMOGRAPHY LUNG VOLUMES: CPT | Performed by: NURSE PRACTITIONER

## 2024-02-08 PROCEDURE — 94726 PLETHYSMOGRAPHY LUNG VOLUMES: CPT | Mod: 26 | Performed by: INTERNAL MEDICINE

## 2024-02-08 PROCEDURE — 94060 EVALUATION OF WHEEZING: CPT | Mod: 26 | Performed by: INTERNAL MEDICINE

## 2024-02-08 PROCEDURE — 94060 EVALUATION OF WHEEZING: CPT | Performed by: NURSE PRACTITIONER

## 2024-02-08 ASSESSMENT — PULMONARY FUNCTION TESTS
FEV1: 2.37
FEV1/FVC: 79
FEV1_PREDICTED: 2.31
FEV1_PERCENT_CHANGE: 2
FEV1_PERCENT_PREDICTED: 98
FEV1_LLN: 1.93
FEV1/FVC_PERCENT_PREDICTED: 105
FEV1/FVC: 79
FEV1/FVC_PERCENT_PREDICTED: 104
FEV1/FVC_PERCENT_PREDICTED: 102
FVC: 2.94
FEV1/FVC_PERCENT_CHANGE: 200
FEV1/FVC_PERCENT_CHANGE: 2
FEV1_PERCENT_CHANGE: 4
FEV1/FVC: 80.61
FVC_PERCENT_PREDICTED: 94
FVC_PERCENT_PREDICTED: 97
FVC: 2.87
FEV1/FVC: 81
FEV1_PERCENT_PREDICTED: 102
FEV1/FVC_PREDICTED: 77
FEV1: 2.26
FEV1/FVC_PERCENT_LLN: 65
FEV1/FVC_PERCENT_PREDICTED: 104
FEV1/FVC_PERCENT_PREDICTED: 76
FVC_PREDICTED: 3.02
FVC_LLN: 2.52

## 2024-02-08 ASSESSMENT — FIBROSIS 4 INDEX: FIB4 SCORE: 2.91

## 2024-02-08 NOTE — PROCEDURES
Technician: JUMANA Dietz    Technician Comment:  Good patient effort & cooperation.  The results of this test meet the ATS/ERS standards for acceptability & reproducibility.  Test was performed on the [employer] Plethysm[employer]-Elite DX system.  Predicted values were GLI-2012 for spirometry, GLI-2020 for DLCO, ITS for Lung Volumes.  The DLCO was uncorrected for Hgb.  A bronchodilator of Albuterol HFA -2puffs via spacer administered.  DLCO performed during dilation period.    Interpretation:  Baseline spirometry is normal with a negative bronchodilator response.  Flow volume loops are unremarkable.  Full lung volumes demonstrate a reduction in reserve volume.  DLCO is reduced.     Summary:  A reduced DLCO with a restrictive pattern on spirometry can be consistent with many conditions including, but not limited to, interstitial lung disease, lung resection, or congestive heart failure.  Correlate with exam and imaging. If edema is noted on exam, consider ECHO testing. Also correlate with a recent Hgb level.     I, Levi Perez DO, am the author of this note.     Levi Perez DO  Staff Pulmonologist and Intensivist  [facility]     Please note that this dictation was created using voice recognition software. The accuracy of the dictation is limited to the abilities of the software. I have made every reasonable attempt to correct obvious errors, but I expect that there are errors of grammar and possibly content that I did not discover before finalizing the note.

## 2024-02-16 ENCOUNTER — PATIENT MESSAGE (OUTPATIENT)
Dept: HEALTH INFORMATION MANAGEMENT | Facility: OTHER | Age: 74
End: 2024-02-16

## 2024-02-20 ENCOUNTER — NON-PROVIDER VISIT (OUTPATIENT)
Dept: SLEEP MEDICINE | Facility: MEDICAL CENTER | Age: 74
End: 2024-02-20
Attending: NURSE PRACTITIONER
Payer: MEDICARE

## 2024-02-20 VITALS
OXYGEN SATURATION: 92 % | RESPIRATION RATE: 16 BRPM | HEIGHT: 67 IN | BODY MASS INDEX: 23.07 KG/M2 | SYSTOLIC BLOOD PRESSURE: 128 MMHG | DIASTOLIC BLOOD PRESSURE: 87 MMHG | WEIGHT: 147 LBS | HEART RATE: 87 BPM

## 2024-02-20 DIAGNOSIS — R06.02 SOB (SHORTNESS OF BREATH): ICD-10-CM

## 2024-02-20 DIAGNOSIS — J84.9 ILD (INTERSTITIAL LUNG DISEASE) (HCC): ICD-10-CM

## 2024-02-20 DIAGNOSIS — J43.2 CENTRILOBULAR EMPHYSEMA (HCC): ICD-10-CM

## 2024-02-20 DIAGNOSIS — J84.10 LUNG FIBROSIS (HCC): ICD-10-CM

## 2024-02-20 DIAGNOSIS — J96.11 CHRONIC RESPIRATORY FAILURE WITH HYPOXIA (HCC): ICD-10-CM

## 2024-02-20 PROBLEM — R93.89 ABNORMAL CT OF THE CHEST: Status: RESOLVED | Noted: 2023-03-24 | Resolved: 2024-02-20

## 2024-02-20 PROBLEM — R79.89 ELEVATED TROPONIN: Status: RESOLVED | Noted: 2023-03-23 | Resolved: 2024-02-20

## 2024-02-20 PROBLEM — A41.9 SEPSIS (HCC): Status: RESOLVED | Noted: 2023-03-23 | Resolved: 2024-02-20

## 2024-02-20 PROCEDURE — 3079F DIAST BP 80-89 MM HG: CPT | Performed by: NURSE PRACTITIONER

## 2024-02-20 PROCEDURE — 3074F SYST BP LT 130 MM HG: CPT | Performed by: NURSE PRACTITIONER

## 2024-02-20 PROCEDURE — 94618 PULMONARY STRESS TESTING: CPT | Performed by: NURSE PRACTITIONER

## 2024-02-20 PROCEDURE — 99212 OFFICE O/P EST SF 10 MIN: CPT | Performed by: NURSE PRACTITIONER

## 2024-02-20 PROCEDURE — 99214 OFFICE O/P EST MOD 30 MIN: CPT | Performed by: NURSE PRACTITIONER

## 2024-02-20 ASSESSMENT — 6 MINUTE WALK TEST (6MWT)
PERCEIVED FATIGUE AT 3 MIN: 2
NUMBER OF RESTS: 0
PERCEIVED BREATHLESSNESS AT 1 MIN: 0.5
PERCEIVED FATIGUE AT 1 MIN: 2
PERCEIVED FATIGUE AT 4 MIN: 2
PERCEIVED BREATHLESSNESS AT 1 MIN: 0
PERCEIVED BREATHLESSNESS AT 3 MIN: 0
O2 FLOW RATE - LITERS PER MIN: 1
PERCEIVED BREATHLESSNESS AT 6 MIN: 0.5
SITTING BLOOD PRESSURE: 118/70
SAO2 AT 6 MIN: 91
SAO2 AT 1 MIN: 91
PERCEIVED FATIGUE AT 2 MIN: 2
PERCEIVED BREATHLESSNESS AT 4 MIN: 0.5
SAO2 AT 4 MIN: 92
BLOOD PRESSURE: RIGHT ARM
SAO2 AT 2 MIN: 95
PERCEIVED BREATHLESSNESS AT 2 MIN: 0
PERCENT OF NORMAL WALKED: 74
SAO2 AT 5 MIN: 99
HEART RATE AT 5 MIN: 99
HEART RATE: 79
PERCEIVED BREATHLESSNESS AT 2 MIN: 0.5
O2 SATURATION: PULSE
PERCEIVED FATIGUE AT 2 MIN: 2
HEART RATE AT 2 MIN: 92
PERCEIVED FATIGUE AT 1 MIN: 2
HEART RATE AT 1 MIN: 85
SAO2 AT 1 MIN: 96
O2 SAT PERCENT ON O2: 98
HEART RATE AT 2 MIN: 89
HEART RATE AT 3 MIN: 95
PERCEIVED BREATHLESSNESS AT 5 MIN: 0.5
HEART RATE AT 1 MIN: 87
BLOOD PRESSURE AT 1 MIN: 128/76
PERCEIVED FATIGUE AT 5 MIN: 2
HEART RATE AT 4 MIN: 98
TOTAL REST TIME: 0
SAO2 AT 3 MIN: 93
HEART RATE AT 6 MIN: 95
PERCEIVED FATIGUE AT 6 MIN: 2
AMBULATES WITH O2: WITH O2
SAO2 AT 2 MIN: 92

## 2024-02-20 ASSESSMENT — FIBROSIS 4 INDEX: FIB4 SCORE: 2.95

## 2024-02-20 NOTE — ASSESSMENT & PLAN NOTE
Chronic, stable. Currently taking amlodipine 5 mg and lisinopril 20 mg daily. In-office blood pressure is 118/62. Denies chest pain, lightheadedness, palpitations, and lower extremity edema.

## 2024-02-20 NOTE — ASSESSMENT & PLAN NOTE
"Chronic, stable. Reviewed DEXA scan from December 2020: \"according to the World Health Organization classification, bone mineral density of this patient is osteopenia with increased fracture risk left femur, normal density lumbar spine.\" Denies recent falls or fractures.    "

## 2024-02-20 NOTE — ASSESSMENT & PLAN NOTE
Chronic, stable. Currently taking levothyroxine 100 mcg daily as prescribed. Denies fatigue, palpitations, hair and skin changes, temperature intolerance, changes in bowel habits, and weight loss or weight gain.

## 2024-02-20 NOTE — ASSESSMENT & PLAN NOTE
"Stable. Reviewed CT of the chest from March 2023 \"stable irregular nodule at the posterior right lung apex, likely scar.\"  "

## 2024-02-20 NOTE — ASSESSMENT & PLAN NOTE
Chronic, stable. Reports that symptoms are well-managed on duoneb, symbicort, and albuterol as needed. Denies recent exacerbation, cough, and shortness of breath.

## 2024-02-20 NOTE — PROGRESS NOTES
Chief Complaint   Patient presents with    Follow-Up     LAST SEEN 08/18/2023 GEMA BROWN  F/V PFT 6MW RESULTS       HPI:  Naomy Vargas is a 74 y.o. 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 Patient is a former smoker.  Also has a history of COPD with a 40.5-pack-year history quit 6 years ago, hypertension, DLD.  Previous CT scan demonstrated interval development of diffuse interstitial septal thickening and some groundglass infiltrates with peripheral predominance suggestive of ILD.  She was admitted to the ICU for sepsis secondary to presumed underlying pulmonary infection complicated by acute hypoxemic respiratory failure.  She was given high-dose steroids and antibiotics.    Family history of autoimmune disease.  No joint pain or urinary difficulty.  Previously positive for COVID.  Patient has seen rheumatology since last visit.  They do not think that ILD is caused by connective tissue disease.  Patient was also previously referred to pulmonary rehab and completed therapy.  He presents today to follow-up for PFT and 6-minute walk test.  Currently she denies any symptoms including cough, shortness of breath, dyspnea, wheezing, chest tightness, or chest congestion.  She is not currently on any inhalers at this time.    15 and 6-minute walk test were reviewed today with patient which do show moderate improvement compared to previous.  6-minute walk test done today showed patient only requires 1 L/min of supplemental oxygen where before she was requiring 3.  On PFT, lung volumes, DLCO, and FEV1 and FVC have all improved significantly compared to previous.      ROS: As per HPI and otherwise negative if not stated.    Past Medical History:   Diagnosis Date    Abnormal CT of the chest     Acute hypoxemic respiratory failure (HCC) 03/23/2023    Anxiety     Arthritis     wrists    Back pain      Blood transfusion without reported diagnosis     with ectopic preg x 2     Breath shortness     with exertion    Cataract     bilateral removal-Dr. Duke Talamantes    Cellulitis     right lower leg    Cellulitis of leg 11/01/2013    Followed by dermatology. Managed with fluocinonide and Bactroban on right leg Left leg she uses triamcinolone and Sarna pramocaine (anesthetic, antipruretic) on both.      Chickenpox     COPD (chronic obstructive pulmonary disease) ([facility])     Dental disorder     upper denture    Earache     Elevated troponin     Fatigue     Frequent urination     Hypertension 02/23/2018    on no meds at this time    Hypothyroidism     Influenza     Insomnia     Kidney disease     reports 1 kidney is smaller than the other    Mumps     OAB (overactive bladder) 02/11/2019    Pain 02/23/2018    right leg and back, 5/10    Peripheral vascular disease, unspecified ([facility]) 10/13/2021    Pneumonia     Rash     Ringing in ears     Sepsis ([facility]) 03/23/2023    Shortness of breath     Swelling of lower extremity     Thyroid disease     Tonsillitis     Toothache     Venous stasis dermatitis     right leg       Past Surgical History:   Procedure Laterality Date    VEIN LIGATION RADIO FREQUENCY Right 2/26/2018    Procedure: VEIN LIGATION RADIO FREQUENCY- LONG SAPENOUS;  Surgeon: Jim Alas M.D.;  Location: SURGERY [facility];  Service: General    ABDOMINAL EXPLORATION      2 ectopic preg    BREAST BIOPSY      hx of benign left breast biopsy    EYE SURGERY      cataract x2    HYSTERECTOMY LAPAROSCOPY      HYSTERECTOMY, TOTAL ABDOMINAL  1977/1978    OOPHORECTOMY      PB MAMMARY DUCTOGRAM, SINGLE      OR REMV 2ND CATARACT,CORN-SCLER SECTN      TONSILLECTOMY         Family History   Problem Relation Age of Onset    Arthritis Mother         hip fracture    Diabetes Mother     Cancer Mother         skin    Arthritis Father         lung    Alcohol/Drug Father         etoh    Lung Disease Sister         smoker/copd     "Hypertension Sister     Other Brother         hep c    Arthritis Maternal Grandmother         hip fracture    Diabetes Maternal Grandfather     No Known Problems Son     No Known Problems Son     No Known Problems Brother     No Known Problems Sister     No Known Problems Brother     Hyperlipidemia Neg Hx     Stroke Neg Hx        Allergies as of 02/20/2024 - Reviewed 02/20/2024   Allergen Reaction Noted    Colophony [pinus strobus] Itching 05/31/2018    Latex Rash and Unspecified 02/23/2018    Neosporin [neomycin-polymyxin b] Rash 02/23/2018    Phenylene Itching 05/31/2018    Soap Rash 02/23/2018    Tape Itching 02/23/2018    Tea tree oil Rash 02/23/2018        Vitals:  /87 (BP Location: Left arm, Patient Position: Sitting, BP Cuff Size: Adult)   Pulse 87   Resp 16   Ht 1.702 m (5' 7\")   Wt 66.7 kg (147 lb)   SpO2 92%     Current medications as of today   Current Outpatient Medications   Medication Sig Dispense Refill    rosuvastatin (CRESTOR) 10 MG Tab TAKE 1 TABLET BY MOUTH EVERY DAY IN THE EVENING 100 Tablet 1    levothyroxine (SYNTHROID) 100 MCG Tab TAKE 1 TABLET BY MOUTH EVERY DAY BEFORE BREAKFAST 100 Tablet 0    triamcinolone acetonide (KENALOG) 0.1 % Cream APPLY 1 APPLICATION TOPICALLY 2 TIMES A DAY AS NEEDED. 80 g 2    lisinopril (PRINIVIL) 20 MG Tab TAKE 1 TABLET BY MOUTH EVERY  Tablet 3    ipratropium-albuterol (DUONEB) 0.5-2.5 (3) MG/3ML nebulizer solution Take 3 mL by nebulization every four hours as needed for Shortness of Breath (Wheezing). 120 Each 3    amLODIPine (NORVASC) 5 MG Tab Take 1 Tablet by mouth every day. 100 Tablet 2    terbinafine (LAMISIL) 1 % cream Apply to affected areas 1-2 times per day as needed for rash 42 g 1    SYMBICORT 80-4.5 MCG/ACT Aerosol INHALE 2 PUFFS BY MOUTH TWICE A DAY 30.6 Each 2    albuterol 108 (90 Base) MCG/ACT Aero Soln inhalation aerosol Inhale 1-2 Puffs every 6 hours as needed for Shortness of Breath. 8.5 g 2    PROAIR  (90 Base) MCG/ACT " Aero Soln inhalation aerosol Inhale 1 Puff every 6 hours as needed for Shortness of Breath. 8.5 g 1    Mirabegron ER (MYRBETRIQ) 50 MG TABLET SR 24 HR Myrbetriq 50 mg tablet,extended release   Take 1 tablet every day by oral route for 90 days.      Ascorbic Acid (VITAMIN C PO) Take  by mouth.      Doxylamine Succinate, Sleep, (SLEEP AID PO) Take  by mouth.      Dextromethorphan-guaiFENesin (MUCINEX DM PO) Take  by mouth.      loperamide (IMODIUM) 2 MG Cap Take 1 Capsule by mouth 4 times a day as needed for Diarrhea. 30 Capsule 1    cyanocobalamin (VITAMIN B-12) 100 MCG Tab Take 100 mcg by mouth every day.      vitamin D (CHOLECALCIFEROL) 1000 Unit (25 mcg) Tab Take 1,000 Units by mouth every day.      Melatonin 1 MG Tab Take  by mouth.      diphenhydrAMINE (BENADRYL) 25 MG Tab Take 25 mg by mouth every 6 hours as needed for Sleep.      Acetaminophen 500 MG Cap Take  by mouth.      Oxymetazoline HCl (NASAL SPRAY NA) Spray  in nose.       No current facility-administered medications for this visit.         Physical Exam:   Gen:           Alert and oriented, No apparent distress. Mood and affect appropriate, normal interaction with examiner.  Eyes:          PERRL, EOM intact, sclere white, conjunctive moist.  Ears:          Not examined.   Hearing:     Grossly intact.  Nose:          Normal, no lesions or deformities.  Dentition:    Good dentition.  Oropharynx:   Tongue normal, posterior pharynx without erythema or exudate.  Neck:        Supple, trachea midline, no masses.  Respiratory Effort: No intercostal retractions or use of accessory muscles.   Lung Auscultation:      Clear to auscultation bilaterally; no rales, rhonchi or wheezing.  CV:            Regular rate and rhythm. No murmurs, rubs or gallops.  Abd:           Not examined.   Lymphadenopathy: Not examined.  Gait and Station: Normal.  Digits and Nails: No clubbing, cyanosis, petechiae, or nodes.   Cranial Nerves: II-XII grossly intact.  Skin:        No  rashes, lesions or ulcers noted.               Ext:           No cyanosis or edema.      Assessment:  1. Lung fibrosis (HCC)  PULMONARY FUNCTION TESTS -Test requested: Complete Pulmonary Function Test    Pulmonary Stress Testing (6-minute walk)    CT-CHEST (THORAX) W/O      2. ILD (interstitial lung disease) (HCC)  PULMONARY FUNCTION TESTS -Test requested: Complete Pulmonary Function Test    Pulmonary Stress Testing (6-minute walk)    CT-CHEST (THORAX) W/O      3. Centrilobular emphysema (HCC)  PULMONARY FUNCTION TESTS -Test requested: Complete Pulmonary Function Test    Pulmonary Stress Testing (6-minute walk)    CT-CHEST (THORAX) W/O            Plan:  CTs and 6-minute walk show moderate improvement compared to previous.  Will place order for CT and 6-minute walk test in 6 months.  Will also order CT chest to follow-up.  Patient is currently asymptomatic today.    Please note that this dictation was created using voice recognition software. I have made every reasonable attempt to correct obvious errors, but it is possible there are errors of grammar and possibly content that I did not discover before finalizing the note.

## 2024-02-20 NOTE — ASSESSMENT & PLAN NOTE
Alan. Reviewed CTA abdomen pelvis from February 2021. Denies back pain, chest pain, and shortness of breath.

## 2024-02-20 NOTE — ASSESSMENT & PLAN NOTE
Provided education on heart healthy diet with adequate intake of fruits, vegetables, and whole grains. Encourage 30 minutes of moderate exercise 3-4 times a week.

## 2024-02-20 NOTE — ASSESSMENT & PLAN NOTE
Chronic, stable. Most recent GFR was 53 in January 2024. Denies nausea, vomiting, headache, fatigue, and muscle cramps. Provided education on avoiding high-dose NSAIDs. Encourage a low-protein diet. Limit sodium intake to less than 2 grams/day. Encourage 30 minutes of moderate exercise 3-4 times a week. Followed by nephrology, Dr. Lawrence.

## 2024-02-20 NOTE — PROCEDURES
6-minute walk test    Baseline vitals on 1 L of oxygen by nasal cannula are an oxygen saturation of 98% with a blood pressure of 118/70 and a heart rate of 79 bpm.  Patient was able to ambulate for 6 minutes without stopping on 1 L of oxygen by nasal cannula with an end oxygen saturation of 91% and a heart rate of 95 bpm.  Patient was able to ambulate 1080 feet (360 m).    I, Dr. Liliam Jensen have interpreted and dictated the results of this study.    Liliam Jensen MD RD  Pulmonary and Critical Care    Available on [facility]

## 2024-02-20 NOTE — ASSESSMENT & PLAN NOTE
Chronic, stable. Reviewed chest x-ray from March 2023. Currently taking rosuvastatin 10 mg daily. Denies chest pain, pain with ambulation, and weakness of extremities.

## 2024-02-20 NOTE — ASSESSMENT & PLAN NOTE
Chronic, stable. Reviewed CT of the chest from May 2023. Reports that she recently completed an 8-week pulmonary rehabilitation program twice a week. Denies chest pain, cough, and shortness of breath. Followed by pulmonology, Renato VORA.

## 2024-02-20 NOTE — ASSESSMENT & PLAN NOTE
Chronic, stable. History of statis dermatitis. Reports vein ligation in 2018. Denies lower extremity pain and swelling. Previously followed by dermatology.

## 2024-02-21 ENCOUNTER — OFFICE VISIT (OUTPATIENT)
Dept: FAMILY PLANNING/WOMEN'S HEALTH CLINIC | Facility: PHYSICIAN GROUP | Age: 74
End: 2024-02-21
Attending: FAMILY MEDICINE
Payer: MEDICARE

## 2024-02-21 VITALS
HEIGHT: 67 IN | DIASTOLIC BLOOD PRESSURE: 62 MMHG | WEIGHT: 146 LBS | BODY MASS INDEX: 22.91 KG/M2 | SYSTOLIC BLOOD PRESSURE: 118 MMHG

## 2024-02-21 DIAGNOSIS — R76.8 SEROLOGIC AUTOIMMUNITY: ICD-10-CM

## 2024-02-21 DIAGNOSIS — Z12.12 ENCOUNTER FOR COLORECTAL CANCER SCREENING: ICD-10-CM

## 2024-02-21 DIAGNOSIS — N18.32 STAGE 3B CHRONIC KIDNEY DISEASE: ICD-10-CM

## 2024-02-21 DIAGNOSIS — M54.50 CHRONIC RIGHT-SIDED LOW BACK PAIN WITHOUT SCIATICA: ICD-10-CM

## 2024-02-21 DIAGNOSIS — I77.1 BILATERAL ILIAC ARTERY STENOSIS (HCC): ICD-10-CM

## 2024-02-21 DIAGNOSIS — E03.9 HYPOTHYROIDISM, UNSPECIFIED TYPE: ICD-10-CM

## 2024-02-21 DIAGNOSIS — R91.8 PULMONARY NODULES: ICD-10-CM

## 2024-02-21 DIAGNOSIS — M85.80 OSTEOPENIA, UNSPECIFIED LOCATION: ICD-10-CM

## 2024-02-21 DIAGNOSIS — I70.0 AORTIC ATHEROSCLEROSIS (HCC): ICD-10-CM

## 2024-02-21 DIAGNOSIS — J84.9 INTERSTITIAL LUNG DISEASE (HCC): ICD-10-CM

## 2024-02-21 DIAGNOSIS — I10 ESSENTIAL HYPERTENSION: ICD-10-CM

## 2024-02-21 DIAGNOSIS — I71.40 ABDOMINAL AORTIC ANEURYSM (AAA) 3.0 CM TO 5.0 CM IN DIAMETER IN FEMALE (HCC): ICD-10-CM

## 2024-02-21 DIAGNOSIS — J43.9 PULMONARY EMPHYSEMA, UNSPECIFIED EMPHYSEMA TYPE (HCC): ICD-10-CM

## 2024-02-21 DIAGNOSIS — J96.11 CHRONIC HYPOXEMIC RESPIRATORY FAILURE (HCC): ICD-10-CM

## 2024-02-21 DIAGNOSIS — Z12.11 ENCOUNTER FOR COLORECTAL CANCER SCREENING: ICD-10-CM

## 2024-02-21 DIAGNOSIS — I87.2 VENOUS STASIS DERMATITIS OF BOTH LOWER EXTREMITIES: ICD-10-CM

## 2024-02-21 DIAGNOSIS — G89.29 CHRONIC RIGHT-SIDED LOW BACK PAIN WITHOUT SCIATICA: ICD-10-CM

## 2024-02-21 DIAGNOSIS — N32.81 OAB (OVERACTIVE BLADDER): ICD-10-CM

## 2024-02-21 DIAGNOSIS — M25.511 ARTHRALGIA OF RIGHT SHOULDER REGION: ICD-10-CM

## 2024-02-21 PROCEDURE — G0439 PPPS, SUBSEQ VISIT: HCPCS

## 2024-02-21 PROCEDURE — 3078F DIAST BP <80 MM HG: CPT

## 2024-02-21 PROCEDURE — 1125F AMNT PAIN NOTED PAIN PRSNT: CPT

## 2024-02-21 PROCEDURE — 3074F SYST BP LT 130 MM HG: CPT

## 2024-02-21 SDOH — SOCIAL STABILITY: SOCIAL NETWORK: HOW OFTEN DO YOU ATTENT MEETINGS OF THE CLUB OR ORGANIZATION YOU BELONG TO?: NEVER

## 2024-02-21 SDOH — SOCIAL STABILITY: SOCIAL NETWORK
DO YOU BELONG TO ANY CLUBS OR ORGANIZATIONS SUCH AS CHURCH GROUPS UNIONS, FRATERNAL OR ATHLETIC GROUPS, OR SCHOOL GROUPS?: NO

## 2024-02-21 SDOH — HEALTH STABILITY: MENTAL HEALTH: HOW OFTEN DO YOU HAVE 6 OR MORE DRINKS ON ONE OCCASION?: WEEKLY

## 2024-02-21 SDOH — ECONOMIC STABILITY: INCOME INSECURITY: HOW HARD IS IT FOR YOU TO PAY FOR THE VERY BASICS LIKE FOOD, HOUSING, MEDICAL CARE, AND HEATING?: NOT VERY HARD

## 2024-02-21 SDOH — HEALTH STABILITY: MENTAL HEALTH: HOW OFTEN DO YOU HAVE A DRINK CONTAINING ALCOHOL?: MONTHLY OR LESS

## 2024-02-21 SDOH — ECONOMIC STABILITY: TRANSPORTATION INSECURITY
IN THE PAST 12 MONTHS, HAS THE LACK OF TRANSPORTATION KEPT YOU FROM MEDICAL APPOINTMENTS OR FROM GETTING MEDICATIONS?: YES

## 2024-02-21 SDOH — SOCIAL STABILITY: SOCIAL NETWORK: IN A TYPICAL WEEK, HOW MANY TIMES DO YOU TALK ON THE PHONE WITH FAMILY, FRIENDS, OR NEIGHBORS?: TWICE A WEEK

## 2024-02-21 SDOH — HEALTH STABILITY: MENTAL HEALTH
STRESS IS WHEN SOMEONE FEELS TENSE, NERVOUS, ANXIOUS, OR CAN'T SLEEP AT NIGHT BECAUSE THEIR MIND IS TROUBLED. HOW STRESSED ARE YOU?: ONLY A LITTLE

## 2024-02-21 SDOH — SOCIAL STABILITY: SOCIAL NETWORK: ARE YOU MARRIED, WIDOWED, DIVORCED, SEPARATED, NEVER MARRIED, OR LIVING WITH A PARTNER?: MARRIED

## 2024-02-21 SDOH — ECONOMIC STABILITY: FOOD INSECURITY: WITHIN THE PAST 12 MONTHS, YOU WORRIED THAT YOUR FOOD WOULD RUN OUT BEFORE YOU GOT MONEY TO BUY MORE.: NEVER TRUE

## 2024-02-21 SDOH — SOCIAL STABILITY: SOCIAL NETWORK: HOW OFTEN DO YOU GET TOGETHER WITH FRIENDS OR RELATIVES?: ONCE A WEEK

## 2024-02-21 SDOH — ECONOMIC STABILITY: FOOD INSECURITY: WITHIN THE PAST 12 MONTHS, THE FOOD YOU BOUGHT JUST DIDN'T LAST AND YOU DIDN'T HAVE MONEY TO GET MORE.: NEVER TRUE

## 2024-02-21 SDOH — HEALTH STABILITY: MENTAL HEALTH: HOW MANY STANDARD DRINKS CONTAINING ALCOHOL DO YOU HAVE ON A TYPICAL DAY?: 1 OR 2

## 2024-02-21 SDOH — ECONOMIC STABILITY: INCOME INSECURITY: IN THE PAST 12 MONTHS, HAS THE ELECTRIC, GAS, OIL, OR WATER COMPANY THREATENED TO SHUT OFF SERVICE IN YOUR HOME?: YES

## 2024-02-21 SDOH — ECONOMIC STABILITY: HOUSING INSECURITY
IN THE LAST 12 MONTHS, WAS THERE A TIME WHEN YOU DID NOT HAVE A STEADY PLACE TO SLEEP OR SLEPT IN A SHELTER (INCLUDING NOW)?: NO

## 2024-02-21 SDOH — HEALTH STABILITY: PHYSICAL HEALTH: ON AVERAGE, HOW MANY DAYS PER WEEK DO YOU ENGAGE IN MODERATE TO STRENUOUS EXERCISE (LIKE A BRISK WALK)?: 0 DAYS

## 2024-02-21 SDOH — ECONOMIC STABILITY: TRANSPORTATION INSECURITY
IN THE PAST 12 MONTHS, HAS LACK OF TRANSPORTATION KEPT YOU FROM MEETINGS, WORK, OR FROM GETTING THINGS NEEDED FOR DAILY LIVING?: YES

## 2024-02-21 SDOH — ECONOMIC STABILITY: INCOME INSECURITY: IN THE LAST 12 MONTHS, WAS THERE A TIME WHEN YOU WERE NOT ABLE TO PAY THE MORTGAGE OR RENT ON TIME?: YES

## 2024-02-21 SDOH — HEALTH STABILITY: PHYSICAL HEALTH: ON AVERAGE, HOW MANY MINUTES DO YOU ENGAGE IN EXERCISE AT THIS LEVEL?: 0 MIN

## 2024-02-21 SDOH — SOCIAL STABILITY: SOCIAL NETWORK: HOW OFTEN DO YOU ATTEND CHURCH OR RELIGIOUS SERVICES?: NEVER

## 2024-02-21 SDOH — ECONOMIC STABILITY: HOUSING INSECURITY: IN THE LAST 12 MONTHS, HOW MANY PLACES HAVE YOU LIVED?: 1

## 2024-02-21 ASSESSMENT — FIBROSIS 4 INDEX: FIB4 SCORE: 2.95

## 2024-02-21 ASSESSMENT — ENCOUNTER SYMPTOMS: GENERAL WELL-BEING: FAIR

## 2024-02-21 ASSESSMENT — LIFESTYLE VARIABLES
SKIP TO QUESTIONS 9-10: 0
AUDIT-C TOTAL SCORE: 4

## 2024-02-21 ASSESSMENT — ACTIVITIES OF DAILY LIVING (ADL): BATHING_REQUIRES_ASSISTANCE: 0

## 2024-02-21 ASSESSMENT — PAIN SCALES - GENERAL: PAINLEVEL: 4=SLIGHT-MODERATE PAIN

## 2024-02-21 ASSESSMENT — PATIENT HEALTH QUESTIONNAIRE - PHQ9: CLINICAL INTERPRETATION OF PHQ2 SCORE: 0

## 2024-02-21 NOTE — PROGRESS NOTES
Comprehensive Health Assessment Program     Naomy Vargas is a 74 y.o. here for her comprehensive health assessment.    Patient Active Problem List    Diagnosis Date Noted    Encounter for colorectal cancer screening 02/21/2024    Arthralgia of right shoulder region 01/24/2024    Serologic autoimmunity (positive EBEN with anti-dsDNA) 09/20/2023    Chronic hypoxemic respiratory failure (HCC) 09/20/2023    Pulmonary hypertension (HCC) 03/25/2023    Nonrheumatic tricuspid valve regurgitation 03/25/2023    Leukocytosis 03/24/2023    Interstitial lung disease (HCC) 03/23/2023    Pleural effusion 03/23/2023    Disorder of arteries and arterioles (Formerly [facility]) 04/12/2022    Aortic atherosclerosis (Formerly [facility]) 10/13/2021    B12 deficiency 07/22/2020    Thrombocytopenia, unspecified (Formerly [facility]) 06/24/2020    OAB (overactive bladder) 02/11/2019    Abdominal aortic aneurysm (AAA) 3.0 cm to 5.0 cm in diameter in female (Formerly [facility]) 11/16/2018    Bilateral iliac artery stenosis (Formerly [facility]) 11/16/2018    Essential hypertension 08/06/2018    Chronic obstructive pulmonary disease (Formerly [facility]) 04/17/2018    Pulmonary nodules 04/17/2018    Osteopenia 04/28/2017    Chronic right-sided low back pain without sciatica 06/06/2016    Venous stasis dermatitis of both lower extremities 07/15/2015    Hypothyroidism 07/14/2015    CKD (chronic kidney disease) stage 3, GFR 30-59 ml/min (Formerly [facility]) 11/02/2013    Hyperglycemia 11/02/2013       Current Outpatient Medications   Medication Sig Dispense Refill    rosuvastatin (CRESTOR) 10 MG Tab TAKE 1 TABLET BY MOUTH EVERY DAY IN THE EVENING 100 Tablet 1    levothyroxine (SYNTHROID) 100 MCG Tab TAKE 1 TABLET BY MOUTH EVERY DAY BEFORE BREAKFAST 100 Tablet 0    triamcinolone acetonide (KENALOG) 0.1 % Cream APPLY 1 APPLICATION TOPICALLY 2 TIMES A DAY AS NEEDED. 80 g 2    lisinopril (PRINIVIL) 20 MG Tab TAKE 1 TABLET BY MOUTH EVERY  Tablet 3    ipratropium-albuterol (DUONEB) 0.5-2.5 (3) MG/3ML nebulizer solution Take 3 mL by  nebulization every four hours as needed for Shortness of Breath (Wheezing). 120 Each 3    amLODIPine (NORVASC) 5 MG Tab Take 1 Tablet by mouth every day. 100 Tablet 2    SYMBICORT 80-4.5 MCG/ACT Aerosol INHALE 2 PUFFS BY MOUTH TWICE A DAY 30.6 Each 2    albuterol 108 (90 Base) MCG/ACT Aero Soln inhalation aerosol Inhale 1-2 Puffs every 6 hours as needed for Shortness of Breath. 8.5 g 2    PROAIR  (90 Base) MCG/ACT Aero Soln inhalation aerosol Inhale 1 Puff every 6 hours as needed for Shortness of Breath. 8.5 g 1    Mirabegron ER (MYRBETRIQ) 50 MG TABLET SR 24 HR Myrbetriq 50 mg tablet,extended release   Take 1 tablet every day by oral route for 90 days.      Ascorbic Acid (VITAMIN C PO) Take  by mouth.      Doxylamine Succinate, Sleep, (SLEEP AID PO) Take  by mouth.      Dextromethorphan-guaiFENesin (MUCINEX DM PO) Take  by mouth.      loperamide (IMODIUM) 2 MG Cap Take 1 Capsule by mouth 4 times a day as needed for Diarrhea. 30 Capsule 1    cyanocobalamin (VITAMIN B-12) 100 MCG Tab Take 100 mcg by mouth every day.      vitamin D (CHOLECALCIFEROL) 1000 Unit (25 mcg) Tab Take 1,000 Units by mouth every day.      Melatonin 1 MG Tab Take  by mouth.      diphenhydrAMINE (BENADRYL) 25 MG Tab Take 25 mg by mouth every 6 hours as needed for Sleep.      Acetaminophen 500 MG Cap Take  by mouth.      Oxymetazoline HCl (NASAL SPRAY NA) Spray  in nose.      terbinafine (LAMISIL) 1 % cream Apply to affected areas 1-2 times per day as needed for rash (Patient not taking: Reported on 2/21/2024) 42 g 1     No current facility-administered medications for this visit.          Current supplements as per medication list.     Allergies:   Colophony [pinus strobus], Latex, Neosporin [neomycin-polymyxin b], Phenylene, Soap, Tape, and Tea tree oil  Social History     Tobacco Use    Smoking status: Former     Current packs/day: 0.00     Average packs/day: 0.5 packs/day for 53.0 years (26.5 ttl pk-yrs)     Types: Cigarettes     Start  date: 1964     Quit date: 2017     Years since quittin.6    Smokeless tobacco: Never   Vaping Use    Vaping Use: Never used   Substance Use Topics    Alcohol use: Yes     Comment: 2 per day    Drug use: No     Family History   Problem Relation Age of Onset    Arthritis Mother         hip fracture    Diabetes Mother     Cancer Mother         skin    Arthritis Father         lung    Alcohol/Drug Father         etoh    Lung Disease Sister         smoker/copd    Hypertension Sister     Other Brother         hep c    Arthritis Maternal Grandmother         hip fracture    Diabetes Maternal Grandfather     No Known Problems Son     No Known Problems Son     No Known Problems Brother     No Known Problems Sister     No Known Problems Brother     Hyperlipidemia Neg Hx     Stroke Neg Hx      Naomy  has a past medical history of Abnormal CT of the chest, Acute hypoxemic respiratory failure (HCC) (2023), Anxiety, Arthritis, Back pain, Blood transfusion without reported diagnosis, Breath shortness, Cataract, Cellulitis, Cellulitis of leg (2013), Chickenpox, COPD (chronic obstructive pulmonary disease) (Formerly [facility]), Dental disorder, Earache, Elevated troponin, Fatigue, Frequent urination, Hypertension (2018), Hypothyroidism, Influenza, Insomnia, Kidney disease, Mumps, OAB (overactive bladder) (2019), Pain (2018), Peripheral vascular disease, unspecified (HCC) (10/13/2021), Pneumonia, Rash, Ringing in ears, Sepsis (Formerly [facility]) (2023), Shortness of breath, Swelling of lower extremity, Thyroid disease, Tonsillitis, Toothache, and Venous stasis dermatitis.    She has no past medical history of Anemia, Clotting disorder (Formerly [facility]), Cough, Depression, Diabetic neuropathy (Formerly [facility]), Headache(784.0), HIV (human immunodeficiency virus infection) (Formerly [facility]), Hyperlipidemia, IBD (inflammatory bowel disease), Meningitis, Osteoporosis, Painful breathing, Sputum production, Ulcer, or Wheezing.   Past Surgical History:    Procedure Laterality Date    VEIN LIGATION RADIO FREQUENCY Right 2/26/2018    Procedure: VEIN LIGATION RADIO FREQUENCY- LONG SAPENOUS;  Surgeon: Jim Alas M.D.;  Location: SURGERY [facility];  Service: General    ABDOMINAL EXPLORATION      2 ectopic preg    BREAST BIOPSY      hx of benign left breast biopsy    EYE SURGERY      cataract x2    HYSTERECTOMY LAPAROSCOPY      HYSTERECTOMY, TOTAL ABDOMINAL  1977/1978    OOPHORECTOMY      PB MAMMARY DUCTOGRAM, SINGLE      LA REMV 2ND CATARACT,CORN-SCLER SECTN      TONSILLECTOMY         Screening:  In the last six months have you experienced any leakage of urine? No    Depression Screening  Little interest or pleasure in doing things?  0 - not at all  Feeling down, depressed , or hopeless? 0 - not at all  Patient Health Questionnaire Score: 0     If depressive symptoms identified deferred to follow up visit unless specifically addressed in assessment and plan.    Interpretation of PHQ-9 Total Score   Score Severity   1-4 No Depression   5-9 Mild Depression   10-14 Moderate Depression   15-19 Moderately Severe Depression   20-27 Severe Depression    Screening for Cognitive Impairment  Do you or any of your friends or family members have any concern about your memory? No  Three Minute Recall (Banana, Sunrise, Chair) 3/3    Morgan clock face with all 12 numbers and set the hands to show 20 past 8.  Yes    Cognitive concerns identified deferred for follow up unless specifically addressed in assessment and plan.    Fall Risk Assessment  Has the patient had two or more falls in the last year or any fall with injury in the last year?  No    Safety Assessment  Do you always wear your seatbelt?  Yes  Any changes to home needed to function safely? No  Difficulty hearing.  No  Patient counseled about all safety risks that were identified.    Functional Assessment ADLs  Are there any barriers preventing you from cooking for yourself or meeting nutritional needs?  No.    Are  there any barriers preventing you from driving safely or obtaining transportation?  No.    Are there any barriers preventing you from using a telephone or calling for help?  No    Are there any barriers preventing you from shopping?  No.    Are there any barriers preventing you from taking care of your own finances?  No    Are there any barriers preventing you from managing your medications?  No    Are there any barriers preventing you from showering, bathing or dressing yourself? No    Are there any barriers preventing you from doing housework or laundry? No  Are there any barriers preventing you from using the toilet?No  Are you currently engaging in any exercise or physical activity?  No.      Self-Assessment of Health  What is your perception of your health? Fair  Do you sleep more than six hours a night? Yes  In the past 7 days, how much did pain keep you from doing your normal work? Some  Do you spend quality time with family or friends (virtually or in person)? Yes  Do you usually eat a heart healthy diet that constists of a variety of fruits, vegetables, whole grains and fiber? Yes  Do you eat foods high in fat and/or Fast Food more than three times per week? No    Advance Care Planning  Do you have an Advance Directive, Living Will, Durable Power of , or POLST? No  Provided patient with educational brochure regarding Advance Care Planning.                   Health Maintenance Summary            Ordered - Colorectal Cancer Screening (Colonoscopy - Every 2 Years) Ordered on 2/21/2024 03/24/2017  REFERRAL TO GI FOR COLONOSCOPY    03/24/2017  REFERRAL TO GI FOR COLONOSCOPY              Ordered - Lung Cancer Screening (Yearly) Ordered on 2/20/2024 05/16/2023  CT-CHEST, HIGH RESOLUTION LUNG    03/07/2023  CT-CHEST (THORAX) W/O    11/05/2021  CT-CHEST (THORAX) W/O    09/23/2020  CT-CHEST (THORAX) W/O    05/21/2020  CT-CHEST (THORAX) W/O    Only the first 5 history entries have been loaded, but  more history exists.              Scheduled - Annual Pulmonary Function Test / Spirometry (Yearly) Scheduled for 4/22/2024 02/08/2024  PULMONARY FUNCTION TESTS -Test requested: Complete Pulmonary Function Test    08/03/2023  PULMONARY FUNCTION TESTS -Test requested: Complete Pulmonary Function Test    11/08/2021  PULMONARY FUNCTION TESTS -Test requested: Complete Pulmonary Function Test    04/17/2018  AMB PULMONARY FUNCTION TEST/LAB              Annual Wellness Visit (Yearly) Next due on 2/21/2025 02/21/2024  Done - Comprehensive Health Assessment    02/21/2024  Level of Service: [place] ANNUAL WELLNESS VISIT-INCLUDES PPPS SUBSEQUE*    04/12/2022  Level of Service: [place] ANNUAL WELLNESS VISIT-INCLUDES PPPS SUBSEQUE*    10/13/2021  Level of Service: [place] ANNUAL WELLNESS VISIT-INCLUDES PPPS SUBSEQUE*    06/24/2020  Subsequent Annual Wellness Visit - Includes PPPS ()    Only the first 5 history entries have been loaded, but more history exists.              Mammogram (Every 2 Years) Next due on 3/7/2025      03/07/2023  MA-SCREENING MAMMO BILAT W/TOMOSYNTHESIS W/CAD    12/22/2020  MA-SCREENING MAMMO BILAT W/TOMOSYNTHESIS W/CAD    08/29/2018  MA-MAMMO SCREENING BILAT W/CLAUDIA W/CAD    06/24/2016  MA-SCREEN MAMMO W/CAD-BILAT    08/18/2015  Postponed    Only the first 5 history entries have been loaded, but more history exists.              Bone Density Scan (Every 5 Years) Tentatively due on 12/22/2025 12/22/2020  DS-BONE DENSITY STUDY (DEXA)    04/26/2017  DS-BONE DENSITY STUDY (DEXA)    08/18/2015  Postponed              IMM DTaP/Tdap/Td Vaccine (2 - Td or Tdap) Next due on 3/15/2027      03/15/2017  Imm Admin: Tdap Vaccine              Pneumococcal Vaccine: 65+ Years (Series Information) Completed      02/08/2019  Imm Admin: Pneumococcal polysaccharide vaccine (PPSV-23)    02/28/2017  Imm Admin: Pneumococcal Conjugate Vaccine (Prevnar/PCV-13)    11/01/2013  Imm Admin: Pneumococcal polysaccharide vaccine  (PPSV-23)              Hepatitis C Screening  Tentatively Complete      2020  Hepatitis C Antibody component of HCV Scrn ( 7728-6075 1xLife)              Influenza Vaccine (Series Information) Completed      2023  Outside Immunization: Fluzone High-Dose Quad    10/28/2022  Imm Admin: Influenza Vaccine Adult HD    2021  Imm Admin: Influenza Vaccine Adult HD    2020  Imm Admin: Influenza Vaccine Adult HD    2019  Imm Admin: Influenza Vaccine Adult HD    Only the first 5 history entries have been loaded, but more history exists.              COVID-19 Vaccine (Series Information) Completed      2023  Outside Immunization: COVID-19(PFR) 12yrs \T\ up    10/28/2022  Imm Admin: PFIZER BIVALENT SARS-COV-2 VACCINE (12+)    2021  Imm Admin: PFIZER PURPLE CAP SARS-COV-2 VACCINATION (12+)    03/10/2021  Imm Admin: PFIZER PURPLE CAP SARS-COV-2 VACCINATION (12+)    2021  Imm Admin: PFIZER PURPLE CAP SARS-COV-2 VACCINATION (12+)    Only the first 5 history entries have been loaded, but more history exists.              Zoster (Shingles) Vaccines (Series Information) Completed      2023  Outside Immunization: Zoster Richard (Shingrix)    2020  Imm Admin: Zoster Vaccine Recombinant (RZV) (SHINGRIX)    2019  Imm Admin: Zoster Vaccine Recombinant (RZV) (SHINGRIX)    03/15/2017  Imm Admin: Zoster Vaccine Live (ZVL) (Zostavax) - HISTORICAL DATA              Hepatitis A Vaccine (Hep A) (Series Information) Aged Out      No completion history exists for this topic.              Hepatitis B Vaccine (Hep B) (Series Information) Aged Out      No completion history exists for this topic.              HPV Vaccines (Series Information) Aged Out      No completion history exists for this topic.              Polio Vaccine (Inactivated Polio) (Series Information) Aged Out      No completion history exists for this topic.              Meningococcal Immunization (Series Information)  "Aged Out      No completion history exists for this topic.              Discontinued - Cervical Cancer Screening  Discontinued        Frequency changed to Never automatically (Topic No Longer Applies)    08/18/2015  Postponed                    Patient Care Team:  Prema Patel P.A.-C. as PCP - General (Family Medicine)  Dina Steinberg P.A.-C. (Inactive) as PCP - [facility] Paneled  Edmundo Montiel M.D. (Dermatology)  Haris Talamantes M.D. as Consulting Physician (Ophthalmology)  Maria R Reyes, M.D. as Consulting Physician (Allergy and Immunology)  Darren Ruiz M.D. as Consulting Physician (Surgery)  davian as Respiratory Therapist (DME Supplier)      Financial Resource Strain: Low Risk  (2/21/2024)    Overall Financial Resource Strain (CARDIA)     Difficulty of Paying Living Expenses: Not very hard      Transportation Needs: Unmet Transportation Needs (2/21/2024)    PRAPARE - Transportation     Lack of Transportation (Medical): Yes     Lack of Transportation (Non-Medical): Yes      Food Insecurity: No Food Insecurity (2/21/2024)    Hunger Vital Sign     Worried About Running Out of Food in the Last Year: Never true     Ran Out of Food in the Last Year: Never true        Encounter Vitals  Blood Pressure : 118/62  Weight: 66.2 kg (146 lb)  Height: 170.2 cm (5' 7\")  BMI (Calculated): 22.87  Pain Score: 4=Slight-Moderate Pain  Pulmonary Vitals  O2 Flow Rate (L/min): 1L continuous oxygen during the day    Alert, oriented in no acute distress.  Eye contact is good, speech goal directed, affect calm.    Assessment and Plan. The following treatment and monitoring plan is recommended:    Abdominal aortic aneurysm (AAA) 3.0 cm to 5.0 cm in diameter in female (HCC)  Stable. Reviewed CTA abdomen pelvis from February 2021. Denies back pain, chest pain, and shortness of breath.    Aortic atherosclerosis (HCC)  Chronic, stable. Reviewed chest x-ray from March 2023. Currently taking rosuvastatin 10 mg daily.     Arthralgia of " right shoulder region  Chronic right-sided low back pain without sciatica  Chronic, stable. Reports that pain is intermittent and improves with stretching and exercise. States occasional use of acetaminophen and ibuprofen as needed.     Bilateral iliac artery stenosis (HCC)  Chronic, stable. Reviewed aorta iliac ultrasound from 2018. History of vein ligation in 2018 with Dr. Alas.     Chronic hypoxemic respiratory failure (HCC)  Chronic obstructive pulmonary disease (HCC)  Chronic, stable. Currently on 1L continuous oxygen during the day, reports that she is unable to tolerate the nasal cannula at night. Reports that symptoms are well-managed on duoneb, symbicort, and albuterol as needed. Denies recent exacerbation, cough, and shortness of breath. Followed by pulmonology, VIELKA Stallings.    CKD (chronic kidney disease) stage 3, GFR 30-59 ml/min ([facility])  Chronic, stable. Most recent GFR was 53 in January 2024. Denies nausea, vomiting, headache, fatigue, and muscle cramps. Provided education on avoiding high-dose NSAIDs. Encourage a low-protein diet. Limit sodium intake to less than 2 grams/day. Followed by nephrology, Dr. Lawrence.    Essential hypertension  Chronic, stable. Currently taking amlodipine 5 mg and lisinopril 20 mg daily. In-office blood pressure is 118/62. Denies chest pain, headaches, lightheadedness, and lower extremity edema.     Hypothyroidism  Chronic, stable. Currently taking levothyroxine 100 mcg daily as prescribed. Denies fatigue, palpitations, hair and skin changes, temperature intolerance, changes in bowel habits, and weight loss or weight gain.      Interstitial lung disease (HCC)  Chronic, stable. Reviewed CT of the chest from May 2023. Reports that she recently completed an 8-week pulmonary rehabilitation program. Denies chest pain, cough, and shortness of breath. Followed by pulmonology, Renato VORA.    OAB (overactive bladder)  Chronic, stable. Reports that symptoms have improved  "significantly since starting mirabegron ER 50 mg daily.     Osteopenia  Chronic, stable. Reviewed DEXA scan from December 2020: \"according to the World Health Organization classification, bone mineral density of this patient is osteopenia with increased fracture risk left femur, normal density lumbar spine.\" Denies recent falls or fractures.    Pulmonary nodules  Stable. Reviewed CT of the chest from March 2023: \"stable irregular nodule at the posterior right lung apex, likely scar.\"  Followed by pulmonology, VIELKA Stallings.    Serologic autoimmunity (positive EBEN with anti-dsDNA)  Chronic, stable. No current medical treatment. Routinely monitored by rheumatology, Dr. Pryor.     Venous stasis dermatitis of both lower extremities  Chronic, stable. History of vein ligation in 2018. Provided education on elevation and use of compression stockings. Denies lower extremity pain and swelling. Previously followed by dermatology.    Encounter for colorectal cancer screening  Cologuard ordered.        Services suggested: No services needed at this time  Health Care Screening: Age-appropriate preventive services recommended by USPTF and ACIP covered by Medicare were discussed today. Services ordered if indicated and agreed upon by the patient.  Referrals offered: Community-based lifestyle interventions to reduce health risks and promote self-management and wellness, fall prevention, nutrition, physical activity, tobacco-use cessation, weight loss, and mental health services as per orders if indicated.    Discussion today about general wellness and lifestyle habits:    Prevent falls and reduce trip hazards; Cautioned about securing or removing rugs.  Have a working fire alarm and carbon monoxide detector.  Engage in regular physical activity and social activities.    Follow-up: Return for appointment with Primary Care Provider as needed.           "

## 2024-02-21 NOTE — ASSESSMENT & PLAN NOTE
Chronic, stable. Reports that pain is intermittent. Reports use of acetaminophen and ibuprofen as needed.

## 2024-02-26 ENCOUNTER — PATIENT OUTREACH (OUTPATIENT)
Dept: HEALTH INFORMATION MANAGEMENT | Facility: OTHER | Age: 74
End: 2024-02-26

## 2024-03-07 ENCOUNTER — PATIENT MESSAGE (OUTPATIENT)
Dept: HEALTH INFORMATION MANAGEMENT | Facility: OTHER | Age: 74
End: 2024-03-07

## 2024-03-22 ENCOUNTER — TELEPHONE (OUTPATIENT)
Dept: HEALTH INFORMATION MANAGEMENT | Facility: OTHER | Age: 74
End: 2024-03-22
Payer: MEDICARE

## 2024-03-23 DIAGNOSIS — I10 ESSENTIAL HYPERTENSION: ICD-10-CM

## 2024-03-25 RX ORDER — AMLODIPINE BESYLATE 5 MG/1
5 TABLET ORAL
Qty: 100 TABLET | Refills: 0 | Status: SHIPPED | OUTPATIENT
Start: 2024-03-25

## 2024-04-17 DIAGNOSIS — E03.1 CONGENITAL HYPOTHYROIDISM WITHOUT GOITER: ICD-10-CM

## 2024-04-24 RX ORDER — LEVOTHYROXINE SODIUM 0.1 MG/1
TABLET ORAL
Qty: 100 TABLET | Refills: 1 | Status: SHIPPED | OUTPATIENT
Start: 2024-04-24

## 2024-05-22 ENCOUNTER — HOSPITAL ENCOUNTER (OUTPATIENT)
Dept: LAB | Facility: MEDICAL CENTER | Age: 74
End: 2024-05-22
Attending: INTERNAL MEDICINE
Payer: MEDICARE

## 2024-05-22 DIAGNOSIS — E55.9 VITAMIN D DEFICIENCY: ICD-10-CM

## 2024-05-22 DIAGNOSIS — N18.31 STAGE 3A CHRONIC KIDNEY DISEASE: ICD-10-CM

## 2024-05-22 LAB
25(OH)D3 SERPL-MCNC: 60 NG/ML (ref 30–100)
ANION GAP SERPL CALC-SCNC: 13 MMOL/L (ref 7–16)
APPEARANCE UR: CLEAR
BACTERIA #/AREA URNS HPF: NEGATIVE /HPF
BILIRUB UR QL STRIP.AUTO: NEGATIVE
BUN SERPL-MCNC: 22 MG/DL (ref 8–22)
CALCIUM SERPL-MCNC: 9.3 MG/DL (ref 8.5–10.5)
CHLORIDE SERPL-SCNC: 100 MMOL/L (ref 96–112)
CO2 SERPL-SCNC: 24 MMOL/L (ref 20–33)
COLOR UR: YELLOW
CREAT SERPL-MCNC: 0.91 MG/DL (ref 0.5–1.4)
EPI CELLS #/AREA URNS HPF: NEGATIVE /HPF
GFR SERPLBLD CREATININE-BSD FMLA CKD-EPI: 66 ML/MIN/1.73 M 2
GLUCOSE SERPL-MCNC: 114 MG/DL (ref 65–99)
GLUCOSE UR STRIP.AUTO-MCNC: NEGATIVE MG/DL
HYALINE CASTS #/AREA URNS LPF: ABNORMAL /LPF
KETONES UR STRIP.AUTO-MCNC: NEGATIVE MG/DL
LEUKOCYTE ESTERASE UR QL STRIP.AUTO: ABNORMAL
MICRO URNS: ABNORMAL
NITRITE UR QL STRIP.AUTO: NEGATIVE
PH UR STRIP.AUTO: 7.5 [PH] (ref 5–8)
POTASSIUM SERPL-SCNC: 4 MMOL/L (ref 3.6–5.5)
PROT UR QL STRIP: NEGATIVE MG/DL
PTH-INTACT SERPL-MCNC: 66 PG/ML (ref 14–72)
RBC # URNS HPF: ABNORMAL /HPF
RBC UR QL AUTO: NEGATIVE
SODIUM SERPL-SCNC: 137 MMOL/L (ref 135–145)
SP GR UR STRIP.AUTO: 1.01
UROBILINOGEN UR STRIP.AUTO-MCNC: 1 MG/DL
WBC #/AREA URNS HPF: ABNORMAL /HPF

## 2024-05-29 ENCOUNTER — OFFICE VISIT (OUTPATIENT)
Dept: NEPHROLOGY | Facility: MEDICAL CENTER | Age: 74
End: 2024-05-29
Payer: MEDICARE

## 2024-05-29 VITALS
WEIGHT: 150 LBS | TEMPERATURE: 98.1 F | BODY MASS INDEX: 23.54 KG/M2 | DIASTOLIC BLOOD PRESSURE: 54 MMHG | SYSTOLIC BLOOD PRESSURE: 102 MMHG | HEIGHT: 67 IN | OXYGEN SATURATION: 94 % | HEART RATE: 77 BPM

## 2024-05-29 DIAGNOSIS — D64.9 ANEMIA, UNSPECIFIED TYPE: ICD-10-CM

## 2024-05-29 DIAGNOSIS — R80.9 MICROALBUMINURIA: ICD-10-CM

## 2024-05-29 DIAGNOSIS — E55.9 VITAMIN D DEFICIENCY: ICD-10-CM

## 2024-05-29 DIAGNOSIS — I10 ESSENTIAL HYPERTENSION: ICD-10-CM

## 2024-05-29 DIAGNOSIS — N18.2 CKD (CHRONIC KIDNEY DISEASE), STAGE II: ICD-10-CM

## 2024-05-29 ASSESSMENT — ENCOUNTER SYMPTOMS
COUGH: 0
WHEEZING: 0
SINUS PAIN: 0
EYES NEGATIVE: 1
FEVER: 0
HEMOPTYSIS: 0
ORTHOPNEA: 0
SHORTNESS OF BREATH: 0
FLANK PAIN: 0
PALPITATIONS: 0
NAUSEA: 0
ABDOMINAL PAIN: 0
VOMITING: 0
WEIGHT LOSS: 0
CHILLS: 0

## 2024-05-29 ASSESSMENT — FIBROSIS 4 INDEX: FIB4 SCORE: 2.95

## 2024-05-29 NOTE — PATIENT INSTRUCTIONS
Stop amlodipine  If BP > 135/85 increase Lisinopril to 40 mg daily  Keep well hydrated  Low salt diet

## 2024-07-05 ENCOUNTER — APPOINTMENT (OUTPATIENT)
Dept: RADIOLOGY | Facility: MEDICAL CENTER | Age: 74
End: 2024-07-05
Attending: STUDENT IN AN ORGANIZED HEALTH CARE EDUCATION/TRAINING PROGRAM
Payer: MEDICARE

## 2024-07-05 ENCOUNTER — HOSPITAL ENCOUNTER (EMERGENCY)
Facility: MEDICAL CENTER | Age: 74
End: 2024-07-05
Attending: STUDENT IN AN ORGANIZED HEALTH CARE EDUCATION/TRAINING PROGRAM
Payer: MEDICARE

## 2024-07-05 ENCOUNTER — OFFICE VISIT (OUTPATIENT)
Dept: MEDICAL GROUP | Facility: PHYSICIAN GROUP | Age: 74
End: 2024-07-05
Payer: MEDICARE

## 2024-07-05 VITALS
HEIGHT: 68 IN | HEART RATE: 90 BPM | RESPIRATION RATE: 18 BRPM | WEIGHT: 148 LBS | TEMPERATURE: 99 F | DIASTOLIC BLOOD PRESSURE: 50 MMHG | SYSTOLIC BLOOD PRESSURE: 84 MMHG | BODY MASS INDEX: 22.43 KG/M2 | OXYGEN SATURATION: 92 %

## 2024-07-05 VITALS
RESPIRATION RATE: 16 BRPM | SYSTOLIC BLOOD PRESSURE: 99 MMHG | HEART RATE: 83 BPM | DIASTOLIC BLOOD PRESSURE: 70 MMHG | TEMPERATURE: 99 F | OXYGEN SATURATION: 92 % | BODY MASS INDEX: 23.49 KG/M2 | HEIGHT: 67 IN | WEIGHT: 149.69 LBS

## 2024-07-05 DIAGNOSIS — U07.1 COVID-19: ICD-10-CM

## 2024-07-05 DIAGNOSIS — J06.9 ACUTE UPPER RESPIRATORY INFECTION, UNSPECIFIED: ICD-10-CM

## 2024-07-05 DIAGNOSIS — J44.9 CHRONIC OBSTRUCTIVE PULMONARY DISEASE, UNSPECIFIED COPD TYPE (HCC): ICD-10-CM

## 2024-07-05 DIAGNOSIS — E86.0 DEHYDRATION: ICD-10-CM

## 2024-07-05 DIAGNOSIS — J02.9 PHARYNGITIS, UNSPECIFIED ETIOLOGY: ICD-10-CM

## 2024-07-05 DIAGNOSIS — I95.89 OTHER SPECIFIED HYPOTENSION: ICD-10-CM

## 2024-07-05 LAB
ALBUMIN SERPL BCP-MCNC: 4.2 G/DL (ref 3.2–4.9)
ALBUMIN/GLOB SERPL: 1.4 G/DL
ALP SERPL-CCNC: 107 U/L (ref 30–99)
ALT SERPL-CCNC: 21 U/L (ref 2–50)
ANION GAP SERPL CALC-SCNC: 15 MMOL/L (ref 7–16)
AST SERPL-CCNC: 30 U/L (ref 12–45)
BASOPHILS # BLD AUTO: 0.5 % (ref 0–1.8)
BASOPHILS # BLD: 0.04 K/UL (ref 0–0.12)
BILIRUB SERPL-MCNC: 0.5 MG/DL (ref 0.1–1.5)
BUN SERPL-MCNC: 21 MG/DL (ref 8–22)
CALCIUM ALBUM COR SERPL-MCNC: 9.5 MG/DL (ref 8.5–10.5)
CALCIUM SERPL-MCNC: 9.7 MG/DL (ref 8.5–10.5)
CHLORIDE SERPL-SCNC: 100 MMOL/L (ref 96–112)
CO2 SERPL-SCNC: 19 MMOL/L (ref 20–33)
CREAT SERPL-MCNC: 1 MG/DL (ref 0.5–1.4)
EOSINOPHIL # BLD AUTO: 0.19 K/UL (ref 0–0.51)
EOSINOPHIL NFR BLD: 2.4 % (ref 0–6.9)
ERYTHROCYTE [DISTWIDTH] IN BLOOD BY AUTOMATED COUNT: 46.9 FL (ref 35.9–50)
FLUAV RNA SPEC QL NAA+PROBE: NEGATIVE
FLUBV RNA SPEC QL NAA+PROBE: NEGATIVE
GFR SERPLBLD CREATININE-BSD FMLA CKD-EPI: 59 ML/MIN/1.73 M 2
GLOBULIN SER CALC-MCNC: 3 G/DL (ref 1.9–3.5)
GLUCOSE SERPL-MCNC: 93 MG/DL (ref 65–99)
HCT VFR BLD AUTO: 44.3 % (ref 37–47)
HGB BLD-MCNC: 14.4 G/DL (ref 12–16)
IMM GRANULOCYTES # BLD AUTO: 0.03 K/UL (ref 0–0.11)
IMM GRANULOCYTES NFR BLD AUTO: 0.4 % (ref 0–0.9)
LYMPHOCYTES # BLD AUTO: 1.46 K/UL (ref 1–4.8)
LYMPHOCYTES NFR BLD: 18.2 % (ref 22–41)
MCH RBC QN AUTO: 31.2 PG (ref 27–33)
MCHC RBC AUTO-ENTMCNC: 32.5 G/DL (ref 32.2–35.5)
MCV RBC AUTO: 96.1 FL (ref 81.4–97.8)
MONOCYTES # BLD AUTO: 1.6 K/UL (ref 0–0.85)
MONOCYTES NFR BLD AUTO: 20 % (ref 0–13.4)
NEUTROPHILS # BLD AUTO: 4.7 K/UL (ref 1.82–7.42)
NEUTROPHILS NFR BLD: 58.5 % (ref 44–72)
NRBC # BLD AUTO: 0 K/UL
NRBC BLD-RTO: 0 /100 WBC (ref 0–0.2)
PLATELET # BLD AUTO: 118 K/UL (ref 164–446)
PMV BLD AUTO: 10.6 FL (ref 9–12.9)
POTASSIUM SERPL-SCNC: 3.9 MMOL/L (ref 3.6–5.5)
PROT SERPL-MCNC: 7.2 G/DL (ref 6–8.2)
RBC # BLD AUTO: 4.61 M/UL (ref 4.2–5.4)
RSV RNA SPEC QL NAA+PROBE: NEGATIVE
S PYO DNA SPEC NAA+PROBE: NOT DETECTED
SARS-COV-2 RNA RESP QL NAA+PROBE: POSITIVE
SODIUM SERPL-SCNC: 134 MMOL/L (ref 135–145)
WBC # BLD AUTO: 8 K/UL (ref 4.8–10.8)

## 2024-07-05 PROCEDURE — 99284 EMERGENCY DEPT VISIT MOD MDM: CPT

## 2024-07-05 PROCEDURE — 80053 COMPREHEN METABOLIC PANEL: CPT

## 2024-07-05 PROCEDURE — 93005 ELECTROCARDIOGRAM TRACING: CPT | Performed by: STUDENT IN AN ORGANIZED HEALTH CARE EDUCATION/TRAINING PROGRAM

## 2024-07-05 PROCEDURE — 87651 STREP A DNA AMP PROBE: CPT

## 2024-07-05 PROCEDURE — 36415 COLL VENOUS BLD VENIPUNCTURE: CPT

## 2024-07-05 PROCEDURE — 3074F SYST BP LT 130 MM HG: CPT

## 2024-07-05 PROCEDURE — 99213 OFFICE O/P EST LOW 20 MIN: CPT

## 2024-07-05 PROCEDURE — 3078F DIAST BP <80 MM HG: CPT

## 2024-07-05 PROCEDURE — 85025 COMPLETE CBC W/AUTO DIFF WBC: CPT

## 2024-07-05 PROCEDURE — 700105 HCHG RX REV CODE 258: Performed by: STUDENT IN AN ORGANIZED HEALTH CARE EDUCATION/TRAINING PROGRAM

## 2024-07-05 PROCEDURE — 93005 ELECTROCARDIOGRAM TRACING: CPT

## 2024-07-05 PROCEDURE — 700101 HCHG RX REV CODE 250: Performed by: STUDENT IN AN ORGANIZED HEALTH CARE EDUCATION/TRAINING PROGRAM

## 2024-07-05 PROCEDURE — 71045 X-RAY EXAM CHEST 1 VIEW: CPT

## 2024-07-05 PROCEDURE — 94640 AIRWAY INHALATION TREATMENT: CPT

## 2024-07-05 PROCEDURE — 0241U POCT CEPHEID COV-2, FLU A/B, RSV - PCR: CPT

## 2024-07-05 RX ORDER — IPRATROPIUM BROMIDE AND ALBUTEROL SULFATE 2.5; .5 MG/3ML; MG/3ML
3 SOLUTION RESPIRATORY (INHALATION)
Status: DISCONTINUED | OUTPATIENT
Start: 2024-07-05 | End: 2024-07-05 | Stop reason: HOSPADM

## 2024-07-05 RX ORDER — SODIUM CHLORIDE 9 MG/ML
1000 INJECTION, SOLUTION INTRAVENOUS ONCE
Status: COMPLETED | OUTPATIENT
Start: 2024-07-05 | End: 2024-07-05

## 2024-07-05 RX ADMIN — IPRATROPIUM BROMIDE AND ALBUTEROL SULFATE 3 ML: 2.5; .5 SOLUTION RESPIRATORY (INHALATION) at 19:24

## 2024-07-05 RX ADMIN — SODIUM CHLORIDE 1000 ML: 9 INJECTION, SOLUTION INTRAVENOUS at 19:05

## 2024-07-05 ASSESSMENT — FIBROSIS 4 INDEX
FIB4 SCORE: 2.95
FIB4 SCORE: 2.95

## 2024-07-23 DIAGNOSIS — Z91.89 CANDIDATE FOR STATIN THERAPY DUE TO RISK OF FUTURE CARDIOVASCULAR EVENT: ICD-10-CM

## 2024-07-23 DIAGNOSIS — I77.1 BILATERAL ILIAC ARTERY STENOSIS (HCC): ICD-10-CM

## 2024-07-29 ENCOUNTER — APPOINTMENT (OUTPATIENT)
Dept: RADIOLOGY | Facility: MEDICAL CENTER | Age: 74
DRG: 038 | End: 2024-07-29
Attending: EMERGENCY MEDICINE
Payer: MEDICARE

## 2024-07-29 ENCOUNTER — OFFICE VISIT (OUTPATIENT)
Dept: URGENT CARE | Facility: PHYSICIAN GROUP | Age: 74
End: 2024-07-29
Payer: MEDICARE

## 2024-07-29 ENCOUNTER — HOSPITAL ENCOUNTER (INPATIENT)
Facility: MEDICAL CENTER | Age: 74
End: 2024-07-29
Attending: EMERGENCY MEDICINE | Admitting: INTERNAL MEDICINE
Payer: MEDICARE

## 2024-07-29 VITALS
RESPIRATION RATE: 14 BRPM | TEMPERATURE: 100.1 F | HEIGHT: 67 IN | OXYGEN SATURATION: 90 % | BODY MASS INDEX: 23.23 KG/M2 | HEART RATE: 96 BPM | WEIGHT: 148 LBS | DIASTOLIC BLOOD PRESSURE: 80 MMHG | SYSTOLIC BLOOD PRESSURE: 118 MMHG

## 2024-07-29 DIAGNOSIS — M85.80 OSTEOPENIA, UNSPECIFIED LOCATION: ICD-10-CM

## 2024-07-29 DIAGNOSIS — D50.9 IRON DEFICIENCY ANEMIA, UNSPECIFIED IRON DEFICIENCY ANEMIA TYPE: ICD-10-CM

## 2024-07-29 DIAGNOSIS — G45.9 TIA (TRANSIENT ISCHEMIC ATTACK): ICD-10-CM

## 2024-07-29 DIAGNOSIS — R21 RASH: ICD-10-CM

## 2024-07-29 PROBLEM — I65.21 SYMPTOMATIC CAROTID ARTERY STENOSIS, RIGHT: Status: ACTIVE | Noted: 2024-07-29

## 2024-07-29 PROBLEM — E87.6 HYPOKALEMIA: Status: ACTIVE | Noted: 2024-07-29

## 2024-07-29 PROBLEM — L30.9 PERIANAL DERMATITIS: Status: ACTIVE | Noted: 2024-07-29

## 2024-07-29 LAB
ALBUMIN SERPL BCP-MCNC: 3.1 G/DL (ref 3.2–4.9)
ALBUMIN/GLOB SERPL: 1 G/DL
ALP SERPL-CCNC: 108 U/L (ref 30–99)
ALT SERPL-CCNC: 17 U/L (ref 2–50)
ANION GAP SERPL CALC-SCNC: 15 MMOL/L (ref 7–16)
APTT PPP: 35.9 SEC (ref 24.7–36)
AST SERPL-CCNC: 31 U/L (ref 12–45)
BASOPHILS # BLD AUTO: 0.4 % (ref 0–1.8)
BASOPHILS # BLD: 0.03 K/UL (ref 0–0.12)
BILIRUB SERPL-MCNC: 0.5 MG/DL (ref 0.1–1.5)
BUN SERPL-MCNC: 27 MG/DL (ref 8–22)
CALCIUM ALBUM COR SERPL-MCNC: 9.6 MG/DL (ref 8.5–10.5)
CALCIUM SERPL-MCNC: 8.9 MG/DL (ref 8.5–10.5)
CHLORIDE SERPL-SCNC: 97 MMOL/L (ref 96–112)
CO2 SERPL-SCNC: 20 MMOL/L (ref 20–33)
CREAT SERPL-MCNC: 1.53 MG/DL (ref 0.5–1.4)
EKG IMPRESSION: NORMAL
EKG IMPRESSION: NORMAL
EOSINOPHIL # BLD AUTO: 0.16 K/UL (ref 0–0.51)
EOSINOPHIL NFR BLD: 2.2 % (ref 0–6.9)
ERYTHROCYTE [DISTWIDTH] IN BLOOD BY AUTOMATED COUNT: 46 FL (ref 35.9–50)
EST. AVERAGE GLUCOSE BLD GHB EST-MCNC: 105 MG/DL
GFR SERPLBLD CREATININE-BSD FMLA CKD-EPI: 35 ML/MIN/1.73 M 2
GLOBULIN SER CALC-MCNC: 3.2 G/DL (ref 1.9–3.5)
GLUCOSE SERPL-MCNC: 106 MG/DL (ref 65–99)
HBA1C MFR BLD: 5.3 % (ref 4–5.6)
HCT VFR BLD AUTO: 35.5 % (ref 37–47)
HGB BLD-MCNC: 11.6 G/DL (ref 12–16)
IMM GRANULOCYTES # BLD AUTO: 0.03 K/UL (ref 0–0.11)
IMM GRANULOCYTES NFR BLD AUTO: 0.4 % (ref 0–0.9)
INR PPP: 1.21 (ref 0.87–1.13)
LYMPHOCYTES # BLD AUTO: 1.03 K/UL (ref 1–4.8)
LYMPHOCYTES NFR BLD: 14.2 % (ref 22–41)
MCH RBC QN AUTO: 30.8 PG (ref 27–33)
MCHC RBC AUTO-ENTMCNC: 32.7 G/DL (ref 32.2–35.5)
MCV RBC AUTO: 94.2 FL (ref 81.4–97.8)
MONOCYTES # BLD AUTO: 1.51 K/UL (ref 0–0.85)
MONOCYTES NFR BLD AUTO: 20.8 % (ref 0–13.4)
NEUTROPHILS # BLD AUTO: 4.5 K/UL (ref 1.82–7.42)
NEUTROPHILS NFR BLD: 62 % (ref 44–72)
NRBC # BLD AUTO: 0 K/UL
NRBC BLD-RTO: 0 /100 WBC (ref 0–0.2)
PLATELET # BLD AUTO: 111 K/UL (ref 164–446)
PMV BLD AUTO: 11 FL (ref 9–12.9)
POTASSIUM SERPL-SCNC: 3.5 MMOL/L (ref 3.6–5.5)
PROT SERPL-MCNC: 6.3 G/DL (ref 6–8.2)
PROTHROMBIN TIME: 15.4 SEC (ref 12–14.6)
RBC # BLD AUTO: 3.77 M/UL (ref 4.2–5.4)
SODIUM SERPL-SCNC: 132 MMOL/L (ref 135–145)
TROPONIN T SERPL-MCNC: 23 NG/L (ref 6–19)
WBC # BLD AUTO: 7.3 K/UL (ref 4.8–10.8)

## 2024-07-29 PROCEDURE — 99223 1ST HOSP IP/OBS HIGH 75: CPT | Mod: AI | Performed by: INTERNAL MEDICINE

## 2024-07-29 PROCEDURE — 700102 HCHG RX REV CODE 250 W/ 637 OVERRIDE(OP): Performed by: INTERNAL MEDICINE

## 2024-07-29 PROCEDURE — 85730 THROMBOPLASTIN TIME PARTIAL: CPT

## 2024-07-29 PROCEDURE — 700102 HCHG RX REV CODE 250 W/ 637 OVERRIDE(OP): Performed by: PSYCHIATRY & NEUROLOGY

## 2024-07-29 PROCEDURE — 36415 COLL VENOUS BLD VENIPUNCTURE: CPT

## 2024-07-29 PROCEDURE — 99222 1ST HOSP IP/OBS MODERATE 55: CPT | Performed by: PSYCHIATRY & NEUROLOGY

## 2024-07-29 PROCEDURE — A9270 NON-COVERED ITEM OR SERVICE: HCPCS | Performed by: INTERNAL MEDICINE

## 2024-07-29 PROCEDURE — 770020 HCHG ROOM/CARE - TELE (206)

## 2024-07-29 PROCEDURE — A9270 NON-COVERED ITEM OR SERVICE: HCPCS | Performed by: PSYCHIATRY & NEUROLOGY

## 2024-07-29 PROCEDURE — 85610 PROTHROMBIN TIME: CPT

## 2024-07-29 PROCEDURE — 70498 CT ANGIOGRAPHY NECK: CPT

## 2024-07-29 PROCEDURE — 80053 COMPREHEN METABOLIC PANEL: CPT

## 2024-07-29 PROCEDURE — 700111 HCHG RX REV CODE 636 W/ 250 OVERRIDE (IP): Performed by: INTERNAL MEDICINE

## 2024-07-29 PROCEDURE — 84484 ASSAY OF TROPONIN QUANT: CPT

## 2024-07-29 PROCEDURE — 99285 EMERGENCY DEPT VISIT HI MDM: CPT

## 2024-07-29 PROCEDURE — 85025 COMPLETE CBC W/AUTO DIFF WBC: CPT

## 2024-07-29 PROCEDURE — 70496 CT ANGIOGRAPHY HEAD: CPT

## 2024-07-29 PROCEDURE — 700117 HCHG RX CONTRAST REV CODE 255: Performed by: EMERGENCY MEDICINE

## 2024-07-29 PROCEDURE — 93005 ELECTROCARDIOGRAM TRACING: CPT | Performed by: EMERGENCY MEDICINE

## 2024-07-29 PROCEDURE — 700105 HCHG RX REV CODE 258: Performed by: EMERGENCY MEDICINE

## 2024-07-29 PROCEDURE — 83036 HEMOGLOBIN GLYCOSYLATED A1C: CPT

## 2024-07-29 PROCEDURE — 700101 HCHG RX REV CODE 250: Performed by: INTERNAL MEDICINE

## 2024-07-29 RX ORDER — SODIUM CHLORIDE AND POTASSIUM CHLORIDE 150; 900 MG/100ML; MG/100ML
INJECTION, SOLUTION INTRAVENOUS CONTINUOUS
Status: DISCONTINUED | OUTPATIENT
Start: 2024-07-29 | End: 2024-07-30

## 2024-07-29 RX ORDER — PREDNISONE 10 MG/1
20 TABLET ORAL DAILY
Qty: 10 TABLET | Refills: 0 | Status: SHIPPED | OUTPATIENT
Start: 2024-07-29 | End: 2024-07-29

## 2024-07-29 RX ORDER — ASPIRIN 81 MG/1
81 TABLET, CHEWABLE ORAL DAILY
Status: DISCONTINUED | OUTPATIENT
Start: 2024-07-30 | End: 2024-08-08 | Stop reason: HOSPADM

## 2024-07-29 RX ORDER — LABETALOL HYDROCHLORIDE 5 MG/ML
10 INJECTION, SOLUTION INTRAVENOUS
Status: DISCONTINUED | OUTPATIENT
Start: 2024-07-29 | End: 2024-07-30

## 2024-07-29 RX ORDER — ASPIRIN 300 MG/1
300 SUPPOSITORY RECTAL DAILY
Status: DISCONTINUED | OUTPATIENT
Start: 2024-07-30 | End: 2024-07-29

## 2024-07-29 RX ORDER — SODIUM CHLORIDE, SODIUM LACTATE, POTASSIUM CHLORIDE, CALCIUM CHLORIDE 600; 310; 30; 20 MG/100ML; MG/100ML; MG/100ML; MG/100ML
1000 INJECTION, SOLUTION INTRAVENOUS ONCE
Status: COMPLETED | OUTPATIENT
Start: 2024-07-29 | End: 2024-07-29

## 2024-07-29 RX ORDER — AMOXICILLIN 250 MG
2 CAPSULE ORAL EVERY EVENING
Status: DISCONTINUED | OUTPATIENT
Start: 2024-07-30 | End: 2024-08-08 | Stop reason: HOSPADM

## 2024-07-29 RX ORDER — BUDESONIDE AND FORMOTEROL FUMARATE DIHYDRATE 80; 4.5 UG/1; UG/1
2 AEROSOL RESPIRATORY (INHALATION) 2 TIMES DAILY
Status: DISCONTINUED | OUTPATIENT
Start: 2024-07-29 | End: 2024-07-29

## 2024-07-29 RX ORDER — OXYCODONE HYDROCHLORIDE 5 MG/1
2.5 TABLET ORAL
Status: DISCONTINUED | OUTPATIENT
Start: 2024-07-29 | End: 2024-08-02

## 2024-07-29 RX ORDER — ONDANSETRON 4 MG/1
4 TABLET, ORALLY DISINTEGRATING ORAL EVERY 4 HOURS PRN
Status: DISCONTINUED | OUTPATIENT
Start: 2024-07-29 | End: 2024-08-08 | Stop reason: HOSPADM

## 2024-07-29 RX ORDER — OXYCODONE HYDROCHLORIDE 5 MG/1
5 TABLET ORAL
Status: DISCONTINUED | OUTPATIENT
Start: 2024-07-29 | End: 2024-08-02

## 2024-07-29 RX ORDER — HEPARIN SODIUM 5000 [USP'U]/ML
5000 INJECTION, SOLUTION INTRAVENOUS; SUBCUTANEOUS EVERY 8 HOURS
Status: DISCONTINUED | OUTPATIENT
Start: 2024-07-29 | End: 2024-08-08 | Stop reason: HOSPADM

## 2024-07-29 RX ORDER — VITAMIN B COMPLEX
1000 TABLET ORAL DAILY
Status: DISCONTINUED | OUTPATIENT
Start: 2024-07-30 | End: 2024-08-08 | Stop reason: HOSPADM

## 2024-07-29 RX ORDER — MIRABEGRON 50 MG/1
50 TABLET, EXTENDED RELEASE ORAL DAILY
COMMUNITY

## 2024-07-29 RX ORDER — CLOTRIMAZOLE 1 %
1 CREAM (GRAM) TOPICAL 2 TIMES DAILY
Qty: 28 G | Refills: 0 | Status: SHIPPED | OUTPATIENT
Start: 2024-07-29 | End: 2024-07-29

## 2024-07-29 RX ORDER — LEVOTHYROXINE SODIUM 100 UG/1
100 TABLET ORAL
Status: DISCONTINUED | OUTPATIENT
Start: 2024-07-30 | End: 2024-08-08 | Stop reason: HOSPADM

## 2024-07-29 RX ORDER — ALBUTEROL SULFATE 90 UG/1
1-2 AEROSOL, METERED RESPIRATORY (INHALATION) EVERY 6 HOURS PRN
Status: DISCONTINUED | OUTPATIENT
Start: 2024-07-29 | End: 2024-07-29

## 2024-07-29 RX ORDER — ATORVASTATIN CALCIUM 40 MG/1
40 TABLET, FILM COATED ORAL EVERY EVENING
Status: DISCONTINUED | OUTPATIENT
Start: 2024-07-29 | End: 2024-08-08 | Stop reason: HOSPADM

## 2024-07-29 RX ORDER — CLOTRIMAZOLE 1 %
1 CREAM (GRAM) TOPICAL 2 TIMES DAILY
Status: DISCONTINUED | OUTPATIENT
Start: 2024-07-29 | End: 2024-07-29

## 2024-07-29 RX ORDER — ACETAMINOPHEN 325 MG/1
650 TABLET ORAL EVERY 6 HOURS PRN
Status: DISCONTINUED | OUTPATIENT
Start: 2024-07-29 | End: 2024-08-06

## 2024-07-29 RX ORDER — LANOLIN ALCOHOL/MO/W.PET/CERES
3 CREAM (GRAM) TOPICAL
COMMUNITY

## 2024-07-29 RX ORDER — ALBUTEROL SULFATE 90 UG/1
1 AEROSOL, METERED RESPIRATORY (INHALATION) EVERY 6 HOURS PRN
Status: DISCONTINUED | OUTPATIENT
Start: 2024-07-29 | End: 2024-08-08 | Stop reason: HOSPADM

## 2024-07-29 RX ORDER — SODIUM CHLORIDE 9 MG/ML
500 INJECTION, SOLUTION INTRAVENOUS
Status: DISCONTINUED | OUTPATIENT
Start: 2024-07-29 | End: 2024-07-30

## 2024-07-29 RX ORDER — HYDRALAZINE HYDROCHLORIDE 20 MG/ML
10 INJECTION INTRAMUSCULAR; INTRAVENOUS
Status: DISCONTINUED | OUTPATIENT
Start: 2024-07-29 | End: 2024-07-30

## 2024-07-29 RX ORDER — UBIDECARENONE 75 MG
100 CAPSULE ORAL DAILY
Status: DISCONTINUED | OUTPATIENT
Start: 2024-07-30 | End: 2024-08-08 | Stop reason: HOSPADM

## 2024-07-29 RX ORDER — DOXYCYCLINE HYCLATE 100 MG
100 TABLET ORAL 2 TIMES DAILY
Qty: 14 TABLET | Refills: 0 | Status: SHIPPED | OUTPATIENT
Start: 2024-07-29 | End: 2024-07-29

## 2024-07-29 RX ORDER — HYDROMORPHONE HYDROCHLORIDE 1 MG/ML
0.25 INJECTION, SOLUTION INTRAMUSCULAR; INTRAVENOUS; SUBCUTANEOUS
Status: DISCONTINUED | OUTPATIENT
Start: 2024-07-29 | End: 2024-08-02

## 2024-07-29 RX ORDER — LOPERAMIDE HCL 2 MG
2 CAPSULE ORAL 4 TIMES DAILY PRN
Status: DISCONTINUED | OUTPATIENT
Start: 2024-07-29 | End: 2024-08-08 | Stop reason: HOSPADM

## 2024-07-29 RX ORDER — CLOPIDOGREL BISULFATE 75 MG/1
300 TABLET ORAL ONCE
Status: COMPLETED | OUTPATIENT
Start: 2024-07-29 | End: 2024-07-29

## 2024-07-29 RX ORDER — ASCORBIC ACID 500 MG
500 TABLET ORAL DAILY
Status: ON HOLD | COMMUNITY
End: 2024-08-08

## 2024-07-29 RX ORDER — IPRATROPIUM BROMIDE AND ALBUTEROL SULFATE 2.5; .5 MG/3ML; MG/3ML
3 SOLUTION RESPIRATORY (INHALATION) EVERY 4 HOURS PRN
Status: DISCONTINUED | OUTPATIENT
Start: 2024-07-29 | End: 2024-08-08 | Stop reason: HOSPADM

## 2024-07-29 RX ORDER — ONDANSETRON 2 MG/ML
4 INJECTION INTRAMUSCULAR; INTRAVENOUS EVERY 4 HOURS PRN
Status: DISCONTINUED | OUTPATIENT
Start: 2024-07-29 | End: 2024-08-06

## 2024-07-29 RX ORDER — POLYETHYLENE GLYCOL 3350 17 G/17G
1 POWDER, FOR SOLUTION ORAL
Status: DISCONTINUED | OUTPATIENT
Start: 2024-07-29 | End: 2024-08-08 | Stop reason: HOSPADM

## 2024-07-29 RX ADMIN — Medication 3 MG: at 20:20

## 2024-07-29 RX ADMIN — POTASSIUM CHLORIDE AND SODIUM CHLORIDE: 900; 150 INJECTION, SOLUTION INTRAVENOUS at 20:28

## 2024-07-29 RX ADMIN — ATORVASTATIN CALCIUM 40 MG: 40 TABLET, FILM COATED ORAL at 20:21

## 2024-07-29 RX ADMIN — CLOPIDOGREL BISULFATE 300 MG: 75 TABLET ORAL at 20:21

## 2024-07-29 RX ADMIN — SODIUM CHLORIDE, POTASSIUM CHLORIDE, SODIUM LACTATE AND CALCIUM CHLORIDE 1000 ML: 600; 310; 30; 20 INJECTION, SOLUTION INTRAVENOUS at 17:26

## 2024-07-29 RX ADMIN — IOHEXOL 80 ML: 350 INJECTION, SOLUTION INTRAVENOUS at 15:58

## 2024-07-29 RX ADMIN — HEPARIN SODIUM 5000 UNITS: 5000 INJECTION, SOLUTION INTRAVENOUS; SUBCUTANEOUS at 21:21

## 2024-07-29 SDOH — ECONOMIC STABILITY: TRANSPORTATION INSECURITY
IN THE PAST 12 MONTHS, HAS THE LACK OF TRANSPORTATION KEPT YOU FROM MEDICAL APPOINTMENTS OR FROM GETTING MEDICATIONS?: NO

## 2024-07-29 SDOH — ECONOMIC STABILITY: TRANSPORTATION INSECURITY
IN THE PAST 12 MONTHS, HAS LACK OF RELIABLE TRANSPORTATION KEPT YOU FROM MEDICAL APPOINTMENTS, MEETINGS, WORK OR FROM GETTING THINGS NEEDED FOR DAILY LIVING?: NO

## 2024-07-29 ASSESSMENT — ENCOUNTER SYMPTOMS
HALLUCINATIONS: 0
HEARTBURN: 0
NECK PAIN: 0
CHILLS: 0
NERVOUS/ANXIOUS: 0
POLYDIPSIA: 0
VOMITING: 0
FOCAL WEAKNESS: 0
HEMOPTYSIS: 0
SPUTUM PRODUCTION: 0
WEAKNESS: 1
NAUSEA: 0
MYALGIAS: 0
FOCAL WEAKNESS: 1
BACK PAIN: 0
BRUISES/BLEEDS EASILY: 0
FEVER: 0
PALPITATIONS: 0
WEAKNESS: 1
TINGLING: 1
NECK PAIN: 0
HEADACHES: 1
BLURRED VISION: 0
PHOTOPHOBIA: 0
DOUBLE VISION: 0
SENSORY CHANGE: 0
WEIGHT LOSS: 0
FLANK PAIN: 0
SPEECH CHANGE: 0
COUGH: 0
TREMORS: 0
HEADACHES: 0
ORTHOPNEA: 0
SPEECH CHANGE: 0

## 2024-07-29 ASSESSMENT — PATIENT HEALTH QUESTIONNAIRE - PHQ9
1. LITTLE INTEREST OR PLEASURE IN DOING THINGS: NOT AT ALL
1. LITTLE INTEREST OR PLEASURE IN DOING THINGS: NOT AT ALL
SUM OF ALL RESPONSES TO PHQ9 QUESTIONS 1 AND 2: 0
SUM OF ALL RESPONSES TO PHQ9 QUESTIONS 1 AND 2: 0
2. FEELING DOWN, DEPRESSED, IRRITABLE, OR HOPELESS: NOT AT ALL
2. FEELING DOWN, DEPRESSED, IRRITABLE, OR HOPELESS: NOT AT ALL

## 2024-07-29 ASSESSMENT — COGNITIVE AND FUNCTIONAL STATUS - GENERAL
SUGGESTED CMS G CODE MODIFIER DAILY ACTIVITY: CJ
DAILY ACTIVITIY SCORE: 22
MOBILITY SCORE: 19
MOVING FROM LYING ON BACK TO SITTING ON SIDE OF FLAT BED: A LITTLE
SUGGESTED CMS G CODE MODIFIER MOBILITY: CK
STANDING UP FROM CHAIR USING ARMS: A LITTLE
TOILETING: A LITTLE
HELP NEEDED FOR BATHING: A LITTLE
CLIMB 3 TO 5 STEPS WITH RAILING: A LITTLE
WALKING IN HOSPITAL ROOM: A LITTLE
MOVING TO AND FROM BED TO CHAIR: A LITTLE

## 2024-07-29 ASSESSMENT — LIFESTYLE VARIABLES
TOTAL SCORE: 0
ALCOHOL_USE: YES
DOES PATIENT WANT TO STOP DRINKING: NO
EVER FELT BAD OR GUILTY ABOUT YOUR DRINKING: NO
TOTAL SCORE: 0
HAVE PEOPLE ANNOYED YOU BY CRITICIZING YOUR DRINKING: NO
SUBSTANCE_ABUSE: 0
TOTAL SCORE: 0
CONSUMPTION TOTAL: INCOMPLETE
HAVE YOU EVER FELT YOU SHOULD CUT DOWN ON YOUR DRINKING: NO
EVER HAD A DRINK FIRST THING IN THE MORNING TO STEADY YOUR NERVES TO GET RID OF A HANGOVER: NO

## 2024-07-29 ASSESSMENT — FIBROSIS 4 INDEX
FIB4 SCORE: 4.11
FIB4 SCORE: 5.01
FIB4 SCORE: 4.11

## 2024-07-29 ASSESSMENT — PAIN DESCRIPTION - PAIN TYPE
TYPE: ACUTE PAIN
TYPE: ACUTE PAIN

## 2024-07-29 ASSESSMENT — SOCIAL DETERMINANTS OF HEALTH (SDOH)
WITHIN THE LAST YEAR, HAVE YOU BEEN HUMILIATED OR EMOTIONALLY ABUSED IN OTHER WAYS BY YOUR PARTNER OR EX-PARTNER?: NO
IN THE PAST 12 MONTHS, HAS THE ELECTRIC, GAS, OIL, OR WATER COMPANY THREATENED TO SHUT OFF SERVICE IN YOUR HOME?: NO
WITHIN THE LAST YEAR, HAVE TO BEEN RAPED OR FORCED TO HAVE ANY KIND OF SEXUAL ACTIVITY BY YOUR PARTNER OR EX-PARTNER?: NO
WITHIN THE LAST YEAR, HAVE YOU BEEN KICKED, HIT, SLAPPED, OR OTHERWISE PHYSICALLY HURT BY YOUR PARTNER OR EX-PARTNER?: NO
WITHIN THE PAST 12 MONTHS, THE FOOD YOU BOUGHT JUST DIDN'T LAST AND YOU DIDN'T HAVE MONEY TO GET MORE: NEVER TRUE
WITHIN THE PAST 12 MONTHS, YOU WORRIED THAT YOUR FOOD WOULD RUN OUT BEFORE YOU GOT THE MONEY TO BUY MORE: NEVER TRUE
WITHIN THE LAST YEAR, HAVE YOU BEEN AFRAID OF YOUR PARTNER OR EX-PARTNER?: NO

## 2024-07-29 NOTE — ED PROVIDER NOTES
ED Provider Note    CHIEF COMPLAINT  Chief Complaint   Patient presents with    Sent from Urgent Care     Pt reports L arm weakness that started on Saturday around 09:00 am. Pt reports headache and confusion. Symptoms have now resolved.        EXTERNAL RECORDS REVIEWED  Outpatient Notes sent from primary care physicians for concern of TIA.  Several episodes of numbness tingling weakness left arm    HPI/ROS  LIMITATION TO HISTORY   Select: : None  OUTSIDE HISTORIAN(S):  Family member at bedside provides additional details that patient had slurred speech and left-sided facial droop yesterday.    Naomy Vargas is a 74 y.o. female who presents to the emergency department with chief complaint of left arm weakness.  Patient had this left arm weakness intermittently since Saturday this is 3 days prior.  She reports that initially she was having trouble moving her left hand however she is able to move her left hand now but has some persistent numbness.  Her family member at bedside also reports that yesterday she had some left-sided facial droop and some slight slurred speech while having this left arm weakness.  This is resolved at this time.  No chest pain no palpitations no shortness of breath no abdominal pain no trauma no other acute symptom change or concern.    PAST MEDICAL HISTORY   has a past medical history of Abnormal CT of the chest, Acute hypoxemic respiratory failure (HCC) (03/23/2023), Anxiety, Arthritis, Back pain, Blood transfusion without reported diagnosis, Breath shortness, Cataract, Cellulitis, Cellulitis of leg (11/01/2013), Chickenpox, COPD (chronic obstructive pulmonary disease) ([facility]), Dental disorder, Earache, Elevated troponin, Fatigue, Frequent urination, Hypertension (02/23/2018), Hypothyroidism, Influenza, Insomnia, Kidney disease, Mumps, OAB (overactive bladder) (02/11/2019), Pain (02/23/2018), Peripheral vascular disease, unspecified (HCC) (10/13/2021), Pneumonia, Rash, Ringing in ears,  Sepsis (HCC) (2023), Shortness of breath, Swelling of lower extremity, Thyroid disease, Tonsillitis, Toothache, and Venous stasis dermatitis.    SURGICAL HISTORY   has a past surgical history that includes mammary ductogram, single; breast biopsy; abdominal exploration; oophorectomy; hysterectomy, total abdominal (); eye surgery; vein ligation radio frequency (Right, 2018); hysterectomy laparoscopy; remv 2nd cataract,corn-scler sectn; and tonsillectomy.    FAMILY HISTORY  Family History   Problem Relation Age of Onset    Arthritis Mother         hip fracture    Diabetes Mother     Cancer Mother         skin    Arthritis Father         lung    Alcohol/Drug Father         etoh    Lung Disease Sister         smoker/copd    Hypertension Sister     Other Brother         hep c    Arthritis Maternal Grandmother         hip fracture    Diabetes Maternal Grandfather     No Known Problems Son     No Known Problems Son     No Known Problems Brother     No Known Problems Sister     No Known Problems Brother     Hyperlipidemia Neg Hx     Stroke Neg Hx        SOCIAL HISTORY  Social History     Tobacco Use    Smoking status: Former     Current packs/day: 0.00     Average packs/day: 0.5 packs/day for 53.0 years (26.5 ttl pk-yrs)     Types: Cigarettes     Start date: 1964     Quit date: 2017     Years since quittin.0    Smokeless tobacco: Never   Vaping Use    Vaping status: Never Used   Substance and Sexual Activity    Alcohol use: Yes     Comment: 2 per day    Drug use: No    Sexual activity: Not Currently     Partners: Male       CURRENT MEDICATIONS  Home Medications       Reviewed by Bridget Romo R.N. (Registered Nurse) on 24 at 1311  Med List Status: Partial     Medication Last Dose Status   Acetaminophen 500 MG Cap  Active   albuterol 108 (90 Base) MCG/ACT Aero Soln inhalation aerosol  Active   amLODIPine (NORVASC) 5 MG Tab  Active   Ascorbic Acid (VITAMIN C PO)  Active  "  clotrimazole (LOTRIMIN) 1 % Cream  Active   cyanocobalamin (VITAMIN B-12) 100 MCG Tab  Active   Dextromethorphan-guaiFENesin (MUCINEX DM PO)  Active   diphenhydrAMINE (BENADRYL) 25 MG Tab  Active   doxycycline (VIBRAMYCIN) 100 MG Tab  Active   Doxylamine Succinate, Sleep, (SLEEP AID PO)  Active   ipratropium-albuterol (DUONEB) 0.5-2.5 (3) MG/3ML nebulizer solution  Active   levothyroxine (SYNTHROID) 100 MCG Tab  Active   lisinopril (PRINIVIL) 20 MG Tab  Active   loperamide (IMODIUM) 2 MG Cap  Active   Melatonin 1 MG Tab  Active   Mirabegron ER (MYRBETRIQ) 50 MG TABLET SR 24 HR  Active   Oxymetazoline HCl (NASAL SPRAY NA)  Active   predniSONE (DELTASONE) 10 MG Tab  Active   PROAIR  (90 Base) MCG/ACT Aero Soln inhalation aerosol  Active   rosuvastatin (CRESTOR) 10 MG Tab  Active   SYMBICORT 80-4.5 MCG/ACT Aerosol  Active   terbinafine (LAMISIL) 1 % cream  Active   triamcinolone acetonide (KENALOG) 0.1 % Cream  Active   vitamin D (CHOLECALCIFEROL) 1000 Unit (25 mcg) Tab  Active                    ALLERGIES  Allergies   Allergen Reactions    Colophony [Pinus Strobus] Itching    Latex Rash and Unspecified     .    Neosporin [Neomycin-Polymyxin B] Rash     .    Phenylene Itching    Soap Rash     Certain soaps cause rash    Tape Itching     PAPER TAPE OK    Tea Tree Oil Rash     .       PHYSICAL EXAM  VITAL SIGNS: /58   Pulse 89   Temp 36.9 °C (98.4 °F) (Temporal)   Resp 16   Ht 1.702 m (5' 7\")   Wt 67.9 kg (149 lb 11.1 oz)   LMP  (LMP Unknown)   SpO2 92%   BMI 23.45 kg/m²    Pulse ox interpretation: I interpret this pulse ox as normal.  Constitutional: Alert and oriented x 3, minimal istress  HEENT: Atraumatic normocephalic, pupils are equal round reactive to light extraocular movements are intact. The nares is clear, external ears are normal, mouth shows moist mucous membranes normal dentition for age  Neck: Supple, no JVD no tracheal deviation  Cardiovascular: Regular rate and rhythm no murmur rub " or gallop 2+ pulses peripherally x4  Thorax & Lungs: No respiratory distress, no wheezes rales or rhonchi, No chest tenderness.   GI: Soft nontender nondistended positive bowel sounds, no peritoneal signs  Skin: Warm dry no acute rash or lesion  Musculoskeletal: Moving all extremities with full range and 5 of 5 strength no acute  deformity  Neurologic: Cranial nerves III through XII are grossly intact no sensory deficit no cerebellar dysfunction, moving all extremities with full range and strength no facial droop no dysarthria no aphasia no hemineglect.  Psychiatric: Appropriate affect for situation at this time         EKG/LABS  Results for orders placed or performed during the hospital encounter of 07/29/24   CBC WITH DIFFERENTIAL   Result Value Ref Range    WBC 7.3 4.8 - 10.8 K/uL    RBC 3.77 (L) 4.20 - 5.40 M/uL    Hemoglobin 11.6 (L) 12.0 - 16.0 g/dL    Hematocrit 35.5 (L) 37.0 - 47.0 %    MCV 94.2 81.4 - 97.8 fL    MCH 30.8 27.0 - 33.0 pg    MCHC 32.7 32.2 - 35.5 g/dL    RDW 46.0 35.9 - 50.0 fL    Platelet Count 111 (L) 164 - 446 K/uL    MPV 11.0 9.0 - 12.9 fL    Neutrophils-Polys 62.00 44.00 - 72.00 %    Lymphocytes 14.20 (L) 22.00 - 41.00 %    Monocytes 20.80 (H) 0.00 - 13.40 %    Eosinophils 2.20 0.00 - 6.90 %    Basophils 0.40 0.00 - 1.80 %    Immature Granulocytes 0.40 0.00 - 0.90 %    Nucleated RBC 0.00 0.00 - 0.20 /100 WBC    Neutrophils (Absolute) 4.50 1.82 - 7.42 K/uL    Lymphs (Absolute) 1.03 1.00 - 4.80 K/uL    Monos (Absolute) 1.51 (H) 0.00 - 0.85 K/uL    Eos (Absolute) 0.16 0.00 - 0.51 K/uL    Baso (Absolute) 0.03 0.00 - 0.12 K/uL    Immature Granulocytes (abs) 0.03 0.00 - 0.11 K/uL    NRBC (Absolute) 0.00 K/uL   COMP METABOLIC PANEL   Result Value Ref Range    Sodium 132 (L) 135 - 145 mmol/L    Potassium 3.5 (L) 3.6 - 5.5 mmol/L    Chloride 97 96 - 112 mmol/L    Co2 20 20 - 33 mmol/L    Anion Gap 15.0 7.0 - 16.0    Glucose 106 (H) 65 - 99 mg/dL    Bun 27 (H) 8 - 22 mg/dL    Creatinine 1.53 (H)  0.50 - 1.40 mg/dL    Calcium 8.9 8.5 - 10.5 mg/dL    Correct Calcium 9.6 8.5 - 10.5 mg/dL    AST(SGOT) 31 12 - 45 U/L    ALT(SGPT) 17 2 - 50 U/L    Alkaline Phosphatase 108 (H) 30 - 99 U/L    Total Bilirubin 0.5 0.1 - 1.5 mg/dL    Albumin 3.1 (L) 3.2 - 4.9 g/dL    Total Protein 6.3 6.0 - 8.2 g/dL    Globulin 3.2 1.9 - 3.5 g/dL    A-G Ratio 1.0 g/dL   TROPONIN   Result Value Ref Range    Troponin T 23 (H) 6 - 19 ng/L   PROTHROMBIN TIME   Result Value Ref Range    PT 15.4 (H) 12.0 - 14.6 sec    INR 1.21 (H) 0.87 - 1.13   APTT   Result Value Ref Range    APTT 35.9 24.7 - 36.0 sec   ESTIMATED GFR   Result Value Ref Range    GFR (CKD-EPI) 35 (A) >60 mL/min/1.73 m 2   Hemoglobin A1C   Result Value Ref Range    Glycohemoglobin 5.3 4.0 - 5.6 %    Est Avg Glucose 105 mg/dL   EKG   Result Value Ref Range    Report       [facility] Emergency Dept.    Test Date:  2024  Pt Name:    SYLVIA PAVONDepartment: [facility]  MRN:        [id number]                      Room:       [facility]  Gender:     Female                       Technician: [id number]  :        [id number]                   Requested By:ARIELLA FAIRCHILD  Order #:    [id number]                    Reading MD:    Measurements  Intervals                                Axis  Rate:       82                           P:          44  AK:         122                          QRS:        56  QRSD:       95                           T:          23  QT:         389  QTc:        455    Interpretive Statements  Sinus rhythm  Ventricular premature complex  Baseline wander in lead(s) I,III,aVL  Compared to ECG 2024 17:25:37  Ventricular premature complex(es) now present  Intraventricular conduction delay no longer present         I have independently interpreted this EKG    RADIOLOGY/PROCEDURES       Radiologist interpretation:  CT-CTA NECK WITH & W/O-POST PROCESSING   Final Result      1.  Severe greater than 70% right ICA origin stenosis.   2.   60-70% left ICA origin stenosis.   3.  Lung apices demonstrate emphysema. Extensive irregular interstitial opacities in the lung apices which could be chronic changes from interstitial lung disease and/or interstitial infiltrates not excluded.   4.  Moderate mediastinal and mild hilar lymphadenopathy.         CT-CTA HEAD WITH & W/O-POST PROCESS   Final Result      1.  No acute arterial abnormality. No large vessel occlusion or aneurysm detected.   2.  Diffuse paranasal sinusitis.      MR-BRAIN-W/O    (Results Pending)   EC-ECHOCARDIOGRAM COMPLETE W/O CONT    (Results Pending)       COURSE & MEDICAL DECISION MAKING    ASSESSMENT, COURSE AND PLAN  Care Narrative: 7-year-old 4-year-old female with TIA symptoms over the last couple of days.  Currently she is asymptomatic however she has a CAT scan that demonstrates severe right-sided ICA stenosis as well as some moderate left ICA stenosis.  Case was discussed with hospitalist Dr. Pang.  I also discussed it with the neurovascular attending Dr. Estrada.  Patient is to be admitted to the hospitalist service and evaluated by neurovascular for options to treat her severe carotid stenosis that is leading to these TIAs.  Admitted in guarded condition.    CRITICAL CARE  The very real possibilty of a deterioration of this patient's condition required the highest level of my preparedness for sudden, emergent intervention.  I provided critical care services, which included medication orders, frequent reevaluations of the patient's condition and response to treatment, ordering and reviewing test results, and discussing the case with various consultants.  The critical care time associated with the care of the patient was 35 minutes. Review chart for interventions. This time is exclusive of any other billable procedures.       FINAL DIAGNOSIS  1.  Transient ischemic attack  2.  Left upper extremity paresthesia  3.  Critical carotid stenosis right ICA  4.  Carotid stenosis left  ICA  5.  Critical care 35 minutes     Electronically signed by: Zane Hinds M.D.

## 2024-07-29 NOTE — ED TRIAGE NOTES
"Chief Complaint   Patient presents with    Sent from Urgent Care     Pt reports L arm weakness that started on Saturday around 09:00 am. Pt reports headache and confusion. Symptoms have now resolved.          Pt ambulated to triage for above complaint.    NIH: negative in triage     Pt is AO x 4, follows commands, and responds appropriately to questions. Patient's breathing is unlabored and pain is currently 0/10 on the 0-10 pain scale.  Pt placed in lobby. Patient educated on triage process and encouraged to alert staff for any changes.      /58   Pulse 89   Temp 36.9 °C (98.4 °F) (Temporal)   Resp 16   Ht 1.702 m (5' 7\")   Wt 67.9 kg (149 lb 11.1 oz)   SpO2 92%     "

## 2024-07-29 NOTE — ED NOTES
Assist RN: Pt amb to B21 with steady gait. Pt attempting to provide urine sample. Chart up for ERP

## 2024-07-30 ENCOUNTER — APPOINTMENT (OUTPATIENT)
Dept: RADIOLOGY | Facility: MEDICAL CENTER | Age: 74
DRG: 038 | End: 2024-07-30
Attending: STUDENT IN AN ORGANIZED HEALTH CARE EDUCATION/TRAINING PROGRAM
Payer: MEDICARE

## 2024-07-30 ENCOUNTER — APPOINTMENT (OUTPATIENT)
Dept: RADIOLOGY | Facility: MEDICAL CENTER | Age: 74
DRG: 038 | End: 2024-07-30
Attending: INTERNAL MEDICINE
Payer: MEDICARE

## 2024-07-30 ENCOUNTER — APPOINTMENT (OUTPATIENT)
Dept: CARDIOLOGY | Facility: MEDICAL CENTER | Age: 74
DRG: 038 | End: 2024-07-30
Attending: INTERNAL MEDICINE
Payer: MEDICARE

## 2024-07-30 PROBLEM — I95.9 HYPOTENSION: Status: ACTIVE | Noted: 2024-07-30

## 2024-07-30 PROBLEM — J18.9 PNEUMONIA: Status: ACTIVE | Noted: 2024-07-30

## 2024-07-30 LAB
ALBUMIN SERPL BCP-MCNC: 3 G/DL (ref 3.2–4.9)
ALBUMIN/GLOB SERPL: 1 G/DL
ALP SERPL-CCNC: 100 U/L (ref 30–99)
ALT SERPL-CCNC: 22 U/L (ref 2–50)
ANION GAP SERPL CALC-SCNC: 13 MMOL/L (ref 7–16)
APPEARANCE UR: ABNORMAL
AST SERPL-CCNC: 30 U/L (ref 12–45)
BACTERIA #/AREA URNS HPF: ABNORMAL /HPF
BACTERIA BLD CULT: NORMAL
BASOPHILS # BLD AUTO: 0.3 % (ref 0–1.8)
BASOPHILS # BLD: 0.02 K/UL (ref 0–0.12)
BILIRUB SERPL-MCNC: 0.5 MG/DL (ref 0.1–1.5)
BILIRUB UR QL STRIP.AUTO: NEGATIVE
BUN SERPL-MCNC: 18 MG/DL (ref 8–22)
CALCIUM ALBUM COR SERPL-MCNC: 9.5 MG/DL (ref 8.5–10.5)
CALCIUM SERPL-MCNC: 8.7 MG/DL (ref 8.5–10.5)
CHLORIDE SERPL-SCNC: 100 MMOL/L (ref 96–112)
CHOLEST SERPL-MCNC: 66 MG/DL (ref 100–199)
CO2 SERPL-SCNC: 22 MMOL/L (ref 20–33)
COLOR UR: YELLOW
CORTIS SERPL-MCNC: 17.9 UG/DL (ref 0–23)
CREAT SERPL-MCNC: 1.14 MG/DL (ref 0.5–1.4)
EOSINOPHIL # BLD AUTO: 0.19 K/UL (ref 0–0.51)
EOSINOPHIL NFR BLD: 2.7 % (ref 0–6.9)
EPI CELLS #/AREA URNS HPF: NEGATIVE /HPF
ERYTHROCYTE [DISTWIDTH] IN BLOOD BY AUTOMATED COUNT: 45.9 FL (ref 35.9–50)
GFR SERPLBLD CREATININE-BSD FMLA CKD-EPI: 50 ML/MIN/1.73 M 2
GLOBULIN SER CALC-MCNC: 3.1 G/DL (ref 1.9–3.5)
GLUCOSE SERPL-MCNC: 93 MG/DL (ref 65–99)
GLUCOSE UR STRIP.AUTO-MCNC: NEGATIVE MG/DL
HCT VFR BLD AUTO: 37.4 % (ref 37–47)
HDLC SERPL-MCNC: 25 MG/DL
HGB BLD-MCNC: 12.4 G/DL (ref 12–16)
HYALINE CASTS #/AREA URNS LPF: ABNORMAL /LPF
IMM GRANULOCYTES # BLD AUTO: 0.02 K/UL (ref 0–0.11)
IMM GRANULOCYTES NFR BLD AUTO: 0.3 % (ref 0–0.9)
KETONES UR STRIP.AUTO-MCNC: NEGATIVE MG/DL
LACTATE SERPL-SCNC: 0.6 MMOL/L (ref 0.5–2)
LDLC SERPL CALC-MCNC: 24 MG/DL
LEUKOCYTE ESTERASE UR QL STRIP.AUTO: ABNORMAL
LV EJECT FRACT MOD 2C 99903: 57.08
LV EJECT FRACT MOD 4C 99902: 61.81
LV EJECT FRACT MOD BP 99901: 59.84
LYMPHOCYTES # BLD AUTO: 0.93 K/UL (ref 1–4.8)
LYMPHOCYTES NFR BLD: 13.3 % (ref 22–41)
MCH RBC QN AUTO: 30.9 PG (ref 27–33)
MCHC RBC AUTO-ENTMCNC: 33.2 G/DL (ref 32.2–35.5)
MCV RBC AUTO: 93.3 FL (ref 81.4–97.8)
MICRO URNS: ABNORMAL
MONOCYTES # BLD AUTO: 1.27 K/UL (ref 0–0.85)
MONOCYTES NFR BLD AUTO: 18.2 % (ref 0–13.4)
NEUTROPHILS # BLD AUTO: 4.54 K/UL (ref 1.82–7.42)
NEUTROPHILS NFR BLD: 65.2 % (ref 44–72)
NITRITE UR QL STRIP.AUTO: POSITIVE
NRBC # BLD AUTO: 0 K/UL
NRBC BLD-RTO: 0 /100 WBC (ref 0–0.2)
PH UR STRIP.AUTO: 6.5 [PH] (ref 5–8)
PLATELET # BLD AUTO: 111 K/UL (ref 164–446)
PMV BLD AUTO: 11 FL (ref 9–12.9)
POTASSIUM SERPL-SCNC: 3.6 MMOL/L (ref 3.6–5.5)
PROCALCITONIN SERPL-MCNC: 1.64 NG/ML
PROT SERPL-MCNC: 6.1 G/DL (ref 6–8.2)
PROT UR QL STRIP: 30 MG/DL
RBC # BLD AUTO: 4.01 M/UL (ref 4.2–5.4)
RBC # URNS HPF: ABNORMAL /HPF
RBC UR QL AUTO: ABNORMAL
SIGNIFICANT IND 70042: NORMAL
SITE SITE: NORMAL
SODIUM SERPL-SCNC: 135 MMOL/L (ref 135–145)
SOURCE SOURCE: NORMAL
SP GR UR STRIP.AUTO: 1.01
TRIGL SERPL-MCNC: 85 MG/DL (ref 0–149)
UROBILINOGEN UR STRIP.AUTO-MCNC: 1 MG/DL
WBC # BLD AUTO: 7 K/UL (ref 4.8–10.8)
WBC #/AREA URNS HPF: ABNORMAL /HPF

## 2024-07-30 PROCEDURE — 83605 ASSAY OF LACTIC ACID: CPT

## 2024-07-30 PROCEDURE — 93306 TTE W/DOPPLER COMPLETE: CPT

## 2024-07-30 PROCEDURE — 700102 HCHG RX REV CODE 250 W/ 637 OVERRIDE(OP): Performed by: STUDENT IN AN ORGANIZED HEALTH CARE EDUCATION/TRAINING PROGRAM

## 2024-07-30 PROCEDURE — 87186 SC STD MICRODIL/AGAR DIL: CPT

## 2024-07-30 PROCEDURE — 99232 SBSQ HOSP IP/OBS MODERATE 35: CPT | Performed by: PSYCHIATRY & NEUROLOGY

## 2024-07-30 PROCEDURE — 82533 TOTAL CORTISOL: CPT

## 2024-07-30 PROCEDURE — 87040 BLOOD CULTURE FOR BACTERIA: CPT

## 2024-07-30 PROCEDURE — A9270 NON-COVERED ITEM OR SERVICE: HCPCS | Performed by: STUDENT IN AN ORGANIZED HEALTH CARE EDUCATION/TRAINING PROGRAM

## 2024-07-30 PROCEDURE — 700105 HCHG RX REV CODE 258: Performed by: STUDENT IN AN ORGANIZED HEALTH CARE EDUCATION/TRAINING PROGRAM

## 2024-07-30 PROCEDURE — 87077 CULTURE AEROBIC IDENTIFY: CPT

## 2024-07-30 PROCEDURE — 81001 URINALYSIS AUTO W/SCOPE: CPT

## 2024-07-30 PROCEDURE — 71250 CT THORAX DX C-: CPT

## 2024-07-30 PROCEDURE — 84145 PROCALCITONIN (PCT): CPT

## 2024-07-30 PROCEDURE — 87086 URINE CULTURE/COLONY COUNT: CPT

## 2024-07-30 PROCEDURE — 770020 HCHG ROOM/CARE - TELE (206)

## 2024-07-30 PROCEDURE — A9270 NON-COVERED ITEM OR SERVICE: HCPCS | Performed by: INTERNAL MEDICINE

## 2024-07-30 PROCEDURE — 99221 1ST HOSP IP/OBS SF/LOW 40: CPT | Performed by: SURGERY

## 2024-07-30 PROCEDURE — 80053 COMPREHEN METABOLIC PANEL: CPT

## 2024-07-30 PROCEDURE — 36415 COLL VENOUS BLD VENIPUNCTURE: CPT

## 2024-07-30 PROCEDURE — 93306 TTE W/DOPPLER COMPLETE: CPT | Mod: 26 | Performed by: STUDENT IN AN ORGANIZED HEALTH CARE EDUCATION/TRAINING PROGRAM

## 2024-07-30 PROCEDURE — 99291 CRITICAL CARE FIRST HOUR: CPT | Performed by: STUDENT IN AN ORGANIZED HEALTH CARE EDUCATION/TRAINING PROGRAM

## 2024-07-30 PROCEDURE — 700111 HCHG RX REV CODE 636 W/ 250 OVERRIDE (IP): Performed by: INTERNAL MEDICINE

## 2024-07-30 PROCEDURE — 700111 HCHG RX REV CODE 636 W/ 250 OVERRIDE (IP): Mod: JZ | Performed by: STUDENT IN AN ORGANIZED HEALTH CARE EDUCATION/TRAINING PROGRAM

## 2024-07-30 PROCEDURE — 700102 HCHG RX REV CODE 250 W/ 637 OVERRIDE(OP): Performed by: PSYCHIATRY & NEUROLOGY

## 2024-07-30 PROCEDURE — 80061 LIPID PANEL: CPT

## 2024-07-30 PROCEDURE — 70551 MRI BRAIN STEM W/O DYE: CPT

## 2024-07-30 PROCEDURE — 700102 HCHG RX REV CODE 250 W/ 637 OVERRIDE(OP): Performed by: INTERNAL MEDICINE

## 2024-07-30 PROCEDURE — A9270 NON-COVERED ITEM OR SERVICE: HCPCS | Performed by: PSYCHIATRY & NEUROLOGY

## 2024-07-30 PROCEDURE — 92610 EVALUATE SWALLOWING FUNCTION: CPT

## 2024-07-30 PROCEDURE — 87015 SPECIMEN INFECT AGNT CONCNTJ: CPT

## 2024-07-30 PROCEDURE — 85025 COMPLETE CBC W/AUTO DIFF WBC: CPT

## 2024-07-30 RX ORDER — MIDODRINE HYDROCHLORIDE 5 MG/1
5 TABLET ORAL
Status: DISCONTINUED | OUTPATIENT
Start: 2024-07-30 | End: 2024-08-08 | Stop reason: HOSPADM

## 2024-07-30 RX ORDER — CLOPIDOGREL BISULFATE 75 MG/1
75 TABLET ORAL DAILY
Status: DISCONTINUED | OUTPATIENT
Start: 2024-07-30 | End: 2024-08-07

## 2024-07-30 RX ORDER — SODIUM CHLORIDE 9 MG/ML
INJECTION, SOLUTION INTRAVENOUS CONTINUOUS
Status: DISCONTINUED | OUTPATIENT
Start: 2024-07-30 | End: 2024-08-01

## 2024-07-30 RX ORDER — SODIUM CHLORIDE 9 MG/ML
1000 INJECTION, SOLUTION INTRAVENOUS ONCE
Status: COMPLETED | OUTPATIENT
Start: 2024-07-30 | End: 2024-07-30

## 2024-07-30 RX ADMIN — SODIUM CHLORIDE 1000 ML: 9 INJECTION, SOLUTION INTRAVENOUS at 08:40

## 2024-07-30 RX ADMIN — VITAM B12 100 MCG: 100 TAB at 05:50

## 2024-07-30 RX ADMIN — Medication 1000 UNITS: at 05:50

## 2024-07-30 RX ADMIN — MIDODRINE HYDROCHLORIDE 5 MG: 5 TABLET ORAL at 18:24

## 2024-07-30 RX ADMIN — SODIUM CHLORIDE: 9 INJECTION, SOLUTION INTRAVENOUS at 12:08

## 2024-07-30 RX ADMIN — LEVOTHYROXINE SODIUM 100 MCG: 0.1 TABLET ORAL at 08:37

## 2024-07-30 RX ADMIN — MIDODRINE HYDROCHLORIDE 5 MG: 5 TABLET ORAL at 13:03

## 2024-07-30 RX ADMIN — HEPARIN SODIUM 5000 UNITS: 5000 INJECTION, SOLUTION INTRAVENOUS; SUBCUTANEOUS at 21:59

## 2024-07-30 RX ADMIN — CLOPIDOGREL BISULFATE 75 MG: 75 TABLET ORAL at 08:37

## 2024-07-30 RX ADMIN — AMPICILLIN AND SULBACTAM 3 G: 1; 2 INJECTION, POWDER, FOR SOLUTION INTRAMUSCULAR; INTRAVENOUS at 20:07

## 2024-07-30 RX ADMIN — ATORVASTATIN CALCIUM 40 MG: 40 TABLET, FILM COATED ORAL at 18:22

## 2024-07-30 RX ADMIN — HEPARIN SODIUM 5000 UNITS: 5000 INJECTION, SOLUTION INTRAVENOUS; SUBCUTANEOUS at 18:27

## 2024-07-30 RX ADMIN — HEPARIN SODIUM 5000 UNITS: 5000 INJECTION, SOLUTION INTRAVENOUS; SUBCUTANEOUS at 05:50

## 2024-07-30 RX ADMIN — ASPIRIN 81 MG 81 MG: 81 TABLET ORAL at 05:50

## 2024-07-30 RX ADMIN — AMPICILLIN AND SULBACTAM 3 G: 1; 2 INJECTION, POWDER, FOR SOLUTION INTRAMUSCULAR; INTRAVENOUS at 13:03

## 2024-07-30 RX ADMIN — Medication 3 MG: at 20:07

## 2024-07-30 ASSESSMENT — COPD QUESTIONNAIRES
DO YOU EVER COUGH UP ANY MUCUS OR PHLEGM?: NO/ONLY WITH OCCASIONAL COLDS OR INFECTIONS
DURING THE PAST 4 WEEKS HOW MUCH DID YOU FEEL SHORT OF BREATH: NONE/LITTLE OF THE TIME
COPD SCREENING SCORE: 5
IN THE PAST 12 MONTHS DO YOU DO LESS THAN YOU USED TO BECAUSE OF YOUR BREATHING PROBLEMS: AGREE
HAVE YOU SMOKED AT LEAST 100 CIGARETTES IN YOUR ENTIRE LIFE: YES

## 2024-07-30 ASSESSMENT — ENCOUNTER SYMPTOMS
VOMITING: 0
COUGH: 1
DIZZINESS: 0
SHORTNESS OF BREATH: 0
WEAKNESS: 1
NAUSEA: 0

## 2024-07-30 ASSESSMENT — PAIN DESCRIPTION - PAIN TYPE: TYPE: ACUTE PAIN

## 2024-07-30 NOTE — CONSULTS
Vascular Surgery          New Patient Consultation    Patient:Naomy Vargas  MRN:[id number]    Date: 7/30/2024    Referring Provider: Sean Villegas M.d.    Consulting Physician: Valerio Cortés MD  _____________________________________________________    Reason for consultation:  Symptomatic right carotid stenosis    HPI:  This is a 74 y.o. female admitted with TIA symptoms affecting her left arm.  Fortunately the episode was transient and she is back to her neurologic baseline with no concerns at this time.  Workup reveals a high-grade right carotid stenosis and also and at least moderate stenosis of the origin of the right common carotid artery and so findings are consistent with symptomatic right carotid stenosis causing her TIA.  She has been evaluated by neuro and started on dual antiplatelet therapy.  Right carotid endarterectomy with angiogram and possible retrograde stenting was recommended.  When I came to see her she was alert and conversant in no distress with no complaints.  No previous neck surgery or radiation    Past Medical History:   Diagnosis Date    Abnormal CT of the chest     Acute hypoxemic respiratory failure (HCC) 03/23/2023    Anxiety     Arthritis     wrists    Back pain     Blood transfusion without reported diagnosis     with ectopic preg x 2     Breath shortness     with exertion    Cataract     bilateral removal-Dr. Duke Talaamntes    Cellulitis     right lower leg    Cellulitis of leg 11/01/2013    Followed by dermatology. Managed with fluocinonide and Bactroban on right leg Left leg she uses triamcinolone and Sarna pramocaine (anesthetic, antipruretic) on both.      Chickenpox     COPD (chronic obstructive pulmonary disease) ([facility])     Dental disorder     upper denture    Earache     Elevated troponin     Fatigue     Frequent urination     Hypertension 02/23/2018    on no meds at this time    Hypothyroidism     Influenza     Insomnia     Kidney disease     reports 1  kidney is smaller than the other    Mumps     OAB (overactive bladder) 02/11/2019    Pain 02/23/2018    right leg and back, 5/10    Peripheral vascular disease, unspecified (HCC) 10/13/2021    Pneumonia     Rash     Ringing in ears     Sepsis (HCC) 03/23/2023    Shortness of breath     Swelling of lower extremity     Thyroid disease     Tonsillitis     Toothache     Venous stasis dermatitis     right leg       Past Surgical History:   Procedure Laterality Date    VEIN LIGATION RADIO FREQUENCY Right 2/26/2018    Procedure: VEIN LIGATION RADIO FREQUENCY- LONG SAPENOUS;  Surgeon: Jim Alas M.D.;  Location: SURGERY [facility];  Service: General    ABDOMINAL EXPLORATION      2 ectopic preg    BREAST BIOPSY      hx of benign left breast biopsy    EYE SURGERY      cataract x2    HYSTERECTOMY LAPAROSCOPY      HYSTERECTOMY, TOTAL ABDOMINAL  1977/1978    OOPHORECTOMY      PB MAMMARY DUCTOGRAM, SINGLE      [place] REMV 2ND CATARACT,CORN-SCLER SECTN      TONSILLECTOMY         Current Facility-Administered Medications   Medication Dose Route Frequency Provider Last Rate Last Admin    clopidogrel (Plavix) tablet 75 mg  75 mg Oral DAILY Franklin Estrada M.D.   75 mg at 07/30/24 0837    NS infusion   Intravenous Continuous Sean Villegas M.D.        heparin injection 5,000 Units  5,000 Units Subcutaneous Q8HRS Robert Pang M.D.   5,000 Units at 07/30/24 0550    senna-docusate (Pericolace Or Senokot S) 8.6-50 MG per tablet 2 Tablet  2 Tablet Oral Q EVENING Robert Pang M.D.        And    polyethylene glycol/lytes (Miralax) Packet 1 Packet  1 Packet Oral QDAY PRN Robert Pang M.D.        acetaminophen (Tylenol) tablet 650 mg  650 mg Oral Q6HRS PRN Robert Pang M.D.        Pharmacy Consult Request ...Pain Management Review 1 Each  1 Each Other PHARMACY TO DOSE Robert Pang M.D.        oxyCODONE immediate-release (Roxicodone) tablet 2.5 mg  2.5 mg Oral Q3HRS PRN Robert Pang M.D.        Or    oxyCODONE  immediate-release (Roxicodone) tablet 5 mg  5 mg Oral Q3HRS PRN Robert Pang M.D.        Or    HYDROmorphone (Dilaudid) injection 0.25 mg  0.25 mg Intravenous Q3HRS PRN Robert Pang M.D.        ondansetron (Zofran) syringe/vial injection 4 mg  4 mg Intravenous Q4HRS PRN Robert Pang M.D.        ondansetron (Zofran ODT) dispertab 4 mg  4 mg Oral Q4HRS PRN Robert Pang M.D.        aspirin (Asa) chewable tab 81 mg  81 mg Oral DAILY Robert Pang M.D.   81 mg at 07/30/24 0550    atorvastatin (Lipitor) tablet 40 mg  40 mg Oral Q EVENING Robert Pang M.D.   40 mg at 07/29/24 2021    Blood pressure control per admin instructions   Other PHARMACY TO DOSE Robert Pang M.D.        labetalol (Normodyne/Trandate) injection 10 mg  10 mg Intravenous Q10 MIN PRN Robert Pang M.D.        hydrALAZINE (Apresoline) injection 10 mg  10 mg Intravenous Q2HRS PRN Robert Pang M.D.        NS (Bolus) 0.9 % infusion 500 mL  500 mL Intravenous Once PRN Robert Pang M.D.        cyanocobalamin (Vitamin B-12) tablet 100 mcg  100 mcg Oral DAILY Robert Pang M.D.   100 mcg at 07/30/24 0550    ipratropium-albuterol (DUONEB) nebulizer solution  3 mL Nebulization Q4HRS PRN Robert Pang M.D.        levothyroxine (Synthroid) tablet 100 mcg  100 mcg Oral QAM AC Robert Pang M.D.   100 mcg at 07/30/24 0837    loperamide (Imodium) capsule 2 mg  2 mg Oral 4X/DAY PRN Robert Pang M.D.        melatonin tablet 3 mg  3 mg Oral Nightly Robert Pang M.D.   3 mg at 07/29/24 2020    albuterol inhaler 1 Puff  1 Puff Inhalation Q6HRS PRN Robert Pang M.D.        vitamin D3 (Cholecalciferol) tablet 1,000 Units  1,000 Units Oral DAILY Robert Pang M.D.   1,000 Units at 07/30/24 0550    mometasone-formoterol (Dulera) 100-5 MCG/ACT inhaler 2 Puff  2 Puff Inhalation BID Robert Pang M.D.           Social History     Socioeconomic History    Marital status:      Spouse name:  Not on file    Number of children: 2    Years of education: Not on file    Highest education level: Not on file   Occupational History    Occupation: retired   Tobacco Use    Smoking status: Former     Current packs/day: 0.00     Average packs/day: 0.5 packs/day for 53.0 years (26.5 ttl pk-yrs)     Types: Cigarettes     Start date: 1964     Quit date: 2017     Years since quittin.0     Passive exposure: Past    Smokeless tobacco: Never   Vaping Use    Vaping status: Never Used   Substance and Sexual Activity    Alcohol use: Yes     Comment: 2 per day wine    Drug use: No    Sexual activity: Not Currently     Partners: Male   Other Topics Concern    Not on file   Social History Narrative    Not on file     Social Determinants of Health     Financial Resource Strain: Low Risk  (2024)    Overall Financial Resource Strain (CARDIA)     Difficulty of Paying Living Expenses: Not very hard   Food Insecurity: No Food Insecurity (2024)    Hunger Vital Sign     Worried About Running Out of Food in the Last Year: Never true     Ran Out of Food in the Last Year: Never true   Transportation Needs: No Transportation Needs (2024)    PRAPARE - Transportation     Lack of Transportation (Medical): No     Lack of Transportation (Non-Medical): No   Physical Activity: Inactive (2024)    Exercise Vital Sign     Days of Exercise per Week: 0 days     Minutes of Exercise per Session: 0 min   Stress: No Stress Concern Present (2024)    [employer] - Occupational Stress Questionnaire     Feeling of Stress : Only a little   Social Connections: Moderately Isolated (2024)    Social Connection and Isolation Panel [NHANES]     Frequency of Communication with Friends and Family: Twice a week     Frequency of Social Gatherings with Friends and Family: Once a week     Attends Mandaen Services: Never     Active Member of Clubs or Organizations: No     Attends Club or  "Organization Meetings: Never     Marital Status:    Intimate Partner Violence: Not At Risk (7/29/2024)    Humiliation, Afraid, Rape, and Kick questionnaire     Fear of Current or Ex-Partner: No     Emotionally Abused: No     Physically Abused: No     Sexually Abused: No   Housing Stability: Low Risk  (7/29/2024)    Housing Stability Vital Sign     Unable to Pay for Housing in the Last Year: No     Number of Places Lived in the Last Year: 1     Unstable Housing in the Last Year: No       Family History   Problem Relation Age of Onset    Arthritis Mother         hip fracture    Diabetes Mother     Cancer Mother         skin    Arthritis Father         lung    Alcohol/Drug Father         etoh    Lung Disease Sister         smoker/copd    Hypertension Sister     Other Brother         hep c    Arthritis Maternal Grandmother         hip fracture    Diabetes Maternal Grandfather     No Known Problems Son     No Known Problems Son     No Known Problems Brother     No Known Problems Sister     No Known Problems Brother     Hyperlipidemia Neg Hx     Stroke Neg Hx        Allergies:  Colophony [pinus strobus], Latex, Neosporin [neomycin-polymyxin b], Phenylene, Soap, Tape, and Tea tree oil    Review of Systems:    Noncontributory except as per HPI    Physical Exam:  BP 90/59   Pulse 87   Temp 37.1 °C (98.7 °F) (Temporal)   Resp 16   Ht 1.719 m (5' 7.68\")   Wt 68 kg (150 lb)   LMP  (LMP Unknown)   SpO2 96%   BMI 23.03 kg/m²     Constitutional: Alert, oriented, no acute distress  HEENT:  Normocephalic and atraumatic, EOMI  Neck:   Supple, no JVD,   Cardiovascular: Regular rate and rhythm,   Pulmonary:  Good air entry bilaterally,    Abdominal:  Soft, non-tender, non-distended  Musculoskeletal: No tenderness, no deformity  Neurological:  CN II-XII grossly intact, no focal deficits  Skin:   Skin is warm and dry. No rash noted.  Vascular exam: Extremities are warm and well-perfused    Labs:  Recent Labs     " 07/29/24 1415 07/30/24 0329   WBC 7.3 7.0   RBC 3.77* 4.01*   HEMOGLOBIN 11.6* 12.4   HEMATOCRIT 35.5* 37.4   MCV 94.2 93.3   MCH 30.8 30.9   MCHC 32.7 33.2   RDW 46.0 45.9   PLATELETCT 111* 111*   MPV 11.0 11.0     Recent Labs     07/29/24 1415 07/30/24  0329   SODIUM 132* 135   POTASSIUM 3.5* 3.6   CHLORIDE 97 100   CO2 20 22   GLUCOSE 106* 93   BUN 27* 18   CREATININE 1.53* 1.14   CALCIUM 8.9 8.7     Recent Labs     07/29/24 1415   APTT 35.9   INR 1.21*     Recent Labs     07/29/24 1415 07/30/24 0329   ASTSGOT 31 30   ALTSGPT 17 22   TBILIRUBIN 0.5 0.5   ALKPHOSPHAT 108* 100*   GLOBULIN 3.2 3.1   INR 1.21*  --          Radiology:  CTA shows a high-grade right ICA stenosis which is the likely source of the patient's TIA.  CTA also shows abnormalities of the proximal right common carotid artery near the origin; specifically there appears to be a 60% stenosis at the origin of the right carotid artery and then potentially some luminal abnormalities a couple centimeters distal to this although these findings could also be artifact.  An image is included below to demonstrate the abnormalities of the proximal right common carotid artery      Note: Approximately 60% origin stenosis.  Luminal irregularity 2 cm distal to this which could be artifact      Assessment/Plan:   -Symptomatic right carotid stenosis    Patient presented with a TIA episode concerning for symptomatic right carotid stenosis.  Right carotid endarterectomy was recommended.  CTA also shows abnormalities of the proximal right common carotid artery which will be further evaluated with retrograde angiogram during the procedure and if the angiogram confirms that there is significant stenosis of the proximal right common carotid artery then a retrograde stent could be placed during the procedure.  I explained the details of the operation, alternatives, and potential risks, including but not limited to bleeding, infection, and risks of anesthesia.  All  questions were answered. Patient understands and agrees to proceed.    N.p.o. except for medications after midnight    Surgery tentatively scheduled for 10:00 tomorrow morning      Valerio Cortés MD  [facility]   [place] preferred or call my office [phone]  __________________________________________________________________  Patient:Naomy Vargas   MRN:[id number]   CSN:[id number]

## 2024-07-30 NOTE — PROGRESS NOTES
Neurology Progress Note  Neurohospitalist Service, [facility]    Referring Physician: Sean Villegas M.D.      Interval History: No acute events overnight.  Admitted last night for transient L side weakness, found to have a high grade R ICA stenosis.    Review of systems: In addition to what is detailed in the HPI and/or updated in the interval history, all other systems reviewed and are negative.    Past Medical History, Past Surgical History and Social History reviewed and unchanged from prior    Medications:    Current Facility-Administered Medications:     0.9 % NaCl with KCl 20 mEq infusion, , Intravenous, Continuous, Robert Pang M.D., Last Rate: 83 mL/hr at 07/29/24 2028, New Bag at 07/29/24 2028    heparin injection 5,000 Units, 5,000 Units, Subcutaneous, Q8HRS, Robert Pang M.D., 5,000 Units at 07/30/24 0550    senna-docusate (Pericolace Or Senokot S) 8.6-50 MG per tablet 2 Tablet, 2 Tablet, Oral, Q EVENING **AND** polyethylene glycol/lytes (Miralax) Packet 1 Packet, 1 Packet, Oral, QDAY PRN, Robert Pang M.D.    acetaminophen (Tylenol) tablet 650 mg, 650 mg, Oral, Q6HRS PRN, Robert Pang M.D.    Notify provider if pain remains uncontrolled, , , CONTINUOUS **AND** Use the Numeric Rating Scale (NRS), Avila-Baker Faces (WBF), or FLACC on regular floors and Critical-Care Pain Observation Tool (CPOT) on ICUs/Trauma to assess pain, , , CONTINUOUS **AND** Pulse Ox, , , CONTINUOUS **AND** Pharmacy Consult Request ...Pain Management Review 1 Each, 1 Each, Other, PHARMACY TO DOSE **AND** If patient difficult to arouse and/or has respiratory depression (respiratory rate of 10 or less), stop any opiates that are currently infusing and call a Rapid Response., , , CONTINUOUS, Robert Pang M.D.    oxyCODONE immediate-release (Roxicodone) tablet 2.5 mg, 2.5 mg, Oral, Q3HRS PRN **OR** oxyCODONE immediate-release (Roxicodone) tablet 5 mg, 5 mg, Oral, Q3HRS PRN **OR**  "HYDROmorphone (Dilaudid) injection 0.25 mg, 0.25 mg, Intravenous, Q3HRS PRN, Robert Pang M.D.    ondansetron (Zofran) syringe/vial injection 4 mg, 4 mg, Intravenous, Q4HRS PRN, Robert Pang M.D.    ondansetron (Zofran ODT) dispertab 4 mg, 4 mg, Oral, Q4HRS PRN, Robert Pang M.D.    aspirin (Asa) chewable tab 81 mg, 81 mg, Oral, DAILY, 81 mg at 07/30/24 0550 **OR** [DISCONTINUED] aspirin (Asa) suppository 300 mg, 300 mg, Rectal, DAILY, Robert Pang M.D.    atorvastatin (Lipitor) tablet 40 mg, 40 mg, Oral, Q EVENING, Robert Pang M.D., 40 mg at 07/29/24 2021    Blood pressure control per admin instructions, , Other, PHARMACY TO DOSE, Robert Pang M.D.    labetalol (Normodyne/Trandate) injection 10 mg, 10 mg, Intravenous, Q10 MIN PRN, Robert Pang M.D.    hydrALAZINE (Apresoline) injection 10 mg, 10 mg, Intravenous, Q2HRS PRN, Robert Pang M.D.    NS (Bolus) 0.9 % infusion 500 mL, 500 mL, Intravenous, Once PRN, Robert Pang M.D.    cyanocobalamin (Vitamin B-12) tablet 100 mcg, 100 mcg, Oral, DAILY, Robert Pang M.D., 100 mcg at 07/30/24 0550    ipratropium-albuterol (DUONEB) nebulizer solution, 3 mL, Nebulization, Q4HRS PRN, Robert Pang M.D.    levothyroxine (Synthroid) tablet 100 mcg, 100 mcg, Oral, QAM AC, Robert Pang M.D.    loperamide (Imodium) capsule 2 mg, 2 mg, Oral, 4X/DAY PRN, Robert Pang M.D.    melatonin tablet 3 mg, 3 mg, Oral, Nightly, Robert Pang M.D., 3 mg at 07/29/24 2020    albuterol inhaler 1 Puff, 1 Puff, Inhalation, Q6HRS PRN, Robert Pang M.D.    vitamin D3 (Cholecalciferol) tablet 1,000 Units, 1,000 Units, Oral, DAILY, Robert Pang M.D., 1,000 Units at 07/30/24 0550    mometasone-formoterol (Dulera) 100-5 MCG/ACT inhaler 2 Puff, 2 Puff, Inhalation, BID, Robert Pang M.D.    Physical Examination:   BP 90/63   Pulse 93   Temp 36.9 °C (98.5 °F) (Temporal)   Resp 17   Ht 1.719 m (5' 7.68\")   Wt 68 kg (150 " lb)   LMP  (LMP Unknown)   SpO2 93%   BMI 23.03 kg/m²       General: Patient is awake and in no acute distress  Neck: There is normal range of motion  CV: Regular rate   Extremities:  Warm, dry, and intact, without peripheral lower extremity edema    NEUROLOGICAL EXAM:     Mental status: Awake, alert and fully oriented  Speech and language: Speech is clear and fluent. The patient is able to name and repeat, and follow commands  Cranial nerve exam: Visual fields are full. There is no nystagmus. Extraocular muscles are intact. Face is symmetric.   Motor exam: There is sustained antigravity with no downward drift in bilateral arms and legs.   Sensory exam: Reacts to tactile in all 4 extremities, no neglect to double stim   Coordination: No ataxia on bilateral finger-to-nose testing  Gait: Deferred due to patient preference        NIHSS: National Institutes of Health Stroke Scale     [0] 1a:Level of Consciousness           0-alert 1-drowsy   2-stupor   3-coma  [0] 1b:LOC Questions                         0-both  1-one      2-neither  [0] 1c:LOC Commands                       0-both  1-one      2-neither  [0] 2: Best Gaze                      0-nl    1-partial  2-forced  [0] 3: Visual Fields                              0-nl    1-partial  2-complete 3-bilat  [0] 4: Facial Paresis                0-nl    1-minor    2-partial  3-full  MOTOR                                              0-nl  [0] 5: Right Arm                       1-drift  [0] 6: Left Arm                                     2-some effort vs gravity  [0] 7: Right Leg                       3-no effort vs gravity  [0] 8: Left Leg                                      4-no movement                                                              x-untestable  [0] 9: Limb Ataxia                    0-abs   1-1_limb   2-2+_limbs                                                              x-untestable  [0] 10:Sensory                        0-nl    1-partial   2-dense  [0] 11:Best Language/Aphasia         0-nl    1-mild/mod 2-severe   3-mute  [0] 12:Dysarthria                     0-nl    1-mild/mod 2-severe                                                              x-untestable  [0] 13:Neglect/Inattention                   0-none  1-partial  2-complete  [0] TOTAL         Objective Data:    Labs:  Lab Results   Component Value Date/Time    PROTHROMBTM 15.4 (H) 07/29/2024 02:15 PM    INR 1.21 (H) 07/29/2024 02:15 PM      Lab Results   Component Value Date/Time    WBC 7.0 07/30/2024 03:29 AM    RBC 4.01 (L) 07/30/2024 03:29 AM    HEMOGLOBIN 12.4 07/30/2024 03:29 AM    HEMATOCRIT 37.4 07/30/2024 03:29 AM    MCV 93.3 07/30/2024 03:29 AM    MCH 30.9 07/30/2024 03:29 AM    MCHC 33.2 07/30/2024 03:29 AM    MPV 11.0 07/30/2024 03:29 AM    NEUTSPOLYS 65.20 07/30/2024 03:29 AM    LYMPHOCYTES 13.30 (L) 07/30/2024 03:29 AM    MONOCYTES 18.20 (H) 07/30/2024 03:29 AM    EOSINOPHILS 2.70 07/30/2024 03:29 AM    BASOPHILS 0.30 07/30/2024 03:29 AM    ANISOCYTOSIS 2+ 07/09/2020 09:37 AM      Lab Results   Component Value Date/Time    SODIUM 135 07/30/2024 03:29 AM    POTASSIUM 3.6 07/30/2024 03:29 AM    CHLORIDE 100 07/30/2024 03:29 AM    CO2 22 07/30/2024 03:29 AM    GLUCOSE 93 07/30/2024 03:29 AM    BUN 18 07/30/2024 03:29 AM    CREATININE 1.14 07/30/2024 03:29 AM      Lab Results   Component Value Date/Time    CHOLSTRLTOT 66 (L) 07/30/2024 03:29 AM    LDL 24 07/30/2024 03:29 AM    HDL 25 (A) 07/30/2024 03:29 AM    TRIGLYCERIDE 85 07/30/2024 03:29 AM       Lab Results   Component Value Date/Time    ALKPHOSPHAT 100 (H) 07/30/2024 03:29 AM    ASTSGOT 30 07/30/2024 03:29 AM    ALTSGPT 22 07/30/2024 03:29 AM    TBILIRUBIN 0.5 07/30/2024 03:29 AM        Imaging/Testing:    I interpreted and/or reviewed the patient's neuroimaging    CT-CTA NECK WITH & W/O-POST PROCESSING   Final Result      1.  Severe greater than 70% right ICA origin stenosis.   2.  60-70% left ICA origin stenosis.   3.  Lung  apices demonstrate emphysema. Extensive irregular interstitial opacities in the lung apices which could be chronic changes from interstitial lung disease and/or interstitial infiltrates not excluded.   4.  Moderate mediastinal and mild hilar lymphadenopathy.         CT-CTA HEAD WITH & W/O-POST PROCESS   Final Result      1.  No acute arterial abnormality. No large vessel occlusion or aneurysm detected.   2.  Diffuse paranasal sinusitis.      MR-BRAIN-W/O    (Results Pending)   EC-ECHOCARDIOGRAM COMPLETE W/O CONT    (Results Pending)       Assessment and Plan:  Naomy Vargas is a 74 year old woman with transient left arm weakness, brain fog, now resolved.  CTA of neck revealing a high grade R ICA stenosis with associated mural thrombus.  Clinical semiology combined with CTA findings highly suggestive of TIA event related to symptomatic carotid disease.  This warrants expedited surgical revascularization.  Discussion regarded potential surgical options and she wishes to pursue carotid endarterectomy.  In interval, will load with DAPT to safeguard from additional plaque rupture. Stenosis is not flow-limiting to warrant acute hypertensive response.     Problem list:   Transient neurologic symptoms   R internal carotid stenosis   Hypertension   Hyperlipidemia     Plan:              - q4h neurochecks/NIHSS              - consult vascular surgery for expedited R CEA              - continue ASA 81mg daily              - loaded with plavix 300mg x 1 dose in ER, continue plavix 75mg daily.  May discontinue on POD#1              - no indication for acute hypertensive response- long-term BP goal is 110-130/60-80, ok to restart home antihypertensives              - MRI brain without contrast              - stroke labs:   HgbA1c 5.3 and LDL 24              - continue home crestor 10mg daily for goal LDL < 70              - lovenox SQ for DVT chemoppx ok              - PT/OT/SLP              - may defer embolic workup with  TTE/ziopatch as stroke etiology most likely atheroembolic disease              - [facility] follow up    The evaluation of the patient, and recommended management, was discussed with Dr. Villegas, attending hospitalist. I have performed a physical exam and reviewed and updated ROS and Plan today (7/30/2024).     Franklin Estrada MD  Vascular Neurology

## 2024-07-30 NOTE — THERAPY
Occupational Therapy Contact Note    Patient Name: Naomy Vargas  Age:  74 y.o., Sex:  female  Medical Record #: [id number]  Today's Date: 7/30/2024    OT orders received. Pt pending R carotid endarterectomy. Will hold OT eval and complete post-op when appropriate/able.

## 2024-07-30 NOTE — RESPIRATORY CARE
" COPD EDUCATION by COPD CLINICAL EDUCATOR  7/30/2024 at 9:20 AM by Abiola Centeno, RRT     Patient reviewed by COPD education team. Patient does not qualify for the COPD program because as her PFTs do npt reveal obstruction. Patient as Pulmonary Hypertension. Endorses rarely taking Symbicort or Albuterol but does endorse using a spacer.      COPD Screen  COPD Risk Screening  Do you have a history of COPD?: No  COPD Population Screener  During the past 4 weeks, how much did you feel short of breath?: None/Little of the time  Do you ever cough up any mucus or phlegm?: No/only with occasional colds or infections  In the past 12 months, you do less than you used to because of your breathing problems: Agree  Have you smoked at least 100 cigarettes in your entire life?: Yes  How old are you?: 60+  COPD Screening Score: 5  COPD Coordinator Recommended: Yes  COPD Coordinator Not Recommended:  (pt with PAH, PFT are normal,)    COPD Assessment       PFT Results    No results found for: \"PFT\"    Meds to [facility] provides bedside medication delivery for all eligible patients at discharge.  Would you like to opt out of this program for any reason?: Yes - Opt Out  Reason the patient would like to opt out of Bedside Medication Delivery at Discharge?: Patient prefers another pharmacy     MY COPD ACTION PLAN     It is recommended that patients and physicians /healthcare providers complete this action plan together. This plan should be discussed at each physician visit and updated as needed.    The green, yellow and red zones show groups of symptoms of COPD. This list of symptoms is not comprehensive, and you may experience other symptoms. In the \"Actions\" column, your healthcare provider has recommended actions for you to take based on your symptoms.    Patient Name: Naomy Vargas   YOB: 1950   Last Updated on: 7/30/2024  9:20 AM   Green Zone:  I am doing well today Actions     Usual activitiy and exercise " "level   Take daily medications     Usual amounts of cough and phlegm/mucus   Use oxygen as prescribed     Sleep well at night   Continue regular exercise/diet plan     Appetite is good   At all times avoid cigarette smoke, inhaled irritants     Daily Medications (these medications are taken every day):   Budesonide-Formoterol Fumarate (Symbicort) 2 Puffs Twice daily     Additional Information:  Use your spacer and rinse your mouth after using your Symbicort    Yellow Zone:  I am having a bad day or a COPD flare Actions     More breathless than usual   Continue daily medications     I have less energy for my daily activities   Use quick relief inhaler as ordered     Increased or thicker phlegm/mucus   Use oxygen as prescribed     Using quick relief inhaler/nebulizer more often   Get plenty of rest     Swelling of ankles more than usual   Use pursed lip breathing     More coughing than usual   At all times avoid cigarette smoke, inhaled irritants     I feel like I have a \"chest cold\"     Poor sleep and my symptoms woke me up     My appetite is not good     My medicine is not helping      Call provider immediately if symptoms don’t improve     Continue daily medications, add rescue medications:   Albuterol  Albuterol 2 Puffs  3mL via nebulizer Every 6 hours PRN         Medications to be used during a flare up, (as Discussed with Provider):           Additional Information:  Use your nebulizer as directed from you Pulmonary Team    Red Zone:  I need urgent medical care Actions     Severe shortness of breath even at rest   Call 911 or seek medical care immediately     Not able to do any activity because of breathing      Fever or shaking chills      Feeling confused or very drowsy       Chest pains      Coughing up blood                  "

## 2024-07-30 NOTE — THERAPY
Physical Therapy Contact Note    Patient Name: Naomy Vargas  Age:  74 y.o., Sex:  female  Medical Record #: [id number]  Today's Date: 7/30/2024 07/30/24 3640   Initial Contact Note    Initial Contact Note Order Received and Verified, Physical Therapy Evaluation in Progress with Full Report to Follow.   Interdisciplinary Plan of Care Collaboration   Collaboration Comments PT orders received, chart reviewed. Pt to have Cartotid endarectomy surgery and angiogram tomorrow. Will hold eval today and eval when appropriate.

## 2024-07-30 NOTE — CONSULTS
Neurology Initial Consult H&P  Neurohospitalist Service, [facility]    Referring Physician: Robert Pang M.D.    Chief Complaint   Patient presents with    Sent from Urgent Care     Pt reports L arm weakness that started on Saturday around 09:00 am. Pt reports headache and confusion. Symptoms have now resolved.        HPI: Naomy Vargas is a 74 year old woman with hypertension, hyperlipidemia, former smoker, presenting with transient neurologic symptoms.  She reports that she had left upper extremity weakness on Saturday.  This was associated with brain fog, mild confusion.  Symptoms lasted hours, then eventually resolved after a nap.  However, given these symptoms, she sought care with her PCP today, but was referred to urgent care, which subsequently referred her to [facility] ER.  She currently feels she is a neurologic baseline with no residual deficits.  A CT angiogram of the head and neck revealed a high grade ~70% cervical ICA stenosis with possible mural thrombus.  On ROS, she reports medication compliance with crestor and lisinopril.  She does not take any antithrombotic therapy. She had COVID earlier this month.  Denies constitutional symptoms.  Her last cigarettes was 8 years ago.    Review of systems: In addition to what is detailed in the HPI above, all other systems reviewed and are negative.    Past Medical History:    has a past medical history of Abnormal CT of the chest, Acute hypoxemic respiratory failure (HCC) (03/23/2023), Anxiety, Arthritis, Back pain, Blood transfusion without reported diagnosis, Breath shortness, Cataract, Cellulitis, Cellulitis of leg (11/01/2013), Chickenpox, COPD (chronic obstructive pulmonary disease) ([facility]), Dental disorder, Earache, Elevated troponin, Fatigue, Frequent urination, Hypertension (02/23/2018), Hypothyroidism, Influenza, Insomnia, Kidney disease, Mumps, OAB (overactive bladder) (02/11/2019), Pain (02/23/2018), Peripheral  vascular disease, unspecified ([facility]) (10/13/2021), Pneumonia, Rash, Ringing in ears, Sepsis ([facility]) (03/23/2023), Shortness of breath, Swelling of lower extremity, Thyroid disease, Tonsillitis, Toothache, and Venous stasis dermatitis.    She has no past medical history of Anemia, Clotting disorder ([facility]), Cough, Depression, Diabetic neuropathy ([facility]), Headache(784.0), HIV (human immunodeficiency virus infection) ([facility]), Hyperlipidemia, IBD (inflammatory bowel disease), Meningitis, Osteoporosis, Painful breathing, Sputum production, Ulcer, or Wheezing.    FHx:  family history includes Alcohol/Drug in her father; Arthritis in her father, maternal grandmother, and mother; Cancer in her mother; Diabetes in her maternal grandfather and mother; Hypertension in her sister; Lung Disease in her sister; No Known Problems in her brother, brother, sister, son, and son; Other in her brother.    SHx:   reports that she quit smoking about 7 years ago. Her smoking use included cigarettes. She started smoking about 60 years ago. She has a 26.5 pack-year smoking history. She has never used smokeless tobacco. She reports current alcohol use. She reports that she does not use drugs.    Allergies:  Allergies   Allergen Reactions    Colophony [Pinus Strobus] Itching    Latex Rash and Unspecified     .    Neosporin [Neomycin-Polymyxin B] Rash     .    Phenylene Itching    Soap Rash     Certain soaps cause rash    Tape Itching     PAPER TAPE OK    Tea Tree Oil Rash     .       Medications:    Current Facility-Administered Medications:     0.9 % NaCl with KCl 20 mEq infusion, , Intravenous, Continuous, Robert Pang M.D.    heparin injection 5,000 Units, 5,000 Units, Subcutaneous, Q8HRS, Robert Pang M.D.    [START ON 7/30/2024] senna-docusate (Pericolace Or Senokot S) 8.6-50 MG per tablet 2 Tablet, 2 Tablet, Oral, Q EVENING **AND** polyethylene glycol/lytes (Miralax) Packet 1 Packet, 1 Packet, Oral, QDAY PRN, Robert Pang M.D.     acetaminophen (Tylenol) tablet 650 mg, 650 mg, Oral, Q6HRS PRN, Robert Pang M.D.    Notify provider if pain remains uncontrolled, , , CONTINUOUS **AND** Use the Numeric Rating Scale (NRS), Avila-Baker Faces (WBF), or FLACC on regular floors and Critical-Care Pain Observation Tool (CPOT) on ICUs/Trauma to assess pain, , , CONTINUOUS **AND** Pulse Ox, , , CONTINUOUS **AND** Pharmacy Consult Request ...Pain Management Review 1 Each, 1 Each, Other, PHARMACY TO DOSE **AND** If patient difficult to arouse and/or has respiratory depression (respiratory rate of 10 or less), stop any opiates that are currently infusing and call a Rapid Response., , , CONTINUOUS, Robert Pang M.D.    oxyCODONE immediate-release (Roxicodone) tablet 2.5 mg, 2.5 mg, Oral, Q3HRS PRN **OR** oxyCODONE immediate-release (Roxicodone) tablet 5 mg, 5 mg, Oral, Q3HRS PRN **OR** HYDROmorphone (Dilaudid) injection 0.25 mg, 0.25 mg, Intravenous, Q3HRS PRN, Robert Pang M.D.    ondansetron (Zofran) syringe/vial injection 4 mg, 4 mg, Intravenous, Q4HRS PRN, Robert Pang M.D.    ondansetron (Zofran ODT) dispertab 4 mg, 4 mg, Oral, Q4HRS PRN, Robert Pang M.D.    [START ON 7/30/2024] aspirin (Asa) chewable tab 81 mg, 81 mg, Oral, DAILY **OR** [DISCONTINUED] aspirin (Asa) suppository 300 mg, 300 mg, Rectal, DAILY, Robert Pang M.D.    atorvastatin (Lipitor) tablet 40 mg, 40 mg, Oral, Q EVENING, Robert Pang M.D.    Blood pressure control per admin instructions, , Other, PHARMACY TO DOSE, Robert Pang M.D.    labetalol (Normodyne/Trandate) injection 10 mg, 10 mg, Intravenous, Q10 MIN PRN, Robert Pang M.D.    hydrALAZINE (Apresoline) injection 10 mg, 10 mg, Intravenous, Q2HRS PRN, Robert Pang M.D.    NS (Bolus) 0.9 % infusion 500 mL, 500 mL, Intravenous, Once PRN, Robert Pang M.D.    albuterol inhaler 1-2 Puff, 1-2 Puff, Inhalation, Q6HRS PRN, Robert Pang M.D.    clotrimazole (Lotrimin) 1 % cream 1  Application, 1 Application, Topical, BID, Robert Pang M.D.    [START ON 7/30/2024] cyanocobalamin (Vitamin B-12) tablet 100 mcg, 100 mcg, Oral, DAILY, Robert Pang M.D.    ipratropium-albuterol (DUONEB) nebulizer solution, 3 mL, Nebulization, Q4HRS PRN, Robert Pang M.D.    levothyroxine (Synthroid) tablet 100 mcg, 100 mcg, Oral, QAM AC, Robert Pang M.D.    loperamide (Imodium) capsule 2 mg, 2 mg, Oral, 4X/DAY PRN, Robert Pang M.D.    melatonin tablet 5 mg, 5 mg, Oral, Nightly, Robert Pang M.D.    albuterol inhaler 1 Puff, 1 Puff, Inhalation, Q6HRS PRN, Robert Pang M.D.    budesonide-formoterol (Symbicort) 80-4.5 MCG/ACT inhaler 2 Puff, 2 Puff, Inhalation, BID, Robert Pang M.D.    [START ON 7/30/2024] vitamin D3 (Cholecalciferol) tablet 1,000 Units, 1,000 Units, Oral, DAILY, Robert Pang M.D.    clopidogrel (Plavix) tablet 300 mg, 300 mg, Oral, Once, Franklin Estrada M.D.    Current Outpatient Medications:     ascorbic acid (VITAMIN C) 500 MG tablet, Take 500 mg by mouth every day., Disp: , Rfl:     melatonin 3 MG Tab, Take 3 mg by mouth at bedtime., Disp: , Rfl:     Mirabegron ER 50 MG TABLET SR 24 HR, Take 50 mg by mouth every day., Disp: , Rfl:     levothyroxine (SYNTHROID) 100 MCG Tab, TAKE 1 TABLET BY MOUTH EVERY DAY BEFORE BREAKFAST, Disp: 100 Tablet, Rfl: 1    rosuvastatin (CRESTOR) 10 MG Tab, TAKE 1 TABLET BY MOUTH EVERY DAY IN THE EVENING, Disp: 100 Tablet, Rfl: 1    lisinopril (PRINIVIL) 20 MG Tab, TAKE 1 TABLET BY MOUTH EVERY DAY, Disp: 100 Tablet, Rfl: 3    SYMBICORT 80-4.5 MCG/ACT Aerosol, INHALE 2 PUFFS BY MOUTH TWICE A DAY, Disp: 30.6 Each, Rfl: 2    albuterol 108 (90 Base) MCG/ACT Aero Soln inhalation aerosol, Inhale 1-2 Puffs every 6 hours as needed for Shortness of Breath., Disp: 8.5 g, Rfl: 2    PROAIR  (90 Base) MCG/ACT Aero Soln inhalation aerosol, Inhale 1 Puff every 6 hours as needed for Shortness of Breath., Disp: 8.5 g, Rfl: 1     "cyanocobalamin (VITAMIN B-12) 100 MCG Tab, Take 100 mcg by mouth every day., Disp: , Rfl:     vitamin D (CHOLECALCIFEROL) 1000 Unit (25 mcg) Tab, Take 1,000 Units by mouth every day., Disp: , Rfl:     Physical Examination:     General: Patient is awake and in no acute distress  Eye: Examination of optic disks not indicated at this time given acuity of consult  Neck: There is normal range of motion  CV: regular rate   Extremities:  clear, dry, intact, without peripheral edema    NEUROLOGICAL EXAM:     /62   Pulse 93   Temp 36.9 °C (98.4 °F) (Temporal)   Resp 20   Ht 1.702 m (5' 7\")   Wt 67.9 kg (149 lb 11.1 oz)   LMP  (LMP Unknown)   SpO2 95%   BMI 23.45 kg/m²       Mental status: Awake, alert and fully oriented  Speech and language: Speech is clear and fluent. The patient is able to name and repeat, and follow commands  Cranial nerve exam: Visual fields are full. There is no nystagmus. Extraocular muscles are intact. Face is symmetric.   Motor exam: There is sustained antigravity with no downward drift in bilateral arms and legs.   Sensory exam: Reacts to tactile in all 4 extremities, no neglect to double stim   Coordination: No ataxia on bilateral finger-to-nose testing  Gait: Deferred due to patient preference      NIHSS: National Institutes of Health Stroke Scale    [0] 1a:Level of Consciousness    0-alert 1-drowsy   2-stupor   3-coma  [0] 1b:LOC Questions                  0-both  1-one      2-neither  [0] 1c:LOC Commands                   0-both  1-one      2-neither  [0] 2: Best Gaze                     0-nl    1-partial  2-forced  [0] 3: Visual Fields                   0-nl    1-partial  2-complete 3-bilat  [0] 4: Facial Paresis                0-nl    1-minor    2-partial  3-full  MOTOR                       0-nl  [0] 5: Right Arm           1-drift  [0] 6: Left Arm             2-some effort vs gravity  [0] 7: Right Leg           3-no effort vs gravity  [0] 8: Left Leg             4-no movement    "                          x-untestable  [0] 9: Limb Ataxia                    0-abs   1-1_limb   2-2+_limbs       x-untestable  [0] 10:Sensory                        0-nl    1-partial  2-dense  [0] 11:Best Language/Aphasia         0-nl    1-mild/mod 2-severe   3-mute  [0] 12:Dysarthria                     0-nl    1-mild/mod 2-severe       x-untestable  [0] 13:Neglect/Inattention            0-none  1-partial  2-complete  [0] TOTAL      Objective Data:    Labs:  Lab Results   Component Value Date/Time    PROTHROMBTM 15.4 (H) 07/29/2024 02:15 PM    INR 1.21 (H) 07/29/2024 02:15 PM      Lab Results   Component Value Date/Time    WBC 7.3 07/29/2024 02:15 PM    RBC 3.77 (L) 07/29/2024 02:15 PM    HEMOGLOBIN 11.6 (L) 07/29/2024 02:15 PM    HEMATOCRIT 35.5 (L) 07/29/2024 02:15 PM    MCV 94.2 07/29/2024 02:15 PM    MCH 30.8 07/29/2024 02:15 PM    MCHC 32.7 07/29/2024 02:15 PM    MPV 11.0 07/29/2024 02:15 PM    NEUTSPOLYS 62.00 07/29/2024 02:15 PM    LYMPHOCYTES 14.20 (L) 07/29/2024 02:15 PM    MONOCYTES 20.80 (H) 07/29/2024 02:15 PM    EOSINOPHILS 2.20 07/29/2024 02:15 PM    BASOPHILS 0.40 07/29/2024 02:15 PM    ANISOCYTOSIS 2+ 07/09/2020 09:37 AM      Lab Results   Component Value Date/Time    SODIUM 132 (L) 07/29/2024 02:15 PM    POTASSIUM 3.5 (L) 07/29/2024 02:15 PM    CHLORIDE 97 07/29/2024 02:15 PM    CO2 20 07/29/2024 02:15 PM    GLUCOSE 106 (H) 07/29/2024 02:15 PM    BUN 27 (H) 07/29/2024 02:15 PM    CREATININE 1.53 (H) 07/29/2024 02:15 PM      Lab Results   Component Value Date/Time    CHOLSTRLTOT 112 06/09/2022 09:41 AM    LDL 42 06/09/2022 09:41 AM    HDL 57 06/09/2022 09:41 AM    TRIGLYCERIDE 64 06/09/2022 09:41 AM       Lab Results   Component Value Date/Time    ALKPHOSPHAT 108 (H) 07/29/2024 02:15 PM    ASTSGOT 31 07/29/2024 02:15 PM    ALTSGPT 17 07/29/2024 02:15 PM    TBILIRUBIN 0.5 07/29/2024 02:15 PM        Imaging/Testing:    I interpreted and/or reviewed the patient's neuroimaging    CT-CTA NECK WITH &  W/O-POST PROCESSING   Final Result      1.  Severe greater than 70% right ICA origin stenosis.   2.  60-70% left ICA origin stenosis.   3.  Lung apices demonstrate emphysema. Extensive irregular interstitial opacities in the lung apices which could be chronic changes from interstitial lung disease and/or interstitial infiltrates not excluded.   4.  Moderate mediastinal and mild hilar lymphadenopathy.         CT-CTA HEAD WITH & W/O-POST PROCESS   Final Result      1.  No acute arterial abnormality. No large vessel occlusion or aneurysm detected.   2.  Diffuse paranasal sinusitis.      MR-BRAIN-W/O    (Results Pending)   EC-ECHOCARDIOGRAM COMPLETE W/O CONT    (Results Pending)       Assessment and Plan:  Naomy Vargas is a 74 year old woman with transient left arm weakness, brain fog, now resolved.  CTA of neck revealing a high grade R ICA stenosis with associated mural thrombus.  Clinical semiology combined with CTA findings highly suggestive of TIA event related to symptomatic carotid disease.  This warrants expedited surgical revascularization.  Discussion regarded potential surgical options and she wishes to pursue carotid endarterectomy.  In interval, will load with DAPT to safeguard from additional plaque rupture. Stenosis is not flow-limiting to warrant acute hypertensive response.    Problem list:   Transient neurologic symptoms   R internal carotid stenosis   Hypertension   Hyperlipidemia    Plan:   - q4h neurochecks/NIHSS   - consult vascular surgery for expedited R CEA   - continue ASA 81mg daily   - loaded with plavix 300mg x 1 dose in ER, continue plavix 75mg daily.  May discontinue on POD#1   - no indication for acute hypertensive response- long-term BP goal is 110-130/60-80, ok to restart home antihypertensives   - MRI brain without contrast   - stroke labs:   HgbA1c and lipid panel   - continue home crestor 10mg daily for goal LDL < 70, titrate dose to LDL results   - lovenox SQ for DVT chemoppx  ok   - PT/OT/SLP   - may defer embolic workup with TTE/ziopatch as stroke etiology most likely atheroembolic disease   - [facility] Neuro[facility] clinic follow up    The evaluation of the patient, and recommended management, was discussed with Dr. Hinds, ER attending, and Dr. Pang, attending hospitalist.     Franklin Estrada MD  Vascular Neurology

## 2024-07-30 NOTE — ED NOTES
MRI screening form complete with patient.  Report called to YAMEL Wade. Patient ready for transport to T[facility]

## 2024-07-30 NOTE — ASSESSMENT & PLAN NOTE
Creatinine 1.53, BUN 27   Plan: IV rehydration.  Check bladder scan to rule out retention  Avoid nephrotoxins

## 2024-07-30 NOTE — THERAPY
Speech Language Pathology   Clinical Swallow Evaluation     Patient Name: Naomy Vargas  AGE:  74 y.o., SEX:  female  Medical Record #: [id number]  Date of Service: 7/30/2024      History of Present Illness    73 y/o admitted on 7/29 for L arm weakness. Not seen by SLP before at [facility].    CTA of head 7/29:   1.  No acute arterial abnormality. No large vessel occlusion or aneurysm detected.  2.  Diffuse paranasal sinusitis.    CTA of neck 7/29:  1.  Severe greater than 70% right ICA origin stenosis.  2.  60-70% left ICA origin stenosis.  3.  Lung apices demonstrate emphysema. Extensive irregular interstitial opacities in the lung apices which could be chronic changes from interstitial lung disease and/or interstitial infiltrates not excluded.      PMH: groin cellulitis, hypothyroidism, peripheral vascular disease, AAA, CKD 3, COPD, hypertension, chronic respiratory failure with hypoxia on 1 to 2 L nasal cannula      General Information:  Vitals  O2 (LPM): 1  O2 Delivery Device: Silicone Nasal Cannula  Level of Consciousness: Alert  Orientation: Oriented x 4  Follows Directives: Yes      Prior Living Situation & Level of Function:  Housing / Facility: [address] [place] [address] House  Lives with - Patient's Self Care Capacity: Spouse, Unrelated Adult (lives with SO and roommate)  Swallowing: WFL       Oral Mechanism Evaluation:  Dentition: Upper denture, Some missing dentition   Facial Symmetry: Central left facial droop  Labial Observations: WFL   Lingual Observations: Midline  Motor Speech: WFL            Laryngeal Function:  Secretion Management: Adequate  Voice Quality: WFL            Assessment  Current Method of Nutrition: Oral diet (regular/thin liquids)  Positioning: Schofield's (60-90 degrees)  Bolus Administration: Patient  O2 (LPM): 1 O2 Delivery Device: Silicone Nasal Cannula  Factor(s) Affecting Performance: None           Swallowing Trials:  Swallowing Trials  Thin Liquid (TN0): WFL  Pureed (PU4): WFL  Regular (RG7):  WFL      Comments: Top dentures donned for session. No overt s/sx of aspiration noted with trials of thin liquids via cup sip, pudding, or solids. Pt fed self independently without obvious difficulty. Vocal quality clear following the swallow. Mastication and suspected AP propulsion timely. Pt denied globus sensation or odynophagia. No obvious oral residue noted after the swallow.        Clinical Impressions  Pt is presenting with a functional oropharyngeal swallow. Recommend continuation of a regular/thin liquid diet. No further acute SLP services are recommended at this time to address dysphagia. Please re-consult SLP with any s/sx of aspiration or changes in swallow function.     Recommendations  Diet Consistency: regular/thin liquids  Instrumentation: None indicated at this time  Medication: As tolerated  Supervision: Independent  Positioning: Fully upright and midline during oral intake  Risk Management : None  Oral Care: BID         SLP Treatment Plan  Treatment Plan: None Indicated  SLP Frequency: N/A - Evaluation Only  Estimated Duration: N/A - Evaluation Only      Anticipated Discharge Needs  Discharge Recommendations: Anticipate that the patient will have no further speech therapy needs after discharge from the hospital   Therapy Recommendations Upon DC: Not Indicated                  Netta Alexander, SLP

## 2024-07-30 NOTE — H&P
Hospital Medicine History & Physical Note    Date of Service  7/29/2024    Primary Care Physician  Prema Patel P.A.-C.        Code Status  Full Code    Chief Complaint  Chief Complaint   Patient presents with    Sent from Urgent Care     Pt reports L arm weakness that started on Saturday around 09:00 am. Pt reports headache and confusion. Symptoms have now resolved.        History of Presenting Illness  Naomy Vargas is a 74 y.o. female with history of groin cellulitis, hypothyroidism, peripheral vascular disease, AAA, CKD 3, COPD, hypertension, chronic respiratory failure with hypoxia on 1 to 2 L nasal cannula, who presented 7/29/2024 with concern for transient left arm weakness since Saturday 2 AM associated there is a left-sided headache, resolved this morning.  Currently denies any weakness or numbness.    She additionally has pruritic rash in perianal area, for which she got prescription of doxycycline, prednisone and clotrimazole from urgent care.    CT of the neck showed bilateral internal carotid artery stenosis, severe greater than 70% on the right side and 60 to 70% on the left side.  Incidental findings of moderate mediastinal and mild hilar lymphadenopathy.  ERP was in the process of contacting vascular surgery to discuss needs for carotid angioplasty and stent placement  I discussed the plan of care with patient, family, bedside RN, and ERP. .    Review of Systems  Review of Systems   Constitutional:  Negative for chills, fever and weight loss.   HENT:  Negative for ear pain, hearing loss and tinnitus.    Eyes:  Negative for blurred vision, double vision and photophobia.   Respiratory:  Negative for cough, hemoptysis and sputum production.    Cardiovascular:  Negative for chest pain, palpitations and orthopnea.   Gastrointestinal:  Negative for heartburn, nausea and vomiting.   Genitourinary:  Negative for dysuria, flank pain, frequency and hematuria.   Musculoskeletal:  Negative for back  pain, joint pain and neck pain.   Skin:  Negative for itching and rash.   Neurological:  Positive for weakness. Negative for tremors, speech change, focal weakness and headaches.   Endo/Heme/Allergies:  Negative for environmental allergies and polydipsia. Does not bruise/bleed easily.   Psychiatric/Behavioral:  Negative for hallucinations and substance abuse. The patient is not nervous/anxious.        Past Medical History   has a past medical history of Abnormal CT of the chest, Acute hypoxemic respiratory failure (HCC) (03/23/2023), Anxiety, Arthritis, Back pain, Blood transfusion without reported diagnosis, Breath shortness, Cataract, Cellulitis, Cellulitis of leg (11/01/2013), Chickenpox, COPD (chronic obstructive pulmonary disease) ([facility]), Dental disorder, Earache, Elevated troponin, Fatigue, Frequent urination, Hypertension (02/23/2018), Hypothyroidism, Influenza, Insomnia, Kidney disease, Mumps, OAB (overactive bladder) (02/11/2019), Pain (02/23/2018), Peripheral vascular disease, unspecified ([facility]) (10/13/2021), Pneumonia, Rash, Ringing in ears, Sepsis ([facility]) (03/23/2023), Shortness of breath, Swelling of lower extremity, Thyroid disease, Tonsillitis, Toothache, and Venous stasis dermatitis.    Surgical History   has a past surgical history that includes pr mammary ductogram, single; breast biopsy; abdominal exploration; oophorectomy; hysterectomy, total abdominal (1977/1978); eye surgery; vein ligation radio frequency (Right, 2/26/2018); hysterectomy laparoscopy; pr remv 2nd cataract,corn-scler sectn; and tonsillectomy.     Family History  family history includes Alcohol/Drug in her father; Arthritis in her father, maternal grandmother, and mother; Cancer in her mother; Diabetes in her maternal grandfather and mother; Hypertension in her sister; Lung Disease in her sister; No Known Problems in her brother, brother, sister, son, and son; Other in her brother.   Family history reviewed with patient. There is no  family history that is pertinent to the chief complaint.     Social History   reports that she quit smoking about 7 years ago. Her smoking use included cigarettes. She started smoking about 60 years ago. She has a 26.5 pack-year smoking history. She has never used smokeless tobacco. She reports current alcohol use. She reports that she does not use drugs.    Allergies  Allergies   Allergen Reactions    Colophony [Pinus Strobus] Itching    Latex Rash and Unspecified     .    Neosporin [Neomycin-Polymyxin B] Rash     .    Phenylene Itching    Soap Rash     Certain soaps cause rash    Tape Itching     PAPER TAPE OK    Tea Tree Oil Rash     .       Medications  Prior to Admission Medications   Prescriptions Last Dose Informant Patient Reported? Taking?   Acetaminophen 500 MG Cap   Yes No   Sig: Take  by mouth.   Ascorbic Acid (VITAMIN C PO)   Yes No   Sig: Take  by mouth.   Dextromethorphan-guaiFENesin (MUCINEX DM PO)   Yes No   Sig: Take  by mouth.   Doxylamine Succinate, Sleep, (SLEEP AID PO)   Yes No   Sig: Take  by mouth.   Melatonin 1 MG Tab   Yes No   Sig: Take  by mouth.   Mirabegron ER (MYRBETRIQ) 50 MG TABLET SR 24 HR   Yes No   Sig: Myrbetriq 50 mg tablet,extended release   Take 1 tablet every day by oral route for 90 days.   Oxymetazoline HCl (NASAL SPRAY NA)   Yes No   Sig: Spray  in nose.   PROAIR  (90 Base) MCG/ACT Aero Soln inhalation aerosol   No No   Sig: Inhale 1 Puff every 6 hours as needed for Shortness of Breath.   SYMBICORT 80-4.5 MCG/ACT Aerosol   No No   Sig: INHALE 2 PUFFS BY MOUTH TWICE A DAY   albuterol 108 (90 Base) MCG/ACT Aero Soln inhalation aerosol   No No   Sig: Inhale 1-2 Puffs every 6 hours as needed for Shortness of Breath.   amLODIPine (NORVASC) 5 MG Tab   No No   Sig: TAKE 1 TABLET BY MOUTH EVERY DAY   clotrimazole (LOTRIMIN) 1 % Cream   No No   Sig: Apply 1 Application topically 2 times a day.   cyanocobalamin (VITAMIN B-12) 100 MCG Tab   Yes No   Sig: Take 100 mcg by mouth  every day.   diphenhydrAMINE (BENADRYL) 25 MG Tab   Yes No   Sig: Take 25 mg by mouth every 6 hours as needed for Sleep.   doxycycline (VIBRAMYCIN) 100 MG Tab   No No   Sig: Take 1 Tablet by mouth 2 times a day for 7 days.   ipratropium-albuterol (DUONEB) 0.5-2.5 (3) MG/3ML nebulizer solution   No No   Sig: Take 3 mL by nebulization every four hours as needed for Shortness of Breath (Wheezing).   levothyroxine (SYNTHROID) 100 MCG Tab   No No   Sig: TAKE 1 TABLET BY MOUTH EVERY DAY BEFORE BREAKFAST   lisinopril (PRINIVIL) 20 MG Tab   No No   Sig: TAKE 1 TABLET BY MOUTH EVERY DAY   loperamide (IMODIUM) 2 MG Cap   No No   Sig: Take 1 Capsule by mouth 4 times a day as needed for Diarrhea.   predniSONE (DELTASONE) 10 MG Tab   No No   Sig: Take 2 Tablets by mouth every day for 5 days.   rosuvastatin (CRESTOR) 10 MG Tab   No No   Sig: TAKE 1 TABLET BY MOUTH EVERY DAY IN THE EVENING   terbinafine (LAMISIL) 1 % cream   No No   Sig: Apply to affected areas 1-2 times per day as needed for rash   triamcinolone acetonide (KENALOG) 0.1 % Cream   No No   Sig: APPLY 1 APPLICATION TOPICALLY 2 TIMES A DAY AS NEEDED.   vitamin D (CHOLECALCIFEROL) 1000 Unit (25 mcg) Tab   Yes No   Sig: Take 1,000 Units by mouth every day.      Facility-Administered Medications: None       Physical Exam  Temp:  [36.9 °C (98.4 °F)-37.8 °C (100.1 °F)] 36.9 °C (98.4 °F)  Pulse:  [89-96] 89  Resp:  [14-16] 16  BP: (118)/(58-80) 118/58  SpO2:  [90 %-92 %] 92 %  Blood Pressure : 118/58   Temperature: 36.9 °C (98.4 °F)   Pulse: 89   Respiration: 16   Pulse Oximetry: 92 %       Physical Exam  Vitals and nursing note reviewed.   Constitutional:       General: She is not in acute distress.     Appearance: Normal appearance.   HENT:      Head: Normocephalic and atraumatic.      Nose: Nose normal.      Mouth/Throat:      Mouth: Mucous membranes are moist.   Eyes:      Extraocular Movements: Extraocular movements intact.      Pupils: Pupils are equal, round, and  "reactive to light.   Cardiovascular:      Rate and Rhythm: Normal rate and regular rhythm.   Pulmonary:      Effort: Pulmonary effort is normal.      Breath sounds: Normal breath sounds.   Abdominal:      General: Abdomen is flat. There is no distension.      Tenderness: There is no abdominal tenderness.   Musculoskeletal:         General: No swelling or deformity. Normal range of motion.      Cervical back: Normal range of motion and neck supple.   Skin:     General: Skin is warm and dry.   Neurological:      General: No focal deficit present.      Mental Status: She is alert and oriented to person, place, and time.   Psychiatric:         Mood and Affect: Mood normal.         Behavior: Behavior normal.         Laboratory:  Recent Labs     07/29/24  1415   WBC 7.3   RBC 3.77*   HEMOGLOBIN 11.6*   HEMATOCRIT 35.5*   MCV 94.2   MCH 30.8   MCHC 32.7   RDW 46.0   PLATELETCT 111*   MPV 11.0     Recent Labs     07/29/24  1415   SODIUM 132*   POTASSIUM 3.5*   CHLORIDE 97   CO2 20   GLUCOSE 106*   BUN 27*   CREATININE 1.53*   CALCIUM 8.9     Recent Labs     07/29/24  1415   ALTSGPT 17   ASTSGOT 31   ALKPHOSPHAT 108*   TBILIRUBIN 0.5   GLUCOSE 106*     Recent Labs     07/29/24  1415   APTT 35.9   INR 1.21*     No results for input(s): \"NTPROBNP\" in the last 72 hours.      Recent Labs     07/29/24  1415   TROPONINT 23*       Imaging:  CT-CTA NECK WITH & W/O-POST PROCESSING   Final Result      1.  Severe greater than 70% right ICA origin stenosis.   2.  60-70% left ICA origin stenosis.   3.  Lung apices demonstrate emphysema. Extensive irregular interstitial opacities in the lung apices which could be chronic changes from interstitial lung disease and/or interstitial infiltrates not excluded.   4.  Moderate mediastinal and mild hilar lymphadenopathy.         CT-CTA HEAD WITH & W/O-POST PROCESS   Final Result      1.  No acute arterial abnormality. No large vessel occlusion or aneurysm detected.   2.  Diffuse paranasal " sinusitis.      MR-BRAIN-W/O    (Results Pending)   EC-ECHOCARDIOGRAM COMPLETE W/O CONT    (Results Pending)         Assessment/Plan:  Justification for Admission Status  I anticipate this patient will require at least two midnights for appropriate medical management, necessitating inpatient admission because symptomatic ICA stenosis    Patient will need a Telemetry bed on MEDICAL service .  The need is secondary to TIA.    * TIA (transient ischemic attack)- (present on admission)  Assessment & Plan  74-year-old female with history of peripheral vascular disease presented with transient left arm weakness, transient headache and CTA of the neck showing severe right ICA stenosis and moderate left ICA stenosis  Plan: To obtain MRI of the brain to evaluate for stroke  Vascular surgery consult pending for recommendation  Aspirin, Lipitor  Transthoracic echo, telemetry    Update: Per /neuro, patient will require intervention for symptomatic ICA stenosis, carotid endarterectomy.  Patient enrolled into inpatient    Hypokalemia  Assessment & Plan  Potassium 3.5  IV normal saline with potassium ordered.    Perianal dermatitis  Assessment & Plan  Topical clotrimazole    Chronic hypoxemic respiratory failure (HCC)- (present on admission)  Assessment & Plan  Continue supplemental oxygen 1 to 2 L nasal cannula    Pulmonary hypertension (HCC)- (present on admission)  Assessment & Plan  Echo: 3/2023  Right ventricular systolic pressure is estimated to be 35 mmHg.  Repeat echo       Chronic obstructive pulmonary disease (HCC)- (present on admission)  Assessment & Plan  Not in exacerbation  Continue Symbicort, DuoNeb as needed      Acute kidney injury on CKD (chronic kidney disease) stage 3, GFR 30-59 ml/min- (present on admission)  Assessment & Plan  Creatinine 1.53, BUN 27   Plan: IV rehydration.  Check bladder scan to rule out retention  Avoid nephrotoxins        VTE prophylaxis: enoxaparin ppx

## 2024-07-30 NOTE — PROGRESS NOTES
Hospital Medicine Daily Progress Note    Date of Service  7/30/2024    Chief Complaint  Naomy Vargas is a 74 y.o. female admitted 7/29/2024 with left sided weakness    Hospital Course  Naomy Vargas is a 74 y.o. female with history of groin cellulitis, hypothyroidism, peripheral vascular disease, AAA, CKD 3, COPD, hypertension, chronic respiratory failure with hypoxia on 1 to 2 L nasal cannula, who presented 7/29/2024 with concern for transient left arm weakness .  Subsequent CT neck with greater than 70% right ICA stenosis.  Neurology evaluated and recommended right CEA.  Vascular surgery has been consulted      Interval Problem Update    7/30/2024  Patient with persistent hypotension  Remain asymptomatic  Labs reviewed normal white count hemoglobin 12.4, chemistry unremarkable with improved renal function with BUN/creatinine 18/1.14, cortisol 17.9, procalcitonin 1.64.  Normal lactic acid  Imaging reviewed CT neck with Severe greater than 70% right ICA origin stenosis.,  60 to 70% left ICA stenosis, long if patient is concerning for emphysema/infiltrate.      Telemetry monitoring  Continue on aspirin, Lipitor, clopidogrel  Remained hypotensive despite patient on 1500 ml bolus.  Continue IV fluid.  Added midodrine  I will get blood culture, get UA  Empirically started on Rocephin for possible pneumonia.  I will get CT chest without contrast for further evaluation for pneumonia which is seen on CT neck.    Repeat BMP in a.m. to monitor electrolytes and toxicity   Repeat CBC in a.m. to monitor white count and hemoglobin  Need close monitoring of blood counts, electrolytes and renal function due to potential of organ dysfunction from ongoing hypotension.  Requiring IV antibiotics, need close monitoring for toxicity  Possible right CEA tomorrow.  Case discussed with vascular surgery  .  Case discussed with Dr. Estrada    High risk of deterioration from ongoing hypotension.  Need close monitoring of  telemetry monitoring.  Likely need pressor if remained hypotensive.    I had extensive discussion with patient's spouse regarding the current medical status, treatment plan and prognosis.  He was briefed on the patient's condition including recent changes or developments .  Updated with recent test results vital signs and symptoms.  Current treatment plan was explained in details including medications, therapies and any upcoming procedure or intervention.  Discussed the patient prognosis  based on the condition response to treatment and any potential complication or risk.        Patient has a high medical complexity, complex decision making and is at high risk of complication, morbidity and mortality      I have discussed this patient's plan of care and discharge plan at IDT rounds today with Case Management, Nursing, Nursing leadership, and other members of the IDT team.    Consultants/Specialty  neurology    Code Status  Full Code    Disposition  The patient is not medically cleared for discharge to home or a post-acute facility.  Anticipate discharge to: home with close outpatient follow-up    I have placed the appropriate orders for post-discharge needs.    Review of Systems  Review of Systems   Respiratory:  Positive for cough. Negative for shortness of breath.    Cardiovascular:  Negative for chest pain.   Gastrointestinal:  Negative for nausea and vomiting.   Genitourinary:  Negative for dysuria.   Neurological:  Positive for weakness. Negative for dizziness.        Physical Exam  Temp:  [36.7 °C (98.1 °F)-37.1 °C (98.7 °F)] 37.1 °C (98.7 °F)  Pulse:  [78-95] 87  Resp:  [16-20] 16  BP: ()/(58-70) 90/59  SpO2:  [91 %-96 %] 96 %    Physical Exam  Constitutional:       Appearance: Normal appearance.   Cardiovascular:      Rate and Rhythm: Normal rate and regular rhythm.   Pulmonary:      Effort: Pulmonary effort is normal.   Abdominal:      General: Abdomen is flat. There is no distension.   Neurological:       Mental Status: She is alert and oriented to person, place, and time.      Comments: Motor strength 5 out of 5 all extremity, weak left hand          Fluids    Intake/Output Summary (Last 24 hours) at 7/30/2024 1218  Last data filed at 7/29/2024 1826  Gross per 24 hour   Intake 1000 ml   Output --   Net 1000 ml       Laboratory  Recent Labs     07/29/24  1415 07/30/24  0329   WBC 7.3 7.0   RBC 3.77* 4.01*   HEMOGLOBIN 11.6* 12.4   HEMATOCRIT 35.5* 37.4   MCV 94.2 93.3   MCH 30.8 30.9   MCHC 32.7 33.2   RDW 46.0 45.9   PLATELETCT 111* 111*   MPV 11.0 11.0     Recent Labs     07/29/24  1415 07/30/24  0329   SODIUM 132* 135   POTASSIUM 3.5* 3.6   CHLORIDE 97 100   CO2 20 22   GLUCOSE 106* 93   BUN 27* 18   CREATININE 1.53* 1.14   CALCIUM 8.9 8.7     Recent Labs     07/29/24  1415   APTT 35.9   INR 1.21*         Recent Labs     07/30/24  0329   TRIGLYCERIDE 85   HDL 25*   LDL 24       Imaging  EC-ECHOCARDIOGRAM COMPLETE W/O CONT   Final Result      CT-CTA NECK WITH & W/O-POST PROCESSING   Final Result      1.  Severe greater than 70% right ICA origin stenosis.   2.  60-70% left ICA origin stenosis.   3.  Lung apices demonstrate emphysema. Extensive irregular interstitial opacities in the lung apices which could be chronic changes from interstitial lung disease and/or interstitial infiltrates not excluded.   4.  Moderate mediastinal and mild hilar lymphadenopathy.         CT-CTA HEAD WITH & W/O-POST PROCESS   Final Result      1.  No acute arterial abnormality. No large vessel occlusion or aneurysm detected.   2.  Diffuse paranasal sinusitis.      MR-BRAIN-W/O    (Results Pending)   CT-CHEST (THORAX) W/O    (Results Pending)        Assessment/Plan  * TIA (transient ischemic attack)- (present on admission)  Assessment & Plan  74-year-old female with history of peripheral vascular disease presented with transient left arm weakness, transient headache and CTA of the neck showing severe right ICA stenosis and  moderate left ICA stenosis        Telemetry monitoring  Continue on aspirin, Lipitor, clopidogrel  Neurochecks every 4 hours  Echocardiogram noted with pulmonary hypertension otherwise unremarkable  MRI brain pending  PT/OT evaluation  Possible right CEA tomorrow.  Case discussed with vascular surgery  .  Case discussed with Dr. Estrada          Hypotension  Assessment & Plan  Remained hypotensive despite patient on 1500 ml bolus.  Continue IV fluid.  Added midodrine  I will get blood culture, get UA  Empirically started on Rocephin for possible pneumonia.  I will get CT chest without contrast for further evaluation for pneumonia which is seen on CT neck.    Pneumonia  Assessment & Plan  Reports recent COVID-19  infection   She does report a cough  Procalcitonin elevated  CT neck concerning for pneumonia  I will go ahead and confirm with CT chest without contrast  Started IV Unasyn  IV fluid, get blood culture  Monitor blood pressure closely          Hypokalemia  Assessment & Plan  Potassium 3.5  IV normal saline with potassium ordered.    Perianal dermatitis  Assessment & Plan  Topical clotrimazole    Chronic hypoxemic respiratory failure (HCC)- (present on admission)  Assessment & Plan  Continue supplemental oxygen 1 to 2 L nasal cannula    Pulmonary hypertension (HCC)- (present on admission)  Assessment & Plan  Echo: 3/2023  Right ventricular systolic pressure is estimated to be 35 mmHg.  Repeat echo       Chronic obstructive pulmonary disease (HCC)- (present on admission)  Assessment & Plan  Not in exacerbation  Continue Symbicort, DuoNeb as needed      Acute kidney injury on CKD (chronic kidney disease) stage 3, GFR 30-59 ml/min- (present on admission)  Assessment & Plan  Creatinine 1.53, BUN 27   Plan: IV rehydration.  Check bladder scan to rule out retention  Avoid nephrotoxins         VTE prophylaxis: heparin sc    I have performed a physical exam and reviewed and updated ROS and Plan today (7/30/2024).  In review of yesterday's note (7/29/2024), there are no changes except as documented above.    Patient is critically ill.   The patient continues to be hypotensive  The vital organ system that is affected is the: Acute stroke/persistent hypotension, pneumonia  If untreated there is a high chance of deterioration into: Septic shock  And eventually death.   The critical care that I am providing today is: Monitoring blood pressure closely, ordered bolus to maintain MAP above 65, initiating IV antibiotics, adding midodrine to maintain MAP above 65.Monitor blood pressure closely, adjusting blood pressure med, implementing intensive blood pressure management with medication, ordering blood culture to rule out bacteremia, getting CT chest for further workup, monitor toxicity while on IV antibiotics, frequently evaluating for need of pressor . Continuous neurological assessment, coordinating care with neurology, vascular for additional intervention, managing potential complication related to acute stroke.  High complexity due to ongoing persistent hypotension, acute nature of acute stroke, need for immediate and intensive blood pressure control and continuous monitoring for complication.    The critical that has been undertaken is medically complex.   There has been no overlap in critical care time.   Critical Care Time not including procedures: 37 minutes

## 2024-07-30 NOTE — ASSESSMENT & PLAN NOTE
74-year-old female with history of peripheral vascular disease presented with transient left arm weakness, transient headache and CTA of the neck showing severe right ICA stenosis and moderate left ICA stenosis        Telemetry monitoring  Continue on aspirin, Lipitor, clopidogrel  Neurochecks every 4 hours  Echocardiogram noted with pulmonary hypertension otherwise unremarkable  MRI brain pending  PT/OT evaluation  right CEA scheduled for Monday due to ongoing bacteremia.Case discussed with vascular surgery  .

## 2024-07-30 NOTE — PROGRESS NOTES
Monitor summary: Accel Junctional 79-83, ID -0.09, QRS -0.11, QT -0.38, with a BBB and (F) PACs and PVCs per strip from the monitor room.

## 2024-07-30 NOTE — CARE PLAN
Problem: Knowledge Deficit - Stroke Education  Goal: Patient's knowledge of stroke and risk factors will improve  Description: Target End Date:  1-3 days or as soon as patient condition allows    Document in Patient Education    1.  Stroke education booklet provided  2.  Education regarding EMS activation, need for follow up, medication prescribed at discharge, risk factors for stroke/lifestyle modifications, warning signs and symptoms of stroke provided  Outcome: Progressing     Problem: Optimal Care of the Stroke Patient  Goal: Optimal acute care for the stroke patient  Description: Target End Date:  1 to 3 days    - Vital signs and neuro checks performed and documented per order  - NIHSS completed and documented per order  - Continuous telemetry monitoring for 72 hours or until discontinued by provider  - Head CT without contrast obtained  - Consideration of MRI/MRA  - MRI screening form completed in worklist if MRI ordered  - Echocardiogram with Bubble Study ordered/completed with consideration of FERNANDO  - Carotid Doppler ordered/completed (Not required if CTA of neck completed in ED)  - Lipid Panel obtained within 48 hours of admission  - PT, PTT, INR obtained per Anticoagulation orders (if applicable)  - Antithrombotic therapy by end of hospital day 2 for ischemic stroke. Provider must document reason if contraindicated.  - Venous Thromboembolism (VTE) Prophylaxis by end of hospital day 2 for ischemic and hemorrhagic stroke. Provider must document reason if contraindicated  - Dysphagia screen completed and documented prior to any PO intake. Patient to remain NPO until Speech Therapy evaluation if thrombolytic or thrombectomy performed  - Rehabilitation assessment including PT/OT/SLP evaluations for referral to Physical Medicine and Rehabilitation services. If none needed, provider needs to document reason  - Neurology consult placed  - Consideration of cardiology consult for cryptogenic strokes  Outcome:  Progressing     Problem: Optimal Care of the Stroke Patient  Goal: Optimal emergency care for the stroke patient  Description: Target End Date:  End of day 1    Time of Onset    1.  Time of last known well obtained  2.  Patient and family/caregiver verbalize understanding of diagnosis, medications and testing  3.  NIHSS performed and documented, including date and time, for ischemic stroke patients prior to any acute recanalization therapy (thrombolytics or mechanical) or within 12 hours of arrival if no intervention is warranted  4.  Consults and referrals placed to appropriate departments    Medications Administration as Ordered:    1.  Implement appropriate reversal agents for INR greater than 1.5  2.  Pre-alteplase administration of antihypertensives for SBP >185 DBP >110  3.  Post-alteplase administration of antihypertensives for SBP >185, DBP >105  4.  Thrombolytic Therapy for qualifying ischemic stroke patients who arrive within 4.5 hours of time of Last Known Well. Thrombolytic therapy administered within 30 minutes or a documented reason for delay  Outcome: Progressing     Problem: Neuro Status  Goal: Neuro status will remain stable or improve  Description: Target End Date:  Prior to discharge or change in level of care    Document on Neuro assessment in the Assessment flowsheet    1.  Assess and monitor neurologic status per provider order/protocol/unit policy  2.  Assess level of consciousness and orientation  3.  Assess for speech, dysarthria, dysphagia, facial symmetry  4.  Assess visual field, eye movements, gaze preference, pupil reaction and size  5.  Assess muscle strength and motor response in all four extremities  6.  Assess for sensation (numbness and tingling)  7.  Assess basic neuro reflexes (cough, gag, corneal)  8.  Identify changes in neuro status and report to provider for testing/treatment orders  Outcome: Progressing     Problem: Neuro Status  Goal: Neuro status will remain stable or  improve  Description: Target End Date:  Prior to discharge or change in level of care    Document on Neuro assessment in the Assessment flowsheet    1.  Assess and monitor neurologic status per provider order/protocol/unit policy  2.  Assess level of consciousness and orientation  3.  Assess for speech, dysarthria, dysphagia, facial symmetry  4.  Assess visual field, eye movements, gaze preference, pupil reaction and size  5.  Assess muscle strength and motor response in all four extremities  6.  Assess for sensation (numbness and tingling)  7.  Assess basic neuro reflexes (cough, gag, corneal)  8.  Identify changes in neuro status and report to provider for testing/treatment orders  Outcome: Progressing   The patient is Stable - Low risk of patient condition declining or worsening    Shift Goals  Clinical Goals: Monitor neuro status  Patient Goals: rest  Family Goals: kimi    Progress made toward(s) clinical / shift goals:  Patient is Aox4 . Neuro deficits from admission resolve. NIH is 0. Anticipating Mri and echo today . Stroke education given . No acute neuro change noted. Hourly rounds to ensure safety . Fall precaution in place .    Patient is not progressing towards the following goals:

## 2024-07-30 NOTE — ASSESSMENT & PLAN NOTE
Echo: 3/2023  Right ventricular systolic pressure is estimated to be 35 mmHg.  Repeat echo noted worsening pulmonary hypertension, RVSP 50 mmHg

## 2024-07-30 NOTE — PROGRESS NOTES
4 Eyes Skin Assessment Completed by YAMEL Prince and YAMEL Peterson.    Head WDL  Ears WDL  Nose WDL  Mouth WDL  Neck WDL  Breast/Chest Redness, Blanching, and Rash  Shoulder Blades WDL  Spine Redness and Blanching  (R) Arm/Elbow/Hand WDL  (L) Arm/Elbow/Hand WDL  Abdomen Redness, Blanching, and Rash  Groin WDL  Scrotum/Coccyx/Buttocks Redness and Blanching  (R) Leg WDL  (L) Leg WDL  (R) Heel/Foot/Toe WDL  (L) Heel/Foot/Toe Redness and Scab          Devices In Places Tele Box and Blood Pressure Cuff      Interventions In Place Gray Ear Foams    Possible Skin Injury No    Pictures Uploaded Into Epic N/A  Wound Consult Placed N/A  RN Wound Prevention Protocol Ordered No

## 2024-07-30 NOTE — DISCHARGE PLANNING
"Viviana Landin is a 31 y.o. female who is being evaluated via a billable video visit.      The patient has been notified of following:     \"This video visit will be conducted via a call between you and your physician/provider. We have found that certain health care needs can be provided without the need for an in-person physical exam.  This service lets us provide the care you need with a video conversation.  If a prescription is necessary we can send it directly to your pharmacy.  If lab work is needed we can place an order for that and you can then stop by our lab to have the test done at a later time.    Video visits are billed at different rates depending on your insurance coverage. Please reach out to your insurance provider with any questions.    If during the course of the call the physician/provider feels a video visit is not appropriate, you will not be charged for this service.\"    Patient has given verbal consent to a Video visit? Yes  How would you like to obtain your AVS? AVS Preference: MyChart.  If dropped by the video visit, the video invitation should be sent to: Send to e-mail at: [email or web address]  Will anyone else be joining your video visit? Yes: Dr. Mchugh, medical resident. How would they like to receive their invitation? Other e-mail: [email or web address]        Video Start Time: 10:30a    Additional provider notes:      Medical  Weight Loss Initial Diet Evaluation  Viviana is presenting today for a new weight management nutrition consultation. Pt has had an initial appointment with Dr. Childers.  Weight loss medication: Phentermine and metformin  Weight Loss Goal: 140lb   Nutrition Assessment:   Anthropometrics:  Pt's Initial Weight: 210 lbs  Weight: 210 lb (95.3 kg)  Weight loss from initial: 0  % Weight loss: 0 %    BMI:   Vitals:    08/06/20 1000   Weight: 210 lb (95.3 kg)      IBW: 100-110lb  Estimated RMR ([place]-St Jeor equation): 1591kcal   Recommended Protein Intake: " HTH/SCP TCN chart review completed. Collaborated with RICK Kamara prior to meeting with the pt. The most current review of medical record, knowledge of pt's PLOF and social support, LACE+ score of 75, 6 clicks scores of 22 ADL and 19 mobility were considered.      TCN met with patient at bedside with patient , Willard, also present. Patient welcomed continued TCN visit with  also at bedside. Introduced TCN program. Provided education regarding post acute levels of care. Education provided regarding case management policy for blanket SNF referrals. Discussed HTH/SCP plan benefits. Pt verbalized understanding.     Patient endorsed she resides with her  (and a roommate on the weekends). Patient endorsed no mobility concerns stating mobility independent of AD prior to acute hospitalization. Patient did note she has a walker at home which was confirmed by her . Patient stated baseline oxygen needs provided by [facility]. Patient and  with questions regarding portable oxygen concentrator. Encouraged patient and  to reach out to DME provider at [facility] to further discuss.     Choice proactively obtained for HH ([facility]) and DME ([facility]) and given to CM. Per review, noted orders for PT, OT and SLP noting therapy is following pending planned R carotid endarterectomy and angiogram noted to be planned for tomorrow.     In collaboration with CM, current discharge considerations are noted to be home with close outpatient follow up or possible home healthcare services pending therapy/ physician recommendations. TCN will continue to follow and collaborate with discharge planning team as additional post acute needs arise. Thank you.     Completed today:  Per review, noted orders for PT/OT  SLP evaluation 7/30 anticipating that the patient will have no further speech therapy needs after discharge from the hospital  Choice obtained: HH ([facility]) and DME ([facility])   Pt aware of [facility]'s blanket  60-80 grams of protein/day  Medical History:  Patient Active Problem List   Diagnosis     Migraine Headache     Midback Pain     Fainting (Syncope)     Mood Disorder Of Unknown (Axis III) Etiology     Low back pain     Fatty liver     Dyslipidemia     Elevated liver enzymes     Headache, migraine     Chronic constipation     Metabolic syndrome     Insulin resistance     Acanthosis nigricans     Family history of diabetes mellitus type II      Diabetes: No   Nutrition History:   Food allergies/intolerances/cultural or religous food customs: No   Weight loss history: lowest weight 140lb- working out daily, drastic change in diet, no coffee or pop, cut out fast food  Biggest weight loss struggle per pt: eating out frequently   Dietary Recall:  Breakfast: coffee- caribou- sugar and chocolate  Lunch:2pm- constantly driving- has autistic son- drive through fast food  Dinner:at mom's house  Frequent snacking: chips, cookies, popcorn, states she loves breads and pasta  States it is her coping mechanism  Eating out (frequency/week): A lot- 2-3 times per day- most meals are eaten out   Hydration (type/oz. per day):  Water: 20oz   Caffeine:caribou coffee  Carbonation: regular Coke- 1 can per day minimum  Exercise:  Routine exercise established: Yes  Started walking again- would like to get back to doing this daily   Nutrition Diagnosis (PES statement):   Overweight/Obesity (NC 3.3) related to overeating and poor lifestyle habits as evidenced by patient report of frequent fast food, grazing throughout the day, lack of activity, high calorie beverages and BMI 41.01  Nutrition Intervention:  1. Food and/or Nutrient Delivery   a. Placed emphasis on importance of developing a healthy meal routine, aiming for 3 meals a day and no snacks.  b. Discussed using a protein supplement as a meal replacement in coffee.  2. Nutrition Education   a. Educated on sources of lean protein, portion sizes, the amount of grams found in each source.  referral policy  SCP with [facility]. discussed possible outpatient transitional PCP follow up if indicated and pt in agreement; sent information to assist in scheduling    Recommend patient to aim for 20-30g protein at each meal.  b. Discussed the importance of adequate hydration, with emphasis on drinking 64oz of water or zero calorie beverages per day.  3. Nutrition Counseling   a. Encouraged importance of developing routine exercise for health benefits and weight loss.  Goals established by patient:   1. Cut out coffee and pop- green shakes- encouraged to try protein shake with coffee  2. Eliminate fast food  3. Walk daily    Assessment/Plan:    Pt will follow up in 1 month(s) with bariatrician and 6 month(s) with dietitian.     Video-Visit Details    Type of service:  Video Visit    Video End Time (time video stopped): 10:50a  Originating Location (pt. Location): Home    Distant Location (provider location):  [facility]     Platform used for Video Visit: Katelynn Goldsmith RD

## 2024-07-30 NOTE — ED NOTES
Med rec updated  and complete. Allergies reviewed .  Pt confirmed name and date of birth        Pt denies antibiotic use in last 30 days.    No anticoagulant medications.      Home pharmacy  [facility] = [phone]

## 2024-07-31 ENCOUNTER — APPOINTMENT (OUTPATIENT)
Dept: RADIOLOGY | Facility: MEDICAL CENTER | Age: 74
DRG: 038 | End: 2024-07-31
Attending: SURGERY
Payer: MEDICARE

## 2024-07-31 VITALS
DIASTOLIC BLOOD PRESSURE: 72 MMHG | RESPIRATION RATE: 20 BRPM | OXYGEN SATURATION: 94 % | HEART RATE: 88 BPM | WEIGHT: 156.53 LBS | BODY MASS INDEX: 23.72 KG/M2 | SYSTOLIC BLOOD PRESSURE: 110 MMHG | HEIGHT: 68 IN | TEMPERATURE: 97.6 F

## 2024-07-31 PROBLEM — R78.81 BACTEREMIA: Status: ACTIVE | Noted: 2024-07-31

## 2024-07-31 LAB
ANION GAP SERPL CALC-SCNC: 15 MMOL/L (ref 7–16)
BACTERIA BLD CULT: ABNORMAL
BASOPHILS # BLD AUTO: 0.4 % (ref 0–1.8)
BASOPHILS # BLD: 0.03 K/UL (ref 0–0.12)
BUN SERPL-MCNC: 9 MG/DL (ref 8–22)
CALCIUM SERPL-MCNC: 8.5 MG/DL (ref 8.5–10.5)
CHLORIDE SERPL-SCNC: 106 MMOL/L (ref 96–112)
CO2 SERPL-SCNC: 17 MMOL/L (ref 20–33)
CREAT SERPL-MCNC: 0.96 MG/DL (ref 0.5–1.4)
EOSINOPHIL # BLD AUTO: 0.24 K/UL (ref 0–0.51)
EOSINOPHIL NFR BLD: 3.3 % (ref 0–6.9)
ERYTHROCYTE [DISTWIDTH] IN BLOOD BY AUTOMATED COUNT: 45.6 FL (ref 35.9–50)
GFR SERPLBLD CREATININE-BSD FMLA CKD-EPI: 62 ML/MIN/1.73 M 2
GLUCOSE SERPL-MCNC: 120 MG/DL (ref 65–99)
HCT VFR BLD AUTO: 36.3 % (ref 37–47)
HGB BLD-MCNC: 11.9 G/DL (ref 12–16)
IMM GRANULOCYTES # BLD AUTO: 0.05 K/UL (ref 0–0.11)
IMM GRANULOCYTES NFR BLD AUTO: 0.7 % (ref 0–0.9)
LYMPHOCYTES # BLD AUTO: 0.88 K/UL (ref 1–4.8)
LYMPHOCYTES NFR BLD: 12 % (ref 22–41)
MAGNESIUM SERPL-MCNC: 1.8 MG/DL (ref 1.5–2.5)
MCH RBC QN AUTO: 30.6 PG (ref 27–33)
MCHC RBC AUTO-ENTMCNC: 32.8 G/DL (ref 32.2–35.5)
MCV RBC AUTO: 93.3 FL (ref 81.4–97.8)
MONOCYTES # BLD AUTO: 1.04 K/UL (ref 0–0.85)
MONOCYTES NFR BLD AUTO: 14.2 % (ref 0–13.4)
NEUTROPHILS # BLD AUTO: 5.07 K/UL (ref 1.82–7.42)
NEUTROPHILS NFR BLD: 69.4 % (ref 44–72)
NRBC # BLD AUTO: 0 K/UL
NRBC BLD-RTO: 0 /100 WBC (ref 0–0.2)
PHOSPHATE SERPL-MCNC: 3.2 MG/DL (ref 2.5–4.5)
PLATELET # BLD AUTO: 131 K/UL (ref 164–446)
PMV BLD AUTO: 10.9 FL (ref 9–12.9)
POTASSIUM SERPL-SCNC: 3.5 MMOL/L (ref 3.6–5.5)
PROCALCITONIN SERPL-MCNC: 0.83 NG/ML
RBC # BLD AUTO: 3.89 M/UL (ref 4.2–5.4)
SIGNIFICANT IND 70042: ABNORMAL
SITE SITE: ABNORMAL
SODIUM SERPL-SCNC: 138 MMOL/L (ref 135–145)
SOURCE SOURCE: ABNORMAL
WBC # BLD AUTO: 7.3 K/UL (ref 4.8–10.8)

## 2024-07-31 PROCEDURE — 83735 ASSAY OF MAGNESIUM: CPT

## 2024-07-31 PROCEDURE — A9270 NON-COVERED ITEM OR SERVICE: HCPCS | Performed by: PSYCHIATRY & NEUROLOGY

## 2024-07-31 PROCEDURE — 85025 COMPLETE CBC W/AUTO DIFF WBC: CPT

## 2024-07-31 PROCEDURE — 84100 ASSAY OF PHOSPHORUS: CPT

## 2024-07-31 PROCEDURE — 700101 HCHG RX REV CODE 250

## 2024-07-31 PROCEDURE — 80048 BASIC METABOLIC PNL TOTAL CA: CPT

## 2024-07-31 PROCEDURE — 700102 HCHG RX REV CODE 250 W/ 637 OVERRIDE(OP): Performed by: PSYCHIATRY & NEUROLOGY

## 2024-07-31 PROCEDURE — 700105 HCHG RX REV CODE 258: Performed by: STUDENT IN AN ORGANIZED HEALTH CARE EDUCATION/TRAINING PROGRAM

## 2024-07-31 PROCEDURE — 36415 COLL VENOUS BLD VENIPUNCTURE: CPT

## 2024-07-31 PROCEDURE — 700111 HCHG RX REV CODE 636 W/ 250 OVERRIDE (IP): Performed by: INTERNAL MEDICINE

## 2024-07-31 PROCEDURE — 99233 SBSQ HOSP IP/OBS HIGH 50: CPT | Performed by: STUDENT IN AN ORGANIZED HEALTH CARE EDUCATION/TRAINING PROGRAM

## 2024-07-31 PROCEDURE — 700102 HCHG RX REV CODE 250 W/ 637 OVERRIDE(OP): Performed by: INTERNAL MEDICINE

## 2024-07-31 PROCEDURE — 99232 SBSQ HOSP IP/OBS MODERATE 35: CPT | Performed by: PSYCHIATRY & NEUROLOGY

## 2024-07-31 PROCEDURE — 700111 HCHG RX REV CODE 636 W/ 250 OVERRIDE (IP): Mod: JZ | Performed by: STUDENT IN AN ORGANIZED HEALTH CARE EDUCATION/TRAINING PROGRAM

## 2024-07-31 PROCEDURE — 700111 HCHG RX REV CODE 636 W/ 250 OVERRIDE (IP)

## 2024-07-31 PROCEDURE — 84145 PROCALCITONIN (PCT): CPT

## 2024-07-31 PROCEDURE — 770020 HCHG ROOM/CARE - TELE (206)

## 2024-07-31 PROCEDURE — A9270 NON-COVERED ITEM OR SERVICE: HCPCS | Performed by: INTERNAL MEDICINE

## 2024-07-31 RX ORDER — ROSUVASTATIN CALCIUM 10 MG/1
TABLET, COATED ORAL
Qty: 100 TABLET | Refills: 0 | Status: SHIPPED | OUTPATIENT
Start: 2024-07-31

## 2024-07-31 RX ADMIN — CLOPIDOGREL BISULFATE 75 MG: 75 TABLET ORAL at 05:14

## 2024-07-31 RX ADMIN — ASPIRIN 81 MG 81 MG: 81 TABLET ORAL at 05:14

## 2024-07-31 RX ADMIN — HEPARIN SODIUM 5000 UNITS: 5000 INJECTION, SOLUTION INTRAVENOUS; SUBCUTANEOUS at 15:38

## 2024-07-31 RX ADMIN — HEPARIN SODIUM 5000 UNITS: 5000 INJECTION, SOLUTION INTRAVENOUS; SUBCUTANEOUS at 20:56

## 2024-07-31 RX ADMIN — LEVOTHYROXINE SODIUM 100 MCG: 0.1 TABLET ORAL at 05:14

## 2024-07-31 RX ADMIN — VITAM B12 100 MCG: 100 TAB at 05:14

## 2024-07-31 RX ADMIN — Medication 3 MG: at 20:56

## 2024-07-31 RX ADMIN — MOMETASONE FUROATE AND FORMOTEROL FUMARATE DIHYDRATE 2 PUFF: 100; 5 AEROSOL RESPIRATORY (INHALATION) at 05:14

## 2024-07-31 RX ADMIN — Medication 1000 UNITS: at 05:14

## 2024-07-31 RX ADMIN — AMPICILLIN AND SULBACTAM 3 G: 1; 2 INJECTION, POWDER, FOR SOLUTION INTRAMUSCULAR; INTRAVENOUS at 01:00

## 2024-07-31 RX ADMIN — CEFTRIAXONE SODIUM 2000 MG: 10 INJECTION, POWDER, FOR SOLUTION INTRAVENOUS at 07:27

## 2024-07-31 RX ADMIN — ATORVASTATIN CALCIUM 40 MG: 40 TABLET, FILM COATED ORAL at 17:31

## 2024-07-31 ASSESSMENT — PAIN DESCRIPTION - PAIN TYPE
TYPE: ACUTE PAIN

## 2024-07-31 ASSESSMENT — ENCOUNTER SYMPTOMS
SHORTNESS OF BREATH: 0
WEAKNESS: 1
COUGH: 1
NAUSEA: 0
DIZZINESS: 0
VOMITING: 0

## 2024-07-31 ASSESSMENT — FIBROSIS 4 INDEX: FIB4 SCORE: 4.26

## 2024-07-31 NOTE — ASSESSMENT & PLAN NOTE
UA positive for nitrate, leukocyte esterase, 1 out of 2 blood culture growing gram-negative douglas.  Follow repeat blood culture  Continue IV antibiotic  Need close monitoring of blood counts, electrolytes and renal function due to potential of organ dysfunction from ongoing bacteremia  Requiring IV antibiotics, need close monitoring for toxicity

## 2024-07-31 NOTE — PROGRESS NOTES
Hospital Medicine Daily Progress Note    Date of Service  7/31/2024    Chief Complaint  Naomy Vargas is a 74 y.o. female admitted 7/29/2024 with left sided weakness    Hospital Course  Naomy Vargas is a 74 y.o. female with history of groin cellulitis, hypothyroidism, peripheral vascular disease, AAA, CKD 3, COPD, hypertension, chronic respiratory failure with hypoxia on 1 to 2 L nasal cannula, who presented 7/29/2024 with concern for transient left arm weakness .  Subsequent CT neck with greater than 70% right ICA stenosis.  Neurology evaluated and recommended right CEA.  Vascular evaluated and procedure canceled due to ongoing bacteremia.       Interval Problem Update    7/31/2024  Seen and examined at bedside  Vital remained stable  Patient on 1 L oxygen saturating over 90%  Labs reviewed normal white count, hemoglobin 11.9, BMP with sodium 138, potassium 3.5, bicarbonate 17 UA positive for nitrate, leukocyte esterase, 1 out of 2 blood culture growing gram-negative douglas.  CT chest noted with chronic interstitial lung disease and pneumonia no acute infiltrate detected    Telemetry monitoring  Started on IV Rocephin  Continue on aspirin, Lipitor, clopidogrel  Repeat blood culture sent  Repeat BMP in a.m. to monitor electrolytes and toxicity   Repeat CBC in a.m. to monitor white count and hemoglobin  Need close monitoring of blood counts, electrolytes and renal function due to potential of organ dysfunction from ongoing bacteremia  Requiring IV antibiotics, need close monitoring for toxicity  right CEA scheduled for Monday.  Case discussed with vascular surgery  .      High risk of deterioration from ongoing bacteremia.  Need close monitoring of telemetry monitoring.  Risks of worsening into septic shock.  Need close blood pressure neuromonitoring.    I have discussed this patient's plan of care and discharge plan at IDT rounds today with Case Management, Nursing, Nursing leadership, and other  members of the IDT team.    Consultants/Specialty  neurology    Code Status  Full Code    Disposition  The patient is not medically cleared for discharge to home or a post-acute facility.  Anticipate discharge to: home with close outpatient follow-up    I have placed the appropriate orders for post-discharge needs.    Review of Systems  Review of Systems   Respiratory:  Positive for cough. Negative for shortness of breath.    Cardiovascular:  Negative for chest pain.   Gastrointestinal:  Negative for nausea and vomiting.   Genitourinary:  Negative for dysuria.   Neurological:  Positive for weakness. Negative for dizziness.        Physical Exam  Temp:  [36.3 °C (97.4 °F)-36.9 °C (98.4 °F)] 36.4 °C (97.5 °F)  Pulse:  [82-86] 85  Resp:  [18] 18  BP: ()/(64-72) 135/64  SpO2:  [92 %-95 %] 93 %    Physical Exam  Constitutional:       Appearance: Normal appearance.   Cardiovascular:      Rate and Rhythm: Normal rate and regular rhythm.   Pulmonary:      Effort: Pulmonary effort is normal.   Abdominal:      General: Abdomen is flat. There is no distension.   Neurological:      Mental Status: She is alert and oriented to person, place, and time.      Comments: Motor strength 5 out of 5 all extremity, weak left hand          Fluids    Intake/Output Summary (Last 24 hours) at 7/31/2024 1624  Last data filed at 7/31/2024 1500  Gross per 24 hour   Intake 320 ml   Output --   Net 320 ml       Laboratory  Recent Labs     07/29/24  1415 07/30/24  0329 07/31/24  1325   WBC 7.3 7.0 7.3   RBC 3.77* 4.01* 3.89*   HEMOGLOBIN 11.6* 12.4 11.9*   HEMATOCRIT 35.5* 37.4 36.3*   MCV 94.2 93.3 93.3   MCH 30.8 30.9 30.6   MCHC 32.7 33.2 32.8   RDW 46.0 45.9 45.6   PLATELETCT 111* 111* 131*   MPV 11.0 11.0 10.9     Recent Labs     07/29/24  1415 07/30/24  0329 07/31/24  1325   SODIUM 132* 135 138   POTASSIUM 3.5* 3.6 3.5*   CHLORIDE 97 100 106   CO2 20 22 17*   GLUCOSE 106* 93 120*   BUN 27* 18 9   CREATININE 1.53* 1.14 0.96   CALCIUM  8.9 8.7 8.5     Recent Labs     07/29/24  1415   APTT 35.9   INR 1.21*         Recent Labs     07/30/24  0329   TRIGLYCERIDE 85   HDL 25*   LDL 24       Imaging  CT-CHEST (THORAX) W/O   Final Result      1.  Findings of chronic interstitial lung disease and pneumonia. No acute infiltrate detected.   2.  Stable moderate mediastinal adenopathy.   3.  Indeterminate fat stranding around the superior left kidney. May be further assessed with abdominal CT.      Fleischner Society pulmonary nodule recommendations:   Not Applicable         MR-BRAIN-W/O   Final Result      1.  Mild chronic microvascular ischemic type changes.   2.  Tiny chronic right parietal infarct.   3.  No acute intracranial abnormality.   4.  Paranasal sinus disease.      EC-ECHOCARDIOGRAM COMPLETE W/O CONT   Final Result      CT-CTA NECK WITH & W/O-POST PROCESSING   Final Result      1.  Severe greater than 70% right ICA origin stenosis.   2.  60-70% left ICA origin stenosis.   3.  Lung apices demonstrate emphysema. Extensive irregular interstitial opacities in the lung apices which could be chronic changes from interstitial lung disease and/or interstitial infiltrates not excluded.   4.  Moderate mediastinal and mild hilar lymphadenopathy.         CT-CTA HEAD WITH & W/O-POST PROCESS   Final Result      1.  No acute arterial abnormality. No large vessel occlusion or aneurysm detected.   2.  Diffuse paranasal sinusitis.           Assessment/Plan  * TIA (transient ischemic attack)- (present on admission)  Assessment & Plan  74-year-old female with history of peripheral vascular disease presented with transient left arm weakness, transient headache and CTA of the neck showing severe right ICA stenosis and moderate left ICA stenosis        Telemetry monitoring  Continue on aspirin, Lipitor, clopidogrel  Neurochecks every 4 hours  Echocardiogram noted with pulmonary hypertension otherwise unremarkable  MRI brain pending  PT/OT evaluation  right CEA  scheduled for Monday due to ongoing bacteremia.Case discussed with vascular surgery  .          Bacteremia  Assessment & Plan  UA positive for nitrate, leukocyte esterase, 1 out of 2 blood culture growing gram-negative douglas.  Follow repeat blood culture  Continue IV antibiotic  Need close monitoring of blood counts, electrolytes and renal function due to potential of organ dysfunction from ongoing bacteremia  Requiring IV antibiotics, need close monitoring for toxicity    Hypotension  Assessment & Plan  Due to ongoing bacteremia  Improved with IV antibiotics and IV fluids    Pneumonia  Assessment & Plan  Pneumonia ruled out  Currently on IV antibiotic for UTI and bacteremia        Hypokalemia  Assessment & Plan  Potassium 3.5  IV normal saline with potassium ordered.    Perianal dermatitis  Assessment & Plan  Topical clotrimazole    Chronic hypoxemic respiratory failure (HCC)- (present on admission)  Assessment & Plan  Continue supplemental oxygen 1 to 2 L nasal cannula    Pulmonary hypertension (HCC)- (present on admission)  Assessment & Plan  Echo: 3/2023  Right ventricular systolic pressure is estimated to be 35 mmHg.  Repeat echo noted worsening pulmonary hypertension, RVSP 50 mmHg       Chronic obstructive pulmonary disease (HCC)- (present on admission)  Assessment & Plan  Not in exacerbation  Continue Symbicort, DuoNeb as needed      Acute kidney injury on CKD (chronic kidney disease) stage 3, GFR 30-59 ml/min- (present on admission)  Assessment & Plan  Creatinine 1.53, BUN 27   Plan: IV rehydration.  Check bladder scan to rule out retention  Avoid nephrotoxins         VTE prophylaxis: heparin sc    I have performed a physical exam and reviewed and updated ROS and Plan today (7/31/2024). In review of yesterday's note (7/30/2024), there are no changes except as documented above.    s

## 2024-07-31 NOTE — CARE PLAN
The patient is Stable - Low risk of patient condition declining or worsening    Shift Goals  Clinical Goals: Stable neuro status  Patient Goals: Sleep  Family Goals: CASIMIRO    Progress made toward(s) clinical / shift goals:  Pt is AAOx4, fall precautions in place, q4h neuro checks, ambulating to the bathroom with a standby assistance. Pt updated on POC for scheduled right CEA, educated on being npo at midnight for procedure.     Problem: Neuro Status  Goal: Neuro status will remain stable or improve  Outcome: Progressing  Note: Q4h neuro checks.     Problem: Hemodynamic Monitoring  Goal: Patient's hemodynamics, fluid balance and neurologic status will be stable or improve  Outcome: Progressing     Problem: Respiratory - Stroke Patient  Goal: Patient will achieve/maintain optimum respiratory rate/effort  Outcome: Progressing    Problem: Knowledge Deficit - Standard  Goal: Patient and family/care givers will demonstrate understanding of plan of care, disease process/condition, diagnostic tests and medications  Outcome: Progressing  Note: Pt updated on POC for scheduled Right CEA in AM. Educated on purpose of being npo at midnight for procedure, verbalized understanding.              Patient is not progressing towards the following goals:

## 2024-07-31 NOTE — PROGRESS NOTES
VASCULAR SURGERY SERVICE                        Progress Note  _________________________________    Patient was stable overnight.  No evidence of recurrent TIA episodes.    Surgery was planned for this morning, specifically a right carotid endarterectomy and a retrograde right carotid stent.  However the patient had 1 of 2 blood cultures that resulted this morning with gram-negative rods, not likely to be a contaminant.  The patient has been started on IV antibiotics.    The planned surgery would be contraindicated in the setting of bacteremia.  I am recommending repeating the blood cultures on Friday which will allow adequate time for the antibiotics to clear the bacteremia.  If the new blood cultures drawn on Friday are negative after 48 hours then we can plan the patient's right carotid surgery.  This means the surgery will most likely be on Monday, time to be determined based on schedule availability.    I had a very thorough detailed conversation with the patient and her  detailing all of this.  Their questions were answered and they indicated good understanding of the situation.  I also instructed the patient to notify her nurse or anybody else from the medical team if she experiences recurrent TIA symptoms which could potentially mandate earlier surgical intervention even if we have not proven that the bacteremia has cleared.  Fortunately now that the patient is on dual antiplatelet therapy the likelihood of recurrent TIA episodes is very low but if it does happen a reassessment will be necessary    Vascular service following along    Appreciate hospitalist services support.    Valerio Cortés MD  [facility]   [place] preferred or call my office [phone]  __________________________________________________  Patient:Naomy Vargas   MRN:[id number]

## 2024-07-31 NOTE — PROGRESS NOTES
Monitor summary: AJ 84-90, CT -0.12, QRS -0.10, QT -0.37, with rare PVCs per strip from the monitor room.

## 2024-07-31 NOTE — PROGRESS NOTES
Neurology Progress Note  Neurohospitalist Service, [facility]    Referring Physician: Sean Villegas M.D.      Interval History: No acute events overnight.  Plan for OR today for possible retrograde common carotid stent placement and R CEA.  MRI brain completed with no acute stroke burden.    Review of systems: In addition to what is detailed in the HPI and/or updated in the interval history, all other systems reviewed and are negative.    Past Medical History, Past Surgical History and Social History reviewed and unchanged from prior    Medications:    Current Facility-Administered Medications:     clopidogrel (Plavix) tablet 75 mg, 75 mg, Oral, DAILY, Franklin Estrada M.D., 75 mg at 07/31/24 0514    NS infusion, , Intravenous, Continuous, Sean Villegas M.D., Last Rate: 75 mL/hr at 07/30/24 1643, Rate Change at 07/30/24 1643    ampicillin/sulbactam (Unasyn) 3 g in  mL IVPB, 3 g, Intravenous, Q6HR, Sean Villegas M.D., Stopped at 07/31/24 0130    midodrine (Proamatine) tablet 5 mg, 5 mg, Oral, TID WITH MEALS, Sean Villegas M.D., 5 mg at 07/30/24 1824    heparin injection 5,000 Units, 5,000 Units, Subcutaneous, Q8HRS, Robert Pang M.D., 5,000 Units at 07/30/24 2159    senna-docusate (Pericolace Or Senokot S) 8.6-50 MG per tablet 2 Tablet, 2 Tablet, Oral, Q EVENING **AND** polyethylene glycol/lytes (Miralax) Packet 1 Packet, 1 Packet, Oral, QDAY PRN, Robert Pang M.D.    acetaminophen (Tylenol) tablet 650 mg, 650 mg, Oral, Q6HRS PRN, Robert Pang M.D.    Notify provider if pain remains uncontrolled, , , CONTINUOUS **AND** Use the Numeric Rating Scale (NRS), Avila-Baker Faces (WBF), or FLACC on regular floors and Critical-Care Pain Observation Tool (CPOT) on ICUs/Trauma to assess pain, , , CONTINUOUS **AND** Pulse Ox, , , CONTINUOUS **AND** Pharmacy Consult Request ...Pain Management Review 1 Each, 1 Each, Other, PHARMACY TO DOSE **AND** If patient difficult to arouse and/or has  "respiratory depression (respiratory rate of 10 or less), stop any opiates that are currently infusing and call a Rapid Response., , , CONTINUOUS, Robert Pang M.D.    oxyCODONE immediate-release (Roxicodone) tablet 2.5 mg, 2.5 mg, Oral, Q3HRS PRN **OR** oxyCODONE immediate-release (Roxicodone) tablet 5 mg, 5 mg, Oral, Q3HRS PRN **OR** HYDROmorphone (Dilaudid) injection 0.25 mg, 0.25 mg, Intravenous, Q3HRS PRN, Robert Pang M.D.    ondansetron (Zofran) syringe/vial injection 4 mg, 4 mg, Intravenous, Q4HRS PRN, Robert Pang M.D.    ondansetron (Zofran ODT) dispertab 4 mg, 4 mg, Oral, Q4HRS PRN, Robert Pang M.D.    aspirin (Asa) chewable tab 81 mg, 81 mg, Oral, DAILY, 81 mg at 07/31/24 0514 **OR** [DISCONTINUED] aspirin (Asa) suppository 300 mg, 300 mg, Rectal, DAILY, Robert Pang M.D.    atorvastatin (Lipitor) tablet 40 mg, 40 mg, Oral, Q EVENING, Robert Pang M.D., 40 mg at 07/30/24 1822    cyanocobalamin (Vitamin B-12) tablet 100 mcg, 100 mcg, Oral, DAILY, Robert Pang M.D., 100 mcg at 07/31/24 0514    ipratropium-albuterol (DUONEB) nebulizer solution, 3 mL, Nebulization, Q4HRS PRN, Robert Pang M.D.    levothyroxine (Synthroid) tablet 100 mcg, 100 mcg, Oral, QAM AC, Robert Pang M.D., 100 mcg at 07/31/24 0514    loperamide (Imodium) capsule 2 mg, 2 mg, Oral, 4X/DAY PRN, Robert Kuharevic, M.D.    melatonin tablet 3 mg, 3 mg, Oral, Nightly, Robert Pang M.D., 3 mg at 07/30/24 2007    albuterol inhaler 1 Puff, 1 Puff, Inhalation, Q6HRS PRN, Robert Pang M.D.    vitamin D3 (Cholecalciferol) tablet 1,000 Units, 1,000 Units, Oral, DAILY, Robert Pang M.D., 1,000 Units at 07/31/24 0514    mometasone-formoterol (Dulera) 100-5 MCG/ACT inhaler 2 Puff, 2 Puff, Inhalation, BID, Robert Pang M.D., 2 Puff at 07/31/24 0514    Physical Examination:   BP (!) 145/66   Pulse 84   Temp 36.9 °C (98.4 °F) (Temporal)   Resp 18   Ht 1.719 m (5' 7.68\")   Wt 71 kg (156 " lb 8.4 oz)   LMP  (LMP Unknown)   SpO2 93%   BMI 24.03 kg/m²       General: Patient is awake and in no acute distress  Neck: There is normal range of motion  CV: Regular rate   Extremities:  Warm, dry, and intact, without peripheral lower extremity edema    NEUROLOGICAL EXAM:     Mental status: Awake, alert and fully oriented  Speech and language: Speech is clear and fluent. The patient is able to name and repeat, and follow commands  Cranial nerve exam: Visual fields are full. There is no nystagmus. Extraocular muscles are intact. Face is symmetric.   Motor exam: There is sustained antigravity with no downward drift in bilateral arms and legs.   Sensory exam: Reacts to tactile in all 4 extremities, no neglect to double stim   Coordination: No ataxia on bilateral finger-to-nose testing  Gait: Deferred due to patient preference        NIHSS: National Institutes of Health Stroke Scale     [0] 1a:Level of Consciousness           0-alert 1-drowsy   2-stupor   3-coma  [0] 1b:LOC Questions                         0-both  1-one      2-neither  [0] 1c:LOC Commands                       0-both  1-one      2-neither  [0] 2: Best Gaze                      0-nl    1-partial  2-forced  [0] 3: Visual Fields                              0-nl    1-partial  2-complete 3-bilat  [0] 4: Facial Paresis                0-nl    1-minor    2-partial  3-full  MOTOR                                              0-nl  [0] 5: Right Arm                       1-drift  [0] 6: Left Arm                                     2-some effort vs gravity  [0] 7: Right Leg                       3-no effort vs gravity  [0] 8: Left Leg                                      4-no movement                                                              x-untestable  [0] 9: Limb Ataxia                    0-abs   1-1_limb   2-2+_limbs                                                              x-untestable  [0] 10:Sensory                        0-nl    1-partial   2-dense  [0] 11:Best Language/Aphasia         0-nl    1-mild/mod 2-severe   3-mute  [0] 12:Dysarthria                     0-nl    1-mild/mod 2-severe                                                              x-untestable  [0] 13:Neglect/Inattention                   0-none  1-partial  2-complete  [0] TOTAL         Objective Data:    Labs:  Lab Results   Component Value Date/Time    PROTHROMBTM 15.4 (H) 07/29/2024 02:15 PM    INR 1.21 (H) 07/29/2024 02:15 PM      Lab Results   Component Value Date/Time    WBC 7.0 07/30/2024 03:29 AM    RBC 4.01 (L) 07/30/2024 03:29 AM    HEMOGLOBIN 12.4 07/30/2024 03:29 AM    HEMATOCRIT 37.4 07/30/2024 03:29 AM    MCV 93.3 07/30/2024 03:29 AM    MCH 30.9 07/30/2024 03:29 AM    MCHC 33.2 07/30/2024 03:29 AM    MPV 11.0 07/30/2024 03:29 AM    NEUTSPOLYS 65.20 07/30/2024 03:29 AM    LYMPHOCYTES 13.30 (L) 07/30/2024 03:29 AM    MONOCYTES 18.20 (H) 07/30/2024 03:29 AM    EOSINOPHILS 2.70 07/30/2024 03:29 AM    BASOPHILS 0.30 07/30/2024 03:29 AM    ANISOCYTOSIS 2+ 07/09/2020 09:37 AM      Lab Results   Component Value Date/Time    SODIUM 135 07/30/2024 03:29 AM    POTASSIUM 3.6 07/30/2024 03:29 AM    CHLORIDE 100 07/30/2024 03:29 AM    CO2 22 07/30/2024 03:29 AM    GLUCOSE 93 07/30/2024 03:29 AM    BUN 18 07/30/2024 03:29 AM    CREATININE 1.14 07/30/2024 03:29 AM      Lab Results   Component Value Date/Time    CHOLSTRLTOT 66 (L) 07/30/2024 03:29 AM    LDL 24 07/30/2024 03:29 AM    HDL 25 (A) 07/30/2024 03:29 AM    TRIGLYCERIDE 85 07/30/2024 03:29 AM       Lab Results   Component Value Date/Time    ALKPHOSPHAT 100 (H) 07/30/2024 03:29 AM    ASTSGOT 30 07/30/2024 03:29 AM    ALTSGPT 22 07/30/2024 03:29 AM    TBILIRUBIN 0.5 07/30/2024 03:29 AM        Imaging/Testing:    I interpreted and/or reviewed the patient's neuroimaging    CT-CHEST (THORAX) W/O   Final Result      1.  Findings of chronic interstitial lung disease and pneumonia. No acute infiltrate detected.   2.  Stable moderate  mediastinal adenopathy.   3.  Indeterminate fat stranding around the superior left kidney. May be further assessed with abdominal CT.      Fleischner Society pulmonary nodule recommendations:   Not Applicable         MR-BRAIN-W/O   Final Result      1.  Mild chronic microvascular ischemic type changes.   2.  Tiny chronic right parietal infarct.   3.  No acute intracranial abnormality.   4.  Paranasal sinus disease.      EC-ECHOCARDIOGRAM COMPLETE W/O CONT   Final Result      CT-CTA NECK WITH & W/O-POST PROCESSING   Final Result      1.  Severe greater than 70% right ICA origin stenosis.   2.  60-70% left ICA origin stenosis.   3.  Lung apices demonstrate emphysema. Extensive irregular interstitial opacities in the lung apices which could be chronic changes from interstitial lung disease and/or interstitial infiltrates not excluded.   4.  Moderate mediastinal and mild hilar lymphadenopathy.         CT-CTA HEAD WITH & W/O-POST PROCESS   Final Result      1.  No acute arterial abnormality. No large vessel occlusion or aneurysm detected.   2.  Diffuse paranasal sinusitis.          Assessment and Plan:  Naomy Vargas is a 74 year old woman with transient left arm weakness, brain fog, now resolved.  CTA of neck revealing a high grade R ICA stenosis with associated mural thrombus.  Clinical semiology combined with CTA findings highly suggestive of TIA event related to symptomatic carotid disease. This warrants expedited surgical revascularization. Dr. Cortés with vascular surgery also noted proximal common carotid stenosis, so will pursue intraoperative angiogram to determine if this lesion warrants stent placement. If stent is placed, will continue DAPT therapy for total of 8 weeks.  Stenoses is not flow-limiting to warrant acute hypertensive response.     Problem list:   Transient neurologic symptoms   R internal carotid stenosis   Hypertension   Hyperlipidemia     Plan:              - q4h neurochecks/NIHSS               - OR today for possible R retrograde common carotid stent placement and R CEA              - continue ASA 81mg daily              - continue plavix 75mg daily- whether it will be needed post-operatively determined if CC stent is placed              - no indication for acute hypertensive response- long-term BP goal is 110-130/60-80              - stroke labs:   HgbA1c 5.3 and LDL 24              - continue home crestor 10mg daily for goal LDL < 70              - lovenox SQ for DVT chemoppx ok              - PT/OT/SLP              - may defer embolic workup with TTE/ziopatch as stroke 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  Otherwise no change in plan as noted above.      The evaluation of the patient, and recommended management, was discussed with Dr. Villegas, attending hospitalist. I have performed a physical exam and reviewed and updated ROS and Plan today (7/31/2024).     Franklin Estrada MD  Vascular Neurology

## 2024-07-31 NOTE — CARE PLAN
The patient is Stable - Low risk of patient condition declining or worsening    Shift Goals  Clinical Goals: Maintain neuro status  Patient Goals: Go home  Family Goals: Update on POC    Progress made toward(s) clinical / shift goals:    Problem: Knowledge Deficit - Stroke Education  Goal: Patient's knowledge of stroke and risk factors will improve  Outcome: Progressing  Note: Patient and family updated on POC. Dr. Cortés at bedside to discuss CEA for procedure tomorrow. Patient updated on NPO status at midnight. Patient verbalizes understanding.     Problem: Hemodynamic Monitoring  Goal: Patient's hemodynamics, fluid balance and neurologic status will be stable or improve  Outcome: Progressing  Note: Patient BP low this AM. Bolus and midodrine given with fair response from patient's BP. Vitals recorded and reported to Dr. Villegas, orders received and BP meds given.       Patient is not progressing towards the following goals:

## 2024-07-31 NOTE — DISCHARGE PLANNING
"TCN following. HTH/SCP chart reviewed. Collaborated with RICK Kamara. No new TCN needs identified in discharge planning at this time. Please see prior TCN note from 7/30 for most recent discharge planning considerations if indicated. As noted choice proactively obtained for HH ([facility]) and DME ([facility]) as needed in discharge planning. Per review, noted therapy following noting patient \"plan for OR today for possible retrograde common carotid stent placement and R CEA\" (please see Neurology note 7/31/21).     TCN will continue to follow and collaborate with discharge planning team as additional post acute needs arise. Thank you.      Completed today:  Per review, noted orders for PT/OT  SLP evaluation 7/30 anticipating that the patient will have no further speech therapy needs after discharge from the hospital  Choice obtained: HH ([facility]) and DME ([facility])   Pt aware of [facility] blanket referral policy  SCP with [facility]. discussed possible outpatient transitional PCP follow up if indicated and patient is agreement. Per review, patient is currently scheduled for [facility] Follow Up with Physician Assistant Prema Patel P.A.-C. Tuesday Aug 6, 2024 1:05 PM  "

## 2024-07-31 NOTE — PROGRESS NOTES
Microbiology called with blood culture results positive for gram negative rods. Hospitalist notified, no new orders at this time.

## 2024-07-31 NOTE — PROGRESS NOTES
Monitor summary: SR/ST , UT 0.13, QRS 0.08, QT 0.36 with rare PVC and rare PACs, per strip from monitor room.

## 2024-08-01 LAB
ANION GAP SERPL CALC-SCNC: 13 MMOL/L (ref 7–16)
BASOPHILS # BLD AUTO: 0.5 % (ref 0–1.8)
BASOPHILS # BLD: 0.03 K/UL (ref 0–0.12)
BUN SERPL-MCNC: 8 MG/DL (ref 8–22)
CALCIUM SERPL-MCNC: 8.1 MG/DL (ref 8.5–10.5)
CHLORIDE SERPL-SCNC: 106 MMOL/L (ref 96–112)
CO2 SERPL-SCNC: 18 MMOL/L (ref 20–33)
CREAT SERPL-MCNC: 0.87 MG/DL (ref 0.5–1.4)
EOSINOPHIL # BLD AUTO: 0.28 K/UL (ref 0–0.51)
EOSINOPHIL NFR BLD: 4.6 % (ref 0–6.9)
ERYTHROCYTE [DISTWIDTH] IN BLOOD BY AUTOMATED COUNT: 45.2 FL (ref 35.9–50)
GFR SERPLBLD CREATININE-BSD FMLA CKD-EPI: 70 ML/MIN/1.73 M 2
GLUCOSE SERPL-MCNC: 103 MG/DL (ref 65–99)
HCT VFR BLD AUTO: 32.9 % (ref 37–47)
HGB BLD-MCNC: 10.9 G/DL (ref 12–16)
IMM GRANULOCYTES # BLD AUTO: 0.05 K/UL (ref 0–0.11)
IMM GRANULOCYTES NFR BLD AUTO: 0.8 % (ref 0–0.9)
LYMPHOCYTES # BLD AUTO: 0.97 K/UL (ref 1–4.8)
LYMPHOCYTES NFR BLD: 16 % (ref 22–41)
MAGNESIUM SERPL-MCNC: 1.6 MG/DL (ref 1.5–2.5)
MCH RBC QN AUTO: 30.9 PG (ref 27–33)
MCHC RBC AUTO-ENTMCNC: 33.1 G/DL (ref 32.2–35.5)
MCV RBC AUTO: 93.2 FL (ref 81.4–97.8)
MONOCYTES # BLD AUTO: 0.81 K/UL (ref 0–0.85)
MONOCYTES NFR BLD AUTO: 13.3 % (ref 0–13.4)
NEUTROPHILS # BLD AUTO: 3.93 K/UL (ref 1.82–7.42)
NEUTROPHILS NFR BLD: 64.8 % (ref 44–72)
NRBC # BLD AUTO: 0 K/UL
NRBC BLD-RTO: 0 /100 WBC (ref 0–0.2)
PHOSPHATE SERPL-MCNC: 2.9 MG/DL (ref 2.5–4.5)
PLATELET # BLD AUTO: 124 K/UL (ref 164–446)
PMV BLD AUTO: 10.5 FL (ref 9–12.9)
POTASSIUM SERPL-SCNC: 3.3 MMOL/L (ref 3.6–5.5)
RBC # BLD AUTO: 3.53 M/UL (ref 4.2–5.4)
SODIUM SERPL-SCNC: 137 MMOL/L (ref 135–145)
WBC # BLD AUTO: 6.1 K/UL (ref 4.8–10.8)

## 2024-08-01 PROCEDURE — 700111 HCHG RX REV CODE 636 W/ 250 OVERRIDE (IP): Performed by: STUDENT IN AN ORGANIZED HEALTH CARE EDUCATION/TRAINING PROGRAM

## 2024-08-01 PROCEDURE — 700111 HCHG RX REV CODE 636 W/ 250 OVERRIDE (IP)

## 2024-08-01 PROCEDURE — 85025 COMPLETE CBC W/AUTO DIFF WBC: CPT

## 2024-08-01 PROCEDURE — 83735 ASSAY OF MAGNESIUM: CPT

## 2024-08-01 PROCEDURE — A9270 NON-COVERED ITEM OR SERVICE: HCPCS | Performed by: INTERNAL MEDICINE

## 2024-08-01 PROCEDURE — 87040 BLOOD CULTURE FOR BACTERIA: CPT | Mod: 91

## 2024-08-01 PROCEDURE — 700102 HCHG RX REV CODE 250 W/ 637 OVERRIDE(OP): Performed by: PSYCHIATRY & NEUROLOGY

## 2024-08-01 PROCEDURE — A9270 NON-COVERED ITEM OR SERVICE: HCPCS | Performed by: PSYCHIATRY & NEUROLOGY

## 2024-08-01 PROCEDURE — 700111 HCHG RX REV CODE 636 W/ 250 OVERRIDE (IP): Performed by: INTERNAL MEDICINE

## 2024-08-01 PROCEDURE — 99233 SBSQ HOSP IP/OBS HIGH 50: CPT | Performed by: STUDENT IN AN ORGANIZED HEALTH CARE EDUCATION/TRAINING PROGRAM

## 2024-08-01 PROCEDURE — 80048 BASIC METABOLIC PNL TOTAL CA: CPT

## 2024-08-01 PROCEDURE — 700102 HCHG RX REV CODE 250 W/ 637 OVERRIDE(OP): Performed by: INTERNAL MEDICINE

## 2024-08-01 PROCEDURE — 770020 HCHG ROOM/CARE - TELE (206)

## 2024-08-01 PROCEDURE — 700101 HCHG RX REV CODE 250

## 2024-08-01 PROCEDURE — 700105 HCHG RX REV CODE 258: Performed by: STUDENT IN AN ORGANIZED HEALTH CARE EDUCATION/TRAINING PROGRAM

## 2024-08-01 PROCEDURE — 84100 ASSAY OF PHOSPHORUS: CPT

## 2024-08-01 PROCEDURE — 36415 COLL VENOUS BLD VENIPUNCTURE: CPT

## 2024-08-01 RX ORDER — MAGNESIUM SULFATE HEPTAHYDRATE 40 MG/ML
2 INJECTION, SOLUTION INTRAVENOUS ONCE
Status: COMPLETED | OUTPATIENT
Start: 2024-08-01 | End: 2024-08-01

## 2024-08-01 RX ADMIN — MAGNESIUM SULFATE HEPTAHYDRATE 2 G: 2 INJECTION, SOLUTION INTRAVENOUS at 16:43

## 2024-08-01 RX ADMIN — VITAM B12 100 MCG: 100 TAB at 04:44

## 2024-08-01 RX ADMIN — ATORVASTATIN CALCIUM 40 MG: 40 TABLET, FILM COATED ORAL at 17:18

## 2024-08-01 RX ADMIN — Medication 3 MG: at 20:43

## 2024-08-01 RX ADMIN — SENNOSIDES AND DOCUSATE SODIUM 2 TABLET: 50; 8.6 TABLET ORAL at 17:18

## 2024-08-01 RX ADMIN — CLOPIDOGREL BISULFATE 75 MG: 75 TABLET ORAL at 04:44

## 2024-08-01 RX ADMIN — LEVOTHYROXINE SODIUM 100 MCG: 0.1 TABLET ORAL at 07:42

## 2024-08-01 RX ADMIN — ASPIRIN 81 MG 81 MG: 81 TABLET ORAL at 04:44

## 2024-08-01 RX ADMIN — HEPARIN SODIUM 5000 UNITS: 5000 INJECTION, SOLUTION INTRAVENOUS; SUBCUTANEOUS at 16:31

## 2024-08-01 RX ADMIN — HEPARIN SODIUM 5000 UNITS: 5000 INJECTION, SOLUTION INTRAVENOUS; SUBCUTANEOUS at 04:44

## 2024-08-01 RX ADMIN — Medication 1000 UNITS: at 04:44

## 2024-08-01 RX ADMIN — HEPARIN SODIUM 5000 UNITS: 5000 INJECTION, SOLUTION INTRAVENOUS; SUBCUTANEOUS at 22:24

## 2024-08-01 RX ADMIN — SODIUM CHLORIDE: 9 INJECTION, SOLUTION INTRAVENOUS at 04:56

## 2024-08-01 RX ADMIN — CEFTRIAXONE SODIUM 2000 MG: 10 INJECTION, POWDER, FOR SOLUTION INTRAVENOUS at 04:44

## 2024-08-01 ASSESSMENT — FIBROSIS 4 INDEX: FIB4 SCORE: 3.61

## 2024-08-01 ASSESSMENT — ENCOUNTER SYMPTOMS
DIZZINESS: 0
NAUSEA: 0
COUGH: 1
WEAKNESS: 1
VOMITING: 0
SHORTNESS OF BREATH: 0

## 2024-08-01 ASSESSMENT — SOCIAL DETERMINANTS OF HEALTH (SDOH)
WITHIN THE PAST 12 MONTHS, YOU WORRIED THAT YOUR FOOD WOULD RUN OUT BEFORE YOU GOT THE MONEY TO BUY MORE: NEVER TRUE
WITHIN THE PAST 12 MONTHS, THE FOOD YOU BOUGHT JUST DIDN'T LAST AND YOU DIDN'T HAVE MONEY TO GET MORE: NEVER TRUE
IN THE PAST 12 MONTHS, HAS THE ELECTRIC, GAS, OIL, OR WATER COMPANY THREATENED TO SHUT OFF SERVICE IN YOUR HOME?: NO

## 2024-08-01 ASSESSMENT — LIFESTYLE VARIABLES
ALCOHOL_USE: YES
TOTAL SCORE: 0
ON A TYPICAL DAY WHEN YOU DRINK ALCOHOL HOW MANY DRINKS DO YOU HAVE: 1
EVER HAD A DRINK FIRST THING IN THE MORNING TO STEADY YOUR NERVES TO GET RID OF A HANGOVER: NO
HAVE YOU EVER FELT YOU SHOULD CUT DOWN ON YOUR DRINKING: NO
TOTAL SCORE: 0
HOW MANY TIMES IN THE PAST YEAR HAVE YOU HAD 5 OR MORE DRINKS IN A DAY: 0
AVERAGE NUMBER OF DAYS PER WEEK YOU HAVE A DRINK CONTAINING ALCOHOL: 7
DOES PATIENT WANT TO STOP DRINKING: NO
HAVE PEOPLE ANNOYED YOU BY CRITICIZING YOUR DRINKING: NO
EVER FELT BAD OR GUILTY ABOUT YOUR DRINKING: NO
CONSUMPTION TOTAL: NEGATIVE
TOTAL SCORE: 0

## 2024-08-01 ASSESSMENT — PAIN DESCRIPTION - PAIN TYPE
TYPE: ACUTE PAIN

## 2024-08-01 NOTE — PROGRESS NOTES
Hospital Medicine Daily Progress Note    Date of Service  8/1/2024    Chief Complaint  Naomy Vargas is a 74 y.o. female admitted 7/29/2024 with left sided weakness    Hospital Course  Naomy Vargas is a 74 y.o. female with history of groin cellulitis, hypothyroidism, peripheral vascular disease, AAA, CKD 3, COPD, hypertension, chronic respiratory failure with hypoxia on 1 to 2 L nasal cannula, who presented 7/29/2024 with concern for transient left arm weakness .  Subsequent CT neck with greater than 70% right ICA stenosis.  Neurology evaluated and recommended right CEA.  Vascular evaluated and procedure canceled due to ongoing bacteremia.  MRI brain with tiny chronic right lateral infarct.  CT chest noted with chronic interstitial lung disease and pneumonia no acute infiltrate detected      Interval Problem Update    8/1/2024  Seen and examined at bedside  Vital remained stable, remained febrile  On 1 L oxygen saturating 90%  Labs with normal white count, hemoglobin 10.9 BMP with sodium 137, potassium 3.3, phosphorus 2.9 magnesium 1.6  Repeat blood culture in process        Telemetry monitoring  Continue on IV Rocephin  Replete magnesium 2 mg IV  Continue on aspirin, Lipitor, clopidogrel  Repeat BMP in a.m. to monitor electrolytes and toxicity   Repeat CBC in a.m. to monitor white count and hemoglobin  Need close monitoring of blood counts, electrolytes and renal function due to potential of organ dysfunction from ongoing bacteremia  Requiring IV antibiotics, need close monitoring for toxicity  right CEA scheduled for Monday.  Discussed with vascular surgery    Monitor blood pressure closely    High risk of deterioration from ongoing bacteremia.  Need close monitoring of telemetry monitoring.  Risks of worsening into septic shock.  Need close blood pressure neuromonitoring.    I have discussed this patient's plan of care and discharge plan at IDT rounds today with Case Management, Nursing,  Nursing leadership, and other members of the IDT team.    Consultants/Specialty  neurology    Code Status  Full Code    Disposition  The patient is not medically cleared for discharge to home or a post-acute facility.  Anticipate discharge to: home with close outpatient follow-up    I have placed the appropriate orders for post-discharge needs.    Review of Systems  Review of Systems   Respiratory:  Positive for cough. Negative for shortness of breath.    Cardiovascular:  Negative for chest pain.   Gastrointestinal:  Negative for nausea and vomiting.   Genitourinary:  Negative for dysuria.   Neurological:  Positive for weakness. Negative for dizziness.        Physical Exam  Temp:  [36.1 °C (97 °F)-36.7 °C (98.1 °F)] 36.2 °C (97.1 °F)  Pulse:  [79-97] 80  Resp:  [18-22] 19  BP: (102-157)/(63-80) 137/63  SpO2:  [90 %-94 %] 93 %    Physical Exam  Constitutional:       Appearance: Normal appearance.   Cardiovascular:      Rate and Rhythm: Normal rate and regular rhythm.   Pulmonary:      Effort: Pulmonary effort is normal.   Abdominal:      General: Abdomen is flat. There is no distension.   Neurological:      Mental Status: She is alert and oriented to person, place, and time.      Comments: Motor strength 5 out of 5 all extremity, weak left hand          Fluids    Intake/Output Summary (Last 24 hours) at 8/1/2024 1510  Last data filed at 8/1/2024 0945  Gross per 24 hour   Intake 3415.81 ml   Output --   Net 3415.81 ml       Laboratory  Recent Labs     07/30/24 0329 07/31/24  1325 08/01/24  0523   WBC 7.0 7.3 6.1   RBC 4.01* 3.89* 3.53*   HEMOGLOBIN 12.4 11.9* 10.9*   HEMATOCRIT 37.4 36.3* 32.9*   MCV 93.3 93.3 93.2   MCH 30.9 30.6 30.9   MCHC 33.2 32.8 33.1   RDW 45.9 45.6 45.2   PLATELETCT 111* 131* 124*   MPV 11.0 10.9 10.5     Recent Labs     07/30/24 0329 07/31/24  1325 08/01/24  0523   SODIUM 135 138 137   POTASSIUM 3.6 3.5* 3.3*   CHLORIDE 100 106 106   CO2 22 17* 18*   GLUCOSE 93 120* 103*   BUN 18 9 8    CREATININE 1.14 0.96 0.87   CALCIUM 8.7 8.5 8.1*               Recent Labs     07/30/24  0329   TRIGLYCERIDE 85   HDL 25*   LDL 24       Imaging  CT-CHEST (THORAX) W/O   Final Result      1.  Findings of chronic interstitial lung disease and pneumonia. No acute infiltrate detected.   2.  Stable moderate mediastinal adenopathy.   3.  Indeterminate fat stranding around the superior left kidney. May be further assessed with abdominal CT.      Fleischner Society pulmonary nodule recommendations:   Not Applicable         MR-BRAIN-W/O   Final Result      1.  Mild chronic microvascular ischemic type changes.   2.  Tiny chronic right parietal infarct.   3.  No acute intracranial abnormality.   4.  Paranasal sinus disease.      EC-ECHOCARDIOGRAM COMPLETE W/O CONT   Final Result      CT-CTA NECK WITH & W/O-POST PROCESSING   Final Result      1.  Severe greater than 70% right ICA origin stenosis.   2.  60-70% left ICA origin stenosis.   3.  Lung apices demonstrate emphysema. Extensive irregular interstitial opacities in the lung apices which could be chronic changes from interstitial lung disease and/or interstitial infiltrates not excluded.   4.  Moderate mediastinal and mild hilar lymphadenopathy.         CT-CTA HEAD WITH & W/O-POST PROCESS   Final Result      1.  No acute arterial abnormality. No large vessel occlusion or aneurysm detected.   2.  Diffuse paranasal sinusitis.           Assessment/Plan  * TIA (transient ischemic attack)- (present on admission)  Assessment & Plan  74-year-old female with history of peripheral vascular disease presented with transient left arm weakness, transient headache and CTA of the neck showing severe right ICA stenosis and moderate left ICA stenosis    Telemetry monitoring  Continue on aspirin, Lipitor, clopidogrel  Neurochecks every 4 hours  Echocardiogram noted with pulmonary hypertension otherwise unremarkable  PT/OT evaluation  right CEA scheduled for Monday due to ongoing  bacteremia.          Bacteremia  Assessment & Plan  UA positive for nitrate, leukocyte esterase, 1 out of 2 blood culture growing gram-negative douglas.  Follow repeat blood culture  Continue IV antibiotic  Need close monitoring of blood counts, electrolytes and renal function due to potential of organ dysfunction from ongoing bacteremia  Requiring IV antibiotics, need close monitoring for toxicity    Hypotension  Assessment & Plan  Due to ongoing bacteremia  Improved with IV antibiotics and IV fluids    Pneumonia  Assessment & Plan  Pneumonia ruled out  Currently on IV antibiotic for UTI and bacteremia        Hypokalemia  Assessment & Plan  Potassium 3.5  IV normal saline with potassium ordered.    Perianal dermatitis  Assessment & Plan  Topical clotrimazole    Chronic hypoxemic respiratory failure (HCC)- (present on admission)  Assessment & Plan  Continue supplemental oxygen 1 to 2 L nasal cannula    Pulmonary hypertension (HCC)- (present on admission)  Assessment & Plan  Echo: 3/2023  Right ventricular systolic pressure is estimated to be 35 mmHg.  Repeat echo noted worsening pulmonary hypertension, RVSP 50 mmHg       Chronic obstructive pulmonary disease (HCC)- (present on admission)  Assessment & Plan  Not in exacerbation  Continue Symbicort, DuoNeb as needed      Acute kidney injury on CKD (chronic kidney disease) stage 3, GFR 30-59 ml/min- (present on admission)  Assessment & Plan  Creatinine 1.53, BUN 27   Plan: IV rehydration.  Check bladder scan to rule out retention  Avoid nephrotoxins         VTE prophylaxis: heparin sc    I have performed a physical exam and reviewed and updated ROS and Plan today (8/1/2024). In review of yesterday's note (7/31/2024), there are no changes except as documented above.

## 2024-08-01 NOTE — THERAPY
Speech Language Therapy Contact Note    Patient Name: Naomy Vargas  Age:  74 y.o., Sex:  female  Medical Record #: [id number]  Today's Date: 8/1/2024 08/01/24 1435   Treatment Variance   Reason For Missed Therapy Medical - Other (Please Comment)   Initial Contact Note    Initial Contact Note  Order Received and Verified, Speech Therapy Evaluation in Progress with Full Report to Follow.   Interdisciplinary Plan of Care Collaboration   IDT Collaboration with  Other (See Comments)   Collaboration Comments Pt has orders for a cognitive-linguistic evaluation. Pt is set to have R carotid endarterectomy when medically appropriate so would recommend holding evaluation until after procedure.

## 2024-08-01 NOTE — PROGRESS NOTES
Monitor summary: SR 78-98, KS -0.13, QRS -0.08, QT -0.38, with (F) PACs, (R) PVCs, trigeminal, bigeminal PVCs and 8 beats of PSVT up to 175 per strip from the monitor room.

## 2024-08-01 NOTE — PROGRESS NOTES
Monitor summary:SR 82-96, [place] .12, QRS .09, QT .38 with occasional and frequent PVC's, occasional PAC's, rare bigeminy, and rare trigeminy per strip from monitor room.

## 2024-08-01 NOTE — THERAPY
Physical Therapy Contact Note    Discussed with RN. Per EMR patient was pending R carotid endarterectomy 7/31 but procedure was cancelled due to bacteremia. RN reported patient is up self in room, L sided weakness resolved. Will hold PT evaluation until after procedure (per RN now planned for Monday 8/5) and re attempt as able and appropriate.     Samia Emanuel, PT, DPT  [phone]

## 2024-08-01 NOTE — CARE PLAN
The patient is Stable - Low risk of patient condition declining or worsening    Shift Goals  Clinical Goals: monitor neuro status, monitor BP  Patient Goals: sleep  Family Goals: Updates    Progress made toward(s) clinical / shift goals:    Problem: Optimal Care of the Stroke Patient  Goal: Optimal emergency care for the stroke patient  Outcome: Progressing     Problem: Neuro Status  Goal: Neuro status will remain stable or improve  Outcome: Progressing   Patient aox4. Patient able to move all extremities.   Problem: Hemodynamic Monitoring  Goal: Patient's hemodynamics, fluid balance and neurologic status will be stable or improve  Outcome: Progressing     Problem: Urinary Elimination  Goal: Establish and maintain regular urinary output  Outcome: Progressing   Patient able to void.   Problem: Mobility - Stroke  Goal: Patient's capacity to carry out activities will improve  Outcome: Progressing   Patient able to ambulate SBA.   Problem: Bowel Elimination  Goal: Establish and maintain regular bowel function  Outcome: Progressing   Last BM 7/31.   Problem: Fall Risk  Goal: Patient will remain free from falls  Outcome: Progressing     Bed locked and in lowest position. Bed alarm in place.   Patient is not progressing towards the following goals:

## 2024-08-01 NOTE — CARE PLAN
The patient is Stable - Low risk of patient condition declining or worsening    Shift Goals  Clinical Goals: Stable neuro status, vascular surgery  Patient Goals: Go to preop  Family Goals: Updates    Progress made toward(s) clinical / shift goals:    Problem: Knowledge Deficit - Stroke Education  Goal: Patient's knowledge of stroke and risk factors will improve  Description: Target End Date:  1-3 days or as soon as patient condition allows    Document in Patient Education    1.  Stroke education booklet provided  2.  Education regarding EMS activation, need for follow up, medication prescribed at discharge, risk factors for stroke/lifestyle modifications, warning signs and symptoms of stroke provided  7/31/2024 1809 by Kaia Mandujano R.N.  Outcome: Progressing  7/31/2024 1809 by Kaia Mandujano R.N.  Outcome: Progressing     Problem: Neuro Status  Goal: Neuro status will remain stable or improve  Description: Target End Date:  Prior to discharge or change in level of care    Document on Neuro assessment in the Assessment flowsheet    1.  Assess and monitor neurologic status per provider order/protocol/unit policy  2.  Assess level of consciousness and orientation  3.  Assess for speech, dysarthria, dysphagia, facial symmetry  4.  Assess visual field, eye movements, gaze preference, pupil reaction and size  5.  Assess muscle strength and motor response in all four extremities  6.  Assess for sensation (numbness and tingling)  7.  Assess basic neuro reflexes (cough, gag, corneal)  8.  Identify changes in neuro status and report to provider for testing/treatment orders  7/31/2024 1809 by Kaia Mandujano R.N.  Outcome: Progressing  7/31/2024 1809 by Kaia Mandujano R.N.  Outcome: Progressing       Patient is not progressing towards the following goals:

## 2024-08-01 NOTE — DISCHARGE PLANNING
Care Transition Team Assessment    RNCM completed assessment and verified information on face sheet. Patient lives in [place] with spouse, Willard. Patient was independent with ADL's and IADL's prior to this admission. Patient uses O2 at home and is on service with [facility]. Viktoria is pending R CEA on Monday 8/5. Patient would benefit from PT/OT eval for dc recs. HCM will continue to follow to assist with any DC planning needs.     Information Source  Orientation Level: Oriented X4  Who is responsible for making decisions for patient? : Patient    Readmission Evaluation  Is this a readmission?: No    Elopement Risk  Legal Hold: No  Ambulatory or Self Mobile in Wheelchair: Yes  Disoriented: No  Psychiatric Symptoms: None  History of Wandering: No  Elopement this Admit: No  Vocalizing Wanting to Leave: No  Displays Behaviors, Body Language Wanting to Leave: No-Not at Risk for Elopement  Elopement Risk: Not at Risk for Elopement    Interdisciplinary Discharge Planning  Lives with - Patient's Self Care Capacity: Spouse, Unrelated Adult (roommate)  Patient or legal guardian wants to designate a caregiver: Yes  Caregiver name: Willard Vargas  Caregiver contact info: [phone]  ([facility]) Authorization for Release of Health Information has been completed: Yes  Support Systems: Spouse / Significant Other, Family Member(s)  Housing / Facility: [address]    Discharge Preparedness  What is your plan after discharge?: Home with help, Home health care  What are your discharge supports?: Spouse  Prior Functional Level: Ambulatory, Drives Self, Independent with Medication Management  Difficulity with ADLs: None  Difficulity with IADLs: None    Functional Assesment  Prior Functional Level: Ambulatory, Drives Self, Independent with Medication Management    Finances  Financial Barriers to Discharge: No  Prescription Coverage: Yes    Vision / Hearing Impairment  Vision Impairment : Yes  Right Eye Vision: Wears Glasses  Left Eye Vision:  Wears Glasses  Hearing Impairment : No         Advance Directive  Advance Directive?: DPOA for Health Care    Domestic Abuse  Have you ever been the victim of abuse or violence?: No  Possible Abuse/Neglect Reported to:: Not Applicable    Psychological Assessment  History of Substance Abuse: None  History of Psychiatric Problems: No  Non-compliant with Treatment: No  Newly Diagnosed Illness: No    Discharge Risks or Barriers  Discharge risks or barriers?: No  Patient risk factors: Vulnerable adult    Anticipated Discharge Information  Discharge Disposition: D/T to home under A care in anticipation of covered skilled care (06)

## 2024-08-01 NOTE — DISCHARGE PLANNING
HTH/SCP TCN chart review completed. Collaborated with RICK Sanchez. Dicussed current discharge considerations noted to home with possible home healthcare services pending therapy/ physician recommendations. As previously noted choice proactively obtained for HH and DME (O2) as needed in discharge planning during initial TCN visit.     Per review, noted planned R carotid endarterectomy 7/31 was cancelled and is now planned for Monday 8/5.  TCN will continue to follow and collaborate with discharge planning team as additional post acute needs arise. Thank you.    Completed:  Per review, noted orders for PT/OT  SLP evaluation 7/30 anticipating that the patient will have no further speech therapy needs after discharge from the hospital  Choice obtained: HH ([facility]) and DME ([facility])   Pt aware of [facility] blanket referral policy  SCP with [facility] PCP. discussed possible outpatient transitional PCP follow up if indicated and patient is agreement. Per review, patient is currently scheduled for [facility] Follow Up with Physician Assistant Prema Patel P.A.-C. Monday, August 12th at 1:20PM

## 2024-08-02 LAB
ANION GAP SERPL CALC-SCNC: 12 MMOL/L (ref 7–16)
BACTERIA BLD CULT: ABNORMAL
BACTERIA BLD CULT: ABNORMAL
BACTERIA UR CULT: ABNORMAL
BACTERIA UR CULT: ABNORMAL
BUN SERPL-MCNC: 8 MG/DL (ref 8–22)
CALCIUM SERPL-MCNC: 8.3 MG/DL (ref 8.5–10.5)
CHLORIDE SERPL-SCNC: 108 MMOL/L (ref 96–112)
CO2 SERPL-SCNC: 18 MMOL/L (ref 20–33)
CREAT SERPL-MCNC: 0.97 MG/DL (ref 0.5–1.4)
GFR SERPLBLD CREATININE-BSD FMLA CKD-EPI: 61 ML/MIN/1.73 M 2
GLUCOSE SERPL-MCNC: 105 MG/DL (ref 65–99)
MAGNESIUM SERPL-MCNC: 2.1 MG/DL (ref 1.5–2.5)
PHOSPHATE SERPL-MCNC: 2.9 MG/DL (ref 2.5–4.5)
POTASSIUM SERPL-SCNC: 3.3 MMOL/L (ref 3.6–5.5)
SIGNIFICANT IND 70042: ABNORMAL
SIGNIFICANT IND 70042: ABNORMAL
SITE SITE: ABNORMAL
SITE SITE: ABNORMAL
SODIUM SERPL-SCNC: 138 MMOL/L (ref 135–145)
SOURCE SOURCE: ABNORMAL
SOURCE SOURCE: ABNORMAL

## 2024-08-02 PROCEDURE — A9270 NON-COVERED ITEM OR SERVICE: HCPCS | Performed by: INTERNAL MEDICINE

## 2024-08-02 PROCEDURE — 36415 COLL VENOUS BLD VENIPUNCTURE: CPT

## 2024-08-02 PROCEDURE — 770020 HCHG ROOM/CARE - TELE (206)

## 2024-08-02 PROCEDURE — 700101 HCHG RX REV CODE 250

## 2024-08-02 PROCEDURE — A9270 NON-COVERED ITEM OR SERVICE: HCPCS | Mod: JZ | Performed by: STUDENT IN AN ORGANIZED HEALTH CARE EDUCATION/TRAINING PROGRAM

## 2024-08-02 PROCEDURE — 80048 BASIC METABOLIC PNL TOTAL CA: CPT

## 2024-08-02 PROCEDURE — 84100 ASSAY OF PHOSPHORUS: CPT

## 2024-08-02 PROCEDURE — 700102 HCHG RX REV CODE 250 W/ 637 OVERRIDE(OP): Mod: JZ | Performed by: STUDENT IN AN ORGANIZED HEALTH CARE EDUCATION/TRAINING PROGRAM

## 2024-08-02 PROCEDURE — 700102 HCHG RX REV CODE 250 W/ 637 OVERRIDE(OP): Performed by: PSYCHIATRY & NEUROLOGY

## 2024-08-02 PROCEDURE — 700111 HCHG RX REV CODE 636 W/ 250 OVERRIDE (IP): Performed by: INTERNAL MEDICINE

## 2024-08-02 PROCEDURE — 83735 ASSAY OF MAGNESIUM: CPT

## 2024-08-02 PROCEDURE — 700111 HCHG RX REV CODE 636 W/ 250 OVERRIDE (IP)

## 2024-08-02 PROCEDURE — 99233 SBSQ HOSP IP/OBS HIGH 50: CPT | Performed by: STUDENT IN AN ORGANIZED HEALTH CARE EDUCATION/TRAINING PROGRAM

## 2024-08-02 PROCEDURE — 700102 HCHG RX REV CODE 250 W/ 637 OVERRIDE(OP): Performed by: INTERNAL MEDICINE

## 2024-08-02 PROCEDURE — A9270 NON-COVERED ITEM OR SERVICE: HCPCS | Performed by: PSYCHIATRY & NEUROLOGY

## 2024-08-02 RX ORDER — HYDROMORPHONE HYDROCHLORIDE 1 MG/ML
0.25 INJECTION, SOLUTION INTRAMUSCULAR; INTRAVENOUS; SUBCUTANEOUS EVERY 8 HOURS PRN
Status: DISCONTINUED | OUTPATIENT
Start: 2024-08-02 | End: 2024-08-08 | Stop reason: HOSPADM

## 2024-08-02 RX ORDER — POTASSIUM CHLORIDE 1500 MG/1
40 TABLET, EXTENDED RELEASE ORAL EVERY 4 HOURS
Status: COMPLETED | OUTPATIENT
Start: 2024-08-02 | End: 2024-08-02

## 2024-08-02 RX ORDER — SODIUM BICARBONATE 650 MG/1
650 TABLET ORAL 2 TIMES DAILY
Status: DISCONTINUED | OUTPATIENT
Start: 2024-08-02 | End: 2024-08-08 | Stop reason: HOSPADM

## 2024-08-02 RX ORDER — OXYCODONE HYDROCHLORIDE 5 MG/1
2.5 TABLET ORAL
Status: DISCONTINUED | OUTPATIENT
Start: 2024-08-02 | End: 2024-08-06

## 2024-08-02 RX ORDER — OXYCODONE HYDROCHLORIDE 5 MG/1
5 TABLET ORAL
Status: DISCONTINUED | OUTPATIENT
Start: 2024-08-02 | End: 2024-08-06

## 2024-08-02 RX ADMIN — Medication 1000 UNITS: at 05:35

## 2024-08-02 RX ADMIN — SODIUM BICARBONATE 650 MG: 650 TABLET ORAL at 10:12

## 2024-08-02 RX ADMIN — POTASSIUM CHLORIDE 40 MEQ: 1500 TABLET, EXTENDED RELEASE ORAL at 10:12

## 2024-08-02 RX ADMIN — ASPIRIN 81 MG 81 MG: 81 TABLET ORAL at 05:35

## 2024-08-02 RX ADMIN — Medication 3 MG: at 20:39

## 2024-08-02 RX ADMIN — HEPARIN SODIUM 5000 UNITS: 5000 INJECTION, SOLUTION INTRAVENOUS; SUBCUTANEOUS at 14:34

## 2024-08-02 RX ADMIN — POTASSIUM CHLORIDE 40 MEQ: 1500 TABLET, EXTENDED RELEASE ORAL at 14:34

## 2024-08-02 RX ADMIN — LEVOTHYROXINE SODIUM 100 MCG: 0.1 TABLET ORAL at 05:35

## 2024-08-02 RX ADMIN — CEFTRIAXONE SODIUM 2000 MG: 10 INJECTION, POWDER, FOR SOLUTION INTRAVENOUS at 05:35

## 2024-08-02 RX ADMIN — HEPARIN SODIUM 5000 UNITS: 5000 INJECTION, SOLUTION INTRAVENOUS; SUBCUTANEOUS at 05:35

## 2024-08-02 RX ADMIN — CLOPIDOGREL BISULFATE 75 MG: 75 TABLET ORAL at 05:36

## 2024-08-02 RX ADMIN — HEPARIN SODIUM 5000 UNITS: 5000 INJECTION, SOLUTION INTRAVENOUS; SUBCUTANEOUS at 22:08

## 2024-08-02 RX ADMIN — SODIUM BICARBONATE 650 MG: 650 TABLET ORAL at 16:06

## 2024-08-02 RX ADMIN — ATORVASTATIN CALCIUM 40 MG: 40 TABLET, FILM COATED ORAL at 16:06

## 2024-08-02 RX ADMIN — VITAM B12 100 MCG: 100 TAB at 05:35

## 2024-08-02 ASSESSMENT — ENCOUNTER SYMPTOMS
NAUSEA: 0
DIZZINESS: 0
COUGH: 1
SHORTNESS OF BREATH: 0
WEAKNESS: 1
VOMITING: 0

## 2024-08-02 ASSESSMENT — PAIN DESCRIPTION - PAIN TYPE
TYPE: ACUTE PAIN

## 2024-08-02 NOTE — CARE PLAN
The patient is Stable - Low risk of patient condition declining or worsening    Shift Goals  Clinical Goals: Stable neuro status  Patient Goals: Rest  Family Goals: CASIMIRO    Progress made toward(s) clinical / shift goals:    Problem: Mobility - Stroke  Goal: Patient's capacity to carry out activities will improve  Description: Target End Date:  Prior to discharge or change in level of care    1.  Assess for barriers to mobility/activity  2.  Implement activity per interdisciplinary team recommendations  3.  Target activity level identified and patient/family/caregiver aware of goal  4.  Provide assistive devices  5.  Instruct patient/caregiver on proper use of assistive/adaptive devices  6.  Schedule activities and rest periods to decrease effects of fatigue  7.  Encourage mobilization to extent of ability  8.  Maintain proper body alignment  9.  Provide adequate pain management to allow progressive mobilization  10. Implement pace maker precautions as needed  Outcome: Progressing     Problem: Self Care  Goal: Patient will have the ability to perform ADLs independently or with assistance (bathe, groom, dress, toilet and feed)  Description: Target End Date:  Prior to discharge or change in level of care    Document on ADL flowsheet    1.  Assess the capability and level of deficiency to perform ADLs  2.  Encourage family/care giver involvement  3.  Provide assistive devices  4.  Consider PT/OT evaluations  5.  Maintain support, give positive feedback, encourage self-care allowing extra time and verbal cuing as needed  6.  Avoid doing something for patients they can do themselves, but provide assistance as needed  7.  Assist in anticipating/planning individual needs  8.  Collaborate with Case Management and  to meet discharge needs  Outcome: Progressing     Problem: Fall Risk  Goal: Patient will remain free from falls  Description: Target End Date:  Prior to discharge or change in level of  care    Document interventions on the Jones Alexi Fall Risk Assessment    1.  Assess for fall risk factors  2.  Implement fall precautions  Outcome: Progressing       Patient is not progressing towards the following goals:

## 2024-08-02 NOTE — PROGRESS NOTES
VASCULAR SURGERY SERVICE                        Progress Note  _________________________________    Patient was stable overnight.  No evidence of recurrent TIA episodes.    Surgery was planned for Wednesday morning, specifically a right carotid endarterectomy and a retrograde right carotid stent.  However the patient had 1 of 2 blood cultures that resulted with E.Coli, not likely to be a contaminant.  The patient has been started on IV antibiotics.    The planned surgery would be contraindicated in the setting of bacteremia.      New blood cultures were drawn today.     Surgery has been re-scheduled for Monday morning at 1030 which should allow adequate time for bacteremia to clear and pneumonia and UTI to improve.    I had a very thorough detailed conversation with the patient and her  detailing all of this.  Their questions were answered and they indicated good understanding of the situation.  I also instructed the patient to notify her nurse or anybody else from the medical team if she experiences recurrent TIA symptoms which could potentially mandate earlier surgical intervention even if we have not proven that the bacteremia has cleared.  Fortunately now that the patient is on dual antiplatelet therapy the likelihood of recurrent TIA episodes is very low but if it does happen a reassessment will be necessary    Vascular service following along    Appreciate hospitalist services support.    Valerio Cortés MD  [facility]   [facility] preferred or call my office [phone]  __________________________________________________  Patient:Naomy Vargas   MRN:[id number]

## 2024-08-02 NOTE — PROGRESS NOTES
Hospital Medicine Daily Progress Note    Date of Service  8/2/2024    Chief Complaint  Naomy Vargas is a 74 y.o. female admitted 7/29/2024 with left sided weakness    Hospital Course  Naomy Vargas is a 74 y.o. female with history of groin cellulitis, hypothyroidism, peripheral vascular disease, AAA, CKD 3, COPD, hypertension, chronic respiratory failure with hypoxia on 1 to 2 L nasal cannula, who presented 7/29/2024 with concern for transient left arm weakness .  Subsequent CT neck with greater than 70% right ICA stenosis.  Neurology evaluated and recommended right CEA.  Vascular evaluated and procedure canceled due to ongoing bacteremia.  MRI brain with tiny chronic right lateral infarct.  CT chest noted with chronic interstitial lung disease and pneumonia no acute infiltrate detected      Interval Problem Update    8/2/2024  Seen and examined at bedside  Vital remained stable  Remain afebrile  Labs reviewed sodium 138 potassium 3.3 magnesium 2.1 phosphorus 2.9, blood culture growing E. coli pansensitive, repeat blood culture negative so far        Telemetry monitoring  Continue on IV Rocephin  Replace potassium  Continue on aspirin, Lipitor, clopidogrel  Repeat BMP in a.m. to monitor electrolytes and toxicity   Repeat CBC in a.m. to monitor white count and hemoglobin  Need close monitoring of blood counts, electrolytes and renal function due to potential of organ dysfunction from ongoing bacteremia  Requiring IV antibiotics, need close monitoring for toxicity.  Requiring IV narcotics for pain management, monitor for toxicity  Discussed with vascular surgery .  Plan for right CEA on Monday once.  Blood culture remain negative  Monitor blood pressure closely    High risk of deterioration from ongoing bacteremia.  Need close monitoring of telemetry monitoring.  Risks of worsening into septic shock.  Need close blood pressure neuromonitoring.    I have discussed this patient's plan of care and  discharge plan at IDT rounds today with Case Management, Nursing, Nursing leadership, and other members of the IDT team.    Consultants/Specialty  neurology    Code Status  Full Code    Disposition  The patient is not medically cleared for discharge to home or a post-acute facility.  Anticipate discharge to: home with close outpatient follow-up    I have placed the appropriate orders for post-discharge needs.    Review of Systems  Review of Systems   Respiratory:  Positive for cough. Negative for shortness of breath.    Cardiovascular:  Negative for chest pain.   Gastrointestinal:  Negative for nausea and vomiting.   Genitourinary:  Negative for dysuria.   Neurological:  Positive for weakness. Negative for dizziness.        Physical Exam  Temp:  [36.1 °C (96.9 °F)-36.8 °C (98.2 °F)] 36.1 °C (96.9 °F)  Pulse:  [76-88] 88  Resp:  [14-16] 14  BP: (123-155)/(62-79) 135/69  SpO2:  [92 %-97 %] 95 %    Physical Exam  Constitutional:       Appearance: Normal appearance.   Cardiovascular:      Rate and Rhythm: Normal rate and regular rhythm.   Pulmonary:      Effort: Pulmonary effort is normal.   Abdominal:      General: Abdomen is flat. There is no distension.   Neurological:      Mental Status: She is alert and oriented to person, place, and time.      Comments: Motor strength 5 out of 5 all extremity, weak left hand          Fluids    Intake/Output Summary (Last 24 hours) at 8/2/2024 1537  Last data filed at 8/1/2024 1932  Gross per 24 hour   Intake 292.76 ml   Output --   Net 292.76 ml       Laboratory  Recent Labs     07/31/24  1325 08/01/24  0523   WBC 7.3 6.1   RBC 3.89* 3.53*   HEMOGLOBIN 11.9* 10.9*   HEMATOCRIT 36.3* 32.9*   MCV 93.3 93.2   MCH 30.6 30.9   MCHC 32.8 33.1   RDW 45.6 45.2   PLATELETCT 131* 124*   MPV 10.9 10.5     Recent Labs     07/31/24  1325 08/01/24  0523 08/02/24  0144   SODIUM 138 137 138   POTASSIUM 3.5* 3.3* 3.3*   CHLORIDE 106 106 108   CO2 17* 18* 18*   GLUCOSE 120* 103* 105*   BUN 9 8  8   CREATININE 0.96 0.87 0.97   CALCIUM 8.5 8.1* 8.3*                       Imaging  CT-CHEST (THORAX) W/O   Final Result      1.  Findings of chronic interstitial lung disease and pneumonia. No acute infiltrate detected.   2.  Stable moderate mediastinal adenopathy.   3.  Indeterminate fat stranding around the superior left kidney. May be further assessed with abdominal CT.      Fleischner Society pulmonary nodule recommendations:   Not Applicable         MR-BRAIN-W/O   Final Result      1.  Mild chronic microvascular ischemic type changes.   2.  Tiny chronic right parietal infarct.   3.  No acute intracranial abnormality.   4.  Paranasal sinus disease.      EC-ECHOCARDIOGRAM COMPLETE W/O CONT   Final Result      CT-CTA NECK WITH & W/O-POST PROCESSING   Final Result      1.  Severe greater than 70% right ICA origin stenosis.   2.  60-70% left ICA origin stenosis.   3.  Lung apices demonstrate emphysema. Extensive irregular interstitial opacities in the lung apices which could be chronic changes from interstitial lung disease and/or interstitial infiltrates not excluded.   4.  Moderate mediastinal and mild hilar lymphadenopathy.         CT-CTA HEAD WITH & W/O-POST PROCESS   Final Result      1.  No acute arterial abnormality. No large vessel occlusion or aneurysm detected.   2.  Diffuse paranasal sinusitis.           Assessment/Plan  * TIA (transient ischemic attack)- (present on admission)  Assessment & Plan  74-year-old female with history of peripheral vascular disease presented with transient left arm weakness, transient headache and CTA of the neck showing severe right ICA stenosis and moderate left ICA stenosis    Telemetry monitoring  Continue on aspirin, Lipitor, clopidogrel  Neurochecks every 4 hours  Echocardiogram noted with pulmonary hypertension otherwise unremarkable  PT/OT evaluation  right CEA scheduled for Monday due to ongoing bacteremia.          Bacteremia  Assessment & Plan  UA positive for  nitrate, leukocyte esterase, 1 out of 2 blood culture growing gram-negative douglas.  Follow repeat blood culture  Continue IV antibiotic  Need close monitoring of blood counts, electrolytes and renal function due to potential of organ dysfunction from ongoing bacteremia  Requiring IV antibiotics, need close monitoring for toxicity    Hypotension  Assessment & Plan  Due to ongoing bacteremia  Improved with IV antibiotics and IV fluids    Pneumonia  Assessment & Plan  Pneumonia ruled out  Currently on IV antibiotic for UTI and bacteremia        Hypokalemia  Assessment & Plan  Replace KCl  Repeat labs in a.m.    Perianal dermatitis  Assessment & Plan  Topical clotrimazole    Chronic hypoxemic respiratory failure (HCC)- (present on admission)  Assessment & Plan  Continue supplemental oxygen 1 to 2 L nasal cannula    Pulmonary hypertension (HCC)- (present on admission)  Assessment & Plan  Echo: 3/2023  Right ventricular systolic pressure is estimated to be 35 mmHg.  Repeat echo noted worsening pulmonary hypertension, RVSP 50 mmHg       Chronic obstructive pulmonary disease (HCC)- (present on admission)  Assessment & Plan  Not in exacerbation  Continue Symbicort, DuoNeb as needed      Acute kidney injury on CKD (chronic kidney disease) stage 3, GFR 30-59 ml/min- (present on admission)  Assessment & Plan  Creatinine 1.53, BUN 27   Plan: IV rehydration.  Check bladder scan to rule out retention  Avoid nephrotoxins         VTE prophylaxis: heparin sc    I have performed a physical exam and reviewed and updated ROS and Plan today (8/2/2024). In review of yesterday's note (8/1/2024), there are no changes except as documented above.

## 2024-08-02 NOTE — DISCHARGE PLANNING
Case Management Discharge Planning    Admission Date: 7/29/2024  GMLOS: 2.1  ALOS: 4    6-Clicks ADL Score: 22  6-Clicks Mobility Score: 19      Anticipated Discharge Dispo: Discharge Disposition: D/T to home under [facility] care in anticipation of covered skilled care (06)    DME Needed: No    Action(s) Taken: Pt discussed in IDT rounds, pt scheduled for procedure 8/5.  PT/OT to re-evaluate pt post procedure.    Escalations Completed: None    Medically Clear: No    Next Steps: Pending PT/OT evals.  Per [facility] SCP and chart review, HH and oxygen choice has been obtained proactively, pt has FWW at home.    Barriers to Discharge: Medical clearance and Pending PT Evaluation    Is the patient up for discharge tomorrow: No

## 2024-08-02 NOTE — PROGRESS NOTES
Monitor Summary: SR 70-83, OK 0.10, QRS 0.08, QT 0.45, with rare PVCs and frequent PACs per strip from monitor room.

## 2024-08-02 NOTE — CARE PLAN
The patient is Stable - Low risk of patient condition declining or worsening    Shift Goals  Clinical Goals: monitor neuro status, increase mobility  Patient Goals: rest  Family Goals: CASIMIRO    Progress made toward(s) clinical / shift goals:    Problem: Neuro Status  Goal: Neuro status will remain stable or improve  Outcome: Progressing   Patient aox4. Able to move all extremities. Denies numbness/tingling.   Problem: Hemodynamic Monitoring  Goal: Patient's hemodynamics, fluid balance and neurologic status will be stable or improve  Outcome: Progressing     Problem: Respiratory - Stroke Patient  Goal: Patient will achieve/maintain optimum respiratory rate/effort  Outcome: Progressing   Patient on 1-2 L NC.   Problem: Urinary Elimination  Goal: Establish and maintain regular urinary output  Outcome: Progressing   Patient able to void.   Problem: Bowel Elimination  Goal: Establish and maintain regular bowel function  Outcome: Progressing   Last BM 8/1.  Problem: Mobility - Stroke  Goal: Patient's capacity to carry out activities will improve  Outcome: Progressing   Patient low fall risk able to ambulate up self.  Problem: Fall Risk  Goal: Patient will remain free from falls  Outcome: Progressing   No bed alarm in place. Bed locked and in lowest position. Patient verbalized understanding of fall risk.     Patient is not progressing towards the following goals:

## 2024-08-02 NOTE — PROGRESS NOTES
Monitor summary: SR 68-92, VT 0.12, QRS 0.09, QT 0.43, with frequent PACs, rare to frequent PVCs, trigeminal, bigeminal, and multifocal PVCs  per strip from the monitor room.

## 2024-08-02 NOTE — CARE PLAN
The patient is Stable - Low risk of patient condition declining or worsening    Shift Goals  Clinical Goals: Stable neuro status, monitor labs  Patient Goals: Rest  Family Goals: CASIMIRO    Progress made toward(s) clinical / shift goals:    Problem: Neuro Status  Goal: Neuro status will remain stable or improve  Description: Target End Date:  Prior to discharge or change in level of care    Document on Neuro assessment in the Assessment flowsheet    1.  Assess and monitor neurologic status per provider order/protocol/unit policy  2.  Assess level of consciousness and orientation  3.  Assess for speech, dysarthria, dysphagia, facial symmetry  4.  Assess visual field, eye movements, gaze preference, pupil reaction and size  5.  Assess muscle strength and motor response in all four extremities  6.  Assess for sensation (numbness and tingling)  7.  Assess basic neuro reflexes (cough, gag, corneal)  8.  Identify changes in neuro status and report to provider for testing/treatment orders  Outcome: Progressing     Problem: Hemodynamic Monitoring  Goal: Patient's hemodynamics, fluid balance and neurologic status will be stable or improve  Description: Target End Date:  Prior to discharge or change in level of care    1.  Vital signs, pulse oximetry and cardiac monitor per provider order and/or policy  2.  Frequent pulse checks performed post thrombectomy  3.  Frequent monitoring for signs of bleeding post TPA administration  4.  Proper management of IV infusions  5.  Intake and output monitored per provider order  6.  Daily weight obtained per unit policy or provider order  7.  Peripheral pulses and capillary refill assessed as needed  8.  Monitor for signs/symptoms of excessive bleeding  9.  Body temperature assessed and fevers treated  10. Patient positioned for maximum circulation/cardiac output  Outcome: Progressing       Patient is not progressing towards the following goals:

## 2024-08-03 LAB
ANION GAP SERPL CALC-SCNC: 10 MMOL/L (ref 7–16)
BUN SERPL-MCNC: 9 MG/DL (ref 8–22)
CALCIUM SERPL-MCNC: 8.4 MG/DL (ref 8.5–10.5)
CHLORIDE SERPL-SCNC: 111 MMOL/L (ref 96–112)
CO2 SERPL-SCNC: 20 MMOL/L (ref 20–33)
CREAT SERPL-MCNC: 0.89 MG/DL (ref 0.5–1.4)
GFR SERPLBLD CREATININE-BSD FMLA CKD-EPI: 68 ML/MIN/1.73 M 2
GLUCOSE SERPL-MCNC: 96 MG/DL (ref 65–99)
POTASSIUM SERPL-SCNC: 4 MMOL/L (ref 3.6–5.5)
SODIUM SERPL-SCNC: 141 MMOL/L (ref 135–145)

## 2024-08-03 PROCEDURE — 700102 HCHG RX REV CODE 250 W/ 637 OVERRIDE(OP): Performed by: INTERNAL MEDICINE

## 2024-08-03 PROCEDURE — 700101 HCHG RX REV CODE 250

## 2024-08-03 PROCEDURE — 36415 COLL VENOUS BLD VENIPUNCTURE: CPT

## 2024-08-03 PROCEDURE — A9270 NON-COVERED ITEM OR SERVICE: HCPCS | Performed by: STUDENT IN AN ORGANIZED HEALTH CARE EDUCATION/TRAINING PROGRAM

## 2024-08-03 PROCEDURE — A9270 NON-COVERED ITEM OR SERVICE: HCPCS | Performed by: INTERNAL MEDICINE

## 2024-08-03 PROCEDURE — 99233 SBSQ HOSP IP/OBS HIGH 50: CPT | Performed by: STUDENT IN AN ORGANIZED HEALTH CARE EDUCATION/TRAINING PROGRAM

## 2024-08-03 PROCEDURE — 700111 HCHG RX REV CODE 636 W/ 250 OVERRIDE (IP): Performed by: INTERNAL MEDICINE

## 2024-08-03 PROCEDURE — 700102 HCHG RX REV CODE 250 W/ 637 OVERRIDE(OP): Performed by: STUDENT IN AN ORGANIZED HEALTH CARE EDUCATION/TRAINING PROGRAM

## 2024-08-03 PROCEDURE — A9270 NON-COVERED ITEM OR SERVICE: HCPCS | Performed by: PSYCHIATRY & NEUROLOGY

## 2024-08-03 PROCEDURE — 700102 HCHG RX REV CODE 250 W/ 637 OVERRIDE(OP): Performed by: PSYCHIATRY & NEUROLOGY

## 2024-08-03 PROCEDURE — 80048 BASIC METABOLIC PNL TOTAL CA: CPT

## 2024-08-03 PROCEDURE — 700111 HCHG RX REV CODE 636 W/ 250 OVERRIDE (IP)

## 2024-08-03 PROCEDURE — 770020 HCHG ROOM/CARE - TELE (206)

## 2024-08-03 RX ADMIN — LEVOTHYROXINE SODIUM 100 MCG: 0.1 TABLET ORAL at 05:06

## 2024-08-03 RX ADMIN — HEPARIN SODIUM 5000 UNITS: 5000 INJECTION, SOLUTION INTRAVENOUS; SUBCUTANEOUS at 14:57

## 2024-08-03 RX ADMIN — CLOPIDOGREL BISULFATE 75 MG: 75 TABLET ORAL at 05:06

## 2024-08-03 RX ADMIN — ASPIRIN 81 MG 81 MG: 81 TABLET ORAL at 05:06

## 2024-08-03 RX ADMIN — CEFTRIAXONE SODIUM 2000 MG: 10 INJECTION, POWDER, FOR SOLUTION INTRAVENOUS at 05:07

## 2024-08-03 RX ADMIN — ATORVASTATIN CALCIUM 40 MG: 40 TABLET, FILM COATED ORAL at 17:17

## 2024-08-03 RX ADMIN — Medication 3 MG: at 21:02

## 2024-08-03 RX ADMIN — HEPARIN SODIUM 5000 UNITS: 5000 INJECTION, SOLUTION INTRAVENOUS; SUBCUTANEOUS at 22:10

## 2024-08-03 RX ADMIN — HEPARIN SODIUM 5000 UNITS: 5000 INJECTION, SOLUTION INTRAVENOUS; SUBCUTANEOUS at 05:06

## 2024-08-03 RX ADMIN — Medication 1000 UNITS: at 05:06

## 2024-08-03 RX ADMIN — SODIUM BICARBONATE 650 MG: 650 TABLET ORAL at 05:06

## 2024-08-03 RX ADMIN — SODIUM BICARBONATE 650 MG: 650 TABLET ORAL at 17:17

## 2024-08-03 RX ADMIN — VITAM B12 100 MCG: 100 TAB at 05:06

## 2024-08-03 ASSESSMENT — ENCOUNTER SYMPTOMS
WEAKNESS: 1
COUGH: 1
NAUSEA: 0
SHORTNESS OF BREATH: 0
DIZZINESS: 0
VOMITING: 0

## 2024-08-03 ASSESSMENT — PAIN DESCRIPTION - PAIN TYPE: TYPE: ACUTE PAIN

## 2024-08-03 NOTE — DISCHARGE PLANNING
TCN following. HTH/SCP chart reviewed. No new TCN needs identified. Noted patient remains ambulatory with no AD or assist, and is currently on 1L supplemental O2.  Anticipate discharge home with home health / DME (O[facility]) if needed at time of discharge.     Completed:  PT/OT orders in chart  SLP recommends no needs - 7/30  Choice obtained: HH ([facility]) and DME ([facility])   Pt aware of [facility]'s blanket referral policy  SCP with [facility] PCP. Has f/u scheduled 8/12

## 2024-08-03 NOTE — CARE PLAN
The patient is Stable - Low risk of patient condition declining or worsening    Shift Goals  Clinical Goals: monitor neuro status, monitor BP  Patient Goals: rest, surgery  Family Goals: CASIMIRO    Progress made toward(s) clinical / shift goals:    Problem: Optimal Care of the Stroke Patient  Goal: Optimal emergency care for the stroke patient  Outcome: Progressing     Problem: Neuro Status  Goal: Neuro status will remain stable or improve  Outcome: Progressing   Patient aox4. Able to move all extremities. Denies numbness/tingling.   Problem: Hemodynamic Monitoring  Goal: Patient's hemodynamics, fluid balance and neurologic status will be stable or improve  Outcome: Progressing     Problem: Respiratory - Stroke Patient  Goal: Patient will achieve/maintain optimum respiratory rate/effort  Outcome: Progressing   Patient on 1L NC. Tolerates well.   Problem: Urinary Elimination  Goal: Establish and maintain regular urinary output  Outcome: Progressing   Patient able to void.  Problem: Bowel Elimination  Goal: Establish and maintain regular bowel function  Outcome: Progressing   Last BM 8/1.   Problem: Mobility - Stroke  Goal: Patient's capacity to carry out activities will improve  Outcome: Progressing   Patient able to be up self in room.   Problem: Fall Risk  Goal: Patient will remain free from falls  Outcome: Progressing       Patient is not progressing towards the following goals:

## 2024-08-03 NOTE — PROGRESS NOTES
Monitor Summary: SR 79-86, VA 0.11, QRS 0.07, QT 0.37, with rare PVCs, occasional and frequent PACs, and bigeminal PACs per strip from monitor room.

## 2024-08-03 NOTE — PROGRESS NOTES
Monitor Summary: SR 69-89, WI 0.12, QRS 0.08, QT 0.38, with rare PVCs and frequent PACs per strip from monitor room.

## 2024-08-03 NOTE — PROGRESS NOTES
MS: rhythm: sr/st, HR 70s-100s, HI 0.16/QRS.08/QT.36 with frequent pvc, pac per strip from monitor room

## 2024-08-03 NOTE — PROGRESS NOTES
Hospital Medicine Daily Progress Note    Date of Service  8/3/2024    Chief Complaint  Naomy Vargas is a 74 y.o. female admitted 7/29/2024 with left sided weakness    Hospital Course  Naomy Vargas is a 74 y.o. female with history of groin cellulitis, hypothyroidism, peripheral vascular disease, AAA, CKD 3, COPD, hypertension, chronic respiratory failure with hypoxia on 1 to 2 L nasal cannula, who presented 7/29/2024 with concern for transient left arm weakness .  Subsequent CT neck with greater than 70% right ICA stenosis.  Neurology evaluated and recommended right CEA.  Vascular evaluated and procedure canceled due to ongoing bacteremia.  MRI brain with tiny chronic right lateral infarct.  CT chest noted with chronic interstitial lung disease and pneumonia no acute infiltrate detected      Interval Problem Update  8/3/2024  Seen and examined at bedside  Vital remained stable  Remain afebrile  On baseline oxygen  Labs reviewed sodium 141 potassium 4, renal function stable  Repeat blood culture remain negative,      Telemetry monitoring  Continue on IV Rocephin  Continue on aspirin, Lipitor, clopidogrel  Repeat BMP in a.m. to monitor electrolytes and toxicity   Need close monitoring of blood counts, electrolytes and renal function due to potential of organ dysfunction from ongoing bacteremia  Requiring IV antibiotics, need close monitoring for toxicity.  Requiring IV narcotics for pain management, monitor for toxicity  Discussed with vascular surgery .  Plan for right CEA on Monday once.  Blood culture remain negative,  Monitor blood pressure closely    High risk of deterioration from ongoing bacteremia.  Need close monitoring of telemetry monitoring.  Risks of worsening into septic shock.  Need close blood pressure neuromonitoring.    I have discussed this patient's plan of care and discharge plan at IDT rounds today with Case Management, Nursing, Nursing leadership, and other members of the  IDT team.    Consultants/Specialty  neurology    Code Status  Full Code    Disposition  The patient is not medically cleared for discharge to home or a post-acute facility.  Anticipate discharge to: home with close outpatient follow-up    I have placed the appropriate orders for post-discharge needs.    Review of Systems  Review of Systems   Respiratory:  Positive for cough. Negative for shortness of breath.    Cardiovascular:  Negative for chest pain.   Gastrointestinal:  Negative for nausea and vomiting.   Genitourinary:  Negative for dysuria.   Neurological:  Positive for weakness. Negative for dizziness.        Physical Exam  Temp:  [35.9 °C (96.6 °F)-36.3 °C (97.4 °F)] 36.1 °C (96.9 °F)  Pulse:  [76-88] 88  Resp:  [14-18] 18  BP: (122-148)/(70-78) 131/77  SpO2:  [92 %-98 %] 92 %    Physical Exam  Constitutional:       Appearance: Normal appearance.   Cardiovascular:      Rate and Rhythm: Normal rate and regular rhythm.   Pulmonary:      Effort: Pulmonary effort is normal.   Abdominal:      General: Abdomen is flat. There is no distension.   Neurological:      Mental Status: She is alert and oriented to person, place, and time.      Comments: Motor strength 5 out of 5 all extremity, weak left hand          Fluids    Intake/Output Summary (Last 24 hours) at 8/3/2024 1549  Last data filed at 8/3/2024 1130  Gross per 24 hour   Intake 300 ml   Output --   Net 300 ml       Laboratory  Recent Labs     08/01/24  0523   WBC 6.1   RBC 3.53*   HEMOGLOBIN 10.9*   HEMATOCRIT 32.9*   MCV 93.2   MCH 30.9   MCHC 33.1   RDW 45.2   PLATELETCT 124*   MPV 10.5     Recent Labs     08/01/24  0523 08/02/24  0144 08/03/24  0129   SODIUM 137 138 141   POTASSIUM 3.3* 3.3* 4.0   CHLORIDE 106 108 111   CO2 18* 18* 20   GLUCOSE 103* 105* 96   BUN 8 8 9   CREATININE 0.87 0.97 0.89   CALCIUM 8.1* 8.3* 8.4*                       Imaging  CT-CHEST (THORAX) W/O   Final Result      1.  Findings of chronic interstitial lung disease and  pneumonia. No acute infiltrate detected.   2.  Stable moderate mediastinal adenopathy.   3.  Indeterminate fat stranding around the superior left kidney. May be further assessed with abdominal CT.      Fleischner Society pulmonary nodule recommendations:   Not Applicable         MR-BRAIN-W/O   Final Result      1.  Mild chronic microvascular ischemic type changes.   2.  Tiny chronic right parietal infarct.   3.  No acute intracranial abnormality.   4.  Paranasal sinus disease.      EC-ECHOCARDIOGRAM COMPLETE W/O CONT   Final Result      CT-CTA NECK WITH & W/O-POST PROCESSING   Final Result      1.  Severe greater than 70% right ICA origin stenosis.   2.  60-70% left ICA origin stenosis.   3.  Lung apices demonstrate emphysema. Extensive irregular interstitial opacities in the lung apices which could be chronic changes from interstitial lung disease and/or interstitial infiltrates not excluded.   4.  Moderate mediastinal and mild hilar lymphadenopathy.         CT-CTA HEAD WITH & W/O-POST PROCESS   Final Result      1.  No acute arterial abnormality. No large vessel occlusion or aneurysm detected.   2.  Diffuse paranasal sinusitis.           Assessment/Plan  * TIA (transient ischemic attack)- (present on admission)  Assessment & Plan  74-year-old female with history of peripheral vascular disease presented with transient left arm weakness, transient headache and CTA of the neck showing severe right ICA stenosis and moderate left ICA stenosis    Telemetry monitoring  Continue on aspirin, Lipitor, clopidogrel  Neurochecks every 4 hours  Echocardiogram noted with pulmonary hypertension otherwise unremarkable  PT/OT evaluation  right CEA scheduled for Monday due to ongoing bacteremia.          Bacteremia  Assessment & Plan  UA positive for nitrate, leukocyte esterase, 1 out of 2 blood culture growing gram-negative douglas.  Follow repeat blood culture  Continue IV antibiotic  Need close monitoring of blood counts,  electrolytes and renal function due to potential of organ dysfunction from ongoing bacteremia  Requiring IV antibiotics, need close monitoring for toxicity    Hypotension  Assessment & Plan  Due to ongoing bacteremia  Improved with IV antibiotics and IV fluids    Pneumonia  Assessment & Plan  Pneumonia ruled out  Currently on IV antibiotic for UTI and bacteremia        Hypokalemia  Assessment & Plan  Replace KCl  Repeat labs in a.m.    Perianal dermatitis  Assessment & Plan  Topical clotrimazole    Chronic hypoxemic respiratory failure (HCC)- (present on admission)  Assessment & Plan  Continue supplemental oxygen 1 to 2 L nasal cannula    Pulmonary hypertension (HCC)- (present on admission)  Assessment & Plan  Echo: 3/2023  Right ventricular systolic pressure is estimated to be 35 mmHg.  Repeat echo noted worsening pulmonary hypertension, RVSP 50 mmHg       Chronic obstructive pulmonary disease (HCC)- (present on admission)  Assessment & Plan  Not in exacerbation  Continue Symbicort, DuoNeb as needed      Acute kidney injury on CKD (chronic kidney disease) stage 3, GFR 30-59 ml/min- (present on admission)  Assessment & Plan  Creatinine 1.53, BUN 27   Plan: IV rehydration.  Check bladder scan to rule out retention  Avoid nephrotoxins         VTE prophylaxis: heparin sc    I have performed a physical exam and reviewed and updated ROS and Plan today (8/3/2024). In review of yesterday's note (8/2/2024), there are no changes except as documented above.

## 2024-08-04 LAB
ANION GAP SERPL CALC-SCNC: 11 MMOL/L (ref 7–16)
BACTERIA BLD CULT: NORMAL
BUN SERPL-MCNC: 9 MG/DL (ref 8–22)
CALCIUM SERPL-MCNC: 8.5 MG/DL (ref 8.5–10.5)
CHLORIDE SERPL-SCNC: 106 MMOL/L (ref 96–112)
CO2 SERPL-SCNC: 21 MMOL/L (ref 20–33)
CREAT SERPL-MCNC: 0.9 MG/DL (ref 0.5–1.4)
GFR SERPLBLD CREATININE-BSD FMLA CKD-EPI: 67 ML/MIN/1.73 M 2
GLUCOSE SERPL-MCNC: 92 MG/DL (ref 65–99)
POTASSIUM SERPL-SCNC: 3.8 MMOL/L (ref 3.6–5.5)
SIGNIFICANT IND 70042: NORMAL
SITE SITE: NORMAL
SODIUM SERPL-SCNC: 138 MMOL/L (ref 135–145)
SOURCE SOURCE: NORMAL

## 2024-08-04 PROCEDURE — 36415 COLL VENOUS BLD VENIPUNCTURE: CPT

## 2024-08-04 PROCEDURE — 700102 HCHG RX REV CODE 250 W/ 637 OVERRIDE(OP): Performed by: INTERNAL MEDICINE

## 2024-08-04 PROCEDURE — 770020 HCHG ROOM/CARE - TELE (206)

## 2024-08-04 PROCEDURE — 80048 BASIC METABOLIC PNL TOTAL CA: CPT

## 2024-08-04 PROCEDURE — 99233 SBSQ HOSP IP/OBS HIGH 50: CPT | Performed by: STUDENT IN AN ORGANIZED HEALTH CARE EDUCATION/TRAINING PROGRAM

## 2024-08-04 PROCEDURE — 700111 HCHG RX REV CODE 636 W/ 250 OVERRIDE (IP)

## 2024-08-04 PROCEDURE — 700102 HCHG RX REV CODE 250 W/ 637 OVERRIDE(OP): Performed by: STUDENT IN AN ORGANIZED HEALTH CARE EDUCATION/TRAINING PROGRAM

## 2024-08-04 PROCEDURE — 700101 HCHG RX REV CODE 250

## 2024-08-04 PROCEDURE — A9270 NON-COVERED ITEM OR SERVICE: HCPCS | Performed by: STUDENT IN AN ORGANIZED HEALTH CARE EDUCATION/TRAINING PROGRAM

## 2024-08-04 PROCEDURE — A9270 NON-COVERED ITEM OR SERVICE: HCPCS | Performed by: INTERNAL MEDICINE

## 2024-08-04 PROCEDURE — 700111 HCHG RX REV CODE 636 W/ 250 OVERRIDE (IP): Performed by: INTERNAL MEDICINE

## 2024-08-04 PROCEDURE — 700102 HCHG RX REV CODE 250 W/ 637 OVERRIDE(OP): Performed by: PSYCHIATRY & NEUROLOGY

## 2024-08-04 PROCEDURE — A9270 NON-COVERED ITEM OR SERVICE: HCPCS | Performed by: PSYCHIATRY & NEUROLOGY

## 2024-08-04 RX ADMIN — Medication 1000 UNITS: at 05:29

## 2024-08-04 RX ADMIN — CEFTRIAXONE SODIUM 2000 MG: 10 INJECTION, POWDER, FOR SOLUTION INTRAVENOUS at 05:29

## 2024-08-04 RX ADMIN — HEPARIN SODIUM 5000 UNITS: 5000 INJECTION, SOLUTION INTRAVENOUS; SUBCUTANEOUS at 05:29

## 2024-08-04 RX ADMIN — Medication 3 MG: at 20:21

## 2024-08-04 RX ADMIN — LEVOTHYROXINE SODIUM 100 MCG: 0.1 TABLET ORAL at 05:49

## 2024-08-04 RX ADMIN — HEPARIN SODIUM 5000 UNITS: 5000 INJECTION, SOLUTION INTRAVENOUS; SUBCUTANEOUS at 14:25

## 2024-08-04 RX ADMIN — VITAM B12 100 MCG: 100 TAB at 05:29

## 2024-08-04 RX ADMIN — ASPIRIN 81 MG 81 MG: 81 TABLET ORAL at 05:29

## 2024-08-04 RX ADMIN — ATORVASTATIN CALCIUM 40 MG: 40 TABLET, FILM COATED ORAL at 16:51

## 2024-08-04 RX ADMIN — CLOPIDOGREL BISULFATE 75 MG: 75 TABLET ORAL at 05:29

## 2024-08-04 RX ADMIN — HEPARIN SODIUM 5000 UNITS: 5000 INJECTION, SOLUTION INTRAVENOUS; SUBCUTANEOUS at 20:21

## 2024-08-04 RX ADMIN — SODIUM BICARBONATE 650 MG: 650 TABLET ORAL at 05:29

## 2024-08-04 RX ADMIN — SODIUM BICARBONATE 650 MG: 650 TABLET ORAL at 16:52

## 2024-08-04 ASSESSMENT — ENCOUNTER SYMPTOMS
WEAKNESS: 1
COUGH: 1
NAUSEA: 0
DIZZINESS: 0
SHORTNESS OF BREATH: 0
VOMITING: 0

## 2024-08-04 ASSESSMENT — PAIN DESCRIPTION - PAIN TYPE
TYPE: ACUTE PAIN
TYPE: ACUTE PAIN

## 2024-08-04 NOTE — PROGRESS NOTES
Monitor Summary: SR 72-87, RI 0.14, QRS 0.07, QT 0.38, with rare PVCs and frequent PACs per strip from monitor room.

## 2024-08-04 NOTE — CARE PLAN
The patient is Stable - Low risk of patient condition declining or worsening    Shift Goals  Clinical Goals: monitor neuro status, monitor BP  Patient Goals: rest, surgery  Family Goals: CASIMIRO    Problem: Respiratory - Stroke Patient  Goal: Patient will achieve/maintain optimum respiratory rate/effort  Description: Target End Date:  Prior to discharge or change in level of care    Document on Assessment flowsheet    1.  Assess and monitor respiratory rate, rhythm, depth and effort of respiration  2.  Oxygenation assessed throughout shift (recommendation of >94% for new stroke patients)  3.  Oxygen administered and/or titrated per order  4.  Collaboration with RT to administer medication/treatments per order  5.  Patient educated on importance of turning, coughing, and deep breathing  6.  Patient positioned for maximum ventilatory efficiency  7.  Airway suctioning provided as needed  8.  Incentive spirometry encouraged 5-10 times every hour or while awake  Outcome: Progressing     Problem: Mobility - Stroke  Goal: Patient's capacity to carry out activities will improve  Description: Target End Date:  Prior to discharge or change in level of care    1.  Assess for barriers to mobility/activity  2.  Implement activity per interdisciplinary team recommendations  3.  Target activity level identified and patient/family/caregiver aware of goal  4.  Provide assistive devices  5.  Instruct patient/caregiver on proper use of assistive/adaptive devices  6.  Schedule activities and rest periods to decrease effects of fatigue  7.  Encourage mobilization to extent of ability  8.  Maintain proper body alignment  9.  Provide adequate pain management to allow progressive mobilization  10. Implement pace maker precautions as needed  Outcome: Progressing

## 2024-08-04 NOTE — PROGRESS NOTES
Hospital Medicine Daily Progress Note    Date of Service  8/4/2024    Chief Complaint  Naomy Vargas is a 74 y.o. female admitted 7/29/2024 with left sided weakness    Hospital Course  Naomy Vargas is a 74 y.o. female with history of groin cellulitis, hypothyroidism, peripheral vascular disease, AAA, CKD 3, COPD, hypertension, chronic respiratory failure with hypoxia on 1 to 2 L nasal cannula, who presented 7/29/2024 with concern for transient left arm weakness .  Subsequent CT neck with greater than 70% right ICA stenosis.  Neurology evaluated and recommended right CEA.  Vascular evaluated and procedure canceled due to ongoing bacteremia.  MRI brain with tiny chronic right lateral infarct.  CT chest noted with chronic interstitial lung disease and pneumonia no acute infiltrate detected      Interval Problem Update    8/4/2024  Seen and examined at bedside  Vital remained stable  Patient remained afebrile  She denies any discomfort  Labs stable sodium 138 potassium 3.8, renal function is stable  Repeat blood culture negative    Telemetry monitoring  Continue on IV Rocephin  Continue on aspirin, Lipitor, clopidogrel  Repeat BMP in a.m. to monitor electrolytes and toxicity   Need close monitoring of blood counts, electrolytes and renal function due to potential of organ dysfunction from ongoing bacteremia  Requiring IV antibiotics, need close monitoring for toxicity.  Requiring IV narcotics for pain management, monitor for toxicity  Discussed with vascular surgery .  Plan for right CEA on Monday once.  Blood culture remain negative,  Monitor blood pressure closely    High risk of deterioration from ongoing bacteremia.  Need close monitoring of telemetry monitoring.  Risks of worsening into septic shock.  Need close blood pressure neuromonitoring.    I have discussed this patient's plan of care and discharge plan at IDT rounds today with Case Management, Nursing, Nursing leadership, and other  members of the IDT team.    Consultants/Specialty  neurology    Code Status  Full Code    Disposition  The patient is not medically cleared for discharge to home or a post-acute facility.  Anticipate discharge to: home with close outpatient follow-up    I have placed the appropriate orders for post-discharge needs.    Review of Systems  Review of Systems   Respiratory:  Positive for cough. Negative for shortness of breath.    Cardiovascular:  Negative for chest pain.   Gastrointestinal:  Negative for nausea and vomiting.   Genitourinary:  Negative for dysuria.   Neurological:  Positive for weakness. Negative for dizziness.        Physical Exam  Temp:  [35.9 °C (96.7 °F)-36.7 °C (98 °F)] 36.7 °C (98 °F)  Pulse:  [74-88] 81  Resp:  [16-18] 17  BP: (123-164)/(58-84) 164/79  SpO2:  [92 %-95 %] 95 %    Physical Exam  Constitutional:       Appearance: Normal appearance.   Cardiovascular:      Rate and Rhythm: Normal rate and regular rhythm.   Pulmonary:      Effort: Pulmonary effort is normal.   Abdominal:      General: Abdomen is flat. There is no distension.   Neurological:      Mental Status: She is alert and oriented to person, place, and time.      Comments: Motor strength 5 out of 5 all extremity, weak left hand          Fluids    Intake/Output Summary (Last 24 hours) at 8/4/2024 0711  Last data filed at 8/3/2024 1130  Gross per 24 hour   Intake 300 ml   Output --   Net 300 ml       Laboratory        Recent Labs     08/02/24  0144 08/03/24  0129   SODIUM 138 141   POTASSIUM 3.3* 4.0   CHLORIDE 108 111   CO2 18* 20   GLUCOSE 105* 96   BUN 8 9   CREATININE 0.97 0.89   CALCIUM 8.3* 8.4*                       Imaging  CT-CHEST (THORAX) W/O   Final Result      1.  Findings of chronic interstitial lung disease and pneumonia. No acute infiltrate detected.   2.  Stable moderate mediastinal adenopathy.   3.  Indeterminate fat stranding around the superior left kidney. May be further assessed with abdominal CT.       Fleischner Society pulmonary nodule recommendations:   Not Applicable         MR-BRAIN-W/O   Final Result      1.  Mild chronic microvascular ischemic type changes.   2.  Tiny chronic right parietal infarct.   3.  No acute intracranial abnormality.   4.  Paranasal sinus disease.      EC-ECHOCARDIOGRAM COMPLETE W/O CONT   Final Result      CT-CTA NECK WITH & W/O-POST PROCESSING   Final Result      1.  Severe greater than 70% right ICA origin stenosis.   2.  60-70% left ICA origin stenosis.   3.  Lung apices demonstrate emphysema. Extensive irregular interstitial opacities in the lung apices which could be chronic changes from interstitial lung disease and/or interstitial infiltrates not excluded.   4.  Moderate mediastinal and mild hilar lymphadenopathy.         CT-CTA HEAD WITH & W/O-POST PROCESS   Final Result      1.  No acute arterial abnormality. No large vessel occlusion or aneurysm detected.   2.  Diffuse paranasal sinusitis.           Assessment/Plan  * TIA (transient ischemic attack)- (present on admission)  Assessment & Plan  74-year-old female with history of peripheral vascular disease presented with transient left arm weakness, transient headache and CTA of the neck showing severe right ICA stenosis and moderate left ICA stenosis    Telemetry monitoring  Continue on aspirin, Lipitor, clopidogrel  Neurochecks every 4 hours  Echocardiogram noted with pulmonary hypertension otherwise unremarkable  PT/OT evaluation  right CEA scheduled for Monday due to ongoing bacteremia.          Bacteremia  Assessment & Plan  UA positive for nitrate, leukocyte esterase, 1 out of 2 blood culture growing gram-negative douglas.  Follow repeat blood culture  Continue IV antibiotic  Need close monitoring of blood counts, electrolytes and renal function due to potential of organ dysfunction from ongoing bacteremia  Requiring IV antibiotics, need close monitoring for toxicity    Hypotension  Assessment & Plan  Due to ongoing  bacteremia  Improved with IV antibiotics and IV fluids    Pneumonia  Assessment & Plan  Pneumonia ruled out  Currently on IV antibiotic for UTI and bacteremia        Hypokalemia  Assessment & Plan  Replace KCl  Repeat labs in a.m.    Perianal dermatitis  Assessment & Plan  Topical clotrimazole    Chronic hypoxemic respiratory failure (HCC)- (present on admission)  Assessment & Plan  Continue supplemental oxygen 1 to 2 L nasal cannula    Pulmonary hypertension (HCC)- (present on admission)  Assessment & Plan  Echo: 3/2023  Right ventricular systolic pressure is estimated to be 35 mmHg.  Repeat echo noted worsening pulmonary hypertension, RVSP 50 mmHg       Chronic obstructive pulmonary disease (HCC)- (present on admission)  Assessment & Plan  Not in exacerbation  Continue Symbicort, DuoNeb as needed      Acute kidney injury on CKD (chronic kidney disease) stage 3, GFR 30-59 ml/min- (present on admission)  Assessment & Plan  Creatinine 1.53, BUN 27   Plan: IV rehydration.  Check bladder scan to rule out retention  Avoid nephrotoxins         VTE prophylaxis: heparin sc    I have performed a physical exam and reviewed and updated ROS and Plan today (8/4/2024). In review of yesterday's note (8/3/2024), there are no changes except as documented above.

## 2024-08-04 NOTE — CARE PLAN
The patient is Stable - Low risk of patient condition declining or worsening    Shift Goals  Clinical Goals: stable neuro exams, monitor O2  Patient Goals: rest, surgery  Family Goals: n/a    Progress made toward(s) clinical / shift goals:      Problem: Neuro Status  Goal: Neuro status will remain stable or improve  Outcome: Progressing  Note: Q4h neuro checks in place.      Problem: Knowledge Deficit - Standard  Goal: Patient and family/care givers will demonstrate understanding of plan of care, disease process/condition, diagnostic tests and medications  Outcome: Progressing  Note: Pt updated on POC for the day. Plan to mobilize as able and for CEA tomorrow morning.        Patient is not progressing towards the following goals: n/a

## 2024-08-04 NOTE — CARE PLAN
The patient is Stable - Low risk of patient condition declining or worsening    Shift Goals  Clinical Goals: monitor neuro status  Patient Goals: rest, surgery  Family Goals: CASIMIRO    Progress made toward(s) clinical / shift goals:    Problem: Optimal Care of the Stroke Patient  Goal: Optimal emergency care for the stroke patient  Outcome: Progressing     Problem: Neuro Status  Goal: Neuro status will remain stable or improve  Outcome: Progressing   Patient aox4. Moves all extremities. Denies numbness/tingling.   Problem: Hemodynamic Monitoring  Goal: Patient's hemodynamics, fluid balance and neurologic status will be stable or improve  Outcome: Progressing     Problem: Urinary Elimination  Goal: Establish and maintain regular urinary output  Outcome: Progressing   Patient able to void.   Problem: Bowel Elimination  Goal: Establish and maintain regular bowel function  Outcome: Progressing   Patient last BM 8/3.   Problem: Mobility - Stroke  Goal: Patient's capacity to carry out activities will improve  Outcome: Progressing   Patient up self. Tolerates well.   Problem: Fall Risk  Goal: Patient will remain free from falls  Outcome: Progressing

## 2024-08-05 ENCOUNTER — APPOINTMENT (OUTPATIENT)
Dept: RADIOLOGY | Facility: MEDICAL CENTER | Age: 74
DRG: 038 | End: 2024-08-05
Attending: SURGERY
Payer: MEDICARE

## 2024-08-05 PROCEDURE — 700102 HCHG RX REV CODE 250 W/ 637 OVERRIDE(OP): Performed by: INTERNAL MEDICINE

## 2024-08-05 PROCEDURE — 700102 HCHG RX REV CODE 250 W/ 637 OVERRIDE(OP): Performed by: STUDENT IN AN ORGANIZED HEALTH CARE EDUCATION/TRAINING PROGRAM

## 2024-08-05 PROCEDURE — A9270 NON-COVERED ITEM OR SERVICE: HCPCS | Performed by: STUDENT IN AN ORGANIZED HEALTH CARE EDUCATION/TRAINING PROGRAM

## 2024-08-05 PROCEDURE — 700111 HCHG RX REV CODE 636 W/ 250 OVERRIDE (IP): Performed by: INTERNAL MEDICINE

## 2024-08-05 PROCEDURE — 99233 SBSQ HOSP IP/OBS HIGH 50: CPT | Performed by: STUDENT IN AN ORGANIZED HEALTH CARE EDUCATION/TRAINING PROGRAM

## 2024-08-05 PROCEDURE — 770020 HCHG ROOM/CARE - TELE (206)

## 2024-08-05 PROCEDURE — 700101 HCHG RX REV CODE 250: Performed by: STUDENT IN AN ORGANIZED HEALTH CARE EDUCATION/TRAINING PROGRAM

## 2024-08-05 PROCEDURE — 700102 HCHG RX REV CODE 250 W/ 637 OVERRIDE(OP): Performed by: PSYCHIATRY & NEUROLOGY

## 2024-08-05 PROCEDURE — 700111 HCHG RX REV CODE 636 W/ 250 OVERRIDE (IP): Performed by: STUDENT IN AN ORGANIZED HEALTH CARE EDUCATION/TRAINING PROGRAM

## 2024-08-05 PROCEDURE — A9270 NON-COVERED ITEM OR SERVICE: HCPCS | Performed by: INTERNAL MEDICINE

## 2024-08-05 PROCEDURE — A9270 NON-COVERED ITEM OR SERVICE: HCPCS | Performed by: PSYCHIATRY & NEUROLOGY

## 2024-08-05 RX ORDER — BENZOCAINE/MENTHOL 6 MG-10 MG
LOZENGE MUCOUS MEMBRANE 2 TIMES DAILY
Status: DISCONTINUED | OUTPATIENT
Start: 2024-08-05 | End: 2024-08-08 | Stop reason: HOSPADM

## 2024-08-05 RX ADMIN — HYDROCORTISONE: 0.01 CREAM TOPICAL at 17:06

## 2024-08-05 RX ADMIN — VITAM B12 100 MCG: 100 TAB at 05:30

## 2024-08-05 RX ADMIN — Medication 3 MG: at 21:20

## 2024-08-05 RX ADMIN — SODIUM BICARBONATE 650 MG: 650 TABLET ORAL at 05:30

## 2024-08-05 RX ADMIN — LEVOTHYROXINE SODIUM 100 MCG: 0.1 TABLET ORAL at 06:41

## 2024-08-05 RX ADMIN — Medication 1000 UNITS: at 05:30

## 2024-08-05 RX ADMIN — CLOPIDOGREL BISULFATE 75 MG: 75 TABLET ORAL at 05:30

## 2024-08-05 RX ADMIN — ASPIRIN 81 MG 81 MG: 81 TABLET ORAL at 05:30

## 2024-08-05 RX ADMIN — ATORVASTATIN CALCIUM 40 MG: 40 TABLET, FILM COATED ORAL at 17:06

## 2024-08-05 RX ADMIN — SODIUM BICARBONATE 650 MG: 650 TABLET ORAL at 17:06

## 2024-08-05 RX ADMIN — HEPARIN SODIUM 5000 UNITS: 5000 INJECTION, SOLUTION INTRAVENOUS; SUBCUTANEOUS at 21:20

## 2024-08-05 RX ADMIN — CEFTRIAXONE SODIUM 2000 MG: 10 INJECTION, POWDER, FOR SOLUTION INTRAVENOUS at 05:30

## 2024-08-05 RX ADMIN — HEPARIN SODIUM 5000 UNITS: 5000 INJECTION, SOLUTION INTRAVENOUS; SUBCUTANEOUS at 05:30

## 2024-08-05 ASSESSMENT — ENCOUNTER SYMPTOMS
WEAKNESS: 1
DIZZINESS: 0
VOMITING: 0
COUGH: 1
SHORTNESS OF BREATH: 0
NAUSEA: 0

## 2024-08-05 ASSESSMENT — FIBROSIS 4 INDEX: FIB4 SCORE: 3.82

## 2024-08-05 NOTE — THERAPY
Physical Therapy Contact Note    Patient Name: Naomy Vargas  Age:  74 y.o., Sex:  female  Medical Record #: [id number]  Today's Date: 8/5/2024 08/05/24 0825   Interdisciplinary Plan of Care Collaboration   Collaboration Comments Pt's sx is rescheduled for tomorrow. Will eval after surgery as appropriate.   Session Information   Date / Session Number  8/5 hold (eval)

## 2024-08-05 NOTE — PROGRESS NOTES
VASCULAR SURGERY SERVICE                        Progress Note  _________________________________    Was planning for OR today for right carotid endarterectomy and retrograde right carotid stent.  Tentatively around 1030    Unfortunately neuromonitoring services just contacted the OR and said they no longer have enough staff to accommodate the procedure today.      Surgery has been rescheduled to tomorrow at 1230    Diet restarted. NPO except meds again at midnight    I will discuss with the patient shortly.    Valerio Cortés MD  [facility]   [facility] preferred or call my office [phone]  __________________________________________________  Patient:Naomy Vargas   MRN:[id number]

## 2024-08-05 NOTE — PROGRESS NOTES
Monitor summary: SR 75-83, NE 0.13, QRS 0.08, QT 0.39, with rare PVCs and PACs per strip from monitor room.

## 2024-08-05 NOTE — PROGRESS NOTES
Monitor Summary: SR 72-82, MS .14, QRS .06, QT .38 with PVC,PAC per strip from monitor room

## 2024-08-05 NOTE — THERAPY
Speech Language Therapy Contact Note    Patient Name: Naomy Vargas  Age:  74 y.o., Sex:  female  Medical Record #: [id number]  Today's Date: 8/5/2024 08/05/24 0806   Treatment Variance   Reason For Missed Therapy Medical - Patient  in Procedure   Initial Contact Note    Initial Contact Note  Order Received and Verified, Speech Therapy Evaluation in Progress with Full Report to Follow.   Interdisciplinary Plan of Care Collaboration   IDT Collaboration with  Other (See Comments)   Collaboration Comments Per EMR, pt currently awaiting procedure for right carotid endarterectomy and retrograde right carotid stent. SLP to follow for a cognitive-linguistic evaluation after procedure.

## 2024-08-05 NOTE — PROGRESS NOTES
Hospital Medicine Daily Progress Note    Date of Service  8/5/2024    Chief Complaint  Naomy Vargas is a 74 y.o. female admitted 7/29/2024 with left sided weakness    Hospital Course  Naomy Vargas is a 74 y.o. female with history of groin cellulitis, hypothyroidism, peripheral vascular disease, AAA, CKD 3, COPD, hypertension, chronic respiratory failure with hypoxia on 1 to 2 L nasal cannula, who presented 7/29/2024 with concern for transient left arm weakness .  Subsequent CT neck with greater than 70% right ICA stenosis.  Neurology evaluated and recommended right CEA.  Vascular evaluated and procedure canceled due to ongoing bacteremia.  MRI brain with tiny chronic right lateral infarct.  CT chest noted with chronic interstitial lung disease and pneumonia no acute infiltrate detected.  Vascular surgery planning for right CEA      Interval Problem Update    8/5/2024  Seen and examined at bedside  Vitals remained stable  Labs reviewed, blood culture growing E. coli, pansensitive, urine culture growing E. coli, repeat blood culture negative to date        Telemetry monitoring  Continue on IV Rocephin  Continue on aspirin, Lipitor, clopidogrel  Repeat BMP in a.m. to monitor electrolytes and toxicity   Need close monitoring of blood counts, electrolytes and renal function due to potential of organ dysfunction from ongoing bacteremia  Requiring IV antibiotics, need close monitoring for toxicity.  Requiring IV narcotics for pain management, monitor for toxicity  Discussed with vascular surgery .  Plan for right CEA on tomorrow.    High risk of deterioration from ongoing bacteremia.  Need close monitoring of telemetry monitoring.  Risks of worsening into septic shock.  Need close blood pressure neuromonitoring.    I have discussed this patient's plan of care and discharge plan at IDT rounds today with Case Management, Nursing, Nursing leadership, and other members of the IDT  team.    Consultants/Specialty  neurology    Code Status  Full Code    Disposition  The patient is not medically cleared for discharge to home or a post-acute facility.  Anticipate discharge to: home with close outpatient follow-up    I have placed the appropriate orders for post-discharge needs.    Review of Systems  Review of Systems   Respiratory:  Positive for cough. Negative for shortness of breath.    Cardiovascular:  Negative for chest pain.   Gastrointestinal:  Negative for nausea and vomiting.   Genitourinary:  Negative for dysuria.   Neurological:  Positive for weakness. Negative for dizziness.        Physical Exam  Temp:  [36 °C (96.8 °F)-37.1 °C (98.8 °F)] 36 °C (96.8 °F)  Pulse:  [72-85] 72  Resp:  [16-18] 18  BP: (128-163)/(64-80) 163/80  SpO2:  [93 %-96 %] 94 %    Physical Exam  Constitutional:       Appearance: Normal appearance.   Cardiovascular:      Rate and Rhythm: Normal rate and regular rhythm.   Pulmonary:      Effort: Pulmonary effort is normal.   Abdominal:      General: Abdomen is flat. There is no distension.   Neurological:      Mental Status: She is alert and oriented to person, place, and time.      Comments: Motor strength 5 out of 5 all extremity, weak left hand          Fluids    Intake/Output Summary (Last 24 hours) at 8/5/2024 0946  Last data filed at 8/4/2024 1027  Gross per 24 hour   Intake 480 ml   Output --   Net 480 ml       Laboratory        Recent Labs     08/03/24  0129 08/04/24  0712   SODIUM 141 138   POTASSIUM 4.0 3.8   CHLORIDE 111 106   CO2 20 21   GLUCOSE 96 92   BUN 9 9   CREATININE 0.89 0.90   CALCIUM 8.4* 8.5                       Imaging  CT-CHEST (THORAX) W/O   Final Result      1.  Findings of chronic interstitial lung disease and pneumonia. No acute infiltrate detected.   2.  Stable moderate mediastinal adenopathy.   3.  Indeterminate fat stranding around the superior left kidney. May be further assessed with abdominal CT.      Fleischner Society pulmonary  nodule recommendations:   Not Applicable         MR-BRAIN-W/O   Final Result      1.  Mild chronic microvascular ischemic type changes.   2.  Tiny chronic right parietal infarct.   3.  No acute intracranial abnormality.   4.  Paranasal sinus disease.      EC-ECHOCARDIOGRAM COMPLETE W/O CONT   Final Result      CT-CTA NECK WITH & W/O-POST PROCESSING   Final Result      1.  Severe greater than 70% right ICA origin stenosis.   2.  60-70% left ICA origin stenosis.   3.  Lung apices demonstrate emphysema. Extensive irregular interstitial opacities in the lung apices which could be chronic changes from interstitial lung disease and/or interstitial infiltrates not excluded.   4.  Moderate mediastinal and mild hilar lymphadenopathy.         CT-CTA HEAD WITH & W/O-POST PROCESS   Final Result      1.  No acute arterial abnormality. No large vessel occlusion or aneurysm detected.   2.  Diffuse paranasal sinusitis.      DX-PORTABLE FLUORO > 1 HOUR    (Results Pending)   DX-NECK FOR SOFT TISSUE    (Results Pending)        Assessment/Plan  * TIA (transient ischemic attack)- (present on admission)  Assessment & Plan  74-year-old female with history of peripheral vascular disease presented with transient left arm weakness, transient headache and CTA of the neck showing severe right ICA stenosis and moderate left ICA stenosis    Telemetry monitoring  Continue on aspirin, Lipitor, clopidogrel  Neurochecks every 4 hours  Echocardiogram noted with pulmonary hypertension otherwise unremarkable  PT/OT evaluation  Plan for right CEA by vascular surgery          Bacteremia  Assessment & Plan  UA positive for nitrate, leukocyte esterase, 1 out of 2 blood culture growing gram-negative douglas.  Follow repeat blood culture  Continue IV antibiotic  Need close monitoring of blood counts, electrolytes and renal function due to potential of organ dysfunction from ongoing bacteremia  Requiring IV antibiotics, need close monitoring for  toxicity    Hypotension  Assessment & Plan  Due to ongoing bacteremia  Improved with IV antibiotics and IV fluids    Pneumonia  Assessment & Plan  Pneumonia ruled out  Currently on IV antibiotic for UTI and bacteremia        Hypokalemia  Assessment & Plan  Replace KCl  Repeat labs in a.m.    Perianal dermatitis  Assessment & Plan  Topical clotrimazole    Chronic hypoxemic respiratory failure (HCC)- (present on admission)  Assessment & Plan  Continue supplemental oxygen 1 to 2 L nasal cannula    Pulmonary hypertension (HCC)- (present on admission)  Assessment & Plan  Echo: 3/2023  Right ventricular systolic pressure is estimated to be 35 mmHg.  Repeat echo noted worsening pulmonary hypertension, RVSP 50 mmHg       Chronic obstructive pulmonary disease (HCC)- (present on admission)  Assessment & Plan  Not in exacerbation  Continue Symbicort, DuoNeb as needed      Acute kidney injury on CKD (chronic kidney disease) stage 3, GFR 30-59 ml/min- (present on admission)  Assessment & Plan  DARRIN improved  Continue to monitor on IV antibiotics         VTE prophylaxis: heparin sc    I have performed a physical exam and reviewed and updated ROS and Plan today (8/5/2024). In review of yesterday's note (8/4/2024), there are no changes except as documented above.

## 2024-08-06 ENCOUNTER — ANESTHESIA EVENT (OUTPATIENT)
Dept: SURGERY | Facility: MEDICAL CENTER | Age: 74
DRG: 038 | End: 2024-08-06
Payer: MEDICARE

## 2024-08-06 ENCOUNTER — APPOINTMENT (OUTPATIENT)
Dept: RADIOLOGY | Facility: MEDICAL CENTER | Age: 74
DRG: 038 | End: 2024-08-06
Attending: SURGERY
Payer: MEDICARE

## 2024-08-06 ENCOUNTER — ANESTHESIA (OUTPATIENT)
Dept: SURGERY | Facility: MEDICAL CENTER | Age: 74
DRG: 038 | End: 2024-08-06
Payer: MEDICARE

## 2024-08-06 LAB
ANION GAP SERPL CALC-SCNC: 15 MMOL/L (ref 7–16)
BACTERIA BLD CULT: NORMAL
BACTERIA BLD CULT: NORMAL
BUN SERPL-MCNC: 10 MG/DL (ref 8–22)
CALCIUM SERPL-MCNC: 8.6 MG/DL (ref 8.5–10.5)
CHLORIDE SERPL-SCNC: 105 MMOL/L (ref 96–112)
CO2 SERPL-SCNC: 19 MMOL/L (ref 20–33)
CREAT SERPL-MCNC: 0.86 MG/DL (ref 0.5–1.4)
GFR SERPLBLD CREATININE-BSD FMLA CKD-EPI: 71 ML/MIN/1.73 M 2
GLUCOSE SERPL-MCNC: 96 MG/DL (ref 65–99)
POTASSIUM SERPL-SCNC: 3.8 MMOL/L (ref 3.6–5.5)
SIGNIFICANT IND 70042: NORMAL
SIGNIFICANT IND 70042: NORMAL
SITE SITE: NORMAL
SITE SITE: NORMAL
SODIUM SERPL-SCNC: 139 MMOL/L (ref 135–145)
SOURCE SOURCE: NORMAL
SOURCE SOURCE: NORMAL

## 2024-08-06 PROCEDURE — 700105 HCHG RX REV CODE 258: Performed by: ANESTHESIOLOGY

## 2024-08-06 PROCEDURE — C1894 INTRO/SHEATH, NON-LASER: HCPCS | Performed by: SURGERY

## 2024-08-06 PROCEDURE — 36415 COLL VENOUS BLD VENIPUNCTURE: CPT

## 2024-08-06 PROCEDURE — 160002 HCHG RECOVERY MINUTES (STAT): Performed by: SURGERY

## 2024-08-06 PROCEDURE — 160036 HCHG PACU - EA ADDL 30 MINS PHASE I: Performed by: SURGERY

## 2024-08-06 PROCEDURE — A9270 NON-COVERED ITEM OR SERVICE: HCPCS | Performed by: STUDENT IN AN ORGANIZED HEALTH CARE EDUCATION/TRAINING PROGRAM

## 2024-08-06 PROCEDURE — 700101 HCHG RX REV CODE 250: Performed by: SURGERY

## 2024-08-06 PROCEDURE — C1751 CATH, INF, PER/CENT/MIDLINE: HCPCS | Performed by: SURGERY

## 2024-08-06 PROCEDURE — 700102 HCHG RX REV CODE 250 W/ 637 OVERRIDE(OP): Performed by: STUDENT IN AN ORGANIZED HEALTH CARE EDUCATION/TRAINING PROGRAM

## 2024-08-06 PROCEDURE — 700117 HCHG RX CONTRAST REV CODE 255: Performed by: SURGERY

## 2024-08-06 PROCEDURE — A9270 NON-COVERED ITEM OR SERVICE: HCPCS | Performed by: SURGERY

## 2024-08-06 PROCEDURE — 700101 HCHG RX REV CODE 250: Performed by: ANESTHESIOLOGY

## 2024-08-06 PROCEDURE — 80048 BASIC METABOLIC PNL TOTAL CA: CPT

## 2024-08-06 PROCEDURE — 03CK0ZZ EXTIRPATION OF MATTER FROM RIGHT INTERNAL CAROTID ARTERY, OPEN APPROACH: ICD-10-PCS | Performed by: SURGERY

## 2024-08-06 PROCEDURE — 95940 IONM IN OPERATNG ROOM 15 MIN: CPT | Performed by: SURGERY

## 2024-08-06 PROCEDURE — 700102 HCHG RX REV CODE 250 W/ 637 OVERRIDE(OP): Performed by: SURGERY

## 2024-08-06 PROCEDURE — 160041 HCHG SURGERY MINUTES - EA ADDL 1 MIN LEVEL 4: Performed by: SURGERY

## 2024-08-06 PROCEDURE — 110371 HCHG SHELL REV 272: Performed by: SURGERY

## 2024-08-06 PROCEDURE — 700105 HCHG RX REV CODE 258: Performed by: SURGERY

## 2024-08-06 PROCEDURE — 110454 HCHG SHELL REV 250: Performed by: SURGERY

## 2024-08-06 PROCEDURE — 99233 SBSQ HOSP IP/OBS HIGH 50: CPT | Performed by: STUDENT IN AN ORGANIZED HEALTH CARE EDUCATION/TRAINING PROGRAM

## 2024-08-06 PROCEDURE — 95955 EEG DURING SURGERY: CPT | Performed by: SURGERY

## 2024-08-06 PROCEDURE — 700111 HCHG RX REV CODE 636 W/ 250 OVERRIDE (IP): Performed by: STUDENT IN AN ORGANIZED HEALTH CARE EDUCATION/TRAINING PROGRAM

## 2024-08-06 PROCEDURE — A9270 NON-COVERED ITEM OR SERVICE: HCPCS | Performed by: INTERNAL MEDICINE

## 2024-08-06 PROCEDURE — 700102 HCHG RX REV CODE 250 W/ 637 OVERRIDE(OP): Performed by: INTERNAL MEDICINE

## 2024-08-06 PROCEDURE — 160035 HCHG PACU - 1ST 60 MINS PHASE I: Performed by: SURGERY

## 2024-08-06 PROCEDURE — 700111 HCHG RX REV CODE 636 W/ 250 OVERRIDE (IP): Performed by: ANESTHESIOLOGY

## 2024-08-06 PROCEDURE — 770020 HCHG ROOM/CARE - TELE (206)

## 2024-08-06 PROCEDURE — 700102 HCHG RX REV CODE 250 W/ 637 OVERRIDE(OP): Performed by: PSYCHIATRY & NEUROLOGY

## 2024-08-06 PROCEDURE — A9270 NON-COVERED ITEM OR SERVICE: HCPCS | Performed by: PSYCHIATRY & NEUROLOGY

## 2024-08-06 PROCEDURE — 160029 HCHG SURGERY MINUTES - 1ST 30 MINS LEVEL 4: Performed by: SURGERY

## 2024-08-06 PROCEDURE — 160048 HCHG OR STATISTICAL LEVEL 1-5: Performed by: SURGERY

## 2024-08-06 PROCEDURE — 75774 ARTERY X-RAY EACH VESSEL: CPT | Mod: RT

## 2024-08-06 PROCEDURE — 700111 HCHG RX REV CODE 636 W/ 250 OVERRIDE (IP): Performed by: INTERNAL MEDICINE

## 2024-08-06 PROCEDURE — 700101 HCHG RX REV CODE 250: Performed by: STUDENT IN AN ORGANIZED HEALTH CARE EDUCATION/TRAINING PROGRAM

## 2024-08-06 PROCEDURE — 160009 HCHG ANES TIME/MIN: Performed by: SURGERY

## 2024-08-06 PROCEDURE — 700111 HCHG RX REV CODE 636 W/ 250 OVERRIDE (IP): Mod: JZ | Performed by: SURGERY

## 2024-08-06 PROCEDURE — 35301 RECHANNELING OF ARTERY: CPT | Mod: RT | Performed by: SURGERY

## 2024-08-06 PROCEDURE — 95938 SOMATOSENSORY TESTING: CPT | Performed by: SURGERY

## 2024-08-06 RX ORDER — SODIUM CHLORIDE, SODIUM LACTATE, POTASSIUM CHLORIDE, CALCIUM CHLORIDE 600; 310; 30; 20 MG/100ML; MG/100ML; MG/100ML; MG/100ML
INJECTION, SOLUTION INTRAVENOUS
Status: DISCONTINUED | OUTPATIENT
Start: 2024-08-06 | End: 2024-08-06 | Stop reason: SURG

## 2024-08-06 RX ORDER — HYDROMORPHONE HYDROCHLORIDE 1 MG/ML
0.2 INJECTION, SOLUTION INTRAMUSCULAR; INTRAVENOUS; SUBCUTANEOUS
Status: DISCONTINUED | OUTPATIENT
Start: 2024-08-06 | End: 2024-08-06 | Stop reason: HOSPADM

## 2024-08-06 RX ORDER — SODIUM CHLORIDE, SODIUM GLUCONATE, SODIUM ACETATE, POTASSIUM CHLORIDE AND MAGNESIUM CHLORIDE 526; 502; 368; 37; 30 MG/100ML; MG/100ML; MG/100ML; MG/100ML; MG/100ML
INJECTION, SOLUTION INTRAVENOUS
Status: DISCONTINUED | OUTPATIENT
Start: 2024-08-06 | End: 2024-08-06 | Stop reason: SURG

## 2024-08-06 RX ORDER — ONDANSETRON 2 MG/ML
INJECTION INTRAMUSCULAR; INTRAVENOUS PRN
Status: DISCONTINUED | OUTPATIENT
Start: 2024-08-06 | End: 2024-08-06 | Stop reason: SURG

## 2024-08-06 RX ORDER — HYDRALAZINE HYDROCHLORIDE 20 MG/ML
5 INJECTION INTRAMUSCULAR; INTRAVENOUS
Status: DISCONTINUED | OUTPATIENT
Start: 2024-08-06 | End: 2024-08-06 | Stop reason: HOSPADM

## 2024-08-06 RX ORDER — ACETAMINOPHEN 500 MG
1000 TABLET ORAL EVERY 6 HOURS PRN
Status: DISCONTINUED | OUTPATIENT
Start: 2024-08-12 | End: 2024-08-08 | Stop reason: HOSPADM

## 2024-08-06 RX ORDER — KETOROLAC TROMETHAMINE 15 MG/ML
15 INJECTION, SOLUTION INTRAMUSCULAR; INTRAVENOUS EVERY 6 HOURS
Status: DISCONTINUED | OUTPATIENT
Start: 2024-08-07 | End: 2024-08-07

## 2024-08-06 RX ORDER — SODIUM CHLORIDE, SODIUM LACTATE, POTASSIUM CHLORIDE, CALCIUM CHLORIDE 600; 310; 30; 20 MG/100ML; MG/100ML; MG/100ML; MG/100ML
INJECTION, SOLUTION INTRAVENOUS CONTINUOUS
Status: DISCONTINUED | OUTPATIENT
Start: 2024-08-06 | End: 2024-08-06 | Stop reason: HOSPADM

## 2024-08-06 RX ORDER — DEXAMETHASONE SODIUM PHOSPHATE 4 MG/ML
INJECTION, SOLUTION INTRA-ARTICULAR; INTRALESIONAL; INTRAMUSCULAR; INTRAVENOUS; SOFT TISSUE PRN
Status: DISCONTINUED | OUTPATIENT
Start: 2024-08-06 | End: 2024-08-06 | Stop reason: SURG

## 2024-08-06 RX ORDER — DIPHENHYDRAMINE HYDROCHLORIDE 50 MG/ML
25 INJECTION INTRAMUSCULAR; INTRAVENOUS EVERY 6 HOURS PRN
Status: DISCONTINUED | OUTPATIENT
Start: 2024-08-06 | End: 2024-08-08 | Stop reason: HOSPADM

## 2024-08-06 RX ORDER — OXYCODONE HYDROCHLORIDE 5 MG/1
2.5 TABLET ORAL
Status: DISCONTINUED | OUTPATIENT
Start: 2024-08-06 | End: 2024-08-08 | Stop reason: HOSPADM

## 2024-08-06 RX ORDER — ONDANSETRON 2 MG/ML
4 INJECTION INTRAMUSCULAR; INTRAVENOUS
Status: DISCONTINUED | OUTPATIENT
Start: 2024-08-06 | End: 2024-08-06 | Stop reason: HOSPADM

## 2024-08-06 RX ORDER — EPHEDRINE SULFATE 50 MG/ML
5 INJECTION, SOLUTION INTRAVENOUS
Status: DISCONTINUED | OUTPATIENT
Start: 2024-08-06 | End: 2024-08-06 | Stop reason: HOSPADM

## 2024-08-06 RX ORDER — ROCURONIUM BROMIDE 10 MG/ML
INJECTION, SOLUTION INTRAVENOUS PRN
Status: DISCONTINUED | OUTPATIENT
Start: 2024-08-06 | End: 2024-08-06 | Stop reason: SURG

## 2024-08-06 RX ORDER — DEXAMETHASONE SODIUM PHOSPHATE 4 MG/ML
4 INJECTION, SOLUTION INTRA-ARTICULAR; INTRALESIONAL; INTRAMUSCULAR; INTRAVENOUS; SOFT TISSUE
Status: DISCONTINUED | OUTPATIENT
Start: 2024-08-06 | End: 2024-08-08 | Stop reason: HOSPADM

## 2024-08-06 RX ORDER — BUPIVACAINE HYDROCHLORIDE AND EPINEPHRINE 5; 5 MG/ML; UG/ML
INJECTION, SOLUTION EPIDURAL; INTRACAUDAL; PERINEURAL
Status: DISCONTINUED | OUTPATIENT
Start: 2024-08-06 | End: 2024-08-06 | Stop reason: HOSPADM

## 2024-08-06 RX ORDER — HEPARIN SODIUM 1000 [USP'U]/ML
INJECTION, SOLUTION INTRAVENOUS; SUBCUTANEOUS PRN
Status: DISCONTINUED | OUTPATIENT
Start: 2024-08-06 | End: 2024-08-06 | Stop reason: SURG

## 2024-08-06 RX ORDER — SCOLOPAMINE TRANSDERMAL SYSTEM 1 MG/1
1 PATCH, EXTENDED RELEASE TRANSDERMAL
Status: DISCONTINUED | OUTPATIENT
Start: 2024-08-06 | End: 2024-08-08 | Stop reason: HOSPADM

## 2024-08-06 RX ORDER — MIDAZOLAM HYDROCHLORIDE 1 MG/ML
INJECTION INTRAMUSCULAR; INTRAVENOUS PRN
Status: DISCONTINUED | OUTPATIENT
Start: 2024-08-06 | End: 2024-08-06 | Stop reason: SURG

## 2024-08-06 RX ORDER — HALOPERIDOL 5 MG/ML
1 INJECTION INTRAMUSCULAR
Status: DISCONTINUED | OUTPATIENT
Start: 2024-08-06 | End: 2024-08-06 | Stop reason: HOSPADM

## 2024-08-06 RX ORDER — MORPHINE SULFATE 4 MG/ML
2 INJECTION INTRAVENOUS
Status: DISCONTINUED | OUTPATIENT
Start: 2024-08-06 | End: 2024-08-08 | Stop reason: HOSPADM

## 2024-08-06 RX ORDER — HYDRALAZINE HYDROCHLORIDE 20 MG/ML
10 INJECTION INTRAMUSCULAR; INTRAVENOUS EVERY 4 HOURS PRN
Status: DISCONTINUED | OUTPATIENT
Start: 2024-08-06 | End: 2024-08-08 | Stop reason: HOSPADM

## 2024-08-06 RX ORDER — LIDOCAINE HYDROCHLORIDE 40 MG/ML
SOLUTION TOPICAL PRN
Status: DISCONTINUED | OUTPATIENT
Start: 2024-08-06 | End: 2024-08-06 | Stop reason: SURG

## 2024-08-06 RX ORDER — HALOPERIDOL 5 MG/ML
1 INJECTION INTRAMUSCULAR EVERY 6 HOURS PRN
Status: DISCONTINUED | OUTPATIENT
Start: 2024-08-06 | End: 2024-08-08 | Stop reason: HOSPADM

## 2024-08-06 RX ORDER — ONDANSETRON 2 MG/ML
4 INJECTION INTRAMUSCULAR; INTRAVENOUS EVERY 4 HOURS PRN
Status: DISCONTINUED | OUTPATIENT
Start: 2024-08-06 | End: 2024-08-08 | Stop reason: HOSPADM

## 2024-08-06 RX ORDER — METOPROLOL TARTRATE 1 MG/ML
1 INJECTION, SOLUTION INTRAVENOUS
Status: DISCONTINUED | OUTPATIENT
Start: 2024-08-06 | End: 2024-08-06 | Stop reason: HOSPADM

## 2024-08-06 RX ORDER — IODIXANOL 270 MG/ML
INJECTION, SOLUTION INTRAVASCULAR
Status: DISCONTINUED | OUTPATIENT
Start: 2024-08-06 | End: 2024-08-06 | Stop reason: HOSPADM

## 2024-08-06 RX ORDER — CLONIDINE HYDROCHLORIDE 0.1 MG/1
0.2 TABLET ORAL
Status: DISCONTINUED | OUTPATIENT
Start: 2024-08-06 | End: 2024-08-08 | Stop reason: HOSPADM

## 2024-08-06 RX ORDER — PHENYLEPHRINE HYDROCHLORIDE 10 MG/ML
INJECTION, SOLUTION INTRAMUSCULAR; INTRAVENOUS; SUBCUTANEOUS PRN
Status: DISCONTINUED | OUTPATIENT
Start: 2024-08-06 | End: 2024-08-06 | Stop reason: SURG

## 2024-08-06 RX ORDER — OXYCODONE HYDROCHLORIDE 5 MG/1
5 TABLET ORAL
Status: DISCONTINUED | OUTPATIENT
Start: 2024-08-06 | End: 2024-08-08 | Stop reason: HOSPADM

## 2024-08-06 RX ORDER — CLONIDINE HYDROCHLORIDE 0.1 MG/1
0.1 TABLET ORAL
Status: DISCONTINUED | OUTPATIENT
Start: 2024-08-06 | End: 2024-08-08 | Stop reason: HOSPADM

## 2024-08-06 RX ORDER — IBUPROFEN 800 MG/1
800 TABLET, FILM COATED ORAL 3 TIMES DAILY PRN
Status: DISCONTINUED | OUTPATIENT
Start: 2024-08-10 | End: 2024-08-07

## 2024-08-06 RX ORDER — OXYCODONE HCL 5 MG/5 ML
5 SOLUTION, ORAL ORAL
Status: DISCONTINUED | OUTPATIENT
Start: 2024-08-06 | End: 2024-08-06 | Stop reason: HOSPADM

## 2024-08-06 RX ORDER — ALBUTEROL SULFATE 5 MG/ML
2.5 SOLUTION RESPIRATORY (INHALATION)
Status: DISCONTINUED | OUTPATIENT
Start: 2024-08-06 | End: 2024-08-06 | Stop reason: HOSPADM

## 2024-08-06 RX ORDER — CEFAZOLIN SODIUM 1 G/3ML
INJECTION, POWDER, FOR SOLUTION INTRAMUSCULAR; INTRAVENOUS PRN
Status: DISCONTINUED | OUTPATIENT
Start: 2024-08-06 | End: 2024-08-06 | Stop reason: SURG

## 2024-08-06 RX ORDER — OXYCODONE HCL 5 MG/5 ML
10 SOLUTION, ORAL ORAL
Status: DISCONTINUED | OUTPATIENT
Start: 2024-08-06 | End: 2024-08-06 | Stop reason: HOSPADM

## 2024-08-06 RX ORDER — ACETAMINOPHEN 500 MG
1000 TABLET ORAL EVERY 6 HOURS
Status: DISCONTINUED | OUTPATIENT
Start: 2024-08-06 | End: 2024-08-08 | Stop reason: HOSPADM

## 2024-08-06 RX ORDER — HYDROMORPHONE HYDROCHLORIDE 1 MG/ML
0.1 INJECTION, SOLUTION INTRAMUSCULAR; INTRAVENOUS; SUBCUTANEOUS
Status: DISCONTINUED | OUTPATIENT
Start: 2024-08-06 | End: 2024-08-06 | Stop reason: HOSPADM

## 2024-08-06 RX ORDER — SODIUM CHLORIDE 9 MG/ML
INJECTION, SOLUTION INTRAVENOUS CONTINUOUS
Status: ACTIVE | OUTPATIENT
Start: 2024-08-06 | End: 2024-08-07

## 2024-08-06 RX ORDER — GLYCOPYRROLATE 0.2 MG/ML
INJECTION INTRAMUSCULAR; INTRAVENOUS PRN
Status: DISCONTINUED | OUTPATIENT
Start: 2024-08-06 | End: 2024-08-06 | Stop reason: SURG

## 2024-08-06 RX ORDER — LIDOCAINE HYDROCHLORIDE 20 MG/ML
INJECTION, SOLUTION EPIDURAL; INFILTRATION; INTRACAUDAL; PERINEURAL PRN
Status: DISCONTINUED | OUTPATIENT
Start: 2024-08-06 | End: 2024-08-06 | Stop reason: SURG

## 2024-08-06 RX ORDER — HYDROMORPHONE HYDROCHLORIDE 1 MG/ML
0.4 INJECTION, SOLUTION INTRAMUSCULAR; INTRAVENOUS; SUBCUTANEOUS
Status: DISCONTINUED | OUTPATIENT
Start: 2024-08-06 | End: 2024-08-06 | Stop reason: HOSPADM

## 2024-08-06 RX ORDER — DIPHENHYDRAMINE HYDROCHLORIDE 50 MG/ML
12.5 INJECTION INTRAMUSCULAR; INTRAVENOUS
Status: DISCONTINUED | OUTPATIENT
Start: 2024-08-06 | End: 2024-08-06 | Stop reason: HOSPADM

## 2024-08-06 RX ORDER — LABETALOL HYDROCHLORIDE 5 MG/ML
10 INJECTION, SOLUTION INTRAVENOUS EVERY 4 HOURS PRN
Status: DISCONTINUED | OUTPATIENT
Start: 2024-08-06 | End: 2024-08-08 | Stop reason: HOSPADM

## 2024-08-06 RX ORDER — PROTAMINE SULFATE 10 MG/ML
INJECTION, SOLUTION INTRAVENOUS PRN
Status: DISCONTINUED | OUTPATIENT
Start: 2024-08-06 | End: 2024-08-06 | Stop reason: SURG

## 2024-08-06 RX ADMIN — GLYCOPYRROLATE 0.2 MG: 0.2 INJECTION INTRAMUSCULAR; INTRAVENOUS at 17:19

## 2024-08-06 RX ADMIN — HYDROCORTISONE: 0.01 CREAM TOPICAL at 05:42

## 2024-08-06 RX ADMIN — Medication 3 MG: at 23:37

## 2024-08-06 RX ADMIN — CEFTRIAXONE SODIUM 2000 MG: 10 INJECTION, POWDER, FOR SOLUTION INTRAVENOUS at 05:50

## 2024-08-06 RX ADMIN — MIDAZOLAM HYDROCHLORIDE 2 MG: 2 INJECTION, SOLUTION INTRAMUSCULAR; INTRAVENOUS at 16:48

## 2024-08-06 RX ADMIN — ROCURONIUM BROMIDE 10 MG: 50 INJECTION, SOLUTION INTRAVENOUS at 17:43

## 2024-08-06 RX ADMIN — SODIUM BICARBONATE 650 MG: 650 TABLET ORAL at 05:42

## 2024-08-06 RX ADMIN — Medication 1000 UNITS: at 05:42

## 2024-08-06 RX ADMIN — HEPARIN SODIUM 5000 UNITS: 5000 INJECTION, SOLUTION INTRAVENOUS; SUBCUTANEOUS at 05:42

## 2024-08-06 RX ADMIN — FENTANYL CITRATE 25 MCG: 50 INJECTION, SOLUTION INTRAMUSCULAR; INTRAVENOUS at 18:06

## 2024-08-06 RX ADMIN — ROCURONIUM BROMIDE 5 MG: 50 INJECTION, SOLUTION INTRAVENOUS at 18:16

## 2024-08-06 RX ADMIN — FENTANYL CITRATE 50 MCG: 50 INJECTION, SOLUTION INTRAMUSCULAR; INTRAVENOUS at 17:10

## 2024-08-06 RX ADMIN — CEFAZOLIN 2 G: 1 INJECTION, POWDER, FOR SOLUTION INTRAMUSCULAR; INTRAVENOUS at 17:14

## 2024-08-06 RX ADMIN — KETOROLAC TROMETHAMINE 15 MG: 15 INJECTION, SOLUTION INTRAMUSCULAR; INTRAVENOUS at 23:36

## 2024-08-06 RX ADMIN — HEPARIN SODIUM 5000 UNITS: 5000 INJECTION, SOLUTION INTRAVENOUS; SUBCUTANEOUS at 23:03

## 2024-08-06 RX ADMIN — CLOPIDOGREL BISULFATE 75 MG: 75 TABLET ORAL at 05:42

## 2024-08-06 RX ADMIN — FENTANYL CITRATE 25 MCG: 50 INJECTION, SOLUTION INTRAMUSCULAR; INTRAVENOUS at 18:29

## 2024-08-06 RX ADMIN — LEVOTHYROXINE SODIUM 100 MCG: 0.1 TABLET ORAL at 08:01

## 2024-08-06 RX ADMIN — LIDOCAINE HYDROCHLORIDE 4 ML: 40 SOLUTION TOPICAL at 16:57

## 2024-08-06 RX ADMIN — SODIUM CHLORIDE: 9 INJECTION, SOLUTION INTRAVENOUS at 23:14

## 2024-08-06 RX ADMIN — SODIUM CHLORIDE, SODIUM GLUCONATE, SODIUM ACETATE, POTASSIUM CHLORIDE AND MAGNESIUM CHLORIDE: 526; 502; 368; 37; 30 INJECTION, SOLUTION INTRAVENOUS at 17:09

## 2024-08-06 RX ADMIN — FENTANYL CITRATE 25 MCG: 50 INJECTION, SOLUTION INTRAMUSCULAR; INTRAVENOUS at 16:55

## 2024-08-06 RX ADMIN — PHENYLEPHRINE HYDROCHLORIDE 100 MCG: 10 INJECTION INTRAVENOUS at 16:57

## 2024-08-06 RX ADMIN — DEXAMETHASONE SODIUM PHOSPHATE 8 MG: 4 INJECTION INTRA-ARTICULAR; INTRALESIONAL; INTRAMUSCULAR; INTRAVENOUS; SOFT TISSUE at 17:36

## 2024-08-06 RX ADMIN — ROCURONIUM BROMIDE 35 MG: 50 INJECTION, SOLUTION INTRAVENOUS at 16:55

## 2024-08-06 RX ADMIN — ASPIRIN 81 MG 81 MG: 81 TABLET ORAL at 05:42

## 2024-08-06 RX ADMIN — ACETAMINOPHEN 1000 MG: 500 TABLET ORAL at 23:03

## 2024-08-06 RX ADMIN — LIDOCAINE HYDROCHLORIDE 50 MG: 20 INJECTION, SOLUTION EPIDURAL; INFILTRATION; INTRACAUDAL at 18:34

## 2024-08-06 RX ADMIN — ROCURONIUM BROMIDE 15 MG: 50 INJECTION, SOLUTION INTRAVENOUS at 17:09

## 2024-08-06 RX ADMIN — HEPARIN SODIUM 2000 UNITS: 1000 INJECTION, SOLUTION INTRAVENOUS; SUBCUTANEOUS at 18:05

## 2024-08-06 RX ADMIN — HEPARIN SODIUM 6000 UNITS: 1000 INJECTION, SOLUTION INTRAVENOUS; SUBCUTANEOUS at 17:30

## 2024-08-06 RX ADMIN — LIDOCAINE HYDROCHLORIDE 50 MG: 20 INJECTION, SOLUTION EPIDURAL; INFILTRATION; INTRACAUDAL at 16:55

## 2024-08-06 RX ADMIN — VITAM B12 100 MCG: 100 TAB at 05:42

## 2024-08-06 RX ADMIN — FENTANYL CITRATE 25 MCG: 50 INJECTION, SOLUTION INTRAMUSCULAR; INTRAVENOUS at 17:45

## 2024-08-06 RX ADMIN — ONDANSETRON 4 MG: 2 INJECTION INTRAMUSCULAR; INTRAVENOUS at 17:53

## 2024-08-06 RX ADMIN — PROTAMINE SULFATE 50 MG: 10 INJECTION, SOLUTION INTRAVENOUS at 18:22

## 2024-08-06 RX ADMIN — SUGAMMADEX 150 MG: 100 INJECTION, SOLUTION INTRAVENOUS at 18:41

## 2024-08-06 RX ADMIN — PROPOFOL 100 MG: 10 INJECTION, EMULSION INTRAVENOUS at 16:55

## 2024-08-06 RX ADMIN — FENTANYL CITRATE 25 MCG: 50 INJECTION, SOLUTION INTRAMUSCULAR; INTRAVENOUS at 17:20

## 2024-08-06 RX ADMIN — PHENYLEPHRINE HYDROCHLORIDE 40 MCG/MIN: 10 INJECTION INTRAVENOUS at 17:17

## 2024-08-06 RX ADMIN — SODIUM CHLORIDE, POTASSIUM CHLORIDE, SODIUM LACTATE AND CALCIUM CHLORIDE: 600; 310; 30; 20 INJECTION, SOLUTION INTRAVENOUS at 16:43

## 2024-08-06 ASSESSMENT — ENCOUNTER SYMPTOMS
WEAKNESS: 1
COUGH: 1
SHORTNESS OF BREATH: 0
NAUSEA: 0
DIZZINESS: 0
VOMITING: 0

## 2024-08-06 ASSESSMENT — FIBROSIS 4 INDEX: FIB4 SCORE: 3.82

## 2024-08-06 NOTE — PROGRESS NOTES
Hospital Medicine Daily Progress Note    Date of Service  8/6/2024    Chief Complaint  Naomy Vargas is a 74 y.o. female admitted 7/29/2024 with left sided weakness    Hospital Course  Naomy Vargas is a 74 y.o. female with history of groin cellulitis, hypothyroidism, peripheral vascular disease, AAA, CKD 3, COPD, hypertension, chronic respiratory failure with hypoxia on 1 to 2 L nasal cannula, who presented 7/29/2024 with concern for transient left arm weakness .  Subsequent CT neck with greater than 70% right ICA stenosis.  Neurology evaluated and recommended right CEA.  Vascular evaluated and procedure canceled due to ongoing bacteremia.  MRI brain with tiny chronic right lateral infarct.  CT chest noted with chronic interstitial lung disease and pneumonia no acute infiltrate detected.  Vascular surgery planning for right CEA today       Interval Problem Update    8/6/2024  Seen and examined at bedside  Vital stable, remain afebrile  Labs reviewed, sodium 139 potassium 3.8, renal function  stable  Repeat blood culture remain negative      Telemetry monitoring  Continue on IV Rocephin  Continue on aspirin, Lipitor, clopidogrel  Repeat BMP in a.m. to monitor electrolytes and toxicity   Need close monitoring of blood counts, electrolytes and renal function due to potential of organ dysfunction from ongoing bacteremia  Requiring IV antibiotics, need close monitoring for toxicity.  Requiring IV narcotics for pain management, monitor for toxicity  Patient is scheduled for right CEA today  Case discussed with vascular surgery Dr. Cortés    High risk of deterioration from ongoing bacteremia.  Need close monitoring of telemetry monitoring.  Risks of worsening into septic shock.  Need close blood pressure neuromonitoring.    I have discussed this patient's plan of care and discharge plan at IDT rounds today with Case Management, Nursing, Nursing leadership, and other members of the IDT  team.    Consultants/Specialty  neurology    Code Status  Full Code    Disposition  The patient is not medically cleared for discharge to home or a post-acute facility.  Anticipate discharge to: home with close outpatient follow-up    I have placed the appropriate orders for post-discharge needs.    Review of Systems  Review of Systems   Respiratory:  Positive for cough. Negative for shortness of breath.    Cardiovascular:  Negative for chest pain.   Gastrointestinal:  Negative for nausea and vomiting.   Genitourinary:  Negative for dysuria.   Neurological:  Positive for weakness. Negative for dizziness.        Physical Exam  Temp:  [35.9 °C (96.6 °F)-36.9 °C (98.4 °F)] 35.9 °C (96.6 °F)  Pulse:  [73-81] 73  Resp:  [18] 18  BP: (123-170)/(67-88) 170/76  SpO2:  [93 %-96 %] 95 %    Physical Exam  Constitutional:       Appearance: Normal appearance.   Cardiovascular:      Rate and Rhythm: Normal rate and regular rhythm.   Pulmonary:      Effort: Pulmonary effort is normal.   Abdominal:      General: Abdomen is flat. There is no distension.   Neurological:      Mental Status: She is alert and oriented to person, place, and time.      Comments: Motor strength 5 out of 5 all extremity, weak left hand          Fluids    Intake/Output Summary (Last 24 hours) at 8/6/2024 1630  Last data filed at 8/6/2024 1400  Gross per 24 hour   Intake 0 ml   Output --   Net 0 ml       Laboratory        Recent Labs     08/04/24  0712 08/06/24  0136   SODIUM 138 139   POTASSIUM 3.8 3.8   CHLORIDE 106 105   CO2 21 19*   GLUCOSE 92 96   BUN 9 10   CREATININE 0.90 0.86   CALCIUM 8.5 8.6                       Imaging  CT-CHEST (THORAX) W/O   Final Result      1.  Findings of chronic interstitial lung disease and pneumonia. No acute infiltrate detected.   2.  Stable moderate mediastinal adenopathy.   3.  Indeterminate fat stranding around the superior left kidney. May be further assessed with abdominal CT.      Fleischner Society pulmonary  nodule recommendations:   Not Applicable         MR-BRAIN-W/O   Final Result      1.  Mild chronic microvascular ischemic type changes.   2.  Tiny chronic right parietal infarct.   3.  No acute intracranial abnormality.   4.  Paranasal sinus disease.      EC-ECHOCARDIOGRAM COMPLETE W/O CONT   Final Result      CT-CTA NECK WITH & W/O-POST PROCESSING   Final Result      1.  Severe greater than 70% right ICA origin stenosis.   2.  60-70% left ICA origin stenosis.   3.  Lung apices demonstrate emphysema. Extensive irregular interstitial opacities in the lung apices which could be chronic changes from interstitial lung disease and/or interstitial infiltrates not excluded.   4.  Moderate mediastinal and mild hilar lymphadenopathy.         CT-CTA HEAD WITH & W/O-POST PROCESS   Final Result      1.  No acute arterial abnormality. No large vessel occlusion or aneurysm detected.   2.  Diffuse paranasal sinusitis.      DX-PORTABLE FLUORO > 1 HOUR    (Results Pending)   DX-OPERATIVE ANGIOGRAM EACH PROJ    (Results Pending)        Assessment/Plan  * TIA (transient ischemic attack)- (present on admission)  Assessment & Plan  74-year-old female with history of peripheral vascular disease presented with transient left arm weakness, transient headache and CTA of the neck showing severe right ICA stenosis and moderate left ICA stenosis    Telemetry monitoring  Continue on aspirin, Lipitor, clopidogrel  Neurochecks every 4 hours  Echocardiogram noted with pulmonary hypertension otherwise unremarkable  PT/OT evaluation  Plan for right CEA by vascular surgery          Bacteremia  Assessment & Plan  UA positive for nitrate, leukocyte esterase, 1 out of 2 blood culture growing gram-negative douglas.  Follow repeat blood culture  Continue IV antibiotic  Need close monitoring of blood counts, electrolytes and renal function due to potential of organ dysfunction from ongoing bacteremia  Requiring IV antibiotics, need close monitoring for  toxicity    Hypotension  Assessment & Plan  Due to ongoing bacteremia  Improved with IV antibiotics and IV fluids    Pneumonia  Assessment & Plan  Pneumonia ruled out  Currently on IV antibiotic for UTI and bacteremia        Hypokalemia  Assessment & Plan  Replace KCl  Repeat labs in a.m.    Perianal dermatitis  Assessment & Plan  Topical clotrimazole    Chronic hypoxemic respiratory failure (HCC)- (present on admission)  Assessment & Plan  Continue supplemental oxygen 1 to 2 L nasal cannula    Pulmonary hypertension (HCC)- (present on admission)  Assessment & Plan  Echo: 3/2023  Right ventricular systolic pressure is estimated to be 35 mmHg.  Repeat echo noted worsening pulmonary hypertension, RVSP 50 mmHg       Chronic obstructive pulmonary disease (HCC)- (present on admission)  Assessment & Plan  Not in exacerbation  Continue Symbicort, DuoNeb as needed      Acute kidney injury on CKD (chronic kidney disease) stage 3, GFR 30-59 ml/min- (present on admission)  Assessment & Plan  DARRIN improved  Continue to monitor on IV antibiotics         VTE prophylaxis: heparin sc    I have performed a physical exam and reviewed and updated ROS and Plan today (8/6/2024). In review of yesterday's note (8/5/2024), there are no changes except as documented above.

## 2024-08-06 NOTE — ANESTHESIA PREPROCEDURE EVALUATION
Case: [id number] Date/Time: 08/06/24 1245    Procedures:       ENDARTERECTOMY, CAROTID (Right)      ANGIOPLASTY, WITH STENT INSERTION    Location: [facility] OR 09 / SURGERY [facility]    Surgeons: Valerio Cortés M.D.          Right carotid stenosis  Cva 10 days ago  Copd- on o2   Relevant Problems   PULMONARY   (positive) Chronic obstructive pulmonary disease (HCC)   (positive) Pneumonia      NEURO   (positive) TIA (transient ischemic attack)      CARDIAC   (positive) Abdominal aortic aneurysm (AAA) 3.0 cm to 5.0 cm in diameter in female (HCC)   (positive) Aortic atherosclerosis (HCC)   (positive) Bilateral iliac artery stenosis (HCC)   (positive) Disorder of arteries and arterioles (HCC)   (positive) Essential hypertension   (positive) Pulmonary hypertension (HCC)   (positive) Symptomatic carotid artery stenosis, right   (positive) Venous stasis dermatitis of both lower extremities         (positive) Acute kidney injury on CKD (chronic kidney disease) stage 3, GFR 30-59 ml/min      ENDO   (positive) Hypothyroidism       Physical Exam    Airway   Mallampati: II  TM distance: >3 FB  Neck ROM: full       Cardiovascular - normal exam  Rhythm: regular  Rate: normal  (-) murmur     Dental - normal exam  (+) upper dentures           Pulmonary - normal exam  Breath sounds clear to auscultation     Abdominal    Neurological - normal exam                   Anesthesia Plan    ASA 4   ASA physical status 4 criteria: CVA or TIA - recent (< 3 months)    Plan - general       Airway plan will be ETT          Induction: intravenous    Postoperative Plan: Postoperative administration of opioids is intended.    Pertinent diagnostic labs and testing reviewed    Informed Consent:    Anesthetic plan and risks discussed with patient.    Use of blood products discussed with: patient whom consented to blood products.

## 2024-08-06 NOTE — PROGRESS NOTES
Monitor summary: SR, HR 70-82, IA .14, QRS .08, QT .40 with rare pac, occasional pvc per strip from monitor room

## 2024-08-06 NOTE — PROGRESS NOTES
Monitor Summary: SR 66-76, MS 0.13, QRS 0.08, QT 0.34, with bigeminal frequent PACs/PVCs per strip from monitor room.

## 2024-08-06 NOTE — PROGRESS NOTES
Patient off to pre-op.  RN and Transport transported pt down with a zoll.  at bedside. Patient has been NPO since midnight.

## 2024-08-06 NOTE — PROGRESS NOTES
VASCULAR SURGERY SERVICE                        Progress Note  _________________________________      Stable overnight    OR today for right carotid endarterectomy and right carotid angiogram/possible stent    Tentatively around 1230 today    NPO except for medications    [facility]   [facility] preferred or call my office [phone]  __________________________________________________  Patient:Naomy Sam   MRN:[id number]

## 2024-08-06 NOTE — CARE PLAN
The patient is Stable - Low risk of patient condition declining or worsening    Shift Goals  Clinical Goals: monitor neuro status  Patient Goals: to have surgery  Family Goals: kimi    Progress made toward(s) clinical / shift goals:  Patient surgery was cancelled for today and rescheduled to tomorrow 8/6/24 at 1230. NPO at midnight. Patient states understanding. Patient complained of sore/open area to periarea, hydrocortisone cream ordered by MD. Wound consult placed.

## 2024-08-06 NOTE — DISCHARGE PLANNING
"TCN following. HTH/SCP chart reviewed.  Collaborated with RICK Steward.  No new TCN needs identified.  Per Surgery Vascular note by Valerio Cortés M.D. on 8/5/24 at 7:59 AM, \"Was planning for OR today for right carotid endarterectomy and retrograde right carotid stent.  Tentatively around 1030.  Surgery has been rescheduled to tomorrow at 1230 \".      Please see prior TCN note from 8/2/24 for most recent discharge planning considerations if indicated.     Completed:  PT/OT orders in chart  SLP recommends no needs - 7/30  Choice obtained: HH ([facility]) and DME ([facility])   Pt aware of [facility] blanket referral policy  SCP with [facility] PCP. Has f/u scheduled 8/12.  "

## 2024-08-06 NOTE — DISCHARGE PLANNING
"TCN following. HTH/SCP chart reviewed.  Per collaboration with RICK Steward, PT/OT to evaluate post-procedure.  At time of writing this note, Per Surgery Vascular note by Valerio Cortés M.D. on 8/6/24 at 9:16 AM, \"OR today for right carotid endarterectomy and right carotid angiogram/possible stent.  Tentatively around 1230 today\".      No new TCN needs identified. Please see prior TCN note from 8/5/24 for most recent discharge planning considerations if indicated.     Completed:  PT/OT orders in chart  SLP recommends no needs - 7/30  Choice obtained: HH ([facility]) and DME ([facility])   Pt aware of [facility] blanket referral policy  SCP with [facility] PCP. Has f/u scheduled 8/12.  "

## 2024-08-06 NOTE — CARE PLAN
The patient is Stable - Low risk of patient condition declining or worsening    Shift Goals  Clinical Goals: monitor neuro status  Patient Goals: to have surgery  Family Goals: kimi    Patient is progressing towards the following goals:    Problem: Knowledge Deficit - Stroke Education  Goal: Patient's knowledge of stroke and risk factors will improve  Description: Target End Date:  1-3 days or as soon as patient condition allows    Document in Patient Education    1.  Stroke education booklet provided  2.  Education regarding EMS activation, need for follow up, medication prescribed at discharge, risk factors for stroke/lifestyle modifications, warning signs and symptoms of stroke provided  Outcome: Progressing     Problem: Discharge Planning - Stroke  Goal: Ensure Stroke Core Measures are met prior to discharge  Description: Target End Date:  Prior to discharge or change in level of care    1. Patient discharged on antithrombotic therapy (Ischemic Stroke)  2. Patient discharged on intensive statin if LDL is greater than or equal to 70 mg/dl (Ischemic Stroke)  3. Patient discharged on anticoagulation therapy for patients with atrial fibrillation/flutter (Ischemic Stroke)  4. Smoking education/cessation provided if applicable  Outcome: Progressing     Problem: Neuro Status  Goal: Neuro status will remain stable or improve  Description: Target End Date:  Prior to discharge or change in level of care    Document on Neuro assessment in the Assessment flowsheet    1.  Assess and monitor neurologic status per provider order/protocol/unit policy  2.  Assess level of consciousness and orientation  3.  Assess for speech, dysarthria, dysphagia, facial symmetry  4.  Assess visual field, eye movements, gaze preference, pupil reaction and size  5.  Assess muscle strength and motor response in all four extremities  6.  Assess for sensation (numbness and tingling)  7.  Assess basic neuro reflexes (cough, gag, corneal)  8.  Identify  changes in neuro status and report to provider for testing/treatment orders  Outcome: Progressing

## 2024-08-07 LAB
ANION GAP SERPL CALC-SCNC: 16 MMOL/L (ref 7–16)
BASOPHILS # BLD AUTO: 0.4 % (ref 0–1.8)
BASOPHILS # BLD: 0.05 K/UL (ref 0–0.12)
BUN SERPL-MCNC: 10 MG/DL (ref 8–22)
CALCIUM SERPL-MCNC: 8.4 MG/DL (ref 8.5–10.5)
CHLORIDE SERPL-SCNC: 103 MMOL/L (ref 96–112)
CO2 SERPL-SCNC: 19 MMOL/L (ref 20–33)
CREAT SERPL-MCNC: 0.84 MG/DL (ref 0.5–1.4)
EOSINOPHIL # BLD AUTO: 0.01 K/UL (ref 0–0.51)
EOSINOPHIL NFR BLD: 0.1 % (ref 0–6.9)
ERYTHROCYTE [DISTWIDTH] IN BLOOD BY AUTOMATED COUNT: 48 FL (ref 35.9–50)
GFR SERPLBLD CREATININE-BSD FMLA CKD-EPI: 73 ML/MIN/1.73 M 2
GLUCOSE SERPL-MCNC: 191 MG/DL (ref 65–99)
HCT VFR BLD AUTO: 36.7 % (ref 37–47)
HGB BLD-MCNC: 12 G/DL (ref 12–16)
IMM GRANULOCYTES # BLD AUTO: 0.09 K/UL (ref 0–0.11)
IMM GRANULOCYTES NFR BLD AUTO: 0.8 % (ref 0–0.9)
LYMPHOCYTES # BLD AUTO: 0.62 K/UL (ref 1–4.8)
LYMPHOCYTES NFR BLD: 5.3 % (ref 22–41)
MCH RBC QN AUTO: 31.9 PG (ref 27–33)
MCHC RBC AUTO-ENTMCNC: 32.7 G/DL (ref 32.2–35.5)
MCV RBC AUTO: 97.6 FL (ref 81.4–97.8)
MONOCYTES # BLD AUTO: 0.13 K/UL (ref 0–0.85)
MONOCYTES NFR BLD AUTO: 1.1 % (ref 0–13.4)
NEUTROPHILS # BLD AUTO: 10.88 K/UL (ref 1.82–7.42)
NEUTROPHILS NFR BLD: 92.3 % (ref 44–72)
NRBC # BLD AUTO: 0 K/UL
NRBC BLD-RTO: 0 /100 WBC (ref 0–0.2)
PLATELET # BLD AUTO: 178 K/UL (ref 164–446)
PMV BLD AUTO: 9.9 FL (ref 9–12.9)
POTASSIUM SERPL-SCNC: 4.3 MMOL/L (ref 3.6–5.5)
RBC # BLD AUTO: 3.76 M/UL (ref 4.2–5.4)
SODIUM SERPL-SCNC: 138 MMOL/L (ref 135–145)
WBC # BLD AUTO: 11.8 K/UL (ref 4.8–10.8)

## 2024-08-07 PROCEDURE — A9270 NON-COVERED ITEM OR SERVICE: HCPCS | Performed by: STUDENT IN AN ORGANIZED HEALTH CARE EDUCATION/TRAINING PROGRAM

## 2024-08-07 PROCEDURE — 700111 HCHG RX REV CODE 636 W/ 250 OVERRIDE (IP): Mod: JZ | Performed by: SURGERY

## 2024-08-07 PROCEDURE — 700102 HCHG RX REV CODE 250 W/ 637 OVERRIDE(OP): Performed by: INTERNAL MEDICINE

## 2024-08-07 PROCEDURE — 97162 PT EVAL MOD COMPLEX 30 MIN: CPT

## 2024-08-07 PROCEDURE — 700102 HCHG RX REV CODE 250 W/ 637 OVERRIDE(OP): Performed by: SURGERY

## 2024-08-07 PROCEDURE — 92523 SPEECH SOUND LANG COMPREHEN: CPT

## 2024-08-07 PROCEDURE — A9270 NON-COVERED ITEM OR SERVICE: HCPCS | Performed by: PSYCHIATRY & NEUROLOGY

## 2024-08-07 PROCEDURE — 85025 COMPLETE CBC W/AUTO DIFF WBC: CPT

## 2024-08-07 PROCEDURE — A9270 NON-COVERED ITEM OR SERVICE: HCPCS | Performed by: INTERNAL MEDICINE

## 2024-08-07 PROCEDURE — 700102 HCHG RX REV CODE 250 W/ 637 OVERRIDE(OP): Performed by: PSYCHIATRY & NEUROLOGY

## 2024-08-07 PROCEDURE — 36415 COLL VENOUS BLD VENIPUNCTURE: CPT

## 2024-08-07 PROCEDURE — 700111 HCHG RX REV CODE 636 W/ 250 OVERRIDE (IP): Performed by: INTERNAL MEDICINE

## 2024-08-07 PROCEDURE — 99233 SBSQ HOSP IP/OBS HIGH 50: CPT | Performed by: STUDENT IN AN ORGANIZED HEALTH CARE EDUCATION/TRAINING PROGRAM

## 2024-08-07 PROCEDURE — 770020 HCHG ROOM/CARE - TELE (206)

## 2024-08-07 PROCEDURE — 700111 HCHG RX REV CODE 636 W/ 250 OVERRIDE (IP): Performed by: STUDENT IN AN ORGANIZED HEALTH CARE EDUCATION/TRAINING PROGRAM

## 2024-08-07 PROCEDURE — 97166 OT EVAL MOD COMPLEX 45 MIN: CPT

## 2024-08-07 PROCEDURE — 700102 HCHG RX REV CODE 250 W/ 637 OVERRIDE(OP): Performed by: STUDENT IN AN ORGANIZED HEALTH CARE EDUCATION/TRAINING PROGRAM

## 2024-08-07 PROCEDURE — 80048 BASIC METABOLIC PNL TOTAL CA: CPT

## 2024-08-07 PROCEDURE — A9270 NON-COVERED ITEM OR SERVICE: HCPCS | Performed by: SURGERY

## 2024-08-07 PROCEDURE — 99232 SBSQ HOSP IP/OBS MODERATE 35: CPT | Performed by: PSYCHIATRY & NEUROLOGY

## 2024-08-07 PROCEDURE — 700101 HCHG RX REV CODE 250: Performed by: STUDENT IN AN ORGANIZED HEALTH CARE EDUCATION/TRAINING PROGRAM

## 2024-08-07 RX ADMIN — ATORVASTATIN CALCIUM 40 MG: 40 TABLET, FILM COATED ORAL at 17:14

## 2024-08-07 RX ADMIN — CEFTRIAXONE SODIUM 2000 MG: 10 INJECTION, POWDER, FOR SOLUTION INTRAVENOUS at 05:53

## 2024-08-07 RX ADMIN — CLOPIDOGREL BISULFATE 75 MG: 75 TABLET ORAL at 05:11

## 2024-08-07 RX ADMIN — ASPIRIN 81 MG 81 MG: 81 TABLET ORAL at 05:11

## 2024-08-07 RX ADMIN — ACETAMINOPHEN 1000 MG: 500 TABLET ORAL at 13:15

## 2024-08-07 RX ADMIN — Medication 3 MG: at 20:49

## 2024-08-07 RX ADMIN — Medication 1000 UNITS: at 05:11

## 2024-08-07 RX ADMIN — LEVOTHYROXINE SODIUM 100 MCG: 0.1 TABLET ORAL at 08:03

## 2024-08-07 RX ADMIN — SODIUM BICARBONATE 650 MG: 650 TABLET ORAL at 17:14

## 2024-08-07 RX ADMIN — VITAM B12 100 MCG: 100 TAB at 05:11

## 2024-08-07 RX ADMIN — ACETAMINOPHEN 1000 MG: 500 TABLET ORAL at 05:11

## 2024-08-07 RX ADMIN — HYDROCORTISONE: 0.01 CREAM TOPICAL at 20:49

## 2024-08-07 RX ADMIN — ACETAMINOPHEN 1000 MG: 500 TABLET ORAL at 17:14

## 2024-08-07 RX ADMIN — HYDROCORTISONE: 0.01 CREAM TOPICAL at 05:59

## 2024-08-07 RX ADMIN — SODIUM BICARBONATE 650 MG: 650 TABLET ORAL at 05:11

## 2024-08-07 RX ADMIN — HEPARIN SODIUM 5000 UNITS: 5000 INJECTION, SOLUTION INTRAVENOUS; SUBCUTANEOUS at 05:12

## 2024-08-07 ASSESSMENT — COGNITIVE AND FUNCTIONAL STATUS - GENERAL
MOBILITY SCORE: 24
SUGGESTED CMS G CODE MODIFIER DAILY ACTIVITY: CH
SUGGESTED CMS G CODE MODIFIER MOBILITY: CH
DAILY ACTIVITIY SCORE: 24

## 2024-08-07 ASSESSMENT — ENCOUNTER SYMPTOMS
WEAKNESS: 0
VOMITING: 0
NAUSEA: 0
DIZZINESS: 0
SHORTNESS OF BREATH: 0
COUGH: 0

## 2024-08-07 ASSESSMENT — GAIT ASSESSMENTS
GAIT LEVEL OF ASSIST: STANDBY ASSIST
DISTANCE (FEET): 450

## 2024-08-07 ASSESSMENT — PAIN DESCRIPTION - PAIN TYPE: TYPE: ACUTE PAIN

## 2024-08-07 ASSESSMENT — ACTIVITIES OF DAILY LIVING (ADL): TOILETING: INDEPENDENT

## 2024-08-07 NOTE — DISCHARGE PLANNING
HTH/SCP TCN chart review completed. Collaborated with CM.  Current discharge considerations are for home with close outpatient f/u when medically cleared.  No new TCN needs identified. Please see prior TCN note from 8/5/24 for most recent discharge planning considerations if indicated.     TCN will continue to follow and collaborate with discharge planning team as additional post acute needs arise. Thank you.    Completed:  PT anticipate that the patient will have no further PT needs on 8/7 after discharge from the hospital. (pt denied want of HH).    OT orders in chart.   SLP recommends no needs - 7/30  Choice obtained: HH ([facility]) and DME ([facility])   Pt aware of [facility] blanket referral policy  SCP with [facility] PCP. Has f/u scheduled 8/12.

## 2024-08-07 NOTE — PROGRESS NOTES
Neurology Progress Note  Neurohospitalist Service, [facility]    Referring Physician: Sean Villegas M.D.      Interval History: Now post-op day #1 from R CEA for symptomatic R carotid disease.  Stable exam, at neurologic baseline.  Intraoperative angiogram with no significant stenosis of the R common carotid, thus no stent was placed.    Review of systems: In addition to what is detailed in the HPI and/or updated in the interval history, all other systems reviewed and are negative.    Past Medical History, Past Surgical History and Social History reviewed and unchanged from prior    Medications:    Current Facility-Administered Medications:     Pharmacy Consult Request ...Pain Management Review 1 Each, 1 Each, Other, PHARMACY TO DOSE, Valerio Cortés M.D.    ondansetron (Zofran) syringe/vial injection 4 mg, 4 mg, Intravenous, Q4HRS PRN, Valerio Cortés M.D.    dexamethasone (Decadron) injection 4 mg, 4 mg, Intravenous, Once PRN, Valerio Cortés M.D.    diphenhydrAMINE (Benadryl) injection 25 mg, 25 mg, Intravenous, Q6HRS PRN, Valerio Cortés M.D.    haloperidol lactate (Haldol) injection 1 mg, 1 mg, Intravenous, Q6HRS PRN, Valerio Cortés M.D.    scopolamine (Transderm-Scop) patch 1 Patch, 1 Patch, Transdermal, Q72HRS PRN, Valerio Cortés M.D.    acetaminophen (Tylenol) tablet 1,000 mg, 1,000 mg, Oral, Q6HRS, 1,000 mg at 08/07/24 0511 **FOLLOWED BY** [START ON 8/12/2024] acetaminophen (Tylenol) tablet 1,000 mg, 1,000 mg, Oral, Q6HRS PRN, Valerio Cortés M.D.    ketorolac (Toradol) 15 MG/ML injection 15 mg, 15 mg, Intravenous, Q6HRS, 15 mg at 08/06/24 2336 **FOLLOWED BY** [START ON 8/10/2024] ibuprofen (Motrin) tablet 800 mg, 800 mg, Oral, TID PRN, Valerio Cortés M.D.    oxyCODONE immediate-release (Roxicodone) tablet 2.5 mg, 2.5 mg, Oral, Q3HRS PRN **OR** oxyCODONE immediate-release (Roxicodone) tablet 5 mg, 5 mg, Oral, Q3HRS PRN **OR** morphine 4 MG/ML  injection 2 mg, 2 mg, Intravenous, Q3HRS PRN, Valerio Cortés M.D.    cloNIDine (Catapres) tablet 0.2 mg, 0.2 mg, Oral, Once PRN **AND** cloNIDine (Catapres) tablet 0.1 mg, 0.1 mg, Oral, Q HOUR PRN, Valerio Cortés M.D.    labetalol (Normodyne/Trandate) injection 10 mg, 10 mg, Intravenous, Q4HRS PRN, Valerio Cortés M.D.    hydrALAZINE (Apresoline) injection 10 mg, 10 mg, Intravenous, Q4HRS PRN, Valerio Cortés M.D.    hydrocortisone 1 % cream, , Topical, BID, Sean Villegas M.D., Given at 08/07/24 0559    sodium bicarbonate tablet 650 mg, 650 mg, Oral, BID, Sean Villegas M.D., 650 mg at 08/07/24 0511    [DISCONTINUED] oxyCODONE immediate-release (Roxicodone) tablet 2.5 mg, 2.5 mg, Oral, Q3HRS PRN **OR** [DISCONTINUED] oxyCODONE immediate-release (Roxicodone) tablet 5 mg, 5 mg, Oral, Q3HRS PRN **OR** HYDROmorphone (Dilaudid) injection 0.25 mg, 0.25 mg, Intravenous, Q8HRS PRN, Sean Villegas M.D.    cefTRIAXone (Rocephin) syringe 2,000 mg, 2,000 mg, Intravenous, Q24HRS, Sean Villegas M.D., 2,000 mg at 08/07/24 0553    clopidogrel (Plavix) tablet 75 mg, 75 mg, Oral, DAILY, Franklin Etsrada M.D., 75 mg at 08/07/24 0511    [Held by provider] midodrine (Proamatine) tablet 5 mg, 5 mg, Oral, TID WITH MEALS, Sean Villegas M.D., 5 mg at 07/30/24 1824    heparin injection 5,000 Units, 5,000 Units, Subcutaneous, Q8HRS, Robert Pang M.D., 5,000 Units at 08/07/24 0512    senna-docusate (Pericolace Or Senokot S) 8.6-50 MG per tablet 2 Tablet, 2 Tablet, Oral, Q EVENING, 2 Tablet at 08/01/24 1718 **AND** polyethylene glycol/lytes (Miralax) Packet 1 Packet, 1 Packet, Oral, QDAY PRN, Robert Pang M.D.    ondansetron (Zofran ODT) dispertab 4 mg, 4 mg, Oral, Q4HRS PRN, Robert Pang M.D.    aspirin (Asa) chewable tab 81 mg, 81 mg, Oral, DAILY, 81 mg at 08/07/24 0511 **OR** [DISCONTINUED] aspirin (Asa) suppository 300 mg, 300 mg, Rectal, DAILY, Robert Pang M.D.    atorvastatin (Lipitor) tablet 40 mg, 40 mg,  "Oral, Q EVENING, Robert Pang M.D., 40 mg at 08/05/24 1706    cyanocobalamin (Vitamin B-12) tablet 100 mcg, 100 mcg, Oral, DAILY, Robert Pang M.D., 100 mcg at 08/07/24 0511    ipratropium-albuterol (DUONEB) nebulizer solution, 3 mL, Nebulization, Q4HRS PRN, Robert Pang M.D.    levothyroxine (Synthroid) tablet 100 mcg, 100 mcg, Oral, QAM AC, Robert Pang M.D., 100 mcg at 08/06/24 0801    loperamide (Imodium) capsule 2 mg, 2 mg, Oral, 4X/DAY PRN, Robert Pang M.D.    melatonin tablet 3 mg, 3 mg, Oral, Nightly, Robert Pang M.D., 3 mg at 08/06/24 2337    albuterol inhaler 1 Puff, 1 Puff, Inhalation, Q6HRS PRN, Robert Pang M.D.    vitamin D3 (Cholecalciferol) tablet 1,000 Units, 1,000 Units, Oral, DAILY, Robert Pang M.D., 1,000 Units at 08/07/24 0511    mometasone-formoterol (Dulera) 100-5 MCG/ACT inhaler 2 Puff, 2 Puff, Inhalation, BID, Robert Pang M.D., 2 Puff at 07/31/24 0514    Physical Examination:   /84   Pulse 73   Temp 35.9 °C (96.7 °F) (Temporal)   Resp 16   Ht 1.719 m (5' 7.68\")   Wt 63.2 kg (139 lb 5.3 oz)   LMP  (LMP Unknown)   SpO2 97%   BMI 21.39 kg/m²       General: Patient is awake and in no acute distress  Neck: There is normal range of motion  CV: Regular rate   Extremities:  Warm, dry, and intact, without peripheral lower extremity edema    NEUROLOGICAL EXAM:     Mental status: Awake, alert and fully oriented  Speech and language: Speech is clear and fluent. The patient is able to name and repeat, and follow commands  Cranial nerve exam: Visual fields are full. There is no nystagmus. Extraocular muscles are intact. Face is symmetric.   Motor exam: There is sustained antigravity with no downward drift in bilateral arms and legs.   Sensory exam: Reacts to tactile in all 4 extremities, no neglect to double stim   Coordination: No ataxia on bilateral finger-to-nose testing  Gait: Deferred due to patient preference        NIHSS: National " Institutes of Health Stroke Scale     [0] 1a:Level of Consciousness           0-alert 1-drowsy   2-stupor   3-coma  [0] 1b:LOC Questions                         0-both  1-one      2-neither  [0] 1c:LOC Commands                       0-both  1-one      2-neither  [0] 2: Best Gaze                      0-nl    1-partial  2-forced  [0] 3: Visual Fields                              0-nl    1-partial  2-complete 3-bilat  [0] 4: Facial Paresis                0-nl    1-minor    2-partial  3-full  MOTOR                                              0-nl  [0] 5: Right Arm                       1-drift  [0] 6: Left Arm                                     2-some effort vs gravity  [0] 7: Right Leg                       3-no effort vs gravity  [0] 8: Left Leg                                      4-no movement                                                              x-untestable  [0] 9: Limb Ataxia                    0-abs   1-1_limb   2-2+_limbs                                                              x-untestable  [0] 10:Sensory                        0-nl    1-partial  2-dense  [0] 11:Best Language/Aphasia         0-nl    1-mild/mod 2-severe   3-mute  [0] 12:Dysarthria                     0-nl    1-mild/mod 2-severe                                                              x-untestable  [0] 13:Neglect/Inattention                   0-none  1-partial  2-complete  [0] TOTAL         Objective Data:    Labs:  Lab Results   Component Value Date/Time    PROTHROMBTM 15.4 (H) 07/29/2024 02:15 PM    INR 1.21 (H) 07/29/2024 02:15 PM      Lab Results   Component Value Date/Time    WBC 11.8 (H) 08/07/2024 12:29 AM    RBC 3.76 (L) 08/07/2024 12:29 AM    HEMOGLOBIN 12.0 08/07/2024 12:29 AM    HEMATOCRIT 36.7 (L) 08/07/2024 12:29 AM    MCV 97.6 08/07/2024 12:29 AM    MCH 31.9 08/07/2024 12:29 AM    MCHC 32.7 08/07/2024 12:29 AM    MPV 9.9 08/07/2024 12:29 AM    NEUTSPOLYS 92.30 (H) 08/07/2024 12:29 AM    LYMPHOCYTES 5.30 (L)  08/07/2024 12:29 AM    MONOCYTES 1.10 08/07/2024 12:29 AM    EOSINOPHILS 0.10 08/07/2024 12:29 AM    BASOPHILS 0.40 08/07/2024 12:29 AM    ANISOCYTOSIS 2+ 07/09/2020 09:37 AM      Lab Results   Component Value Date/Time    SODIUM 138 08/07/2024 12:29 AM    POTASSIUM 4.3 08/07/2024 12:29 AM    CHLORIDE 103 08/07/2024 12:29 AM    CO2 19 (L) 08/07/2024 12:29 AM    GLUCOSE 191 (H) 08/07/2024 12:29 AM    BUN 10 08/07/2024 12:29 AM    CREATININE 0.84 08/07/2024 12:29 AM      Lab Results   Component Value Date/Time    CHOLSTRLTOT 66 (L) 07/30/2024 03:29 AM    LDL 24 07/30/2024 03:29 AM    HDL 25 (A) 07/30/2024 03:29 AM    TRIGLYCERIDE 85 07/30/2024 03:29 AM       Lab Results   Component Value Date/Time    ALKPHOSPHAT 100 (H) 07/30/2024 03:29 AM    ASTSGOT 30 07/30/2024 03:29 AM    ALTSGPT 22 07/30/2024 03:29 AM    TBILIRUBIN 0.5 07/30/2024 03:29 AM        Imaging/Testing:    I interpreted and/or reviewed the patient's neuroimaging    CT-CHEST (THORAX) W/O   Final Result      1.  Findings of chronic interstitial lung disease and pneumonia. No acute infiltrate detected.   2.  Stable moderate mediastinal adenopathy.   3.  Indeterminate fat stranding around the superior left kidney. May be further assessed with abdominal CT.      Fleischner Society pulmonary nodule recommendations:   Not Applicable         MR-BRAIN-W/O   Final Result      1.  Mild chronic microvascular ischemic type changes.   2.  Tiny chronic right parietal infarct.   3.  No acute intracranial abnormality.   4.  Paranasal sinus disease.      EC-ECHOCARDIOGRAM COMPLETE W/O CONT   Final Result      CT-CTA NECK WITH & W/O-POST PROCESSING   Final Result      1.  Severe greater than 70% right ICA origin stenosis.   2.  60-70% left ICA origin stenosis.   3.  Lung apices demonstrate emphysema. Extensive irregular interstitial opacities in the lung apices which could be chronic changes from interstitial lung disease and/or interstitial infiltrates not excluded.   4.   Moderate mediastinal and mild hilar lymphadenopathy.         CT-CTA HEAD WITH & W/O-POST PROCESS   Final Result      1.  No acute arterial abnormality. No large vessel occlusion or aneurysm detected.   2.  Diffuse paranasal sinusitis.      DX-PORTABLE FLUORO > 1 HOUR    (Results Pending)   DX-OPERATIVE ANGIOGRAM EACH PROJ    (Results Pending)       Assessment and Plan:  Naomy Vargas is a 74 year old woman with transient left arm weakness, brain fog, now resolved.  CTA of neck revealing a high grade R ICA stenosis. Clinical semiology combined with CTA findings highly suggestive of TIA event related to symptomatic carotid disease- and she is now POD#1 from R CEA by Dr. Cortés.  Secondary stroke prevention to include ASA, statin therapy, and BP control.     Problem list:   Transient neurologic symptoms   R internal carotid stenosis   Hypertension   Hyperlipidemia     Plan:              - q4h neurochecks/NIHSS              - continue ASA 81mg daily              - may stop plavix at this time, as symptomatic lesion has been removed   - long-term BP goal is 110-130/60-80, acute goal is -140 to mitigate reperfusion injury.  Start enteral anti-HTN if needed              - stroke labs:   HgbA1c 5.3 and LDL 24              - continue home crestor 10mg daily for goal LDL < 70              - lovenox SQ for DVT chemoppx ok              - PT/OT/SLP              - may defer embolic workup with TTE/ziopatch as stroke etiology most likely atheroembolic disease              - [facility] follow up   - please reconsult Stroke Neurology with any additional questions or concerns    The evaluation of the patient, and recommended management, was discussed with Dr. Villegas, attending hospitalist. I have performed a physical exam and reviewed and updated ROS and Plan today (8/7/2024).     Franklin Estrada MD  Vascular Neurology

## 2024-08-07 NOTE — ANESTHESIA PROCEDURE NOTES
Arterial Line    Performed by: Dain Taylor M.D.  Authorized by: Dain Taylor M.D.    Start Time:  8/6/2024 3:51 PM  End Time:  8/6/2024 3:52 PM  Localization: ultrasound guidance  Image captured, interpreted and electronically stored.  Patient Location:  OR  Indication: continuous blood pressure monitoring        Catheter Size:  20 G  Seldinger Technique?: Yes    Laterality:  Left  Site:  Radial artery  Line Secured:  Antimicrobial disc, tape and transparent dressing  Events: patient tolerated procedure well with no complications

## 2024-08-07 NOTE — THERAPY
Speech Language Pathology   Cognitive Evaluation     Patient Name: Naomy Vargas  AGE:  74 y.o., SEX:  female  Medical Record #: [id number]  Date of Service: 8/7/2024      History of Present Illness    75 y/o admitted on 7/29 for L arm weakness. Not seen by SLP before at [facility].    CTA of head 7/29:   1.  No acute arterial abnormality. No large vessel occlusion or aneurysm detected.  2.  Diffuse paranasal sinusitis.    CTA of neck 7/29:  1.  Severe greater than 70% right ICA origin stenosis.  2.  60-70% left ICA origin stenosis.  3.  Lung apices demonstrate emphysema. Extensive irregular interstitial opacities in the lung apices which could be chronic changes from interstitial lung disease and/or interstitial infiltrates not excluded.      PMH: groin cellulitis, hypothyroidism, peripheral vascular disease, AAA, CKD 3, COPD, hypertension, chronic respiratory failure with hypoxia on 1 to 2 L nasal cannula      General Information  Vitals  O2 (LPM): 3  O2 Delivery Device: Silicone Nasal Cannula  Level of Consciousness: Alert  Orientation: Oriented x 4  Follows Directives: Yes      Prior Living Situation & Level of Function  Housing / Facility: [address] [place] [address]  Lives with - Patient's Self Care Capacity: Spouse, Unrelated Adult (roommate)  Communication: WFL  Swallowing: WFL         Laryngeal Function Exam  Secretion Management: Adequate  Voice Quality: WFL      Subjective  Pt endorsed being at her cognitive baseline, good recall of events since admit and s/p procedure yesterday.      Assessment  Pt seen on this date for a cognitive-linguistic evaluation. Portions of the Cognistat (The Neurobehavioral Cognitive Status Examination) were presented to the pt and they received the following scores:    Orientation: WNL  Attention: WNL  Repetition (Language):WNL  Memory:WNL  Calculations:WNL  Similarities (Reasoning):WNL  Judgment (Reasoning):WNL    Non-standardized measures were used to assess other cognitive domains and  confrontational naming was found to be WFL. Pt lives with spouse and roommate at baseline but is functionally independent with all IADLs. Speech is clear and no expressive/receptive deficits noted.    Of note, cognitive-linguistic evaluations assess performance on various cognitive-linguistic domains such as expressive and receptive language, attention, memory, executive function, problem solving, etc. It is not within the scope of Speech Language Pathologists to determine capacity, please defer to medical team or psych for capacity evaluation. Thank you.         Clinical Impressions  Pt is presenting at her cognitive baseline as no deficits were seen during this evaluation. No further acute SLP needs at this time but please re-consult with any change in status or difficulty.      Recommendations  Supervision Needs Upons Discharge: None  IADLs: N/A         SLP Treatment Plan  Treatment Plan: None Indicated  SLP Frequency: N/A - Evaluation Only  Estimated Duration: N/A - Evaluation Only      Anticipated Discharge Needs  Discharge Recommendations: Anticipate that the patient will have no further speech therapy needs after discharge from the hospital  Therapy Recommendations Upon DC: Not Indicated                Netta Alexander, SLP

## 2024-08-07 NOTE — CARE PLAN
The patient is Watcher - Medium risk of patient condition declining or worsening    Shift Goals  Clinical Goals: stable neuro assessment, monitor for bleeding, stable BP, post op vitals  Patient Goals: rest and go home  Family Goals: CASIMIRO    Progress made toward(s) clinical / shift goals:  Upon returning to the unit from surgery, the patient was noted to have a bleeding surgical wound on the LLE that was covered with a 2x2 gauze and transparent film. Further monitoring for signs and symptoms of a worsening bleed has been in place since.     Patient is not progressing towards the following goals:    Problem: Knowledge Deficit - Stroke Education  Goal: Patient's knowledge of stroke and risk factors will improve  Description: Target End Date:  1-3 days or as soon as patient condition allows    Document in Patient Education    1.  Stroke education booklet provided  2.  Education regarding EMS activation, need for follow up, medication prescribed at discharge, risk factors for stroke/lifestyle modifications, warning signs and symptoms of stroke provided  Outcome: Progressing     Problem: Neuro Status  Goal: Neuro status will remain stable or improve  Description: Target End Date:  Prior to discharge or change in level of care    Document on Neuro assessment in the Assessment flowsheet    1.  Assess and monitor neurologic status per provider order/protocol/unit policy  2.  Assess level of consciousness and orientation  3.  Assess for speech, dysarthria, dysphagia, facial symmetry  4.  Assess visual field, eye movements, gaze preference, pupil reaction and size  5.  Assess muscle strength and motor response in all four extremities  6.  Assess for sensation (numbness and tingling)  7.  Assess basic neuro reflexes (cough, gag, corneal)  8.  Identify changes in neuro status and report to provider for testing/treatment orders  Outcome: Progressing     Problem: Self Care  Goal: Patient will have the ability to perform ADLs  independently or with assistance (bathe, groom, dress, toilet and feed)  Description: Target End Date:  Prior to discharge or change in level of care    Document on ADL flowsheet    1.  Assess the capability and level of deficiency to perform ADLs  2.  Encourage family/care giver involvement  3.  Provide assistive devices  4.  Consider PT/OT evaluations  5.  Maintain support, give positive feedback, encourage self-care allowing extra time and verbal cuing as needed  6.  Avoid doing something for patients they can do themselves, but provide assistance as needed  7.  Assist in anticipating/planning individual needs  8.  Collaborate with Case Management and  to meet discharge needs  Outcome: Progressing     Problem: Knowledge Deficit - Standard  Goal: Patient and family/care givers will demonstrate understanding of plan of care, disease process/condition, diagnostic tests and medications  Description: Target End Date:  1-3 days or as soon as patient condition allows    Document in Patient Education    1.  Patient and family/caregiver oriented to unit, equipment, visitation policy and means for communicating concern  2.  Complete/review Learning Assessment  3.  Assess knowledge level of disease process/condition, treatment plan, diagnostic tests and medications  4.  Explain disease process/condition, treatment plan, diagnostic tests and medications  Outcome: Progressing

## 2024-08-07 NOTE — PROGRESS NOTES
4 Eyes Skin Assessment Completed by YAMEL Hernandez and YAMEL Leslie.    Head bruise on left lower chin  Ears WDL  Nose WDL  Mouth WDL  Neck Redness, Blanching, and Incision drain on right side of neck   Breast/Chest WDL  Shoulder Blades WDL  Spine WDL  (R) Arm/Elbow/Hand WDL  (L) Arm/Elbow/Hand WDL  Abdomen Bruising  Groin WDL  Scrotum/Coccyx/Buttocks WDL  (R) Leg WDL  (L) Leg WDL  (R) Heel/Foot/Toe Redness and Blanching  (L) Heel/Foot/Toe bleed covered with gauze and transparent dressing          Devices In Places Tele Box, Blood Pressure Cuff, Pulse Ox, SCD's, and Nasal Cannula      Interventions In Place Gray Ear Foams, NC W/Ear Foams, and Pillows    Possible Skin Injury No    Pictures Uploaded Into Epic No, needs to be completed  Wound Consult Placed N/A  RN Wound Prevention Protocol Ordered No

## 2024-08-07 NOTE — PROGRESS NOTES
Monitor summary: SR 70-80, WA 0.13, QRS 0.09, QT 0.40, with rare PACs , trigem and PVCs per strip from monitor room.

## 2024-08-07 NOTE — OP REPORT
VASCULAR SURGERY SERVICE  Operative Note  _______________________________________________    Date: 2024    Patient: Naomy Vargas  : 1950  MRN: [id number]  _______________________________________________    Preoperative Diagnosis:  symptomatic right carotid stenosis    Postoperative Diagnosis:  symptomatic right carotid stenosis    Procedure:  25241 right carotid endarterectomy with neuromonitoring  78742 Direct sheath insertion in the right carotid artery and right carotid angiogram    _______________________________________________    Surgeon:   Valerio Cortés MD    Assistant:   Irene Bunn PA-C    Anesthesia:   GETA plus local anesthetic    EBL:    Less than 100 cc    Heparin:   Systemically heparinized    Complications:  None    Disposition:   PACU in stable condition    Findings:   Bulky smooth plaque      Justification for use of Surgical First Assist:  An experienced first assistant was utilized during this operation due to the complexity of the operation.  My assistant participated with patient preparation for surgery, incision, surgical exposure including retraction, dissection, and ligation to isolate the target structures and preserve nearby structures, and closure of the field of dissection.  The presence of the expert assist increased the efficiency of the operation and decreased the risk of intraoperative surgical complications.    _______________________________________________    History:  Naomy Vargas is a 74 y.o. female with symptomatic right carotid stenosis.  Carotid endarterectomy was recommended.  I had a thorough, detailed discussion with the patient regarding the severity of her carotid disease and that she is at high risk of stroke.  I explained the primary purpose of this operation is to prevent stroke from the plaque in the carotid artery.  I explained the details of the operation including neuromonitoring and possible use of a shunt.  I discussed the alternatives  of optimal medical management or stenting, although carotid endarterectomy is preferable in someone who is acceptable risk for surgery.  I discussed the potential risks, including but not limited to bleeding, infection, injury to vessels or nerves, and risks of anesthesia. I explained the risk that the operation itself can cause a stroke, although the risk of stroke from the operation is less than the risk of stroke if the plaque is not treated, and thus carotid endarterectomy is recommended.  All of their questions were answered. Patient understands and agrees to proceed.    Procedure Summary:  Following informed consent, patient was brought to the OR where general anesthesia was administered. Patient was positioned, neuromonitoring was put in place, and the neck was prepped and draped in the usual sterile fashion.  Timeout was called to identify the correct patient, team and equipment.  Everyone was in agreement.      Procedure began with injection of local anesthetic along the SCM and then a longitudinal incision was made and carried down through each layer using cautery to assure hemostasis.  The common facial vein was identified and ligated.  The common, external and internal carotid were identified and dissected circumferentially and encircled with loops.     Based on the patient's recent bout of bacteremia I chose to perform an eversion technique to avoid the use of a prosthetic patch.    Patient was systemically heparinized and then the artery was clamped, with the ICA clamp being applied first.  The origin of the ICA was transected off of the carotid bulb.  Eversion endarterectomy was performed.  Bulky smooth plaque was removed.  There were no changes on EEG monitoring. Endarterectomy of the plaque was performed and the endpoints feathered nicely.      At this point I inserted a 7 Yi sheath directly into the carotid bulb down into the common carotid artery.  I performed a retrograde carotid angiogram  using multiple views at different degrees of angulation including 30 degrees ERIKA, 30 degrees MOODY, and a nearly lateral projection.  There was no evidence of any significant stenosis in the common carotid artery.  No stent placement was required.  The sheath was removed and the common carotid artery was clamped.    At this point the proximal end of the ICA was fashioned into a harris and it was anastomosed to the carotid bulb in an end-to-side fashion using a running 5-0 Prolene suture line.  The repair was flushed and irrigated prior to completion and once completed was pressurized and found to be hemostatic and then outflow was restored with the ICA clamp being removed last.  There was a continuous doppler flow signal in the ICA at this point with no evidence of stenosis.  The heparin was reversed with protamine. Topical hemostatic was applied. A 7Fr drain was placed and exited the lower neck and was secured with a nylon suture and connected to bulb suction.    At this point we had good hemostasis.  The platysma was reapproximated with absorbable suture.  The skin was reapproximated with a running 4-0 stratafix subcuticular suture.  A sterile dressing was placed. Patient was extubated and sent to PACU in stable condition.  All counts were correct at the conclusion of the case.       _______________________________________________    Valerio Cortés MD  [facility]

## 2024-08-07 NOTE — WOUND TEAM
[facility]  Inpatient Services  Wound and Skin Care Brief Evaluation    Admission Date: 7/29/2024     Last order of IP CONSULT TO WOUND CARE was found on 8/5/2024 from Hospital Encounter on 7/29/2024     HPI, PMH, SH: Reviewed    Chief Complaint   Patient presents with    Sent from Urgent Care     Pt reports L arm weakness that started on Saturday around 09:00 am. Pt reports headache and confusion. Symptoms have now resolved.      Diagnosis: TIA (transient ischemic attack) [G45.9]  Symptomatic carotid artery stenosis, right [I65.21]    Unit where seen by Wound Team: S176/01     Wound consult placed regarding luis felipe-area. Chart and images reviewed. This discussed with bedside RN. This clinician in to assess patient. Patient pleasant and agreeable. Rash resolved per patient.     No pressure injuries or advanced wound care needs identified. Wound consult completed. No further follow up unless indicated and consulted.          PREVENTATIVE INTERVENTIONS:    Q shift Manjit - performed per nursing policy  Q shift pressure point assessments - performed per nursing policy

## 2024-08-07 NOTE — PROGRESS NOTES
Hospital Medicine Daily Progress Note    Date of Service  8/7/2024    Chief Complaint  Naomy Vargas is a 74 y.o. female admitted 7/29/2024 with left sided weakness    Hospital Course  Naomy Vargas is a 74 y.o. female with history of groin cellulitis, hypothyroidism, peripheral vascular disease, AAA, CKD 3, COPD, hypertension, chronic respiratory failure with hypoxia on 1 to 2 L nasal cannula, who presented 7/29/2024 with concern for transient left arm weakness .  Subsequent CT neck with greater than 70% right ICA stenosis.  Neurology evaluated and recommended right CEA.  Vascular evaluated and procedure canceled due to ongoing bacteremia.  MRI brain with tiny chronic right lateral infarct.  CT chest noted with chronic interstitial lung disease and pneumonia no acute infiltrate detected. S/p right carotid endarterectomy     Interval Problem Update    8/7/2024  Seen and examined at bedside  Vital stable  No bleeding around procedure site  Labs reveal leukocytosis 11.8, sodium 138 potassium 4.3, renal function stable.    Continue to monitor bleeding site  Continue on IV Rocephin day 9 out of 10  Continue on aspirin, Lipitor  Plavix discontinued  Repeat CBC in a.m. to monitor white count, hemoglobin  Requiring IV antibiotics, need close monitoring for toxicity.  Requiring IV narcotics for pain management, monitor for toxicityy  Case discussed with vascular surgery Dr. Cortés in person  Case discussed with Dr. Estrada    High risk of deterioration from ongoing bacteremia.  Need close monitoring of telemetry monitoring.  Risks of worsening into septic shock.  Need close blood pressure neuromonitoring.    I have discussed this patient's plan of care and discharge plan at IDT rounds today with Case Management, Nursing, Nursing leadership, and other members of the IDT team.    Consultants/Specialty  neurology    Code Status  Full Code    Disposition  Medically Cleared  I have placed the appropriate orders for  post-discharge needs.    Review of Systems  Review of Systems   HENT:          Right neck dressing noted soaked with bleeding   Respiratory:  Negative for cough and shortness of breath.    Gastrointestinal:  Negative for nausea and vomiting.   Genitourinary:  Negative for dysuria.   Neurological:  Negative for dizziness and weakness.        Physical Exam  Temp:  [35.9 °C (96.6 °F)-37.2 °C (98.9 °F)] 35.9 °C (96.7 °F)  Pulse:  [72-93] 86  Resp:  [12-20] 16  BP: ()/(67-86) 123/84  SpO2:  [93 %-98 %] 95 %    Physical Exam  Constitutional:       Appearance: Normal appearance.   Cardiovascular:      Rate and Rhythm: Normal rate and regular rhythm.   Pulmonary:      Effort: Pulmonary effort is normal.   Abdominal:      General: Abdomen is flat. There is no distension.   Neurological:      Mental Status: She is alert and oriented to person, place, and time.      Comments: Motor strength 5 out of 5 all extremity, weak left hand          Fluids    Intake/Output Summary (Last 24 hours) at 8/7/2024 1425  Last data filed at 8/7/2024 1049  Gross per 24 hour   Intake 1400 ml   Output 686 ml   Net 714 ml       Laboratory  Recent Labs     08/07/24  0029   WBC 11.8*   RBC 3.76*   HEMOGLOBIN 12.0   HEMATOCRIT 36.7*   MCV 97.6   MCH 31.9   MCHC 32.7   RDW 48.0   PLATELETCT 178   MPV 9.9       Recent Labs     08/06/24  0136 08/07/24  0029   SODIUM 139 138   POTASSIUM 3.8 4.3   CHLORIDE 105 103   CO2 19* 19*   GLUCOSE 96 191*   BUN 10 10   CREATININE 0.86 0.84   CALCIUM 8.6 8.4*                       Imaging  DX-PORTABLE FLUORO > 1 HOUR   Preliminary Result      Portable fluoroscopy utilized for 28 seconds.         INTERPRETING LOCATION: [address] [place] [address], [facility]      DX-OPERATIVE ANGIOGRAM EACH PROJ   Preliminary Result      Digitized intraoperative radiograph is submitted for review. This examination is not for diagnostic purpose but for guidance during a surgical procedure. Please see the patient's chart for full  procedural details.         INTERPRETING LOCATION: [address], [place], [address]      CT-CHEST (THORAX) W/O   Final Result      1.  Findings of chronic interstitial lung disease and pneumonia. No acute infiltrate detected.   2.  Stable moderate mediastinal adenopathy.   3.  Indeterminate fat stranding around the superior left kidney. May be further assessed with abdominal CT.      Fleischner Society pulmonary nodule recommendations:   Not Applicable         MR-BRAIN-W/O   Final Result      1.  Mild chronic microvascular ischemic type changes.   2.  Tiny chronic right parietal infarct.   3.  No acute intracranial abnormality.   4.  Paranasal sinus disease.      EC-ECHOCARDIOGRAM COMPLETE W/O CONT   Final Result      CT-CTA NECK WITH & W/O-POST PROCESSING   Final Result      1.  Severe greater than 70% right ICA origin stenosis.   2.  60-70% left ICA origin stenosis.   3.  Lung apices demonstrate emphysema. Extensive irregular interstitial opacities in the lung apices which could be chronic changes from interstitial lung disease and/or interstitial infiltrates not excluded.   4.  Moderate mediastinal and mild hilar lymphadenopathy.         CT-CTA HEAD WITH & W/O-POST PROCESS   Final Result      1.  No acute arterial abnormality. No large vessel occlusion or aneurysm detected.   2.  Diffuse paranasal sinusitis.           Assessment/Plan  * TIA (transient ischemic attack)- (present on admission)  Assessment & Plan  74-year-old female with history of peripheral vascular disease presented with transient left arm weakness, transient headache and CTA of the neck showing severe right ICA stenosis and moderate left ICA stenosis    Telemetry monitoring  Continue on aspirin, Lipitor  Neurochecks every 4 hours  Echocardiogram noted with pulmonary hypertension otherwise unremarkable  PT/OT evaluation  S/p r right CEA        Bacteremia  Assessment & Plan  UA positive for nitrate, leukocyte esterase, 1 out of 2 blood culture growing  gram-negative douglas.  Follow repeat blood culture  Continue IV antibiotic  Need close monitoring of blood counts, electrolytes and renal function due to potential of organ dysfunction from ongoing bacteremia  Requiring IV antibiotics, need close monitoring for toxicity    Hypotension  Assessment & Plan  Due to ongoing bacteremia  Improved with IV antibiotics and IV fluids    Pneumonia  Assessment & Plan  Pneumonia ruled out  Currently on IV antibiotic for UTI and bacteremia        Hypokalemia  Assessment & Plan  Replace KCl  Repeat labs in a.m.    Perianal dermatitis  Assessment & Plan  Topical clotrimazole    Chronic hypoxemic respiratory failure (HCC)- (present on admission)  Assessment & Plan  Continue supplemental oxygen 1 to 2 L nasal cannula    Pulmonary hypertension (HCC)- (present on admission)  Assessment & Plan  Echo: 3/2023  Right ventricular systolic pressure is estimated to be 35 mmHg.  Repeat echo noted worsening pulmonary hypertension, RVSP 50 mmHg       Chronic obstructive pulmonary disease (HCC)- (present on admission)  Assessment & Plan  Not in exacerbation  Continue Symbicort, DuoNeb as needed      Acute kidney injury on CKD (chronic kidney disease) stage 3, GFR 30-59 ml/min- (present on admission)  Assessment & Plan  DARRIN improved  Continue to monitor on IV antibiotics         VTE prophylaxis: heparin sc    I have performed a physical exam and reviewed and updated ROS and Plan today (8/7/2024). In review of yesterday's note (8/6/2024), there are no changes except as documented above.

## 2024-08-07 NOTE — THERAPY
Physical Therapy   Initial Evaluation     Patient Name: Naomy Vargas  Age:  74 y.o., Sex:  female  Medical Record #: [id number]  Today's Date: 8/7/2024          Assessment  Patient is 74 y.o. female admitted for cc of L sided weakness which has appeared to return to baseline since. Pt in bed upon arrival and agreeable to transferring to chair for breakfast with no assistance or AD. After breakfast, pt was agreeable to amb, standing from chair and amb 450ft in triana with SBA on supplemental O2, which is baseline. Pt amb with mildly unsteady side to side weaving which pt noted was normal, but experienced no LOB. Pt repositioned herself supine in bed requiring no assistance. Pt returned to wall O2 as found. Pt noted she plans on amb with nursing staff and has no concerns for returning home. Pt appears to have a good prognosis and denies any need for HH upon d/c home. Plan to continue following pt while she remains in hospital.     Plan    Physical Therapy Initial Treatment Plan   Treatment Plan : Family / Caregiver Training, Equipment, Gait Training, Manual Therapy, Neuro Re-Education / Balance, Self Care / Home Evaluation, Stair Training, Therapeutic Activities, Therapeutic Exercise  Treatment Frequency: 1 Time per Week  Duration: Until Therapy Goals Met    DC Equipment Recommendations: None (pt denied any use or desire to use AD)  Discharge Recommendations: Anticipate that the patient will have no further physical therapy needs after discharge from the hospital (pt denied want of HH)       Subjective/ Objective     08/07/24 1022   Cognition    Cognition / Consciousness WDL   Level of Consciousness Alert   Comments pt was agreeable and cooperative after she had breakfast!   Active ROM Lower Body    Active ROM Lower Body  WDL   Comments observed during transfers/ gait   Strength Lower Body   Lower Body Strength  WDL   Comments observed during gait/ transfers   Sensation Lower Body   Lower Extremity Sensation   WDL    Comments none noted   Vision   Vision Comments none noted   Balance Assessment   Sitting Balance (Static) Good   Sitting Balance (Dynamic) Fair +   Standing Balance (Static) Fair +   Standing Balance (Dynamic) Fair   Weight Shift Sitting Good   Weight Shift Standing Fair   Comments pt balance observed with gait and transfers without any AD. Pt noted she feels pretty close to baseline and has been walking with nursing.   Bed Mobility    Supine to Sit   (not observed)   Sit to Supine Supervised   Scooting Supervised   Comments pt completed bed mobility without assistance   Gait Analysis   Gait Level Of Assist Standby Assist  (pt appeared a bit wobbly, but pt notes this is baseline for her)   Assistive Device None   Distance (Feet) 450   # of Times Distance was Traveled 1   Deviation   (a bit wobbly with mild side to side weaving but no LOB)   # of Stairs Climbed 0  (pt denied any worry about amb stairs upon arrival home)   Weight Bearing Status Full   Vision Deficits Impacting Mobility none   Comments pt amb with SBA, no AD and amb with slight side to side deviations but no LOB experienced. Pt noted this is close to baseline for her.   Functional Mobility   Sit to Stand Supervised   Bed, Chair, Wheelchair Transfer Supervised   Transfer Method Stand Step   Comments pt did not require any assistance with functional mobility/ transfers - no AD   Patient / Family Goals    Patient / Family Goal #1 home - return to independent self/ activities   Short Term Goals    Short Term Goal # 1 pt will amb in triana with good balance and no AD, supervision by the end of 6 sessions for independence in community amb   Education Group   Education Provided Role of Physical Therapist;Gait Training   Role of Physical Therapist Patient Response Patient;Acceptance;Explanation;Verbal Demonstration   Gait Training Patient Response Patient;Acceptance;Demonstration;Explanation;Verbal Demonstration;Action Demonstration   Additional Comments pt  appeared understanding of education

## 2024-08-07 NOTE — ANESTHESIA POSTPROCEDURE EVALUATION
Patient: Naomy Vargas    Procedure Summary       Date: 08/06/24 Room / Location: [facility] 09 / SURGERY [facility]    Anesthesia Start: 1643 Anesthesia Stop: 1852    Procedures:       RIGHT CAROTID ENDARTERECTOMY WITH NEUROMONITORING (Right: Neck)      Direct sheath insertion in the right carotid artery and right carotid angiogram (Right: Neck) Diagnosis: (SYMPTOMATIC RIGHT CAROTID STENOSIS)    Surgeons: Valerio Cortés M.D. Responsible Provider: Dain Taylor M.D.    Anesthesia Type: general ASA Status: 4            Final Anesthesia Type: general  Last vitals  BP   Blood Pressure : (!) 145/72    Temp   36.2 °C (97.2 °F)    Pulse   78   Resp   14    SpO2   94 %      Anesthesia Post Evaluation    Patient location during evaluation: PACU  Patient participation: complete - patient participated  Level of consciousness: awake and alert    Airway patency: patent  Anesthetic complications: no  Cardiovascular status: hemodynamically stable  Respiratory status: acceptable  Hydration status: euvolemic    PONV: none          No notable events documented.     Nurse Pain Score: 0 (NPRS)

## 2024-08-07 NOTE — ANESTHESIA PROCEDURE NOTES
Airway    Date/Time: 8/6/2024 4:59 PM    Performed by: Dain Taylor M.D.  Authorized by: Dain Taylor M.D.    Location:  OR  Urgency:  Emergent  Consent Cannot be Obtained Due to Urgency: Yes    Indications for Airway Management:  Respiratory failure      Spontaneous Ventilation: absent    Preoxygenated: Yes    Patient Position:  Sniffing  Mask Difficulty Assessment:  1 - vent by mask  Final Airway Type:  Endotracheal airway  Final Endotracheal Airway:  ETT  Cuffed: Yes    Technique Used for Successful ETT Placement:  Direct laryngoscopy  Devices/Methods Used in Placement:  Cricoid pressure    Insertion Site:  Oral  Blade Type:  Salazar  Laryngoscope Blade/Videolaryngoscope Blade Size:  2  ETT Size (mm):  7.0  Measured from:  Teeth  ETT to Teeth (cm):  21  Placement Verified by: auscultation and capnometry    Cormack-Lehane Classification:  Grade I - full view of glottis  Number of Attempts at Approach:  1

## 2024-08-07 NOTE — PROGRESS NOTES
"       VASCULAR SURGERY SERVICE                        Progress Note  _________________________________    Stable overnight, feeling well    /84   Pulse 73   Temp 35.9 °C (96.7 °F) (Temporal)   Resp 16   Ht 1.719 m (5' 7.68\")   Wt 63.2 kg (139 lb 5.3 oz)   LMP  (LMP Unknown)   SpO2 97%   BMI 21.39 kg/m²       Mild incisional oozing  ANITA drain with minimal output, I removed the drain and there is mild oozing from the drain removal site  New bandage applied  Neuro intact    A/P)  Doing well  We will monitor the amount of oozing until the afternoon and depending on how much oozing there is we will determine if we can clear her for discharge today or need to keep her overnight again.    Otherwise no surgical concerns    Holding heparin and Toradol.  No further need for Plavix.  Aspirin 81 mg daily can continue    Appreciate hospitalist support    Valerio Cortés MD  [facility]   Voalte preferred or call my office [phone]  __________________________________________________  Patient:Naomy Vargas   MRN:[id number]         "

## 2024-08-07 NOTE — ANESTHESIA PROCEDURE NOTES
Peripheral IV    Date/Time: 8/6/2024 5:09 PM    Performed by: Dain Taylor M.D.  Authorized by: Dain Taylor M.D.    Size:  20 G  Laterality:  Right  Peripheral IV Location:  Forearm  Site Prep:  Alcohol  Technique:  Direct puncture

## 2024-08-07 NOTE — ANESTHESIA TIME REPORT
Anesthesia Start and Stop Event Times       Date Time Event    8/6/2024 1632 Ready for Procedure     1643 Anesthesia Start     1852 Anesthesia Stop          Responsible Staff  08/06/24      Name Role Begin End    Dain Taylor M.D. Anesth 1643 1852          Overtime Reason:  no overtime (within assigned shift)    Comments:

## 2024-08-08 ENCOUNTER — PHARMACY VISIT (OUTPATIENT)
Dept: PHARMACY | Facility: MEDICAL CENTER | Age: 74
End: 2024-08-08
Payer: COMMERCIAL

## 2024-08-08 VITALS
BODY MASS INDEX: 21.12 KG/M2 | HEIGHT: 68 IN | OXYGEN SATURATION: 94 % | HEART RATE: 80 BPM | DIASTOLIC BLOOD PRESSURE: 61 MMHG | TEMPERATURE: 97.2 F | RESPIRATION RATE: 16 BRPM | WEIGHT: 139.33 LBS | SYSTOLIC BLOOD PRESSURE: 123 MMHG

## 2024-08-08 LAB
BASOPHILS # BLD AUTO: 0.4 % (ref 0–1.8)
BASOPHILS # BLD: 0.04 K/UL (ref 0–0.12)
EOSINOPHIL # BLD AUTO: 0.15 K/UL (ref 0–0.51)
EOSINOPHIL NFR BLD: 1.6 % (ref 0–6.9)
ERYTHROCYTE [DISTWIDTH] IN BLOOD BY AUTOMATED COUNT: 46.5 FL (ref 35.9–50)
HCT VFR BLD AUTO: 28.8 % (ref 37–47)
HGB BLD-MCNC: 9.3 G/DL (ref 12–16)
IMM GRANULOCYTES # BLD AUTO: 0.05 K/UL (ref 0–0.11)
IMM GRANULOCYTES NFR BLD AUTO: 0.5 % (ref 0–0.9)
LYMPHOCYTES # BLD AUTO: 1.29 K/UL (ref 1–4.8)
LYMPHOCYTES NFR BLD: 13.4 % (ref 22–41)
MCH RBC QN AUTO: 30.9 PG (ref 27–33)
MCHC RBC AUTO-ENTMCNC: 32.3 G/DL (ref 32.2–35.5)
MCV RBC AUTO: 95.7 FL (ref 81.4–97.8)
MONOCYTES # BLD AUTO: 0.75 K/UL (ref 0–0.85)
MONOCYTES NFR BLD AUTO: 7.8 % (ref 0–13.4)
NEUTROPHILS # BLD AUTO: 7.35 K/UL (ref 1.82–7.42)
NEUTROPHILS NFR BLD: 76.3 % (ref 44–72)
NRBC # BLD AUTO: 0 K/UL
NRBC BLD-RTO: 0 /100 WBC (ref 0–0.2)
PLATELET # BLD AUTO: 183 K/UL (ref 164–446)
PMV BLD AUTO: 10 FL (ref 9–12.9)
RBC # BLD AUTO: 3.01 M/UL (ref 4.2–5.4)
WBC # BLD AUTO: 9.6 K/UL (ref 4.8–10.8)

## 2024-08-08 PROCEDURE — 36415 COLL VENOUS BLD VENIPUNCTURE: CPT

## 2024-08-08 PROCEDURE — A9270 NON-COVERED ITEM OR SERVICE: HCPCS | Performed by: STUDENT IN AN ORGANIZED HEALTH CARE EDUCATION/TRAINING PROGRAM

## 2024-08-08 PROCEDURE — 700102 HCHG RX REV CODE 250 W/ 637 OVERRIDE(OP): Performed by: INTERNAL MEDICINE

## 2024-08-08 PROCEDURE — 700111 HCHG RX REV CODE 636 W/ 250 OVERRIDE (IP): Performed by: STUDENT IN AN ORGANIZED HEALTH CARE EDUCATION/TRAINING PROGRAM

## 2024-08-08 PROCEDURE — RXMED WILLOW AMBULATORY MEDICATION CHARGE: Performed by: STUDENT IN AN ORGANIZED HEALTH CARE EDUCATION/TRAINING PROGRAM

## 2024-08-08 PROCEDURE — A9270 NON-COVERED ITEM OR SERVICE: HCPCS | Performed by: INTERNAL MEDICINE

## 2024-08-08 PROCEDURE — 85025 COMPLETE CBC W/AUTO DIFF WBC: CPT

## 2024-08-08 PROCEDURE — 700101 HCHG RX REV CODE 250: Performed by: STUDENT IN AN ORGANIZED HEALTH CARE EDUCATION/TRAINING PROGRAM

## 2024-08-08 PROCEDURE — 700102 HCHG RX REV CODE 250 W/ 637 OVERRIDE(OP): Performed by: SURGERY

## 2024-08-08 PROCEDURE — 99239 HOSP IP/OBS DSCHRG MGMT >30: CPT | Performed by: STUDENT IN AN ORGANIZED HEALTH CARE EDUCATION/TRAINING PROGRAM

## 2024-08-08 PROCEDURE — 700102 HCHG RX REV CODE 250 W/ 637 OVERRIDE(OP): Performed by: STUDENT IN AN ORGANIZED HEALTH CARE EDUCATION/TRAINING PROGRAM

## 2024-08-08 PROCEDURE — A9270 NON-COVERED ITEM OR SERVICE: HCPCS | Performed by: SURGERY

## 2024-08-08 RX ORDER — ASCORBIC ACID 500 MG
500 TABLET ORAL DAILY
Qty: 30 TABLET | Refills: 0 | Status: SHIPPED | OUTPATIENT
Start: 2024-08-08 | End: 2024-09-07

## 2024-08-08 RX ORDER — ASPIRIN 81 MG/1
81 TABLET, CHEWABLE ORAL DAILY
Qty: 30 TABLET | Refills: 0 | Status: SHIPPED | OUTPATIENT
Start: 2024-08-09 | End: 2024-09-08

## 2024-08-08 RX ORDER — ATORVASTATIN CALCIUM 40 MG/1
40 TABLET, FILM COATED ORAL EVERY EVENING
Qty: 30 TABLET | Refills: 0 | Status: SHIPPED | OUTPATIENT
Start: 2024-08-08 | End: 2024-08-12 | Stop reason: SDUPTHER

## 2024-08-08 RX ORDER — UBIDECARENONE 75 MG
100 CAPSULE ORAL DAILY
Qty: 30 TABLET | Refills: 3 | Status: SHIPPED | OUTPATIENT
Start: 2024-08-08

## 2024-08-08 RX ADMIN — SODIUM BICARBONATE 650 MG: 650 TABLET ORAL at 04:17

## 2024-08-08 RX ADMIN — ACETAMINOPHEN 1000 MG: 500 TABLET ORAL at 04:17

## 2024-08-08 RX ADMIN — Medication 1000 UNITS: at 04:17

## 2024-08-08 RX ADMIN — HYDROCORTISONE: 0.01 CREAM TOPICAL at 04:17

## 2024-08-08 RX ADMIN — ACETAMINOPHEN 1000 MG: 500 TABLET ORAL at 00:24

## 2024-08-08 RX ADMIN — VITAM B12 100 MCG: 100 TAB at 04:17

## 2024-08-08 RX ADMIN — ASPIRIN 81 MG 81 MG: 81 TABLET ORAL at 04:17

## 2024-08-08 RX ADMIN — CEFTRIAXONE SODIUM 2000 MG: 10 INJECTION, POWDER, FOR SOLUTION INTRAVENOUS at 04:17

## 2024-08-08 RX ADMIN — LEVOTHYROXINE SODIUM 100 MCG: 0.1 TABLET ORAL at 07:48

## 2024-08-08 ASSESSMENT — PAIN DESCRIPTION - PAIN TYPE: TYPE: ACUTE PAIN

## 2024-08-08 NOTE — CARE PLAN
The patient is Stable - Low risk of patient condition declining or worsening    Shift Goals  Clinical Goals: stable neuro assessment, monitor for bleeding, stable BP, post op vitals  Patient Goals: go home  Family Goals: CASIMIRO    Problem: Knowledge Deficit - Stroke Education  Goal: Patient's knowledge of stroke and risk factors will improve  Description: Target End Date:  1-3 days or as soon as patient condition allows    Document in Patient Education    1.  Stroke education booklet provided  2.  Education regarding EMS activation, need for follow up, medication prescribed at discharge, risk factors for stroke/lifestyle modifications, warning signs and symptoms of stroke provided  Outcome: Progressing     Problem: Discharge Planning - Stroke  Goal: Ensure Stroke Core Measures are met prior to discharge  Description: Target End Date:  Prior to discharge or change in level of care    1. Patient discharged on antithrombotic therapy (Ischemic Stroke)  2. Patient discharged on intensive statin if LDL is greater than or equal to 70 mg/dl (Ischemic Stroke)  3. Patient discharged on anticoagulation therapy for patients with atrial fibrillation/flutter (Ischemic Stroke)  4. Smoking education/cessation provided if applicable  Outcome: Progressing

## 2024-08-08 NOTE — PROGRESS NOTES
Monitor Summary: AJ 74-92, OH 0.07, QRS 0.10, QT 0.49, with rare PACs/PVCs per strip from monitor room.

## 2024-08-08 NOTE — THERAPY
Occupational Therapy   Initial Evaluation     Patient Name: Naomy Vargas  Age:  74 y.o., Sex:  female  Medical Record #: [id number]  Today's Date: 8/7/2024     Assessment    Patient is 74 y.o. female POD #1 R CEA for symptomatic R carotid disease. Pt presents to OT eval able to demonstrate self-care independence and functional mobility at her baseline without AD at SPV level. Pt reports ample support at home from  and helpful roommate. No additional OT needs or concerns for DC once medically cleared.     Plan    Occupational Therapy Initial Treatment Plan   Duration: Evaluation only    DC Equipment Recommendations: None  Discharge Recommendations: Anticipate that the patient will have no further occupational therapy needs after discharge from the hospital      Objective     08/07/24 0937   Prior Living Situation   Prior Services None   Housing / Facility 1 Story House   Steps Into Home 4   Bathroom Set up Bathtub / Shower Combination;Shower Chair;Grab Bars   Equipment Owned Tub / Shower Seat;Grab Bar(s) In Tub / Shower   Lives with - Patient's Self Care Capacity Spouse;Unrelated Adult   Comments pt lives with  and roommate on the weekends who is very helpful.   Prior Level of ADL Function   Self Feeding Independent   Grooming / Hygiene Independent   Bathing Independent   Dressing Independent   Toileting Independent   Prior Level of IADL Function   Medication Management Independent   Laundry Independent   Kitchen Mobility Independent   Finances Independent   Home Management Independent   Shopping Independent   Prior Level Of Mobility Independent Without Device in Community   Driving / Transportation Driving Independent   Occupation (Pre-Hospital Vocational) Retired Due To Age   Cognition    Cognition / Consciousness WDL   Level of Consciousness Alert   Comments pleasant and cooperative   Active ROM Upper Body   Active ROM Upper Body  WDL   Strength Upper Body   Upper Body Strength  WDL   Sensation  Upper Body   Upper Extremity Sensation  WDL   Upper Body Muscle Tone   Upper Body Muscle Tone  WDL   Coordination Upper Body   Coordination WDL   Balance Assessment   Sitting Balance (Static) Good   Sitting Balance (Dynamic) Good   Standing Balance (Static) Fair +   Standing Balance (Dynamic) Fair +   Weight Shift Sitting Good   Weight Shift Standing Good   Comments no AD   Bed Mobility    Supine to Sit Supervised   Sit to Supine Supervised   ADL Assessment   Eating Independent   Grooming Standing;Independent   Upper Body Dressing Supervision   Lower Body Dressing Supervision   Toileting Supervision   How much help from another person does the patient currently need...   Putting on and taking off regular lower body clothing? 4   Bathing (including washing, rinsing, and drying)? 4   Toileting, which includes using a toilet, bedpan, or urinal? 4   Putting on and taking off regular upper body clothing? 4   Taking care of personal grooming such as brushing teeth? 4   Eating meals? 4   6 Clicks Daily Activity Score 24   Functional Mobility   Sit to Stand Independent   Bed, Chair, Wheelchair Transfer Supervised   Toilet Transfers Supervised   Transfer Method Stand Step   Mobility no AD   Activity Tolerance   Comments no deficits   Education Group   Education Provided Role of Occupational Therapist;Activities of Daily Living   Role of Occupational Therapist Patient Response Patient;Acceptance;Explanation;Verbal Demonstration   ADL Patient Response Patient;Acceptance;Explanation;Verbal Demonstration   Occupational Therapy Initial Treatment Plan    Duration Evaluation only   Problem List   Problem List None   Anticipated Discharge Equipment and Recommendations   DC Equipment Recommendations None   Discharge Recommendations Anticipate that the patient will have no further occupational therapy needs after discharge from the hospital

## 2024-08-08 NOTE — DISCHARGE SUMMARY
Discharge Summary    CHIEF COMPLAINT ON ADMISSION  Chief Complaint   Patient presents with    Sent from Urgent Care     Pt reports L arm weakness that started on Saturday around 09:00 am. Pt reports headache and confusion. Symptoms have now resolved.        Reason for Admission  Weakness/Sent by      Admission Date  7/29/2024    CODE STATUS  Full Code    HPI & HOSPITAL COURSE      Naomy Vargas is a 74 y.o. female with history of groin cellulitis, hypothyroidism, peripheral vascular disease, AAA, CKD 3, COPD, hypertension, chronic respiratory failure with hypoxia on 1 to 2 L nasal cannula, who presented 7/29/2024 with concern for transient left arm weakness .  Subsequent CT neck with greater than 70% right ICA stenosis.  Neurology evaluated and recommended right CEA.  Vascular evaluated and procedure canceled due to ongoing bacteremia.  MRI brain with tiny chronic right lateral infarct.  CT chest noted with chronic interstitial lung disease and pneumonia no acute infiltrate detected. S/p right carotid endarterectomy .  Neurology and vascular recommended aspirin on discharge.  Patient loss of blood from procedure.  Repeat hemoglobin 9.3.  No active bleeding on procedure site.  I have ordered repeat labs in 1 week.  This was discussed the patient.  Her blood pressure remained stable without antihypertensive.  I have held her lisinopril.  Recommended to monitor blood pressure closely at home.  She completed 10 days course of IV antibiotic for bacteremia.    At the time of discharge patient remain hemodynamically stable ,asymptomatic ,labs remain unremarkable .  Repeat blood culture remain negative.  Patient will be discharged with close follow up with PCP, vascular surgery, neurology.Discharge plan was discussed with patient in details .  Patient agreed with discharge plan  and  all questions answered.      Therefore, she is discharged in good and stable condition to home with close outpatient follow-up.    The  patient met 2-midnight criteria for an inpatient stay at the time of discharge.    Discharge Date  8/8/2024          DISCHARGE DIAGNOSES  Principal Problem:    TIA (transient ischemic attack) (POA: Yes)  Active Problems:    Acute kidney injury on CKD (chronic kidney disease) stage 3, GFR 30-59 ml/min (POA: Yes)      Overview: Diagnosed at hospital, has stopped NSAIDs and is drinking more water.       Last labs within range.    Chronic obstructive pulmonary disease (HCC) (POA: Yes)    Pulmonary hypertension (HCC) (POA: Yes)    Chronic hypoxemic respiratory failure (HCC) (POA: Yes)    Perianal dermatitis (POA: Unknown)    Hypokalemia (POA: Unknown)    Symptomatic carotid artery stenosis, right (POA: Yes)    Pneumonia (POA: Unknown)    Hypotension (POA: Unknown)    Bacteremia (POA: Unknown)  Resolved Problems:    * No resolved hospital problems. *      FOLLOW UP  Future Appointments   Date Time Provider Department Center   8/12/2024  1:20 PM Prema Patel P.A.-C. [facility]   8/20/2024  3:00 PM [address]   9/12/2024  3:30 PM Tali Lawrence M.D. NE[facility] KAREN Cortés M.D.  [address]  [facility] [address]  [facility] [id number]  [phone]    Follow up in 1 week(s)        MEDICATIONS ON DISCHARGE     Medication List        START taking these medications        Instructions   aspirin 81 MG Chew chewable tablet  Start taking on: August 9, 2024  Commonly known as: Asa   Chew 1 Tablet every day for 30 days.  Dose: 81 mg     atorvastatin 40 MG Tabs  Commonly known as: Lipitor   Take 1 Tablet by mouth every evening for 30 days.  Dose: 40 mg            CONTINUE taking these medications        Instructions   ascorbic acid 500 MG tablet  Commonly known as: Vitamin C   Take 1 Tablet by mouth every day for 30 days.  Dose: 500 mg     cyanocobalamin 100 MCG Tabs  Commonly known as: Vitamin B-12   Take 1 Tablet by mouth every day.  Dose: 100 mcg     levothyroxine 100 MCG Tabs  Commonly known as:  Synthroid   TAKE 1 TABLET BY MOUTH EVERY DAY BEFORE BREAKFAST     melatonin 3 MG Tabs   Take 3 mg by mouth at bedtime.  Dose: 3 mg     Mirabegron ER 50 MG Tb24   Take 50 mg by mouth every day.  Dose: 50 mg     * ProAir  (90 Base) MCG/ACT Aers inhalation aerosol  Generic drug: albuterol   Inhale 1 Puff every 6 hours as needed for Shortness of Breath.  Dose: 1 Puff     * albuterol 108 (90 Base) MCG/ACT Aers inhalation aerosol   Inhale 1-2 Puffs every 6 hours as needed for Shortness of Breath.  Dose: 1-2 Puff     rosuvastatin 10 MG Tabs  Commonly known as: Crestor   TAKE 1 TABLET BY MOUTH EVERY DAY IN THE EVENING     Symbicort 80-4.5 MCG/ACT Aero  Generic drug: budesonide-formoterol   INHALE 2 PUFFS BY MOUTH TWICE A DAY     vitamin D3 1000 Unit (25 mcg) Tabs  Commonly known as: Cholecalciferol   Take 1,000 Units by mouth every day.  Dose: 1,000 Units           * This list has 2 medication(s) that are the same as other medications prescribed for you. Read the directions carefully, and ask your doctor or other care provider to review them with you.                STOP taking these medications      lisinopril 20 MG Tabs  Commonly known as: Prinivil              Allergies  Allergies   Allergen Reactions    Colophony [Pinus Strobus] Itching    Latex Rash and Unspecified     .    Neosporin [Neomycin-Polymyxin B] Rash     .    Phenylene Itching    Soap Rash     Certain soaps cause rash    Tape Itching     PAPER TAPE OK    Tea Tree Oil Rash     .       DIET  Orders Placed This Encounter   Procedures    Diet Order Diet: Regular     Standing Status:   Standing     Number of Occurrences:   1     Order Specific Question:   Diet:     Answer:   Regular [1]       ACTIVITY  As tolerated.  Weight bearing as tolerated    CONSULTATIONS  Vascular    neurology  PROCEDURES  DX-PORTABLE FLUORO > 1 HOUR   Preliminary Result      Portable fluoroscopy utilized for 28 seconds.         INTERPRETING LOCATION: [address] [place] [address], [address]       DX-OPERATIVE ANGIOGRAM EACH PROJ   Preliminary Result      Digitized intraoperative radiograph is submitted for review. This examination is not for diagnostic purpose but for guidance during a surgical procedure. Please see the patient's chart for full procedural details.         INTERPRETING LOCATION: [address] [facility], [place], [address]      CT-CHEST (THORAX) W/O   Final Result      1.  Findings of chronic interstitial lung disease and pneumonia. No acute infiltrate detected.   2.  Stable moderate mediastinal adenopathy.   3.  Indeterminate fat stranding around the superior left kidney. May be further assessed with abdominal CT.      Fleischner Society pulmonary nodule recommendations:   Not Applicable         MR-BRAIN-W/O   Final Result      1.  Mild chronic microvascular ischemic type changes.   2.  Tiny chronic right parietal infarct.   3.  No acute intracranial abnormality.   4.  Paranasal sinus disease.      EC-ECHOCARDIOGRAM COMPLETE W/O CONT   Final Result      CT-CTA NECK WITH & W/O-POST PROCESSING   Final Result      1.  Severe greater than 70% right ICA origin stenosis.   2.  60-70% left ICA origin stenosis.   3.  Lung apices demonstrate emphysema. Extensive irregular interstitial opacities in the lung apices which could be chronic changes from interstitial lung disease and/or interstitial infiltrates not excluded.   4.  Moderate mediastinal and mild hilar lymphadenopathy.         CT-CTA HEAD WITH & W/O-POST PROCESS   Final Result      1.  No acute arterial abnormality. No large vessel occlusion or aneurysm detected.   2.  Diffuse paranasal sinusitis.              LABORATORY  Lab Results   Component Value Date    SODIUM 138 08/07/2024    POTASSIUM 4.3 08/07/2024    CHLORIDE 103 08/07/2024    CO2 19 (L) 08/07/2024    GLUCOSE 191 (H) 08/07/2024    BUN 10 08/07/2024    CREATININE 0.84 08/07/2024        Lab Results   Component Value Date    WBC 9.6 08/08/2024    HEMOGLOBIN 9.3 (L) 08/08/2024    HEMATOCRIT 28.8 (L)  08/08/2024    PLATELETCT 183 08/08/2024        Total time of the discharge process exceeds 37 minutes.

## 2024-08-08 NOTE — PROGRESS NOTES
Monitor summary: AJ, SR 75-94, DC 0.10, QRS 0.08, QT 0.39, with rare PVC, and rare & occasional PJC, per strip from monitor room.

## 2024-08-08 NOTE — DISCHARGE INSTRUCTIONS
Transient Ischemic Attack (TIA)  Discharge Instructions    You experienced a Transient Ischemic Attack.  If an artery that supplies brain tissue is blocked for a short time, the blood flow to that area of the brain slows down or stops, which may cause temporary or transient symptoms of a stroke. A TIA is a serious warning sign that you could have a stroke.      Treatment Received:  Carotid Stenosis and Carotid Endarterectomy    You received a Carotid Endarterectomy.  A carotid endarterectomy is a surgical procedure to clear blockages in the carotid artery.  The carotid arteries are the large arteries in the neck that supply blood to the brain. Carotid stenosis is the narrowing of the carotid arteries. This narrowing can put you at risk for a stroke because it decreases blood flow to the brain.     Home Care Instructions   It is normal to be sore for a couple weeks after surgery. See your provider if this seems to be getting worse rather than better.  Take showers, not baths, for a few days after surgery or until instructed otherwise by your caregiver. Do not take a bath or swim until directed by your surgeon.  Only take over-the-counter or prescription medicines for pain, discomfort, or fever as directed.  An anticoagulant may be prescribed after surgery. This medicine should be taken exactly as directed.  Change bandages (dressings) as directed by your provider.  Resume your normal activities as directed by your provider.  Avoid lifting until you are instructed otherwise.  Make an appointment to see your provider for suture or staple removal when instructed.    When to Seek Medical Care  There is increased bleeding from the wound.  You notice redness, swelling, or increasing pain in the wound.  You notice swelling in your neck or have difficulty breathing or talking.  You notice a bad smell or pus coming from the wound or dressing.  You have an oral temperature above 102° F (38.9° C).    Seek Medical Care  "Immediately If:  Your initial symptoms are getting worse instead of better.  You develop any abnormal bruising or bleeding.  You develop a rash.  You have difficulty breathing.  You develop any reaction or side effects to medicine given.  You develop chest pain, shortness of breath, or pain or swelling in your legs.  You have a return of symptoms or problems that caused you to have this surgery.  You have problems or concerns that have not been answered.    Stroke Risk Factors    You are at increased risk of having another stroke event.  See your Patient Stroke Guide to help reduce your stroke risk. These are your specific risk factors:  Age - Over 55  Cardiovascular disease  Carotid Endarterectomy   Heart disease  High blood pressure  Previous TIAs or \"mini strokes\"     Get help right away if you have any signs of a stroke.  \"BE FAST\" is an easy way to remember the main warning signs of a stroke:  B - Balance. Dizziness, sudden trouble walking, or loss of balance.  E - Eyes. Trouble seeing or a change in how you see.  F - Face. Sudden weakness or loss of feeling in the face. The face or eyelid may droop on one side.  A - Arms. Weakness or loss of feeling in an arm. This happens all of a sudden and most often on one side of the body.  S - Speech. Sudden trouble speaking, slurred speech, or trouble understanding what people say.  T - Time. Time to call emergency services. Write down what time symptoms started.    "

## 2024-08-08 NOTE — PROGRESS NOTES
"                 VASCULAR SURGERY               Inpatient Progress Note  _________________________________    Vitals (8/8/2024)  /61   Pulse 80   Temp 36.2 °C (97.2 °F) (Temporal)   Resp 16   Ht 1.719 m (5' 7.68\")   Wt 63.2 kg (139 lb 5.3 oz)   LMP  (LMP Unknown)   SpO2 94%   BMI 21.39 kg/m²   _________________________________      8/8/2024  Status post Carotid endarterectomy    She is doing very well this morning. Oozing from drain/ suture site has stopped. There is no active drainage from incision at this time.  She has stable right neck swelling consist with surgery. She is eager to go home this morning. Denies any dysphagia.       Vitals stable  Pleasant female in no apparent distress  Trachea midline, speech clear and coherent.   Right neck with edema and ecchymosis, soft  Moving all extremities well       A/P:  Status post CEA   She is doing very well today  Stable for d/c home today per vascular surgery  Asa 81mg daily   Appreciate Hospitalist service's support  She will f/u with Dr. Cortés/ vascular surgery in about 2 weeks for post operative appointment.  Please call if there are any further questions or concerns.        Irene Bunn PA-C  [facility]  [place] preferred or call my office [phone]  __________________________________________________________________  Patient:Naomy Vargas   MRN:[id number]   CSN:[id number]  "

## 2024-08-09 ENCOUNTER — PATIENT OUTREACH (OUTPATIENT)
Dept: MEDICAL GROUP | Facility: PHYSICIAN GROUP | Age: 74
End: 2024-08-09
Payer: MEDICARE

## 2024-08-09 NOTE — PROGRESS NOTES
Transitional Care Management  TCM Outreach Date and Time: Filed (8/9/2024  9:35 AM)    Discharge Questions  Actual Discharge Date: 08/08/24  Now that you are home, how are you feeling?: Very Good  Did you receive any new prescriptions?: Yes  Were you able to get them filled?: Yes  Meds to Bed or Pharmacy filled?: Meds to Bed  Do you have any questions about your current medications or new medications (Review Med Rec)?: No  Did you have any durable medical equipment ordered?: No  Do you have a follow up appointment scheduled with your PCP?: Yes  Appointment Date: 08/12/24  Appointment Time: 1320  Any issues or paperwork you wish to discuss with your PCP?: No  Are you (patient) able to get to the appointment?: Yes  If Home Health was ordered, have they contacted you (Patient): Not Applicable  Did you have enough support after your last discharge?: Yes  Does this patient qualify for the CCM program?: Yes (chart routed to ccm rn)    Transitional Care  Number of attempts made to contact patient: 1  Current or previous attempts completed within two business days of discharge? : Yes  Provided education regarding treatment plan, medications, self-management, ADLs?: No  Has patient completed an Advanced Directive?: Yes  Is the patient's advanced directive on file?: Yes  Has the Care Manager's phone number provided?: No  Is there anything else I can help you with?: No    Discharge Summary  Chief Complaint: Weakness/Sent by UC  Admitting Diagnosis: Weakness/Sent by UC  Discharge Diagnosis: TIA (transient ischemic attack)

## 2024-08-12 ENCOUNTER — HOSPITAL ENCOUNTER (OUTPATIENT)
Dept: LAB | Facility: MEDICAL CENTER | Age: 74
End: 2024-08-12
Attending: STUDENT IN AN ORGANIZED HEALTH CARE EDUCATION/TRAINING PROGRAM
Payer: MEDICARE

## 2024-08-12 ENCOUNTER — OFFICE VISIT (OUTPATIENT)
Dept: MEDICAL GROUP | Facility: PHYSICIAN GROUP | Age: 74
End: 2024-08-12
Payer: MEDICARE

## 2024-08-12 VITALS
SYSTOLIC BLOOD PRESSURE: 128 MMHG | HEART RATE: 88 BPM | HEIGHT: 67 IN | RESPIRATION RATE: 16 BRPM | BODY MASS INDEX: 23.39 KG/M2 | OXYGEN SATURATION: 93 % | TEMPERATURE: 98.5 F | WEIGHT: 149 LBS | DIASTOLIC BLOOD PRESSURE: 68 MMHG

## 2024-08-12 DIAGNOSIS — I10 PRIMARY HYPERTENSION: ICD-10-CM

## 2024-08-12 DIAGNOSIS — I65.22 STENOSIS OF LEFT CAROTID ARTERY: ICD-10-CM

## 2024-08-12 DIAGNOSIS — D50.0 IRON DEFICIENCY ANEMIA DUE TO CHRONIC BLOOD LOSS: ICD-10-CM

## 2024-08-12 DIAGNOSIS — D69.6 THROMBOCYTOPENIA (HCC): ICD-10-CM

## 2024-08-12 DIAGNOSIS — D50.9 IRON DEFICIENCY ANEMIA, UNSPECIFIED IRON DEFICIENCY ANEMIA TYPE: ICD-10-CM

## 2024-08-12 DIAGNOSIS — Z09 HOSPITAL DISCHARGE FOLLOW-UP: ICD-10-CM

## 2024-08-12 DIAGNOSIS — Z12.11 COLON CANCER SCREENING: ICD-10-CM

## 2024-08-12 DIAGNOSIS — G45.9 TIA (TRANSIENT ISCHEMIC ATTACK): ICD-10-CM

## 2024-08-12 LAB
BASOPHILS # BLD AUTO: 0.6 % (ref 0–1.8)
BASOPHILS # BLD: 0.05 K/UL (ref 0–0.12)
EOSINOPHIL # BLD AUTO: 0.25 K/UL (ref 0–0.51)
EOSINOPHIL NFR BLD: 3.2 % (ref 0–6.9)
ERYTHROCYTE [DISTWIDTH] IN BLOOD BY AUTOMATED COUNT: 52.5 FL (ref 35.9–50)
HCT VFR BLD AUTO: 32.4 % (ref 37–47)
HGB BLD-MCNC: 10.4 G/DL (ref 12–16)
IMM GRANULOCYTES # BLD AUTO: 0.05 K/UL (ref 0–0.11)
IMM GRANULOCYTES NFR BLD AUTO: 0.6 % (ref 0–0.9)
LYMPHOCYTES # BLD AUTO: 1.46 K/UL (ref 1–4.8)
LYMPHOCYTES NFR BLD: 18.6 % (ref 22–41)
MCH RBC QN AUTO: 31.7 PG (ref 27–33)
MCHC RBC AUTO-ENTMCNC: 32.1 G/DL (ref 32.2–35.5)
MCV RBC AUTO: 98.8 FL (ref 81.4–97.8)
MONOCYTES # BLD AUTO: 0.72 K/UL (ref 0–0.85)
MONOCYTES NFR BLD AUTO: 9.2 % (ref 0–13.4)
NEUTROPHILS # BLD AUTO: 5.32 K/UL (ref 1.82–7.42)
NEUTROPHILS NFR BLD: 67.8 % (ref 44–72)
NRBC # BLD AUTO: 0 K/UL
NRBC BLD-RTO: 0 /100 WBC (ref 0–0.2)
PLATELET # BLD AUTO: 184 K/UL (ref 164–446)
PMV BLD AUTO: 11.5 FL (ref 9–12.9)
RBC # BLD AUTO: 3.28 M/UL (ref 4.2–5.4)
WBC # BLD AUTO: 7.9 K/UL (ref 4.8–10.8)

## 2024-08-12 PROCEDURE — 36415 COLL VENOUS BLD VENIPUNCTURE: CPT

## 2024-08-12 PROCEDURE — 99215 OFFICE O/P EST HI 40 MIN: CPT | Performed by: PHYSICIAN ASSISTANT

## 2024-08-12 PROCEDURE — 85025 COMPLETE CBC W/AUTO DIFF WBC: CPT

## 2024-08-12 PROCEDURE — 3078F DIAST BP <80 MM HG: CPT | Performed by: PHYSICIAN ASSISTANT

## 2024-08-12 PROCEDURE — 3074F SYST BP LT 130 MM HG: CPT | Performed by: PHYSICIAN ASSISTANT

## 2024-08-12 RX ORDER — ATORVASTATIN CALCIUM 40 MG/1
40 TABLET, FILM COATED ORAL EVERY EVENING
Qty: 90 TABLET | Refills: 3 | Status: SHIPPED | OUTPATIENT
Start: 2024-08-12 | End: 2025-08-07

## 2024-08-12 ASSESSMENT — FIBROSIS 4 INDEX: FIB4 SCORE: 2.59

## 2024-08-13 NOTE — PROGRESS NOTES
"Verbal consent was acquired by the patient to use [employer] ambient listening note generation during this visit     Subjective:     HPI:   History of Present Illness  The patient is a 74-year-old female here for hospital follow-up and TIA.    She experienced left-sided weakness the day before her urgent care visit, which had slightly improved by the time she visited urgent care. She was subsequently sent to the ER where a bacterial infection was discovered in her blood. She scratched her buttocks after using bath toilet paper, which caused itching. She applied jock itch cream, which provided some relief. However, Bacitracin, similar to Neosporin, exacerbated her infection. She was treated in the hospital with antibiotics and steroids.  Patient underwent a right sided carotid endarterectomy post-surgery, her bandage was changed 8 to 10 times, but the area remained bloody. The doctor cleaned the area and informed her that she was bleeding from the incision, which ceased after 30 minutes of holding pressure. She was referred here to ensure the bleeding has ceased because her dog stepped on her neck a couple of days ago so her niece had to change her surgical dressing. She has a follow-up appointment scheduled with vascular surgery. She is currently taking baby aspirin and rosuvastatin. She was taken off amlodipine and lisinopril. She purchased a wrist cuff and a larger cuff, but both were inaccurate.      Health Maintenance: Completed    ROS:  Gen: no fevers/chills  Eyes: no changes in vision  ENT: no sore throat  Pulm: no sob, no cough  CV: no chest pain  GI: no nausea/vomiting  : no dysuria  MSk: no myalgias  Skin: no rash  Neuro: no headaches  Psych: no depression, no anxiety    Objective:     Exam:  /68   Pulse 88   Temp 36.9 °C (98.5 °F) (Temporal)   Resp 16   Ht 1.702 m (5' 7\")   Wt 67.6 kg (149 lb)   LMP  (LMP Unknown)   SpO2 93%   BMI 23.34 kg/m²  Body mass index is 23.34 kg/m².    PHYSICAL " EXAM  Gen: Alert and oriented, No apparent distress.  Skin: Warm, dry, good turgor, no rashes in visible areas.  HEENT: Normocephalic. Eyes conjunctiva clear lids without ptosis, pupils equal and reactive to light accommodation, ears normal shape and contour, surgical dressing in place right neck  Neck: Trachea midline, no masses, no thyromegaly  Lungs: Normal effort, CTA bilaterally, no wheezes, rhonchi, or rales  CV: Regular rate and rhythm. No murmurs, rubs, or gallops.  MSK: Normal gait, moves all extremities.  Neuro: Grossly non-focal.  Ext: No clubbing, cyanosis, edema.  Psych: Alert and oriented x3, normal affect and mood.     Results  Laboratory Studies reviewed and discussed with patient     Assessment & Plan:     1. Colon cancer screening  COLOGUARD (FIT DNA)      2. TIA (transient ischemic attack)  atorvastatin (LIPITOR) 40 MG Tab      3. Thrombocytopenia (HCC)  CBC WITHOUT DIFFERENTIAL      4. Iron deficiency anemia due to chronic blood loss  CBC WITHOUT DIFFERENTIAL          Assessment & Plan      1. Hospital discharge follow-up  - Chart and discharge summary were reviewed.   - Hospitalization and results reviewed with patient.   - Medications reviewed including instructions regarding high risk medications, dosing and side effects.  - Recommended Services: No services needed at this time  - Advance directive/POLST on file?  Yes    2. TIA (transient ischemic attack)  Patient recently diagnosed with TIA. MRI of brain showed tiny chronic right parietal infarct and mild chronic microvascular ischemic type changes. Referral was placed to neurology when she is discharged from the hospital but she never heard from the referral so printed out the information for her to call and schedule an appointment to follow-up on this.  Patient also was taking both atorvastatin and rosuvastatin so discontinued rosuvastatin today and refilled atorvastatin at higher dosage.  Also gave the patient an updated medication list.    - atorvastatin (LIPITOR) 40 MG Tab; Take 1 Tablet by mouth every evening for 360 days.  Dispense: 90 Tablet; Refill: 3    3. Thrombocytopenia (HCC)  Chronic, stable.  Most recent CBC showed platelets had normalized, however she is starting on a baby aspirin given her recent TIA so we will continue to monitor this regularly.  - CBC WITHOUT DIFFERENTIAL; Future    4. Iron deficiency anemia due to chronic blood loss  Chronic, not at goal.  Patient had moderate anemia on recent labs following her endarterectomy.  Does state it was complicated by bleeding so we will continue to monitor this with lab work.  - CBC WITHOUT DIFFERENTIAL; Future    5. Primary hypertension  Chronic, stable.  Blood pressure in the office at 128/60.  Patient recently was taken off of lisinopril and amlodipine due to hypotension.  Strongly recommended that she monitor her blood pressure regularly which we discussed in detail today.  Recommended bring her blood pressure cuff into her next appointment to ensure accuracy.  Also recommended blood pressure of less than 130/90, particularly given her recent stroke and carotid artery stenosis.    6. Colon cancer screening  Patient declined being referred to GI for a colonoscopy at this time. Instead they would like to proceed with Cologuard. I informed the patient that if the Cologuard comes back abnormal they will need to be referred to GI and will need a colonoscopy for further evaluation. They express understanding.     - COLOGUARD (FIT DNA)       HCC Gap Form    Last edited 08/12/24 16:56 PDT by Prema Patel P.A.-C.         I spent a total of 45 minutes with record review, exam, communication with the patient, communication with other providers, and documentation of this encounter.    Please note that this dictation was created using voice recognition software. I have made every reasonable attempt to correct obvious errors, but I expect that there are errors of grammar and possibly content  that I did not discover before finalizing the note.

## 2024-08-20 ENCOUNTER — HOSPITAL ENCOUNTER (OUTPATIENT)
Dept: RADIOLOGY | Facility: MEDICAL CENTER | Age: 74
End: 2024-08-20
Attending: NURSE PRACTITIONER
Payer: MEDICARE

## 2024-08-20 DIAGNOSIS — J84.9 ILD (INTERSTITIAL LUNG DISEASE) (HCC): ICD-10-CM

## 2024-08-20 DIAGNOSIS — J84.10 LUNG FIBROSIS (HCC): ICD-10-CM

## 2024-08-20 DIAGNOSIS — J43.2 CENTRILOBULAR EMPHYSEMA (HCC): ICD-10-CM

## 2024-08-20 PROCEDURE — 71250 CT THORAX DX C-: CPT

## 2024-08-21 DIAGNOSIS — I10 ESSENTIAL HYPERTENSION: ICD-10-CM

## 2024-08-21 RX ORDER — AMLODIPINE BESYLATE 5 MG/1
5 TABLET ORAL
Qty: 100 TABLET | Refills: 0 | Status: SHIPPED | OUTPATIENT
Start: 2024-08-21

## 2024-08-21 NOTE — TELEPHONE ENCOUNTER
Received request via: Pharmacy    Was the patient seen in the last year in this department? Yes    Does the patient have an active prescription (recently filled or refills available) for medication(s) requested? No    Pharmacy Name: [id number] - [place], [place] - [address]     Does the patient have [facility] and need 100-day supply? (This applies to ALL medications) Yes, quantity updated to 100 days

## 2024-08-22 ENCOUNTER — OFFICE VISIT (OUTPATIENT)
Dept: VASCULAR SURGERY | Facility: MEDICAL CENTER | Age: 74
End: 2024-08-22
Payer: MEDICARE

## 2024-08-22 VITALS
HEIGHT: 67 IN | HEART RATE: 86 BPM | OXYGEN SATURATION: 95 % | TEMPERATURE: 98.7 F | SYSTOLIC BLOOD PRESSURE: 128 MMHG | BODY MASS INDEX: 23.07 KG/M2 | DIASTOLIC BLOOD PRESSURE: 64 MMHG | WEIGHT: 147 LBS

## 2024-08-22 DIAGNOSIS — I65.21 CAROTID ARTERY STENOSIS, ASYMPTOMATIC, RIGHT: ICD-10-CM

## 2024-08-22 ASSESSMENT — FIBROSIS 4 INDEX: FIB4 SCORE: 2.57

## 2024-08-22 NOTE — PROGRESS NOTES
"                 VASCULAR SURGERY                    Postop Note  _________________________________    8/22/2024    This patient is here to follow-up after left carotid endarterectomy with retrograde carotid angiogram.  As you recall this patient underwent a right carotid endarterectomy for asymptomatic right carotid artery stenosis.  At the time of the original CT there is the suggestion of possible inflow disease at the origin of the common carotid artery.  Dr. Cortés performed a retrograde angiogram confirming that there was no evidence of significant stenosis and proceeded with the endarterectomy as usual.  This was all performed without complication the patient is doing well postoperatively.  As you recall she does have home O2 dependent COPD and this appears to be at baseline.  The patient's contralateral side was moderate estimated at approximately 60%.    Patient reports no neurologic complaints and doing well from all respects      Vitals  /64 (BP Location: Left arm, Patient Position: Sitting, BP Cuff Size: Adult)   Pulse 86   Temp 37.1 °C (98.7 °F) (Temporal)   Ht 1.702 m (5' 7\")   Wt 66.7 kg (147 lb)   LMP  (LMP Unknown)   SpO2 95%   BMI 23.02 kg/m²     Exam  Incision healing well  Clean, dry and intact  No erythema or drainage  Neurologically intact          DiagnosisAssessment:    Symptomatic right carotid artery stenosis status post carotid endarterectomy  Moderate contralateral disease asymptomatic    Plan:      Patient will receive a carotid duplex in 3 months to follow that left side.  Follow-up      The patient demonstrates a clear understanding of our discussion and agrees.     Follow up:    Dr. Cortés 3 months with carotid duplex      Lane Hernandez M.D.  [facility]  [phone]  [address] [facility] [address] [facility] [address], [facility] [address]  "

## 2024-08-29 ENCOUNTER — APPOINTMENT (OUTPATIENT)
Dept: NEUROLOGY | Facility: MEDICAL CENTER | Age: 74
End: 2024-08-29
Attending: PSYCHIATRY & NEUROLOGY
Payer: MEDICARE

## 2024-09-02 NOTE — PROGRESS NOTES
Vascular Neurology Clinic Note  [facility]    Referring Physician: Prema Patel P.A.-C.    HPI: Naomy Vargas is a 74 year old woman with hypertension, hyperlipidemia, presenting to [facility] Neurovascular clinic following an inpatient admission for stroke/TIA in late July 2024.  Let me summarize her neurologic care.  I saw Naomy on 7/29/2024 after she presented to [facility] ER with transient L arm weakness.  She reports that her symptoms started a few days prior to her presentation, lasting a couple hours.  It was associated with brain fog and mild confusion.  In the ER, a stroke protocol CT revealed a high grade stenosis of the cervical R ICA.   I placed her on DAPT, and vascular surgery was consulted for R CEA.  Unfortunately, blood cultures were positive and surgery was delayed, but she eventually underwent uncomplicated CEA on 8/6/2024.  She was discharged on ASA, high intensity statin and BP control for secondary stroke prevention.    Since discharge, Naomy reports that she is doing well.  She denies any recurrent stroke symptoms.  She does have some mild neck soreness at the incision site.   She is having some easy fatigueability and mild cognitive fog.  She is compliant with all medications, with no bleeding diathesis associated with her ASA use.     Review of systems: In addition to what is detailed in the HPI above, all other systems reviewed and are negative.    Past Medical History:    has a past medical history of Abnormal CT of the chest, Acute hypoxemic respiratory failure (HCC) (03/23/2023), Anxiety, Arthritis, Back pain, Blood transfusion without reported diagnosis, Breath shortness, Cataract, Cellulitis, Cellulitis of leg (11/01/2013), Chickenpox, COPD (chronic obstructive pulmonary disease) (HCC), Dental disorder, Earache, Elevated troponin, Fatigue, Frequent urination, Hypertension (02/23/2018), Hypothyroidism, Influenza, Insomnia, Kidney disease, Mumps, OAB  (overactive bladder) (02/11/2019), Pain (02/23/2018), Peripheral vascular disease, unspecified ([facility]) (10/13/2021), Pneumonia, Rash, Ringing in ears, Sepsis ([facility]) (03/23/2023), Shortness of breath, Swelling of lower extremity, Thyroid disease, Tonsillitis, Toothache, and Venous stasis dermatitis.    She has no past medical history of Anemia, Clotting disorder ([facility]), Cough, Depression, Diabetic neuropathy ([facility]), Headache(784.0), HIV (human immunodeficiency virus infection) ([facility]), Hyperlipidemia, IBD (inflammatory bowel disease), Meningitis, Osteoporosis, Painful breathing, Sputum production, Ulcer, or Wheezing.    FHx:  family history includes Alcohol/Drug in her father; Arthritis in her father, maternal grandmother, and mother; Cancer in her mother; Diabetes in her maternal grandfather and mother; Hypertension in her sister; Lung Disease in her sister; No Known Problems in her brother, brother, sister, son, and son; Other in her brother.    SHx:   reports that she quit smoking about 7 years ago. Her smoking use included cigarettes. She started smoking about 60 years ago. She has a 26.5 pack-year smoking history. She has been exposed to tobacco smoke. She has never used smokeless tobacco. She reports current alcohol use. She reports that she does not use drugs.    Allergies:  Allergies   Allergen Reactions    Colophony [Pinus Strobus] Itching    Latex Rash and Unspecified     .    Neosporin [Neomycin-Polymyxin B] Rash     .    Phenylene Itching    Soap Rash     Certain soaps cause rash    Tape Itching     PAPER TAPE OK    Tea Tree Oil Rash     .       Medications:    Current Outpatient Medications:     amLODIPine (NORVASC) 5 MG Tab, TAKE 1 TABLET BY MOUTH EVERY DAY, Disp: 100 Tablet, Rfl: 0    atorvastatin (LIPITOR) 40 MG Tab, Take 1 Tablet by mouth every evening for 360 days., Disp: 90 Tablet, Rfl: 3    ascorbic acid (VITAMIN C) 500 MG tablet, Take 1 Tablet by mouth every day for 30 days., Disp: 30 Tablet, Rfl: 0     "aspirin (ASA) 81 MG Chew Tab chewable tablet, Chew and swallow 1 Tablet every day for 30 days., Disp: 30 Tablet, Rfl: 0    cyanocobalamin (VITAMIN B-12) 100 MCG Tab, Take 1 Tablet by mouth every day., Disp: 30 Tablet, Rfl: 3    melatonin 3 MG Tab, Take 3 mg by mouth at bedtime., Disp: , Rfl:     Mirabegron ER 50 MG TABLET SR 24 HR, Take 50 mg by mouth every day., Disp: , Rfl:     levothyroxine (SYNTHROID) 100 MCG Tab, TAKE 1 TABLET BY MOUTH EVERY DAY BEFORE BREAKFAST, Disp: 100 Tablet, Rfl: 1    SYMBICORT 80-4.5 MCG/ACT Aerosol, INHALE 2 PUFFS BY MOUTH TWICE A DAY, Disp: 30.6 Each, Rfl: 2    albuterol 108 (90 Base) MCG/ACT Aero Soln inhalation aerosol, Inhale 1-2 Puffs every 6 hours as needed for Shortness of Breath., Disp: 8.5 g, Rfl: 2    PROAIR  (90 Base) MCG/ACT Aero Soln inhalation aerosol, Inhale 1 Puff every 6 hours as needed for Shortness of Breath., Disp: 8.5 g, Rfl: 1    vitamin D (CHOLECALCIFEROL) 1000 Unit (25 mcg) Tab, Take 1,000 Units by mouth every day., Disp: , Rfl:     Physical Examination:   /62 (BP Location: Left arm, Patient Position: Sitting, BP Cuff Size: Adult)   Pulse 85   Temp 37.3 °C (99.1 °F) (Temporal)   Resp 16   Ht 1.702 m (5' 7\")   Wt 65.3 kg (144 lb)   LMP  (LMP Unknown)   SpO2 95% Comment: 1 liter  BMI 22.55 kg/m²     NEUROLOGICAL EXAM:     Mental status: Awake, alert and fully oriented  Speech and language: Speech is clear and fluent. The patient is able to name and repeat, and follow commands  Cranial nerve exam:  Visual fields are full, extraocular movements are intact, face is symmetric  Motor exam: There is sustained antigravity with no downward drift in bilateral arms and legs.    Sensory exam:  Intact to light touch in all 4 distal extremities, there is no neglect to double stim  Coordination: No ataxia on spontaneous movements  Gait: Deferred due to patient preference    NIHSS: National Institutes of Health Stroke Scale    [0] 1a:Level of Consciousness   "  0-alert 1-drowsy   2-stupor   3-coma  [0] 1b:LOC Questions                  0-both  1-one      2-neither  [0] 1c:LOC Commands                   0-both  1-one      2-neither  [0] 2: Best Gaze                     0-nl    1-partial  2-forced  [0] 3: Visual Fields                   0-nl    1-partial  2-complete 3-bilat  [0] 4: Facial Paresis                0-nl    1-minor    2-partial  3-full  MOTOR                       0-nl  [0] 5: Right Arm           1-drift  [0] 6: Left Arm             2-some effort vs gravity  [0] 7: Right Leg           3-no effort vs gravity  [0] 8: Left Leg             4-no movement                             x-untestable  [0] 9: Limb Ataxia                    0-abs   1-1_limb   2-2+_limbs       x-untestable  [0] 10:Sensory                        0-nl    1-partial  2-dense  [0] 11:Best Language/Aphasia         0-nl    1-mild/mod 2-severe   3-mute  [0] 12:Dysarthria                     0-nl    1-mild/mod 2-severe       x-untestable  [0] 13:Neglect/Inattention            0-none  1-partial  2-complete  [0] TOTAL      Baseline Modified Henderson Scale (MRS): 0 = No symptoms    Objective Data:    Labs  Lab Results   Component Value Date/Time    WBC 7.9 08/12/2024 02:24 PM    RBC 3.28 (L) 08/12/2024 02:24 PM    HEMOGLOBIN 10.4 (L) 08/12/2024 02:24 PM    HEMATOCRIT 32.4 (L) 08/12/2024 02:24 PM    PLATELETCT 184 08/12/2024 02:24 PM      Lab Results   Component Value Date/Time    SODIUM 138 08/07/2024 12:29 AM    POTASSIUM 4.3 08/07/2024 12:29 AM    CHLORIDE 103 08/07/2024 12:29 AM    CO2 19 (L) 08/07/2024 12:29 AM    GLUCOSE 191 (H) 08/07/2024 12:29 AM    BUN 10 08/07/2024 12:29 AM    CREATININE 0.84 08/07/2024 12:29 AM      Lab Results   Component Value Date/Time    CHOLSTRLTOT 66 (L) 07/30/2024 03:29 AM    LDL 24 07/30/2024 03:29 AM    HDL 25 (A) 07/30/2024 03:29 AM    TRIGLYCERIDE 85 07/30/2024 03:29 AM       Lab Results   Component Value Date/Time    HBA1C 5.3 07/29/2024 02:15 PM            Imaging/Testing:  I interpreted and/or reviewed the patient's neuroimaging    MRI brain (7/30/2024):  IMPRESSION:     1.  Mild chronic microvascular ischemic type changes.  2.  Tiny chronic right parietal infarct.  3.  No acute intracranial abnormality.  4.  Paranasal sinus disease.      CT angiogram of head (7/29/2024):  IMPRESSION:     1.  No acute arterial abnormality. No large vessel occlusion or aneurysm detected.  2.  Diffuse paranasal sinusitis.    CT angiogram of neck (7/29/2024):  IMPRESSION:     1.  Severe greater than 70% right ICA origin stenosis.  2.  60-70% left ICA origin stenosis.  3.  Lung apices demonstrate emphysema. Extensive irregular interstitial opacities in the lung apices which could be chronic changes from interstitial lung disease and/or interstitial infiltrates not excluded.  4.  Moderate mediastinal and mild hilar lymphadenopathy    Assessment and Plan:  Naomy Vargas is a 74 year old woman with hypertension, hyperlipidemia, presenting with transient L arm weakness, found to have R cervical ICA severe stenosis.  Her clinical symptoms were thought to be secondary to symptomatic carotid disease which warranted expedited surgical revascularization.  She is now s/p R CEA.  Long-term secondary stroke prevention is ASA, high intensity statin, and BP control.    TOAST classification:  [X] Large artery atherosclerosis  [] Cardioembolic  [] Small vessel disease (lacunar)  [] Other:   [] Undetermined    Problem list:   Transient cerebral ischemia   Right symptomatic carotid stenosis   Hypertension   Hyperlipidemia      Plan:   - continue ASA 81mg daily   - continue atorvastatin 40mg daily for goal LDL < 70   - long-term BP control is 110-130/60-80, titrate PO anti-HTN to goal   - return to clinic in 6 months    Franklin Estrada MD  Vascular Neurology

## 2024-09-05 ENCOUNTER — HOSPITAL ENCOUNTER (OUTPATIENT)
Dept: LAB | Facility: MEDICAL CENTER | Age: 74
End: 2024-09-05
Attending: INTERNAL MEDICINE
Payer: MEDICARE

## 2024-09-05 DIAGNOSIS — N18.2 CKD (CHRONIC KIDNEY DISEASE), STAGE II: ICD-10-CM

## 2024-09-05 DIAGNOSIS — D64.9 ANEMIA, UNSPECIFIED TYPE: ICD-10-CM

## 2024-09-05 DIAGNOSIS — I10 ESSENTIAL HYPERTENSION: ICD-10-CM

## 2024-09-05 DIAGNOSIS — R80.9 MICROALBUMINURIA: ICD-10-CM

## 2024-09-05 LAB
ERYTHROCYTE [DISTWIDTH] IN BLOOD BY AUTOMATED COUNT: 47.8 FL (ref 35.9–50)
GFR SERPLBLD CREATININE-BSD FMLA CKD-EPI: 53 ML/MIN/1.73 M 2
HCT VFR BLD AUTO: 35.7 % (ref 37–47)
HGB BLD-MCNC: 11.1 G/DL (ref 12–16)
MCH RBC QN AUTO: 29.8 PG (ref 27–33)
MCHC RBC AUTO-ENTMCNC: 31.1 G/DL (ref 32.2–35.5)
MCV RBC AUTO: 96 FL (ref 81.4–97.8)
PLATELET # BLD AUTO: 184 K/UL (ref 164–446)
PMV BLD AUTO: 11.7 FL (ref 9–12.9)
RBC # BLD AUTO: 3.72 M/UL (ref 4.2–5.4)
WBC # BLD AUTO: 7.8 K/UL (ref 4.8–10.8)

## 2024-09-05 PROCEDURE — 82043 UR ALBUMIN QUANTITATIVE: CPT

## 2024-09-05 PROCEDURE — 36415 COLL VENOUS BLD VENIPUNCTURE: CPT

## 2024-09-05 PROCEDURE — 82570 ASSAY OF URINE CREATININE: CPT

## 2024-09-05 PROCEDURE — 80048 BASIC METABOLIC PNL TOTAL CA: CPT

## 2024-09-05 PROCEDURE — 85027 COMPLETE CBC AUTOMATED: CPT

## 2024-09-06 ENCOUNTER — OFFICE VISIT (OUTPATIENT)
Dept: NEUROLOGY | Facility: MEDICAL CENTER | Age: 74
End: 2024-09-06
Attending: PSYCHIATRY & NEUROLOGY
Payer: MEDICARE

## 2024-09-06 VITALS
OXYGEN SATURATION: 95 % | HEART RATE: 85 BPM | RESPIRATION RATE: 16 BRPM | TEMPERATURE: 99.1 F | SYSTOLIC BLOOD PRESSURE: 124 MMHG | BODY MASS INDEX: 22.6 KG/M2 | WEIGHT: 144 LBS | DIASTOLIC BLOOD PRESSURE: 62 MMHG | HEIGHT: 67 IN

## 2024-09-06 DIAGNOSIS — I63.411 CEREBROVASCULAR ACCIDENT (CVA) DUE TO EMBOLISM OF RIGHT MIDDLE CEREBRAL ARTERY (HCC): ICD-10-CM

## 2024-09-06 DIAGNOSIS — I65.21 STENOSIS OF RIGHT CAROTID ARTERY: ICD-10-CM

## 2024-09-06 LAB
ANION GAP SERPL CALC-SCNC: 10 MMOL/L (ref 7–16)
BUN SERPL-MCNC: 28 MG/DL (ref 8–22)
CALCIUM SERPL-MCNC: 9.4 MG/DL (ref 8.5–10.5)
CHLORIDE SERPL-SCNC: 99 MMOL/L (ref 96–112)
CO2 SERPL-SCNC: 24 MMOL/L (ref 20–33)
CREAT SERPL-MCNC: 1.09 MG/DL (ref 0.5–1.4)
CREAT UR-MCNC: 139.36 MG/DL
GLUCOSE SERPL-MCNC: 139 MG/DL (ref 65–99)
MICROALBUMIN UR-MCNC: 9 MG/DL
MICROALBUMIN/CREAT UR: 65 MG/G (ref 0–30)
POTASSIUM SERPL-SCNC: 4.1 MMOL/L (ref 3.6–5.5)
SODIUM SERPL-SCNC: 133 MMOL/L (ref 135–145)

## 2024-09-06 PROCEDURE — 99204 OFFICE O/P NEW MOD 45 MIN: CPT | Performed by: PSYCHIATRY & NEUROLOGY

## 2024-09-06 PROCEDURE — 3074F SYST BP LT 130 MM HG: CPT | Performed by: PSYCHIATRY & NEUROLOGY

## 2024-09-06 PROCEDURE — 3078F DIAST BP <80 MM HG: CPT | Performed by: PSYCHIATRY & NEUROLOGY

## 2024-09-06 PROCEDURE — 99212 OFFICE O/P EST SF 10 MIN: CPT | Performed by: PSYCHIATRY & NEUROLOGY

## 2024-09-06 ASSESSMENT — FIBROSIS 4 INDEX: FIB4 SCORE: 2.57

## 2024-09-12 ENCOUNTER — OFFICE VISIT (OUTPATIENT)
Dept: NEPHROLOGY | Facility: MEDICAL CENTER | Age: 74
End: 2024-09-12
Payer: MEDICARE

## 2024-09-12 VITALS
DIASTOLIC BLOOD PRESSURE: 60 MMHG | OXYGEN SATURATION: 91 % | SYSTOLIC BLOOD PRESSURE: 126 MMHG | WEIGHT: 149 LBS | TEMPERATURE: 98 F | HEIGHT: 67 IN | HEART RATE: 86 BPM | BODY MASS INDEX: 23.39 KG/M2

## 2024-09-12 DIAGNOSIS — I10 ESSENTIAL HYPERTENSION: ICD-10-CM

## 2024-09-12 DIAGNOSIS — N39.0 URINARY TRACT INFECTION WITHOUT HEMATURIA, SITE UNSPECIFIED: ICD-10-CM

## 2024-09-12 DIAGNOSIS — N18.31 STAGE 3A CHRONIC KIDNEY DISEASE: ICD-10-CM

## 2024-09-12 DIAGNOSIS — R80.9 MICROALBUMINURIA: ICD-10-CM

## 2024-09-12 DIAGNOSIS — D64.9 ANEMIA, UNSPECIFIED TYPE: ICD-10-CM

## 2024-09-12 DIAGNOSIS — E55.9 VITAMIN D DEFICIENCY: ICD-10-CM

## 2024-09-12 PROCEDURE — 99214 OFFICE O/P EST MOD 30 MIN: CPT | Performed by: INTERNAL MEDICINE

## 2024-09-12 PROCEDURE — G2211 COMPLEX E/M VISIT ADD ON: HCPCS | Performed by: INTERNAL MEDICINE

## 2024-09-12 PROCEDURE — 3078F DIAST BP <80 MM HG: CPT | Performed by: INTERNAL MEDICINE

## 2024-09-12 PROCEDURE — 3074F SYST BP LT 130 MM HG: CPT | Performed by: INTERNAL MEDICINE

## 2024-09-12 RX ORDER — CEFUROXIME AXETIL 250 MG/1
250 TABLET ORAL 2 TIMES DAILY
Qty: 14 TABLET | Refills: 0 | Status: SHIPPED | OUTPATIENT
Start: 2024-09-12

## 2024-09-12 ASSESSMENT — ENCOUNTER SYMPTOMS
FLANK PAIN: 0
HEMOPTYSIS: 0
SINUS PAIN: 0
EYES NEGATIVE: 1
DIARRHEA: 0
WEIGHT LOSS: 0
ORTHOPNEA: 0
PALPITATIONS: 0
CHILLS: 0
NAUSEA: 0
SHORTNESS OF BREATH: 0
VOMITING: 0
WHEEZING: 0
ABDOMINAL PAIN: 0
FEVER: 0
COUGH: 0

## 2024-09-12 ASSESSMENT — FIBROSIS 4 INDEX: FIB4 SCORE: 2.57

## 2024-09-12 NOTE — PROGRESS NOTES
Subjective:      Naomy Vargas is a 74 y.o. female who presents with CKD          Chronic Kidney Disease  Pertinent negatives include no abdominal pain, chest pain, chills, congestion, coughing, fever, nausea or vomiting.     Naomy is coming today for f/u of CKD II  Recently hospitalized with CVA, s/p right endarterectomy  C/o dysuria/no hematuria/flank pain  Last urine culture from July 2024 E Coli  No fever/chills  Pedal edema improved -well controlled with low salt diet  No NSAID's  CKD -creat level worse to 1.09 GFR 53  HTN: BP well controlled  CT abd from 2008 revealed scarred right kidney      Review of Systems   Constitutional:  Negative for chills, fever, malaise/fatigue and weight loss.   HENT:  Negative for congestion, hearing loss and sinus pain.    Eyes: Negative.    Respiratory:  Negative for cough, hemoptysis, shortness of breath and wheezing.    Cardiovascular:  Negative for chest pain, palpitations, orthopnea and leg swelling.   Gastrointestinal:  Negative for abdominal pain, diarrhea, nausea and vomiting.   Genitourinary:  Positive for dysuria, frequency and urgency. Negative for flank pain and hematuria.   Skin: Negative.    All other systems reviewed and are negative.    Past Medical History:   Diagnosis Date    Abnormal CT of the chest     Acute hypoxemic respiratory failure (HCC) 03/23/2023    Anxiety     Arthritis     wrists    Back pain     Blood transfusion without reported diagnosis     with ectopic preg x 2     Breath shortness     with exertion    Cataract     bilateral removal-Dr. Duke Talamantes    Cellulitis     right lower leg    Cellulitis of leg 11/01/2013    Followed by dermatology. Managed with fluocinonide and Bactroban on right leg Left leg she uses triamcinolone and Sarna pramocaine (anesthetic, antipruretic) on both.      Chickenpox     COPD (chronic obstructive pulmonary disease) ([facility])     Dental disorder     upper denture    Earache     Elevated troponin     Fatigue      Frequent urination     Hypertension 2018    on no meds at this time    Hypothyroidism     Influenza     Insomnia     Kidney disease     reports 1 kidney is smaller than the other    Mumps     OAB (overactive bladder) 2019    Pain 2018    right leg and back, 5/10    Peripheral vascular disease, unspecified ([facility]) 10/13/2021    Pneumonia     Rash     Ringing in ears     Sepsis ([facility]) 2023    Shortness of breath     Swelling of lower extremity     Thyroid disease     Tonsillitis     Toothache     Venous stasis dermatitis     right leg       Family History   Problem Relation Age of Onset    Arthritis Mother         hip fracture    Diabetes Mother     Cancer Mother         skin    Arthritis Father         lung    Alcohol/Drug Father         etoh    Lung Disease Sister         smoker/copd    Hypertension Sister     Other Brother         hep c    Arthritis Maternal Grandmother         hip fracture    Diabetes Maternal Grandfather     No Known Problems Son     No Known Problems Son     No Known Problems Brother     No Known Problems Sister     No Known Problems Brother     Hyperlipidemia Neg Hx     Stroke Neg Hx        Social History     Socioeconomic History    Marital status:     Number of children: 2   Occupational History    Occupation: retired   Tobacco Use    Smoking status: Former     Current packs/day: 0.00     Average packs/day: 0.5 packs/day for 53.0 years (26.5 ttl pk-yrs)     Types: Cigarettes     Start date: 1964     Quit date: 2017     Years since quittin.1     Passive exposure: Past    Smokeless tobacco: Never   Vaping Use    Vaping status: Never Used   Substance and Sexual Activity    Alcohol use: Yes     Comment: 2 per day wine    Drug use: No    Sexual activity: Not Currently     Partners: Male     Social Determinants of Health     Financial Resource Strain: Low Risk  (2024)    Overall Financial Resource Strain (CARDIA)     Difficulty of Paying Living  "Expenses: Not very hard   Food Insecurity: No Food Insecurity (8/1/2024)    Hunger Vital Sign     Worried About Running Out of Food in the Last Year: Never true     Ran Out of Food in the Last Year: Never true   Transportation Needs: No Transportation Needs (8/1/2024)    PRAPARE - Transportation     Lack of Transportation (Medical): No     Lack of Transportation (Non-Medical): No   Physical Activity: Inactive (2/21/2024)    Exercise Vital Sign     Days of Exercise per Week: 0 days     Minutes of Exercise per Session: 0 min   Stress: No Stress Concern Present (2/21/2024)    [employer] - Occupational Stress Questionnaire     Feeling of Stress : Only a little   Social Connections: Moderately Isolated (2/21/2024)    Social Connection and Isolation Panel [NHANES]     Frequency of Communication with Friends and Family: Twice a week     Frequency of Social Gatherings with Friends and Family: Once a week     Attends Voodoo Services: Never     Active Member of Clubs or Organizations: No     Attends Club or Organization Meetings: Never     Marital Status:    Intimate Partner Violence: Not At Risk (7/29/2024)    Humiliation, Afraid, Rape, and Kick questionnaire     Fear of Current or Ex-Partner: No     Emotionally Abused: No     Physically Abused: No     Sexually Abused: No   Housing Stability: Low Risk  (8/1/2024)    Housing Stability Vital Sign     Unable to Pay for Housing in the Last Year: No     Number of Places Lived in the Last Year: 1     Unstable Housing in the Last Year: No          Objective:     /60 (BP Location: Left arm, Patient Position: Sitting, BP Cuff Size: Adult)   Pulse 86   Temp 36.7 °C (98 °F) (Temporal)   Ht 1.702 m (5' 7\")   Wt 67.6 kg (149 lb)   LMP  (LMP Unknown)   SpO2 91%   BMI 23.34 kg/m²      Physical Exam  Vitals reviewed.   Constitutional:       General: She is not in acute distress.     Appearance: Normal appearance. She is well-developed. " She is not diaphoretic.   HENT:      Head: Normocephalic and atraumatic.      Nose: Nose normal.      Mouth/Throat:      Mouth: Mucous membranes are moist.      Pharynx: Oropharynx is clear.   Eyes:      Extraocular Movements: Extraocular movements intact.      Conjunctiva/sclera: Conjunctivae normal.      Pupils: Pupils are equal, round, and reactive to light.   Cardiovascular:      Rate and Rhythm: Normal rate and regular rhythm.      Pulses: Normal pulses.      Heart sounds: Normal heart sounds.   Pulmonary:      Effort: Pulmonary effort is normal. No respiratory distress.      Breath sounds: Normal breath sounds. No wheezing or rales.   Abdominal:      General: Bowel sounds are normal. There is no distension.      Palpations: Abdomen is soft. There is no mass.      Tenderness: There is no abdominal tenderness. There is no right CVA tenderness or left CVA tenderness.   Musculoskeletal:      Cervical back: Normal range of motion and neck supple.      Right lower leg: No edema.      Left lower leg: No edema.   Skin:     General: Skin is warm.      Coloration: Skin is not pale.      Findings: No erythema or rash.   Neurological:      General: No focal deficit present.      Mental Status: She is alert and oriented to person, place, and time.      Cranial Nerves: No cranial nerve deficit.      Coordination: Coordination normal.   Psychiatric:         Mood and Affect: Mood normal.         Behavior: Behavior normal.         Thought Content: Thought content normal.         Judgment: Judgment normal.            Laboratory and renal imaging results reviewed : d/w pt   Latest Reference Range & Units 08/07/24 00:29 08/08/24 07:06 08/12/24 14:24 09/05/24 15:58 09/05/24 15:59   WBC 4.8 - 10.8 K/uL 11.8 (H) 9.6 7.9  7.8   RBC 4.20 - 5.40 M/uL 3.76 (L) 3.01 (L) 3.28 (L)  3.72 (L)   Hemoglobin 12.0 - 16.0 g/dL 12.0 9.3 (L) 10.4 (L)  11.1 (L)   Hematocrit 37.0 - 47.0 % 36.7 (L) 28.8 (L) 32.4 (L)  35.7 (L)   MCV 81.4 - 97.8 fL 97.6  95.7 98.8 (H)  96.0   MCH 27.0 - 33.0 pg 31.9 30.9 31.7  29.8   MCHC 32.2 - 35.5 g/dL 32.7 32.3 32.1 (L)  31.1 (L)   RDW 35.9 - 50.0 fL 48.0 46.5 52.5 (H)  47.8   Platelet Count 164 - 446 K/uL 178 183 184  184   MPV 9.0 - 12.9 fL 9.9 10.0 11.5  11.7   Neutrophils-Polys 44.00 - 72.00 % 92.30 (H) 76.30 (H) 67.80     Neutrophils (Absolute) 1.82 - 7.42 K/uL 10.88 (H) 7.35 5.32     Lymphocytes 22.00 - 41.00 % 5.30 (L) 13.40 (L) 18.60 (L)     Lymphs (Absolute) 1.00 - 4.80 K/uL 0.62 (L) 1.29 1.46     Monocytes 0.00 - 13.40 % 1.10 7.80 9.20     Monos (Absolute) 0.00 - 0.85 K/uL 0.13 0.75 0.72     Eosinophils 0.00 - 6.90 % 0.10 1.60 3.20     Eos (Absolute) 0.00 - 0.51 K/uL 0.01 0.15 0.25     Basophils 0.00 - 1.80 % 0.40 0.40 0.60     Baso (Absolute) 0.00 - 0.12 K/uL 0.05 0.04 0.05     Immature Granulocytes 0.00 - 0.90 % 0.80 0.50 0.60     Immature Granulocytes (abs) 0.00 - 0.11 K/uL 0.09 0.05 0.05     Nucleated RBC 0.00 - 0.20 /100 WBC 0.00 0.00 0.00     NRBC (Absolute) K/uL 0.00 0.00 0.00     Sodium 135 - 145 mmol/L 138    133 (L)   Potassium 3.6 - 5.5 mmol/L 4.3    4.1   Chloride 96 - 112 mmol/L 103    99   Co2 20 - 33 mmol/L 19 (L)    24   Anion Gap 7.0 - 16.0  16.0    10.0   Glucose 65 - 99 mg/dL 191 (H)    139 (H)   Bun 8 - 22 mg/dL 10    28 (H)   Creatinine 0.50 - 1.40 mg/dL 0.84    1.09   GFR (CKD-EPI) >60 mL/min/1.73 m 2 73    53 !   Calcium 8.5 - 10.5 mg/dL 8.4 (L)    9.4   Micro Alb Creat Ratio 0 - 30 mg/g    65 (H)    Creatinine, Urine mg/dL    139.36    Microalbumin, Urine Random mg/dL    9.0    (H): Data is abnormally high  (L): Data is abnormally low  !: Data is abnormal         Assessment/Plan:       1. CKD (chronic kidney disease) stage II, GFR > 60 ml/min (HCC) worse to CKD IIIa      Creat level worse - to monitor closely      Keep well hydrated    2. Vitamin D deficiency      Well controlled      PTH WNL    3. Essential hypertension      BP well controlled -to monitor    4. Microalbuminuria      Very mild        5. Anemia, unspecified type      Hb stable WNL    6. UTI: repeat urine culture -start Cefuroxime after urine culture completed  Recs:  Continue current treatment  Keep well hydrated  Low salt diet  Avoid NSAID's  Complete urine culture  Cefuroxime 250 mg po BID x 7 days  F/u in 4 -5 months

## 2024-09-12 NOTE — PATIENT INSTRUCTIONS
Continue current treatment  Keep well hydrated  Low salt diet  Avoid NSAID's  Complete urine culture  Cefuroxime 250 mg po BID x 7 days

## 2024-09-13 ENCOUNTER — HOSPITAL ENCOUNTER (OUTPATIENT)
Dept: LAB | Facility: MEDICAL CENTER | Age: 74
End: 2024-09-13
Attending: INTERNAL MEDICINE
Payer: MEDICARE

## 2024-09-13 DIAGNOSIS — N18.31 STAGE 3A CHRONIC KIDNEY DISEASE: ICD-10-CM

## 2024-09-13 DIAGNOSIS — N39.0 URINARY TRACT INFECTION WITHOUT HEMATURIA, SITE UNSPECIFIED: ICD-10-CM

## 2024-09-13 PROCEDURE — 87186 SC STD MICRODIL/AGAR DIL: CPT

## 2024-09-13 PROCEDURE — 87077 CULTURE AEROBIC IDENTIFY: CPT

## 2024-09-13 PROCEDURE — 87086 URINE CULTURE/COLONY COUNT: CPT

## 2024-09-26 ENCOUNTER — HOSPITAL ENCOUNTER (OUTPATIENT)
Facility: MEDICAL CENTER | Age: 74
End: 2024-09-26
Attending: PHYSICIAN ASSISTANT
Payer: MEDICARE

## 2024-09-26 PROCEDURE — 87077 CULTURE AEROBIC IDENTIFY: CPT

## 2024-09-26 PROCEDURE — 87186 SC STD MICRODIL/AGAR DIL: CPT

## 2024-09-26 PROCEDURE — 87086 URINE CULTURE/COLONY COUNT: CPT

## 2024-10-09 ENCOUNTER — HOSPITAL ENCOUNTER (OUTPATIENT)
Facility: MEDICAL CENTER | Age: 74
End: 2024-10-09
Attending: PHYSICIAN ASSISTANT
Payer: MEDICARE

## 2024-10-09 PROCEDURE — 87086 URINE CULTURE/COLONY COUNT: CPT

## 2024-10-09 PROCEDURE — 87077 CULTURE AEROBIC IDENTIFY: CPT

## 2024-10-09 PROCEDURE — 87186 SC STD MICRODIL/AGAR DIL: CPT

## 2024-11-09 DIAGNOSIS — E03.1 CONGENITAL HYPOTHYROIDISM WITHOUT GOITER: ICD-10-CM

## 2024-11-12 ENCOUNTER — HOSPITAL ENCOUNTER (OUTPATIENT)
Facility: MEDICAL CENTER | Age: 74
End: 2024-11-12
Attending: PHYSICIAN ASSISTANT
Payer: MEDICARE

## 2024-11-12 ENCOUNTER — OFFICE VISIT (OUTPATIENT)
Dept: MEDICAL GROUP | Facility: PHYSICIAN GROUP | Age: 74
End: 2024-11-12
Payer: MEDICARE

## 2024-11-12 VITALS
HEIGHT: 67 IN | TEMPERATURE: 98.7 F | BODY MASS INDEX: 23.28 KG/M2 | SYSTOLIC BLOOD PRESSURE: 132 MMHG | RESPIRATION RATE: 16 BRPM | OXYGEN SATURATION: 96 % | DIASTOLIC BLOOD PRESSURE: 74 MMHG | HEART RATE: 74 BPM | WEIGHT: 148.3 LBS

## 2024-11-12 DIAGNOSIS — N18.32 STAGE 3B CHRONIC KIDNEY DISEASE: ICD-10-CM

## 2024-11-12 DIAGNOSIS — Z23 NEED FOR VACCINATION: ICD-10-CM

## 2024-11-12 DIAGNOSIS — N89.8 VAGINA ITCHING: ICD-10-CM

## 2024-11-12 PROCEDURE — G0008 ADMIN INFLUENZA VIRUS VAC: HCPCS | Performed by: PHYSICIAN ASSISTANT

## 2024-11-12 PROCEDURE — 3078F DIAST BP <80 MM HG: CPT | Performed by: PHYSICIAN ASSISTANT

## 2024-11-12 PROCEDURE — 90662 IIV NO PRSV INCREASED AG IM: CPT | Performed by: PHYSICIAN ASSISTANT

## 2024-11-12 PROCEDURE — 87510 GARDNER VAG DNA DIR PROBE: CPT

## 2024-11-12 PROCEDURE — 99214 OFFICE O/P EST MOD 30 MIN: CPT | Mod: 25 | Performed by: PHYSICIAN ASSISTANT

## 2024-11-12 PROCEDURE — 87660 TRICHOMONAS VAGIN DIR PROBE: CPT

## 2024-11-12 PROCEDURE — 87480 CANDIDA DNA DIR PROBE: CPT

## 2024-11-12 PROCEDURE — 3075F SYST BP GE 130 - 139MM HG: CPT | Performed by: PHYSICIAN ASSISTANT

## 2024-11-12 RX ORDER — ESTRADIOL 0.1 MG/G
1 CREAM VAGINAL
Qty: 42.5 G | Refills: 0 | Status: SHIPPED | OUTPATIENT
Start: 2024-11-12

## 2024-11-12 ASSESSMENT — FIBROSIS 4 INDEX: FIB4 SCORE: 2.57

## 2024-11-12 NOTE — PROGRESS NOTES
"Verbal consent was acquired by the patient to use [employer] ambient listening note generation during this visit     Subjective:     HPI:   History of Present Illness  The patient is a 74-year-old female who presents with concerns of vaginal itching.    She reports experiencing vaginal itching but no discharge. The itching became severe after using Vagisil wipes and ointment, suggesting a possible allergic reaction. She has since discontinued the use of Vagisil products. Her skin feels dry but becomes moist when itchy. She used Hibiclens for washing and applied diaper rash cream yesterday.    She has been dealing with a urinary tract infection (UTI) for an extended period. She has been on Keflex for a few weeks, prescribed by Dr. Lawrence, who recommended a 30 to 60-day course. She was doing well after taking Keflex and another medication for 7 days but experienced discomfort on Saturday night or Sunday.      Health Maintenance: Completed    ROS:  Gen: no fevers/chills  Eyes: no changes in vision  ENT: no sore throat  Pulm: no sob, no cough  CV: no chest pain  GI: no nausea/vomiting  : no dysuria  MSk: no myalgias  Skin: no rash  Neuro: no headaches    Objective:     Exam:  /74 (BP Location: Right arm, Patient Position: Sitting, BP Cuff Size: Adult)   Pulse 74   Temp 37.1 °C (98.7 °F) (Temporal)   Resp 16   Ht 1.702 m (5' 7\")   Wt 67.3 kg (148 lb 4.8 oz)   LMP  (LMP Unknown)   SpO2 96%   BMI 23.23 kg/m²  Body mass index is 23.23 kg/m².    PHYSICAL EXAM  Constitutional: Alert, no distress, well-groomed.  Skin: Warm, dry, good turgor, no rashes in visible areas.  Eye: Equal, round and reactive, conjunctiva clear, lids normal.  ENMT: Lips without lesions, good dentition, moist mucous membranes.  Neck: Trachea midline, no masses, no thyromegaly.  Respiratory: Unlabored respiratory effort, no cough.  MSK: Normal gait, moves all extremities.  : declined   Neuro: Grossly non-focal.   Psych: Alert and " oriented x3, normal affect and mood.    Results      Assessment & Plan:     1. Need for vaccination  INFLUENZA VACCINE, HIGH DOSE (65+ ONLY)      2. Vagina itching  VAGINAL PATHOGENS DNA PANEL    estradiol (ESTRACE) 0.1 MG/GM vaginal cream      3. Stage 3b chronic kidney disease            Assessment & Plan      1. Need for vaccination  - INFLUENZA VACCINE, HIGH DOSE (65+ ONLY)    2. Vagina itching  Symptoms suggest a possible yeast infection or bacterial vaginosis, potentially due to antibiotic use. Vaginal dryness, a common cause of external vaginal itching, could also be a factor. The use of vaginal estrogen cream could alleviate dryness. Her kidney function is suboptimal, so oral medication should be avoided if possible.  Recommended against use of scented products in the vaginal area.  A vaginal swab will be taken to rule out infection. A prescription for vaginal cream will be sent to [facility], to be used every three days. She is advised to avoid all vaginal products and use regular soap in the shower. If the swab results are abnormal, discussed treating with vaginal suppository given CKD.   - VAGINAL PATHOGENS DNA PANEL; Future  - estradiol (ESTRACE) 0.1 MG/GM vaginal cream; Insert 1 g into the vagina every 3 days.  Dispense: 42.5 g; Refill: 0    3. Stage 3b chronic kidney disease  Chronic, stable. She has been on extended prescription for Keflex for a few weeks for recurrent UTI as prescribed by Dr. Lawrence.        I spent a total of 25 minutes with record review, exam, communication with the patient, communication with other providers, and documentation of this encounter.    Please note that this dictation was created using voice recognition software. I have made every reasonable attempt to correct obvious errors, but I expect that there are errors of grammar and possibly content that I did not discover before finalizing the note.

## 2024-11-13 LAB
CANDIDA DNA VAG QL PROBE+SIG AMP: NEGATIVE
G VAGINALIS DNA VAG QL PROBE+SIG AMP: NEGATIVE
T VAGINALIS DNA VAG QL PROBE+SIG AMP: NEGATIVE

## 2024-11-13 RX ORDER — LEVOTHYROXINE SODIUM 100 UG/1
TABLET ORAL
Qty: 100 TABLET | Refills: 1 | Status: SHIPPED | OUTPATIENT
Start: 2024-11-13

## 2024-11-13 NOTE — TELEPHONE ENCOUNTER
Received request via: Pharmacy    Was the patient seen in the last year in this department? Yes    Does the patient have an active prescription (recently filled or refills available) for medication(s) requested? No    [facility]    Does the patient have group home Plus and need 100-day supply? (This applies to ALL medications) Yes, quantity updated to 100 days

## 2024-11-21 DIAGNOSIS — G45.9 TIA (TRANSIENT ISCHEMIC ATTACK): ICD-10-CM

## 2024-11-21 RX ORDER — ATORVASTATIN CALCIUM 40 MG/1
40 TABLET, FILM COATED ORAL EVERY EVENING
Qty: 100 TABLET | Refills: 1 | Status: SHIPPED | OUTPATIENT
Start: 2024-11-21 | End: 2025-11-16

## 2024-11-21 NOTE — TELEPHONE ENCOUNTER
Pt has had OV within the 12 month protocol and lipid panel is current. 6 month supply of statin sent to pharmacy.   Lab Results   Component Value Date/Time    CHOLSTRLTOT 66 (L) 07/30/2024 03:29 AM    LDL 24 07/30/2024 03:29 AM    HDL 25 (A) 07/30/2024 03:29 AM    TRIGLYCERIDE 85 07/30/2024 03:29 AM       Lab Results   Component Value Date/Time    SODIUM 133 (L) 09/05/2024 03:59 PM    POTASSIUM 4.1 09/05/2024 03:59 PM    CHLORIDE 99 09/05/2024 03:59 PM    CO2 24 09/05/2024 03:59 PM    GLUCOSE 139 (H) 09/05/2024 03:59 PM    BUN 28 (H) 09/05/2024 03:59 PM    CREATININE 1.09 09/05/2024 03:59 PM     Lab Results   Component Value Date/Time    ALKPHOSPHAT 100 (H) 07/30/2024 03:29 AM    ASTSGOT 30 07/30/2024 03:29 AM    ALTSGPT 22 07/30/2024 03:29 AM    TBILIRUBIN 0.5 07/30/2024 03:29 AM      Kristen Vang, Clinical Pharmacist, CDE, CACP  Managed Care Pharmacist for [facility] and [facility]

## 2024-12-02 ENCOUNTER — APPOINTMENT (OUTPATIENT)
Dept: ADMISSIONS | Facility: MEDICAL CENTER | Age: 74
End: 2024-12-02
Attending: INTERNAL MEDICINE
Payer: MEDICARE

## 2024-12-04 ENCOUNTER — ANESTHESIA EVENT (OUTPATIENT)
Dept: SURGERY | Facility: MEDICAL CENTER | Age: 74
End: 2024-12-04
Payer: MEDICARE

## 2024-12-04 ENCOUNTER — HOSPITAL ENCOUNTER (OUTPATIENT)
Facility: MEDICAL CENTER | Age: 74
End: 2024-12-04
Attending: INTERNAL MEDICINE | Admitting: INTERNAL MEDICINE
Payer: MEDICARE

## 2024-12-04 ENCOUNTER — ANESTHESIA (OUTPATIENT)
Dept: SURGERY | Facility: MEDICAL CENTER | Age: 74
End: 2024-12-04
Payer: MEDICARE

## 2024-12-04 VITALS
HEIGHT: 67 IN | SYSTOLIC BLOOD PRESSURE: 154 MMHG | DIASTOLIC BLOOD PRESSURE: 60 MMHG | TEMPERATURE: 99.4 F | WEIGHT: 146.83 LBS | OXYGEN SATURATION: 93 % | BODY MASS INDEX: 23.04 KG/M2 | HEART RATE: 79 BPM | RESPIRATION RATE: 21 BRPM

## 2024-12-04 PROCEDURE — 160048 HCHG OR STATISTICAL LEVEL 1-5: Performed by: INTERNAL MEDICINE

## 2024-12-04 PROCEDURE — 160029 HCHG SURGERY MINUTES - 1ST 30 MINS LEVEL 4: Performed by: INTERNAL MEDICINE

## 2024-12-04 PROCEDURE — 700111 HCHG RX REV CODE 636 W/ 250 OVERRIDE (IP): Mod: JZ | Performed by: STUDENT IN AN ORGANIZED HEALTH CARE EDUCATION/TRAINING PROGRAM

## 2024-12-04 PROCEDURE — 160002 HCHG RECOVERY MINUTES (STAT): Performed by: INTERNAL MEDICINE

## 2024-12-04 PROCEDURE — 700101 HCHG RX REV CODE 250

## 2024-12-04 PROCEDURE — 160041 HCHG SURGERY MINUTES - EA ADDL 1 MIN LEVEL 4: Performed by: INTERNAL MEDICINE

## 2024-12-04 PROCEDURE — 160009 HCHG ANES TIME/MIN: Performed by: INTERNAL MEDICINE

## 2024-12-04 PROCEDURE — 700101 HCHG RX REV CODE 250: Performed by: STUDENT IN AN ORGANIZED HEALTH CARE EDUCATION/TRAINING PROGRAM

## 2024-12-04 PROCEDURE — 160046 HCHG PACU - 1ST 60 MINS PHASE II: Performed by: INTERNAL MEDICINE

## 2024-12-04 PROCEDURE — 160025 RECOVERY II MINUTES (STATS): Performed by: INTERNAL MEDICINE

## 2024-12-04 PROCEDURE — 700105 HCHG RX REV CODE 258: Performed by: STUDENT IN AN ORGANIZED HEALTH CARE EDUCATION/TRAINING PROGRAM

## 2024-12-04 PROCEDURE — 160035 HCHG PACU - 1ST 60 MINS PHASE I: Performed by: INTERNAL MEDICINE

## 2024-12-04 PROCEDURE — 700101 HCHG RX REV CODE 250: Performed by: INTERNAL MEDICINE

## 2024-12-04 RX ORDER — BALANCED SALT SOLUTION ENRICHED WITH BICARBONATE, DEXTROSE, AND GLUTATHIONE
KIT INTRAOCULAR
Status: DISCONTINUED
Start: 2024-12-04 | End: 2024-12-04 | Stop reason: HOSPADM

## 2024-12-04 RX ORDER — METOPROLOL TARTRATE 1 MG/ML
1 INJECTION, SOLUTION INTRAVENOUS
Status: DISCONTINUED | OUTPATIENT
Start: 2024-12-04 | End: 2024-12-04 | Stop reason: HOSPADM

## 2024-12-04 RX ORDER — PHENYLEPHRINE HYDROCHLORIDE 25 MG/ML
SOLUTION/ DROPS OPHTHALMIC
Status: COMPLETED
Start: 2024-12-04 | End: 2024-12-04

## 2024-12-04 RX ORDER — DEXAMETHASONE SODIUM PHOSPHATE 4 MG/ML
INJECTION, SOLUTION INTRA-ARTICULAR; INTRALESIONAL; INTRAMUSCULAR; INTRAVENOUS; SOFT TISSUE PRN
Status: DISCONTINUED | OUTPATIENT
Start: 2024-12-04 | End: 2024-12-04 | Stop reason: SURG

## 2024-12-04 RX ORDER — EPHEDRINE SULFATE 50 MG/ML
5 INJECTION, SOLUTION INTRAVENOUS
Status: DISCONTINUED | OUTPATIENT
Start: 2024-12-04 | End: 2024-12-04 | Stop reason: HOSPADM

## 2024-12-04 RX ORDER — LABETALOL HYDROCHLORIDE 5 MG/ML
5 INJECTION, SOLUTION INTRAVENOUS
Status: DISCONTINUED | OUTPATIENT
Start: 2024-12-04 | End: 2024-12-04 | Stop reason: HOSPADM

## 2024-12-04 RX ORDER — BALANCED SALT SOLUTION 6.4; .75; .48; .3; 3.9; 1.7 MG/ML; MG/ML; MG/ML; MG/ML; MG/ML; MG/ML
SOLUTION OPHTHALMIC
Status: DISCONTINUED | OUTPATIENT
Start: 2024-12-04 | End: 2024-12-04 | Stop reason: HOSPADM

## 2024-12-04 RX ORDER — BALANCED SALT SOLUTION ENRICHED WITH BICARBONATE, DEXTROSE, AND GLUTATHIONE
KIT INTRAOCULAR
Status: DISCONTINUED | OUTPATIENT
Start: 2024-12-04 | End: 2024-12-04 | Stop reason: HOSPADM

## 2024-12-04 RX ORDER — ATROPINE SULFATE 10 MG/ML
SOLUTION/ DROPS OPHTHALMIC
Status: DISCONTINUED | OUTPATIENT
Start: 2024-12-04 | End: 2024-12-04 | Stop reason: HOSPADM

## 2024-12-04 RX ORDER — CYCLOPENTOLATE HYDROCHLORIDE 10 MG/ML
SOLUTION/ DROPS OPHTHALMIC
Status: COMPLETED
Start: 2024-12-04 | End: 2024-12-04

## 2024-12-04 RX ORDER — LIDOCAINE HYDROCHLORIDE 20 MG/ML
INJECTION, SOLUTION EPIDURAL; INFILTRATION; INTRACAUDAL; PERINEURAL PRN
Status: DISCONTINUED | OUTPATIENT
Start: 2024-12-04 | End: 2024-12-04 | Stop reason: SURG

## 2024-12-04 RX ORDER — ONDANSETRON 2 MG/ML
INJECTION INTRAMUSCULAR; INTRAVENOUS PRN
Status: DISCONTINUED | OUTPATIENT
Start: 2024-12-04 | End: 2024-12-04 | Stop reason: SURG

## 2024-12-04 RX ORDER — HALOPERIDOL 5 MG/ML
1 INJECTION INTRAMUSCULAR
Status: DISCONTINUED | OUTPATIENT
Start: 2024-12-04 | End: 2024-12-04 | Stop reason: HOSPADM

## 2024-12-04 RX ORDER — ONDANSETRON 2 MG/ML
4 INJECTION INTRAMUSCULAR; INTRAVENOUS
Status: DISCONTINUED | OUTPATIENT
Start: 2024-12-04 | End: 2024-12-04 | Stop reason: HOSPADM

## 2024-12-04 RX ORDER — TIMOLOL MALEATE 5 MG/ML
SOLUTION/ DROPS OPHTHALMIC
Status: DISCONTINUED | OUTPATIENT
Start: 2024-12-04 | End: 2024-12-04 | Stop reason: HOSPADM

## 2024-12-04 RX ORDER — EPHEDRINE SULFATE 50 MG/ML
INJECTION, SOLUTION INTRAVENOUS PRN
Status: DISCONTINUED | OUTPATIENT
Start: 2024-12-04 | End: 2024-12-04 | Stop reason: SURG

## 2024-12-04 RX ORDER — DIPHENHYDRAMINE HYDROCHLORIDE 50 MG/ML
12.5 INJECTION INTRAMUSCULAR; INTRAVENOUS
Status: DISCONTINUED | OUTPATIENT
Start: 2024-12-04 | End: 2024-12-04 | Stop reason: HOSPADM

## 2024-12-04 RX ORDER — SODIUM CHLORIDE 9 MG/ML
INJECTION, SOLUTION INTRAVENOUS
Status: DISCONTINUED | OUTPATIENT
Start: 2024-12-04 | End: 2024-12-04 | Stop reason: SURG

## 2024-12-04 RX ADMIN — FENTANYL CITRATE 50 MCG: 50 INJECTION, SOLUTION INTRAMUSCULAR; INTRAVENOUS at 15:16

## 2024-12-04 RX ADMIN — SODIUM CHLORIDE: 9 INJECTION, SOLUTION INTRAVENOUS at 15:01

## 2024-12-04 RX ADMIN — PHENYLEPHRINE HYDROCHLORIDE 1 DROP: 25 SOLUTION/ DROPS OPHTHALMIC at 13:15

## 2024-12-04 RX ADMIN — PROPOFOL 150 MG: 10 INJECTION, EMULSION INTRAVENOUS at 15:05

## 2024-12-04 RX ADMIN — DEXAMETHASONE SODIUM PHOSPHATE 4 MG: 4 INJECTION INTRA-ARTICULAR; INTRALESIONAL; INTRAMUSCULAR; INTRAVENOUS; SOFT TISSUE at 15:05

## 2024-12-04 RX ADMIN — EPHEDRINE SULFATE 5 MG: 50 INJECTION, SOLUTION INTRAVENOUS at 15:36

## 2024-12-04 RX ADMIN — ONDANSETRON 4 MG: 2 INJECTION INTRAMUSCULAR; INTRAVENOUS at 15:05

## 2024-12-04 RX ADMIN — FENTANYL CITRATE 50 MCG: 50 INJECTION, SOLUTION INTRAMUSCULAR; INTRAVENOUS at 15:04

## 2024-12-04 RX ADMIN — LIDOCAINE HYDROCHLORIDE 60 MG: 20 INJECTION, SOLUTION EPIDURAL; INFILTRATION; INTRACAUDAL; PERINEURAL at 15:05

## 2024-12-04 RX ADMIN — CYCLOPENTOLATE HYDROCHLORIDE 1 DROP: 10 SOLUTION OPHTHALMIC at 13:15

## 2024-12-04 ASSESSMENT — PAIN DESCRIPTION - PAIN TYPE
TYPE: SURGICAL PAIN

## 2024-12-04 ASSESSMENT — FIBROSIS 4 INDEX: FIB4 SCORE: 2.57

## 2024-12-04 NOTE — ANESTHESIA PREPROCEDURE EVALUATION
Case: [id number] Date/Time: 12/04/24 1415    Procedure: RIGHT EYE POSTERIOR VITRECTOMY, AIR FLUID GAS, EXCHANGE ENDOLASER, POSSIBLE CRYO 23 GAUGE AND ANY OTHER INDICATED PROCEDURES (Right: Eye)    Anesthesia type: General    Pre-op diagnosis: MACULA OFF RETINAL DETACHMENT    Location: [facility] ROOM 24 / SURGERY SAME DAY [facility]    Surgeons: Stephan Resendez M.D.            Relevant Problems   PULMONARY   (positive) Chronic obstructive pulmonary disease (HCC)   (positive) Pneumonia      NEURO   (positive) TIA (transient ischemic attack)      CARDIAC   (positive) Abdominal aortic aneurysm (AAA) 3.0 cm to 5.0 cm in diameter in female (HCC)   (positive) Aortic atherosclerosis (HCC)   (positive) Bilateral iliac artery stenosis (HCC)   (positive) Disorder of arteries and arterioles (HCC)   (positive) Essential hypertension   (positive) Pulmonary hypertension (HCC)   (positive) Symptomatic carotid artery stenosis, right   (positive) Venous stasis dermatitis of both lower extremities         (positive) Acute kidney injury on CKD (chronic kidney disease) stage 3, GFR 30-59 ml/min      ENDO   (positive) Hypothyroidism       Physical Exam    Airway   Mallampati: III  TM distance: >3 FB  Neck ROM: full       Cardiovascular - normal exam  Rhythm: regular  Rate: normal  (-) murmur     Dental - normal exam           Pulmonary - normal exam  Breath sounds clear to auscultation     Abdominal    Neurological - normal exam                   Anesthesia Plan    ASA 3   ASA physical status 3 criteria: COPD - poorly controlled, a thrombophilic disease requiring anticoagulation and other (comment)    Plan - general       Airway plan will be LMA    (Mod pulmonary HTN)    Plan Factors:   Patient was not previously instructed to abstain from smoking on day of procedure.  Patient did not smoke on day of procedure.      Induction: intravenous    Postoperative Plan: Postoperative administration of opioids is intended.    Pertinent diagnostic labs  and testing reviewed    Informed Consent:    Anesthetic plan and risks discussed with patient.    Use of blood products discussed with: patient whom consented to blood products.

## 2024-12-04 NOTE — ANESTHESIA PROCEDURE NOTES
Airway    Date/Time: 12/4/2024 3:12 PM    Performed by: Sudha Costello M.D.  Authorized by: Sudha Costello M.D.    Location:  OR  Urgency:  Elective  Indications for Airway Management:  Anesthesia      Spontaneous Ventilation: absent    Sedation Level:  Deep  Preoxygenated: Yes    Patient Position:  Sniffing  Final Airway Type:  Supraglottic airway  Final Supraglottic Airway:  Flexible LMA    SGA Size:  3  Number of Attempts at Approach:  1

## 2024-12-04 NOTE — OP REPORT
Procedure: 25g  Pars Plana Vitrectomy, Endolaser,  20% SF6 , right eye     Pre op Diagnosis: macula off rhegmatogenous Retinal detachment, right eye     Post op Diagnosis:  same    Findings: macula off rhegmatogenous retinal detachment, right eye     Immediately prior to procedure, time out was performed to include correct patient, agreement on procedure to be performed, correct side, accurate procedure consent, antibiotics, fluids, safety precautions, and availability of any special implants that might be required.    The patient was taken back into operating area. Subsequently the patient received general anesthesia from the anesthesia department. The eye was then prepped and draped in the usual sterile fashion for ophthalmic surgery, and a lid speculum was placed.     A  25-gauge trocar-cannula system was used to place a cannula in the typical beveled entry with conjunctival displacement in the inferotemporal quadrant. An infusion line was assembled and flushed for all in-line air. The infusion line was placed into the vitreous cavity and was directly visualized prior to unclamping. Once it was unclamped, 2 additional cannulas were placed in the superotemporal and superonasal quadrants in a similar fashion. At this time, the vitreous cutter and light pipe were introduced into the eye and using the wide-angle viewing system, a core vitrectomy was performed. A PVD was present. At all times throughout the case light exposure to the macula and fovea were minimized to reduce the risk of phototoxicity. A macula off rhegmatogenous retinal detachment from 6 oclock clockwise to 12 oclock with a single HST near the ora itzel at 11 oclock  was observed. All vitreous was shaved in the periphery and around tears. The tear was marked with diathermy and a posterior retinotomy was made just posterior to the HST at 11 oc. An air-fluid exhanage was done with soft tip canula. Endolaser was done around tear and posterior  retinotomy. The view was very difficult because the patient's intraocular lens was fogging under air in the area of the YAG capsulatomy.  20%SF6 gas was injected into the eye.  Sclerostomies were watertight. Infusion cannula was removed and site was closed. Lid speculum was removed.  Betadine, atropine, and Tobradex ointment were instilled in the eye. Ophthalmic ointment was placed in the eye and the eye was patched and overlayed with Oliver shield.     The patient tolerated the procedure well without any complications, was taken to the postoperative recovery area in good condition.

## 2024-12-05 NOTE — DISCHARGE INSTRUCTIONS
If any questions arise, call your provider.  If your provider is not available, please feel free to call the Surgical Center at [phone].    MEDICATIONS: Resume taking daily medication.  Take prescribed pain medication with food.  If no medication is prescribed, you may take non-aspirin pain medication if needed.  PAIN MEDICATION CAN BE VERY CONSTIPATING.  Take a stool softener or laxative such as senokot, pericolace, or milk of magnesia if needed.    Last pain medication given at     Discharge Instructions:  -go to follow up appointment 12/4/24 at 8 am.  -keep eye dressing in place until follow up appointment.  -keep green wrist band in place.   -keep head face down for positioning as much as possible until bed time. If you need a break you can lay on your left side.   -Do not rub eye.     What to Expect Post Anesthesia    Rest and take it easy for the first 24 hours.  A responsible adult is recommended to remain with you during that time.  It is normal to feel sleepy.  We encourage you to not do anything that requires balance, judgment or coordination.    FOR 24 HOURS DO NOT:  Drive, operate machinery or run household appliances.  Drink beer or alcoholic beverages.  Make important decisions or sign legal documents.    To avoid nausea, slowly advance diet as tolerated, avoiding spicy or greasy foods for the first day.  Add more substantial food to your diet according to your provider's instructions.  Babies can be fed formula or breast milk as soon as they are hungry.  INCREASE FLUIDS AND FIBER TO AVOID CONSTIPATION.    MILD FLU-LIKE SYMPTOMS ARE NORMAL.  YOU MAY EXPERIENCE GENERALIZED MUSCLE ACHES, THROAT IRRITATION, HEADACHE AND/OR SOME NAUSEA.

## 2024-12-05 NOTE — OR NURSING
1554- Pt to PACU 1 from OR. Bedside report from anesthesiologist and RN.  Attached to monitoring, VSS, breathing is calm and unlabored, Patient is asleep currently. Pt has a dressing on right eye and CDI. Remains on 6 L oxygen via mask.    1600- Pt denies pain or nausea at this time.     1605-  Pt Now on RA, and has had some water, tolerating well. Patient transferred to chair and head faced down per orders.     1635- RN updated patients  via telephone.  has brought home O2 to bedside.     1647- Patient dressed with husbands assistance.     1700- Discharge instructions reviewed and signed.  PIV removed with tip intact. Patient 95% on 2 liters home O2.    1701- Patient brought to restroom then discharged home with all belongings.

## 2024-12-05 NOTE — ANESTHESIA TIME REPORT
Anesthesia Start and Stop Event Times       Date Time Event    12/4/2024 1441 Ready for Procedure     1501 Anesthesia Start     1600 Anesthesia Stop          Responsible Staff  12/04/24      Name Role Begin End    Sudha Costello M.D. Anesth 1501 1600          Overtime Reason:  overtime    Comments:

## 2024-12-05 NOTE — ANESTHESIA POSTPROCEDURE EVALUATION
Patient: Naomy Vargas    Procedure Summary       Date: 12/04/24 Room / Location: [facility] ROOM 24 / SURGERY SAME DAY [facility]    Anesthesia Start: 1501 Anesthesia Stop: 1600    Procedure: RIGHT EYE POSTERIOR VITRECTOMY, AIR FLUID GAS, EXCHANGE ENDOLASER,  25 GAUGE AND ANY OTHER INDICATED PROCEDURES (Right: Eye) Diagnosis: (MACULA OFF RETINAL DETACHMENT)    Surgeons: Stephan Resendez M.D. Responsible Provider: Sudha Costello M.D.    Anesthesia Type: general ASA Status: 3            Final Anesthesia Type: general  Last vitals  BP   Blood Pressure : (!) 146/67    Temp   37.4 °C (99.4 °F)    Pulse   86   Resp   18    SpO2   99 %      Anesthesia Post Evaluation    Patient location during evaluation: PACU  Patient participation: complete - patient participated  Level of consciousness: awake and alert    Airway patency: patent  Anesthetic complications: no  Cardiovascular status: hemodynamically stable  Respiratory status: acceptable  Hydration status: euvolemic    PONV: none          There were no known notable events for this encounter.     Nurse Pain Score: 0 (NPRS)

## 2025-01-14 ENCOUNTER — APPOINTMENT (OUTPATIENT)
Dept: ADMISSIONS | Facility: MEDICAL CENTER | Age: 75
End: 2025-01-14
Attending: INTERNAL MEDICINE
Payer: MEDICARE

## 2025-01-15 ENCOUNTER — PRE-ADMISSION TESTING (OUTPATIENT)
Dept: ADMISSIONS | Facility: MEDICAL CENTER | Age: 75
End: 2025-01-15
Payer: MEDICARE

## 2025-01-15 NOTE — PREADMIT AVS NOTE
Current Medications   Medication Instructions    loperamide (IMODIUM A-D) 2 MG tablet Continue as prescribed    TRIAMCINOLONE ACETONIDE EX Hold medication day of procedure    Multiple Vitamins-Minerals (HAIR SKIN & NAILS PO) Please do not take any more prior to surgery     Probiotic Product (PROBIOTIC-10 PO) Please do not take any more prior to surgery     vitamin e (VITAMIN E) 400 UNIT Cap Please do not take any more prior to surgery     acetaminophen (TYLENOL) 500 MG Tab Continue as prescribed    diphenhydrAMINE HCl (BENADRYL PO) Continue as prescribed    Pseudoephedrine HCl (SUDAFED PO) Hold medication day of procedure    diphenhydrAMINE HCl, Sleep, (SLEEP-AID) 50 MG Cap Continue as prescribed    atorvastatin (LIPITOR) 40 MG Tab Continue as prescribed    levothyroxine (SYNTHROID) 100 MCG Tab Continue as prescribed    estradiol (ESTRACE) 0.1 MG/GM vaginal cream Hold medication day of procedure    cyanocobalamin (VITAMIN B-12) 100 MCG Tab Please do not take any more prior to surgery     melatonin 3 MG Tab Continue as prescribed    Mirabegron ER 50 MG TABLET SR 24 HR Continue as prescribed    SYMBICORT 80-4.5 MCG/ACT Aerosol Continue as prescribed    albuterol 108 (90 Base) MCG/ACT Aero Soln inhalation aerosol Continue as prescribed    PROAIR  (90 Base) MCG/ACT Aero Soln inhalation aerosol Continue as prescribed    vitamin D (CHOLECALCIFEROL) 1000 Unit (25 mcg) Tab Please do not take any more prior to surgery

## 2025-01-15 NOTE — OR NURSING
Preadmit note for surgery/procedure scheduled on 1/17/2025 with Dr. Resendez:    Patient given fasting and medication instructions per anesthesia guidelines.  Patient understands to follow prescribing doctor/surgeon's instructions if they differ.  Patient understands all instructions and denies questions at this time. Patient would like preop testing completed in preop.  Call made to Dr. Resendez's office.  Message left with the above information.  Asked for return call if this is a problem for Dr. Resendez.

## 2025-01-16 NOTE — OR NURSING
I received a call from Kim at Dr. Resendez's office and I notified Kim that per note from Arin Pratt RN, the patient would like pre-op testing completed in pre-op. Kim states that pre-op testing may be completed in pre-op.

## 2025-01-17 ENCOUNTER — ANESTHESIA (OUTPATIENT)
Dept: SURGERY | Facility: MEDICAL CENTER | Age: 75
End: 2025-01-17
Payer: MEDICARE

## 2025-01-17 ENCOUNTER — ANESTHESIA EVENT (OUTPATIENT)
Dept: SURGERY | Facility: MEDICAL CENTER | Age: 75
End: 2025-01-17
Payer: MEDICARE

## 2025-01-17 ENCOUNTER — HOSPITAL ENCOUNTER (OUTPATIENT)
Facility: MEDICAL CENTER | Age: 75
End: 2025-01-17
Attending: INTERNAL MEDICINE | Admitting: INTERNAL MEDICINE
Payer: MEDICARE

## 2025-01-17 VITALS
DIASTOLIC BLOOD PRESSURE: 88 MMHG | BODY MASS INDEX: 23.22 KG/M2 | HEIGHT: 67 IN | RESPIRATION RATE: 19 BRPM | HEART RATE: 73 BPM | WEIGHT: 147.93 LBS | OXYGEN SATURATION: 92 % | SYSTOLIC BLOOD PRESSURE: 143 MMHG | TEMPERATURE: 97.5 F

## 2025-01-17 LAB
ALBUMIN SERPL BCP-MCNC: 4.2 G/DL (ref 3.2–4.9)
ALBUMIN/GLOB SERPL: 1.2 G/DL
ALP SERPL-CCNC: 313 U/L (ref 30–99)
ALT SERPL-CCNC: 37 U/L (ref 2–50)
ANION GAP SERPL CALC-SCNC: 13 MMOL/L (ref 7–16)
AST SERPL-CCNC: 50 U/L (ref 12–45)
BILIRUB SERPL-MCNC: 0.7 MG/DL (ref 0.1–1.5)
BUN SERPL-MCNC: 20 MG/DL (ref 8–22)
CALCIUM ALBUM COR SERPL-MCNC: 8.9 MG/DL (ref 8.5–10.5)
CALCIUM SERPL-MCNC: 9.1 MG/DL (ref 8.5–10.5)
CHLORIDE SERPL-SCNC: 103 MMOL/L (ref 96–112)
CO2 SERPL-SCNC: 24 MMOL/L (ref 20–33)
CREAT SERPL-MCNC: 1.1 MG/DL (ref 0.5–1.4)
ERYTHROCYTE [DISTWIDTH] IN BLOOD BY AUTOMATED COUNT: 46.4 FL (ref 35.9–50)
GFR SERPLBLD CREATININE-BSD FMLA CKD-EPI: 52 ML/MIN/1.73 M 2
GLOBULIN SER CALC-MCNC: 3.5 G/DL (ref 1.9–3.5)
GLUCOSE SERPL-MCNC: 86 MG/DL (ref 65–99)
HCT VFR BLD AUTO: 45.6 % (ref 37–47)
HGB BLD-MCNC: 14.6 G/DL (ref 12–16)
INR PPP: 1.15 (ref 0.87–1.13)
MCH RBC QN AUTO: 28.2 PG (ref 27–33)
MCHC RBC AUTO-ENTMCNC: 32 G/DL (ref 32.2–35.5)
MCV RBC AUTO: 88 FL (ref 81.4–97.8)
PLATELET # BLD AUTO: 160 K/UL (ref 164–446)
PMV BLD AUTO: 10.9 FL (ref 9–12.9)
POTASSIUM SERPL-SCNC: 4 MMOL/L (ref 3.6–5.5)
PROT SERPL-MCNC: 7.7 G/DL (ref 6–8.2)
PROTHROMBIN TIME: 14.7 SEC (ref 12–14.6)
RBC # BLD AUTO: 5.18 M/UL (ref 4.2–5.4)
SODIUM SERPL-SCNC: 140 MMOL/L (ref 135–145)
WBC # BLD AUTO: 7.5 K/UL (ref 4.8–10.8)

## 2025-01-17 PROCEDURE — 160048 HCHG OR STATISTICAL LEVEL 1-5: Performed by: INTERNAL MEDICINE

## 2025-01-17 PROCEDURE — 80053 COMPREHEN METABOLIC PANEL: CPT

## 2025-01-17 PROCEDURE — 700101 HCHG RX REV CODE 250

## 2025-01-17 PROCEDURE — 85610 PROTHROMBIN TIME: CPT

## 2025-01-17 PROCEDURE — 700111 HCHG RX REV CODE 636 W/ 250 OVERRIDE (IP): Performed by: INTERNAL MEDICINE

## 2025-01-17 PROCEDURE — 36415 COLL VENOUS BLD VENIPUNCTURE: CPT

## 2025-01-17 PROCEDURE — 160046 HCHG PACU - 1ST 60 MINS PHASE II: Performed by: INTERNAL MEDICINE

## 2025-01-17 PROCEDURE — 700101 HCHG RX REV CODE 250: Performed by: INTERNAL MEDICINE

## 2025-01-17 PROCEDURE — 160035 HCHG PACU - 1ST 60 MINS PHASE I: Performed by: INTERNAL MEDICINE

## 2025-01-17 PROCEDURE — 700101 HCHG RX REV CODE 250: Performed by: ANESTHESIOLOGY

## 2025-01-17 PROCEDURE — 85027 COMPLETE CBC AUTOMATED: CPT

## 2025-01-17 PROCEDURE — 700111 HCHG RX REV CODE 636 W/ 250 OVERRIDE (IP): Performed by: ANESTHESIOLOGY

## 2025-01-17 PROCEDURE — 700105 HCHG RX REV CODE 258: Performed by: INTERNAL MEDICINE

## 2025-01-17 PROCEDURE — 160009 HCHG ANES TIME/MIN: Performed by: INTERNAL MEDICINE

## 2025-01-17 PROCEDURE — 160041 HCHG SURGERY MINUTES - EA ADDL 1 MIN LEVEL 4: Performed by: INTERNAL MEDICINE

## 2025-01-17 PROCEDURE — 160029 HCHG SURGERY MINUTES - 1ST 30 MINS LEVEL 4: Performed by: INTERNAL MEDICINE

## 2025-01-17 PROCEDURE — 160025 RECOVERY II MINUTES (STATS): Performed by: INTERNAL MEDICINE

## 2025-01-17 PROCEDURE — 160002 HCHG RECOVERY MINUTES (STAT): Performed by: INTERNAL MEDICINE

## 2025-01-17 RX ORDER — OXYCODONE HCL 5 MG/5 ML
5 SOLUTION, ORAL ORAL
Status: DISCONTINUED | OUTPATIENT
Start: 2025-01-17 | End: 2025-01-17 | Stop reason: HOSPADM

## 2025-01-17 RX ORDER — CEFAZOLIN SODIUM 1 G/3ML
INJECTION, POWDER, FOR SOLUTION INTRAMUSCULAR; INTRAVENOUS
Status: DISCONTINUED | OUTPATIENT
Start: 2025-01-17 | End: 2025-01-17 | Stop reason: HOSPADM

## 2025-01-17 RX ORDER — CYCLOPENTOLATE HYDROCHLORIDE 10 MG/ML
1 SOLUTION/ DROPS OPHTHALMIC ONCE
Status: COMPLETED | OUTPATIENT
Start: 2025-01-17 | End: 2025-01-17

## 2025-01-17 RX ORDER — DEXAMETHASONE SODIUM PHOSPHATE 4 MG/ML
INJECTION, SOLUTION INTRA-ARTICULAR; INTRALESIONAL; INTRAMUSCULAR; INTRAVENOUS; SOFT TISSUE PRN
Status: DISCONTINUED | OUTPATIENT
Start: 2025-01-17 | End: 2025-01-17 | Stop reason: SURG

## 2025-01-17 RX ORDER — ATROPINE SULFATE 10 MG/ML
SOLUTION/ DROPS OPHTHALMIC
Status: DISCONTINUED | OUTPATIENT
Start: 2025-01-17 | End: 2025-01-17 | Stop reason: HOSPADM

## 2025-01-17 RX ORDER — BALANCED SALT SOLUTION ENRICHED WITH BICARBONATE, DEXTROSE, AND GLUTATHIONE
KIT INTRAOCULAR
Status: DISCONTINUED | OUTPATIENT
Start: 2025-01-17 | End: 2025-01-17 | Stop reason: HOSPADM

## 2025-01-17 RX ORDER — CYCLOPENTOLATE HYDROCHLORIDE 10 MG/ML
SOLUTION/ DROPS OPHTHALMIC
Status: COMPLETED
Start: 2025-01-17 | End: 2025-01-17

## 2025-01-17 RX ORDER — PHENYLEPHRINE HYDROCHLORIDE 25 MG/ML
SOLUTION/ DROPS OPHTHALMIC
Status: COMPLETED
Start: 2025-01-17 | End: 2025-01-17

## 2025-01-17 RX ORDER — TIMOLOL MALEATE 5 MG/ML
SOLUTION/ DROPS OPHTHALMIC
Status: DISCONTINUED | OUTPATIENT
Start: 2025-01-17 | End: 2025-01-17 | Stop reason: HOSPADM

## 2025-01-17 RX ORDER — PHENYLEPHRINE HYDROCHLORIDE 25 MG/ML
1 SOLUTION/ DROPS OPHTHALMIC ONCE
Status: COMPLETED | OUTPATIENT
Start: 2025-01-17 | End: 2025-01-17

## 2025-01-17 RX ORDER — OXYCODONE HCL 5 MG/5 ML
10 SOLUTION, ORAL ORAL
Status: DISCONTINUED | OUTPATIENT
Start: 2025-01-17 | End: 2025-01-17 | Stop reason: HOSPADM

## 2025-01-17 RX ORDER — ONDANSETRON 2 MG/ML
INJECTION INTRAMUSCULAR; INTRAVENOUS PRN
Status: DISCONTINUED | OUTPATIENT
Start: 2025-01-17 | End: 2025-01-17 | Stop reason: SURG

## 2025-01-17 RX ORDER — LIDOCAINE HYDROCHLORIDE 20 MG/ML
INJECTION, SOLUTION EPIDURAL; INFILTRATION; INTRACAUDAL; PERINEURAL PRN
Status: DISCONTINUED | OUTPATIENT
Start: 2025-01-17 | End: 2025-01-17 | Stop reason: SURG

## 2025-01-17 RX ORDER — DEXAMETHASONE SODIUM PHOSPHATE 4 MG/ML
INJECTION, SOLUTION INTRA-ARTICULAR; INTRALESIONAL; INTRAMUSCULAR; INTRAVENOUS; SOFT TISSUE
Status: DISCONTINUED | OUTPATIENT
Start: 2025-01-17 | End: 2025-01-17 | Stop reason: HOSPADM

## 2025-01-17 RX ORDER — HALOPERIDOL 5 MG/ML
1 INJECTION INTRAMUSCULAR
Status: DISCONTINUED | OUTPATIENT
Start: 2025-01-17 | End: 2025-01-17 | Stop reason: HOSPADM

## 2025-01-17 RX ORDER — SODIUM CHLORIDE, SODIUM LACTATE, POTASSIUM CHLORIDE, CALCIUM CHLORIDE 600; 310; 30; 20 MG/100ML; MG/100ML; MG/100ML; MG/100ML
INJECTION, SOLUTION INTRAVENOUS CONTINUOUS
Status: DISCONTINUED | OUTPATIENT
Start: 2025-01-17 | End: 2025-01-17 | Stop reason: HOSPADM

## 2025-01-17 RX ORDER — DIPHENHYDRAMINE HYDROCHLORIDE 50 MG/ML
12.5 INJECTION INTRAMUSCULAR; INTRAVENOUS
Status: DISCONTINUED | OUTPATIENT
Start: 2025-01-17 | End: 2025-01-17 | Stop reason: HOSPADM

## 2025-01-17 RX ORDER — BALANCED SALT SOLUTION 6.4; .75; .48; .3; 3.9; 1.7 MG/ML; MG/ML; MG/ML; MG/ML; MG/ML; MG/ML
SOLUTION OPHTHALMIC
Status: DISCONTINUED | OUTPATIENT
Start: 2025-01-17 | End: 2025-01-17 | Stop reason: HOSPADM

## 2025-01-17 RX ORDER — BALANCED SALT SOLUTION ENRICHED WITH BICARBONATE, DEXTROSE, AND GLUTATHIONE
KIT INTRAOCULAR
Status: DISCONTINUED
Start: 2025-01-17 | End: 2025-01-17 | Stop reason: HOSPADM

## 2025-01-17 RX ORDER — ONDANSETRON 2 MG/ML
4 INJECTION INTRAMUSCULAR; INTRAVENOUS
Status: DISCONTINUED | OUTPATIENT
Start: 2025-01-17 | End: 2025-01-17 | Stop reason: HOSPADM

## 2025-01-17 RX ADMIN — ONDANSETRON 4 MG: 2 INJECTION INTRAMUSCULAR; INTRAVENOUS at 16:08

## 2025-01-17 RX ADMIN — CYCLOPENTOLATE HYDROCHLORIDE 1 DROP: 10 SOLUTION OPHTHALMIC at 14:17

## 2025-01-17 RX ADMIN — DEXAMETHASONE SODIUM PHOSPHATE 4 MG: 4 INJECTION INTRA-ARTICULAR; INTRALESIONAL; INTRAMUSCULAR; INTRAVENOUS; SOFT TISSUE at 16:08

## 2025-01-17 RX ADMIN — PHENYLEPHRINE HYDROCHLORIDE 1 DROP: 25 SOLUTION/ DROPS OPHTHALMIC at 14:17

## 2025-01-17 RX ADMIN — CYCLOPENTOLATE HYDROCHLORIDE 1 DROP: 10 SOLUTION/ DROPS OPHTHALMIC at 14:17

## 2025-01-17 RX ADMIN — LIDOCAINE HYDROCHLORIDE 100 MG: 20 INJECTION, SOLUTION EPIDURAL; INFILTRATION; INTRACAUDAL; PERINEURAL at 15:17

## 2025-01-17 RX ADMIN — PROPOFOL 200 MG: 10 INJECTION, EMULSION INTRAVENOUS at 15:17

## 2025-01-17 RX ADMIN — SODIUM CHLORIDE, POTASSIUM CHLORIDE, SODIUM LACTATE AND CALCIUM CHLORIDE: 600; 310; 30; 20 INJECTION, SOLUTION INTRAVENOUS at 15:18

## 2025-01-17 ASSESSMENT — FIBROSIS 4 INDEX: FIB4 SCORE: 2.57

## 2025-01-17 NOTE — ANESTHESIA PREPROCEDURE EVALUATION
Case: [id number] Date/Time: 01/17/25 1445    Procedure: RIGHT EYE POSTERIOR VITRECTOMY, AIRFLUID GAS EXCHANGE [id number] %, ENDO LASER 23 GAUGE AND ANY OTHER INDICATED PROCEDURES    Pre-op diagnosis: TOTAL RETINAL DETACHMENT    Location: [facility] ROOM 24 / SURGERY SAME DAY [facility]    Surgeons: Stephan Resendez M.D.            Relevant Problems   PULMONARY   (positive) Chronic obstructive pulmonary disease (HCC)   (positive) Pneumonia      NEURO   (positive) TIA (transient ischemic attack)      CARDIAC   (positive) Abdominal aortic aneurysm (AAA) 3.0 cm to 5.0 cm in diameter in female (HCC)   (positive) Aortic atherosclerosis (HCC)   (positive) Bilateral iliac artery stenosis (HCC)   (positive) Disorder of arteries and arterioles (HCC)   (positive) Essential hypertension   (positive) Pulmonary hypertension (HCC)   (positive) Symptomatic carotid artery stenosis, right   (positive) Venous stasis dermatitis of both lower extremities         (positive) Acute kidney injury on CKD (chronic kidney disease) stage 3, GFR 30-59 ml/min      ENDO   (positive) Hypothyroidism       Physical Exam    Airway   Mallampati: II  TM distance: >3 FB  Neck ROM: full       Cardiovascular - normal exam  Rhythm: regular  Rate: normal  (-) murmur     Dental - normal exam           Pulmonary - normal exam  Breath sounds clear to auscultation     Abdominal    Neurological - normal exam                   Anesthesia Plan    ASA 3   ASA physical status 3 criteria: other (comment) and CVA or TIA - history (> 3 months)    Plan - general       Airway plan will be LMA          Induction: intravenous    Postoperative Plan: Postoperative administration of opioids is intended.    Pertinent diagnostic labs and testing reviewed    Informed Consent:    Anesthetic plan and risks discussed with patient.    Use of blood products discussed with: patient whom consented to blood products.

## 2025-01-17 NOTE — ANESTHESIA PROCEDURE NOTES
Airway    Date/Time: 1/17/2025 3:19 PM    Performed by: Darren Reyes M.D.  Authorized by: Darren Reyes M.D.    Location:  OR  Urgency:  Elective  Indications for Airway Management:  Anesthesia      Spontaneous Ventilation: absent    Sedation Level:  Deep  Preoxygenated: Yes    Final Airway Type:  Supraglottic airway  Final Supraglottic Airway:  Standard LMA    SGA Size:  3  Number of Attempts at Approach:  1

## 2025-01-18 NOTE — DISCHARGE INSTRUCTIONS
What to Expect Post Anesthesia    Rest and take it easy for the first 24 hours.  A responsible adult is recommended to remain with you during that time.  It is normal to feel sleepy.  We encourage you to not do anything that requires balance, judgment or coordination.    FOR 24 HOURS DO NOT:  Drive, operate machinery or run household appliances.  Drink beer or alcoholic beverages.  Make important decisions or sign legal documents.    To avoid nausea, slowly advance diet as tolerated, avoiding spicy or greasy foods for the first day.  Add more substantial food to your diet according to your provider's instructions.  Babies can be fed formula or breast milk as soon as they are hungry.  INCREASE FLUIDS AND FIBER TO AVOID CONSTIPATION.    MILD FLU-LIKE SYMPTOMS ARE NORMAL.  YOU MAY EXPERIENCE GENERALIZED MUSCLE ACHES, THROAT IRRITATION, HEADACHE AND/OR SOME NAUSEA.    If any questions arise, call your provider.  If your provider is not available, please feel free to call the Surgical Center at [phone].    MEDICATIONS: Resume taking daily medication.  Take prescribed pain medication with food.  If no medication is prescribed, you may take non-aspirin pain medication if needed.  PAIN MEDICATION CAN BE VERY CONSTIPATING.  Take a stool softener or laxative such as senokot, pericolace, or milk of magnesia if needed.    May take Tylenol at anytime

## 2025-01-18 NOTE — OR NURSING
1612- pt arrives from OR to PACU 6, report received from RN and anesthesia. Pt place on monitor. VSS,  NAD noted. Oral airway in place.   O2 6L via mask.    Dressing to (R) eye- CDI    1617-LMA d/c'd    1625-Dr Resendez at bedside.  Pt tolerates sip of water    1645- pt assisted up to chair, face down at this time    1655- called and updated on status/ POC    1705-discharge instructions given to pt and , pt remains in face down position    1720-  assisted pt with dressing.  PIV d/c'd    1723-pt escorted out in w/c by AYALA Sherman  All belongings accounted for.

## 2025-01-18 NOTE — ANESTHESIA TIME REPORT
Anesthesia Start and Stop Event Times       Date Time Event    1/17/2025 1501 Anesthesia Start     1620 Anesthesia Stop          Responsible Staff  01/17/25      Name Role Begin End    Darren Reyes M.D. Anesth 1501 1620          Overtime Reason:  no overtime (within assigned shift)    Comments:

## 2025-01-18 NOTE — OP REPORT
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    The patient was taken back into operating area. Subsequently the patient received general anesthesia from the anesthesia department. The eye was then prepped and draped in the usual sterile fashion for ophthalmic surgery, and a lid speculum was placed.     A 25-gauge trocar-cannula system was used to place a cannula in the typical beveled entry with conjunctival displacement in the inferotemporal quadrant. An infusion line was assembled and flushed for all in-line air. The infusion line was placed into the vitreous cavity and was directly visualized prior to unclamping. Once it was unclamped, 2 additional cannulas were placed in the superotemporal and superonasal quadrants in a similar fashion. At this time, the vitreous cutter and light pipe were introduced into the eye and using the wide-angle viewing system. A vitrectomy had been performed previously. Using the aid of scleral depression, peripheral residual vitrectomy was performed. A near-total recurrent retinal detachment was observed. There was only noted to be a break at the site of prior retinal tear superotemporally, though there was some atrophic appearing retina inferiorly.  At all times throughout the case light exposure to the macula and fovea were minimized to reduce the risk of phototoxicity. All vitreous was shaved in the periphery and around tears. Tears were marked with diathermy. The break was marked with  endodiathermy. A posterior retinotomy was performed. An air-fluid exhanage was done with soft tip canula. Endolaser was done around the retonotomy and the superotemporal opening, as well as in a 360 scatter fashion. The view after going to air was difficult as the posterior surface of the IOL would fog, though the view remained adequate to complete the laser. 15% C3F8 gas was injected into the eye.   Sclerostomies were sutured with 7-0 vicryl suture. Infusion cannula was removed and site was closed.Betadine was instilled in the eye. The lid speculum was removed. Timolol, Atropine, and Ophthalmic ointment was placed in the eye and the eye was patched and overlayed with Oliver shield.     The patient tolerated the procedure well without any complications, was taken to the postoperative recovery area in good condition.

## 2025-01-18 NOTE — ANESTHESIA POSTPROCEDURE EVALUATION
Patient: Naomy Vargas    Procedure Summary       Date: 01/17/25 Room / Location: [facility] ROOM 24 / SURGERY SAME DAY [facility]    Anesthesia Start: 1501 Anesthesia Stop: 1620    Procedure: RIGHT EYE POSTERIOR VITRECTOMY, AIRFLUID GAS EXCHANGE V8Q190 %, ENDO LASER (Eye) Diagnosis: (TOTAL RETINAL DETACHMENT)    Surgeons: Stephan Resendez M.D. Responsible Provider: Darren Reyes M.D.    Anesthesia Type: general ASA Status: 3            Final Anesthesia Type: general  Last vitals  BP   Blood Pressure : 127/87    Temp   36.4 °C (97.6 °F)    Pulse   82   Resp   20    SpO2   93 %      Anesthesia Post Evaluation    Patient location during evaluation: PACU  Patient participation: complete - patient participated  Level of consciousness: awake and alert    Airway patency: patent  Anesthetic complications: no  Cardiovascular status: hemodynamically stable  Respiratory status: acceptable  Hydration status: euvolemic    PONV: none          No notable events documented.     Nurse Pain Score: 0 (NPRS)

## 2025-03-03 ENCOUNTER — HOSPITAL ENCOUNTER (OUTPATIENT)
Dept: RADIOLOGY | Facility: MEDICAL CENTER | Age: 75
End: 2025-03-03
Attending: SURGERY
Payer: MEDICARE

## 2025-03-03 DIAGNOSIS — I65.21 CAROTID ARTERY STENOSIS, ASYMPTOMATIC, RIGHT: ICD-10-CM

## 2025-03-03 PROCEDURE — 93880 EXTRACRANIAL BILAT STUDY: CPT | Mod: 26 | Performed by: INTERNAL MEDICINE

## 2025-03-03 PROCEDURE — 93880 EXTRACRANIAL BILAT STUDY: CPT

## 2025-03-24 ENCOUNTER — TELEPHONE (OUTPATIENT)
Dept: HEALTH INFORMATION MANAGEMENT | Facility: OTHER | Age: 75
End: 2025-03-24
Payer: MEDICARE

## 2025-03-25 PROBLEM — N18.31 CHRONIC KIDNEY DISEASE, STAGE 3A: Status: ACTIVE | Noted: 2025-03-25

## 2025-04-07 ENCOUNTER — OFFICE VISIT (OUTPATIENT)
Dept: FAMILY PLANNING/WOMEN'S HEALTH CLINIC | Facility: PHYSICIAN GROUP | Age: 75
End: 2025-04-07
Payer: MEDICARE

## 2025-04-07 VITALS
OXYGEN SATURATION: 98 % | DIASTOLIC BLOOD PRESSURE: 78 MMHG | HEIGHT: 68 IN | WEIGHT: 150 LBS | BODY MASS INDEX: 22.73 KG/M2 | RESPIRATION RATE: 16 BRPM | SYSTOLIC BLOOD PRESSURE: 118 MMHG | HEART RATE: 74 BPM

## 2025-04-07 DIAGNOSIS — N18.31 CHRONIC KIDNEY DISEASE, STAGE 3A: ICD-10-CM

## 2025-04-07 DIAGNOSIS — I27.20 PULMONARY HYPERTENSION (HCC): ICD-10-CM

## 2025-04-07 DIAGNOSIS — E03.9 HYPOTHYROIDISM, UNSPECIFIED TYPE: ICD-10-CM

## 2025-04-07 DIAGNOSIS — Z86.73 HISTORY OF TIA (TRANSIENT ISCHEMIC ATTACK): ICD-10-CM

## 2025-04-07 DIAGNOSIS — N32.81 OAB (OVERACTIVE BLADDER): ICD-10-CM

## 2025-04-07 DIAGNOSIS — I10 ESSENTIAL HYPERTENSION: ICD-10-CM

## 2025-04-07 DIAGNOSIS — J84.9 INTERSTITIAL LUNG DISEASE (HCC): ICD-10-CM

## 2025-04-07 DIAGNOSIS — E78.49 OTHER HYPERLIPIDEMIA: ICD-10-CM

## 2025-04-07 DIAGNOSIS — J84.10 PULMONARY FIBROSIS (HCC): ICD-10-CM

## 2025-04-07 DIAGNOSIS — J43.9 PULMONARY EMPHYSEMA, UNSPECIFIED EMPHYSEMA TYPE (HCC): ICD-10-CM

## 2025-04-07 PROBLEM — I77.9 DISORDER OF ARTERIES AND ARTERIOLES (HCC): Status: RESOLVED | Noted: 2022-04-12 | Resolved: 2025-04-07

## 2025-04-07 PROBLEM — J96.11 CHRONIC HYPOXEMIC RESPIRATORY FAILURE (HCC): Status: RESOLVED | Noted: 2023-09-20 | Resolved: 2025-04-07

## 2025-04-07 PROCEDURE — 3074F SYST BP LT 130 MM HG: CPT | Performed by: NURSE PRACTITIONER

## 2025-04-07 PROCEDURE — G0439 PPPS, SUBSEQ VISIT: HCPCS | Performed by: NURSE PRACTITIONER

## 2025-04-07 PROCEDURE — 3078F DIAST BP <80 MM HG: CPT | Performed by: NURSE PRACTITIONER

## 2025-04-07 PROCEDURE — 1126F AMNT PAIN NOTED NONE PRSNT: CPT | Performed by: NURSE PRACTITIONER

## 2025-04-07 SDOH — ECONOMIC STABILITY: FOOD INSECURITY: HOW HARD IS IT FOR YOU TO PAY FOR THE VERY BASICS LIKE FOOD, HOUSING, MEDICAL CARE, AND HEATING?: NOT HARD AT ALL

## 2025-04-07 SDOH — ECONOMIC STABILITY: FOOD INSECURITY: WITHIN THE PAST 12 MONTHS, THE FOOD YOU BOUGHT JUST DIDN'T LAST AND YOU DIDN'T HAVE MONEY TO GET MORE.: NEVER TRUE

## 2025-04-07 SDOH — ECONOMIC STABILITY: FOOD INSECURITY: WITHIN THE PAST 12 MONTHS, YOU WORRIED THAT YOUR FOOD WOULD RUN OUT BEFORE YOU GOT THE MONEY TO BUY MORE.: NEVER TRUE

## 2025-04-07 SDOH — ECONOMIC STABILITY: HOUSING INSECURITY: IN THE LAST 12 MONTHS, WAS THERE A TIME WHEN YOU WERE NOT ABLE TO PAY THE MORTGAGE OR RENT ON TIME?: NO

## 2025-04-07 SDOH — HEALTH STABILITY: PHYSICAL HEALTH: ON AVERAGE, HOW MANY MINUTES DO YOU ENGAGE IN EXERCISE AT THIS LEVEL?: 0 MIN

## 2025-04-07 SDOH — ECONOMIC STABILITY: HOUSING INSECURITY: AT ANY TIME IN THE PAST 12 MONTHS, WERE YOU HOMELESS OR LIVING IN A SHELTER (INCLUDING NOW)?: NO

## 2025-04-07 SDOH — ECONOMIC STABILITY: TRANSPORTATION INSECURITY: IN THE PAST 12 MONTHS, HAS LACK OF TRANSPORTATION KEPT YOU FROM MEDICAL APPOINTMENTS OR FROM GETTING MEDICATIONS?: NO

## 2025-04-07 SDOH — HEALTH STABILITY: PHYSICAL HEALTH: ON AVERAGE, HOW MANY DAYS PER WEEK DO YOU ENGAGE IN MODERATE TO STRENUOUS EXERCISE (LIKE A BRISK WALK)?: 0 DAYS

## 2025-04-07 ASSESSMENT — ENCOUNTER SYMPTOMS: GENERAL WELL-BEING: FAIR

## 2025-04-07 ASSESSMENT — ACTIVITIES OF DAILY LIVING (ADL)
LACK_OF_TRANSPORTATION: NO
BATHING_REQUIRES_ASSISTANCE: 0
LACK_OF_TRANSPORTATION: NO

## 2025-04-07 ASSESSMENT — PATIENT HEALTH QUESTIONNAIRE - PHQ9: CLINICAL INTERPRETATION OF PHQ2 SCORE: 0

## 2025-04-07 ASSESSMENT — FIBROSIS 4 INDEX: FIB4 SCORE: 3.85

## 2025-04-07 ASSESSMENT — PAIN SCALES - GENERAL: PAINLEVEL_OUTOF10: NO PAIN

## 2025-04-07 NOTE — PROGRESS NOTES
Comprehensive Health Assessment Program     Naomy Vargas is a 75 y.o. here for her comprehensive health assessment.    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/20/2023    Pulmonary hypertension (HCC) 03/25/2023    Nonrheumatic tricuspid valve regurgitation 03/25/2023    Leukocytosis 03/24/2023    Interstitial lung disease (HCC) 03/23/2023    Pleural effusion 03/23/2023    Aortic atherosclerosis (HCC) 10/13/2021    B12 deficiency 07/22/2020    Thrombocytopenia, unspecified (HCC) 06/24/2020    OAB (overactive bladder) 02/11/2019    Abdominal aortic aneurysm (AAA) 3.0 cm to 5.0 cm in diameter in female (HCC) 11/16/2018    Bilateral iliac artery stenosis (HCC) 11/16/2018    Essential hypertension 08/06/2018    Chronic obstructive pulmonary disease (HCC) 04/17/2018    Pulmonary nodules 04/17/2018    Osteopenia 04/28/2017    Chronic right-sided low back pain without sciatica 06/06/2016    Venous stasis dermatitis of both lower extremities 07/15/2015    Hypothyroidism 07/14/2015    Hyperglycemia 11/02/2013       Current Outpatient Medications   Medication Sig Dispense Refill    loperamide (IMODIUM A-D) 2 MG tablet Take 2 mg by mouth 1 time a day as needed for Diarrhea.         TRIAMCINOLONE ACETONIDE EX Apply  topically as needed.      Multiple Vitamins-Minerals (HAIR SKIN & NAILS PO) Take 1-2 Tablets by mouth 2 times a day.      Probiotic Product (PROBIOTIC-10 PO) Take 1  Tablet by mouth every morning.      vitamin e (VITAMIN E) 400 UNIT Cap Take 400 Units by mouth every morning.      acetaminophen (TYLENOL) 500 MG Tab Take 500 mg by mouth at bedtime as needed.         diphenhydrAMINE HCl (BENADRYL PO) Take 25 mg by mouth at bedtime as needed.      Pseudoephedrine HCl (SUDAFED PO) Take 1 Tablet by mouth at bedtime as needed.         diphenhydrAMINE HCl, Sleep, (SLEEP-AID) 50 MG Cap Take 1 Capsule by mouth at bedtime as needed.      atorvastatin (LIPITOR) 40 MG Tab Take 1 Tablet by mouth every evening for 360 days. (Patient taking differently: Take 40 mg by mouth every evening.     MEDICATION INSTRUCTIONS FOR SURGERY/PROCEDURE ON 12/4/24: OKAY TO TAKE NIGHT BEFORE SURGERY.) 100 Tablet 1    levothyroxine (SYNTHROID) 100 MCG Tab TAKE 1 TABLET BY MOUTH EVERY DAY BEFORE BREAKFAST (Patient taking differently: Take 100 mcg by mouth every morning on an empty stomach.   ) 100 Tablet 1    cyanocobalamin (VITAMIN B-12) 100 MCG Tab Take 1 Tablet by mouth every day. (Patient taking differently: Take 100 mcg by mouth every morning.   ) 30 Tablet 3    melatonin 3 MG Tab Take 3 mg by mouth at bedtime.      Mirabegron ER 50 MG TABLET SR 24 HR Take 50 mg by mouth every morning.         vitamin D (CHOLECALCIFEROL) 1000 Unit (25 mcg) Tab Take 1,000 Units by mouth every morning.      aspirin 81 MG EC tablet Take 81 mg by mouth every morning. (Patient not taking: Reported on 1/15/2025)      SYMBICORT 80-4.5 MCG/ACT Aerosol INHALE 2 PUFFS BY MOUTH TWICE A DAY (Patient not taking: Reported on 4/7/2025) 30.6 Each 2    albuterol 108 (90 Base) MCG/ACT Aero Soln inhalation aerosol Inhale 1-2 Puffs every 6 hours as needed for Shortness of Breath. (Patient not taking: Reported on 4/7/2025) 8.5 g 2    PROAIR  (90 Base) MCG/ACT Aero Soln inhalation aerosol Inhale 1 Puff every 6 hours as needed for Shortness of Breath. (Patient not taking: Reported on 4/7/2025) 8.5 g 1     No current facility-administered  medications for this visit.          Current supplements as per medication list.     Allergies:   Colophony [pinus strobus], Latex, Neosporin [neomycin-polymyxin b], Phenylene, Soap, Tape, and Tea tree oil  Social History     Tobacco Use    Smoking status: Former     Current packs/day: 0.00     Average packs/day: 0.5 packs/day for 53.0 years (26.5 ttl pk-yrs)     Types: Cigarettes     Start date: 1964     Quit date: 2017     Years since quittin.7     Passive exposure: Past    Smokeless tobacco: Never   Vaping Use    Vaping status: Never Used   Substance Use Topics    Alcohol use: Yes     Comment: 2 per day wine    Drug use: No     Family History   Problem Relation Age of Onset    Arthritis Mother         hip fracture    Diabetes Mother     Cancer Mother         skin    Arthritis Father         lung    Alcohol/Drug Father         etoh    Lung Disease Sister         smoker/copd    Hypertension Sister     Other Brother         hep c    Arthritis Maternal Grandmother         hip fracture    Diabetes Maternal Grandfather     No Known Problems Son     No Known Problems Son     No Known Problems Brother     No Known Problems Sister     No Known Problems Brother     Hyperlipidemia Neg Hx     Stroke Neg Hx      Naomy  has a past medical history of Abnormal CT of the chest (2023), Acute hypoxemic respiratory failure (HCC) (2023), Acute kidney injury on CKD (chronic kidney disease) stage 3, GFR 30-59 ml/min (2013), Anxiety, Arthritis, Back pain, Blood transfusion without reported diagnosis, Breath shortness, Cataract, Cellulitis, Cellulitis of leg (2013), Chickenpox, COPD (chronic obstructive pulmonary disease) ([facility]), Dental disorder, Earache, Elevated troponin (2023), Fatigue, Frequent urination, Heart murmur, Hypertension (2018), Hypothyroidism, Influenza, Insomnia, Kidney disease, Mumps, OAB (overactive bladder) (2019), Other hyperlipidemia (2025), Pain  (02/23/2018), Peripheral vascular disease, unspecified ([facility]) (10/13/2021), Pneumonia (1980), Rash, Ringing in ears, Sepsis ([facility]) (03/23/2023), Shortness of breath, Swelling of lower extremity, Thyroid disease, TIA (transient ischemic attack) (07/29/2024), Tonsillitis, UTI (urinary tract infection), and Venous stasis dermatitis.    She has no past medical history of Anemia, Clotting disorder ([facility]), Cough, Depression, Diabetic neuropathy ([facility]), Headache(784.0), HIV (human immunodeficiency virus infection) ([facility]), IBD (inflammatory bowel disease), Meningitis, Osteoporosis, Painful breathing, Sputum production, Ulcer, or Wheezing.   Past Surgical History:   Procedure Laterality Date    VITRECTOMY POSTERIOR  1/17/2025    Procedure: RIGHT EYE POSTERIOR VITRECTOMY, AIRFLUID GAS EXCHANGE [id number] %, ENDO LASER;  Surgeon: Stephan Resendez M.D.;  Location: SURGERY SAME DAY [facility];  Service: Ophthalmology    VITRECTOMY POSTERIOR Right 12/04/2024    Procedure: RIGHT EYE POSTERIOR VITRECTOMY, AIR FLUID GAS, EXCHANGE ENDOLASER,  25 GAUGE AND ANY OTHER INDICATED PROCEDURES;  Surgeon: Stephan Resendez M.D.;  Location: SURGERY SAME DAY [facility];  Service: Ophthalmology    W[place] THROMBOENDARTECTMY NECK,NECK INCIS Right 08/06/2024    Procedure: RIGHT CAROTID ENDARTERECTOMY WITH NEUROMONITORING;  Surgeon: Valerio Cortés M.D.;  Location: [facility];  Service: Vascular    ANGIOPLASTY, WITH STENT INSERTION Right 08/06/2024    Procedure: Direct sheath insertion in the right carotid artery and right carotid angiogram;  Surgeon: Valerio Cortés M.D.;  Location: [facility];  Service: Vascular    VEIN LIGATION RADIO FREQUENCY Right 02/26/2018    Procedure: VEIN LIGATION RADIO FREQUENCY- LONG SAPENOUS;  Surgeon: Jim Alas M.D.;  Location: [facility];  Service: General    ABDOMINAL EXPLORATION      2 ectopic preg    BREAST BIOPSY      hx of benign left breast biopsy    EYE SURGERY      cataract x2     HYSTERECTOMY, TOTAL ABDOMINAL  1977/1978    OOPHORECTOMY      PB MAMMARY DUCTOGRAM, SINGLE      AK REMV 2ND CATARACT,CORN-SCLER SECTN      TONSILLECTOMY         Screening:  In the last six months have you experienced any leakage of urine? No    Depression Screening  Little interest or pleasure in doing things?  0 - not at all  Feeling down, depressed , or hopeless? 0 - not at all  Patient Health Questionnaire Score: 0     If depressive symptoms identified deferred to follow up visit unless specifically addressed in assessment and plan.    Interpretation of PHQ-9 Total Score   Score Severity   1-4 No Depression   5-9 Mild Depression   10-14 Moderate Depression   15-19 Moderately Severe Depression   20-27 Severe Depression    Screening for Cognitive Impairment  Do you or any of your friends or family members have any concern about your memory? No  Three Minute Recall (Village, Kitchen, Baby) 3/3    Morgan clock face with all 12 numbers and set the hands to show 10 minutes past 11.  Yes 5  Cognitive concerns identified deferred for follow up unless specifically addressed in assessment and plan.    Fall Risk Assessment  Has the patient had two or more falls in the last year or any fall with injury in the last year?  No    Safety Assessment  Do you always wear your seatbelt?  Yes  Any changes to home needed to function safely? No  Difficulty hearing.  No  Patient counseled about all safety risks that were identified.    Functional Assessment ADLs  Are there any barriers preventing you from cooking for yourself or meeting nutritional needs?  No.    Are there any barriers preventing you from driving safely or obtaining transportation?  No.    Are there any barriers preventing you from using a telephone or calling for help?  No    Are there any barriers preventing you from shopping?  No.    Are there any barriers preventing you from taking care of your own finances?  No    Are there any barriers preventing you from managing  your medications?  No    Are there any barriers preventing you from showering, bathing or dressing yourself? No    Are there any barriers preventing you from doing housework or laundry? No  Are there any barriers preventing you from using the toilet?No  Are you currently engaging in any exercise or physical activity?  No.      Self-Assessment of Health  What is your perception of your health? Fair    Do you sleep more than six hours a night? Yes    In the past 7 days, how much did pain keep you from doing your normal work? Some    Do you spend quality time with family or friends (virtually or in person)? Yes    Do you usually eat a heart healthy diet that constists of a variety of fruits, vegetables, whole grains and fiber? Yes    Do you eat foods high in fat and/or Fast Food more than three times per week? No    How concerned are you that your medical conditions are not being well managed? Not at all    Are you worried that in the next 2 months, you may not have stable housing that you own, rent, or stay in as part of a household? No      Advance Care Planning  Do you have an Advance Directive, Living Will, Durable Power of , or POLST? No                 Health Maintenance Summary            Current Care Gaps       Colorectal Cancer Screening (Colonoscopy - Every 2 Years) Overdue since 3/24/2019      08/12/2024  Order placed for COLOGUARD (FIT DNA) by Prema Patel P.A.-C.    03/24/2017  REFERRAL TO GI FOR COLONOSCOPY    03/24/2017  REFERRAL TO GI FOR COLONOSCOPY              COVID-19 Vaccine (6 - 2024-25 season) Overdue since 9/1/2024 09/29/2023  Imm Admin: COVID-19, mRNA, LNP-S, PF, naveed-sucrose, 30 mcg/0.3 mL    10/28/2022  Imm Admin: PFIZER BIVALENT SARS-COV-2 VACCINE (12+)    11/22/2021  Imm Admin: PFIZER PURPLE CAP SARS-COV-2 VACCINATION (12+)    03/10/2021  Imm Admin: PFIZER PURPLE CAP SARS-COV-2 VACCINATION (12+)    02/17/2021  Imm Admin: PFIZER PURPLE CAP SARS-COV-2 VACCINATION (12+)      Only the first 5 history entries have been loaded, but more history exists.            Annual Pulmonary Function Test / Spirometry (Yearly) Overdue since 2024  PULMONARY FUNCTION TESTS -Test requested: Complete Pulmonary Function Test    2023  PULMONARY FUNCTION TESTS -Test requested: Complete Pulmonary Function Test    2021  PULMONARY FUNCTION TESTS -Test requested: Complete Pulmonary Function Test    2018  AMB PULMONARY FUNCTION TEST/LAB              Lung Cancer Screening Shared Decision Making (Once) Never done      No completion history exists for this topic.                      Needs Review       Hepatitis C Screening  Tentatively Complete      2020  Hepatitis C Antibody component of HCV Scrn ( 3630-5890 1xLife)              Bone Density Scan (Every 5 Years) Tentatively due on 2025  DS-BONE DENSITY STUDY (DEXA)    2017  DS-BONE DENSITY STUDY (DEXA)    2015  Postponed                      Upcoming       Annual Wellness Visit (Yearly) Next due on 2025  Level of Service: MD ANNUAL WELLNESS VISIT-INCLUDES PPPS SUBSEQUE*    2024  Level of Service: MD ANNUAL WELLNESS VISIT-INCLUDES PPPS SUBSEQUE*    2024  Done - Comprehensive Health Assessment    2022  Level of Service: MD ANNUAL WELLNESS VISIT-INCLUDES PPPS SUBSEQUE*    10/13/2021  Level of Service: MD ANNUAL WELLNESS VISIT-INCLUDES PPPS SUBSEQUE*     Only the first 5 history entries have been loaded, but more history exists.            IMM DTaP/Tdap/Td Vaccine (2 - Td or Tdap) Next due on 3/15/2027      03/15/2017  Imm Admin: Tdap Vaccine                      Completed or No Longer Recommended       Influenza Vaccine (Series Information) Completed      2024  Imm Admin: Influenza high-dose trivalent (PF)    2023  Imm Admin: Influenza Vaccine Adult HD    10/28/2022  Imm Admin: Influenza Vaccine Adult HD    2021  Imm Admin:  Influenza Vaccine Adult HD    08/27/2020  Imm Admin: Influenza Vaccine Adult HD      Only the first 5 history entries have been loaded, but more history exists.              Zoster (Shingles) Vaccines (Series Information) Completed      12/21/2023  Imm Admin: Zoster Vaccine Recombinant (RZV) (SHINGRIX)    06/24/2020  Imm Admin: Zoster Vaccine Recombinant (RZV) (SHINGRIX)    02/08/2019  Imm Admin: Zoster Vaccine Recombinant (RZV) (SHINGRIX)    03/15/2017  Imm Admin: Zoster Vaccine Live (ZVL) (Zostavax) - HISTORICAL DATA              Pneumococcal Vaccine: 50+ Years (Series Information) Completed      02/08/2019  Imm Admin: Pneumococcal polysaccharide vaccine (PPSV-23)    02/28/2017  Imm Admin: Pneumococcal Conjugate Vaccine (Prevnar/PCV-13)    11/01/2013  Imm Admin: Pneumococcal polysaccharide vaccine (PPSV-23)              Hepatitis A Vaccine (Hep A) (Series Information) Aged Out      No completion history exists for this topic.              Hepatitis B Vaccine (Hep B) (Series Information) Aged Out     No completion history exists for this topic.              HPV Vaccines (Series Information) Aged Out     No completion history exists for this topic.              Polio Vaccine (Inactivated Polio) (Series Information) Aged Out     No completion history exists for this topic.              Meningococcal Immunization (Series Information) Aged Out     No completion history exists for this topic.              Mammogram  Discontinued        Frequency changed to Never automatically (Topic No Longer Applies)    03/07/2023  MA-SCREENING MAMMO BILAT W/TOMOSYNTHESIS W/CAD    12/22/2020  MA-SCREENING MAMMO BILAT W/TOMOSYNTHESIS W/CAD    08/29/2018  MA-MAMMO SCREENING BILAT W/CLAUDIA W/CAD    06/24/2016  MA-SCREEN MAMMO W/CAD-BILAT     Only the first 5 history entries have been loaded, but more history exists.            Cervical Cancer Screening  Discontinued        Frequency changed to Never automatically (Topic No Longer Applies)  "   08/18/2015  Postponed              Lung Cancer Screening  Discontinued        Frequency changed to Never automatically (Topic No Longer Applies)    08/20/2024  CT-CHEST (THORAX) W/O    07/30/2024  CT-CHEST (THORAX) W/O    05/16/2023  CT-CHEST, HIGH RESOLUTION LUNG    03/07/2023  CT-CHEST (THORAX) W/O      Only the first 5 history entries have been loaded, but more history exists.                            Patient Care Team:  Prema Patel P.A.-C. as PCP - General (Family Medicine)  Dina Steinberg P.A.-C. (Inactive) as PCP - [facility] Paneled  Edmundo Montiel M.D. (Dermatology)  Haris Talamantes M.D. as Consulting Physician (Ophthalmology)  Maria R Reyes, M.D. as Consulting Physician (Allergy and Immunology)  Darren Ruiz M.D. as Consulting Physician (Surgery)  davian as Respiratory Therapist (DME Supplier)      Financial Resource Strain: Low Risk  (4/7/2025)    Overall Financial Resource Strain (CARDIA)     Difficulty of Paying Living Expenses: Not hard at all      Transportation Needs: No Transportation Needs (4/7/2025)    PRAPARE - Transportation     Lack of Transportation (Medical): No     Lack of Transportation (Non-Medical): No      Food Insecurity: No Food Insecurity (4/7/2025)    Hunger Vital Sign     Worried About Running Out of Food in the Last Year: Never true     Ran Out of Food in the Last Year: Never true        Encounter Vitals  Blood Pressure : 118/78  Pulse: 74  Respiration: 16  Pulse Oximetry: 98 %  Weight: 68 kg (150 lb)  Height: 172.7 cm (5' 8\")  BMI (Calculated): 22.81  Pain Score: No pain     ROS:  No fever, chills, nausea, vomiting, diarrhea, chest pain or shortness of breath. See HPI.    Physical Exam:  Constitutional: NAD  HENMT: NC/AT OP clear, TM's clear, no lymphadenopathy, no thyromegaly.  No JVD.  (-) carotid bruit   Cardiovascular: RRR, No murmur   Lungs: CTAB bilat slightly diminished   Extremities: 2+ DP, PT and Radial pulses bilaterally. No edema   Skin: No legions " notes  Neurologic: Alert & oriented x3    Assessment and Plan. The following treatment and monitoring plan is recommended:  Other hyperlipidemia  Stable on atorvastatin. Records review     Latest Reference Range & Units 07/30/24 03:29   Cholesterol,Tot 100 - 199 mg/dL 66 (L)   Triglycerides 0 - 149 mg/dL 85   HDL >=40 mg/dL 25 !   LDL <100 mg/dL 24   (L): Data is abnormally low  !: Data is abnormal. No musculoskeletal issues associated with the use of a statin. Cont heart healthy diet and regular exercise. Cont f/u with PCP for ongoing monitoring and medication management      Hypothyroidism  Stable on levothyroxine. Records review      Latest Reference Range & Units 06/09/22 09:41 01/22/24 11:49   TSH 0.380 - 5.330 uIU/mL 2.770 0.520   Cont f/u with PCP for ongoing monitoring and medication management      Chronic kidney disease, stage 3a  Stable. Records review    Latest Reference Range & Units 09/05/24 15:59 01/17/25 14:05   GFR (CKD-EPI) >60 mL/min/1.73 m 2 53 ! 52 !   !: Data is abnormal patient aware of importance of daily water hydration/ avoidance of OTC NSAIDs. Cont f/u with PCP for ongoing monitoring and discussion     Chronic obstructive pulmonary disease (HCC)  Stable Patient reports no longer wearing O2/ no longer taking symbicort/ nor using albuterol. Sats today 98 % on RA. No ARAUJO. Records review CT chest (8/20/24) reports emphysema. Former smoker. Cont f/u with PCP for ongoing monitoring. Avoid second hand smoke.     Interstitial lung disease (HCC)  Stable Patient reports no longer wearing O2/ no longer taking symbicort/ nor using albuterol. Sats today 98 % on RA. No ARAUJO. Records review CT chest (8/20/24) reports chronic interstitial lung disease. Former smoker. Cont f/u with PCP for ongoing monitoring. Avoid second hand smoke.     Pulmonary fibrosis (HCC)  Stable Patient reports no longer wearing O2/ no longer taking symbicort/ nor using albuterol. Sats today 98 % on RA. No ARAUJO. Records review CT  chest (7/30/24) reports moderate pulmonary fibrotic changes with honeycombing. Chest X ray (7/5/24) reports fibrosis.  Former smoker. Cont f/u with PCP for ongoing monitoring. Avoid second hand smoke.     Pulmonary hypertension (HCC)  Stable Records review Echo ( 7/30/24) reports RVSP 50 mmHg. No ARAUJO. Patient no longer using O2. Cont f/u with PCP for ongoing monitoring     History of TIA (transient ischemic attack)  Stable. No sequelae. Records review MRI brain (7/30/24) reports Tiny chronic R parietal infarct. Patient reports no longer taking ASA. Cont f/u with PCP for ongoing monitoring     OAB (overactive bladder)  Stable. Rx myrbetriq. Symptoms improved with rx. Cont f/u with PCP for ongoing monitoring and medication management       Services suggested: No services needed at this time  Health Care Screening: Age-appropriate preventive services recommended by USPTF and ACIP covered by Medicare were discussed today. Services ordered if indicated and agreed upon by the patient.  Referrals offered: Community-based lifestyle interventions to reduce health risks and promote self-management and wellness, fall prevention, nutrition, physical activity, tobacco-use cessation, weight loss, and mental health services as per orders if indicated.    Discussion today about general wellness and lifestyle habits:    Prevent falls and reduce trip hazards; Cautioned about securing or removing rugs.  Have a working fire alarm and carbon monoxide detector.  Engage in regular physical activity and social activities.    Patient has cologuard at home. Patient to complete in 2025.     Follow-up: Return f/u with PCP as indicated.    HANDOUTS OFFERED     [employer]

## 2025-04-07 NOTE — ASSESSMENT & PLAN NOTE
Stable. Rx myrbetriq. Symptoms improved with rx. Cont f/u with PCP for ongoing monitoring and medication management

## 2025-04-07 NOTE — ASSESSMENT & PLAN NOTE
Stable on atorvastatin. Records review     Latest Reference Range & Units 07/30/24 03:29   Cholesterol,Tot 100 - 199 mg/dL 66 (L)   Triglycerides 0 - 149 mg/dL 85   HDL >=40 mg/dL 25 !   LDL <100 mg/dL 24   (L): Data is abnormally low  !: Data is abnormal. No musculoskeletal issues associated with the use of a statin. Cont heart healthy diet and regular exercise. Cont f/u with PCP for ongoing monitoring and medication management

## 2025-04-07 NOTE — ASSESSMENT & PLAN NOTE
Stable Patient reports no longer wearing O2/ no longer taking symbicort/ nor using albuterol. Sats today 98 % on RA. No ARAUJO. Records review CT chest (8/20/24) reports emphysema. Former smoker. Cont f/u with PCP for ongoing monitoring. Avoid second hand smoke.

## 2025-04-07 NOTE — ASSESSMENT & PLAN NOTE
Stable. No sequelae. Records review MRI brain (7/30/24) reports Tiny chronic R parietal infarct. Patient reports no longer taking ASA. Cont f/u with PCP for ongoing monitoring

## 2025-04-07 NOTE — ASSESSMENT & PLAN NOTE
Stable Patient reports no longer wearing O2/ no longer taking symbicort/ nor using albuterol. Sats today 98 % on RA. No ARAUJO. Records review CT chest (8/20/24) reports chronic interstitial lung disease. Former smoker. Cont f/u with PCP for ongoing monitoring. Avoid second hand smoke.

## 2025-04-07 NOTE — ASSESSMENT & PLAN NOTE
Stable on levothyroxine. Records review      Latest Reference Range & Units 06/09/22 09:41 01/22/24 11:49   TSH 0.380 - 5.330 uIU/mL 2.770 0.520   Cont f/u with PCP for ongoing monitoring and medication management

## 2025-04-07 NOTE — ASSESSMENT & PLAN NOTE
Stable Records review Echo ( 7/30/24) reports RVSP 50 mmHg. No ARAUJO. Patient no longer using O2. Cont f/u with PCP for ongoing monitoring

## 2025-04-07 NOTE — ASSESSMENT & PLAN NOTE
Stable Patient reports no longer wearing O2/ no longer taking symbicort/ nor using albuterol. Sats today 98 % on RA. No ARAUJO. Records review CT chest (7/30/24) reports moderate pulmonary fibrotic changes with honeycombing. Chest X ray (7/5/24) reports fibrosis.  Former smoker. Cont f/u with PCP for ongoing monitoring. Avoid second hand smoke.

## 2025-04-07 NOTE — ASSESSMENT & PLAN NOTE
Stable. Records review    Latest Reference Range & Units 09/05/24 15:59 01/17/25 14:05   GFR (CKD-EPI) >60 mL/min/1.73 m 2 53 ! 52 !   !: Data is abnormal patient aware of importance of daily water hydration/ avoidance of OTC NSAIDs. Cont f/u with PCP for ongoing monitoring and discussion

## 2025-04-28 ENCOUNTER — OFFICE VISIT (OUTPATIENT)
Dept: MEDICAL GROUP | Facility: PHYSICIAN GROUP | Age: 75
End: 2025-04-28
Payer: MEDICARE

## 2025-04-28 VITALS
HEART RATE: 81 BPM | DIASTOLIC BLOOD PRESSURE: 68 MMHG | SYSTOLIC BLOOD PRESSURE: 104 MMHG | RESPIRATION RATE: 24 BRPM | BODY MASS INDEX: 23.64 KG/M2 | TEMPERATURE: 98 F | HEIGHT: 68 IN | OXYGEN SATURATION: 92 % | WEIGHT: 156 LBS

## 2025-04-28 DIAGNOSIS — J43.9 PULMONARY EMPHYSEMA, UNSPECIFIED EMPHYSEMA TYPE (HCC): ICD-10-CM

## 2025-04-28 DIAGNOSIS — E03.9 HYPOTHYROIDISM, UNSPECIFIED TYPE: ICD-10-CM

## 2025-04-28 DIAGNOSIS — Z00.00 MEDICARE ANNUAL WELLNESS VISIT, SUBSEQUENT: Primary | ICD-10-CM

## 2025-04-28 DIAGNOSIS — J96.11 CHRONIC RESPIRATORY FAILURE WITH HYPOXIA (HCC): ICD-10-CM

## 2025-04-28 DIAGNOSIS — J84.9 INTERSTITIAL LUNG DISEASE (HCC): ICD-10-CM

## 2025-04-28 ASSESSMENT — ACTIVITIES OF DAILY LIVING (ADL): BATHING_REQUIRES_ASSISTANCE: 0

## 2025-04-28 ASSESSMENT — PATIENT HEALTH QUESTIONNAIRE - PHQ9: CLINICAL INTERPRETATION OF PHQ2 SCORE: 0

## 2025-04-28 ASSESSMENT — FIBROSIS 4 INDEX: FIB4 SCORE: 3.85

## 2025-04-28 ASSESSMENT — ENCOUNTER SYMPTOMS: GENERAL WELL-BEING: GOOD

## 2025-04-28 NOTE — PROGRESS NOTES
Chief Complaint   Patient presents with    Annual Wellness Visit    Referral Needed     Pulmonary        HPI:  Naomy Vargas is a 75 y.o. here for Medicare Annual Wellness Visit     Patient is also requesting a referral to pulmonology.  States that she weaned off of her oxygen in November of last year because she has been feeling good.  Her oxygen ranges from 89-91, worsens with physical activity.    She is also requesting an updated referral to rheumatology.    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/20/2023    Pulmonary hypertension (HCC) 03/25/2023    Nonrheumatic tricuspid valve regurgitation 03/25/2023    Leukocytosis 03/24/2023    Interstitial lung disease (HCC) 03/23/2023    Pleural effusion 03/23/2023    Aortic atherosclerosis (HCC) 10/13/2021    B12 deficiency 07/22/2020    Thrombocytopenia, unspecified (HCC) 06/24/2020    OAB (overactive bladder) 02/11/2019    Abdominal aortic aneurysm (AAA) 3.0 cm to 5.0 cm in diameter in female (HCC) 11/16/2018    Bilateral iliac artery stenosis (HCC) 11/16/2018    Essential hypertension 08/06/2018    Chronic obstructive pulmonary disease (HCC) 04/17/2018    Pulmonary nodules 04/17/2018    Osteopenia 04/28/2017    Chronic right-sided low back pain without sciatica 06/06/2016    Venous stasis dermatitis of both lower extremities 07/15/2015    Hypothyroidism 07/14/2015    Hyperglycemia 11/02/2013       Current Outpatient  Medications   Medication Sig Dispense Refill    aspirin 81 MG EC tablet Take 81 mg by mouth every morning.      loperamide (IMODIUM A-D) 2 MG tablet Take 2 mg by mouth 1 time a day as needed for Diarrhea.         TRIAMCINOLONE ACETONIDE EX Apply  topically as needed.      Multiple Vitamins-Minerals (HAIR SKIN & NAILS PO) Take 1-2 Tablets by mouth 2 times a day.      Probiotic Product (PROBIOTIC-10 PO) Take 1 Tablet by mouth every morning.      vitamin e (VITAMIN E) 400 UNIT Cap Take 400 Units by mouth every morning.      acetaminophen (TYLENOL) 500 MG Tab Take 500 mg by mouth at bedtime as needed.         diphenhydrAMINE HCl (BENADRYL PO) Take 25 mg by mouth at bedtime as needed.      Pseudoephedrine HCl (SUDAFED PO) Take 1 Tablet by mouth at bedtime as needed.         diphenhydrAMINE HCl, Sleep, (SLEEP-AID) 50 MG Cap Take 1 Capsule by mouth at bedtime as needed.      atorvastatin (LIPITOR) 40 MG Tab Take 1 Tablet by mouth every evening for 360 days. (Patient taking differently: Take 40 mg by mouth every evening.     MEDICATION INSTRUCTIONS FOR SURGERY/PROCEDURE ON 12/4/24: OKAY TO TAKE NIGHT BEFORE SURGERY.) 100 Tablet 1    levothyroxine (SYNTHROID) 100 MCG Tab TAKE 1 TABLET BY MOUTH EVERY DAY BEFORE BREAKFAST (Patient taking differently: Take 100 mcg by mouth every morning on an empty stomach.   ) 100 Tablet 1    cyanocobalamin (VITAMIN B-12) 100 MCG Tab Take 1 Tablet by mouth every day. (Patient taking differently: Take 100 mcg by mouth every morning.   ) 30 Tablet 3    melatonin 3 MG Tab Take 3 mg by mouth at bedtime.      Mirabegron ER 50 MG TABLET SR 24 HR Take 50 mg by mouth every morning.         PROAIR  (90 Base) MCG/ACT Aero Soln inhalation aerosol Inhale 1 Puff every 6 hours as needed for Shortness of Breath. 8.5 g 1    vitamin D (CHOLECALCIFEROL) 1000 Unit (25 mcg) Tab Take 1,000 Units by mouth every morning.      SYMBICORT 80-4.5 MCG/ACT Aerosol INHALE 2 PUFFS BY MOUTH TWICE A DAY (Patient not  taking: Reported on 4/7/2025) 30.6 Each 2     No current facility-administered medications for this visit.          Current supplements as per medication list.     Allergies: Colophony [pinus strobus], Latex, Neosporin [neomycin-polymyxin b], Phenylene, Soap, Tape, and Tea tree oil    Current social contact/activities:      She  reports that she quit smoking about 7 years ago. Her smoking use included cigarettes. She started smoking about 60 years ago. She has a 26.5 pack-year smoking history. She has been exposed to tobacco smoke. She has never used smokeless tobacco. She reports current alcohol use. She reports that she does not use drugs.  Counseling given: Not Answered      ROS:    Gait: Uses no assistive device  Ostomy: no  Other tubes: No  Amputations: No  Chronic oxygen use: No  Last eye exam: last year  Wears hearing aids: No   : Denies any urinary leakage during the last 6 months    Screening:    Depression Screening  Little interest or pleasure in doing things?  0 - not at all  Feeling down, depressed , or hopeless? 0 - not at all  Patient Health Questionnaire Score: 0     If depressive symptoms identified deferred to follow up visit unless specifically addressed in assessment and plan.    Interpretation of PHQ-9 Total Score   Score Severity   1-4 No Depression   5-9 Mild Depression   10-14 Moderate Depression   15-19 Moderately Severe Depression   20-27 Severe Depression    Screening for Cognitive Impairment  Do you or any of your friends or family members have any concern about your memory? No  Three Minute Recall (Village, Kitchen, Baby) 3/3    Morgan clock face with all 12 numbers and set the hands to show 10 minutes past 11.  Yes 5/5  Cognitive concerns identified deferred for follow up unless specifically addressed in assessment and plan.    Fall Risk Assessment  Has the patient had two or more falls in the last year or any fall with injury in the last year?  Yes    Safety Assessment  Do you always  wear your seatbelt?  Yes  Any changes to home needed to function safely? No  Difficulty hearing.  No  Patient counseled about all safety risks that were identified.    Functional Assessment ADLs  Are there any barriers preventing you from cooking for yourself or meeting nutritional needs?  No.    Are there any barriers preventing you from driving safely or obtaining transportation?  Yes.    Are there any barriers preventing you from using a telephone or calling for help?  No    Are there any barriers preventing you from shopping?  No.    Are there any barriers preventing you from taking care of your own finances?  No    Are there any barriers preventing you from managing your medications?  No    Are there any barriers preventing you from showering, bathing or dressing yourself? No    Are there any barriers preventing you from doing housework or laundry? No  Are there any barriers preventing you from using the toilet?No  Are you currently engaging in any exercise or physical activity?  No.      Self-Assessment of Health  What is your perception of your health? Good    Do you sleep more than six hours a night? Yes    In the past 7 days, how much did pain keep you from doing your normal work? None    Do you spend quality time with family or friends (virtually or in person)? Yes both  Do you usually eat a heart healthy diet that constists of a variety of fruits, vegetables, whole grains and fiber? Yes    Do you eat foods high in fat and/or Fast Food more than three times per week? No    How concerned are you that your medical conditions are not being well managed? Not at all    Are you worried that in the next 2 months, you may not have stable housing that you own, rent, or stay in as part of a household? No      Advance Care Planning  Do you have an Advance Directive, Living Will, Durable Power of , or POLST? No                 Health Maintenance Summary            Current Care Gaps       Colorectal Cancer  Screening (Colonoscopy - Every 2 Years) Overdue since 3/24/2019      2024  Order placed for COLOGUARD (FIT DNA) by Prema Patel P.A.-C.    2017  REFERRAL TO GI FOR COLONOSCOPY    2017  REFERRAL TO GI FOR COLONOSCOPY              COVID-19 Vaccine ( season) Overdue since 2023  Imm Admin: COVID-19, mRNA, LNP-S, PF, naveed-sucrose, 30 mcg/0.3 mL    10/28/2022  Imm Admin: PFIZER BIVALENT SARS-COV-2 VACCINE (12+)    2021  Imm Admin: PFIZER PURPLE CAP SARS-COV-2 VACCINATION (12+)    03/10/2021  Imm Admin: PFIZER PURPLE CAP SARS-COV-2 VACCINATION (12+)    2021  Imm Admin: PFIZER PURPLE CAP SARS-COV-2 VACCINATION (12+)     Only the first 5 history entries have been loaded, but more history exists.            Annual Pulmonary Function Test / Spirometry (Yearly) Overdue since 2024  PULMONARY FUNCTION TESTS -Test requested: Complete Pulmonary Function Test    2023  PULMONARY FUNCTION TESTS -Test requested: Complete Pulmonary Function Test    2021  PULMONARY FUNCTION TESTS -Test requested: Complete Pulmonary Function Test    2018  AMB PULMONARY FUNCTION TEST/LAB              Lung Cancer Screening Shared Decision Making (Once) Never done      No completion history exists for this topic.                      Needs Review       Hepatitis C Screening  Tentatively Complete      2020  Hepatitis C Antibody component of HCV Scrn ( 1230-3352 1xLife)              Bone Density Scan (Every 5 Years) Tentatively due on 2025  DS-BONE DENSITY STUDY (DEXA)    2017  DS-BONE DENSITY STUDY (DEXA)    2015  Postponed                      Upcoming       Annual Wellness Visit (Yearly) Next due on 2025  Visit Dx: Medicare annual wellness visit, subsequent    2025  Level of Service: CO ANNUAL WELLNESS VISIT-INCLUDES PPPS SUBSEQUE*    2025  Level of Service: CO ANNUAL WELLNESS  VISIT-INCLUDES PPPS SUBSEQUE*    02/21/2024  Level of Service: MS ANNUAL WELLNESS VISIT-INCLUDES PPPS SUBSEQUE*    02/21/2024  Done - Comprehensive Health Assessment     Only the first 5 history entries have been loaded, but more history exists.            IMM DTaP/Tdap/Td Vaccine (2 - Td or Tdap) Next due on 3/15/2027      03/15/2017  Imm Admin: Tdap Vaccine                      Completed or No Longer Recommended       Influenza Vaccine (Series Information) Completed      11/12/2024  Imm Admin: Influenza high-dose trivalent (PF)    09/29/2023  Imm Admin: Influenza Vaccine Adult HD    10/28/2022  Imm Admin: Influenza Vaccine Adult HD    09/22/2021  Imm Admin: Influenza Vaccine Adult HD    08/27/2020  Imm Admin: Influenza Vaccine Adult HD      Only the first 5 history entries have been loaded, but more history exists.              Zoster (Shingles) Vaccines (Series Information) Completed      12/21/2023  Imm Admin: Zoster Vaccine Recombinant (RZV) (SHINGRIX)    06/24/2020  Imm Admin: Zoster Vaccine Recombinant (RZV) (SHINGRIX)    02/08/2019  Imm Admin: Zoster Vaccine Recombinant (RZV) (SHINGRIX)    03/15/2017  Imm Admin: Zoster Vaccine Live (ZVL) (Zostavax) - HISTORICAL DATA              Pneumococcal Vaccine: 50+ Years (Series Information) Completed      02/08/2019  Imm Admin: Pneumococcal polysaccharide vaccine (PPSV-23)    02/28/2017  Imm Admin: Pneumococcal Conjugate Vaccine (Prevnar/PCV-13)    11/01/2013  Imm Admin: Pneumococcal polysaccharide vaccine (PPSV-23)              Hepatitis A Vaccine (Hep A) (Series Information) Aged Out      No completion history exists for this topic.              Hepatitis B Vaccine (Hep B) (Series Information) Aged Out     No completion history exists for this topic.              HPV Vaccines (Series Information) Aged Out     No completion history exists for this topic.              Polio Vaccine (Inactivated Polio) (Series Information) Aged Out     No completion history exists for  this topic.              Meningococcal Immunization (Series Information) Aged Out     No completion history exists for this topic.              Mammogram  Discontinued        Frequency changed to Never automatically (Topic No Longer Applies)    2023  MA-SCREENING MAMMO BILAT W/TOMOSYNTHESIS W/CAD    2020  MA-SCREENING MAMMO BILAT W/TOMOSYNTHESIS W/CAD    2018  MA-MAMMO SCREENING BILAT W/CLAUDIA W/CAD    2016  MA-SCREEN MAMMO W/CAD-BILAT     Only the first 5 history entries have been loaded, but more history exists.            Cervical Cancer Screening  Discontinued        Frequency changed to Never automatically (Topic No Longer Applies)    2015  Postponed              Lung Cancer Screening  Discontinued        Frequency changed to Never automatically (Topic No Longer Applies)    2024  CT-CHEST (THORAX) W/O    2024  CT-CHEST (THORAX) W/O    2023  CT-CHEST, HIGH RESOLUTION LUNG    2023  CT-CHEST (THORAX) W/O      Only the first 5 history entries have been loaded, but more history exists.                            Patient Care Team:  Prema Patel P.A.-C. as PCP - General (Family Medicine)  Dina Steinberg P.A.-C. (Inactive) as PCP - [facility] Paneled  Edmundo Montiel M.D. (Dermatology)  Haris Talamantes M.D. as Consulting Physician (Ophthalmology)  Maria R Reyes, M.D. as Consulting Physician (Allergy and Immunology)  Darren Ruiz M.D. as Consulting Physician (Surgery)  davian as Respiratory Therapist (DME Supplier)        Social History     Tobacco Use    Smoking status: Former     Current packs/day: 0.00     Average packs/day: 0.5 packs/day for 53.0 years (26.5 ttl pk-yrs)     Types: Cigarettes     Start date: 1964     Quit date: 2017     Years since quittin.7     Passive exposure: Past    Smokeless tobacco: Never   Vaping Use    Vaping status: Never Used   Substance Use Topics    Alcohol use: Yes     Comment: 2 per day wine    Drug use: No      Family History   Problem Relation Age of Onset    Arthritis Mother         hip fracture    Diabetes Mother     Cancer Mother         skin    Arthritis Father         lung    Alcohol/Drug Father         etoh    Lung Disease Sister         smoker/copd    Hypertension Sister     Other Brother         hep c    Arthritis Maternal Grandmother         hip fracture    Diabetes Maternal Grandfather     No Known Problems Son     No Known Problems Son     No Known Problems Brother     No Known Problems Sister     No Known Problems Brother     Hyperlipidemia Neg Hx     Stroke Neg Hx      She  has a past medical history of Abnormal CT of the chest (03/24/2023), Acute hypoxemic respiratory failure (HCC) (03/23/2023), Acute kidney injury on CKD (chronic kidney disease) stage 3, GFR 30-59 ml/min (11/02/2013), Anxiety, Arthritis, Back pain, Blood transfusion without reported diagnosis, Breath shortness, Cataract, Cellulitis, Cellulitis of leg (11/01/2013), Chickenpox, COPD (chronic obstructive pulmonary disease) ([facility]), Dental disorder, Earache, Elevated troponin (03/23/2023), Fatigue, Frequent urination, Heart murmur, Hypertension (02/23/2018), Hypothyroidism, Influenza, Insomnia, Kidney disease, Mumps, OAB (overactive bladder) (02/11/2019), Other hyperlipidemia (4/7/2025), Pain (02/23/2018), Peripheral vascular disease, unspecified ([facility]) (10/13/2021), Pneumonia (1980), Rash, Ringing in ears, Sepsis ([facility]) (03/23/2023), Shortness of breath, Swelling of lower extremity, Thyroid disease, TIA (transient ischemic attack) (07/29/2024), Tonsillitis, UTI (urinary tract infection), and Venous stasis dermatitis.    She has no past medical history of Anemia, Clotting disorder (HCC), Cough, Depression, Diabetic neuropathy ([facility]), Headache(784.0), HIV (human immunodeficiency virus infection) ([facility]), IBD (inflammatory bowel disease), Meningitis, Osteoporosis, Painful breathing, Sputum production, Ulcer, or Wheezing.   Past Surgical History:  "  Procedure Laterality Date    VITRECTOMY POSTERIOR  1/17/2025    Procedure: RIGHT EYE POSTERIOR VITRECTOMY, AIRFLUID GAS EXCHANGE [id number] %, ENDO LASER;  Surgeon: Stephan Resendez M.D.;  Location: SURGERY SAME DAY [facility];  Service: Ophthalmology    VITRECTOMY POSTERIOR Right 12/04/2024    Procedure: RIGHT EYE POSTERIOR VITRECTOMY, AIR FLUID GAS, EXCHANGE ENDOLASER,  25 GAUGE AND ANY OTHER INDICATED PROCEDURES;  Surgeon: Stephan Resendez M.D.;  Location: SURGERY SAME DAY [facility];  Service: Ophthalmology    NE THROMBOENDARTECTMY NECK,NECK INCIS Right 08/06/2024    Procedure: RIGHT CAROTID ENDARTERECTOMY WITH NEUROMONITORING;  Surgeon: Valerio Cortés M.D.;  Location: SURGERY [facility];  Service: Vascular    ANGIOPLASTY, WITH STENT INSERTION Right 08/06/2024    Procedure: Direct sheath insertion in the right carotid artery and right carotid angiogram;  Surgeon: Valerio Cortés M.D.;  Location: SURGERY [facility];  Service: Vascular    VEIN LIGATION RADIO FREQUENCY Right 02/26/2018    Procedure: VEIN LIGATION RADIO FREQUENCY- LONG SAPENOUS;  Surgeon: Jim Alas M.D.;  Location: SURGERY [facility];  Service: General    ABDOMINAL EXPLORATION      2 ectopic preg    BREAST BIOPSY      hx of benign left breast biopsy    EYE SURGERY      cataract x2    HYSTERECTOMY, TOTAL ABDOMINAL  1977/1978    OOPHORECTOMY      PB MAMMARY DUCTOGRAM, SINGLE      NE REMV 2ND CATARACT,CORN-SCLER SECTN      TONSILLECTOMY         Exam:   /68   Pulse 81   Temp 36.7 °C (98 °F) (Temporal)   Resp (!) 24   Ht 1.727 m (5' 8\")   Wt 70.8 kg (156 lb)   SpO2 92%  Body mass index is 23.72 kg/m².    Hearing excellent.    Dentition good  Alert, oriented in no acute distress.  Eye contact is good, speech goal directed, affect calm    Assessment and Plan. The following treatment and monitoring plan is recommended:    1. Medicare annual wellness visit, subsequent  Services suggested: No services needed at this time  Health " Care Screening: Age-appropriate preventive services recommended by USPTF and ACIP covered by Medicare were discussed today. Services ordered if indicated and agreed upon by the patient.  Referrals offered: Community-based lifestyle interventions to reduce health risks and promote self-management and wellness, fall prevention, nutrition, physical activity, tobacco-use cessation, weight loss, and mental health services as per orders if indicated.    Discussion today about general wellness and lifestyle habits:    Prevent falls and reduce trip hazards; Cautioned about securing or removing rugs.  Have a working fire alarm and carbon monoxide detector;   Engage in regular physical activity and social activities     2. Interstitial lung disease (HCC)  Patient was previously evaluated by rheumatology and labs were ordered, where she got sick and never followed up so recommended making follow-up appointment for updated labs  - Referral to Pulmonary and Sleep Medicine    3. Pulmonary emphysema, unspecified emphysema type (HCC)  - Referral to Pulmonary and Sleep Medicine    4. Hypothyroidism, unspecified type  Chronic, stable.  Currently on levothyroxine 100 mcg.  Labs ordered  - TSH WITH REFLEX TO FT4; Future    5. Chronic respiratory failure with hypoxia (HCC)  Chronic, stable.  Patient states that she weaned herself off of oxygen 5 months ago and does not feel like she needs it.  She also has discontinued her Symbicort usage.  Discussed necessity to maintain oxygen saturation over 90% so recommended she wear her oxygen with exertion as it usually drops to the high 80s until she can be evaluated by pulmonology.      Follow-up: 6 months

## 2025-05-29 ENCOUNTER — OFFICE VISIT (OUTPATIENT)
Dept: SLEEP MEDICINE | Facility: MEDICAL CENTER | Age: 75
End: 2025-05-29
Attending: INTERNAL MEDICINE
Payer: MEDICARE

## 2025-05-29 VITALS
BODY MASS INDEX: 23.54 KG/M2 | WEIGHT: 150 LBS | SYSTOLIC BLOOD PRESSURE: 140 MMHG | OXYGEN SATURATION: 87 % | DIASTOLIC BLOOD PRESSURE: 62 MMHG | HEIGHT: 67 IN | HEART RATE: 75 BPM

## 2025-05-29 DIAGNOSIS — Z87.891 FORMER SMOKER: ICD-10-CM

## 2025-05-29 DIAGNOSIS — J84.9 ILD (INTERSTITIAL LUNG DISEASE) (HCC): Primary | ICD-10-CM

## 2025-05-29 DIAGNOSIS — J96.11 CHRONIC RESPIRATORY FAILURE WITH HYPOXIA (HCC): ICD-10-CM

## 2025-05-29 DIAGNOSIS — J43.2 CENTRILOBULAR EMPHYSEMA (HCC): ICD-10-CM

## 2025-05-29 PROCEDURE — 94761 N-INVAS EAR/PLS OXIMETRY MLT: CPT | Performed by: INTERNAL MEDICINE

## 2025-05-29 PROCEDURE — 99214 OFFICE O/P EST MOD 30 MIN: CPT | Performed by: INTERNAL MEDICINE

## 2025-05-29 ASSESSMENT — FIBROSIS 4 INDEX: FIB4 SCORE: 3.85

## 2025-05-29 NOTE — ASSESSMENT & PLAN NOTE
Chronic. No air flow obstruction however she also suffered from a restrictive process related to ILD. Tobacco free.   Orders:    PULMONARY FUNCTION TESTS -Test requested: Complete Pulmonary Function Test; Future    EC-ECHOCARDIOGRAM COMPLETE W/O CONT; Future    Multiple Oximetry; Future    DME O2 New Set Up

## 2025-05-29 NOTE — PROCEDURES
Multi-Ox Readings  Multi Ox #1 Room air   O2 sat % at rest 88-89   O2 sat % on exertion     O2 sat average on exertion     Multi Ox #2 2 LPM   O2 sat % at rest 97   O2 sat % on exertion 88   O2 sat average on exertion       Multi Ox #3 3 LPM   O2 sat % at rest 97   O2 sat % on exertion 91   O2 sat average on exertion

## 2025-05-29 NOTE — PROGRESS NOTES
[facility]    Date of Service: 5/29/2025     Chief Complaint:   Chief Complaint   Patient presents with    Follow-Up     LAST SEEN 2/20/24 BY VIELKA STILES    Results     CT CHEST 8/20/24, 7/30/24  CXR 7/5/24       HPI:     Naomy Vargas is a 75 y.o. female presents to clinic today for evaluation of ILD. PMHx is significant for emphysema for which she was followed in pulmonary medicine clinic until 2021, hypothyroid, PVD and ILD. She is a former tobacco smoker with 40 pk year hx and quit roughly 10 years ago.  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  Family history is notable for a sister with chronic lung disease.  Per patient sister was evaluated at [facility] but exact diagnosis not known.  She does have 2 dogs at home.  She has not owned birds and does not currently.  She does not use down products.  No humidifiers in the home.  Hobbies include gardening.  Prior to hospitalization March 2023 for acute hypoxic respiratory failure she was on chronic Macrobid at least since 2021 for suppression of urinary tract infections.  At the time of hospitalization, CT chest showed bilateral, upper and lower lobe reticular changes with some associated groundglass opacity that were not present on a CT from 2021.  She had a positive EBEN 1: 640 with homogenous pattern.  There was concern for Macrobid induced lung toxicity and she was treated with steroid burst with prolonged taper over period of 6 to 8 weeks.  She continued to require supplemental oxygen with activity and pulmonary function testing from August 2023 showed significant decline in diffusion capacity with mild restriction which was new compared to pulmonary function testing from 2021.  Repeat pulmonary function testing from February 2024 was generally stable.  She has seen rheumatology who did not feel this was a clear connective tissue disease associated ILD and extended autoimmune  serology panel was sent and negative.  Overall she has good functional capacity and actually stopped using her oxygen between November 2024 and just recently.  She noted lightheadedness while doing housework at home and noted that her pulse oximetry was measuring in the 70s therefore she started using oxygen again particular with activity.  She was 87% on supplemental oxygen at 2 L/min during exertion in clinic today but recovered to 93% at rest.  She has no cough.  No chest pain.  No edema.  No joint pain.  No rashes.  No fevers, chills, night sweats, weight loss.      Tobacco use  Former smoker with 40-pack-year history tobacco free for 10 years    Respiratory symptoms  Dyspnea onset: Initial onset was in early 2023  Aggravating factors:   Cough/mucus: None  Symptoms are initially improved but overall stable particular when using supplemental oxygen    Exposures associated with hypersensitivity pneumonitis  Pets, birds, livestock: None  Humidifiers or saunas: None  Feathers/down pillows: None  Mold exposure or water damage in home: None  Hobbies: Gardening  Welding history or quarts cutting: None  Work history: Manufacturing commercial cooking equipment  Spray painting, sandblasting, asbestos, chemical or fumes exposure: None    Symptoms of rheumatic disease/sarcoidosis  Joint pain/inflammation, digital ulcers, dry eyes, dry mouth, fatigue, fever, hair loss, muscle weakness or pain: None  Raynaud phenomenon: None  Skin thickening or telangiectasia: None  Rashes: None  Parotid swelling: None    Medication and radiation-induced lung toxicity  Hx of nitrofurantoin, amiodarone, chemotherapy, or biologic agents:  Hx of radiation therapy:     Family history  Family history of lung disease, particularly ILD or lung fibrosis:  History of premature graying, cirrhosis, aplastic anemia, other bone marrow diseases:    Gastrointestinal Symptoms:     Hx of acid reflux symptoms:  Hx of aspiration:     ROS    Past Medical  History[1]    Past Surgical History[2]    Social History     Socioeconomic History    Marital status:      Spouse name: Not on file    Number of children: 2    Years of education: Not on file    Highest education level: Not on file   Occupational History    Occupation: retired   Tobacco Use    Smoking status: Former     Current packs/day: 0.00     Average packs/day: 0.5 packs/day for 53.0 years (26.5 ttl pk-yrs)     Types: Cigarettes     Start date: 1964     Quit date: 2017     Years since quittin.8     Passive exposure: Past    Smokeless tobacco: Never   Vaping Use    Vaping status: Never Used   Substance and Sexual Activity    Alcohol use: Yes     Comment: 2 per day wine    Drug use: No    Sexual activity: Not Currently     Partners: Male   Other Topics Concern    Not on file   Social History Narrative    Not on file     Social Drivers of Health     Financial Resource Strain: Low Risk  (2025)    Overall Financial Resource Strain (CARDIA)     Difficulty of Paying Living Expenses: Not hard at all   Food Insecurity: No Food Insecurity (2025)    Hunger Vital Sign     Worried About Running Out of Food in the Last Year: Never true     Ran Out of Food in the Last Year: Never true   Transportation Needs: No Transportation Needs (2025)    PRAPARE - Transportation     Lack of Transportation (Medical): No     Lack of Transportation (Non-Medical): No   Physical Activity: Inactive (2025)    Exercise Vital Sign     Days of Exercise per Week: 0 days     Minutes of Exercise per Session: 0 min   Stress: No Stress Concern Present (2024)    [employer] [employer] - Occupational Stress Questionnaire     Feeling of Stress : Only a little   Social Connections: Moderately Isolated (2024)    Social Connection and Isolation Panel [NHANES]     Frequency of Communication with Friends and Family: Twice a week     Frequency of Social Gatherings with Friends and Family: Once a  "week     Attends Holiness Services: Never     Active Member of Clubs or Organizations: No     Attends Club or Organization Meetings: Never     Marital Status:    Intimate Partner Violence: Not At Risk (7/29/2024)    Humiliation, Afraid, Rape, and Kick questionnaire     Fear of Current or Ex-Partner: No     Emotionally Abused: No     Physically Abused: No     Sexually Abused: No   Housing Stability: Unknown (4/7/2025)    Housing Stability Vital Sign     Unable to Pay for Housing in the Last Year: No     Number of Times Moved in the Last Year: Not on file     Homeless in the Last Year: No          Family History   Problem Relation Age of Onset    Arthritis Mother         hip fracture    Diabetes Mother     Cancer Mother         skin    Arthritis Father         lung    Alcohol/Drug Father         etoh    Lung Disease Sister         smoker/copd    Hypertension Sister     Other Brother         hep c    Arthritis Maternal Grandmother         hip fracture    Diabetes Maternal Grandfather     No Known Problems Son     No Known Problems Son     No Known Problems Brother     No Known Problems Sister     No Known Problems Brother     Hyperlipidemia Neg Hx     Stroke Neg Hx        Medications Ordered Prior to Encounter[3]    Colophony [pinus strobus], Latex, Neosporin [neomycin-polymyxin b], Phenylene, Soap, Tape, and Tea tree oil    Vitals:    05/29/25 1427   Height: 1.702 m (5' 7\")   Weight: 68 kg (150 lb)   Weight % change since last entry.: 0 %   BP: (!) 140/62   Pulse: 75   BMI (Calculated): 23.49        Physical Exam  Constitutional:       General: She is not in acute distress.     Appearance: Normal appearance. She is well-developed and normal weight.   HENT:      Head: Normocephalic and atraumatic.      Right Ear: External ear normal.      Left Ear: External ear normal.      Nose: Nose normal. No congestion.      Mouth/Throat:      Mouth: Mucous membranes are moist.      Pharynx: Oropharynx is clear. No " oropharyngeal exudate.   Eyes:      General: No scleral icterus.     Extraocular Movements: Extraocular movements intact.      Conjunctiva/sclera: Conjunctivae normal.      Pupils: Pupils are equal, round, and reactive to light.   Neck:      Vascular: No JVD.      Trachea: No tracheal deviation.   Cardiovascular:      Rate and Rhythm: Normal rate and regular rhythm.      Heart sounds: Normal heart sounds. No murmur heard.     No friction rub. No gallop.   Pulmonary:      Effort: Pulmonary effort is normal. No accessory muscle usage or respiratory distress.      Breath sounds: No wheezing or rales.      Comments: Late inspiratory crackles b/l lower lobes  Abdominal:      General: There is no distension.      Palpations: Abdomen is soft.      Tenderness: There is no abdominal tenderness.   Musculoskeletal:         General: No tenderness or deformity. Normal range of motion.      Cervical back: Neck supple.      Right lower leg: No edema.      Left lower leg: No edema.   Lymphadenopathy:      Cervical: No cervical adenopathy.   Skin:     General: Skin is warm and dry.      Findings: No rash.      Nails: There is no clubbing.   Neurological:      Mental Status: She is alert and oriented to person, place, and time.      Cranial Nerves: No cranial nerve deficit.      Gait: Gait normal.   Psychiatric:         Behavior: Behavior normal.                                                                      PFT Trends   Date 4/18 11/21 8/23 2/24     FVC 2.6 3.32 2.64 2.94     TLC 5.85 6.36 3.92 4.54     DLCO 19.46 18.83 10.32 11.61         ILD Workup:    HRCT: none, CT chest last from 8/2024  CTD Panel: +EBEN 1:640 homogenous 3/23; +chromatin Ab (69) 9/23  Echo: TTE from 7/24 showed nl RV sz and fx, RVSP of 50 mmHg  HP Antibodies: negative 3/2023        Assessment/Plan:    Assessment & Plan  ILD (interstitial lung disease) (HCC)  This pt had an acute interstitial process in 3/2023 which was steroid responsive based on  improvement on imaging after steroid taper but also after stopping macrobid. Her CT at the time of presentation in March and her clinical improvement with stopping therapy and corticosteroids are certainly highly suggestive of nitrofurantoin lung toxicity. Her PFT was stable from 8/23-2/24 therefore I would like to update these and pending results, consider updating imaging. Avoid nitrofurantion. Update TTE.   Orders:    PULMONARY FUNCTION TESTS -Test requested: Complete Pulmonary Function Test; Future    EC-ECHOCARDIOGRAM COMPLETE W/O CONT; Future    Multiple Oximetry; Future    DME O2 New Set Up    Centrilobular emphysema (HCC)  Chronic. No air flow obstruction however she also suffered from a restrictive process related to ILD. Tobacco free.   Orders:    PULMONARY FUNCTION TESTS -Test requested: Complete Pulmonary Function Test; Future    EC-ECHOCARDIOGRAM COMPLETE W/O CONT; Future    Multiple Oximetry; Future    DME O2 New Set Up    Chronic respiratory failure with hypoxia (HCC)  Likely chronic and she does appear to need to O2 with activity. She had mildly elevated RVSP in July of last year therefore will encourage O2 use at 3LPM with activity  based on ambulatory oximetry today  and update TTE.   Orders:    PULMONARY FUNCTION TESTS -Test requested: Complete Pulmonary Function Test; Future    EC-ECHOCARDIOGRAM COMPLETE W/O CONT; Future    Multiple Oximetry; Future    DME O2 New Set Up    Former smoker  Tobacco free. Encouraged ongoing abstinence. She is a candidate for lung Ca screening which we can discuss at f/u.             Return in about 4 weeks (around 6/26/2025) for PFT any time .     This note was generated using voice recognition software which has a chance of producing errors of grammar and possibly content.  I have made every reasonable attempt to find and correct any obvious errors, but it should be expected that some may not be found prior to finalization of this note.    Time spent in record  review prior to patient arrival, reviewing results, and in face-to-face encounter totaled 40 min, excluding any procedures if performed.    Isabel Pak M.D.   [facility] Pulmonary Medicine         [1]   Past Medical History:  Diagnosis Date    Abnormal CT of the chest 03/24/2023    Acute hypoxemic respiratory failure (HCC) 03/23/2023    Acute kidney injury on CKD (chronic kidney disease) stage 3, GFR 30-59 ml/min 11/02/2013    Diagnosed at hospital, has stopped NSAIDs and is drinking more water.   Last labs within range.      Anxiety     Arthritis     wrists, back    Back pain     Blood transfusion without reported diagnosis     with ectopic preg x 2     Breath shortness     with exertion    Cataract     bilateral removal-Dr. Duke Talamantes    Cellulitis     right lower leg    Cellulitis of leg 11/01/2013    Followed by dermatology. Managed with fluocinonide and Bactroban on right leg Left leg she uses triamcinolone and Sarna pramocaine (anesthetic, antipruretic) on both.      Chickenpox     history of    COPD (chronic obstructive pulmonary disease) ([facility])     1/15/2025 has inhalers prn; pt states she hasn't used in months    Dental disorder     upper denture    Earache     Elevated troponin 03/23/2023    Fatigue     Frequent urination     Heart murmur     Hypertension 02/23/2018    on no meds at this time    Hypothyroidism     Influenza     Insomnia     Kidney disease     reports 1 kidney is smaller than the other, chronic kidney disease stage 3    Mumps     OAB (overactive bladder) 02/11/2019    Other hyperlipidemia 4/7/2025    Pain 02/23/2018    right leg and back, 5/10    Peripheral vascular disease, unspecified ([facility]) 10/13/2021    Pneumonia 1980    Rash     Ringing in ears     Sepsis ([facility]) 03/23/2023    Shortness of breath     Swelling of lower extremity     Thyroid disease     takes levothyroxine daily    TIA (transient ischemic attack) 07/29/2024    numbness in neck    Tonsillitis     history of    UTI (urinary  tract infection)     chronic, been ongoing for 1 year    Venous stasis dermatitis     right leg   [2]   Past Surgical History:  Procedure Laterality Date    VITRECTOMY POSTERIOR  1/17/2025    Procedure: RIGHT EYE POSTERIOR VITRECTOMY, AIRFLUID GAS EXCHANGE P6V081 %, ENDO LASER;  Surgeon: Stephan Resendez M.D.;  Location: SURGERY SAME DAY [facility];  Service: Ophthalmology    VITRECTOMY POSTERIOR Right 12/04/2024    Procedure: RIGHT EYE POSTERIOR VITRECTOMY, AIR FLUID GAS, EXCHANGE ENDOLASER,  25 GAUGE AND ANY OTHER INDICATED PROCEDURES;  Surgeon: Stephan Resendez M.D.;  Location: SURGERY SAME DAY [facility];  Service: Ophthalmology    SC THROMBOENDARTECTMY NECK,NECK INCIS Right 08/06/2024    Procedure: RIGHT CAROTID ENDARTERECTOMY WITH NEUROMONITORING;  Surgeon: Valerio Cortés M.D.;  Location: SURGERY [facility];  Service: Vascular    ANGIOPLASTY, WITH STENT INSERTION Right 08/06/2024    Procedure: Direct sheath insertion in the right carotid artery and right carotid angiogram;  Surgeon: Valerio Cortés M.D.;  Location: SURGERY [facility];  Service: Vascular    VEIN LIGATION RADIO FREQUENCY Right 02/26/2018    Procedure: VEIN LIGATION RADIO FREQUENCY- LONG SAPENOUS;  Surgeon: Jim Alas M.D.;  Location: SURGERY [facility];  Service: General    ABDOMINAL EXPLORATION      2 ectopic preg    BREAST BIOPSY      hx of benign left breast biopsy    EYE SURGERY      cataract x2    HYSTERECTOMY, TOTAL ABDOMINAL  1977/1978    OOPHORECTOMY      PB MAMMARY DUCTOGRAM, SINGLE      SC REMV 2ND CATARACT,CORN-SCLER SECTN      TONSILLECTOMY     [3]   Current Outpatient Medications on File Prior to Visit   Medication Sig Dispense Refill    aspirin 81 MG EC tablet Take 81 mg by mouth every morning.      loperamide (IMODIUM A-D) 2 MG tablet Take 2 mg by mouth 1 time a day as needed for Diarrhea.         acetaminophen (TYLENOL) 500 MG Tab Take 500 mg by mouth at bedtime as needed.         diphenhydrAMINE HCl (BENADRYL  PO) Take 25 mg by mouth at bedtime as needed.      atorvastatin (LIPITOR) 40 MG Tab Take 1 Tablet by mouth every evening for 360 days. (Patient taking differently: Take 40 mg by mouth every evening.     MEDICATION INSTRUCTIONS FOR SURGERY/PROCEDURE ON 12/4/24: OKAY TO TAKE NIGHT BEFORE SURGERY.) 100 Tablet 1    levothyroxine (SYNTHROID) 100 MCG Tab TAKE 1 TABLET BY MOUTH EVERY DAY BEFORE BREAKFAST (Patient taking differently: Take 100 mcg by mouth every morning on an empty stomach.   ) 100 Tablet 1    PROAIR  (90 Base) MCG/ACT Aero Soln inhalation aerosol Inhale 1 Puff every 6 hours as needed for Shortness of Breath. 8.5 g 1    TRIAMCINOLONE ACETONIDE EX Apply  topically as needed.      Multiple Vitamins-Minerals (HAIR SKIN & NAILS PO) Take 1-2 Tablets by mouth 2 times a day.      Probiotic Product (PROBIOTIC-10 PO) Take 1 Tablet by mouth every morning.      vitamin e (VITAMIN E) 400 UNIT Cap Take 400 Units by mouth every morning.      Pseudoephedrine HCl (SUDAFED PO) Take 1 Tablet by mouth at bedtime as needed.         diphenhydrAMINE HCl, Sleep, (SLEEP-AID) 50 MG Cap Take 1 Capsule by mouth at bedtime as needed.      cyanocobalamin (VITAMIN B-12) 100 MCG Tab Take 1 Tablet by mouth every day. (Patient taking differently: Take 100 mcg by mouth every morning.   ) 30 Tablet 3    melatonin 3 MG Tab Take 3 mg by mouth at bedtime.      Mirabegron ER 50 MG TABLET SR 24 HR Take 50 mg by mouth every morning.         SYMBICORT 80-4.5 MCG/ACT Aerosol INHALE 2 PUFFS BY MOUTH TWICE A DAY (Patient not taking: Reported on 4/7/2025) 30.6 Each 2    vitamin D (CHOLECALCIFEROL) 1000 Unit (25 mcg) Tab Take 1,000 Units by mouth every morning.       No current facility-administered medications on file prior to visit.

## 2025-06-08 DIAGNOSIS — E03.1 CONGENITAL HYPOTHYROIDISM WITHOUT GOITER: ICD-10-CM

## 2025-06-09 RX ORDER — LEVOTHYROXINE SODIUM 100 UG/1
100 TABLET ORAL
Qty: 100 TABLET | Refills: 1 | Status: SHIPPED | OUTPATIENT
Start: 2025-06-09

## 2025-06-09 NOTE — TELEPHONE ENCOUNTER
Received request via: Pharmacy    Was the patient seen in the last year in this department? Yes    Does the patient have an active prescription (recently filled or refills available) for medication(s) requested? No    Pharmacy Name: [facility]/pharmacy #[id number] - [place], [place] - [address]      Does the patient have USP Plus and need 100-day supply? (This applies to ALL medications) Yes, quantity updated to 100 days

## 2025-06-13 DIAGNOSIS — G45.9 TIA (TRANSIENT ISCHEMIC ATTACK): ICD-10-CM

## 2025-06-16 RX ORDER — ATORVASTATIN CALCIUM 40 MG/1
40 TABLET, FILM COATED ORAL EVERY EVENING
Qty: 100 TABLET | Refills: 1 | Status: SHIPPED | OUTPATIENT
Start: 2025-06-16

## 2025-06-26 ENCOUNTER — OFFICE VISIT (OUTPATIENT)
Dept: SLEEP MEDICINE | Facility: MEDICAL CENTER | Age: 75
End: 2025-06-26
Attending: INTERNAL MEDICINE
Payer: MEDICARE

## 2025-06-26 VITALS
SYSTOLIC BLOOD PRESSURE: 158 MMHG | DIASTOLIC BLOOD PRESSURE: 70 MMHG | HEART RATE: 79 BPM | OXYGEN SATURATION: 90 % | WEIGHT: 150 LBS | HEIGHT: 67 IN | BODY MASS INDEX: 23.54 KG/M2

## 2025-06-26 DIAGNOSIS — J84.9 ILD (INTERSTITIAL LUNG DISEASE) (HCC): Primary | ICD-10-CM

## 2025-06-26 DIAGNOSIS — J43.2 CENTRILOBULAR EMPHYSEMA (HCC): ICD-10-CM

## 2025-06-26 DIAGNOSIS — J96.11 CHRONIC RESPIRATORY FAILURE WITH HYPOXIA (HCC): ICD-10-CM

## 2025-06-26 DIAGNOSIS — Z87.891 FORMER SMOKER: ICD-10-CM

## 2025-06-26 PROCEDURE — 99214 OFFICE O/P EST MOD 30 MIN: CPT | Performed by: INTERNAL MEDICINE

## 2025-06-26 PROCEDURE — 3078F DIAST BP <80 MM HG: CPT | Performed by: INTERNAL MEDICINE

## 2025-06-26 PROCEDURE — 99212 OFFICE O/P EST SF 10 MIN: CPT | Performed by: INTERNAL MEDICINE

## 2025-06-26 PROCEDURE — 3077F SYST BP >= 140 MM HG: CPT | Performed by: INTERNAL MEDICINE

## 2025-06-26 ASSESSMENT — ENCOUNTER SYMPTOMS
TREMORS: 0
HEMOPTYSIS: 0
FALLS: 0
HEARTBURN: 0
ABDOMINAL PAIN: 0
SHORTNESS OF BREATH: 0
DIARRHEA: 0
ORTHOPNEA: 0
WEAKNESS: 0
EYE PAIN: 0
COUGH: 0
EYE DISCHARGE: 0
DIZZINESS: 0
HEADACHES: 0
WEIGHT LOSS: 0
BLURRED VISION: 0
PALPITATIONS: 0
DOUBLE VISION: 0
FEVER: 0
DEPRESSION: 0
STRIDOR: 0
PHOTOPHOBIA: 0
CLAUDICATION: 0
DIAPHORESIS: 0
BACK PAIN: 0
SINUS PAIN: 0
VOMITING: 0
CHILLS: 0
NECK PAIN: 0
NAUSEA: 0
WHEEZING: 0
CONSTIPATION: 0
FOCAL WEAKNESS: 0
SPEECH CHANGE: 0
SORE THROAT: 0
EYE REDNESS: 0
PND: 0
MYALGIAS: 0
SPUTUM PRODUCTION: 0

## 2025-06-26 ASSESSMENT — FIBROSIS 4 INDEX: FIB4 SCORE: 3.85

## 2025-06-26 NOTE — PROGRESS NOTES
[facility]    Date of Service: 6/26/2025     Chief Complaint:   Chief Complaint   Patient presents with    Follow-Up     LAST SEEN 5/29/25    Results     PFT ON 7/1/25  ECHO ON 7/28/25       HPI:     Naomy Vargas is a 75 y.o. female presents to clinic today for f/u ILD c/b respiratory failure. Pt was last seen by me on 5/29/25. PMHx is significant for emphysema for which she was followed in pulmonary medicine clinic until 2021, hypothyroid, PVD and ILD. She is a former tobacco smoker with 40 pk year hx and quit roughly 10 years ago.  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  Family history is notable for a sister with chronic lung disease.  Per patient sister was evaluated at [facility] but exact diagnosis not known.  She does have 2 dogs at home.  She has not owned birds and does not currently.  She does not use down products.  No humidifiers in the home.  Hobbies include gardening.  Prior to hospitalization March 2023 for acute hypoxic respiratory failure she was on chronic Macrobid at least since 2021 for suppression of urinary tract infections.  At the time of hospitalization, CT chest showed bilateral, upper and lower lobe reticular changes with some associated groundglass opacity that were not present on a CT from 2021.  She had a positive EBEN 1: 640 with homogenous pattern.  There was concern for Macrobid induced lung toxicity and she was treated with steroid burst with prolonged taper over period of 6 to 8 weeks.  She continued to require supplemental oxygen with activity and pulmonary function testing from August 2023 showed significant decline in diffusion capacity with mild restriction which was new compared to pulmonary function testing from 2021.  Repeat pulmonary function testing from February 2024 was generally stable.  She has seen rheumatology who did not feel this was a clear connective tissue disease associated ILD  and extended autoimmune serology panel was sent and negative.  Overall she has good functional capacity and actually stopped using her oxygen between November 2024 and just prior to my first visit with her in May after noting a drop in her O2 on RA when getting the mail. She has since obtained POC and using 2lPM. Overall she feels stable since our last visit. We  had planned to update PFT which is scheduled for 7/1/25 and obtain TTE scheduled for 7/28/25.   Tobacco use  Former smoker with 40-pack-year history tobacco free for 10 years     Respiratory symptoms  Dyspnea onset: Initial onset was in early 2023  Aggravating factors:   Cough/mucus: None  Symptoms are initially improved but overall stable particular when using supplemental oxygen     Exposures associated with hypersensitivity pneumonitis  Pets, birds, livestock: None  Humidifiers or saunas: None  Feathers/down pillows: None  Mold exposure or water damage in home: None  Hobbies: Gardening  Welding history or quarts cutting: None  Work history: Manufacturing commercial cooking equipment  Spray painting, sandblasting, asbestos, chemical or fumes exposure: None     Symptoms of rheumatic disease/sarcoidosis  Joint pain/inflammation, digital ulcers, dry eyes, dry mouth, fatigue, fever, hair loss, muscle weakness or pain: None  Raynaud phenomenon: None  Skin thickening or telangiectasia: None  Rashes: None  Parotid swelling: None     Medication and radiation-induced lung toxicity  Hx of nitrofurantoin, amiodarone, chemotherapy, or biologic agents: YES  Hx of radiation therapy: none     Family history  Family history of lung disease, particularly ILD or lung fibrosis: none  History of premature graying, cirrhosis, aplastic anemia, other bone marrow diseases:     Gastrointestinal Symptoms:      Hx of acid reflux symptoms: none  Hx of aspiration: none    Review of Systems   Constitutional:  Negative for chills, diaphoresis, fever, malaise/fatigue and weight loss.    HENT:  Negative for congestion, ear discharge, ear pain, hearing loss, nosebleeds, sinus pain, sore throat and tinnitus.    Eyes:  Negative for blurred vision, double vision, photophobia, pain, discharge and redness.   Respiratory:  Negative for cough, hemoptysis, sputum production, shortness of breath, wheezing and stridor.    Cardiovascular:  Negative for chest pain, palpitations, orthopnea, claudication, leg swelling and PND.   Gastrointestinal:  Negative for abdominal pain, constipation, diarrhea, heartburn, nausea and vomiting.   Genitourinary:  Negative for dysuria and urgency.   Musculoskeletal:  Negative for back pain, falls, joint pain, myalgias and neck pain.   Skin:  Negative for itching and rash.   Neurological:  Negative for dizziness, tremors, speech change, focal weakness, weakness and headaches.   Endo/Heme/Allergies:  Negative for environmental allergies.   Psychiatric/Behavioral:  Negative for depression.        Past Medical History[1]    Past Surgical History[2]    Social History     Socioeconomic History    Marital status:      Spouse name: Not on file    Number of children: 2    Years of education: Not on file    Highest education level: Not on file   Occupational History    Occupation: retired   Tobacco Use    Smoking status: Former     Current packs/day: 0.00     Average packs/day: 0.5 packs/day for 53.0 years (26.5 ttl pk-yrs)     Types: Cigarettes     Start date: 1964     Quit date: 2017     Years since quittin.9     Passive exposure: Past    Smokeless tobacco: Never   Vaping Use    Vaping status: Never Used   Substance and Sexual Activity    Alcohol use: Yes     Comment: 2 per day wine    Drug use: No    Sexual activity: Not Currently     Partners: Male   Other Topics Concern    Not on file   Social History Narrative    Not on file     Social Drivers of Health     Financial Resource Strain: Low Risk  (2025)    Overall Financial Resource Strain (CARDIA)      Difficulty of Paying Living Expenses: Not hard at all   Food Insecurity: No Food Insecurity (4/7/2025)    Hunger Vital Sign     Worried About Running Out of Food in the Last Year: Never true     Ran Out of Food in the Last Year: Never true   Transportation Needs: No Transportation Needs (4/7/2025)    PRAPARE - Transportation     Lack of Transportation (Medical): No     Lack of Transportation (Non-Medical): No   Physical Activity: Inactive (4/7/2025)    Exercise Vital Sign     Days of Exercise per Week: 0 days     Minutes of Exercise per Session: 0 min   Stress: No Stress Concern Present (2/21/2024)    [employer] - Occupational Stress Questionnaire     Feeling of Stress : Only a little   Social Connections: Moderately Isolated (2/21/2024)    Social Connection and Isolation Panel [NHANES]     Frequency of Communication with Friends and Family: Twice a week     Frequency of Social Gatherings with Friends and Family: Once a week     Attends [employer] Services: Never     Active Member of Clubs or Organizations: No     Attends Club or Organization Meetings: Never     Marital Status:    Intimate Partner Violence: Not At Risk (7/29/2024)    Humiliation, Afraid, Rape, and Kick questionnaire     Fear of Current or Ex-Partner: No     Emotionally Abused: No     Physically Abused: No     Sexually Abused: No   Housing Stability: Unknown (4/7/2025)    Housing Stability Vital Sign     Unable to Pay for Housing in the Last Year: No     Number of Times Moved in the Last Year: Not on file     Homeless in the Last Year: No          Family History   Problem Relation Age of Onset    Arthritis Mother         hip fracture    Diabetes Mother     Cancer Mother         skin    Arthritis Father         lung    Alcohol/Drug Father         etoh    Lung Disease Sister         smoker/copd    Hypertension Sister     Other Brother         hep c    Arthritis Maternal Grandmother         hip fracture    Diabetes  "Maternal Grandfather     No Known Problems Son     No Known Problems Son     No Known Problems Brother     No Known Problems Sister     No Known Problems Brother     Hyperlipidemia Neg Hx     Stroke Neg Hx        Medications Ordered Prior to Encounter[3]    Colophony [pinus strobus], Latex, Neosporin [neomycin-polymyxin b], Phenylene, Soap, Tape, and Tea tree oil    Vitals:    06/26/25 1330   Height: 1.702 m (5' 7\")   Weight: 68 kg (150 lb)   Weight % change since last entry.: 0 %   BP: (!) 158/70   Pulse: 79   BMI (Calculated): 23.49        Physical Exam  Constitutional:       General: She is not in acute distress.     Appearance: Normal appearance. She is well-developed and normal weight.   HENT:      Head: Normocephalic and atraumatic.      Right Ear: External ear normal.      Left Ear: External ear normal.      Nose: Nose normal. No congestion.      Mouth/Throat:      Mouth: Mucous membranes are moist.      Pharynx: Oropharynx is clear. No oropharyngeal exudate.   Eyes:      General: No scleral icterus.     Extraocular Movements: Extraocular movements intact.      Conjunctiva/sclera: Conjunctivae normal.      Pupils: Pupils are equal, round, and reactive to light.   Neck:      Vascular: No JVD.      Trachea: No tracheal deviation.   Cardiovascular:      Rate and Rhythm: Normal rate and regular rhythm.      Heart sounds: Normal heart sounds. No murmur heard.     No friction rub. No gallop.   Pulmonary:      Effort: Pulmonary effort is normal. No accessory muscle usage or respiratory distress.      Breath sounds: No wheezing or rales.      Comments: Coarse inspiratory crackles LLL>RLL  Abdominal:      General: There is no distension.      Palpations: Abdomen is soft.      Tenderness: There is no abdominal tenderness.   Musculoskeletal:         General: No tenderness or deformity. Normal range of motion.      Cervical back: Neck supple.      Right lower leg: No edema.      Left lower leg: No edema. "   Lymphadenopathy:      Cervical: No cervical adenopathy.   Skin:     General: Skin is warm and dry.      Findings: No rash.      Nails: There is no clubbing.   Neurological:      Mental Status: She is alert and oriented to person, place, and time.      Cranial Nerves: No cranial nerve deficit.      Gait: Gait normal.   Psychiatric:         Behavior: Behavior normal.                                                                    PFT Trends   Date 4/18 11/21 8/23 2/24       FVC 2.6 3.32 2.64 2.94       TLC 5.85 6.36 3.92 4.54       DLCO 19.46 18.83 10.32 11.61             ILD Workup:     HRCT: none, CT chest last from 8/2024  CTD Panel: +EBEN 1:640 homogenous 3/23; +chromatin Ab (69) 9/23  Echo: TTE from 7/24 showed nl RV sz and fx, RVSP of 50 mmHg  HP Antibodies: negative 3/2023    Assessment/Plan:    Assessment & Plan  ILD (interstitial lung disease) (HCC)  Chronic. PFT stable from 6567-2660. CT and hx are highly suggestive of nitrofurantion induced lung toxicity. Repeat PFT pending. If stable can monitor. If declining, would repeat CT with HRCT protocol and consider addition of antifibrotic therapy. TTE pending to evaluate RV filling pressures.       Centrilobular emphysema (HCC)  Chronic. Tobacco free. Not currently on bronchodilator therapy       Chronic respiratory failure with hypoxia (HCC)  Chronic and 2/2 #1 and #2. Overall O2 needs are stable. Pt is compliant with and benefiting from O2 at 2LPM       Former smoker  Tobacco free for 8 years therefore still a candidate for lung Ca screening. Pending PFT, will either repeat CT or refer to lung Ca screening.             Return in about 8 weeks (around 8/21/2025) for Dr nadine alvarado for ILD; if not then Dr Perez.     This note was generated using voice recognition software which has a chance of producing errors of grammar and possibly content.  I have made every reasonable attempt to find and correct any obvious errors, but it should be expected  that some may not be found prior to finalization of this note.    Time spent in record review prior to patient arrival, reviewing results, and in face-to-face encounter totaled 30 min, excluding any procedures if performed.    Isabel Pak M.D.   [facility]        [1]   Past Medical History:  Diagnosis Date    Abnormal CT of the chest 03/24/2023    Acute hypoxemic respiratory failure (HCC) 03/23/2023    Acute kidney injury on CKD (chronic kidney disease) stage 3, GFR 30-59 ml/min 11/02/2013    Diagnosed at hospital, has stopped NSAIDs and is drinking more water.   Last labs within range.      Anxiety     Arthritis     wrists, back    Back pain     Blood transfusion without reported diagnosis     with ectopic preg x 2     Breath shortness     with exertion    Cataract     bilateral removal-Dr. Duke Talamantes    Cellulitis     right lower leg    Cellulitis of leg 11/01/2013    Followed by dermatology. Managed with fluocinonide and Bactroban on right leg Left leg she uses triamcinolone and Sarna pramocaine (anesthetic, antipruretic) on both.      Chickenpox     history of    COPD (chronic obstructive pulmonary disease) ([facility])     1/15/2025 has inhalers prn; pt states she hasn't used in months    Dental disorder     upper denture    Earache     Elevated troponin 03/23/2023    Fatigue     Frequent urination     Heart murmur     Hypertension 02/23/2018    on no meds at this time    Hypothyroidism     Influenza     Insomnia     Kidney disease     reports 1 kidney is smaller than the other, chronic kidney disease stage 3    Mumps     OAB (overactive bladder) 02/11/2019    Other hyperlipidemia 4/7/2025    Pain 02/23/2018    right leg and back, 5/10    Peripheral vascular disease, unspecified (HCC) 10/13/2021    Pneumonia 1980    Rash     Ringing in ears     Sepsis ([facility]) 03/23/2023    Shortness of breath     Swelling of lower extremity     Thyroid disease     takes levothyroxine daily    TIA (transient  ischemic attack) 07/29/2024    numbness in neck    Tonsillitis     history of    UTI (urinary tract infection)     chronic, been ongoing for 1 year    Venous stasis dermatitis     right leg   [2]   Past Surgical History:  Procedure Laterality Date    VITRECTOMY POSTERIOR  1/17/2025    Procedure: RIGHT EYE POSTERIOR VITRECTOMY, AIRFLUID GAS EXCHANGE [id number] %, ENDO LASER;  Surgeon: Stephan Resendez M.D.;  Location: SURGERY SAME DAY [facility];  Service: Ophthalmology    VITRECTOMY POSTERIOR Right 12/04/2024    Procedure: RIGHT EYE POSTERIOR VITRECTOMY, AIR FLUID GAS, EXCHANGE ENDOLASER,  25 GAUGE AND ANY OTHER INDICATED PROCEDURES;  Surgeon: Stephan Resendez M.D.;  Location: SURGERY SAME DAY [facility];  Service: Ophthalmology    NM THROMBOENDARTECTMY NECK,NECK INCIS Right 08/06/2024    Procedure: RIGHT CAROTID ENDARTERECTOMY WITH NEUROMONITORING;  Surgeon: Valerio Cortés M.D.;  Location: SURGERY [facility];  Service: Vascular    ANGIOPLASTY, WITH STENT INSERTION Right 08/06/2024    Procedure: Direct sheath insertion in the right carotid artery and right carotid angiogram;  Surgeon: Valerio Cortés M.D.;  Location: SURGERY [facility];  Service: Vascular    VEIN LIGATION RADIO FREQUENCY Right 02/26/2018    Procedure: VEIN LIGATION RADIO FREQUENCY- LONG SAPENOUS;  Surgeon: Jim Alas M.D.;  Location: [facility];  Service: General    ABDOMINAL EXPLORATION      2 ectopic preg    BREAST BIOPSY      hx of benign left breast biopsy    EYE SURGERY      cataract x2    HYSTERECTOMY, TOTAL ABDOMINAL  1977/1978    OOPHORECTOMY      PB MAMMARY DUCTOGRAM, SINGLE      NM REMV 2ND CATARACT,CORN-SCLER SECTN      TONSILLECTOMY     [3]   Current Outpatient Medications on File Prior to Visit   Medication Sig Dispense Refill    atorvastatin (LIPITOR) 40 MG Tab TAKE 1 TABLET BY MOUTH EVERY EVENING 100 Tablet 1    levothyroxine (SYNTHROID) 100 MCG Tab TAKE 1 TABLET BY MOUTH EVERY DAY BEFORE BREAKFAST 100 Tablet 1     aspirin 81 MG EC tablet Take 81 mg by mouth every morning.      loperamide (IMODIUM A-D) 2 MG tablet Take 2 mg by mouth 1 time a day as needed for Diarrhea.         TRIAMCINOLONE ACETONIDE EX Apply  topically as needed.      Multiple Vitamins-Minerals (HAIR SKIN & NAILS PO) Take 1-2 Tablets by mouth 2 times a day.      Probiotic Product (PROBIOTIC-10 PO) Take 1 Tablet by mouth every morning.      vitamin e (VITAMIN E) 400 UNIT Cap Take 400 Units by mouth every morning.      acetaminophen (TYLENOL) 500 MG Tab Take 500 mg by mouth at bedtime as needed.         diphenhydrAMINE HCl (BENADRYL PO) Take 25 mg by mouth at bedtime as needed.      Pseudoephedrine HCl (SUDAFED PO) Take 1 Tablet by mouth at bedtime as needed.         diphenhydrAMINE HCl, Sleep, (SLEEP-AID) 50 MG Cap Take 1 Capsule by mouth at bedtime as needed.      cyanocobalamin (VITAMIN B-12) 100 MCG Tab Take 1 Tablet by mouth every day. (Patient taking differently: Take 100 mcg by mouth every morning.   ) 30 Tablet 3    melatonin 3 MG Tab Take 3 mg by mouth at bedtime.      Mirabegron ER 50 MG TABLET SR 24 HR Take 50 mg by mouth every morning.         vitamin D (CHOLECALCIFEROL) 1000 Unit (25 mcg) Tab Take 1,000 Units by mouth every morning.      SYMBICORT 80-4.5 MCG/ACT Aerosol INHALE 2 PUFFS BY MOUTH TWICE A DAY (Patient not taking: Reported on 6/26/2025) 30.6 Each 2    PROAIR  (90 Base) MCG/ACT Aero Soln inhalation aerosol Inhale 1 Puff every 6 hours as needed for Shortness of Breath. (Patient not taking: Reported on 6/26/2025) 8.5 g 1     No current facility-administered medications on file prior to visit.

## 2025-07-01 ENCOUNTER — HOSPITAL ENCOUNTER (OUTPATIENT)
Dept: PULMONOLOGY | Facility: MEDICAL CENTER | Age: 75
End: 2025-07-01
Attending: INTERNAL MEDICINE
Payer: MEDICARE

## 2025-07-01 DIAGNOSIS — J84.9 ILD (INTERSTITIAL LUNG DISEASE) (HCC): ICD-10-CM

## 2025-07-01 DIAGNOSIS — J43.2 CENTRILOBULAR EMPHYSEMA (HCC): ICD-10-CM

## 2025-07-01 DIAGNOSIS — J96.11 CHRONIC RESPIRATORY FAILURE WITH HYPOXIA (HCC): ICD-10-CM

## 2025-07-01 PROCEDURE — 94726 PLETHYSMOGRAPHY LUNG VOLUMES: CPT

## 2025-07-01 PROCEDURE — 94729 DIFFUSING CAPACITY: CPT

## 2025-07-01 PROCEDURE — 94060 EVALUATION OF WHEEZING: CPT

## 2025-07-01 RX ORDER — ALBUTEROL SULFATE 5 MG/ML
2.5 SOLUTION RESPIRATORY (INHALATION)
Status: DISCONTINUED | OUTPATIENT
Start: 2025-07-01 | End: 2025-07-02 | Stop reason: HOSPADM

## 2025-07-01 RX ADMIN — ALBUTEROL SULFATE 2.5 MG: 5 SOLUTION RESPIRATORY (INHALATION) at 08:46

## 2025-07-17 NOTE — Clinical Note
Naomy Vargas  [address] [address]    July 17, 2025    Member Name: Naomy Vargas   Member Number: [id number]   Reference Number: [id number]   Approved Services: Echos and EKG   Approved Service Dates: 07/17/2025 - 11/14/2025   Requesting Provider: Isabel Pak   Requested Provider: [facility]     Dear Naomy Sam:    The following medical service(s) requested by Isabel Pak have been approved:    Procedure Code Procedure Code Name Requested Quantity Approved Quantity Status   68529 (CPT®) AK ECHO HEART XTHORACIC,COMPLETE W DOPPLER 1 1 Authorized       Approved Quantity means the number of visits approved for medication treatments and/or medical services.    The services should be provided by [facility] no later than 11/14/2025. Please contact the provider listed below with any questions. Your plan benefit may require a deductible, co-payment, or co-insurance for these services.    Provider Information:  [facility]  [phone]    Important Note for Members:    This authorization does not guarantee that [facility] or [facility] will pay for your care. Payment depends on your plan details, if you're eligible for coverage on the day you receive care, and whether the service follows plan rules. These include things such as reviewing if the service is medically necessary. We recommend reviewing your Evidence of Coverage before receiving any care.    Important Note for Providers:    Payment by [facility] or [facility] for these services is subject to the terms of the Evidence of Coverage or Summary Plan Description, eligibility at the time of service, standard processing procedures and confirmation of benefit coverage. All claims are subject to standard processing procedures, including but not limited to coding edits, medical necessity determinations, and other plan policies in effect at the time of claim  adjudication. Providers are required to follow all [facility] Administrative Guidelines and requirements.    For any questions or additional information, please contact Customer Service:    [facility] Toll Free: [phone]  TTY users dial: [phone]   Call Center Hours:  Oct 1 - Mar 31, Mon - Fri 7 AM to 8 PM PST  Oct 1 - Mar 31, Sat - Sun 8 AM to 8 PM PST  Apr 1 - Sep 30, Mon - Fri 7 AM to 8 PM PST   Office Hours: Mon - Fri 8 AM to 5 PM [facility]   E-mail: [email or web address]   Website:  [email or web address]      This information is available for free in other languages. Please contact Customer Service at the phone number above for more information. [facility] complies with applicable Federal civil rights laws and does not discriminate on the basis of race, color, national origin, age, disability or sex.    Sincerely,     Healthcare Utilization Management Department     Cc: [facility]   Isabel Pak    Multi-Language Insert  Multi- Services  English: We have free  services to answer any questions you may have about our health or drug plan.  To get an , just call us at [phone].  Someone who speaks English/Language can help you.  This is a free service.  Swedish: Tenemos servicios de intérprete sin costo alguno  para responder cualquier pregunta que pueda tener sobre nuestro plan de yaima o medicamentos. Para hablar con un intérprete, por favor llame al [phone]. Alguien que hable español le podrá ayudar. Kimberlee es un servicio gratuito.  Chinese Mandarin: ?????????????????????????????? ???????????????? [phone]????????????????? ?????????  Chinese Cantonese: ?????????????????????????????? ????????????? [phone]???????????????????? ????????  Tagalog:  Pretty Haney  mohini holthumphrey jay jay gracia  [phone]. Arjun humphreymodestaalfredo elierreuben hermilo Bacon.  Alexis ragsdale.  Macanese:  Nous proposons jyoti services gratuits d'interprétation pour répondre à toutes pat questions relatives à notre régime de santé ou d'assurance-médicaments. Pour accéder au service d'interprétation, il vous suffit de nous appeler au [phone]. Un interlocuteur [facility] pourra vous aider. Ce service est gratuit.  French:  Mauricio tôi có d?ch v? thông d?ch mi?n phí ð? tr? l?i các câu h?i v? chýõng s?c kh?e và chýõng trình thu?c men. N?u quí v? c?n thông d?ch viên johnna g?i [phone] s? có nhân viên nói ti?ng Vi?t giúp ð? quí v?. Ðây là d?ch v? mi?n phí .  Norwegian:  [facility]ser [facility] DolmetschersMercy Health – The Jewish Hospitalice beantLincoln Hospitalet Ihren Fragen zu unserem Gesundheits- und Arzneimittelplan. Unsere Dolmetscher erreichen Sie [phone]. Man wird Januaryn renetta auf [facility]. Dieser Service ist kostenIntermountain Healthcare.  Chinese:  ??? ?? ?? ?? ?? ??? ?? ??? ?? ???? ?? ?? ???? ???? ????. ?? ???? ????? ?? [phone] ??? ??? ????.  ???? ?? ???? ?? ?? ????. ? ???? ??? ?????.   Uzbek: Donya serrano âîçedyêíóò âîïðîñû îòíîñèòåëüíî ñòðàõîâîãî èëè ìåäèêàìåíòíîãî ïëàíà, âû ìîæåòå âîñïîëüçîâàòüñÿ íàøèìè áåñïëàòíûìè óñëóãàìè ïåðåâîä÷èêîâ. ×òîáû âîñïîëüçîâàòüñÿ óñëóãàìè ïåðåâîä÷èêà, ïîçâîíèòå íàì ïî òåëåôîíó [phone]. Âàì îêàæåò ïîìîùü ñîòðóäíèê, êîòîðûé ãîâîðèò ïî-póññêè. Äàííàÿ óñëóãà áåñïëàòíàÿ.  Hebrew: ÅääÇ äÞÏã ÎÏãÇÊ ÇáãÊÑÌã ÇáÝæÑí ÇáãÌÇäíÉ ááÅÌÇÈÉ Úä Ãí ÃÓÆáÉ ÊÊÚáÞ ÈÇáÕÍÉ Ãæ ÌÏæá ÇáÃÏæíÉ áÏíäÇ. ááÍÕæá Úáì ãÊÑÌã ÝæÑí¡ áíÓ Úáíß Óæì ÇáÇÊÕÇá ÈäÇ Úáì [phone] . ÓíÞæã ÔÎÕ ãÇ íÊÍÏË ÇáÚÑÈíÉ ÈãÓÇÚÏÊß. åÐå ÎÏãÉ ãÌÇäíÉ.  Neville: ????? ????????? ?? ??? ?? ????? ?? ???? ??? ???? ???? ?? ?????? ?? ???? ???? ?? ??? ????? ??? ????? ???????? ?????? ?????? ???. ?? ???????? ??????? ???? ?? ???, ?? ???? [phone] ?? ??? ????. ??? ??????? ?? ?????? ????? ?? ???? ??? ?? ???? ??.  ?? ?? ????? ???? ??.   Guyanese:  È disponibile un servizio di interpretariato gratuito per rispondere a eventuali domande sul nostro piano sanitario e farmaceutico. Per un interprete, contattare il vargas [phone]. Un nostro incaricato joanna parla Italianovi fornirà l'assistenza necessaria. È un servizio gratuito.  Portugués:  Dispomos de serviços de interpretação gratuitos para responder a qualquer questão que tenha acerca do nosso plano de saúde ou de medicação. Para obter um intérprete, contacte-nos através do número [phone]. Irá encontrar alguém que fale o idioma  Português para o ajudar. Kimberlee serviço é gratuito.  Sudanese Creole:  Nou genyen sèvis entèprèt gratis sharon reponn tout kesyon ou ta genyen konsènan plan medikal oswa dwòg nou an.  Sharon jwenn yon sonja, jis rele nou nan [phone]. Yon moun ki pale Kreyòl kapab amanuel w.  Sa a se yon sèvis ki gratis.  Polish:  Umo¿liwiamy bezp³atne skorzystanie z us³ug t³umacza ustnego, który pomo¿e w uzyskaniu odpowiedzi na temat planu zdrowotnego lub dawkowania leków. Raquel skorzystaæ z pomocy t³umacza znaj¹cebenjamin michel¿y zadzwoniæ pod numer [phone]. Ta us³uga jest bezp³atna.  Eritrean: ????? ??????? ????????????????????? ??????????????????????????????????[phone] ???????????????? ? ????????????????? ?????

## 2025-07-24 NOTE — PROCEDURES
PFT interpretation:     There is no significant obstruction or restriction. There is no significant bronchodilator response. The reduced DLCO and DL/VA can be seen with pulmonary emphysema, pulmonary vascular disease and/or interstitial lung disease. Even though the spirometry and total lung capacity appear normal, combined pulmonary fibrosis and emphysema can appear like this, especially with reduced diffusion capacity. Recommend clinical correlation and further work-up as appropriate.         Lacy Su MD  Pulmonary and Critical Care Medicine  [facility]

## 2025-07-28 ENCOUNTER — HOSPITAL ENCOUNTER (OUTPATIENT)
Dept: CARDIOLOGY | Facility: MEDICAL CENTER | Age: 75
End: 2025-07-28
Attending: INTERNAL MEDICINE
Payer: MEDICARE

## 2025-07-28 ENCOUNTER — HOSPITAL ENCOUNTER (OUTPATIENT)
Dept: LAB | Facility: MEDICAL CENTER | Age: 75
End: 2025-07-28
Attending: PHYSICIAN ASSISTANT
Payer: MEDICARE

## 2025-07-28 ENCOUNTER — HOSPITAL ENCOUNTER (OUTPATIENT)
Dept: LAB | Facility: MEDICAL CENTER | Age: 75
End: 2025-07-28
Attending: INTERNAL MEDICINE
Payer: MEDICARE

## 2025-07-28 DIAGNOSIS — J84.9 ILD (INTERSTITIAL LUNG DISEASE) (HCC): ICD-10-CM

## 2025-07-28 DIAGNOSIS — E03.9 HYPOTHYROIDISM, UNSPECIFIED TYPE: ICD-10-CM

## 2025-07-28 DIAGNOSIS — N18.31 STAGE 3A CHRONIC KIDNEY DISEASE: ICD-10-CM

## 2025-07-28 DIAGNOSIS — D64.9 ANEMIA, UNSPECIFIED TYPE: ICD-10-CM

## 2025-07-28 DIAGNOSIS — J43.2 CENTRILOBULAR EMPHYSEMA (HCC): ICD-10-CM

## 2025-07-28 DIAGNOSIS — J96.11 CHRONIC RESPIRATORY FAILURE WITH HYPOXIA (HCC): ICD-10-CM

## 2025-07-28 DIAGNOSIS — R80.9 MICROALBUMINURIA: ICD-10-CM

## 2025-07-28 LAB
ANION GAP SERPL CALC-SCNC: 13 MMOL/L (ref 7–16)
BUN SERPL-MCNC: 28 MG/DL (ref 8–22)
CALCIUM SERPL-MCNC: 9.3 MG/DL (ref 8.5–10.5)
CHLORIDE SERPL-SCNC: 102 MMOL/L (ref 96–112)
CO2 SERPL-SCNC: 24 MMOL/L (ref 20–33)
CREAT SERPL-MCNC: 1.07 MG/DL (ref 0.5–1.4)
CREAT UR-MCNC: 83.5 MG/DL
GFR SERPLBLD CREATININE-BSD FMLA CKD-EPI: 54 ML/MIN/1.73 M 2
GLUCOSE SERPL-MCNC: 91 MG/DL (ref 65–99)
IRON SATN MFR SERPL: 37 % (ref 15–55)
IRON SERPL-MCNC: 109 UG/DL (ref 40–170)
LV EJECT FRACT  99904: 62
LV EJECT FRACT MOD 2C 99903: 66.21
LV EJECT FRACT MOD 4C 99902: 55.25
LV EJECT FRACT MOD BP 99901: 61.48
MICROALBUMIN UR-MCNC: 46.9 MG/DL
MICROALBUMIN/CREAT UR: 562 MG/G (ref 0–30)
POTASSIUM SERPL-SCNC: 4.4 MMOL/L (ref 3.6–5.5)
SODIUM SERPL-SCNC: 139 MMOL/L (ref 135–145)
TIBC SERPL-MCNC: 293 UG/DL (ref 250–450)
TSH SERPL DL<=0.005 MIU/L-ACNC: 2.04 UIU/ML (ref 0.38–5.33)
UIBC SERPL-MCNC: 184 UG/DL (ref 110–370)

## 2025-07-28 PROCEDURE — 83540 ASSAY OF IRON: CPT

## 2025-07-28 PROCEDURE — 82570 ASSAY OF URINE CREATININE: CPT

## 2025-07-28 PROCEDURE — 93306 TTE W/DOPPLER COMPLETE: CPT | Mod: 26 | Performed by: INTERNAL MEDICINE

## 2025-07-28 PROCEDURE — 80048 BASIC METABOLIC PNL TOTAL CA: CPT

## 2025-07-28 PROCEDURE — 36415 COLL VENOUS BLD VENIPUNCTURE: CPT

## 2025-07-28 PROCEDURE — 84443 ASSAY THYROID STIM HORMONE: CPT

## 2025-07-28 PROCEDURE — 83550 IRON BINDING TEST: CPT

## 2025-07-28 PROCEDURE — 82043 UR ALBUMIN QUANTITATIVE: CPT

## 2025-07-28 PROCEDURE — 93306 TTE W/DOPPLER COMPLETE: CPT

## (undated) DEVICE — DRAPE SURGICAL U 77X120 - (10/CA)

## (undated) DEVICE — BOVIE BLADE COATED - (50/PK)

## (undated) DEVICE — GLOVE BIOGEL PI INDICATOR SZ 6.5 SURGICAL PF LF - (50/BX 4BX/CA)

## (undated) DEVICE — SUTURE GENERAL

## (undated) DEVICE — KIT RADIAL ARTERY 20GA W/MAX BARRIER AND BIOPATCH (5EA/CA) #10740 IS FOR THE SET RADIAL ARTERIAL

## (undated) DEVICE — DRAPE LARGE 3 QUARTER - (20/CA)

## (undated) DEVICE — GLOVE SZ 7.5 BIOGEL PI MICRO - PF LF (50PR/BX)

## (undated) DEVICE — SYRINGE 20 ML LL (50EA/BX 4BX/CA)

## (undated) DEVICE — CLOSURE FAST VNUS 7F X 100CM

## (undated) DEVICE — TUBE CONNECTING SUCTION - CLEAR PLASTIC STERILE 72 IN (50EA/CA)

## (undated) DEVICE — TRANSDUCER ADULT DISP. SINGLE BONDED STERILE - (20EA/CA)

## (undated) DEVICE — CANNULA ANTERIOR CHAMBER RANDOLPH NEEDLE (10EA/BX)

## (undated) DEVICE — PACK VITRECTOMY 25G 20K V W (1EA/BX)

## (undated) DEVICE — SLEEVE, VASO, THIGH, MED

## (undated) DEVICE — VESSELOOP MAXI BLUE STERILE- SURG-I-LOOP (10EA/BX)

## (undated) DEVICE — KIT  I.V. START (100EA/CA)

## (undated) DEVICE — STERI STRIP COMPOUND BENZOIN - TINCTURE 0.6ML WITH APPLICATOR (40EA/BX)

## (undated) DEVICE — SODIUM CHL. INJ. 0.9% 500ML (24EA/CA 50CA/PF)

## (undated) DEVICE — CANNULA O2 COMFORT SOFT EAR ADULT 7 FT TUBING (50/CA)

## (undated) DEVICE — PROTECTOR ULNA NERVE - (36PR/CA)

## (undated) DEVICE — TOWELS CLOTH SURGICAL - (4/PK 20PK/CA)

## (undated) DEVICE — SHEATH RO 7F 10CM

## (undated) DEVICE — NEEDLE SPINAL NON-SAFETY 20 GA X 3 IN (25/BX)

## (undated) DEVICE — SUCTION INSTRUMENT YANKAUER BULBOUS TIP W/O VENT (50EA/CA)

## (undated) DEVICE — NEPTUNE 4 PORT MANIFOLD - (20/PK)

## (undated) DEVICE — KIT SURGIFLO W/OUT THROMBIN - (6EA/CA)

## (undated) DEVICE — MEDICINE CUP STERILE 2 OZ - (100/CA)

## (undated) DEVICE — PACK AV FISTULA (2EA/CA)

## (undated) DEVICE — BACKFLUSH SOFT TIP ADVANCED 25G (6EA/BX)

## (undated) DEVICE — TUBING CLEARLINK DUO-VENT - C-FLO (48EA/CA)

## (undated) DEVICE — BAG ISOLATION 20X20 INVISI - SHEILD (10EA/BX)

## (undated) DEVICE — GOWN WARMING STANDARD FLEX - (30/CA)

## (undated) DEVICE — CANISTER SUCTION 3000ML MECHANICAL FILTER AUTO SHUTOFF MEDI-VAC NONSTERILE LF DISP (40EA/CA)

## (undated) DEVICE — CLIP SM INTNL HRZN TI ESCP LGT - (24EA/PK 25PK/BX)

## (undated) DEVICE — GLOVE BIOGEL PI INDICATOR SZ 7.5 SURGICAL PF LF -(50/BX 4BX/CA)

## (undated) DEVICE — SET LEADWIRE 5 LEAD BEDSIDE DISPOSABLE ECG (1SET OF 5/EA)

## (undated) DEVICE — ANTI-FOG SOLUTION - 60BTL/CA

## (undated) DEVICE — GLOVE SZ 6.5 BIOGEL PI MICRO - PF LF (50PR/BX)

## (undated) DEVICE — POLY UMBILICAL TAPE 1/8X30 - (36/BX)

## (undated) DEVICE — SODIUM CHL IRRIGATION 0.9% 1000ML (12EA/CA)

## (undated) DEVICE — SUTURE 4-0 30CM STRATAFIX SPIRAL PS-2 (12EA/BX)

## (undated) DEVICE — GELAQUASONIC 100 ULTRASOUND - 48/BX 20GM STERILE FOIL POUCH

## (undated) DEVICE — SET EXTENSION 31IN APPX 3.2ML WITH CLAMP (50/CA)WAS 4610-03

## (undated) DEVICE — WATER IRRIGATION STERILE 1000ML (12EA/CA)

## (undated) DEVICE — SHIELD OPTH AL GRTR CVR FOX (50EA/BX)

## (undated) DEVICE — TUBE E-T HI-LO CUFF 7.0MM (10EA/PK)

## (undated) DEVICE — ELECTRODE DUAL RETURN W/ CORD - (50/PK)

## (undated) DEVICE — SYRINGE AUTOFILL KIT - 10/BX

## (undated) DEVICE — SLEEVE VASO DVT COMPRESSION CALF MED - (10PR/CA)

## (undated) DEVICE — TOWEL STOP TIMEOUT SAFETY FLAG (40EA/CA)

## (undated) DEVICE — PROBE DIATHERMY DISP 25G (6EA/BX)

## (undated) DEVICE — WIPE SURGICAL INSTRUMENT VISIWIPE 2-7/8IN X 2-7/8IN (20EA/PK)

## (undated) DEVICE — LACTATED RINGERS INJ 1000 ML - (14EA/CA 60CA/PF)

## (undated) DEVICE — GOWN SURGICAL XX-LARGE - (28EA/CA) SIRUS NON REINFORCED

## (undated) DEVICE — GUIDEWIRES STARTER (PTFE COATED) J CURVED FIXED CORE 0.035 180CM 10 3 MM J FS

## (undated) DEVICE — GOWN SURGEONS X-LARGE - DISP. (30/CA)

## (undated) DEVICE — PAD PREP 24 X 48 CUFFED - (100/CA)

## (undated) DEVICE — COVER PROBE STERILE CONE (12EA/CA)

## (undated) DEVICE — BANDAGE ELASTIC 4 HONEYCOMB - 4"X5YD LF (20/CA)"

## (undated) DEVICE — CLIP MED INTNL HRZN TI ESCP - (25/BX)

## (undated) DEVICE — SET EXTENSION WITH 2 PORTS (48EA/CA) ***PART #2C8610 IS A SUBSTITUTE*****

## (undated) DEVICE — SUTURE 3-0 VICRYL PLUS SH - 8X 18 INCH (12/BX)

## (undated) DEVICE — SYRINGE 50 ML LL (40EA/BX 4BX/CA)

## (undated) DEVICE — SUTURE 3-0 SILK 12 X 18 IN - (36/BX)

## (undated) DEVICE — SENSOR OXIMETER ADULT SPO2 RD SET (20EA/BX)

## (undated) DEVICE — INTRAOP NEURO IN OR 1:1 PER 15 MIN

## (undated) DEVICE — PEROXIDE HYDROGEN 3% 32 OZ. (12EA/BX)

## (undated) DEVICE — SOD. CHL. INJ. 0.9% 250 ML - (36/CA 50CA/PF)

## (undated) DEVICE — CANISTER SUCTION 3000ML MECHANICAL FILTER AUTO SHUTOFF MEDI-VAC NONSTERILE LF DISP  (40EA/CA)

## (undated) DEVICE — BLADE SURGICAL #11 - (50/BX)

## (undated) DEVICE — SENSOR SPO2 NEO LNCS ADHESIVE (20/BX) SEE USER NOTES

## (undated) DEVICE — PROBE LASER ILLUMINATED FLEX CURVED 25G (6EA/BX)

## (undated) DEVICE — SUTURE CV

## (undated) DEVICE — SYRINGE NON SAFETY 3 CC 21 GA X 1 1/2 IN (100/BX 8BX/CA)

## (undated) DEVICE — MASK AIRWAY SIZE 3 UNIQUE SILICON (10/BX)

## (undated) DEVICE — COVER LIGHT HANDLE ALC PLUS DISP (18EA/BX)

## (undated) DEVICE — GLOVE SZ 6 BIOGEL PI MICRO - PF LF (50PR/BX 4BX/CA)

## (undated) DEVICE — MASK OXYGEN VNYL ADLT MED CONC WITH 7 FOOT TUBING - (50EA/CA)

## (undated) DEVICE — SUTURE 4-0 PROLENE PS-2 18 (36PK/BX)"

## (undated) DEVICE — DRAPE IOBAN II INCISE 23X17 - (10EA/BX 4BX/CA)

## (undated) DEVICE — PAD EYE GAUZE COVERED OVAL 1 5/8 X 2 5/8" STERILE"

## (undated) DEVICE — SPONGE GAUZESTER 4 X 4 4PLY - (128PK/CA)

## (undated) DEVICE — CHLORAPREP 26 ML APPLICATOR - ORANGE TINT(25/CA)

## (undated) DEVICE — RESERVOIR SUCTION 100 CC - SILICONE (20EA/CA)

## (undated) DEVICE — SHEET THYROID - (10EA/CA)

## (undated) DEVICE — SUTURE 7-0 VICRYL TG140-8 (12PK/BX)

## (undated) DEVICE — BAG DECANTER (50EA/CS)

## (undated) DEVICE — KIT ROOM DECONTAMINATION

## (undated) DEVICE — SUTURE 6-0 PROLENE C-1 D/A 24 (36PK/BX)"

## (undated) DEVICE — DRAPE C-ARM LARGE 41IN X 74 IN - (10/BX 2BX/CA)

## (undated) DEVICE — DRESSING TRANSPARENT FILM TEGADERM 2.375 X 2.75" (100EA/BX)"

## (undated) DEVICE — KIT ANESTHESIA W/CIRCUIT & 3/LT BAG W/FILTER (20EA/CA)

## (undated) DEVICE — NEEDLE FILTER ASPIRATION 18 GA X 1 1/2 IN (100EA/BX)

## (undated) DEVICE — BANDAGE ROLL STERILE BULKEE 4.5 IN X 4 YD (100EA/CA)

## (undated) DEVICE — GLOVE BIOGEL SZ 7 SURGICAL PF LTX - (50PR/BX 4BX/CA)

## (undated) DEVICE — NEEDLE SAFETY 18 GA X 1 1/2 IN (100EA/BX)

## (undated) DEVICE — SUTURE 6-0 PROLENE BV-1 D/A 24 (36PK/BX)"

## (undated) DEVICE — SUTURE 0 SILK TIES (36PK/BX)

## (undated) DEVICE — SLEEVE VASO CALF MED - (10PR/CA)

## (undated) DEVICE — TIP NEEDLE SOFT 25G X 0.8MM (10EA/BX)

## (undated) DEVICE — ELECTRODE 850 FOAM ADHESIVE - HYDROGEL RADIOTRNSPRNT (50/PK)

## (undated) DEVICE — GOWN SURGICAL X-LARGE ULTRA - FILM-REINFORCED (20/CA)

## (undated) DEVICE — Device

## (undated) DEVICE — STAPLER SKIN DISP - (6/BX 10BX/CA) VISISTAT

## (undated) DEVICE — GLOVE BIOGEL PI INDICATOR SZ 8.0 SURGICAL PF LF -(50/BX 4BX/CA)

## (undated) DEVICE — GLOVE SZ 8 BIOGEL PI MICRO - PF LF (50PR/BX)

## (undated) DEVICE — SUTURE 2-0 VICRYL PLUS CT-1 36 (36PK/BX)"

## (undated) DEVICE — PACK VITRECTOMY (1EA/CA)

## (undated) DEVICE — PENCIL ELECTSURG 10FT BTN SWH - (50/CA)

## (undated) DEVICE — HOLDER MAGNETIC NEEDLE DEVON - (12EA/BX 8BX/CA)

## (undated) DEVICE — CANISTER SUCTION RIGID RED 1500CC (40EA/CA)

## (undated) DEVICE — SUTURE 5-0 PROLENE BLUE C-1 HS 1 X 30 (36EA/BX)"

## (undated) DEVICE — DECANTER FLD BLS - (50/CA)

## (undated) DEVICE — GLOVE SZ 7 BIOGEL PI MICRO - PF LF (50PR/BX 4BX/CA)

## (undated) DEVICE — CORDS BIPOLAR COAGULATION - 12FT STERILE DISP. (10EA/BX)

## (undated) DEVICE — STAPLER 35MM SKIN WIDE (6EA/BX)

## (undated) DEVICE — PROBE 23 GA ILLUM FLEX CURVED - LASER(6/BX)

## (undated) DEVICE — MASK ANESTHESIA ADULT  - (100/CA)

## (undated) DEVICE — DRESSING TRANSPARENT FILM TEGADERM 4 X 4.75" (50EA/BX)"

## (undated) DEVICE — SYRINGE 30 ML LL (56/BX)

## (undated) DEVICE — DRAIN J-VAC 7MM FLAT - (10EA/CA)

## (undated) DEVICE — HEAD HOLDER JUNIOR/ADULT

## (undated) DEVICE — GLOVE BIOGEL PI INDICATOR SZ 7.0 SURGICAL PF LF - (50/BX 4BX/CA)

## (undated) DEVICE — GLOVE BIOGEL INDICATOR SZ 7.5 SURGICAL PF LTX - (50PR/BX 4BX/CA)

## (undated) DEVICE — SET INTRO MIRCROPUNCTURE - MPIS-501-SST

## (undated) DEVICE — JELLY, KY 2 0Z STERILE

## (undated) DEVICE — PACK MINOR BASIN - (2EA/CA)

## (undated) DEVICE — PAD LAP STERILE 18 X 18 - (5/PK 40PK/CA)

## (undated) DEVICE — SUTURE 4-0 SILK 12 X 18 INCH - (36/BX)

## (undated) DEVICE — DRAPE MAGNETIC (INSTRA-MAG) - (30/CA)

## (undated) DEVICE — VESSELOOP MINI BLUE STERILE - SURG-I-LOOP (10EA/BX)

## (undated) DEVICE — MASK, LARYNGEAL AIRWAY #4